# Patient Record
Sex: MALE | Race: WHITE | NOT HISPANIC OR LATINO | Employment: OTHER | URBAN - METROPOLITAN AREA
[De-identification: names, ages, dates, MRNs, and addresses within clinical notes are randomized per-mention and may not be internally consistent; named-entity substitution may affect disease eponyms.]

---

## 2021-09-09 ENCOUNTER — TELEPHONE (OUTPATIENT)
Dept: CT IMAGING | Facility: HOSPITAL | Age: 76
End: 2021-09-09

## 2021-09-09 ENCOUNTER — HOSPITAL ENCOUNTER (OUTPATIENT)
Dept: CT IMAGING | Facility: HOSPITAL | Age: 76
Discharge: HOME | End: 2021-09-09
Attending: OTOLARYNGOLOGY
Payer: MEDICARE

## 2021-09-09 DIAGNOSIS — D37.02 NEOPLASM OF UNCERTAIN BEHAVIOR OF BASE OF TONGUE: ICD-10-CM

## 2021-09-09 PROCEDURE — G1004 CDSM NDSC: HCPCS

## 2021-09-09 PROCEDURE — 70491 CT SOFT TISSUE NECK W/DYE: CPT

## 2021-09-09 RX ADMIN — IOHEXOL 85 ML: 350 INJECTION, SOLUTION INTRAVENOUS at 14:13

## 2021-09-09 NOTE — TELEPHONE ENCOUNTER
Called Dr MONTGOMERY [facility] office to get blood work results  According to his chart he had bloodwork in July thru the [place] but they have no results   Office will try to get results and fax to us before pt's 1:30 pm appt today

## 2021-10-03 DIAGNOSIS — Z11.59 SCREENING FOR VIRAL DISEASE: Primary | ICD-10-CM

## 2021-10-06 ENCOUNTER — TELEPHONE (OUTPATIENT)
Dept: URGENT CARE | Facility: CLINIC | Age: 76
End: 2021-10-06

## 2021-12-21 ENCOUNTER — HOSPITAL ENCOUNTER (INPATIENT)
Facility: HOSPITAL | Age: 76
LOS: 8 days | Discharge: HOME HEALTH | DRG: 871 | End: 2021-12-30
Attending: EMERGENCY MEDICINE | Admitting: INTERNAL MEDICINE
Payer: MEDICARE

## 2021-12-21 DIAGNOSIS — A41.9 SEPSIS (HCC): ICD-10-CM

## 2021-12-21 DIAGNOSIS — I26.99 PULMONARY EMBOLI (HCC): ICD-10-CM

## 2021-12-21 DIAGNOSIS — E83.42 HYPOMAGNESEMIA: ICD-10-CM

## 2021-12-21 DIAGNOSIS — J18.9 PNEUMONIA: ICD-10-CM

## 2021-12-21 DIAGNOSIS — R13.10 DYSPHAGIA, UNSPECIFIED TYPE: ICD-10-CM

## 2021-12-21 DIAGNOSIS — R74.01 TRANSAMINITIS: ICD-10-CM

## 2021-12-21 DIAGNOSIS — E11.9 TYPE 2 DIABETES MELLITUS WITHOUT COMPLICATION, WITHOUT LONG-TERM CURRENT USE OF INSULIN (HCC): ICD-10-CM

## 2021-12-21 DIAGNOSIS — E87.2 LACTIC ACIDOSIS: ICD-10-CM

## 2021-12-21 DIAGNOSIS — C01 PRIMARY MALIGNANT NEOPLASM OF BASE OF TONGUE (HCC): ICD-10-CM

## 2021-12-21 DIAGNOSIS — I10 ESSENTIAL (PRIMARY) HYPERTENSION: ICD-10-CM

## 2021-12-21 DIAGNOSIS — Z43.4 CHOLECYSTOSTOMY CARE (HCC): Primary | ICD-10-CM

## 2021-12-21 DIAGNOSIS — K81.9 CHOLECYSTITIS: ICD-10-CM

## 2021-12-21 PROCEDURE — 99285 EMERGENCY DEPT VISIT HI MDM: CPT

## 2021-12-21 PROCEDURE — 1124F ACP DISCUSS-NO DSCNMKR DOCD: CPT | Performed by: EMERGENCY MEDICINE

## 2021-12-22 ENCOUNTER — APPOINTMENT (EMERGENCY)
Dept: CT IMAGING | Facility: HOSPITAL | Age: 76
DRG: 871 | End: 2021-12-22
Payer: MEDICARE

## 2021-12-22 ENCOUNTER — APPOINTMENT (INPATIENT)
Dept: NON INVASIVE DIAGNOSTICS | Facility: HOSPITAL | Age: 76
DRG: 871 | End: 2021-12-22
Payer: MEDICARE

## 2021-12-22 ENCOUNTER — APPOINTMENT (INPATIENT)
Dept: ULTRASOUND IMAGING | Facility: HOSPITAL | Age: 76
DRG: 871 | End: 2021-12-22
Payer: MEDICARE

## 2021-12-22 ENCOUNTER — APPOINTMENT (EMERGENCY)
Dept: RADIOLOGY | Facility: HOSPITAL | Age: 76
DRG: 871 | End: 2021-12-22
Payer: MEDICARE

## 2021-12-22 PROBLEM — N52.9 ERECTILE DYSFUNCTION: Status: ACTIVE | Noted: 2021-12-22

## 2021-12-22 PROBLEM — R31.9 HEMATURIA: Status: ACTIVE | Noted: 2021-12-22

## 2021-12-22 PROBLEM — R13.10 DYSPHAGIA, UNSPECIFIED: Status: ACTIVE | Noted: 2021-12-22

## 2021-12-22 PROBLEM — K81.9 CHOLECYSTITIS: Status: ACTIVE | Noted: 2021-12-22

## 2021-12-22 PROBLEM — E87.2 LACTIC ACIDOSIS: Status: ACTIVE | Noted: 2021-12-22

## 2021-12-22 PROBLEM — M17.10 OSTEOARTHRITIS OF KNEE: Status: ACTIVE | Noted: 2021-12-22

## 2021-12-22 PROBLEM — F43.10 POST-TRAUMATIC STRESS DISORDER, UNSPECIFIED: Status: ACTIVE | Noted: 2021-12-22

## 2021-12-22 PROBLEM — I10 ESSENTIAL (PRIMARY) HYPERTENSION: Status: ACTIVE | Noted: 2021-12-22

## 2021-12-22 PROBLEM — K63.5 POLYP OF COLON: Status: ACTIVE | Noted: 2021-12-22

## 2021-12-22 PROBLEM — G56.00 CARPAL TUNNEL SYNDROME: Status: ACTIVE | Noted: 2021-12-22

## 2021-12-22 PROBLEM — I26.99 OTHER PULMONARY EMBOLISM WITHOUT ACUTE COR PULMONALE (HCC): Status: ACTIVE | Noted: 2021-12-22

## 2021-12-22 PROBLEM — F41.0 PANIC DISORDER: Status: ACTIVE | Noted: 2021-12-22

## 2021-12-22 PROBLEM — R01.1 HEART MURMUR: Status: ACTIVE | Noted: 2018-05-15

## 2021-12-22 PROBLEM — C01 PRIMARY MALIGNANT NEOPLASM OF BASE OF TONGUE (HCC): Status: ACTIVE | Noted: 2021-10-11

## 2021-12-22 PROBLEM — E78.5 HYPERLIPIDEMIA DUE TO TYPE 2 DIABETES MELLITUS (HCC): Status: ACTIVE | Noted: 2021-12-22

## 2021-12-22 PROBLEM — E11.69 HYPERLIPIDEMIA DUE TO TYPE 2 DIABETES MELLITUS (HCC): Status: ACTIVE | Noted: 2021-12-22

## 2021-12-22 PROBLEM — J18.9 COMMUNITY ACQUIRED PNEUMONIA OF LEFT LOWER LOBE OF LUNG: Status: ACTIVE | Noted: 2021-12-22

## 2021-12-22 PROBLEM — M17.9 OSTEOARTHRITIS OF KNEE: Status: ACTIVE | Noted: 2021-12-22

## 2021-12-22 PROBLEM — L57.0 ACTINIC KERATOSIS: Status: ACTIVE | Noted: 2021-12-22

## 2021-12-22 PROBLEM — F41.9 ANXIETY: Status: ACTIVE | Noted: 2021-12-22

## 2021-12-22 PROBLEM — Z85.51 HISTORY OF MALIGNANT NEOPLASM OF BLADDER: Status: ACTIVE | Noted: 2021-12-22

## 2021-12-22 PROBLEM — E87.20 LACTIC ACIDOSIS: Status: ACTIVE | Noted: 2021-12-22

## 2021-12-22 PROBLEM — F43.12 CHRONIC POST-TRAUMATIC STRESS DISORDER (PTSD) AFTER MILITARY COMBAT: Status: ACTIVE | Noted: 2021-12-22

## 2021-12-22 PROBLEM — I49.9 CARDIAC ARRHYTHMIA: Status: ACTIVE | Noted: 2018-05-15

## 2021-12-22 PROBLEM — E87.1 HYPONATREMIA: Status: ACTIVE | Noted: 2021-12-22

## 2021-12-22 PROBLEM — E11.9 TYPE 2 DIABETES MELLITUS (HCC): Status: ACTIVE | Noted: 2021-12-22

## 2021-12-22 PROBLEM — D48.5 NEOPLASM OF UNCERTAIN BEHAVIOR OF SKIN: Status: ACTIVE | Noted: 2021-12-22

## 2021-12-22 PROBLEM — I51.7 LEFT VENTRICULAR HYPERTROPHY: Status: ACTIVE | Noted: 2018-05-15

## 2021-12-22 PROBLEM — D37.05 NEOPLASM OF UNCERTAIN BEHAVIOR OF OROPHARYNX: Status: ACTIVE | Noted: 2021-09-23

## 2021-12-22 PROBLEM — F17.200 TOBACCO DEPENDENCE: Status: ACTIVE | Noted: 2021-12-22

## 2021-12-22 PROBLEM — A41.9 SEPSIS (HCC): Status: ACTIVE | Noted: 2021-12-22

## 2021-12-22 PROBLEM — C67.9 MALIGNANT NEOPLASM OF URINARY BLADDER (HCC): Status: ACTIVE | Noted: 2021-12-22

## 2021-12-22 PROBLEM — D49.1 NEOPLASM OF LUNG: Status: ACTIVE | Noted: 2021-12-22

## 2021-12-22 PROBLEM — E43 SEVERE PROTEIN-CALORIE MALNUTRITION (HCC): Status: ACTIVE | Noted: 2021-11-24

## 2021-12-22 LAB
2HR DELTA HS TROPONIN: 2 NG/L
4HR DELTA HS TROPONIN: 5 NG/L
ALBUMIN SERPL BCP-MCNC: 3.9 G/DL (ref 3.5–5)
ALP SERPL-CCNC: 77 U/L (ref 34–104)
ALT SERPL W P-5'-P-CCNC: 17 U/L (ref 7–52)
ANION GAP SERPL CALCULATED.3IONS-SCNC: 12 MMOL/L (ref 4–13)
ANISOCYTOSIS BLD QL SMEAR: PRESENT
AORTIC ROOT: 3.3 CM
AORTIC VALVE MEAN VELOCITY: 9.9 M/S
APICAL FOUR CHAMBER EJECTION FRACTION: 72 %
APTT PPP: 31 SECONDS (ref 23–37)
ASCENDING AORTA: 3.3 CM
AST SERPL W P-5'-P-CCNC: 19 U/L (ref 13–39)
ATRIAL RATE: 112 BPM
ATRIAL RATE: 115 BPM
ATRIAL RATE: 122 BPM
AV AREA BY CONTINUOUS VTI: 2.7 CM2
AV AREA PEAK VELOCITY: 2.6 CM2
AV LVOT MEAN GRADIENT: 3 MMHG
AV LVOT PEAK GRADIENT: 5 MMHG
AV MEAN GRADIENT: 4 MMHG
AV PEAK GRADIENT: 7 MMHG
AV VALVE AREA: 2.69 CM2
BASE EX.OXY STD BLDV CALC-SCNC: 52.9 % (ref 60–80)
BASE EXCESS BLDV CALC-SCNC: 0.4 MMOL/L
BASOPHILS # BLD MANUAL: 0 THOUSAND/UL (ref 0–0.1)
BASOPHILS NFR MAR MANUAL: 0 % (ref 0–1)
BILIRUB SERPL-MCNC: 0.94 MG/DL (ref 0.2–1)
BILIRUB UR QL STRIP: NEGATIVE
BNP SERPL-MCNC: 88 PG/ML (ref 1–100)
BUN SERPL-MCNC: 23 MG/DL (ref 5–25)
CALCIUM SERPL-MCNC: 9.7 MG/DL (ref 8.4–10.2)
CARDIAC TROPONIN I PNL SERPL HS: 16 NG/L
CARDIAC TROPONIN I PNL SERPL HS: 18 NG/L
CARDIAC TROPONIN I PNL SERPL HS: 21 NG/L
CHLORIDE SERPL-SCNC: 89 MMOL/L (ref 96–108)
CLARITY UR: CLEAR
CO2 SERPL-SCNC: 25 MMOL/L (ref 21–32)
COLOR UR: YELLOW
CREAT SERPL-MCNC: 0.9 MG/DL (ref 0.6–1.3)
DOP CALC AO VTI: 24.45 CM
DOP CALC LVOT AREA: 3.14 CM2
DOP CALC LVOT DIAMETER: 2 CM
DOP CALC LVOT PEAK VEL VTI: 20.98 CM
DOP CALC LVOT PEAK VEL: 1.13 M/S
DOP CALC LVOT STROKE INDEX: 31.2 ML/M2
DOP CALC LVOT STROKE VOLUME: 65.88 CM3
E WAVE DECELERATION TIME: 220 MS
EOSINOPHIL # BLD MANUAL: 0 THOUSAND/UL (ref 0–0.4)
EOSINOPHIL NFR BLD MANUAL: 0 % (ref 0–6)
ERYTHROCYTE [DISTWIDTH] IN BLOOD BY AUTOMATED COUNT: 19.3 % (ref 11.6–15.1)
EST. AVERAGE GLUCOSE BLD GHB EST-MCNC: 134 MG/DL
FLUAV RNA RESP QL NAA+PROBE: NEGATIVE
FLUBV RNA RESP QL NAA+PROBE: NEGATIVE
FRACTIONAL SHORTENING: 33 % (ref 28–44)
GFR SERPL CREATININE-BSD FRML MDRD: 83 ML/MIN/1.73SQ M
GLUCOSE SERPL-MCNC: 131 MG/DL (ref 65–140)
GLUCOSE SERPL-MCNC: 157 MG/DL (ref 65–140)
GLUCOSE SERPL-MCNC: 182 MG/DL (ref 65–140)
GLUCOSE SERPL-MCNC: 206 MG/DL (ref 65–140)
GLUCOSE SERPL-MCNC: 226 MG/DL (ref 65–140)
GLUCOSE UR STRIP-MCNC: ABNORMAL MG/DL
HBA1C MFR BLD: 6.3 %
HCO3 BLDV-SCNC: 25.4 MMOL/L (ref 24–30)
HCT VFR BLD AUTO: 36 % (ref 36.5–49.3)
HGB BLD-MCNC: 11.8 G/DL (ref 12–17)
HGB UR QL STRIP.AUTO: NEGATIVE
INR PPP: 1.01 (ref 0.84–1.19)
INTERVENTRICULAR SEPTUM IN DIASTOLE (PARASTERNAL SHORT AXIS VIEW): 1.3 CM
KETONES UR STRIP-MCNC: NEGATIVE MG/DL
LACTATE SERPL-SCNC: 1.9 MMOL/L (ref 0.5–2)
LACTATE SERPL-SCNC: 3.4 MMOL/L (ref 0.5–2)
LEFT ATRIUM AREA SYSTOLE SINGLE PLANE A4C: 11.5 CM2
LEFT INTERNAL DIMENSION IN SYSTOLE: 2.9 CM (ref 2.1–4)
LEFT VENTRICULAR INTERNAL DIMENSION IN DIASTOLE: 4.3 CM (ref 4.93–7.34)
LEFT VENTRICULAR POSTERIOR WALL IN END DIASTOLE: 1.1 CM
LEFT VENTRICULAR STROKE VOLUME: 49 ML
LEUKOCYTE ESTERASE UR QL STRIP: NEGATIVE
LYMPHOCYTES # BLD AUTO: 0.06 THOUSAND/UL (ref 0.6–4.47)
LYMPHOCYTES # BLD AUTO: 1 % (ref 14–44)
MAGNESIUM SERPL-MCNC: 1.7 MG/DL (ref 1.9–2.7)
MCH RBC QN AUTO: 29.2 PG (ref 26.8–34.3)
MCHC RBC AUTO-ENTMCNC: 32.8 G/DL (ref 31.4–37.4)
MCV RBC AUTO: 89 FL (ref 82–98)
MONOCYTES # BLD AUTO: 0.52 THOUSAND/UL (ref 0–1.22)
MONOCYTES NFR BLD: 8 % (ref 4–12)
MV E'TISSUE VEL-SEP: 9 CM/S
MV PEAK A VEL: 0.76 M/S
MV PEAK E VEL: 68 CM/S
MV STENOSIS PRESSURE HALF TIME: 0 MS
NEUTROPHILS # BLD MANUAL: 5.9 THOUSAND/UL (ref 1.85–7.62)
NEUTS BAND NFR BLD MANUAL: 8 % (ref 0–8)
NEUTS SEG NFR BLD AUTO: 83 % (ref 43–75)
NITRITE UR QL STRIP: NEGATIVE
O2 CT BLDV-SCNC: 9.6 ML/DL
P AXIS: 52 DEGREES
P AXIS: 55 DEGREES
P AXIS: 61 DEGREES
PCO2 BLDV: 42.5 MM HG (ref 42–50)
PH BLDV: 7.39 [PH] (ref 7.3–7.4)
PH UR STRIP.AUTO: 6.5 [PH]
PLATELET # BLD AUTO: 225 THOUSANDS/UL (ref 149–390)
PLATELET BLD QL SMEAR: ADEQUATE
PMV BLD AUTO: 9.9 FL (ref 8.9–12.7)
PO2 BLDV: 25.3 MM HG (ref 35–45)
POTASSIUM SERPL-SCNC: 4.5 MMOL/L (ref 3.5–5.3)
PR INTERVAL: 178 MS
PR INTERVAL: 180 MS
PR INTERVAL: 184 MS
PROCALCITONIN SERPL-MCNC: 0.41 NG/ML
PROT SERPL-MCNC: 7.4 G/DL (ref 6.4–8.4)
PROT UR STRIP-MCNC: NEGATIVE MG/DL
PROTHROMBIN TIME: 13.2 SECONDS (ref 11.6–14.5)
QRS AXIS: 15 DEGREES
QRS AXIS: 20 DEGREES
QRS AXIS: 42 DEGREES
QRSD INTERVAL: 108 MS
QRSD INTERVAL: 116 MS
QRSD INTERVAL: 118 MS
QT INTERVAL: 306 MS
QT INTERVAL: 334 MS
QT INTERVAL: 338 MS
QTC INTERVAL: 436 MS
QTC INTERVAL: 461 MS
QTC INTERVAL: 462 MS
RBC # BLD AUTO: 4.04 MILLION/UL (ref 3.88–5.62)
RIGHT ATRIUM AREA SYSTOLE A4C: 10.2 CM2
RIGHT VENTRICLE ID DIMENSION: 2.7 CM
RSV RNA RESP QL NAA+PROBE: NEGATIVE
SARS-COV-2 RNA RESP QL NAA+PROBE: NEGATIVE
SL CV LV EF: 70
SL CV PED ECHO LEFT VENTRICLE DIASTOLIC VOLUME (MOD BIPLANE) 2D: 81 ML
SL CV PED ECHO LEFT VENTRICLE SYSTOLIC VOLUME (MOD BIPLANE) 2D: 32 ML
SODIUM SERPL-SCNC: 126 MMOL/L (ref 135–147)
SP GR UR STRIP.AUTO: <=1.005 (ref 1–1.03)
T WAVE AXIS: 48 DEGREES
T WAVE AXIS: 50 DEGREES
T WAVE AXIS: 51 DEGREES
TRICUSPID VALVE S': 1.1 CM/S
UROBILINOGEN UR QL STRIP.AUTO: 0.2 E.U./DL
VENTRICULAR RATE: 112 BPM
VENTRICULAR RATE: 115 BPM
VENTRICULAR RATE: 122 BPM
WBC # BLD AUTO: 6.48 THOUSAND/UL (ref 4.31–10.16)
Z-SCORE OF LEFT VENTRICULAR DIMENSION IN END SYSTOLE: -3.36

## 2021-12-22 PROCEDURE — 83605 ASSAY OF LACTIC ACID: CPT | Performed by: EMERGENCY MEDICINE

## 2021-12-22 PROCEDURE — 84145 PROCALCITONIN (PCT): CPT | Performed by: EMERGENCY MEDICINE

## 2021-12-22 PROCEDURE — G1004 CDSM NDSC: HCPCS

## 2021-12-22 PROCEDURE — 85730 THROMBOPLASTIN TIME PARTIAL: CPT | Performed by: EMERGENCY MEDICINE

## 2021-12-22 PROCEDURE — 85007 BL SMEAR W/DIFF WBC COUNT: CPT | Performed by: EMERGENCY MEDICINE

## 2021-12-22 PROCEDURE — 74177 CT ABD & PELVIS W/CONTRAST: CPT

## 2021-12-22 PROCEDURE — 71045 X-RAY EXAM CHEST 1 VIEW: CPT

## 2021-12-22 PROCEDURE — 84484 ASSAY OF TROPONIN QUANT: CPT | Performed by: EMERGENCY MEDICINE

## 2021-12-22 PROCEDURE — 87040 BLOOD CULTURE FOR BACTERIA: CPT | Performed by: EMERGENCY MEDICINE

## 2021-12-22 PROCEDURE — 96375 TX/PRO/DX INJ NEW DRUG ADDON: CPT

## 2021-12-22 PROCEDURE — 81003 URINALYSIS AUTO W/O SCOPE: CPT | Performed by: EMERGENCY MEDICINE

## 2021-12-22 PROCEDURE — 92610 EVALUATE SWALLOWING FUNCTION: CPT | Performed by: NURSE PRACTITIONER

## 2021-12-22 PROCEDURE — 93010 ELECTROCARDIOGRAM REPORT: CPT | Performed by: INTERNAL MEDICINE

## 2021-12-22 PROCEDURE — 82948 REAGENT STRIP/BLOOD GLUCOSE: CPT

## 2021-12-22 PROCEDURE — 99223 1ST HOSP IP/OBS HIGH 75: CPT | Performed by: PHYSICIAN ASSISTANT

## 2021-12-22 PROCEDURE — 99222 1ST HOSP IP/OBS MODERATE 55: CPT | Performed by: SPECIALIST

## 2021-12-22 PROCEDURE — 93306 TTE W/DOPPLER COMPLETE: CPT

## 2021-12-22 PROCEDURE — 83036 HEMOGLOBIN GLYCOSYLATED A1C: CPT | Performed by: PHYSICIAN ASSISTANT

## 2021-12-22 PROCEDURE — 85027 COMPLETE CBC AUTOMATED: CPT | Performed by: EMERGENCY MEDICINE

## 2021-12-22 PROCEDURE — 96361 HYDRATE IV INFUSION ADD-ON: CPT

## 2021-12-22 PROCEDURE — 76705 ECHO EXAM OF ABDOMEN: CPT

## 2021-12-22 PROCEDURE — 36415 COLL VENOUS BLD VENIPUNCTURE: CPT | Performed by: EMERGENCY MEDICINE

## 2021-12-22 PROCEDURE — 93306 TTE W/DOPPLER COMPLETE: CPT | Performed by: INTERNAL MEDICINE

## 2021-12-22 PROCEDURE — 83735 ASSAY OF MAGNESIUM: CPT | Performed by: EMERGENCY MEDICINE

## 2021-12-22 PROCEDURE — 99285 EMERGENCY DEPT VISIT HI MDM: CPT | Performed by: EMERGENCY MEDICINE

## 2021-12-22 PROCEDURE — 93970 EXTREMITY STUDY: CPT

## 2021-12-22 PROCEDURE — 0241U HB NFCT DS VIR RESP RNA 4 TRGT: CPT | Performed by: EMERGENCY MEDICINE

## 2021-12-22 PROCEDURE — 83880 ASSAY OF NATRIURETIC PEPTIDE: CPT | Performed by: EMERGENCY MEDICINE

## 2021-12-22 PROCEDURE — 93970 EXTREMITY STUDY: CPT | Performed by: SURGERY

## 2021-12-22 PROCEDURE — 96365 THER/PROPH/DIAG IV INF INIT: CPT

## 2021-12-22 PROCEDURE — 80053 COMPREHEN METABOLIC PANEL: CPT | Performed by: EMERGENCY MEDICINE

## 2021-12-22 PROCEDURE — 71275 CT ANGIOGRAPHY CHEST: CPT

## 2021-12-22 PROCEDURE — 82805 BLOOD GASES W/O2 SATURATION: CPT | Performed by: EMERGENCY MEDICINE

## 2021-12-22 PROCEDURE — 85610 PROTHROMBIN TIME: CPT | Performed by: EMERGENCY MEDICINE

## 2021-12-22 PROCEDURE — 93005 ELECTROCARDIOGRAM TRACING: CPT

## 2021-12-22 RX ORDER — FLUTICASONE PROPIONATE 50 MCG
1 SPRAY, SUSPENSION (ML) NASAL DAILY
Status: DISCONTINUED | OUTPATIENT
Start: 2021-12-22 | End: 2021-12-30 | Stop reason: HOSPADM

## 2021-12-22 RX ORDER — MAGNESIUM SULFATE HEPTAHYDRATE 40 MG/ML
2 INJECTION, SOLUTION INTRAVENOUS ONCE
Status: COMPLETED | OUTPATIENT
Start: 2021-12-22 | End: 2021-12-22

## 2021-12-22 RX ORDER — LISINOPRIL 20 MG/1
40 TABLET ORAL DAILY
Status: DISCONTINUED | OUTPATIENT
Start: 2021-12-22 | End: 2021-12-24

## 2021-12-22 RX ORDER — PANTOPRAZOLE SODIUM 20 MG/1
20 TABLET, DELAYED RELEASE ORAL DAILY
Status: DISCONTINUED | OUTPATIENT
Start: 2021-12-22 | End: 2021-12-24

## 2021-12-22 RX ORDER — ATORVASTATIN CALCIUM 40 MG/1
40 TABLET, FILM COATED ORAL
Status: DISCONTINUED | OUTPATIENT
Start: 2021-12-22 | End: 2021-12-24

## 2021-12-22 RX ORDER — ONDANSETRON 2 MG/ML
4 INJECTION INTRAMUSCULAR; INTRAVENOUS EVERY 6 HOURS PRN
Status: DISCONTINUED | OUTPATIENT
Start: 2021-12-22 | End: 2021-12-30 | Stop reason: HOSPADM

## 2021-12-22 RX ORDER — METOPROLOL TARTRATE 5 MG/5ML
5 INJECTION INTRAVENOUS EVERY 8 HOURS
Status: DISCONTINUED | OUTPATIENT
Start: 2021-12-22 | End: 2021-12-24

## 2021-12-22 RX ORDER — ACETAMINOPHEN 650 MG/1
650 SUPPOSITORY RECTAL EVERY 4 HOURS PRN
Status: DISCONTINUED | OUTPATIENT
Start: 2021-12-22 | End: 2021-12-30 | Stop reason: HOSPADM

## 2021-12-22 RX ORDER — KETOROLAC TROMETHAMINE 30 MG/ML
15 INJECTION, SOLUTION INTRAMUSCULAR; INTRAVENOUS EVERY 6 HOURS PRN
Status: DISPENSED | OUTPATIENT
Start: 2021-12-22 | End: 2021-12-24

## 2021-12-22 RX ORDER — HYDROMORPHONE HCL/PF 1 MG/ML
0.5 SYRINGE (ML) INJECTION ONCE
Status: COMPLETED | OUTPATIENT
Start: 2021-12-22 | End: 2021-12-22

## 2021-12-22 RX ORDER — ACETAMINOPHEN 325 MG/1
975 TABLET ORAL ONCE
Status: COMPLETED | OUTPATIENT
Start: 2021-12-22 | End: 2021-12-22

## 2021-12-22 RX ORDER — GLYCOPYRROLATE 0.2 MG/ML
0.1 INJECTION INTRAMUSCULAR; INTRAVENOUS EVERY 8 HOURS PRN
Status: DISCONTINUED | OUTPATIENT
Start: 2021-12-22 | End: 2021-12-30 | Stop reason: HOSPADM

## 2021-12-22 RX ORDER — SODIUM CHLORIDE 9 MG/ML
125 INJECTION, SOLUTION INTRAVENOUS CONTINUOUS
Status: DISCONTINUED | OUTPATIENT
Start: 2021-12-22 | End: 2021-12-23

## 2021-12-22 RX ORDER — ASPIRIN 81 MG/1
81 TABLET ORAL DAILY
Status: DISCONTINUED | OUTPATIENT
Start: 2021-12-22 | End: 2021-12-24

## 2021-12-22 RX ORDER — ACETAMINOPHEN 325 MG/1
650 TABLET ORAL EVERY 6 HOURS PRN
Status: DISCONTINUED | OUTPATIENT
Start: 2021-12-22 | End: 2021-12-22

## 2021-12-22 RX ADMIN — KETOROLAC TROMETHAMINE 15 MG: 30 INJECTION, SOLUTION INTRAMUSCULAR; INTRAVENOUS at 21:40

## 2021-12-22 RX ADMIN — VANCOMYCIN HYDROCHLORIDE 1250 MG: 1 INJECTION, POWDER, LYOPHILIZED, FOR SOLUTION INTRAVENOUS at 03:18

## 2021-12-22 RX ADMIN — MAGNESIUM SULFATE HEPTAHYDRATE 2 G: 40 INJECTION, SOLUTION INTRAVENOUS at 05:42

## 2021-12-22 RX ADMIN — PIPERACILLIN SODIUM AND TAZOBACTAM SODIUM 4.5 G: 4; .5 INJECTION, POWDER, LYOPHILIZED, FOR SOLUTION INTRAVENOUS at 17:28

## 2021-12-22 RX ADMIN — PIPERACILLIN SODIUM AND TAZOBACTAM SODIUM 4.5 G: 4; .5 INJECTION, POWDER, LYOPHILIZED, FOR SOLUTION INTRAVENOUS at 07:37

## 2021-12-22 RX ADMIN — ACETAMINOPHEN 975 MG: 325 TABLET ORAL at 02:27

## 2021-12-22 RX ADMIN — PIPERACILLIN SODIUM AND TAZOBACTAM SODIUM 4.5 G: 4; .5 INJECTION, POWDER, LYOPHILIZED, FOR SOLUTION INTRAVENOUS at 13:01

## 2021-12-22 RX ADMIN — ENOXAPARIN SODIUM 80 MG: 100 INJECTION, SOLUTION INTRAVENOUS; SUBCUTANEOUS at 17:30

## 2021-12-22 RX ADMIN — IOHEXOL 100 ML: 350 INJECTION, SOLUTION INTRAVENOUS at 03:03

## 2021-12-22 RX ADMIN — SODIUM CHLORIDE 1000 ML: 0.9 INJECTION, SOLUTION INTRAVENOUS at 02:42

## 2021-12-22 RX ADMIN — METOPROLOL TARTRATE 25 MG: 25 TABLET, FILM COATED ORAL at 09:54

## 2021-12-22 RX ADMIN — PIPERACILLIN AND TAZOBACTAM 3.38 G: 3; .375 INJECTION, POWDER, FOR SOLUTION INTRAVENOUS at 01:41

## 2021-12-22 RX ADMIN — SODIUM CHLORIDE 125 ML/HR: 0.9 INJECTION, SOLUTION INTRAVENOUS at 23:00

## 2021-12-22 RX ADMIN — SODIUM CHLORIDE 125 ML/HR: 0.9 INJECTION, SOLUTION INTRAVENOUS at 05:39

## 2021-12-22 RX ADMIN — HYDROMORPHONE HYDROCHLORIDE 0.5 MG: 1 INJECTION, SOLUTION INTRAMUSCULAR; INTRAVENOUS; SUBCUTANEOUS at 02:36

## 2021-12-22 RX ADMIN — PIPERACILLIN SODIUM AND TAZOBACTAM SODIUM 4.5 G: 4; .5 INJECTION, POWDER, LYOPHILIZED, FOR SOLUTION INTRAVENOUS at 23:00

## 2021-12-22 RX ADMIN — SODIUM CHLORIDE 1000 ML: 0.9 INJECTION, SOLUTION INTRAVENOUS at 01:22

## 2021-12-22 RX ADMIN — ENOXAPARIN SODIUM 80 MG: 100 INJECTION, SOLUTION INTRAVENOUS; SUBCUTANEOUS at 04:09

## 2021-12-22 RX ADMIN — ACETAMINOPHEN 650 MG: 650 SUPPOSITORY RECTAL at 15:20

## 2021-12-22 RX ADMIN — ASPIRIN 81 MG: 81 TABLET, COATED ORAL at 09:53

## 2021-12-22 RX ADMIN — LISINOPRIL 40 MG: 20 TABLET ORAL at 09:53

## 2021-12-22 RX ADMIN — METOPROLOL TARTRATE 5 MG: 5 INJECTION INTRAVENOUS at 21:39

## 2021-12-22 RX ADMIN — SODIUM CHLORIDE 500 ML: 0.9 INJECTION, SOLUTION INTRAVENOUS at 23:47

## 2021-12-22 RX ADMIN — PANTOPRAZOLE SODIUM 20 MG: 20 TABLET, DELAYED RELEASE ORAL at 09:54

## 2021-12-22 RX ADMIN — SODIUM CHLORIDE 1000 ML: 0.9 INJECTION, SOLUTION INTRAVENOUS at 04:08

## 2021-12-22 NOTE — ASSESSMENT & PLAN NOTE
No results found for: HGBA1C    No results for input(s): POCGLU in the last 72 hours  Blood Sugar Average: Last 72 hrs:    Continue pre-hospital Lipitor 40 mg p o   Daily

## 2021-12-22 NOTE — ED PROCEDURE NOTE
PROCEDURE  CriticalCare Time  Performed by: Adeline Gutierrez MD  Authorized by: Adeline Gutierrez MD     Critical care provider statement:     Critical care time (minutes):  65    Critical care start time:  12/22/2021 1:30 AM    Critical care end time:  12/22/2021 4:25 AM    Critical care was necessary to treat or prevent imminent or life-threatening deterioration of the following conditions:  Dehydration and sepsis    Critical care was time spent personally by me on the following activities:  Examination of patient, evaluation of patient's response to treatment, discussions with consultants, development of treatment plan with patient or surrogate, review of old charts, re-evaluation of patient's condition, ordering and review of radiographic studies, ordering and review of laboratory studies and ordering and performing treatments and interventions         Adeline Gutierrez MD  12/22/21 5720

## 2021-12-22 NOTE — PLAN OF CARE
Problem: Nutrition/Hydration-ADULT  Goal: Nutrient/Hydration intake appropriate for improving, restoring or maintaining nutritional needs  Description: Monitor and assess patient's nutrition/hydration status for malnutrition  Collaborate with interdisciplinary team and initiate plan and interventions as ordered  Monitor patient's weight and dietary intake as ordered or per policy  Utilize nutrition screening tool and intervene as necessary  Determine patient's food preferences and provide high-protein, high-caloric foods as appropriate       INTERVENTIONS:  - Monitor oral intake, urinary output, labs, and treatment plans  - Assess nutrition and hydration status and recommend course of action  - Evaluate amount of meals eaten  - Assist patient with eating if necessary   - Allow adequate time for meals  - Recommend/ encourage appropriate diets, oral nutritional supplements, and vitamin/mineral supplements  - Order, calculate, and assess calorie counts as needed  - Recommend, monitor, and adjust tube feedings and TPN/PPN based on assessed needs  - Assess need for intravenous fluids  - Provide specific nutrition/hydration education as appropriate  - Include patient/family/caregiver in decisions related to nutrition  Outcome: Progressing     Problem: INFECTION - ADULT  Goal: Absence or prevention of progression during hospitalization  Description: INTERVENTIONS:  - Assess and monitor for signs and symptoms of infection  - Monitor lab/diagnostic results  - Monitor all insertion sites, i e  indwelling lines, tubes, and drains  - Monitor endotracheal if appropriate and nasal secretions for changes in amount and color  - [place] appropriate cooling/warming therapies per order  - Administer medications as ordered  - Instruct and encourage patient and family to use good hand hygiene technique  - Identify and instruct in appropriate isolation precautions for identified infection/condition  Outcome: Progressing     Problem: Potential for Falls  Goal: Patient will remain free of falls  Description: INTERVENTIONS:  - Educate patient/family on patient safety including physical limitations  - Instruct patient to call for assistance with activity   - Consult OT/PT to assist with strengthening/mobility   - Keep Call bell within reach  - Keep bed low and locked with side rails adjusted as appropriate  - Keep care items and personal belongings within reach  - Initiate and maintain comfort rounds  - Make Fall Risk Sign visible to staff  - Apply yellow socks and bracelet for high fall risk patients  - Consider moving patient to room near nurses station  Outcome: Progressing     Problem: MOBILITY - ADULT  Goal: Maintain or return to baseline ADL function  Description: INTERVENTIONS:  -  Assess patient's ability to carry out ADLs; assess patient's baseline for ADL function and identify physical deficits which impact ability to perform ADLs (bathing, care of mouth/teeth, toileting, grooming, dressing, etc )  - Assess/evaluate cause of self-care deficits   - Assess range of motion  - Assess patient's mobility; develop plan if impaired  - Assess patient's need for assistive devices and provide as appropriate  - Encourage maximum independence but intervene and supervise when necessary  - Involve family in performance of ADLs  - Assess for home care needs following discharge   - Consider OT consult to assist with ADL evaluation and planning for discharge  - Provide patient education as appropriate  Outcome: Progressing     Problem: DISCHARGE PLANNING  Goal: Discharge to home or other facility with appropriate resources  Description: INTERVENTIONS:  - Identify barriers to discharge w/patient and caregiver  - Arrange for needed discharge resources and transportation as appropriate  - Identify discharge learning needs (meds, wound care, etc )  - Arrange for interpretive services to assist at discharge as needed  - Refer to Case Management Department for coordinating discharge planning if the patient needs post-hospital services based on physician/advanced practitioner order or complex needs related to functional status, cognitive ability, or social support system  Outcome: Progressing

## 2021-12-22 NOTE — ASSESSMENT & PLAN NOTE
Patient currently has feeding tube though he is unsure what formula he is receiving, dose or frequency    Will attempt to clarify and order once more information is available

## 2021-12-22 NOTE — ASSESSMENT & PLAN NOTE
· Will give Lovenox 1 milligram/kilogram subQ q 12 hours  · Placed on O2 protocol  · Obtain echocardiogram and venous duplex extended in a m

## 2021-12-22 NOTE — ASSESSMENT & PLAN NOTE
· Secondary to sepsis  · Resolved status post fluid resuscitation  · Initial lactic acid was 3 4 with repeat of 1 9

## 2021-12-22 NOTE — ED NOTES
Pt was found standing up next to bed  IV dislodged when pt stood up  Skin tear noted to right forearm  PA and RN assisted pt back into bed an instructed him to use call fritz Pena RN  12/22/21 0168

## 2021-12-22 NOTE — H&P
[address]  H&P- Brinda Berrios 1945, 78 y o  male MRN: [id number]  Unit/Bed#: ED 28 Encounter: [id number]  Primary Care Provider: Jennie Velasquez MD   Date and time admitted to hospital: 12/21/2021 11:56 PM    * Sepsis [facility])  Assessment & Plan  · Admit to telemetry  · Patient received sepsis protocol normal saline fluid resuscitation followed by 125 cc/hour  · Will trend lactic acid and procalcitonin  · Blood cultures and urine culture currently pending  · Will give Zosyn 4 5 g IV q 6 hours  · Patient met sepsis criteria in that he was tachycardic with heart rate as high as 120, lactic acidosis of 3 4, febrile a T-max 101 5° unknown source of infection being community-acquired pneumonia as well as cholecystitis    Other pulmonary embolism without acute cor pulmonale ([facility] )  Assessment & Plan  · Will give Lovenox 1 milligram/kilogram subQ q 12 hours  · Placed on O2 protocol  · Obtain echocardiogram and venous duplex extended in a m  Community acquired pneumonia of left lower lobe of lung  Assessment & Plan  · Being treated as per above  · Will obtain urine for strep pneumo and Legionella    Cholecystitis  Assessment & Plan  · Will obtain ultrasound gallbladder in a m  · Will give Zosyn as per above  · Consult general surgery    Lactic acidosis  Assessment & Plan  · Secondary to sepsis  · Resolved status post fluid resuscitation  · Initial lactic acid was 3 4 with repeat of 1 9    Hyponatremia  Assessment & Plan  Will hydrate with normal saline 125 cc/hour continue to monitor with repeat labs in a m  Primary malignant neoplasm of base of tongue [facility])  Assessment & Plan  Patient just completed radiation therapy at [address]    Hyperlipidemia due to type 2 diabetes mellitus ([facility] )  Assessment & Plan  No results found for: HGBA1C    No results for input(s): POCGLU in the last 72 hours  Blood Sugar Average: Last 72 hrs:    Continue pre-hospital Lipitor 40 mg p o   Daily    Type 2 diabetes mellitus ([facility] )  Assessment & Plan  No results found for: HGBA1C    No results for input(s): POCGLU in the last 72 hours  Blood Sugar Average: Last 72 hrs:    Patient is NPO, will obtain Accu-Cheks q 6 hours with Humalog correction dose q 6    Dysphagia, unspecified  Assessment & Plan  Patient currently has feeding tube though he is unsure what formula he is receiving, dose or frequency  Will attempt to clarify and order once more information is available    Essential (primary) hypertension  Assessment & Plan  Substitute lisinopril 40 mg p o  Daily for pre-hospital ramipril and metoprolol 25 mg p o  B i d       VTE Prophylaxis: Enoxaparin (Lovenox)  Code Status:  Level 1  Discussion with family:  None present at bedside at time of exam    Anticipated Length of Stay:  Patient will be admitted on an Inpatient basis with an anticipated length of stay of  > 2 midnights  Justification for Hospital Stay:  Sepsis secondary to community-acquired pneumonia as well as cholecystitis requiring IV antibiotics, IV fluid resuscitation close monitoring  Pulmonary embolism requiring anticoagulation and further evaluation    Total Time for Visit, including Counseling / Coordination of Care: 1 hour  Greater than 50% of this total time spent on direct patient counseling and coordination of care  Chief Complaint:   Generalized weakness x6 days    History of Present Illness:    Zoie Becerril is a 78 y o  male who presents with generalized weakness x6 days  Patient presents ER for further evaluation treatment of his 6 day history of generalized weakness  Patient is undergoing radiation treatment [address] [place] [address] for tongue cancer and just completed his 7 weeks of radiation    Patient is additionally complaining of right upper quadrant abdominal pain and was noted to be febrile upon arrival in the ER with a T-max of 101 2    Patient is a poor historian and unable provide any other information, he denies any cough or shortness of breath, no chest pain or palpitations, he states that he often has nausea vomiting secondary to his cancer treatment  Review of Systems:    Review of Systems   Constitutional: Positive for chills, fatigue and fever  Respiratory: Negative for cough, shortness of breath and wheezing  Cardiovascular: Negative for chest pain and palpitations  Gastrointestinal: Positive for abdominal pain  Negative for diarrhea, nausea and vomiting  Genitourinary: Negative for dysuria, frequency, hematuria and urgency  Neurological: Positive for weakness  Negative for light-headedness and headaches  All other systems reviewed and are negative  Past Medical and Surgical History:     Past Medical History:   Diagnosis Date    Cancer ([facility] )     Diabetes ([facility] )     Gastritis     GERD (gastroesophageal reflux disease)     History of bladder cancer 2010    treated with surgery    Hypercholesterolemia     Hypertension        Past Surgical History:   Procedure Laterality Date    BLADDER TUMOR EXCISION      EGD         Meds/Allergies:    Prior to Admission medications    Medication Sig Start Date End Date Taking?  Authorizing Provider   aspirin (ECOTRIN LOW STRENGTH) 81 mg EC tablet Take 81 mg by mouth    Historical Provider, MD   atorvastatin (LIPITOR) 40 mg tablet Take 40 mg by mouth    Historical Provider, MD   Empagliflozin 25 MG TABS Take 1 tablet by mouth    Historical Provider, MD   fluticasone (FLONASE) 50 mcg/act nasal spray 1 spray into each nostril daily    Historical Provider, MD   insulin glargine (LANTUS) 100 units/mL subcutaneous injection Inject under the skin    Historical Provider, MD   metFORMIN (GLUCOPHAGE) 1000 MG tablet Take 1,000 mg by mouth    Historical Provider, MD   METOPROLOL TARTRATE PO Take by mouth    Historical Provider, MD   pantoprazole (PROTONIX) 20 mg tablet Take 20 mg by mouth daily    Historical Provider, MD   ramipril (ALTACE) 10 MG capsule Take 10 mg by mouth    Historical Provider, MD     all medications and allergies reviewed    Allergies: No Known Allergies    Social History:     Marital Status: /Civil Union   Occupation:  Retired  Patient Pre-hospital Living Situation:  Resides at home with wife and daughter  Patient Pre-hospital Level of Mobility:  Full without assist  Patient Pre-hospital Diet Restrictions:  Tube feeds  Substance Use History:   Social History     Substance and Sexual Activity   Alcohol Use Not Currently     Social History     Tobacco Use   Smoking Status Former Smoker    Quit date: 2021    Years since quittin 3   Smokeless Tobacco Never Used     Social History     Substance and Sexual Activity   Drug Use Never       Family History:  I have reviewed the patient's family history    Physical Exam:     Vitals:   Blood Pressure: 143/70 (21)  Pulse: (!) 120 (21)  Temperature: 100 1 °F (37 8 °C) (21)  Temp Source: Tympanic (21)  Respirations: 20 (21)  Height: 6' 2" (188 cm) (21)  Weight - Scale: 79 4 kg (175 lb) (21)  SpO2: 93 % (21)    Physical Exam  Vitals and nursing note reviewed  Constitutional:       General: He is not in acute distress  Appearance: Normal appearance  HENT:      Head: Normocephalic and atraumatic  Right Ear: Tympanic membrane normal       Left Ear: Tympanic membrane normal       Nose: Nose normal       Mouth/Throat:      Mouth: Mucous membranes are moist       Pharynx: No oropharyngeal exudate or posterior oropharyngeal erythema  Comments: Oral thrush  Eyes:      Extraocular Movements: Extraocular movements intact  Pupils: Pupils are equal, round, and reactive to light  Cardiovascular:      Rate and Rhythm: Regular rhythm  Tachycardia present  Pulses: Normal pulses  Heart sounds: Murmur heard  Pulmonary:      Effort: Pulmonary effort is normal  No respiratory distress        Breath sounds: Normal breath sounds  Abdominal:      General: Abdomen is flat  Bowel sounds are normal       Palpations: Abdomen is soft  Tenderness: There is abdominal tenderness in the right upper quadrant  Comments: Feeding tube present left upper quadrant   Musculoskeletal:         General: Normal range of motion  Cervical back: Normal range of motion and neck supple  Right lower leg: No edema  Left lower leg: No edema  Skin:     General: Skin is warm and dry  Capillary Refill: Capillary refill takes less than 2 seconds  Findings: Ecchymosis and signs of injury present  Comments: Multiple ecchymosis bilateral upper extremities with skin tear of posterior right forearm   Neurological:      General: No focal deficit present  Mental Status: He is alert and oriented to person, place, and time  Additional Data:     Lab Results: I have personally reviewed pertinent reports  Results from last 7 days   Lab Units 12/22/21  0103   WBC Thousand/uL 6 48   HEMOGLOBIN g/dL 11 8*   HEMATOCRIT % 36 0*   PLATELETS Thousands/uL 225   BANDS PCT % 8   LYMPHO PCT % 1*   MONO PCT % 8   EOS PCT % 0     Results from last 7 days   Lab Units 12/22/21  0103   SODIUM mmol/L 126*   POTASSIUM mmol/L 4 5   CHLORIDE mmol/L 89*   CO2 mmol/L 25   BUN mg/dL 23   CREATININE mg/dL 0 90   ANION GAP mmol/L 12   CALCIUM mg/dL 9 7   ALBUMIN g/dL 3 9   TOTAL BILIRUBIN mg/dL 0 94   ALK PHOS U/L 77   ALT U/L 17   AST U/L 19   GLUCOSE RANDOM mg/dL 226*     Results from last 7 days   Lab Units 12/22/21  0103   INR  1 01             Results from last 7 days   Lab Units 12/22/21  0410 12/22/21  0103   LACTIC ACID mmol/L 1 9 3 4*       Imaging: I have personally reviewed pertinent reports  PE Study with CT abdomen & pelvis with contrast   Final Result by Connie Olson MD (70/01 0163)      1  Motion limited examination    Small pulmonary emboli are seen within the segmental pulmonary arteries of bilateral lungs  Measured RV/LV ratio is within normal limits at less than 0 9      2   Patchy opacities in left upper and lower lobe bases likely due to pneumonia and less likely pulmonary infarction  3   Distended gallbladder with pericholecystic stranding  Further evaluation with ultrasound may be obtained to evaluate for noncalcified stones/cholecystitis  4   Mild focal thickening of the hepatic flexure may be reactive to adjacent gallbladder  I personally discussed this study with Steven Baig on 12/22/2021 at 3:43 AM          Workstation performed: [id number]         XR chest portable - 1 view    (Results Pending)   US right upper quadrant    (Results Pending)   VAS lower limb venous duplex study, complete bilateral    (Results Pending)         EKG, Pathology, and Other Studies Reviewed on Admission:   · EKG: N/A    Epic / Care Everywhere Records Reviewed: Yes    ** Please Note: This note has been constructed using a voice recognition system   **

## 2021-12-22 NOTE — SEPSIS NOTE
Sepsis Note   Idania Vasquez 78 y o  male MRN: [id number]  Unit/Bed#: ED 28 Encounter: [id number]       qSOFA     Row Name 12/22/21 0411 12/22/21 0230 12/22/21 0200 12/21/21 7947       Altered mental status GCS < 15 0 -- -- --     Respiratory Rate > / =22 -- 0 0 0     Systolic BP < / =441 -- 0 0 0     Q Sofa Score 0 0 0 0

## 2021-12-22 NOTE — ED PROCEDURE NOTE
PROCEDURE  ECG 12 Lead Documentation Only    Date/Time: 12/22/2021 1:39 AM  Performed by: Tarah Figueroa MD  Authorized by: Tarah Figueroa MD     Indications / Diagnosis:  Weak   ECG reviewed by me, the ED Provider: yes    Patient location:  ED  Interpretation:     Interpretation: normal    Rate:     ECG rate:  122    ECG rate assessment: tachycardic    Rhythm:     Rhythm: sinus tachycardia    Ectopy:     Ectopy: none    QRS:     QRS axis:  Normal    QRS intervals:  Normal  Conduction:     Conduction: abnormal      Abnormal conduction: incomplete RBBB    ST segments:     ST segments:  Non-specific  T waves:     T waves: non-specific           Tarah Figueroa MD  12/22/21 0240

## 2021-12-22 NOTE — ASSESSMENT & PLAN NOTE
No results found for: HGBA1C    No results for input(s): POCGLU in the last 72 hours      Blood Sugar Average: Last 72 hrs:    Patient is NPO, will obtain Accu-Cheks q 6 hours with Humalog correction dose q 6

## 2021-12-22 NOTE — CONSULTS
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 with general malaise, testing so far has identified pulmonary embolism, pneumonia (Covid negative), and questionable cholecystitis on CT  Lethargic, denies abdominal pain, no N/V  Abdomen flat, soft, non-tender, no guarding, PEG in place 3 cm @ skin  T 100 1, , RR 19, /81, SpO2 91% RA  Na 126, Hgb 11 8, WBC 6 48, T bili 0 94, LA 3 4    CT showing GB distention, RUQ inflammation involving hepatic flexure  Assessment:  Acute cholecystitis, w/o choledocholithiasis or cholangitis  Immunocompromise 2/2 chemotherapy for tongue SCCA  Plan:  Continue IVF and IV Zosyn  Obtain RUQ US for second look and r/o cholelithiasis  May need HIDA to evaluate cystic duct patency  Maintain NPO pending further tests  Maintain anticoagulation with Lovenox or Heparin for now  Patient may need percutaneous cholecystostomy  Will discuss with surgery team       History of Present Illness   HPI:  Ilir Alas is a 78 y o  male who presents with   Review of Systems   Constitutional: Positive for activity change, appetite change and fatigue         Historical Information   Past Medical History:   Diagnosis Date    [facility] ([facility] [address]     Diabetes ([facility] [address]     Gastritis     GERD (gastroesophageal reflux disease)     History of bladder cancer 2010    treated with surgery    Hypercholesterolemia     Hypertension      Past Surgical History:   Procedure Laterality Date    BLADDER TUMOR EXCISION      EGD       Social History   Social History Substance and Sexual Activity   Alcohol Use Not Currently     Social History     Substance and Sexual Activity   Drug Use Never     Social History     Tobacco Use   Smoking Status Former Smoker    Quit date: 2021    Years since quittin 3   Smokeless Tobacco Never Used     E-Cigarette/Vaping    E-Cigarette Use Never User      E-Cigarette/Vaping Substances     Family History: History reviewed  No pertinent family history  Meds/Allergies   PTA meds:   Prior to Admission Medications   Prescriptions Last Dose Informant Patient Reported? Taking?    Empagliflozin 25 MG TABS  Self Yes No   Sig: Take 1 tablet by mouth   METOPROLOL TARTRATE PO  Self Yes No   Sig: Take by mouth   aspirin (ECOTRIN LOW STRENGTH) 81 mg EC tablet  Self Yes No   Sig: Take 81 mg by mouth   atorvastatin (LIPITOR) 40 mg tablet  Self Yes No   Sig: Take 40 mg by mouth   fluticasone (FLONASE) 50 mcg/act nasal spray  Self Yes No   Si spray into each nostril daily   insulin glargine (LANTUS) 100 units/mL subcutaneous injection  Self Yes No   Sig: Inject under the skin   metFORMIN (GLUCOPHAGE) 1000 MG tablet  Self Yes No   Sig: Take 1,000 mg by mouth   pantoprazole (PROTONIX) 20 mg tablet  Self Yes No   Sig: Take 20 mg by mouth daily   ramipril (ALTACE) 10 MG capsule  Self Yes No   Sig: Take 10 mg by mouth      Facility-Administered Medications: None     No Known Allergies    Objective   First Vitals:   Blood Pressure: 150/74 (21)  Pulse: (!) 120 (21)  Temperature: (!) 101 2 °F (38 4 °C) (21)  Temp Source: Temporal (21)  Respirations: 16 (21)  Height: 6' 2" (188 cm) (21)  Weight - Scale: 79 4 kg (175 lb) (21)  SpO2: 92 % (21)    Current Vitals:   Blood Pressure: 151/81 (21)  Pulse: (!) 109 (21)  Temperature: 100 1 °F (37 8 °C) (21)  Temp Source: Tympanic (21)  Respirations: 19 (21)  Height: 6' 2" (188 cm) (12/21/21 2356)  Weight - Scale: 79 4 kg (175 lb) (12/21/21 2356)  SpO2: 91 % (12/22/21 0630)      Intake/Output Summary (Last 24 hours) at 12/22/2021 0821  Last data filed at 12/22/2021 6736  Gross per 24 hour   Intake 3400 05 ml   Output --   Net 3400 05 ml       Invasive Devices  Report    Peripheral Intravenous Line            Peripheral IV 12/22/21 Left Antecubital <1 day    Peripheral IV 12/22/21 Right Antecubital <1 day                Physical Exam  Vitals and nursing note reviewed  Constitutional:       General: He is not in acute distress  Appearance: He is well-developed  He is ill-appearing  He is not diaphoretic  Comments: Lethargic  Arousable for about 30 seconds at a time  Falling asleep while answering questions  Difficulty opening eyes  Thin and frail  HENT:      Head: Normocephalic and atraumatic  Eyes:      Conjunctiva/sclera: Conjunctivae normal       Pupils: Pupils are equal, round, and reactive to light  Cardiovascular:      Rate and Rhythm: Regular rhythm  Tachycardia present  Heart sounds: No murmur heard  Pulmonary:      Effort: Pulmonary effort is normal  No respiratory distress  Breath sounds: Normal breath sounds  Abdominal:      General: Abdomen is flat  Bowel sounds are normal  There is no distension  Palpations: Abdomen is soft  Tenderness: There is no abdominal tenderness  There is no guarding  Comments: Soft, non-tender  PEG in place 3 cm at skin  Musculoskeletal:      Cervical back: Normal range of motion  Skin:     General: Skin is warm and dry  Capillary Refill: Capillary refill takes less than 2 seconds  Neurological:      Mental Status: He is oriented to person, place, and time  Psychiatric:         Behavior: Behavior normal          Lab Results:   I have personally reviewed pertinent lab results    , CBC:   Lab Results   Component Value Date    WBC 6 48 12/22/2021    HGB 11 8 (L) 12/22/2021    HCT 36 0 (L) 12/22/2021    MCV 89 12/22/2021     12/22/2021    MCH 29 2 12/22/2021    MCHC 32 8 12/22/2021    RDW 19 3 (H) 12/22/2021    MPV 9 9 12/22/2021   , CMP:   Lab Results   Component Value Date    SODIUM 126 (L) 12/22/2021    K 4 5 12/22/2021    CL 89 (L) 12/22/2021    CO2 25 12/22/2021    BUN 23 12/22/2021    CREATININE 0 90 12/22/2021    CALCIUM 9 7 12/22/2021    AST 19 12/22/2021    ALT 17 12/22/2021    ALKPHOS 77 12/22/2021    EGFR 83 12/22/2021     Imaging: I have personally reviewed pertinent reports  EKG, Pathology, and Other Studies: I have personally reviewed pertinent reports  Code Status: Level 1 - Full Code  Advance Directive and Living Will:      Power of :    POLST:      Counseling / Coordination of Care  Total floor / unit time spent today 15 minutes  Greater than 50% of total time was spent with the patient and / or family counseling and / or coordination of care  A description of the counseling / coordination of care: treatment planning

## 2021-12-22 NOTE — ASSESSMENT & PLAN NOTE
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 with general malaise, testing so far has identified pulmonary embolism, pneumonia (Covid negative), and questionable cholecystitis on CT  Lethargic, denies abdominal pain, no N/V  Abdomen flat, soft, non-tender, no guarding, PEG in place 3 cm @ skin  T 100 1, , RR 19, /81, SpO2 91% RA  Na 126, Hgb 11 8, WBC 6 48, T bili 0 94, LA 3 4    CT showing GB distention, RUQ inflammation involving hepatic flexure  Assessment:  Acute cholecystitis, w/o choledocholithiasis or cholangitis  Immunocompromise 2/2 chemotherapy for tongue SCCA  Plan:  Continue IVF and IV Zosyn  Obtain RUQ US for second look and r/o cholelithiasis  May need HIDA to evaluate cystic duct patency  Maintain NPO pending further tests  Maintain anticoagulation with Lovenox or Heparin for now  Patient may need percutaneous cholecystostomy    Will discuss with surgery team

## 2021-12-22 NOTE — ED PROVIDER NOTES
History  Chief Complaint   Patient presents with    Weakness - Generalized     pt has cancer and received last treatment on Friday; pt is normally tired afterward but is more tired today       73YO M    PMH:  Just finished 7 weeks of radiation for tongue cancer      Pt here for evaluation weakness and RUQ abdominal pain   Pt was unaware that he had fever, temp 101 here on arrival    No new cough    Tmax:   Unknown     Interventions:  None      Pt reports Abdominal discomfort, ruq as well as right lower chest pain     Denies Nausea, No vomiting  No Diarrhea       No urinary complaints:  No dysuria/hematuria/frequency       No sob  No respiratory distress, no stridor, no wheezing      Has recurrent oral thrush      Rash:    None noted      Denies headache   No neck stiffness      No recent travel      Sick contacts: None  No recent hospitalization           History provided by:  Patient  Fever - 9 weeks to 76 years  Max temp prior to arrival:  101  Severity:  Moderate  Onset quality:  Gradual  Worsened by:  Nothing  Ineffective treatments:  None tried  Associated symptoms: chest pain, chills and somnolence    Associated symptoms: no confusion, no cough, no diarrhea, no dysuria, no ear pain, no headaches, no myalgias, no nausea, no rash, no rhinorrhea, no sore throat and no vomiting        Prior to Admission Medications   Prescriptions Last Dose Informant Patient Reported? Taking?    Empagliflozin 25 MG TABS  Self Yes No   Sig: Take 1 tablet by mouth   METOPROLOL TARTRATE PO  Self Yes No   Sig: Take by mouth   aspirin (ECOTRIN LOW STRENGTH) 81 mg EC tablet  Self Yes No   Sig: Take 81 mg by mouth   atorvastatin (LIPITOR) 40 mg tablet  Self Yes No   Sig: Take 40 mg by mouth   fluticasone (FLONASE) 50 mcg/act nasal spray  Self Yes No   Si spray into each nostril daily   insulin glargine (LANTUS) 100 units/mL subcutaneous injection  Self Yes No   Sig: Inject under the skin   metFORMIN (GLUCOPHAGE) 1000 MG tablet  Self Yes No   Sig: Take 1,000 mg by mouth   pantoprazole (PROTONIX) 20 mg tablet  Self Yes No   Sig: Take 20 mg by mouth daily   ramipril (ALTACE) 10 MG capsule  Self Yes No   Sig: Take 10 mg by mouth      Facility-Administered Medications: None       Past Medical History:   Diagnosis Date    Cancer ([facility] )     Diabetes ([facility] )     Gastritis     GERD (gastroesophageal reflux disease)     History of bladder cancer     treated with surgery    Hypercholesterolemia     Hypertension        Past Surgical History:   Procedure Laterality Date    BLADDER TUMOR EXCISION      EGD         History reviewed  No pertinent family history  I have reviewed and agree with the history as documented  E-Cigarette/Vaping    E-Cigarette Use Never User      E-Cigarette/Vaping Substances     Social History     Tobacco Use    Smoking status: Former Smoker     Quit date: 2021     Years since quittin 3    Smokeless tobacco: Never Used   Vaping Use    Vaping Use: Never used   Substance Use Topics    Alcohol use: Not Currently    Drug use: Never       Review of Systems   Constitutional: Positive for chills, fatigue and fever  Negative for diaphoresis  HENT: Negative for ear pain, rhinorrhea, sinus pressure, sinus pain, sore throat, trouble swallowing and voice change  Respiratory: Negative for cough, shortness of breath, wheezing and stridor  Cardiovascular: Positive for chest pain  Negative for palpitations and leg swelling  Gastrointestinal: Positive for abdominal pain  Negative for blood in stool, diarrhea, nausea and vomiting  Genitourinary: Negative for difficulty urinating, dysuria, flank pain, frequency, hematuria, penile pain, penile swelling and urgency  Musculoskeletal: Negative for arthralgias, back pain, gait problem, joint swelling, myalgias, neck pain and neck stiffness  Skin: Negative for rash and wound  Neurological: Negative for dizziness, light-headedness and headaches  Psychiatric/Behavioral: Negative for confusion  All other systems reviewed and are negative  Physical Exam  Physical Exam  Constitutional:       General: He is not in acute distress  Appearance: He is well-developed  He is not ill-appearing, toxic-appearing or diaphoretic  HENT:      Head: Normocephalic and atraumatic  Right Ear: External ear normal       Left Ear: External ear normal       Nose: Nose normal       Mouth/Throat:      Pharynx: No oropharyngeal exudate or posterior oropharyngeal erythema  Comments: Moderate to severe oral thrush   Eyes:      General: No scleral icterus  Right eye: No discharge  Left eye: No discharge  Extraocular Movements: Extraocular movements intact  Conjunctiva/sclera: Conjunctivae normal       Pupils: Pupils are equal, round, and reactive to light  Neck:      Vascular: No JVD  Trachea: No tracheal deviation  Cardiovascular:      Rate and Rhythm: Regular rhythm  Tachycardia present  Pulses: Normal pulses  Heart sounds: Normal heart sounds  No murmur heard  No friction rub  No gallop  Pulmonary:      Effort: Pulmonary effort is normal  No respiratory distress  Breath sounds: Normal breath sounds  No stridor  No wheezing, rhonchi or rales  Chest:      Chest wall: No tenderness  Abdominal:      General: Bowel sounds are normal  There is no distension  Palpations: Abdomen is soft  There is no mass  Tenderness: There is no abdominal tenderness  There is no right CVA tenderness, left CVA tenderness, guarding or rebound  Hernia: No hernia is present  Musculoskeletal:         General: No swelling, tenderness, deformity or signs of injury  Normal range of motion  Cervical back: Normal range of motion and neck supple  No rigidity or tenderness  Right lower leg: No edema  Left lower leg: No edema  Lymphadenopathy:      Cervical: No cervical adenopathy     Skin:     General: Skin is warm  Capillary Refill: Capillary refill takes less than 2 seconds  Coloration: Skin is not jaundiced or pale  Findings: No bruising, erythema, lesion or rash  Neurological:      General: No focal deficit present  Mental Status: He is alert and oriented to person, place, and time  Mental status is at baseline  Cranial Nerves: No cranial nerve deficit  Sensory: No sensory deficit  Motor: No weakness or abnormal muscle tone  Coordination: Coordination normal    Psychiatric:         Behavior: Behavior normal          Thought Content:  Thought content normal          Judgment: Judgment normal       Comments: Weak affect          Vital Signs  ED Triage Vitals [12/21/21 2356]   Temperature Pulse Respirations Blood Pressure SpO2   (!) 101 2 °F (38 4 °C) (!) 120 16 150/74 92 %      Temp Source Heart Rate Source Patient Position - Orthostatic VS BP Location FiO2 (%)   Temporal Monitor Sitting Left arm --      Pain Score       No Pain           Vitals:    12/21/21 2356 12/22/21 0200 12/22/21 0230   BP: 150/74 (!) 175/107 143/70   Pulse: (!) 120 (!) 119 (!) 120   Patient Position - Orthostatic VS: Sitting           Visual Acuity      ED Medications  Medications   magnesium sulfate 2 g/50 mL IVPB (premix) 2 g (has no administration in time range)   aspirin (ECOTRIN LOW STRENGTH) EC tablet 81 mg (has no administration in time range)   atorvastatin (LIPITOR) tablet 40 mg (has no administration in time range)   fluticasone (FLONASE) 50 mcg/act nasal spray 1 spray (has no administration in time range)   pantoprazole (PROTONIX) EC tablet 20 mg (has no administration in time range)   lisinopril (ZESTRIL) tablet 40 mg (has no administration in time range)   sodium chloride 0 9 % infusion (has no administration in time range)   acetaminophen (TYLENOL) tablet 650 mg (has no administration in time range)   ondansetron (ZOFRAN) injection 4 mg (has no administration in time range)   insulin lispro (HumaLOG) 100 units/mL subcutaneous injection 1-6 Units (has no administration in time range)   enoxaparin (LOVENOX) subcutaneous injection 80 mg (has no administration in time range)   piperacillin-tazobactam (ZOSYN) IVPB 4 5 g (has no administration in time range)   sodium chloride 0 9 % bolus 1,000 mL (0 mL Intravenous Stopped 12/22/21 0152)     Followed by   sodium chloride 0 9 % bolus 1,000 mL (0 mL Intravenous Stopped 12/22/21 0312)     Followed by   sodium chloride 0 9 % bolus 1,000 mL (1,000 mL Intravenous New Bag 12/22/21 0408)   acetaminophen (TYLENOL) tablet 975 mg (975 mg Oral Given 12/22/21 0227)   piperacillin-tazobactam (ZOSYN) IVPB 3 375 g (0 g Intravenous Stopped 12/22/21 0211)   vancomycin (VANCOCIN) 1,250 mg in sodium chloride 0 9 % 250 mL IVPB (1,250 mg Intravenous New Bag 12/22/21 0318)   HYDROmorphone (DILAUDID) injection 0 5 mg (0 5 mg Intravenous Given 12/22/21 0236)   iohexol (OMNIPAQUE) 350 MG/ML injection (SINGLE-DOSE) 100 mL (100 mL Intravenous Given 12/22/21 0303)   enoxaparin (LOVENOX) subcutaneous injection 80 mg (80 mg Subcutaneous Given 12/22/21 0409)       Diagnostic Studies  Results Reviewed     Procedure Component Value Units Date/Time    Hemoglobin A1c w/EAG Estimation (Orders if not completed within the last 90 days) [[id number]] Collected: 12/22/21 0103    Lab Status: In process Specimen: Blood from Arm, Right Updated: 12/22/21 0453    Strep Pneumoniae, Urine [[id number]]     Lab Status: No result Specimen: Urine     Legionella antigen, Urine [[id number]]     Lab Status: No result Specimen: Urine     Lactic acid 2 Hours [[id number]]  (Normal) Collected: 12/22/21 0410    Lab Status: Final result Specimen: Blood from Arm, Right Updated: 12/22/21 0432     LACTIC ACID 1 9 mmol/L     Narrative:      Result may be elevated if tourniquet was used during collection      HS Troponin I 2hr [[id number]] Collected: 12/22/21 0310    Lab Status: Final result Specimen: Blood from Arm, Right Updated: 12/22/21 0339     hs TnI 2hr 18 ng/L      Delta 2hr hsTnI 2 ng/L     HS Troponin I 4hr [[id number]]     Lab Status: No result Specimen: Blood     COVID/FLU/RSV - 2 hour TAT [[id number]]  (Normal) Collected: 12/22/21 0121    Lab Status: Final result Specimen: Nares from Nose Updated: 12/22/21 0217     SARS-CoV-2 Negative     INFLUENZA A PCR Negative     INFLUENZA B PCR Negative     RSV PCR Negative    Narrative:      FOR PEDIATRIC PATIENTS - copy/paste COVID Guidelines URL to browser: [email or web address]  [email or web address]     This test has been authorized by FDA under an EUA (Emergency Use Assay) for use by authorized laboratories  Clinical caution and judgement should be used with the interpretation of these results with consideration of the clinical impression and other laboratory testing  Testing reported as "Positive" or "Negative" has been proven to be accurate according to standard laboratory validation requirements  All testing is performed with control materials showing appropriate reactivity at standard intervals  Lactic acid [[id number]]  (Abnormal) Collected: 12/22/21 0103    Lab Status: Final result Specimen: Blood from Arm, Right Updated: 12/22/21 0204     LACTIC ACID 3 4 mmol/L     Narrative:      Result may be elevated if tourniquet was used during collection      Manual Differential(PHLEBS Do Not Order) [[id number]]  (Abnormal) Collected: 12/22/21 0103    Lab Status: Final result Specimen: Blood from Arm, Right Updated: 12/22/21 0200     Segmented % 83 %      Bands % 8 %      Lymphocytes % 1 %      Monocytes % 8 %      Eosinophils, % 0 %      Basophils % 0 %      Absolute Neutrophils 5 90 Thousand/uL      Lymphocytes Absolute 0 06 Thousand/uL      Monocytes Absolute 0 52 Thousand/uL      Eosinophils Absolute 0 00 Thousand/uL      Basophils Absolute 0 00 Thousand/uL      Total Counted --     Anisocytosis Present     Platelet Estimate Adequate    CBC and differential [[id number]]  (Abnormal) Collected: 12/22/21 0103    Lab Status: Final result Specimen: Blood from Arm, Right Updated: 12/22/21 0159     WBC 6 48 Thousand/uL      RBC 4 04 Million/uL      Hemoglobin 11 8 g/dL      Hematocrit 36 0 %      MCV 89 fL      MCH 29 2 pg      MCHC 32 8 g/dL      RDW 19 3 %      MPV 9 9 fL      Platelets 434 Thousands/uL     Narrative: This is an appended report  These results have been appended to a previously verified report      Comprehensive metabolic panel [[id number]]  (Abnormal) Collected: 12/22/21 0103    Lab Status: Final result Specimen: Blood from Arm, Right Updated: 12/22/21 0151     Sodium 126 mmol/L      Potassium 4 5 mmol/L      Chloride 89 mmol/L      CO2 25 mmol/L      ANION GAP 12 mmol/L      BUN 23 mg/dL      Creatinine 0 90 mg/dL      Glucose 226 mg/dL      Calcium 9 7 mg/dL      AST 19 U/L      ALT 17 U/L      Alkaline Phosphatase 77 U/L      Total Protein 7 4 g/dL      Albumin 3 9 g/dL      Total Bilirubin 0 94 mg/dL      eGFR 83 ml/min/1 73sq m     Narrative:      [facility] guidelines for Chronic Kidney Disease (CKD):     Stage 1 with normal or high GFR (GFR > 90 mL/min/1 73 square meters)    Stage 2 Mild CKD (GFR = 60-89 mL/min/1 73 square meters)    Stage 3A Moderate CKD (GFR = 45-59 mL/min/1 73 square meters)    Stage 3B Moderate CKD (GFR = 30-44 mL/min/1 73 square meters)    Stage 4 Severe CKD (GFR = 15-29 mL/min/1 73 square meters)    Stage 5 End Stage CKD (GFR <15 mL/min/1 73 square meters)  Note: GFR calculation is accurate only with a steady state creatinine    Magnesium [[id number]]  (Abnormal) Collected: 12/22/21 0103    Lab Status: Final result Specimen: Blood from Arm, Right Updated: 12/22/21 0151     Magnesium 1 7 mg/dL     HS Troponin 0hr (reflex protocol) [[id number]]  (Normal) Collected: 12/22/21 0103    Lab Status: Final result Specimen: Blood from Arm, Right Updated: 12/22/21 0151     hs TnI 0hr 16 ng/L B-Type Natriuretic Peptide(BNP) CA, GH, EA Campuses Only [[id number]]  (Normal) Collected: 12/22/21 0103    Lab Status: Final result Specimen: Blood from Arm, Right Updated: 12/22/21 0149     BNP 88 pg/mL     Protime-INR [[id number]]  (Normal) Collected: 12/22/21 0103    Lab Status: Final result Specimen: Blood from Arm, Right Updated: 12/22/21 0137     Protime 13 2 seconds      INR 1 01    APTT [[id number]]  (Normal) Collected: 12/22/21 0103    Lab Status: Final result Specimen: Blood from Arm, Right Updated: 12/22/21 0137     PTT 31 seconds     Blood gas, Venous [[id number]]  (Abnormal) Collected: 12/22/21 0103    Lab Status: Final result Specimen: Blood from Arm, Right Updated: 12/22/21 0128     pH, Milo 7 395     pCO2, Milo 42 5 mm Hg      pO2, Milo 25 3 mm Hg      HCO3, Milo 25 4 mmol/L      Base Excess, Milo 0 4 mmol/L      O2 Content, Milo 9 6 ml/dL      O2 HGB, VENOUS 52 9 %     Blood culture #2 [[id number]] Collected: 12/22/21 0119    Lab Status: In process Specimen: Blood from Arm, Left Updated: 12/22/21 0126    Blood culture #1 [[id number]] Collected: 12/22/21 0103    Lab Status: In process Specimen: Blood from Arm, Right Updated: 12/22/21 0126    Procalcitonin with AM Reflex [[id number]] Collected: 12/22/21 0103    Lab Status: In process Specimen: Blood from Arm, Right Updated: 12/22/21 0125    UA w Reflex to Microscopic w Reflex to Culture [[id number]]     Lab Status: No result Specimen: Urine                  PE Study with CT abdomen & pelvis with contrast   Final Result by Connie Olson MD (46/40 [id number])      1  Motion limited examination  Small pulmonary emboli are seen within the segmental pulmonary arteries of bilateral lungs  Measured RV/LV ratio is within normal limits at less than 0 9      2   Patchy opacities in left upper and lower lobe bases likely due to pneumonia and less likely pulmonary infarction  3   Distended gallbladder with pericholecystic stranding   Further evaluation with ultrasound may be obtained to evaluate for noncalcified stones/cholecystitis  4   Mild focal thickening of the hepatic flexure may be reactive to adjacent gallbladder  I personally discussed this study with Emilie Mancillaashwini on 12/22/2021 at 3:43 AM          Workstation performed: [id number]         XR chest portable - 1 view    (Results Pending)   US right upper quadrant    (Results Pending)              Procedures  Procedures         ED Course  ED Course as of 12/22/21 0541   Wed Dec 22, 2021   0125 Blood gas, Venous(!)  Pt seen and evaluated  Here for weakness and fever today  Sepsis work up in progress    0129 CBC and differential(!)   0227 Comprehensive metabolic panel(!)   9011 Manual Differential(PHLEBS Do Not Order)(!)   0227 LACTIC ACID(!!): 3 4   0227 Magnesium(!): 1 7   0227 CBC and differential(!)   0227 BNP: 88   0227 hs TnI 0hr: 16   0227 Blood gas, Venous(!)   0227 COVID/FLU/RSV - 2 hour TAT   0342 Dw Radiology    Small Pulm Emboli seen   No R heart strain   Opacities at base, pna vs infarct  Significant Stranding around gallbladder - recommend US   0347 HS Troponin I 2hr  Tiger texted Aleja Coleman for admission    Daphne Varma texted Dr Thee Ruiz, Gen surg  Reviewed the case    [address] Dw Dr Thee Ruiz, Laverne Boeck the case   He agrees with the tx  No further recommendations  Will see in consult    [address] [facility] admit    6040 Dw wife regarding diagnoses  She understands  She understands plan of admission                HEART Risk Score      Most Recent Value   Heart Score Risk Calculator    History 0 Filed at: 12/22/2021 0541   ECG 1 Filed at: 12/22/2021 0541   Age 2 Filed at: 12/22/2021 0541   Risk Factors 1 Filed at: 12/22/2021 0541   Troponin 0 Filed at: 12/22/2021 0541   HEART Score 4 Filed at: 12/22/2021 0541                        SBIRT 20yo+      Most Recent Value   SBIRT (25 yo +)    In order to provide better care to our patients, we are screening all of our patients for alcohol and drug use   Would it be okay to ask you these screening questions? Yes Filed at: 12/22/2021 0009   Initial Alcohol Screen: US AUDIT-C     1  How often do you have a drink containing alcohol? 0 Filed at: 12/22/2021 0009   2  How many drinks containing alcohol do you have on a typical day you are drinking? 0 Filed at: 12/22/2021 0009   3a  Male UNDER 65: How often do you have five or more drinks on one occasion? 0 Filed at: 12/22/2021 0009   3b  FEMALE Any Age, or MALE 65+: How often do you have 4 or more drinks on one occassion? 0 Filed at: 12/22/2021 0009   Audit-C Score 0 Filed at: 12/22/2021 0009   MARKO: How many times in the past year have you    Used an illegal drug or used a prescription medication for non-medical reasons? Never Filed at: 12/22/2021 0009                    MDM    Disposition  Final diagnoses:   Sepsis ([facility] )   Lactic acidosis   Pulmonary emboli ([facility] )   Pneumonia   Hypomagnesemia     Time reflects when diagnosis was documented in both MDM as applicable and the Disposition within this note     Time User Action Codes Description Comment    12/22/2021  2:41 AM Talita Rios Add [A41 9] Sepsis ([facility] )     12/22/2021  2:41 AM Talita Rios Add [E87 2] Lactic acidosis     12/22/2021  4:01 AM Talita Rios Add [I26 99] Pulmonary emboli ([facility] )     12/22/2021  4:01 AM Talita Rios Add [J18 9] Pneumonia     12/22/2021  4:01 AM Talita Rios Add [E83 42] Hypomagnesemia     12/22/2021  4:39 AM Doyne Glimpse Add [K81 9] Cholecystitis       ED Disposition     ED Disposition Condition Date/Time Comment    Admit Stable Wed Dec 22, 2021  4:01 AM Case was discussed with Mack Olson and the patient's admission status was agreed to be Admission Status: inpatient status to the service of Dr Boris Cortez   Follow-up Information    None         Patient's Medications   Discharge Prescriptions    No medications on file       No discharge procedures on file      PDMP Review     None          ED Provider  Electronically Signed by           Anthnoy Keyes Thera Harada, MD  12/22/21 5992

## 2021-12-22 NOTE — SPEECH THERAPY NOTE
Speech Language/Pathology  Speech/Language Pathology  Assessment    Patient Name: Urban Quinteros  JCJGS'I Date: 12/22/2021     Problem List  Principal Problem:    Sepsis ([facility] )  Active Problems:    Essential (primary) hypertension    Dysphagia, unspecified    Type 2 diabetes mellitus ([facility] )    Hyperlipidemia due to type 2 diabetes mellitus ([facility] )    Primary malignant neoplasm of base of tongue ([facility] )    Community acquired pneumonia of left lower lobe of lung    Other pulmonary embolism without acute cor pulmonale (HCC)    Cholecystitis    Lactic acidosis    Hyponatremia    Past Medical History  Past Medical History:   Diagnosis Date    Cancer ([facility]     Diabetes ([facility]     Gastritis     GERD (gastroesophageal reflux disease)     History of bladder cancer 2010    treated with surgery    Hypercholesterolemia     Hypertension      Past Surgical History  Past Surgical History:   Procedure Laterality Date    BLADDER TUMOR EXCISION      EGD       H&P review "Urban Quinteros is a 78 y o  male who presents with generalized weakness x6 days  Patient presents ER for further evaluation treatment of his 6 day history of generalized weakness  Patient is undergoing radiation treatment [address] [place] [address] for tongue cancer and just completed his 7 weeks of radiation  Patient is additionally complaining of right upper quadrant abdominal pain and was noted to be febrile upon arrival in the ER with a T-max of 101  2     Patient is a poor historian and unable provide any other information, he denies any cough or shortness of breath, no chest pain or palpitations, he states that he often has nausea vomiting secondary to his cancer treatment "      Bedside Swallow Evaluation:    Summary:  Pt refused PO trials today  Received somnolent in bed woke easily to verbal cue  Answered questions fluctuated alertness 1x but woke back up with tactile cue   Pt had FEES procedure at [facility] 11/5/21 with recs for chopped soft solids/thin liquids ( see below)  PEG tube placed right around this time as well ~6 weeks ago  Pt reports he has not eaten any food/liquid by mouth since PEG was placed due to "reduced appetite and gagging on everything " He denies completed a video barium swallow study following FEES  I was not able to find completed study via chart review either  He denies following back up with a Speech-Language Pathologist since FEES procedure  This service will continue follow-up as patient needs continued education regarding swallowing function and does need to be provided with an exercise program to be completing atleast 3x per day  At this time, recommend to continue NPO with alternate means of nutrition/hydration  Will continue follow-up at the bedside to provide PO trials as patient willing to participate, education and exercises  Therapy Prognosis: guarded  Prognosis considerations: history of H&N CA refusing PO  Frequency: 2-3x weekly    Consider consult w/:  Nutrition    H&P/Admit info, pertinent provider notes/procedures/studies/PMH:(copied and placed in this report)      Goal(s):  Pt will tolerate least restrictive diet w/out s/s aspiration or oral/pharyngeal difficulties       Patient's goal: to rest    Reason for consult:  R/o aspiration  Determine safest and least restrictive diet  current pna  poor intake  weight loss ( ~40 lbs)   h/o dysphagia   H/o Head and neck CA      Current diet: NPO    Previous VBS: not known to this service; however, per chart review FEES completed 11/5/21 with recs for Continue oral diet: chopped, soft diet with thin liquids, Cyclical alteration, Control bolus size and rate of presentation, To consider nutritional supplements, Aspiration precautions, Initiate oral and pharyngeal strengthening exercises and Videofluroscopic evaluation of swallowing recommended"         O2 requirement: NC    Voice/Speech: WFL    Social: answering questions with eyes mostly closed    Follows commands: did not participate                          Cognitive Status: oriented x3/3    [facility]  Unable to assess, patient did not participate    Results d/w:  Pt, nursing,

## 2021-12-22 NOTE — ASSESSMENT & PLAN NOTE
· Will obtain ultrasound gallbladder in a m    · Will give Zosyn as per above  · Consult general surgery

## 2021-12-22 NOTE — ASSESSMENT & PLAN NOTE
· Admit to telemetry  · Patient received sepsis protocol normal saline fluid resuscitation followed by 125 cc/hour  · Will trend lactic acid and procalcitonin  · Blood cultures and urine culture currently pending  · Will give Zosyn 4 5 g IV q 6 hours  · Patient met sepsis criteria in that he was tachycardic with heart rate as high as 120, lactic acidosis of 3 4, febrile a T-max 101 5° unknown source of infection being community-acquired pneumonia as well as cholecystitis

## 2021-12-23 LAB
ALBUMIN SERPL BCP-MCNC: 2.7 G/DL (ref 3.5–5)
ALP SERPL-CCNC: 97 U/L (ref 34–104)
ALT SERPL W P-5'-P-CCNC: 42 U/L (ref 7–52)
ANION GAP SERPL CALCULATED.3IONS-SCNC: 8 MMOL/L (ref 4–13)
AST SERPL W P-5'-P-CCNC: 56 U/L (ref 13–39)
BILIRUB SERPL-MCNC: 0.97 MG/DL (ref 0.2–1)
BUN SERPL-MCNC: 19 MG/DL (ref 5–25)
CALCIUM ALBUM COR SERPL-MCNC: 8.9 MG/DL (ref 8.3–10.1)
CALCIUM SERPL-MCNC: 7.9 MG/DL (ref 8.4–10.2)
CHLORIDE SERPL-SCNC: 104 MMOL/L (ref 96–108)
CO2 SERPL-SCNC: 24 MMOL/L (ref 21–32)
CREAT SERPL-MCNC: 0.83 MG/DL (ref 0.6–1.3)
ERYTHROCYTE [DISTWIDTH] IN BLOOD BY AUTOMATED COUNT: 19.9 % (ref 11.6–15.1)
GFR SERPL CREATININE-BSD FRML MDRD: 86 ML/MIN/1.73SQ M
GLUCOSE SERPL-MCNC: 114 MG/DL (ref 65–140)
GLUCOSE SERPL-MCNC: 115 MG/DL (ref 65–140)
GLUCOSE SERPL-MCNC: 120 MG/DL (ref 65–140)
GLUCOSE SERPL-MCNC: 137 MG/DL (ref 65–140)
GLUCOSE SERPL-MCNC: 275 MG/DL (ref 65–140)
HCT VFR BLD AUTO: 27.6 % (ref 36.5–49.3)
HGB BLD-MCNC: 9.1 G/DL (ref 12–17)
L PNEUMO1 AG UR QL IA.RAPID: NEGATIVE
MAGNESIUM SERPL-MCNC: 1.9 MG/DL (ref 1.9–2.7)
MCH RBC QN AUTO: 29 PG (ref 26.8–34.3)
MCHC RBC AUTO-ENTMCNC: 33 G/DL (ref 31.4–37.4)
MCV RBC AUTO: 88 FL (ref 82–98)
NRBC BLD AUTO-RTO: 0 /100 WBCS
PLATELET # BLD AUTO: 191 THOUSANDS/UL (ref 149–390)
PMV BLD AUTO: 10.2 FL (ref 8.9–12.7)
POTASSIUM SERPL-SCNC: 3.6 MMOL/L (ref 3.5–5.3)
PROCALCITONIN SERPL-MCNC: 0.85 NG/ML
PROT SERPL-MCNC: 5.4 G/DL (ref 6.4–8.4)
RBC # BLD AUTO: 3.14 MILLION/UL (ref 3.88–5.62)
S PNEUM AG UR QL: NEGATIVE
SODIUM SERPL-SCNC: 136 MMOL/L (ref 135–147)
WBC # BLD AUTO: 7.92 THOUSAND/UL (ref 4.31–10.16)

## 2021-12-23 PROCEDURE — 80053 COMPREHEN METABOLIC PANEL: CPT

## 2021-12-23 PROCEDURE — 83735 ASSAY OF MAGNESIUM: CPT

## 2021-12-23 PROCEDURE — 36415 COLL VENOUS BLD VENIPUNCTURE: CPT | Performed by: EMERGENCY MEDICINE

## 2021-12-23 PROCEDURE — 97163 PT EVAL HIGH COMPLEX 45 MIN: CPT

## 2021-12-23 PROCEDURE — 84145 PROCALCITONIN (PCT): CPT | Performed by: EMERGENCY MEDICINE

## 2021-12-23 PROCEDURE — 87449 NOS EACH ORGANISM AG IA: CPT

## 2021-12-23 PROCEDURE — 82948 REAGENT STRIP/BLOOD GLUCOSE: CPT

## 2021-12-23 PROCEDURE — 99232 SBSQ HOSP IP/OBS MODERATE 35: CPT

## 2021-12-23 PROCEDURE — 85027 COMPLETE CBC AUTOMATED: CPT | Performed by: PHYSICIAN ASSISTANT

## 2021-12-23 PROCEDURE — 97167 OT EVAL HIGH COMPLEX 60 MIN: CPT

## 2021-12-23 PROCEDURE — 99232 SBSQ HOSP IP/OBS MODERATE 35: CPT | Performed by: SPECIALIST

## 2021-12-23 RX ORDER — SODIUM CHLORIDE 9 MG/ML
75 INJECTION, SOLUTION INTRAVENOUS CONTINUOUS
Status: DISCONTINUED | OUTPATIENT
Start: 2021-12-23 | End: 2021-12-25

## 2021-12-23 RX ORDER — GUAIFENESIN 100 MG/5ML
200 SOLUTION ORAL 4 TIMES DAILY
Status: DISCONTINUED | OUTPATIENT
Start: 2021-12-23 | End: 2021-12-30 | Stop reason: HOSPADM

## 2021-12-23 RX ADMIN — PIPERACILLIN SODIUM AND TAZOBACTAM SODIUM 4.5 G: 4; .5 INJECTION, POWDER, LYOPHILIZED, FOR SOLUTION INTRAVENOUS at 13:46

## 2021-12-23 RX ADMIN — PIPERACILLIN SODIUM AND TAZOBACTAM SODIUM 4.5 G: 4; .5 INJECTION, POWDER, LYOPHILIZED, FOR SOLUTION INTRAVENOUS at 18:05

## 2021-12-23 RX ADMIN — ONDANSETRON 4 MG: 2 INJECTION INTRAMUSCULAR; INTRAVENOUS at 18:04

## 2021-12-23 RX ADMIN — METOPROLOL TARTRATE 5 MG: 5 INJECTION INTRAVENOUS at 13:39

## 2021-12-23 RX ADMIN — ENOXAPARIN SODIUM 80 MG: 100 INJECTION, SOLUTION INTRAVENOUS; SUBCUTANEOUS at 13:46

## 2021-12-23 RX ADMIN — ACETAMINOPHEN 650 MG: 650 SUPPOSITORY RECTAL at 13:51

## 2021-12-23 RX ADMIN — PIPERACILLIN SODIUM AND TAZOBACTAM SODIUM 4.5 G: 4; .5 INJECTION, POWDER, LYOPHILIZED, FOR SOLUTION INTRAVENOUS at 23:37

## 2021-12-23 RX ADMIN — ATORVASTATIN CALCIUM 40 MG: 40 TABLET, FILM COATED ORAL at 18:05

## 2021-12-23 RX ADMIN — SODIUM CHLORIDE 75 ML/HR: 0.9 INJECTION, SOLUTION INTRAVENOUS at 18:10

## 2021-12-23 RX ADMIN — GUAIFENESIN 200 MG: 100 SOLUTION ORAL at 22:51

## 2021-12-23 RX ADMIN — ENOXAPARIN SODIUM 80 MG: 100 INJECTION, SOLUTION INTRAVENOUS; SUBCUTANEOUS at 20:45

## 2021-12-23 RX ADMIN — FLUTICASONE PROPIONATE 1 SPRAY: 50 SPRAY, METERED NASAL at 18:04

## 2021-12-23 RX ADMIN — INSULIN LISPRO 4 UNITS: 100 INJECTION, SOLUTION INTRAVENOUS; SUBCUTANEOUS at 23:45

## 2021-12-23 RX ADMIN — METOPROLOL TARTRATE 5 MG: 5 INJECTION INTRAVENOUS at 20:45

## 2021-12-23 RX ADMIN — METOPROLOL TARTRATE 5 MG: 5 INJECTION INTRAVENOUS at 04:17

## 2021-12-23 RX ADMIN — PIPERACILLIN SODIUM AND TAZOBACTAM SODIUM 4.5 G: 4; .5 INJECTION, POWDER, LYOPHILIZED, FOR SOLUTION INTRAVENOUS at 04:48

## 2021-12-23 NOTE — MALNUTRITION/BMI
This medical record reflects one or more clinical indicators suggestive of malnutrition   Malnutrition Findings:   Adult Malnutrition type: Acute illness (related to inadequate protein energy intake as evidenced by significant wt  loss 13% < 6 months, moderate muscle loss clavicle/temple area, moderate fat loss buccal/orbital pads treated with EN )  Adult Degree of Malnutrition: Other severe protein calorie malnutrition  Malnutrition Characteristics: Fat loss,Muscle loss,Weight loss    BMI Findings: Body mass index is 22 47 kg/m²  See Nutrition note dated 12/23/21 for additional details  Completed nutrition assessment is viewable in the nutrition documentation

## 2021-12-23 NOTE — PROGRESS NOTES
Progress Note - General Surgery   Bettina Li 78 y o  male MRN: [id number]  Unit/Bed#: DIS 02 Encounter: [id number]    Assessment:  78 y o  male with history of immunosuppression secondary to chemotherapy for tongue cancer with acute cholecystitis   Febrile 101 1  One episode of hypotension, 88/56 @ 2300  Tachycardic, 100s  Leukocytosis     Sepsis     Plan:  Conservative management for acute cholecystitis   NPO   IVF @ 125  IV Zosyn   Continue to monitors labs and abdominal exam   Antiemetics and analgesics prn   Incentive spirometry   Encourage ambulation  Lovenox   Medical management per SLIM     Subjective/Objective   Subjective: No acute overnight events  Patient is doing well this morning with no acute complaints  Brad passing flatus or recent bowel movement  Denies abdominal pain, nausea, vomiting  Has not ambulated  Denies chest pain, palpitations, shortness of breath, calf tenderness  Denies fever, chills  Objective:  Blood pressure 132/62, pulse 87, temperature 99 7 °F (37 6 °C), temperature source Tympanic, resp  rate 22, height 6' 2" (1 88 m), weight 79 4 kg (175 lb), SpO2 90 %  ,Body mass index is 22 47 kg/m²      Intake/Output Summary (Last 24 hours) at 12/23/2021 0802  Last data filed at 12/22/2021 1801  Gross per 24 hour   Intake 1245 83 ml   Output 300 ml   Net 945 83 ml       Invasive Devices  Report    Peripheral Intravenous Line            Peripheral IV 12/22/21 Left Antecubital 1 day    Peripheral IV 12/22/21 Right Antecubital 1 day              Physical Exam: /62 (BP Location: Left arm)   Pulse 87   Temp 99 7 °F (37 6 °C) (Tympanic)   Resp 22   Ht 6' 2" (1 88 m)   Wt 79 4 kg (175 lb)   SpO2 90%   BMI 22 47 kg/m²   General appearance: alert and oriented, in no acute distress, lethargic   Lungs: crackles  Heart: regular rate and rhythm, S1, S2 normal, no murmur, click, rub or gallop  Abdomen: soft, non-tender; bowel sounds normal; no masses,  no organomegaly; PEG in place  Extremities: extremities normal, warm and well-perfused; no cyanosis, clubbing, or edema  Skin: Skin color, texture, turgor normal  No rashes or lesions    Lab, Imaging and other studies:  I have personally reviewed pertinent lab results      CBC:   Lab Results   Component Value Date    WBC 7 92 12/23/2021    HGB 9 1 (L) 12/23/2021    HCT 27 6 (L) 12/23/2021    MCV 88 12/23/2021     12/23/2021    MCH 29 0 12/23/2021    MCHC 33 0 12/23/2021    RDW 19 9 (H) 12/23/2021    MPV 10 2 12/23/2021    NRBC 0 12/23/2021   CMP: pending     VTE Pharmacologic Prophylaxis: Enoxaparin (Lovenox)  VTE Mechanical Prophylaxis: sequential compression device    Shaniqua Martinez PA-C

## 2021-12-23 NOTE — ASSESSMENT & PLAN NOTE
· Patient initially presented with generalized weakness  Evidence of possible cholecystitis seen on CT abdomen/pelvis, confirmed with ultrasound gallbladder  · General surgery consult appreciated  · Holding off on more aggressive measures as of now, as patient is not a good surgical candidate  Attempting conservative measures, and if failed, will consider percutaneous cholecystostomy  · Zosyn day 2

## 2021-12-23 NOTE — PHYSICAL THERAPY NOTE
Physical Therapy Evaluation     Patient's Name: Lisa Campos    Admitting Diagnosis  Weakness [R53 1]    Problem List  Patient Active Problem List   Diagnosis    Anxiety    Neoplasm of uncertain behavior of skin    Post-traumatic stress disorder, unspecified    Essential (primary) hypertension    Actinic keratosis    Dysphagia, unspecified    Erectile dysfunction    History of malignant neoplasm of bladder    Cardiac arrhythmia    Carpal tunnel syndrome    Chronic post-traumatic stress disorder (PTSD) after  combat    Type 2 diabetes mellitus ([facility] )    Heart murmur    Hematuria    Hyperlipidemia due to type 2 diabetes mellitus ([facility] )    Left ventricular hypertrophy    Malignant neoplasm of urinary bladder (HCC)    Neoplasm of lung    Neoplasm of uncertain behavior of oropharynx    Osteoarthritis of knee    Panic disorder    Polyp of colon    Primary malignant neoplasm of base of tongue ([facility] )    Severe protein-calorie malnutrition ([facility] )    Tobacco dependence    [facility]    Community acquired pneumonia of left lower lobe of lung    Other pulmonary embolism without acute cor pulmonale (HCC)    Cholecystitis    Lactic acidosis    Hyponatremia       Past Medical History  Past Medical History:   Diagnosis Date    Cancer ([facility] )     Diabetes [facility]     Gastritis     GERD (gastroesophageal reflux disease)     History of bladder cancer 2010    treated with surgery    Hypercholesterolemia     Hypertension        Past Surgical History  Past Surgical History:   Procedure Laterality Date    BLADDER TUMOR EXCISION      EGD          12/23/21 1245   PT Last Visit   PT Visit Date 12/23/21   Note Type   Note type Evaluation   Pain Assessment   Pain Assessment Tool 0-10   Pain Score 7  (at rest 3/10, increased w/activity)   Pain Location/Orientation Orientation: Mid;Location: Chest   Pain Onset/Description Frequency: Constant/Continuous; Descriptor: Aching;Descriptor: Sore;Descriptor: Sharp   Effect of Pain on Daily Activities yes   Patient's Stated Pain Goal No pain   Hospital Pain Intervention(s) Medication (See MAR); Repositioned; Ambulation/increased activity; Emotional support; Other (Comment)  (therapeutic pillow provided to brace w/coughing)   Multiple Pain Sites No   Restrictions/Precautions   Other Precautions Multiple lines;Telemetry; Fall Risk;Pain   Home Living   Type of [address] One level;Performs ADLs on one level; Able to live on main level with bedroom/bathroom;Stairs to enter with rails  (4-5 JUDITH front entrance, 2 from garage)   Bathroom Shower/Tub   (baseline assisted sponge bathing)   Bathroom Toilet Standard  (but also uses commode)   Bathroom Equipment Commode  (situated in bathroom)   [address] Other (Comment)  (no baseline AD usage)   Additional Comments Pt  reports he ambulates with physical assistance of 1-2 dependent on how he feels and who is helping  Prior Function   Level of [place] Needs assistance with IADLs; Needs assistance with ADLs and functional mobility   Lives With Spouse;Daughter   Receives Help From Family   ADL Assistance Needs assistance   IADLs Needs assistance   Falls in the last 6 months 1 to 4  (approximately 4)   Vocational Retired   General   Additional Pertinent History [place] OT present for co-assessment due to medical complexity, initial trialing of WB tasks  Family/Caregiver Present Yes  (spouse arrived upon conclusion)   Cognition   Overall Cognitive Status WFL   Arousal/Participation Responsive   Orientation Level Oriented X4   Memory Within functional limits   Following Commands Follows one step commands with increased time or repetition   Comments Pt  agreeable to PT assessment ,cooperative     Subjective   Subjective "I had the treatment to get better and have more pain than ever"   RLE Assessment   RLE Assessment X  (3+/5 gross musculature)   LLE Assessment   LLE Assessment X  (3+/5 gross musculature)   Vision-Basic Assessment   Current Vision Wears glasses all the time   Bed Mobility   Supine to Sit 3  Moderate assistance   Additional items Assist x 1;HOB elevated; Bedrails; Increased time required;Verbal cues;LE management   Sit to Supine 3  Moderate assistance   Additional items Assist x 2;Bedrails; Increased time required;Verbal cues;LE management   Additional Comments Pt  reported increased dizziness upon achieving static sitting balance  Javier Cordero reported symptomatic resolution within 2 minutes rest  Tactile cues maintained throughout assessment  Transfers   Sit to Stand 3  Moderate assistance   Additional items Assist x 2; Increased time required;Verbal cues; Other  (postural support/hand held assistance)   Stand to Sit 3  Moderate assistance   Additional items Assist x 2; Increased time required;Verbal cues; Other  (postural support/hand held assistance)   Stand pivot Unable to assess  (bedside tasks only due to safety concerns)   Ambulation/Elevation   Gait pattern Improper Weight shift; Antalgic;Narrow LISANDRA; Decreased foot clearance; Short stride; Excessively slow   Gait Assistance 3  Moderate assist   Additional items Assist x 2;Verbal cues; Tactile cues   Assistive Device None; Other (Comment)  (postural support/hand held assistance)   Distance 2 feet  (toward HOB,could not advance due to medical complications i e  lightheadedness)   Stair Management Assistance Not tested  (unsafe for trialing at this time)   Balance   Static Sitting Fair -   Dynamic Sitting Poor +   Static Standing Poor   Dynamic Standing Poor   Ambulatory Poor -   Endurance Deficit   Endurance Deficit Yes   Activity Tolerance   Activity Tolerance Patient limited by fatigue;Patient limited by pain   Medical Staff Made Aware yes, CM was informed of assessment outcome  Nurse Made Aware yes, Natalia Walker RN & Melodie Lee PCA   Assessment   Prognosis Fair   Problem List Decreased strength;Decreased endurance; Impaired balance;Decreased mobility; Decreased coordination;Pain   Assessment Pt is 78 y o  male seen for PT evaluation s/p [facility] on 12/21/2021 w/ Sepsis ([address] )  PT consulted to assess pt's functional mobility and d/c needs  Order placed for PT eval and tx, w/ up and OOB as tolerated order  Comorbidities affecting pt's physical performance at time of assessment include: weakness,sepsis, cholecystitis,community acquired pneumonia L lower lobe, HTN, hyponatremia, lactic acidosis, PE, malignant neoplasm base of tongue, type 2 DM   PTA, pt was requiring assistance of 1-2 for ambulation  Personal factors affecting pt at time of IE include: stairs to enter home, inability to ambulate household distances, inability to navigate community distances, unable to perform dynamic tasks in community, positive fall history and unable to perform physical activity  Please find objective findings from PT assessment regarding body systems outlined above with impairments and limitations including weakness, impaired balance, decreased endurance, impaired coordination, gait deviations, pain, decreased activity tolerance, decreased functional mobility tolerance and fall risk  From PT/mobility standpoint, recommendation at time of d/c would be post acute rehabilitation services pending progress in order to facilitate return to PLOF  Goals   Patient Goals to have less pain   LTG Expiration Date 01/02/22   Long Term Goal #1 1 )Patient will complete bed mobility min A of 1 for decrease need for caregiver assistance, decrease burden of care  2 ) Patient will complete transfers min A of 1 to decrease risk of falls, facilitate upright standing posture  3 ) BLE strength to greater than/equal to 4/5 gross musculature to increase ability to safely transfer, control descent to chair  4 ) Patient will exhibit increase dynamic standing to Fair 1-2 minutes  without LOB min A of 1 to improve activity tolerance   5 ) Patient will exhibit increase dynamic ambulatory balance to Fair 30-50 feet w/AD prn min A of 1 to improve ability to mobilize to toilet, chair and decrease risk for additional medical complication   PT Treatment Day 0   Plan   Treatment/Interventions Functional transfer training;LE strengthening/ROM; Therapeutic exercise; Endurance training;Patient/family training;Equipment eval/education; Bed mobility;Gait training;Spoke to nursing;Spoke to case management;OT   PT Frequency 3-5x/wk   Recommendation   PT Discharge Recommendation Post acute rehabilitation services   Additional Comments Upon conclusion, pt  was resting in bed w/all needs within reach and spouse at bedside  Additional Comments 2 Pt's raw score on the [facility] Basic Mobility inpatient short form is 11, standardized score is 30 25  Patients at this level are likely to benefit from DC to [address]  However, please refer to therapist recommendation for safe DC planning     [facility] Basic Mobility Inpatient   Turning in Bed Without Bedrails 3   Lying on Back to Sitting on Edge of Flat Bed 2   Moving Bed to Chair 2   Standing Up From Chair 2   Walk in Room 1   Climb 3-5 Stairs 1   Basic Mobility Inpatient Raw Score 11   Basic Mobility Standardized Score 30 25   Highest Level Of Mobility   [facility] Goal 4: Move to chair/commode     History/Personal Factors/Comorbidities: weakness,sepsis, cholecystitis,community acquired pneumonia L lower lobe, HTN, hyponatremia, lactic acidosis, PE, malignant neoplasm base of tongue, type 2 DM     # of body structures/limitations: muscle weakness, activity intolerance,decreased endurance, impaired balance, gait deviations,impaired coordination, pain    Clinical presentation: unstable as seen in pain severity, progressive symptoms prior to hospitalization, high fall risk with positive fall history,complicated medical history including chemo radiation,lightheadedness,dizziness    Initial Assessment Time: [id number]    Nicholas Barone, PT

## 2021-12-23 NOTE — PLAN OF CARE
Problem: PHYSICAL THERAPY ADULT  Goal: Performs mobility at highest level of function for planned discharge setting  See evaluation for individualized goals  Description: Treatment/Interventions: Functional transfer training,LE strengthening/ROM,Therapeutic exercise,Endurance training,Patient/family training,Equipment eval/education,Bed mobility,Gait training,Spoke to nursing,Spoke to case management,OT          See flowsheet documentation for full assessment, interventions and recommendations  Note: Prognosis: Fair  Problem List: Decreased strength,Decreased endurance,Impaired balance,Decreased mobility,Decreased coordination,Pain  Assessment: Pt is 78 y o  male seen for PT evaluation s/p admit to [facility] on 12/21/2021 w/ Sepsis ([facility] [address] )  PT consulted to assess pt's functional mobility and d/c needs  Order placed for PT eval and tx, w/ up and OOB as tolerated order  Comorbidities affecting pt's physical performance at time of assessment include: weakness,sepsis, cholecystitis,community acquired pneumonia L lower lobe, HTN, hyponatremia, lactic acidosis, PE, malignant neoplasm base of tongue, type 2 DM   PTA, pt was requiring assistance of 1-2 for ambulation  Personal factors affecting pt at time of IE include: stairs to enter home, inability to ambulate household distances, inability to navigate community distances, unable to perform dynamic tasks in community, positive fall history and unable to perform physical activity  Please find objective findings from PT assessment regarding body systems outlined above with impairments and limitations including weakness, impaired balance, decreased endurance, impaired coordination, gait deviations, pain, decreased activity tolerance, decreased functional mobility tolerance and fall risk  From PT/mobility standpoint, recommendation at time of d/c would be post acute rehabilitation services pending progress in order to facilitate return to OF             PT Discharge Recommendation: (S) Post acute rehabilitation services          See flowsheet documentation for full assessment

## 2021-12-23 NOTE — ASSESSMENT & PLAN NOTE
· As seen on CT and chest x-ray  · COVID negative  · Procalcitonin 0 85  · Strep pneumo and Legionella urine antigens negative  · Zosyn day 2

## 2021-12-23 NOTE — PROGRESS NOTES
[address]  Progress Note - Illa All 1945, 78 y o  male MRN: [id number]  Unit/Bed#: DIS 02 Encounter: [id number]  Primary Care Provider: Giovana Edward MD   Date and time admitted to hospital: 12/21/2021 11:56 PM    Hyponatremia  Assessment & Plan  · Resolved to 136   · Noted to be 125  · Status was IV fluids  · Continue to monitor morning labs      Lactic acidosis  Assessment & Plan  · Resolved to 1 9   · Secondary to sepsis  · Initial lactic acid was 3 4  · Status post IV fluids    Cholecystitis  Assessment & Plan  · Patient initially presented with generalized weakness  Evidence of possible cholecystitis seen on CT abdomen/pelvis, confirmed with ultrasound gallbladder  · General surgery consult appreciated  · Holding off on more aggressive measures as of now, as patient is not a good surgical candidate  Attempting conservative measures, and if failed, will consider percutaneous cholecystostomy  · Zosyn day 2  Other pulmonary embolism without acute cor pulmonale (HCC)  Assessment & Plan  · As seen on CT chest/abdomen/pelvis  · Echo unremarkable  · No evidence of DVT on venous Doppler  · Continue Lovenox 1 milligram/kilogram subQ q 12 hours  · O2 protocol  · Continue to monitor hemodynamic status      Community acquired pneumonia of left lower lobe of lung  Assessment & Plan  · As seen on CT and chest x-ray  · COVID negative  · Procalcitonin 0 85  · Strep pneumo and Legionella urine antigens negative  · Zosyn day 2    Primary malignant neoplasm of base of tongue ([facility] [address] )  Assessment & Plan  · Patient just completed radiation therapy at [address]    Hyperlipidemia due to type 2 diabetes mellitus [facility])  Assessment & Plan  Lab Results   Component Value Date    HGBA1C 6 3 (H) 12/22/2021       Recent Labs     12/22/21  1801 12/22/21  2356 12/23/21  0625 12/23/21  1211   POCGLU 182* 131 114 120       Blood Sugar Average: Last 72 hrs:  (P) 765 [id number]  · Continue home statin    Type 2 diabetes mellitus [facility])  Assessment & Plan  Lab Results   Component Value Date    HGBA1C 6 3 (H) 12/22/2021       Recent Labs     12/22/21  1801 12/22/21  2356 12/23/21  0625 12/23/21  1211   POCGLU 182* 131 114 120       Blood Sugar Average: Last 72 hrs:  (P) 432 [id number]  · Hold home hypoglycemic agents  · Sliding scale insulin  · Accu-Cheks    Dysphagia, unspecified  Assessment & Plan  · Patient has an indwelling PEG tube  Formula used is a [employer] product, which we do not carry here at the hospital   · Nutrition consult appreciated  · Patient and his wife agreeable to using our product here (Steven Harrison) while inpatient    Essential (primary) hypertension  Assessment & Plan  · Blood pressure well controlled  · Continue metoprolol  · Hold lisinopril  · Continue to monitor    * Sepsis [facility])  Assessment & Plan  · Patient is still with active pneumonia cholecystitis, however all other sepsis parameters resolved  · Sepsis criteria met with tachycardia, fever, lactic acidosis, with active pneumonia and cholecystitis  · Patient received sepsis protocol normal saline fluid resuscitation followed by 125 cc/hour  · Lactic acid and procalcitonin trended to normal  · Blood cultures and urine culture currently normal  · Zosyn 4 5 g IV q 6 hours day 2        VTE Pharmacologic Prophylaxis: VTE Score: 9 High Risk (Score >/= 5) - Pharmacological DVT Prophylaxis Ordered: enoxaparin (Lovenox)  Sequential Compression Devices Ordered  Patient Centered Rounds: I performed bedside rounds with nursing staff today  Discussions with Specialists or Other Care Team Provider:  General surgery, nutrition, case management, physical therapy    Education and Discussions with Family / Patient: Updated  (wife) at bedside  Time Spent for Care: 30 minutes  More than 50% of total time spent on counseling and coordination of care as described above      Current Length of Stay: 1 day(s)  Current Patient Status: Inpatient   Certification Statement: The patient will continue to require additional inpatient hospital stay due to IV antibiotics for cholecystitis and pneumonia  Discharge Plan: Anticipate discharge in 48-72 hrs to discharge location to be determined pending rehab evaluations  Code Status: Level 1 - Full Code    Subjective:   Patient was seen examined resting comfortably in bed  No acute events overnight  Reports he feels slightl better today, although he is still experiencing intermittent right upper quadrant pain  Otherwise offers no other complaints  Objective:     Vitals:   Temp (24hrs), Av 3 °F (37 4 °C), Min:98 6 °F (37 °C), Max:99 7 °F (37 6 °C)    Temp:  [98 6 °F (37 °C)-99 7 °F (37 6 °C)] 99 5 °F (37 5 °C)  HR:  [] 90  Resp:  [18-24] 24  BP: ()/(56-67) 141/67  SpO2:  [90 %] 90 %  Body mass index is 22 47 kg/m²  Input and Output Summary (last 24 hours): Intake/Output Summary (Last 24 hours) at 2021 1557  Last data filed at 2021 1300  Gross per 24 hour   Intake 100 ml   Output 900 ml   Net -800 ml       Physical Exam:   Physical Exam  Vitals and nursing note reviewed  Constitutional:       General: He is not in acute distress  Appearance: He is underweight  He is not toxic-appearing  HENT:      Head: Normocephalic and atraumatic  Mouth/Throat:      Mouth: Mucous membranes are moist    Eyes:      Conjunctiva/sclera: Conjunctivae normal    Cardiovascular:      Rate and Rhythm: Normal rate and regular rhythm  Pulses: Normal pulses  Heart sounds: Normal heart sounds  Pulmonary:      Effort: Pulmonary effort is normal  No respiratory distress  Breath sounds: Normal breath sounds  No wheezing, rhonchi or rales  Abdominal:      General: Bowel sounds are normal  There is distension  Palpations: Abdomen is soft  Tenderness: There is no abdominal tenderness (Mild right upper quadrant)  There is no guarding or rebound  Musculoskeletal:      Cervical back: Neck supple  Right lower leg: No edema  Left lower leg: No edema  Skin:     General: Skin is warm and dry  Comments: Peg tube in place   Neurological:      Mental Status: He is alert and oriented to person, place, and time  Cranial Nerves: No cranial nerve deficit  Sensory: No sensory deficit  Motor: No weakness  Coordination: Coordination normal    Psychiatric:         Mood and Affect: Mood normal          Behavior: Behavior normal          Thought Content: Thought content normal           Additional Data:     Labs:  Results from last 7 days   Lab Units 12/23/21  0524 12/22/21  0103 12/22/21  0103   WBC Thousand/uL 7 92   < > 6 48   HEMOGLOBIN g/dL 9 1*   < > 11 8*   HEMATOCRIT % 27 6*   < > 36 0*   PLATELETS Thousands/uL 191   < > 225   BANDS PCT %  --   --  8   LYMPHO PCT %  --   --  1*   MONO PCT %  --   --  8   EOS PCT %  --   --  0    < > = values in this interval not displayed       Results from last 7 days   Lab Units 12/23/21  1133   SODIUM mmol/L 136   POTASSIUM mmol/L 3 6   CHLORIDE mmol/L 104   CO2 mmol/L 24   BUN mg/dL 19   CREATININE mg/dL 0 83   ANION GAP mmol/L 8   CALCIUM mg/dL 7 9*   ALBUMIN g/dL 2 7*   TOTAL BILIRUBIN mg/dL 0 97   ALK PHOS U/L 97   ALT U/L 42   AST U/L 56*   GLUCOSE RANDOM mg/dL 115     Results from last 7 days   Lab Units 12/22/21  0103   INR  1 01     Results from last 7 days   Lab Units 12/23/21  1211 12/23/21  0625 12/22/21  2356 12/22/21  1801 12/22/21  1156 12/22/21  0618   POC GLUCOSE mg/dl 120 114 131 182* 206* 157*     Results from last 7 days   Lab Units 12/22/21  0103   HEMOGLOBIN A1C % 6 3*     Results from last 7 days   Lab Units 12/23/21  0524 12/22/21  0410 12/22/21  0103   LACTIC ACID mmol/L  --  1 9 3 4*   PROCALCITONIN ng/ml 0 85*  --  0 41*       Lines/Drains:  Invasive Devices  Report    Peripheral Intravenous Line            Peripheral IV 12/22/21 Left Antecubital 1 day    Peripheral IV 12/22/21 Right Antecubital 1 day                  Telemetry:  Telemetry Orders (From admission, onward)             24 Hour Telemetry Monitoring  Continuous x 24 Hours (Telem)        References:    Telemetry Guidelines   Question:  Reason for 24 Hour Telemetry  Answer:  Pulmonary Embolism - 24 hours without resp  compromise, dysrhythmias, hemodynamically stable                 Telemetry Reviewed: Normal Sinus Rhythm  Indication for Continued Telemetry Use: PE             Imaging: Reviewed radiology reports from this admission including: Echo, venous Doppler, chest x-ray, CT chest/abdomen/pelvis, right upper quadrant ultrasound    Recent Cultures (last 7 days):   Results from last 7 days   Lab Units 12/23/21  0758 12/22/21  0119 12/22/21  0103   BLOOD CULTURE   --  No Growth at 24 hrs  No Growth at 24 hrs     LEGIONELLA URINARY ANTIGEN  Negative  --   --        Last 24 Hours Medication List:   Current Facility-Administered Medications   Medication Dose Route Frequency Provider Last Rate    acetaminophen  650 mg Rectal Q4H PRN Dione Lee, PA-C      aspirin  81 mg Oral Daily Ignacio Clamp, PA-C      atorvastatin  40 mg Oral Daily With Milad Bravo, PA-C      enoxaparin  1 mg/kg Subcutaneous Q12H [facility] Josephine Clamp, PA-C      fluticasone  1 spray Nasal Daily Ignacio Clamp, PA-C      glycopyrrolate  0 1 mg Intravenous Q8H PRN Dione Lee, PA-C      insulin lispro  1-6 Units Subcutaneous Q6H [facility] Ignacio Clamp, PA-C      ketorolac  15 mg Intravenous Q6H PRN Ignacio Clamp, PA-C      lisinopril  40 mg Oral Daily Josephine Clamp, PA-C      metoprolol  5 mg Intravenous [address], PA-C      morphine injection  2 mg Intravenous Q4H PRN Josephine Clamp, PA-C      ondansetron  4 mg Intravenous Q6H PRN Ignacio Clamp, PA-C      pantoprazole  20 mg Oral Daily Jospehine Clamp, PA-C      piperacillin-tazobactam  4 5 g Intravenous Q6H Ignacio Clamp, PA-C 4 5 g (12/23/21 5008)    sodium chloride  75 mL/hr Intravenous Cecil Stoner PA-C          Today, Patient Was Seen By: Tahmina Condon PA-C    **Please Note: This note may have been constructed using a voice recognition system  **

## 2021-12-23 NOTE — CASE MANAGEMENT
Case Management Assessment & Discharge Planning Note    Patient name Caleb Robles  Location DIS 02/DIS 02 MRN [id number]  : 1945 Date 2021       Current Admission Date: 2021  Current Admission Diagnosis:Sepsis [facility])   Patient Active Problem List    Diagnosis Date Noted    Anxiety 2021    Neoplasm of uncertain behavior of skin 2021    Post-traumatic stress disorder, unspecified 2021    Essential (primary) hypertension 2021    Actinic keratosis 2021    Dysphagia, unspecified 2021    Erectile dysfunction 2021    History of malignant neoplasm of bladder 2021    Carpal tunnel syndrome 2021    Chronic post-traumatic stress disorder (PTSD) after  combat 2021    Type 2 diabetes mellitus ([address] ) 2021    Hematuria 2021    Hyperlipidemia due to type 2 diabetes mellitus ([address] ) 2021    Malignant neoplasm of urinary bladder ([address] ) 2021    Neoplasm of lung 2021    Osteoarthritis of knee 2021    Panic disorder 2021    Polyp of colon 2021    Tobacco dependence 2021    Sepsis ([address] ) 2021    Community acquired pneumonia of left lower lobe of lung 2021    Other pulmonary embolism without acute cor pulmonale ([address] ) 2021    Cholecystitis 2021    Lactic acidosis 2021    Hyponatremia 2021    Severe protein-calorie malnutrition ([address] ) 2021    Primary malignant neoplasm of base of tongue ([address] ) 10/11/2021    Neoplasm of uncertain behavior of oropharynx 2021    Cardiac arrhythmia 05/15/2018    Heart murmur 05/15/2018    Left ventricular hypertrophy 05/15/2018      LOS (days): 1  Geometric Mean LOS (GMLOS) (days): 4 80  Days to GMLOS:3 6     OBJECTIVE:    Risk of Unplanned Readmission Score: 10         Current admission status: Inpatient       Preferred Pharmacy: No Pharmacies Listed  Primary Care Provider: Karishma Schwarz MD    Primary Insurance: MEDICARE  Secondary Insurance: AETNA    ASSESSMENT:  Active Health Care Agents    There are no active Health Care Agents on file  Patient Information  Mental Status: Alert  During Assessment patient was accompanied by: Not accompanied during assessment  Assessment information provided by[de-identified] Spouse  Primary Caregiver: Self  Support Systems: Self  County of Residence: [address] [place] [address] do you live in?: [place]  Home entry access options   Select all that apply : Stairs  Number of steps to enter home : 2  Do the steps have railings?: Yes  Type of Current Residence: [facility]  Living Arrangements: Lives w/ Spouse/significant other,Lives w/ Daughter    Activities of Daily Living Prior to Admission  Functional Status: Independent  Completes ADLs independently?: Yes  Ambulates independently?: Yes  Does patient use assisted devices?: No  Does patient currently own DME?: No  Does the patient have a history of Short-Term Rehab?: No  Does patient have a history of HHC?: No  Does patient currently have [employer]?: No         Patient Information Continued  Income Source: Pension/correction  Does patient have prescription coverage?: Yes  Does patient receive dialysis treatments?: No  Does patient have a history of substance abuse?: No  Does patient have a history of Mental Health Diagnosis?: Yes  Is patient receiving treatment for mental health?: No  Treatment options were provided  (pt has hx of ptsd and [facility] no [facility] nor take medicaiton as he is stable without per wife)         Means of Transportation  Means of Transport to Appts[de-identified] Drives Self  In the past 12 months, has lack of transportation kept you from medical appointments or from getting medications?: No  In the past 12 months, has lack of transportation kept you from meetings, work, or from getting things needed for daily living?: No  Was application for public transport provided?: N/A        DISCHARGE DETAILS:    Discharge planning discussed with[de-identified] pt's wife  Freedom of Choice: Yes     CM contacted family/caregiver?: Yes  Were Treatment Team discharge recommendations reviewed with patient/caregiver?: Yes  Did patient/caregiver verbalize understanding of patient care needs?: Yes  Were patient/caregiver advised of the risks associated with not following Treatment Team discharge recommendations?: Yes    Contacts  Patient Contacts: pt's wife  Relationship to Patient[de-identified] Family  Contact Method: Phone  Reason/Outcome: Continuity of Care,Referral,Discharge Planning (s/w wife to complete assessment   per wife pt is mostly ipta but after months of chemo/radiation he may benefit from dme, has [address] and she would like randi )    [address]         [facility] the [facility]?: Yes  [address] requested[de-identified] [address] Name[de-identified] [address] Provider[de-identified] PCP  Home Health Services Needed[de-identified] Evaluate Functional Status and Safety,Strengthening/Theraputic Exercises to Improve Function  Homebound Criteria Met[de-identified] Requires the Assistance of Another Person for Safe Ambulation or to Leave the Home,Uses an Assist Device (i e  cane, walker, etc)  Supporting Clincal Findings[de-identified] Fatigues Easliy in Short Distances,Limited Endurance         Other Referral/Resources/Interventions Provided:  Interventions: [facility]  Referral Comments: sent referral for Arya Mosqueda  will request pt/ot kieran         Treatment Team Recommendation: Home with [facility]  Discharge Destination Plan[de-identified] Home with Mary Beth at Discharge : Family

## 2021-12-23 NOTE — ASSESSMENT & PLAN NOTE
Lab Results   Component Value Date    HGBA1C 6 3 (H) 12/22/2021       Recent Labs     12/22/21  1801 12/22/21  2356 12/23/21  0625 12/23/21  1211   POCGLU 182* 131 114 120       Blood Sugar Average: Last 72 hrs:  (P) 136 [id number]  · Continue home statin

## 2021-12-23 NOTE — ASSESSMENT & PLAN NOTE
· Patient has an indwelling PEG tube  Formula used is a [employer] product, which we do not carry here at the hospital   · Nutrition consult appreciated    · Patient and his wife agreeable to using our product here (Sky Paulson) while inpatient

## 2021-12-23 NOTE — OCCUPATIONAL THERAPY NOTE
Occupational Therapy Evaluation      Brinda Berrios    12/23/2021    Patient Active Problem List   Diagnosis    Anxiety    Neoplasm of uncertain behavior of skin    Post-traumatic stress disorder, unspecified    Essential (primary) hypertension    Actinic keratosis    Dysphagia, unspecified    Erectile dysfunction    History of malignant neoplasm of bladder    Cardiac arrhythmia    Carpal tunnel syndrome    Chronic post-traumatic stress disorder (PTSD) after  combat    Type 2 diabetes mellitus ([facility]    Heart murmur    Hematuria    Hyperlipidemia due to type 2 diabetes mellitus ([facility] )    Left ventricular hypertrophy    Malignant neoplasm of urinary bladder (HCC)    Neoplasm of lung    Neoplasm of uncertain behavior of oropharynx    Osteoarthritis of knee    Panic disorder    Polyp of colon    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)    Severe protein-calorie malnutrition ([facility] )    Tobacco dependence    [facility]is ([facility]    Community acquired pneumonia of left lower lobe of lung    Other pulmonary embolism without acute cor pulmonale (HCC)    Cholecystitis    Lactic acidosis    Hyponatremia       Past Medical History:   Diagnosis Date    [facility] ([facility]     Diabetes ([facility]     Gastritis     GERD (gastroesophageal reflux disease)     History of bladder cancer 2010    treated with surgery    Hypercholesterolemia     Hypertension        Past Surgical History:   Procedure Laterality Date    BLADDER TUMOR EXCISION      EGD          12/23/21 1246   OT Last Visit   OT Visit Date 12/23/21   Note Type   Note type Evaluation   Restrictions/Precautions   Weight Bearing Precautions Per Order No   Other Precautions Telemetry;Multiple lines;Pain; Fall Risk;Bed Alarm   Pain Assessment   Pain Assessment Tool 0-10   Pain Score 7  (3/10 at rest, increased to 7/10 c mobility )   Pain Location/Orientation Location: Chest   Pain Onset/Description Onset: Ongoing; Onset: Gradual;Descriptor: Aching;Descriptor: Discomfort   Hospital Pain Intervention(s) Repositioned; Ambulation/increased activity; Emotional support   Multiple Pain Sites No   Home Living   Type of Home House   Home Layout Stairs to enter with rails; One level; Able to live on main level with bedroom/bathroom; Performs ADLs on one level  (4-5 JUDITH vs 2 via garage )   [employer] retargeting   (pt reports spongebathing at baseline )   Bathroom Toilet Standard  (but also uses commode )   Bathroom Equipment Commode  (in bathroom )   [address]   (no AD at baseline, hand held assistance for mobility)   Additional Comments Pt reports ambulating with hand hand assistance of 1-2 family members  Does not use AD at baseline    Prior Function   Level of [place] Needs assistance with IADLs; Needs assistance with ADLs and functional mobility   Lives With Spouse;Daughter   Receives Help From Family   ADL Assistance Needs assistance  (assistance with sponge bathing, dressing )   IADLs   (family completes all IADLs )   Falls in the last 6 months 1 to 4  (~ 4 per pt )   Vocational Retired   Comments (-) driving, family provides transportation    Lifestyle   Autonomy Pt reporting needing assistance with ADL and IADLs prior to admission, lives with wife and daughter in 1 story home with 4-5 JUDITH vs 2 JUDITH via garage  Pt ambulates with physical assistance from family members, does not drive       Reciprocal Relationships wife, daughter    Service to Others retired    ADL   Eating Assistance 4  Minimal Assistance   Grooming Assistance 4  [facility] 4  Minimal Assistance   LB [address] 2  Maximal [address] 4  [address] 2  [address]imal [address]  2  [address]imal [address] 4  Minimal Assistance   Bed Mobility   Supine to Sit 3  Moderate assistance   Additional items Assist x 1;HOB elevated; Bedrails; Increased time required;Verbal cues;LE management   Sit to Supine 3  Moderate assistance   Additional items Assist x 2;Bedrails; Increased time required;Verbal cues;LE management   Additional Comments Pt reported dizziness/lightheadedness c positional changes  Symptomatic resolution ~2 min static seated rest at EOB  Transfers   Sit to Stand 3  Moderate assistance   Additional items Assist x 2; Increased time required;Verbal cues; Other  (postural support/ hand held assistance )   Stand to Sit 3  Moderate assistance   Additional items Assist x 2; Increased time required;Verbal cues; Other  (postural support/hand hald assistance )   Additional Comments Reported return of lightheadedness upon standing  Pt returned to resting in bed  Functional Mobility   Functional Mobility 3  Moderate assistance  (x2)   Additional Comments Multiple LOB requiring external correction following sit<>transfers and side steps at EOB  Pt required postural support and VC to maitain static standing position  VC to open eyes and correct posture  Additional items Hand hold assistance   Balance   Static Sitting Fair -   Dynamic Sitting Poor +   Static Standing Poor   Dynamic Standing Poor   Activity Tolerance   Activity Tolerance Patient limited by pain; Patient limited by fatigue   Medical Staff Made Aware CM made aware of assessment outcomes  Co-Eval with PT Maddie Son d/t pt's medical complexity and need for physical assistance of 2 to maintain safety      Nurse Made Aware TIFFANY Martinez, RN Parminder Hauser made aware of outcomes    RUE Assessment   RUE Assessment WFL  (~120 degrees, MMT ~3+/5 based on functional assessment )   LUE Assessment   LUE Assessment WFL  (~120 degrees, MMT ~3+/5 based on functional assessment )   Hand Function   Gross Motor Coordination Functional   Fine Motor Coordination Functional   Sensation   Light Touch No apparent deficits  (per pt )   Vision-Basic Assessment   Current Vision Wears glasses all the time Cognition   Overall Cognitive Status WFL   Orientation Level Oriented X4   Memory Within functional limits   Following Commands Follows one step commands without difficulty   Comments Pt agreeable to OT session, cooperative    Assessment   Limitation Decreased ADL status; Decreased UE ROM; Decreased UE strength;Decreased Safe judgement during ADL;Decreased endurance;Decreased self-care trans;Decreased high-level ADLs   Prognosis Good   Assessment Patient is a 78 y o  male seen for OT evaluation at [place] [address] following admission on 12/21/2021  s/p Sepsis ([address] )  Comorbidities impacting functional performance include: HTN, dysphagia, DM2, primary malignant neoplasm of base of tongue, community acquired pneumonia of L lower lobe, PE without acute cor pulmonale, lactic acidosis, cholecystitis, hyponatremia, anxiety, PTSD, OA of knee, severe protein-calorie malnutrition, and malignant neoplasm of urinary bladder  Patient presents with active orders for OT eval and treat and up and OOB as tolerated   Performed at least 2 patient identifiers during session including name and wristband  Patient reports prior level of function as needs assistance  with ADL/IADLs, ambulatory with hand hold assistance from family members, and lives with daughter and spouse in a 1 story house  with 4-5 vs 2 JUDITH  Patient is retired  and does not  drive  Upon initial evaluation, patient requires min assist  for UB ADLs, max assist for LB ADLs, and mod assist x2 for transfers and functional mobility with HHA  Based on functional eval, patient presents A&Ox4 with intact  attention, safety awareness, and memory  Occupational performance is affected by the following deficits: muscular strength , balance , functional reach , trunk control , activity tolerance  and (+) pain   Patient would benefit from OT services to maximize independence in self-care and transfers   Personal factors impacting performance include: (+) Hx of falls , JUDITH home , decreased ADL independence  and decreased IADL independence  Occupational areas to address include:bathing/showering, toileting, dressing , eating , personal hygiene/grooming , bed mobility , functional mobility, social participation , activity engagement , safety awareness , fall prevention , energy conservation  and home management   Recommending post-acute rehabilitation  upon D/C  Goals   Patient Goals to have less pain    Plan   Treatment Interventions ADL retraining;Functional transfer training;UE strengthening/ROM; Endurance training;Patient/family training;Equipment evaluation/education; Neuromuscular reeducation; Energy conservation; Compensatory technique education; Activityengagement   Goal Expiration Date 01/02/22   OT Treatment Day 0   OT Frequency 3-5x/wk   Recommendation   OT Discharge Recommendation Post acute rehabilitation services   OT - OK to Discharge Yes  (Once medically clear)   Additional Comments  Pt resting in bed at end of session, all needs met and call bell in reach    Additional Comments 2 The patient's raw score on the AM-PAC Daily Activity inpatient short form is 12, standardized score is 30 6, less than 39 4  Patients at this level are likely to benefit from discharge to post-acute rehabilitation services  Please refer to the recommendation of the Occupational Therapist for safe discharge planning     AM-PAC Daily Activity Inpatient   Lower Body Dressing 1   Bathing 2   Toileting 1   Upper Body Dressing 2   Grooming 3   Eating 3   Daily Activity Raw Score 12   Daily Activity Standardized Score (Calc for Raw Score >=11) 30 6   AM-PAC Applied Cognition Inpatient   Following a Speech/Presentation 3   Understanding Ordinary Conversation 4   Taking Medications 3   Remembering Where Things Are Placed or Put Away 3   Remembering List of 4-5 Errands 3   Taking Care of Complicated Tasks 2   Applied Cognition Raw Score 18   Applied Cognition Standardized Score 38 07   Pt will complete UB ADLs Supervision with use of AE as needed for increased ADL independence within 10 days  Pt will complete LB ADLs Min A  with use of AE as needed for increased ADL independence within 10 days  Pt will complete toileting Min A  with use of DME for increased ADL independence within 10 days  Pt will demonstrate proper body mechanics to complete transfers and functional mobility with Min A and use of AD for increased safety and functional mobility within 10 days  Pt will demonstrate standing tolerance of 6 min with Min A and use of AD for increased endurance and activity tolerance within 10 days  Pt will demonstrate OOB sitting tolerance of 2-4 hr/day for increased activity tolerance and engagement within 10 days  Pt will participate in 10 min of BUE therapeutic exercise to increase strength, ROM, and endurance during ADL/IADLs within 10 days  Pt will participate in ongoing cognitive assessments to assist with safe D/C planning and recommendations  Pt will demonstrate proper body mechanics and fall prevention strategies during 100% of tx sessions for increased safety awareness during ADL/IADLs    Pt will verbalize and demonstrate understanding of energy conservation/deep breathing techniques for increased activity tolerance and endurance during meaningful activities       Lisa Gavin, OT

## 2021-12-23 NOTE — ASSESSMENT & PLAN NOTE
· As seen on CT chest/abdomen/pelvis  · Echo unremarkable  · No evidence of DVT on venous Doppler  · Continue Lovenox 1 milligram/kilogram subQ q 12 hours  · O2 protocol  · Continue to monitor hemodynamic status

## 2021-12-23 NOTE — ASSESSMENT & PLAN NOTE
· Patient is still with active pneumonia cholecystitis, however all other sepsis parameters resolved    · Sepsis criteria met with tachycardia, fever, lactic acidosis, with active pneumonia and cholecystitis  · Patient received sepsis protocol normal saline fluid resuscitation followed by 125 cc/hour  · Lactic acid and procalcitonin trended to normal  · Blood cultures and urine culture currently normal  · Zosyn 4 5 g IV q 6 hours day 2

## 2021-12-23 NOTE — PLAN OF CARE
Problem: OCCUPATIONAL THERAPY ADULT  Goal: Performs self-care activities at highest level of function for planned discharge setting  See evaluation for individualized goals  Description: Treatment Interventions: ADL retraining,Functional transfer training,UE strengthening/ROM,Endurance training,Patient/family training,Equipment evaluation/education,Neuromuscular reeducation,Energy conservation,Compensatory technique education,Activityengagement          See flowsheet documentation for full assessment, interventions and recommendations  Note: Limitation: Decreased ADL status,Decreased UE ROM,Decreased UE strength,Decreased Safe judgement during ADL,Decreased endurance,Decreased self-care trans,Decreased high-level ADLs  Prognosis: Good  Assessment: Patient is a 78 y o  male seen for OT evaluation at [place] [address] following admission on 12/21/2021  s/p Sepsis ([address] )  Comorbidities impacting functional performance include: HTN, dysphagia, DM2, primary malignant neoplasm of base of tongue, community acquired pneumonia of L lower lobe, PE without acute cor pulmonale, lactic acidosis, cholecystitis, hyponatremia, anxiety, PTSD, OA of knee, severe protein-calorie malnutrition, and malignant neoplasm of urinary bladder  Patient presents with active orders for OT eval and treat and up and OOB as tolerated   Performed at least 2 patient identifiers during session including name and wristband  Patient reports prior level of function as needs assistance  with ADL/IADLs, ambulatory with hand hold assistance from family members, and lives with daughter and spouse in a 1 story house  with 4-5 vs 2 JUDITH  Patient is retired  and does not  drive  Upon initial evaluation, patient requires min assist  for UB ADLs, max assist for LB ADLs, and mod assist x2 for transfers and functional mobility with HHA  Based on functional eval, patient presents A&Ox4 with intact  attention, safety awareness, and memory   Occupational performance is affected by the following deficits: muscular strength , balance , functional reach , trunk control , activity tolerance  and (+) pain   Patient would benefit from OT services to maximize independence in self-care and transfers  Personal factors impacting performance include: (+) Hx of falls , JUDITH home , decreased ADL independence  and decreased IADL independence  Occupational areas to address include:bathing/showering, toileting, dressing , eating , personal hygiene/grooming , bed mobility , functional mobility, social participation , activity engagement , safety awareness , fall prevention , energy conservation  and home management   Recommending post-acute rehabilitation  upon D/C        OT Discharge Recommendation: Post acute rehabilitation services  OT - OK to Discharge: Yes (Once medically clear)     Jairo Farah OT

## 2021-12-23 NOTE — PLAN OF CARE
Problem: Potential for Falls  Goal: Patient will remain free of falls  Description: INTERVENTIONS:  - Educate patient/family on patient safety including physical limitations  - Instruct patient to call for assistance with activity   - Consult OT/PT to assist with strengthening/mobility   - Keep Call bell within reach  - Keep bed low and locked with side rails adjusted as appropriate  - Keep care items and personal belongings within reach  - Initiate and maintain comfort rounds  - Make Fall Risk Sign visible to staff  Problem: Potential for Falls  Goal: Patient will remain free of falls  Description: INTERVENTIONS:  - Educate patient/family on patient safety including physical limitations  - Instruct patient to call for assistance with activity   - Consult OT/PT to assist with strengthening/mobility   - Keep Call bell within reach  - Keep bed low and locked with side rails adjusted as appropriate  - Keep care items and personal belongings within reach  - Initiate and maintain comfort rounds  - Make Fall Risk Sign visible to staff  Problem: MOBILITY - ADULT  Goal: Maintain or return to baseline ADL function  Description: INTERVENTIONS:  -  Assess patient's ability to carry out ADLs; assess patient's baseline for ADL function and identify physical deficits which impact ability to perform ADLs (bathing, care of mouth/teeth, toileting, grooming, dressing, etc )  - Assess/evaluate cause of self-care deficits   - Assess range of motion  - Assess patient's mobility; develop plan if impaired  - Assess patient's need for assistive devices and provide as appropriate  - Encourage maximum independence but intervene and supervise when necessary  - Involve family in performance of ADLs  - Assess for home care needs following discharge   - Consider OT consult to assist with ADL evaluation and planning for discharge  - Provide patient education as appropriate  Outcome: Not Progressing  Goal: Maintains/Returns to pre admission functional level  Description: INTERVENTIONS:  - Perform BMAT or MOVE assessment daily    - Set and communicate daily mobility goal to care team and patient/family/caregiver  - Collaborate with rehabilitation services on mobility goals if consulted  - Out of bed for toileting  - Record patient progress and toleration of activity level   Outcome: Not Progressing     Problem: SAFETY ADULT  Goal: Maintain or return to baseline ADL function  Description: INTERVENTIONS:  -  Assess patient's ability to carry out ADLs; assess patient's baseline for ADL function and identify physical deficits which impact ability to perform ADLs (bathing, care of mouth/teeth, toileting, grooming, dressing, etc )  - Assess/evaluate cause of self-care deficits   - Assess range of motion  - Assess patient's mobility; develop plan if impaired  - Assess patient's need for assistive devices and provide as appropriate  - Encourage maximum independence but intervene and supervise when necessary  - Involve family in performance of ADLs  - Assess for home care needs following discharge   - Consider OT consult to assist with ADL evaluation and planning for discharge  - Provide patient education as appropriate  Outcome: Not Progressing  Goal: Maintains/Returns to pre admission functional level  Description: INTERVENTIONS:  - Perform BMAT or MOVE assessment daily    - Set and communicate daily mobility goal to care team and patient/family/caregiver  - Collaborate with rehabilitation services on mobility goals if consulted  - Reposition patient every 2 hours    - Dangle patient 3 times a day  - Stand patient 3 times a day  - Ambulate patient 3 times a day  - Out of bed to chair 3 times a day   - Out of bed for meals 3 times a day  - Out of bed for toileting  - Record patient progress and toleration of activity level   Outcome: Not Progressing     Problem: Nutrition/Hydration-ADULT  Goal: Nutrient/Hydration intake appropriate for improving, restoring or maintaining nutritional needs  Description: Monitor and assess patient's nutrition/hydration status for malnutrition  Collaborate with interdisciplinary team and initiate plan and interventions as ordered  Monitor patient's weight and dietary intake as ordered or per policy  Utilize nutrition screening tool and intervene as necessary  Determine patient's food preferences and provide high-protein, high-caloric foods as appropriate       INTERVENTIONS:  - Monitor oral intake, urinary output, labs, and treatment plans  - Assess nutrition and hydration status and recommend course of action  - Evaluate amount of meals eaten  - Assist patient with eating if necessary   - Allow adequate time for meals  - Recommend/ encourage appropriate diets, oral nutritional supplements, and vitamin/mineral supplements  - Order, calculate, and assess calorie counts as needed  - Recommend, monitor, and adjust tube feedings and TPN/PPN based on assessed needs  - Assess need for intravenous fluids  - Provide specific nutrition/hydration education as appropriate  - Include patient/family/caregiver in decisions related to nutrition  Outcome: Not Progressing     - Apply yellow socks and bracelet for high fall risk patients  - Consider moving patient to room near nurses station  Outcome: Progressing     Problem: PAIN - ADULT  Goal: Verbalizes/displays adequate comfort level or baseline comfort level  Description: Interventions:  - Encourage patient to monitor pain and request assistance  - Assess pain using appropriate pain scale  - Administer analgesics based on type and severity of pain and evaluate response  - Implement non-pharmacological measures as appropriate and evaluate response  - Consider cultural and social influences on pain and pain management  - Notify physician/advanced practitioner if interventions unsuccessful or patient reports new pain  Outcome: Progressing     Problem: INFECTION - ADULT  Goal: Absence or prevention of progression during hospitalization  Description: INTERVENTIONS:  - Assess and monitor for signs and symptoms of infection  - Monitor lab/diagnostic results  - Monitor all insertion sites, i e  indwelling lines, tubes, and drains  - Monitor endotracheal if appropriate and nasal secretions for changes in amount and color  - [place] appropriate cooling/warming therapies per order  - Administer medications as ordered  - Instruct and encourage patient and family to use good hand hygiene technique  - Identify and instruct in appropriate isolation precautions for identified infection/condition  Outcome: Progressing     Problem: SAFETY ADULT  Goal: Patient will remain free of falls  Description: INTERVENTIONS:  - Educate patient/family on patient safety including physical limitations  - Instruct patient to call for assistance with activity   - Consult OT/PT to assist with strengthening/mobility   - Keep Call bell within reach  - Keep bed low and locked with side rails adjusted as appropriate  - Keep care items and personal belongings within reach  - Initiate and maintain comfort rounds  - Make Fall Risk Sign visible to staff  - Apply yellow socks and bracelet for high fall risk patients  - Consider moving patient to room near nurses station  Outcome: Progressing     Problem: DISCHARGE PLANNING  Goal: Discharge to home or other facility with appropriate resources  Description: INTERVENTIONS:  - Identify barriers to discharge w/patient and caregiver  - Arrange for needed discharge resources and transportation as appropriate  - Identify discharge learning needs (meds, wound care, etc )  - Arrange for interpretive services to assist at discharge as needed  - Refer to Case Management Department for coordinating discharge planning if the patient needs post-hospital services based on physician/advanced practitioner order or complex needs related to functional status, cognitive ability, or social support system  Outcome: Progressing     Problem: Knowledge Deficit  Goal: Patient/family/caregiver demonstrates understanding of disease process, treatment plan, medications, and discharge instructions  Description: Complete learning assessment and assess knowledge base    Interventions:  - Provide teaching at level of understanding  - Provide teaching via preferred learning methods  Outcome: Progressing     Problem: Prexisting or High Potential for Compromised Skin Integrity  Goal: Skin integrity is maintained or improved  Description: INTERVENTIONS:  - Identify patients at risk for skin breakdown  - Assess and monitor skin integrity  - Assess and monitor nutrition and hydration status  - Monitor labs   - Assess for incontinence   - Turn and reposition patient  - Assist with mobility/ambulation  - Relieve pressure over bony prominences  - Avoid friction and shearing  - Provide appropriate hygiene as needed including keeping skin clean and dry  - Evaluate need for skin moisturizer/barrier cream  - Collaborate with interdisciplinary team   - Patient/family teaching  - Consider wound care consult   Outcome: Progressing     - Apply yellow socks and bracelet for high fall risk patients  - Consider moving patient to room near nurses station  Outcome: Progressing     Problem: PAIN - ADULT  Goal: Verbalizes/displays adequate comfort level or baseline comfort level  Description: Interventions:  - Encourage patient to monitor pain and request assistance  - Assess pain using appropriate pain scale  - Administer analgesics based on type and severity of pain and evaluate response  - Implement non-pharmacological measures as appropriate and evaluate response  - Consider cultural and social influences on pain and pain management  - Notify physician/advanced practitioner if interventions unsuccessful or patient reports new pain  Outcome: Progressing     Problem: INFECTION - ADULT  Goal: Absence or prevention of progression during hospitalization  Description: INTERVENTIONS:  - Assess and monitor for signs and symptoms of infection  - Monitor lab/diagnostic results  - Monitor all insertion sites, i e  indwelling lines, tubes, and drains  - Monitor endotracheal if appropriate and nasal secretions for changes in amount and color  - [place] appropriate cooling/warming therapies per order  - Administer medications as ordered  - Instruct and encourage patient and family to use good hand hygiene technique  - Identify and instruct in appropriate isolation precautions for identified infection/condition  Outcome: Progressing     Problem: SAFETY ADULT  Goal: Patient will remain free of falls  Description: INTERVENTIONS:  - Educate patient/family on patient safety including physical limitations  - Instruct patient to call for assistance with activity   - Consult OT/PT to assist with strengthening/mobility   - Keep Call bell within reach  - Keep bed low and locked with side rails adjusted as appropriate  - Keep care items and personal belongings within reach  - Initiate and maintain comfort rounds  - Make Fall Risk Sign visible to staff  - Offer Toileting every 4 Hours, in advance of need  - Initiate/Maintain bed alarm  - Obtain necessary fall risk management equipment  - Apply yellow socks and bracelet for high fall risk patients  - Consider moving patient to room near nurses station  Outcome: Progressing     Problem: DISCHARGE PLANNING  Goal: Discharge to home or other facility with appropriate resources  Description: INTERVENTIONS:  - Identify barriers to discharge w/patient and caregiver  - Arrange for needed discharge resources and transportation as appropriate  - Identify discharge learning needs (meds, wound care, etc )  - Arrange for interpretive services to assist at discharge as needed  - Refer to Case Management Department for coordinating discharge planning if the patient needs post-hospital services based on physician/advanced practitioner order or complex needs related to functional status, cognitive ability, or social support system  Outcome: Progressing     Problem: Knowledge Deficit  Goal: Patient/family/caregiver demonstrates understanding of disease process, treatment plan, medications, and discharge instructions  Description: Complete learning assessment and assess knowledge base    Interventions:  - Provide teaching at level of understanding  - Provide teaching via preferred learning methods  Outcome: Progressing     Problem: Prexisting or High Potential for Compromised Skin Integrity  Goal: Skin integrity is maintained or improved  Description: INTERVENTIONS:  - Identify patients at risk for skin breakdown  - Assess and monitor skin integrity  - Assess and monitor nutrition and hydration status  - Monitor labs   - Assess for incontinence   - Turn and reposition patient  - Assist with mobility/ambulation  - Relieve pressure over bony prominences  - Avoid friction and shearing  - Provide appropriate hygiene as needed including keeping skin clean and dry  - Evaluate need for skin moisturizer/barrier cream  - Collaborate with interdisciplinary team   - Patient/family teaching  - Consider wound care consult   Outcome: Progressing

## 2021-12-23 NOTE — ASSESSMENT & PLAN NOTE
Lab Results   Component Value Date    HGBA1C 6 3 (H) 12/22/2021       Recent Labs     12/22/21  1801 12/22/21  2356 12/23/21  0625 12/23/21  1211   POCGLU 182* 131 114 120       Blood Sugar Average: Last 72 hrs:  (P) 431 [id number]  · Hold home hypoglycemic agents  · Sliding scale insulin  · Accu-Cheks

## 2021-12-24 PROBLEM — E87.20 LACTIC ACIDOSIS: Status: RESOLVED | Noted: 2021-12-22 | Resolved: 2021-12-24

## 2021-12-24 PROBLEM — E87.1 HYPONATREMIA: Status: RESOLVED | Noted: 2021-12-22 | Resolved: 2021-12-24

## 2021-12-24 PROBLEM — E87.2 LACTIC ACIDOSIS: Status: RESOLVED | Noted: 2021-12-22 | Resolved: 2021-12-24

## 2021-12-24 LAB
ALBUMIN SERPL BCP-MCNC: 2.8 G/DL (ref 3.5–5)
ALP SERPL-CCNC: 191 U/L (ref 34–104)
ALT SERPL W P-5'-P-CCNC: 108 U/L (ref 7–52)
ANION GAP SERPL CALCULATED.3IONS-SCNC: 8 MMOL/L (ref 4–13)
AST SERPL W P-5'-P-CCNC: 176 U/L (ref 13–39)
BILIRUB SERPL-MCNC: 0.98 MG/DL (ref 0.2–1)
BUN SERPL-MCNC: 21 MG/DL (ref 5–25)
CALCIUM ALBUM COR SERPL-MCNC: 9 MG/DL (ref 8.3–10.1)
CALCIUM SERPL-MCNC: 8 MG/DL (ref 8.4–10.2)
CHLORIDE SERPL-SCNC: 107 MMOL/L (ref 96–108)
CO2 SERPL-SCNC: 25 MMOL/L (ref 21–32)
CREAT SERPL-MCNC: 0.77 MG/DL (ref 0.6–1.3)
ERYTHROCYTE [DISTWIDTH] IN BLOOD BY AUTOMATED COUNT: 19.9 % (ref 11.6–15.1)
GFR SERPL CREATININE-BSD FRML MDRD: 89 ML/MIN/1.73SQ M
GLUCOSE SERPL-MCNC: 175 MG/DL (ref 65–140)
GLUCOSE SERPL-MCNC: 186 MG/DL (ref 65–140)
GLUCOSE SERPL-MCNC: 200 MG/DL (ref 65–140)
GLUCOSE SERPL-MCNC: 202 MG/DL (ref 65–140)
GLUCOSE SERPL-MCNC: 230 MG/DL (ref 65–140)
HCT VFR BLD AUTO: 29.3 % (ref 36.5–49.3)
HGB BLD-MCNC: 9.4 G/DL (ref 12–17)
MAGNESIUM SERPL-MCNC: 1.9 MG/DL (ref 1.9–2.7)
MCH RBC QN AUTO: 28.2 PG (ref 26.8–34.3)
MCHC RBC AUTO-ENTMCNC: 32.1 G/DL (ref 31.4–37.4)
MCV RBC AUTO: 88 FL (ref 82–98)
PHOSPHATE SERPL-MCNC: 1.5 MG/DL (ref 2.3–4.1)
PLATELET # BLD AUTO: 218 THOUSANDS/UL (ref 149–390)
PMV BLD AUTO: 10 FL (ref 8.9–12.7)
POTASSIUM SERPL-SCNC: 3.4 MMOL/L (ref 3.5–5.3)
PROT SERPL-MCNC: 5.7 G/DL (ref 6.4–8.4)
RBC # BLD AUTO: 3.33 MILLION/UL (ref 3.88–5.62)
SODIUM SERPL-SCNC: 140 MMOL/L (ref 135–147)
WBC # BLD AUTO: 9.63 THOUSAND/UL (ref 4.31–10.16)

## 2021-12-24 PROCEDURE — 99232 SBSQ HOSP IP/OBS MODERATE 35: CPT | Performed by: SURGERY

## 2021-12-24 PROCEDURE — 99232 SBSQ HOSP IP/OBS MODERATE 35: CPT | Performed by: NURSE PRACTITIONER

## 2021-12-24 PROCEDURE — C9113 INJ PANTOPRAZOLE SODIUM, VIA: HCPCS | Performed by: NURSE PRACTITIONER

## 2021-12-24 PROCEDURE — 87081 CULTURE SCREEN ONLY: CPT | Performed by: NURSE PRACTITIONER

## 2021-12-24 PROCEDURE — 85027 COMPLETE CBC AUTOMATED: CPT

## 2021-12-24 PROCEDURE — 82948 REAGENT STRIP/BLOOD GLUCOSE: CPT

## 2021-12-24 PROCEDURE — 80053 COMPREHEN METABOLIC PANEL: CPT

## 2021-12-24 PROCEDURE — 84100 ASSAY OF PHOSPHORUS: CPT

## 2021-12-24 PROCEDURE — 83735 ASSAY OF MAGNESIUM: CPT

## 2021-12-24 PROCEDURE — 36415 COLL VENOUS BLD VENIPUNCTURE: CPT

## 2021-12-24 RX ORDER — ATORVASTATIN CALCIUM 40 MG/1
40 TABLET, FILM COATED ORAL
Status: DISCONTINUED | OUTPATIENT
Start: 2021-12-24 | End: 2021-12-26

## 2021-12-24 RX ORDER — LISINOPRIL 20 MG/1
40 TABLET ORAL DAILY
Status: DISCONTINUED | OUTPATIENT
Start: 2021-12-25 | End: 2021-12-30 | Stop reason: HOSPADM

## 2021-12-24 RX ORDER — ASPIRIN 81 MG/1
81 TABLET, CHEWABLE ORAL DAILY
Status: DISCONTINUED | OUTPATIENT
Start: 2021-12-25 | End: 2021-12-30 | Stop reason: HOSPADM

## 2021-12-24 RX ORDER — PANTOPRAZOLE SODIUM 40 MG/1
40 INJECTION, POWDER, FOR SOLUTION INTRAVENOUS
Status: DISCONTINUED | OUTPATIENT
Start: 2021-12-24 | End: 2021-12-30 | Stop reason: HOSPADM

## 2021-12-24 RX ADMIN — GUAIFENESIN 200 MG: 100 SOLUTION ORAL at 18:00

## 2021-12-24 RX ADMIN — LISINOPRIL 40 MG: 20 TABLET ORAL at 10:39

## 2021-12-24 RX ADMIN — GUAIFENESIN 200 MG: 100 SOLUTION ORAL at 10:39

## 2021-12-24 RX ADMIN — METOPROLOL TARTRATE 5 MG: 5 INJECTION INTRAVENOUS at 04:19

## 2021-12-24 RX ADMIN — ENOXAPARIN SODIUM 80 MG: 100 INJECTION, SOLUTION INTRAVENOUS; SUBCUTANEOUS at 10:39

## 2021-12-24 RX ADMIN — METOPROLOL TARTRATE 5 MG: 5 INJECTION INTRAVENOUS at 12:44

## 2021-12-24 RX ADMIN — SODIUM CHLORIDE 75 ML/HR: 0.9 INJECTION, SOLUTION INTRAVENOUS at 14:12

## 2021-12-24 RX ADMIN — PIPERACILLIN SODIUM AND TAZOBACTAM SODIUM 4.5 G: 4; .5 INJECTION, POWDER, LYOPHILIZED, FOR SOLUTION INTRAVENOUS at 17:59

## 2021-12-24 RX ADMIN — GUAIFENESIN 200 MG: 100 SOLUTION ORAL at 22:34

## 2021-12-24 RX ADMIN — PIPERACILLIN SODIUM AND TAZOBACTAM SODIUM 4.5 G: 4; .5 INJECTION, POWDER, LYOPHILIZED, FOR SOLUTION INTRAVENOUS at 22:45

## 2021-12-24 RX ADMIN — PIPERACILLIN SODIUM AND TAZOBACTAM SODIUM 4.5 G: 4; .5 INJECTION, POWDER, LYOPHILIZED, FOR SOLUTION INTRAVENOUS at 14:12

## 2021-12-24 RX ADMIN — GUAIFENESIN 200 MG: 100 SOLUTION ORAL at 12:43

## 2021-12-24 RX ADMIN — INSULIN LISPRO 1 UNITS: 100 INJECTION, SOLUTION INTRAVENOUS; SUBCUTANEOUS at 06:00

## 2021-12-24 RX ADMIN — ATORVASTATIN CALCIUM 40 MG: 40 TABLET, FILM COATED ORAL at 18:00

## 2021-12-24 RX ADMIN — ENOXAPARIN SODIUM 80 MG: 100 INJECTION, SOLUTION INTRAVENOUS; SUBCUTANEOUS at 22:34

## 2021-12-24 RX ADMIN — ASPIRIN 81 MG: 81 TABLET, COATED ORAL at 10:39

## 2021-12-24 RX ADMIN — INSULIN LISPRO 3 UNITS: 100 INJECTION, SOLUTION INTRAVENOUS; SUBCUTANEOUS at 18:01

## 2021-12-24 RX ADMIN — INSULIN LISPRO 1 UNITS: 100 INJECTION, SOLUTION INTRAVENOUS; SUBCUTANEOUS at 12:43

## 2021-12-24 RX ADMIN — PANTOPRAZOLE SODIUM 40 MG: 40 INJECTION, POWDER, FOR SOLUTION INTRAVENOUS at 12:43

## 2021-12-24 RX ADMIN — ACETAMINOPHEN 650 MG: 650 SUPPOSITORY RECTAL at 15:50

## 2021-12-24 RX ADMIN — METOPROLOL TARTRATE 25 MG: 25 TABLET, FILM COATED ORAL at 22:34

## 2021-12-24 RX ADMIN — PIPERACILLIN SODIUM AND TAZOBACTAM SODIUM 4.5 G: 4; .5 INJECTION, POWDER, LYOPHILIZED, FOR SOLUTION INTRAVENOUS at 05:54

## 2021-12-24 RX ADMIN — FLUTICASONE PROPIONATE 1 SPRAY: 50 SPRAY, METERED NASAL at 10:35

## 2021-12-24 NOTE — CASE MANAGEMENT
Case Management Progress Note    Patient name Sky All  Location [address] [address] [id number]/-[id number] MRN [id number]  : 1945 Date 2021       LOS (days): 2  Geometric Mean LOS (GMLOS) (days): 4 80  Days to GMLOS:2 3        OBJECTIVE:        Current admission status: Inpatient  Preferred Pharmacy: No Pharmacies Listed  Primary Care Provider: Giovana Edward MD    Primary Insurance: MEDICARE  Secondary Insurance: [employer]    PROGRESS NOTE:  Cm placed a call to Carter Sweet at 4:46 to # [phone] and cm left a message for her to call to discuss d/c plan, pt/ot has recommended  Rehab pt is active with [facility], cm will continue to work on a safe d/c plan

## 2021-12-24 NOTE — PROGRESS NOTES
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    Plan:  - no indication for acute surgical intervention at this time  Due to health status and co-morbidities, will continue with conservative management at this time  If patient clinically worsens (return of fevers, elevated WBC, increased pain in RUQ) may consider consult to IR to assess patient for percutaneous cholecystostomy tube placement  - cont tube feeds  - IV abx   - DVT prophylaxis  - PRN analgesia   - monitor vitals, AM labs   - Medical management per primary team     Subjective/Objective   Subjective: No acute events overnight  Patient states he feels ok today  Denies worsening abdominal pain, N/V  Denies chest pain, palpitations, pain/swelling in extremities  Denies any  complications with tube feeds  Objective: Blood pressure 143/63, pulse 91, temperature 98 °F (36 7 °C), temperature source Tympanic, resp  rate 16, height 6' 2" (1 88 m), weight 79 4 kg (175 lb), SpO2 96 %  ,Body mass index is 22 47 kg/m²        Intake/Output Summary (Last 24 hours) at 12/24/2021 0735  Last data filed at 12/24/2021 0530  Gross per 24 hour   Intake 1360 ml   Output 200 ml   Net 1160 ml       Invasive Devices  Report    Peripheral Intravenous Line            Peripheral IV 12/22/21 Left Antecubital 2 days    Peripheral IV 12/22/21 Right Antecubital 2 days          Drain            Gastrostomy/Enterostomy PEG-jejunostomy LUQ -- days                Physical Exam: /63 (BP Location: Left arm)   Pulse 91   Temp 98 °F (36 7 °C) (Tympanic)   Resp 16   Ht 6' 2" (1 88 m)   Wt 79 4 kg (175 lb)   SpO2 96%   BMI 22 47 kg/m²   General appearance: alert and oriented, in no acute distress, appears stated age and cooperative, seen and examined while laying comfortably in bed  Lungs: Rales noted in R lower lobe of lung  Heart: regular rate and rhythm, S1, S2 normal, no murmur, click, rub or gallop  Abdomen: soft, non-distended, mildly tender to palpation in RUQ, voluntary guarding on palpation, PEG tube in place , hypoactive bowel sounds   Extremities: extremities normal, warm and well-perfused; no cyanosis, clubbing, or edema    Lab, Imaging and other studies:  I have personally reviewed pertinent lab results    , CBC:   Lab Results   Component Value Date    WBC 9 63 12/24/2021    HGB 9 4 (L) 12/24/2021    HCT 29 3 (L) 12/24/2021    MCV 88 12/24/2021     12/24/2021    MCH 28 2 12/24/2021    MCHC 32 1 12/24/2021    RDW 19 9 (H) 12/24/2021    MPV 10 0 12/24/2021   , CMP:   Lab Results   Component Value Date    SODIUM 140 12/24/2021    K 3 4 (L) 12/24/2021     12/24/2021    CO2 25 12/24/2021    BUN 21 12/24/2021    CREATININE 0 77 12/24/2021    CALCIUM 8 0 (L) 12/24/2021     (H) 12/24/2021     (H) 12/24/2021    ALKPHOS 191 (H) 12/24/2021    EGFR 89 12/24/2021     VTE Pharmacologic Prophylaxis: Enoxaparin (Lovenox)  VTE Mechanical Prophylaxis: sequential compression device      Ronda Montero PA-C  12/24/21

## 2021-12-24 NOTE — ASSESSMENT & PLAN NOTE
· Patient has an indwelling PEG tube  Formula used is a [employer] product, which we do not carry here at the hospital   · Nutrition consult appreciated    · Patient and his wife agreeable to using our product here ([employer]) while inpatient

## 2021-12-24 NOTE — ASSESSMENT & PLAN NOTE
· As seen on CT and chest x-ray  · COVID negative   · Procalcitonin 0 85- will trend   · Strep pneumo and Legionella urine antigens negative  · Zosyn day 3  · Check MRSA

## 2021-12-24 NOTE — PLAN OF CARE
Problem: Potential for Falls  Goal: Patient will remain free of falls  Description: INTERVENTIONS:  - Educate patient/family on patient safety including physical limitations  - Instruct patient to call for assistance with activity   - Consult OT/PT to assist with strengthening/mobility   - Keep Call bell within reach  - Keep bed low and locked with side rails adjusted as appropriate  - Keep care items and personal belongings within reach  - Initiate and maintain comfort rounds  - Make Fall Risk Sign visible to staff  - Offer Toileting every 2 Hours, in advance of need  - Initiate/Maintain bed alarm  - Apply yellow socks and bracelet for high fall risk patients  - Consider moving patient to room near nurses station  Outcome: Progressing     Problem: MOBILITY - ADULT  Goal: Maintain or return to baseline ADL function  Description: INTERVENTIONS:  -  Assess patient's ability to carry out ADLs; assess patient's baseline for ADL function and identify physical deficits which impact ability to perform ADLs (bathing, care of mouth/teeth, toileting, grooming, dressing, etc )  - Assess/evaluate cause of self-care deficits   - Assess range of motion  - Assess patient's mobility; develop plan if impaired  - Assess patient's need for assistive devices and provide as appropriate  - Encourage maximum independence but intervene and supervise when necessary  - Involve family in performance of ADLs  - Assess for home care needs following discharge   - Consider OT consult to assist with ADL evaluation and planning for discharge  - Provide patient education as appropriate  Outcome: Progressing  Goal: Maintains/Returns to pre admission functional level  Description: INTERVENTIONS:  - Perform BMAT or MOVE assessment daily    - Set and communicate daily mobility goal to care team and patient/family/caregiver     - Collaborate with rehabilitation services on mobility goals if consulted  - Dangle patient 3 times a day  - Record patient progress and toleration of activity level   Outcome: Progressing     Problem: PAIN - ADULT  Goal: Verbalizes/displays adequate comfort level or baseline comfort level  Description: Interventions:  - Encourage patient to monitor pain and request assistance  - Assess pain using appropriate pain scale  - Administer analgesics based on type and severity of pain and evaluate response  - Implement non-pharmacological measures as appropriate and evaluate response  - Consider cultural and social influences on pain and pain management  - Notify physician/advanced practitioner if interventions unsuccessful or patient reports new pain  Outcome: Progressing     Problem: INFECTION - ADULT  Goal: Absence or prevention of progression during hospitalization  Description: INTERVENTIONS:  - Assess and monitor for signs and symptoms of infection  - Monitor lab/diagnostic results  - Monitor all insertion sites, i e  indwelling lines, tubes, and drains  - Monitor endotracheal if appropriate and nasal secretions for changes in amount and color  - Tunas appropriate cooling/warming therapies per order  - Administer medications as ordered  - Instruct and encourage patient and family to use good hand hygiene technique  - Identify and instruct in appropriate isolation precautions for identified infection/condition  Outcome: Progressing     Problem: SAFETY ADULT  Goal: Patient will remain free of falls  Description: INTERVENTIONS:  - Educate patient/family on patient safety including physical limitations  - Instruct patient to call for assistance with activity   - Consult OT/PT to assist with strengthening/mobility   - Keep Call bell within reach  - Keep bed low and locked with side rails adjusted as appropriate  - Keep care items and personal belongings within reach  - Initiate and maintain comfort rounds  - Make Fall Risk Sign visible to staff  - Offer Toileting every 2 Hours, in advance of need  - Initiate/Maintain bed alarm  - Apply yellow socks and bracelet for high fall risk patients  - Consider moving patient to room near nurses station  Outcome: Progressing  Goal: Maintain or return to baseline ADL function  Description: INTERVENTIONS:  -  Assess patient's ability to carry out ADLs; assess patient's baseline for ADL function and identify physical deficits which impact ability to perform ADLs (bathing, care of mouth/teeth, toileting, grooming, dressing, etc )  - Assess/evaluate cause of self-care deficits   - Assess range of motion  - Assess patient's mobility; develop plan if impaired  - Assess patient's need for assistive devices and provide as appropriate  - Encourage maximum independence but intervene and supervise when necessary  - Involve family in performance of ADLs  - Assess for home care needs following discharge   - Consider OT consult to assist with ADL evaluation and planning for discharge  - Provide patient education as appropriate  Outcome: Progressing  Goal: Maintains/Returns to pre admission functional level  Description: INTERVENTIONS:  - Perform BMAT or MOVE assessment daily    - Set and communicate daily mobility goal to care team and patient/family/caregiver     - Collaborate with rehabilitation services on mobility goals if consulted  - Dangle patient 3 times a day  - Record patient progress and toleration of activity level   Outcome: Progressing     Problem: DISCHARGE PLANNING  Goal: Discharge to home or other facility with appropriate resources  Description: INTERVENTIONS:  - Identify barriers to discharge w/patient and caregiver  - Arrange for needed discharge resources and transportation as appropriate  - Identify discharge learning needs (meds, wound care, etc )  - Arrange for interpretive services to assist at discharge as needed  - Refer to Case Management Department for coordinating discharge planning if the patient needs post-hospital services based on physician/advanced practitioner order or complex needs related to functional status, cognitive ability, or social support system  Outcome: Progressing     Problem: Knowledge Deficit  Goal: Patient/family/caregiver demonstrates understanding of disease process, treatment plan, medications, and discharge instructions  Description: Complete learning assessment and assess knowledge base  Interventions:  - Provide teaching at level of understanding  - Provide teaching via preferred learning methods  Outcome: Progressing     Problem: Nutrition/Hydration-ADULT  Goal: Nutrient/Hydration intake appropriate for improving, restoring or maintaining nutritional needs  Description: Monitor and assess patient's nutrition/hydration status for malnutrition  Collaborate with interdisciplinary team and initiate plan and interventions as ordered  Monitor patient's weight and dietary intake as ordered or per policy  Utilize nutrition screening tool and intervene as necessary  Determine patient's food preferences and provide high-protein, high-caloric foods as appropriate       INTERVENTIONS:  - Monitor oral intake, urinary output, labs, and treatment plans  - Assess nutrition and hydration status and recommend course of action  - Evaluate amount of meals eaten  - Assist patient with eating if necessary   - Allow adequate time for meals  - Recommend/ encourage appropriate diets, oral nutritional supplements, and vitamin/mineral supplements  - Order, calculate, and assess calorie counts as needed  - Recommend, monitor, and adjust tube feedings and TPN/PPN based on assessed needs  - Assess need for intravenous fluids  - Provide specific nutrition/hydration education as appropriate  - Include patient/family/caregiver in decisions related to nutrition  Outcome: Progressing     Problem: Prexisting or High Potential for Compromised Skin Integrity  Goal: Skin integrity is maintained or improved  Description: INTERVENTIONS:  - Identify patients at risk for skin breakdown  - Assess and monitor skin integrity  - Assess and monitor nutrition and hydration status  - Monitor labs   - Assess for incontinence   - Turn and reposition patient  - Assist with mobility/ambulation  - Relieve pressure over bony prominences  - Avoid friction and shearing  - Provide appropriate hygiene as needed including keeping skin clean and dry  - Evaluate need for skin moisturizer/barrier cream  - Collaborate with interdisciplinary team   - Patient/family teaching  - Consider wound care consult   Outcome: Progressing

## 2021-12-24 NOTE — ASSESSMENT & PLAN NOTE
· Patient just completed radiation therapy at [address]  · Supportive care   · Aspiration precautions

## 2021-12-24 NOTE — ASSESSMENT & PLAN NOTE
· Blood pressure well controlled  · Continue with lisinopril and metoprolol tartrate  · Continue to monitor

## 2021-12-24 NOTE — PROGRESS NOTES
[facility] 47  Progress Note - Cheyenne Massed 1945, 78 y o  male MRN: [id number]  Unit/Bed#: DIS 02 Encounter: [id number]  Primary Care Provider: Merrill Saldaña MD   Date and time admitted to hospital: 12/21/2021 11:56 PM    * Sepsis [facility])  Assessment & Plan  · Patient is still with active pneumonia and cholecystitis, however all other sepsis parameters resolved  · Sepsis criteria met with tachycardia, fever, lactic acidosis, with pneumonia possible aspiration and cholecystitis  · Patient received sepsis protocol normal saline fluid resuscitation followed by 125 cc/hour  · Lactic acid and procalcitonin trended to normal  · Blood cultures NGTD   · Zosyn 4 5 g IV q 6 hours day 3    Cholecystitis  Assessment & Plan  · Patient initially presented with generalized weakness  Evidence of possible cholecystitis seen on CT abdomen/pelvis, confirmed with ultrasound gallbladder  · General surgery consult appreciated  · Holding off on more aggressive measures as of now, as patient is not a good surgical candidate  Attempting conservative measures, and if failed, will consider percutaneous cholecystostomy  · Tube feeds resumed and is tolerating   · Zosyn day 3  Other pulmonary embolism without acute cor pulmonale (HCC)  Assessment & Plan  · As seen on CT chest/abdomen/pelvis- 1st episodes of PE  · Echo unremarkable  · No evidence of DVT on venous Doppler  · Continue Lovenox 1 milligram/kilogram subQ q 12 hours  · Will hold off on starting the patient on full anticoagulation at this time as there may be a possibility of IR intervention for percutaneous cholecystostomy tube placement if fails conservative measures  · O2 protocol  · Continue to monitor hemodynamic status      Community acquired pneumonia of left lower lobe of lung  Assessment & Plan  · As seen on CT and chest x-ray  · COVID negative   · Procalcitonin 0 85- will trend   · Strep pneumo and Legionella urine antigens negative  · Zosyn day 3  · Check MRSA     Primary malignant neoplasm of base of tongue ([address] )  Assessment & Plan  · Patient just completed radiation therapy at [address]  · Supportive care   · Aspiration precautions     Type 2 diabetes mellitus without complication, without long-term current use of insulin [facility])  Assessment & Plan  Lab Results   Component Value Date    HGBA1C 6 3 (H) 2021       Recent Labs     21  1211 21  1805 21  2342 21  0558   POCGLU 120 137 275* 186*       Blood Sugar Average: Last 72 hrs:  (P) 877 [id number]  · Hold home hypoglycemic agents  · Sliding scale insulin  · Accu-Cheks     Dysphagia, unspecified  Assessment & Plan  · Patient has an indwelling PEG tube  Formula used is a [employer] product, which we do not carry here at the hospital   · Nutrition consult appreciated  · Patient and his wife agreeable to using our product here (Evalene Messing) while inpatient     Essential (primary) hypertension  Assessment & Plan  · Blood pressure well controlled  · Continue with lisinopril and metoprolol tartrate  · Continue to monitor         VTE Prophylaxis:  Enoxaparin (Lovenox)    Patient Centered Rounds: I have performed bedside rounds with nursing staff today  Discussions with Specialists or Other Care Team Provider: surgery   Education and Discussions with Family / Patient: patient and wife via phone     Current Length of Stay: 2 day(s)    Current Patient Status: Inpatient   Certification Statement: The patient will continue to require additional inpatient hospital stay due to Sepsis    Discharge Plan:  Pending hospital course    Code Status: Level 1 - Full Code    Subjective:   Feeling slightly better however tired overall  Denies any nausea or vomiting  He had a bowel movement earlier today  Complain of abdominal pain on right upper quadrant  This is not much worse than prior      Objective:     Vitals:   Temp (24hrs), Av 8 °F (37 1 °C), Min:98 °F (36 7 °C), Max:99 5 °F (37 5 °C)    Temp:  [98 °F (36 7 °C)-99 5 °F (37 5 °C)] 98 °F (36 7 °C)  HR:  [] 91  Resp:  [16-24] 16  BP: (141-168)/(63-80) 143/63  SpO2:  [87 %-96 %] 96 %  Body mass index is 22 47 kg/m²  Input and Output Summary (last 24 hours): Intake/Output Summary (Last 24 hours) at 12/24/2021 1305  Last data filed at 12/24/2021 0530  Gross per 24 hour   Intake 1360 ml   Output 200 ml   Net 1160 ml       Physical Exam:   Physical Exam  Vitals and nursing note reviewed  Constitutional:       General: He is not in acute distress  Appearance: Normal appearance  He is ill-appearing (chronically )  HENT:      Head: Normocephalic and atraumatic  Right Ear: External ear normal       Left Ear: External ear normal       Nose: Nose normal       Mouth/Throat:      Mouth: Mucous membranes are moist       Pharynx: Oropharynx is clear  Eyes:      General:         Right eye: No discharge  Left eye: No discharge  Extraocular Movements: Extraocular movements intact  Pupils: Pupils are equal, round, and reactive to light  Cardiovascular:      Rate and Rhythm: Normal rate and regular rhythm  Pulses: Normal pulses  Heart sounds: Normal heart sounds  No murmur heard  Pulmonary:      Effort: Pulmonary effort is normal  No respiratory distress  Breath sounds: No wheezing or rales  Comments: Decreased in bases    Abdominal:      General: Bowel sounds are normal  There is no distension  Palpations: Abdomen is soft  There is no mass  Tenderness: There is no abdominal tenderness  Musculoskeletal:         General: No swelling, tenderness or deformity  Normal range of motion  Cervical back: Normal range of motion and neck supple  No rigidity  Skin:     General: Skin is warm and dry  Capillary Refill: Capillary refill takes less than 2 seconds  Coloration: Skin is pale  Findings: No erythema     Neurological:      General: No focal deficit present  Mental Status: He is alert and oriented to person, place, and time  Mental status is at baseline  Psychiatric:         Mood and Affect: Mood normal          Behavior: Behavior normal          Thought Content: Thought content normal          Judgment: Judgment normal          Additional Data:     Labs:    Results from last 7 days   Lab Units 12/24/21  0418 12/23/21  0524 12/22/21  0103   WBC Thousand/uL 9 63   < > 6 48   HEMOGLOBIN g/dL 9 4*   < > 11 8*   HEMATOCRIT % 29 3*   < > 36 0*   PLATELETS Thousands/uL 218   < > 225   LYMPHO PCT %  --   --  1*   MONO PCT %  --   --  8   EOS PCT %  --   --  0    < > = values in this interval not displayed  Results from last 7 days   Lab Units 12/24/21  0418   SODIUM mmol/L 140   POTASSIUM mmol/L 3 4*   CHLORIDE mmol/L 107   CO2 mmol/L 25   BUN mg/dL 21   CREATININE mg/dL 0 77   CALCIUM mg/dL 8 0*   ALK PHOS U/L 191*   ALT U/L 108*   AST U/L 176*     Results from last 7 days   Lab Units 12/22/21  0103   INR  1 01     Results from last 7 days   Lab Units 12/24/21  1046 12/24/21  0558 12/23/21  2342 12/23/21  1805 12/23/21  1211 12/23/21  0625 12/22/21  2356 12/22/21  1801 12/22/21  1156 12/22/21  0618   POC GLUCOSE mg/dl 175* 186* 275* 137 120 114 131 182* 206* 157*     Results from last 7 days   Lab Units 12/22/21  0103   HEMOGLOBIN A1C % 6 3*       * I Have Reviewed All Lab Data Listed Above  * Additional Pertinent Lab Tests Reviewed: [facility] Admission  Reviewed    Imaging:  Imaging Reports Reviewed Today Include: US RUQ     Recent Cultures (last 7 days):     Results from last 7 days   Lab Units 12/23/21  0758 12/22/21  0119 12/22/21  0103   BLOOD CULTURE   --  No Growth at 48 hrs  No Growth at 48 hrs     LEGIONELLA URINARY ANTIGEN  Negative  --   --        Last 24 Hours Medication List:   Current Facility-Administered Medications   Medication Dose Route Frequency Provider Last Rate    acetaminophen  650 mg Rectal Q4H PRN Ifrah Zazueta PA-C      [START ON 12/25/2021] aspirin  81 mg Per PEG Tube Daily MOISES Hu      atorvastatin  40 mg Per PEG Tube Daily With Dinner MOISES Hu      enoxaparin  1 mg/kg Subcutaneous Q12H [address] Chacorta Tricia, TANISHA      fluticasone  1 spray Nasal Daily Chacorta Tricia, TANISHA      glycopyrrolate  0 1 mg Intravenous Q8H PRN Ifrah Zazueta PA-C      guaiFENesin  200 mg Oral 4x Daily Chacorta Tricia, TANISHA      insulin lispro  1-6 Units Subcutaneous Q6H [address] Chacorta Tricia, TANISHA      ketorolac  15 mg Intravenous Q6H PRN Chacorta TriciaTANISHA      [START ON 12/25/2021] lisinopril  40 mg Per PEG Tube Daily MOISES Hu      metoprolol tartrate  25 mg Oral Q12H [address] MOISES Hu      morphine injection  2 mg Intravenous Q4H PRN Chacorta TriciaTANISHA      ondansetron  4 mg Intravenous Q6H PRN Chacorta Tricia, TANISHA      pantoprazole  40 mg Intravenous Q24H [address] MOISES Hu      piperacillin-tazobactam  4 5 g Intravenous Q6H Chacorta Tricia, TANISHA 4 5 g (12/23/21 2337)    sodium chloride  75 mL/hr Intravenous Continuous Brad S DimitrBISI mcgheeC 75 mL/hr (12/23/21 1810)        Today, Patient Was Seen By: MOISES Hu    ** Please Note: Dictation voice to text software may have been used in the creation of this document   **

## 2021-12-24 NOTE — ASSESSMENT & PLAN NOTE
Lab Results   Component Value Date    HGBA1C 6 3 (H) 12/22/2021       Recent Labs     12/23/21  1211 12/23/21  1805 12/23/21  2342 12/24/21  0558   POCGLU 120 137 275* 186*       Blood Sugar Average: Last 72 hrs:  (P) 983 [id number]  · Hold home hypoglycemic agents  · Sliding scale insulin  · Accu-Cheks

## 2021-12-24 NOTE — OCCUPATIONAL THERAPY NOTE
12/24/21 1150   Note Type   Cancel Reasons Other  (Refused)   Attempted OT treatment at 1150 AM  Patient refused despite encouragement  Will continue with OT treatment as able    Ita Beverly

## 2021-12-24 NOTE — PLAN OF CARE
Problem: Potential for Falls  Goal: Patient will remain free of falls  Description: INTERVENTIONS:  - Educate patient/family on patient safety including physical limitations  - Instruct patient to call for assistance with activity   - Consult OT/PT to assist with strengthening/mobility   - Keep Call bell within reach  - Keep bed low and locked with side rails adjusted as appropriate  - Keep care items and personal belongings within reach  - Initiate and maintain comfort rounds  - Make Fall Risk Sign visible to staff  - Apply yellow socks and bracelet for high fall risk patients  - Consider moving patient to room near nurses station  Outcome: Progressing     Problem: MOBILITY - ADULT  Goal: Maintain or return to baseline ADL function  Description: INTERVENTIONS:  -  Assess patient's ability to carry out ADLs; assess patient's baseline for ADL function and identify physical deficits which impact ability to perform ADLs (bathing, care of mouth/teeth, toileting, grooming, dressing, etc )  - Assess/evaluate cause of self-care deficits   - Assess range of motion  - Assess patient's mobility; develop plan if impaired  - Assess patient's need for assistive devices and provide as appropriate  - Encourage maximum independence but intervene and supervise when necessary  - Involve family in performance of ADLs  - Assess for home care needs following discharge   - Consider OT consult to assist with ADL evaluation and planning for discharge  - Provide patient education as appropriate  Outcome: Progressing  Goal: Maintains/Returns to pre admission functional level  Description: INTERVENTIONS:  - Perform BMAT or MOVE assessment daily    - Set and communicate daily mobility goal to care team and patient/family/caregiver     - Collaborate with rehabilitation services on mobility goals if consulted  - Out of bed for toileting  - Record patient progress and toleration of activity level   Outcome: Progressing     Problem: PAIN - ADULT  Goal: Verbalizes/displays adequate comfort level or baseline comfort level  Description: Interventions:  - Encourage patient to monitor pain and request assistance  - Assess pain using appropriate pain scale  - Administer analgesics based on type and severity of pain and evaluate response  - Implement non-pharmacological measures as appropriate and evaluate response  - Consider cultural and social influences on pain and pain management  - Notify physician/advanced practitioner if interventions unsuccessful or patient reports new pain  Outcome: Progressing     Problem: INFECTION - ADULT  Goal: Absence or prevention of progression during hospitalization  Description: INTERVENTIONS:  - Assess and monitor for signs and symptoms of infection  - Monitor lab/diagnostic results  - Monitor all insertion sites, i e  indwelling lines, tubes, and drains  - Monitor endotracheal if appropriate and nasal secretions for changes in amount and color  - Carmine appropriate cooling/warming therapies per order  - Administer medications as ordered  - Instruct and encourage patient and family to use good hand hygiene technique  - Identify and instruct in appropriate isolation precautions for identified infection/condition  Outcome: Progressing     Problem: SAFETY ADULT  Goal: Patient will remain free of falls  Description: INTERVENTIONS:  - Educate patient/family on patient safety including physical limitations  - Instruct patient to call for assistance with activity   - Consult OT/PT to assist with strengthening/mobility   - Keep Call bell within reach  - Keep bed low and locked with side rails adjusted as appropriate  - Keep care items and personal belongings within reach  - Initiate and maintain comfort rounds  - Make Fall Risk Sign visible to staff  - Offer Toileting every 4 Hours, in advance of need  - Initiate/Maintain bed alarm  - Obtain necessary fall risk management equipment  - Apply yellow socks and bracelet for high fall risk patients  - Consider moving patient to room near nurses station  Outcome: Progressing  Goal: Maintain or return to baseline ADL function  Description: INTERVENTIONS:  -  Assess patient's ability to carry out ADLs; assess patient's baseline for ADL function and identify physical deficits which impact ability to perform ADLs (bathing, care of mouth/teeth, toileting, grooming, dressing, etc )  - Assess/evaluate cause of self-care deficits   - Assess range of motion  - Assess patient's mobility; develop plan if impaired  - Assess patient's need for assistive devices and provide as appropriate  - Encourage maximum independence but intervene and supervise when necessary  - Involve family in performance of ADLs  - Assess for home care needs following discharge   - Consider OT consult to assist with ADL evaluation and planning for discharge  - Provide patient education as appropriate  Outcome: Progressing  Goal: Maintains/Returns to pre admission functional level  Description: INTERVENTIONS:  - Perform BMAT or MOVE assessment daily    - Set and communicate daily mobility goal to care team and patient/family/caregiver     - Collaborate with rehabilitation services on mobility goals if consulted  - Dangle patient 3 times a day  - Stand patient 3 times a day  - Ambulate patient 3 times a day  - Out of bed to chair 3 times a day   - Out of bed for meals 3 times a day  - Out of bed for toileting  - Record patient progress and toleration of activity level   Outcome: Progressing     Problem: DISCHARGE PLANNING  Goal: Discharge to home or other facility with appropriate resources  Description: INTERVENTIONS:  - Identify barriers to discharge w/patient and caregiver  - Arrange for needed discharge resources and transportation as appropriate  - Identify discharge learning needs (meds, wound care, etc )  - Arrange for interpretive services to assist at discharge as needed  - Refer to Case Management Department for coordinating discharge planning if the patient needs post-hospital services based on physician/advanced practitioner order or complex needs related to functional status, cognitive ability, or social support system  Outcome: Progressing     Problem: Knowledge Deficit  Goal: Patient/family/caregiver demonstrates understanding of disease process, treatment plan, medications, and discharge instructions  Description: Complete learning assessment and assess knowledge base  Interventions:  - Provide teaching at level of understanding  - Provide teaching via preferred learning methods  Outcome: Progressing     Problem: Nutrition/Hydration-ADULT  Goal: Nutrient/Hydration intake appropriate for improving, restoring or maintaining nutritional needs  Description: Monitor and assess patient's nutrition/hydration status for malnutrition  Collaborate with interdisciplinary team and initiate plan and interventions as ordered  Monitor patient's weight and dietary intake as ordered or per policy  Utilize nutrition screening tool and intervene as necessary  Determine patient's food preferences and provide high-protein, high-caloric foods as appropriate       INTERVENTIONS:  - Monitor oral intake, urinary output, labs, and treatment plans  - Assess nutrition and hydration status and recommend course of action  - Evaluate amount of meals eaten  - Assist patient with eating if necessary   - Allow adequate time for meals  - Recommend/ encourage appropriate diets, oral nutritional supplements, and vitamin/mineral supplements  - Order, calculate, and assess calorie counts as needed  - Recommend, monitor, and adjust tube feedings and TPN/PPN based on assessed needs  - Assess need for intravenous fluids  - Provide specific nutrition/hydration education as appropriate  - Include patient/family/caregiver in decisions related to nutrition  Outcome: Progressing     Problem: Prexisting or High Potential for Compromised Skin Integrity  Goal: Skin integrity is maintained or improved  Description: INTERVENTIONS:  - Identify patients at risk for skin breakdown  - Assess and monitor skin integrity  - Assess and monitor nutrition and hydration status  - Monitor labs   - Assess for incontinence   - Turn and reposition patient  - Assist with mobility/ambulation  - Relieve pressure over bony prominences  - Avoid friction and shearing  - Provide appropriate hygiene as needed including keeping skin clean and dry  - Evaluate need for skin moisturizer/barrier cream  - Collaborate with interdisciplinary team   - Patient/family teaching  - Consider wound care consult   Outcome: Progressing

## 2021-12-24 NOTE — ASSESSMENT & PLAN NOTE
· As seen on CT chest/abdomen/pelvis- 1st episodes of PE  · Echo unremarkable  · No evidence of DVT on venous Doppler  · Continue Lovenox 1 milligram/kilogram subQ q 12 hours  · Will hold off on starting the patient on full anticoagulation at this time as there may be a possibility of IR intervention for percutaneous cholecystostomy tube placement if fails conservative measures  · O2 protocol  · Continue to monitor hemodynamic status

## 2021-12-24 NOTE — ASSESSMENT & PLAN NOTE
· Patient is still with active pneumonia and cholecystitis, however all other sepsis parameters resolved    · Sepsis criteria met with tachycardia, fever, lactic acidosis, with pneumonia possible aspiration and cholecystitis  · Patient received sepsis protocol normal saline fluid resuscitation followed by 125 cc/hour  · Lactic acid and procalcitonin trended to normal  · Blood cultures NGTD   · Zosyn 4 5 g IV q 6 hours day 3

## 2021-12-24 NOTE — ASSESSMENT & PLAN NOTE
· Patient initially presented with generalized weakness  Evidence of possible cholecystitis seen on CT abdomen/pelvis, confirmed with ultrasound gallbladder  · General surgery consult appreciated  · Holding off on more aggressive measures as of now, as patient is not a good surgical candidate  Attempting conservative measures, and if failed, will consider percutaneous cholecystostomy  · Tube feeds resumed and is tolerating   · Zosyn day 3

## 2021-12-25 LAB
ALBUMIN SERPL BCP-MCNC: 2.8 G/DL (ref 3.5–5)
ALP SERPL-CCNC: 332 U/L (ref 34–104)
ALT SERPL W P-5'-P-CCNC: 270 U/L (ref 7–52)
ANION GAP SERPL CALCULATED.3IONS-SCNC: 9 MMOL/L (ref 4–13)
AST SERPL W P-5'-P-CCNC: 370 U/L (ref 13–39)
BASOPHILS # BLD AUTO: 0.03 THOUSANDS/ΜL (ref 0–0.1)
BASOPHILS NFR BLD AUTO: 0 % (ref 0–1)
BILIRUB SERPL-MCNC: 0.91 MG/DL (ref 0.2–1)
BUN SERPL-MCNC: 18 MG/DL (ref 5–25)
CALCIUM ALBUM COR SERPL-MCNC: 9.1 MG/DL (ref 8.3–10.1)
CALCIUM SERPL-MCNC: 8.1 MG/DL (ref 8.4–10.2)
CHLORIDE SERPL-SCNC: 107 MMOL/L (ref 96–108)
CO2 SERPL-SCNC: 26 MMOL/L (ref 21–32)
CREAT SERPL-MCNC: 0.73 MG/DL (ref 0.6–1.3)
EOSINOPHIL # BLD AUTO: 0 THOUSAND/ΜL (ref 0–0.61)
EOSINOPHIL NFR BLD AUTO: 0 % (ref 0–6)
ERYTHROCYTE [DISTWIDTH] IN BLOOD BY AUTOMATED COUNT: 20.2 % (ref 11.6–15.1)
GFR SERPL CREATININE-BSD FRML MDRD: 91 ML/MIN/1.73SQ M
GLUCOSE SERPL-MCNC: 233 MG/DL (ref 65–140)
GLUCOSE SERPL-MCNC: 238 MG/DL (ref 65–140)
GLUCOSE SERPL-MCNC: 255 MG/DL (ref 65–140)
GLUCOSE SERPL-MCNC: 259 MG/DL (ref 65–140)
GLUCOSE SERPL-MCNC: 268 MG/DL (ref 65–140)
HCT VFR BLD AUTO: 28.9 % (ref 36.5–49.3)
HGB BLD-MCNC: 9.3 G/DL (ref 12–17)
IMM GRANULOCYTES # BLD AUTO: 0.2 THOUSAND/UL (ref 0–0.2)
IMM GRANULOCYTES NFR BLD AUTO: 2 % (ref 0–2)
LYMPHOCYTES # BLD AUTO: 0.31 THOUSANDS/ΜL (ref 0.6–4.47)
LYMPHOCYTES NFR BLD AUTO: 3 % (ref 14–44)
MAGNESIUM SERPL-MCNC: 1.9 MG/DL (ref 1.9–2.7)
MCH RBC QN AUTO: 28.8 PG (ref 26.8–34.3)
MCHC RBC AUTO-ENTMCNC: 32.2 G/DL (ref 31.4–37.4)
MCV RBC AUTO: 90 FL (ref 82–98)
MONOCYTES # BLD AUTO: 0.72 THOUSAND/ΜL (ref 0.17–1.22)
MONOCYTES NFR BLD AUTO: 7 % (ref 4–12)
NEUTROPHILS # BLD AUTO: 9.2 THOUSANDS/ΜL (ref 1.85–7.62)
NEUTS SEG NFR BLD AUTO: 88 % (ref 43–75)
NRBC BLD AUTO-RTO: 1 /100 WBCS
PHOSPHATE SERPL-MCNC: 1.3 MG/DL (ref 2.3–4.1)
PLATELET # BLD AUTO: 247 THOUSANDS/UL (ref 149–390)
PMV BLD AUTO: 9.8 FL (ref 8.9–12.7)
POTASSIUM SERPL-SCNC: 3.1 MMOL/L (ref 3.5–5.3)
PROT SERPL-MCNC: 5.9 G/DL (ref 6.4–8.4)
RBC # BLD AUTO: 3.23 MILLION/UL (ref 3.88–5.62)
SODIUM SERPL-SCNC: 142 MMOL/L (ref 135–147)
WBC # BLD AUTO: 10.46 THOUSAND/UL (ref 4.31–10.16)

## 2021-12-25 PROCEDURE — C9113 INJ PANTOPRAZOLE SODIUM, VIA: HCPCS | Performed by: NURSE PRACTITIONER

## 2021-12-25 PROCEDURE — 99232 SBSQ HOSP IP/OBS MODERATE 35: CPT | Performed by: NURSE PRACTITIONER

## 2021-12-25 PROCEDURE — 82948 REAGENT STRIP/BLOOD GLUCOSE: CPT

## 2021-12-25 PROCEDURE — 85025 COMPLETE CBC W/AUTO DIFF WBC: CPT | Performed by: NURSE PRACTITIONER

## 2021-12-25 PROCEDURE — 99232 SBSQ HOSP IP/OBS MODERATE 35: CPT | Performed by: SURGERY

## 2021-12-25 PROCEDURE — 83735 ASSAY OF MAGNESIUM: CPT | Performed by: NURSE PRACTITIONER

## 2021-12-25 PROCEDURE — 84100 ASSAY OF PHOSPHORUS: CPT | Performed by: NURSE PRACTITIONER

## 2021-12-25 PROCEDURE — 80053 COMPREHEN METABOLIC PANEL: CPT | Performed by: NURSE PRACTITIONER

## 2021-12-25 RX ORDER — MAGNESIUM SULFATE HEPTAHYDRATE 40 MG/ML
2 INJECTION, SOLUTION INTRAVENOUS ONCE
Status: COMPLETED | OUTPATIENT
Start: 2021-12-25 | End: 2021-12-25

## 2021-12-25 RX ORDER — POTASSIUM CHLORIDE 20MEQ/15ML
40 LIQUID (ML) ORAL 2 TIMES DAILY
Status: DISCONTINUED | OUTPATIENT
Start: 2021-12-25 | End: 2021-12-27

## 2021-12-25 RX ORDER — AMLODIPINE BESYLATE 5 MG/1
5 TABLET ORAL DAILY
Status: DISCONTINUED | OUTPATIENT
Start: 2021-12-26 | End: 2021-12-28

## 2021-12-25 RX ORDER — AMLODIPINE BESYLATE 5 MG/1
5 TABLET ORAL DAILY
Status: DISCONTINUED | OUTPATIENT
Start: 2021-12-25 | End: 2021-12-25

## 2021-12-25 RX ADMIN — MAGNESIUM SULFATE HEPTAHYDRATE 2 G: 40 INJECTION, SOLUTION INTRAVENOUS at 10:39

## 2021-12-25 RX ADMIN — GUAIFENESIN 200 MG: 100 SOLUTION ORAL at 13:53

## 2021-12-25 RX ADMIN — ENOXAPARIN SODIUM 80 MG: 100 INJECTION, SOLUTION INTRAVENOUS; SUBCUTANEOUS at 09:44

## 2021-12-25 RX ADMIN — INSULIN LISPRO 3 UNITS: 100 INJECTION, SOLUTION INTRAVENOUS; SUBCUTANEOUS at 23:57

## 2021-12-25 RX ADMIN — ENOXAPARIN SODIUM 80 MG: 100 INJECTION, SOLUTION INTRAVENOUS; SUBCUTANEOUS at 21:42

## 2021-12-25 RX ADMIN — PIPERACILLIN SODIUM AND TAZOBACTAM SODIUM 4.5 G: 4; .5 INJECTION, POWDER, LYOPHILIZED, FOR SOLUTION INTRAVENOUS at 05:42

## 2021-12-25 RX ADMIN — GUAIFENESIN 200 MG: 100 SOLUTION ORAL at 09:46

## 2021-12-25 RX ADMIN — INSULIN LISPRO 3 UNITS: 100 INJECTION, SOLUTION INTRAVENOUS; SUBCUTANEOUS at 05:51

## 2021-12-25 RX ADMIN — LISINOPRIL 40 MG: 20 TABLET ORAL at 09:44

## 2021-12-25 RX ADMIN — INSULIN LISPRO 2 UNITS: 100 INJECTION, SOLUTION INTRAVENOUS; SUBCUTANEOUS at 00:01

## 2021-12-25 RX ADMIN — GUAIFENESIN 200 MG: 100 SOLUTION ORAL at 21:43

## 2021-12-25 RX ADMIN — GUAIFENESIN 200 MG: 100 SOLUTION ORAL at 18:08

## 2021-12-25 RX ADMIN — METOPROLOL TARTRATE 25 MG: 25 TABLET, FILM COATED ORAL at 09:46

## 2021-12-25 RX ADMIN — PIPERACILLIN AND TAZOBACTAM 3.38 G: 3; .375 INJECTION, POWDER, FOR SOLUTION INTRAVENOUS at 19:01

## 2021-12-25 RX ADMIN — INSULIN LISPRO 3 UNITS: 100 INJECTION, SOLUTION INTRAVENOUS; SUBCUTANEOUS at 18:21

## 2021-12-25 RX ADMIN — POTASSIUM & SODIUM PHOSPHATES POWDER PACK 280-160-250 MG 2 PACKET: 280-160-250 PACK at 18:08

## 2021-12-25 RX ADMIN — POTASSIUM CHLORIDE 40 MEQ: 20 SOLUTION ORAL at 10:43

## 2021-12-25 RX ADMIN — FLUTICASONE PROPIONATE 1 SPRAY: 50 SPRAY, METERED NASAL at 09:47

## 2021-12-25 RX ADMIN — POTASSIUM PHOSPHATE, MONOBASIC POTASSIUM PHOSPHATE, DIBASIC 21 MMOL: 224; 236 INJECTION, SOLUTION, CONCENTRATE INTRAVENOUS at 13:49

## 2021-12-25 RX ADMIN — PIPERACILLIN SODIUM AND TAZOBACTAM SODIUM 4.5 G: 4; .5 INJECTION, POWDER, LYOPHILIZED, FOR SOLUTION INTRAVENOUS at 12:43

## 2021-12-25 RX ADMIN — ATORVASTATIN CALCIUM 40 MG: 40 TABLET, FILM COATED ORAL at 18:08

## 2021-12-25 RX ADMIN — INSULIN LISPRO 3 UNITS: 100 INJECTION, SOLUTION INTRAVENOUS; SUBCUTANEOUS at 11:35

## 2021-12-25 RX ADMIN — ASPIRIN 81 MG CHEWABLE TABLET 81 MG: 81 TABLET CHEWABLE at 09:44

## 2021-12-25 RX ADMIN — METOPROLOL TARTRATE 25 MG: 25 TABLET, FILM COATED ORAL at 21:43

## 2021-12-25 RX ADMIN — PANTOPRAZOLE SODIUM 40 MG: 40 INJECTION, POWDER, FOR SOLUTION INTRAVENOUS at 09:46

## 2021-12-25 RX ADMIN — MORPHINE SULFATE 2 MG: 2 INJECTION, SOLUTION INTRAMUSCULAR; INTRAVENOUS at 19:12

## 2021-12-25 RX ADMIN — MORPHINE SULFATE 2 MG: 2 INJECTION, SOLUTION INTRAMUSCULAR; INTRAVENOUS at 05:41

## 2021-12-25 RX ADMIN — SODIUM CHLORIDE 75 ML/HR: 0.9 INJECTION, SOLUTION INTRAVENOUS at 05:34

## 2021-12-25 NOTE — PROGRESS NOTES
[facility] 47  Progress Note - Dion Silvestre 1945, 78 y o  male MRN: [id number]  Unit/Bed#: -01 Encounter: [id number]  Primary Care Provider: Nereida Valenzuela MD   Date and time admitted to hospital: 12/21/2021 11:56 PM    * Sepsis [facility])  Assessment & Plan  · Patient is still with active pneumonia and cholecystitis, however all other sepsis parameters resolved  · Sepsis criteria met with tachycardia, fever, lactic acidosis, with pneumonia possible aspiration and cholecystitis  · Patient received sepsis protocol normal saline fluid resuscitation followed by 125 cc/hour  · Lactic acid and procalcitonin trended to normal  · Blood cultures NGTD   · Zosyn 4 5 g will decrease dosing to 3 375 IV q 6 hours day 4    Cholecystitis  Assessment & Plan  · Patient initially presented with generalized weakness  Evidence of possible cholecystitis seen on CT abdomen/pelvis, confirmed with ultrasound gallbladder  · General surgery consult appreciated  · Holding off on more aggressive measures as of now, as patient is not a good surgical candidate  Attempting conservative measures, and if failed, will consider percutaneous cholecystostomy by IR   · Tube feeds resumed and is tolerating    · Zosyn day 4   · Trending CMP     Other pulmonary embolism without acute cor pulmonale (HCC)  Assessment & Plan  · As seen on CT chest/abdomen/pelvis- 1st episodes of PE  · Echo unremarkable  · No evidence of DVT on venous Doppler  · Continue Lovenox 1 milligram/kilogram subQ q 12 hours  · Will hold off on starting the patient on full anticoagulation at this time as there may be a possibility of IR intervention for percutaneous cholecystostomy tube placement if fails conservative measures  · O2 protocol    · Continue to monitor hemodynamic status       Community acquired pneumonia of left lower lobe of lung  Assessment & Plan  · As seen on CT and chest x-ray  · COVID negative   · Procalcitonin 0 85- will trend · Strep pneumo and Legionella urine antigens negative  · Zosyn day 4  · MRSA-pending      Primary malignant neoplasm of base of tongue ([facility] [address] )  Assessment & Plan  · Patient just completed radiation therapy at [address]  · Supportive care   · Aspiration precautions      Hyperlipidemia due to type 2 diabetes mellitus [facility])  Assessment & Plan  Lab Results   Component Value Date    HGBA1C 6 3 (H) 12/22/2021       Recent Labs     12/24/21  1619 12/24/21  2250 12/25/21  0549 12/25/21  1134   POCGLU 230* 202* 259* 255*       Blood Sugar Average: Last 72 hrs:  (P) 857 [id number]  · Continue home statin      Type 2 diabetes mellitus without complication, without long-term current use of insulin [facility])  Assessment & Plan  Lab Results   Component Value Date    HGBA1C 6 3 (H) 12/22/2021       Recent Labs     12/24/21  1619 12/24/21  2250 12/25/21  0549 12/25/21  1134   POCGLU 230* 202* 259* 255*       Blood Sugar Average: Last 72 hrs:  (P) 187 [id number]  · Hold home hypoglycemic agents  · Sliding scale insulin   · Added long acting insulin as tolerates tube feeds   · Accu-Cheks     Dysphagia, unspecified  Assessment & Plan  · Patient has an indwelling PEG tube  Formula used is a [employer] product, which we do not carry here at the hospital   · Nutrition consult appreciated  · Patient and his wife agreeable to using our product here (Shahana Valdes) while inpatient       Essential (primary) hypertension  Assessment & Plan  · Blood pressure well controlled  · Continue with lisinopril and metoprolol tartrate  · Continue to monitor          VTE Prophylaxis:  Enoxaparin (Lovenox)    Patient Centered Rounds: I have performed bedside rounds with nursing staff today      Discussions with Specialists or Other Care Team Provider: surgery   Education and Discussions with Family / Patient: patient and wife     Current Length of Stay: 3 day(s)    Current Patient Status: Inpatient   Certification Statement: The patient will continue to require additional inpatient hospital stay due to sepsis    Discharge Plan: pending clinical course     Code Status: Level 1 - Full Code    Subjective:   Feeling slightly better  Denies any nausea vomiting  Some mild upper quadrant abdominal pain  Objective:     Vitals:   Temp (24hrs), Av 2 °F (37 3 °C), Min:98 3 °F (36 8 °C), Max:100 °F (37 8 °C)    Temp:  [98 3 °F (36 8 °C)-100 °F (37 8 °C)] 98 3 °F (36 8 °C)  HR:  [85-93] 85  Resp:  [18] 18  BP: (163-182)/(75-96) 167/96  SpO2:  [92 %] 92 %  Body mass index is 22 47 kg/m²  Input and Output Summary (last 24 hours): Intake/Output Summary (Last 24 hours) at 2021 1315  Last data filed at 2021 1132  Gross per 24 hour   Intake 2822 5 ml   Output --   Net 2822 5 ml       Physical Exam:   Physical Exam  Vitals and nursing note reviewed  Constitutional:       General: He is not in acute distress  Appearance: Normal appearance  He is ill-appearing (chronically)  HENT:      Head: Normocephalic and atraumatic  Right Ear: External ear normal       Left Ear: External ear normal       Nose: Nose normal       Mouth/Throat:      Mouth: Mucous membranes are moist       Pharynx: Oropharynx is clear  Eyes:      General:         Right eye: No discharge  Left eye: No discharge  Extraocular Movements: Extraocular movements intact  Pupils: Pupils are equal, round, and reactive to light  Cardiovascular:      Rate and Rhythm: Normal rate and regular rhythm  Pulses: Normal pulses  Heart sounds: Normal heart sounds  No murmur heard  Pulmonary:      Effort: Pulmonary effort is normal  No respiratory distress  Breath sounds: Normal breath sounds  No wheezing or rales  Abdominal:      General: Bowel sounds are normal  There is no distension  Palpations: Abdomen is soft  There is no mass  Tenderness: There is abdominal tenderness (mild tenderness RUQ)     Musculoskeletal:         General: No swelling, tenderness or deformity  Normal range of motion  Cervical back: Normal range of motion and neck supple  No rigidity  Skin:     General: Skin is warm and dry  Capillary Refill: Capillary refill takes less than 2 seconds  Coloration: Skin is pale  Findings: No erythema  Neurological:      General: No focal deficit present  Mental Status: He is alert and oriented to person, place, and time  Mental status is at baseline  Psychiatric:         Mood and Affect: Mood normal          Behavior: Behavior normal          Thought Content: Thought content normal          Judgment: Judgment normal          Additional Data:     Labs:    Results from last 7 days   Lab Units 12/25/21  0550   WBC Thousand/uL 10 46*   HEMOGLOBIN g/dL 9 3*   HEMATOCRIT % 28 9*   PLATELETS Thousands/uL 247   NEUTROS PCT % 88*   LYMPHS PCT % 3*   MONOS PCT % 7   EOS PCT % 0     Results from last 7 days   Lab Units 12/25/21  0550   SODIUM mmol/L 142   POTASSIUM mmol/L 3 1*   CHLORIDE mmol/L 107   CO2 mmol/L 26   BUN mg/dL 18   CREATININE mg/dL 0 73   CALCIUM mg/dL 8 1*   ALK PHOS U/L 332*   ALT U/L 270*   AST U/L 370*     Results from last 7 days   Lab Units 12/22/21  0103   INR  1 01     Results from last 7 days   Lab Units 12/25/21  1134 12/25/21  0549 12/24/21  2250 12/24/21  1619 12/24/21  1046 12/24/21  0558 12/23/21  2342 12/23/21  1805 12/23/21  1211 12/23/21  0625 12/22/21  2356 12/22/21  1801   POC GLUCOSE mg/dl 255* 259* 202* 230* 175* 186* 275* 137 120 114 131 182*     Results from last 7 days   Lab Units 12/22/21  0103   HEMOGLOBIN A1C % 6 3*       * I Have Reviewed All Lab Data Listed Above  * Additional Pertinent Lab Tests Reviewed: [facility] Admission  Reviewed    Imaging:  Imaging Reports Reviewed Today Include: n/a     Recent Cultures (last 7 days):     Results from last 7 days   Lab Units 12/23/21  0758 12/22/21  0119 12/22/21  0103   BLOOD CULTURE   --  No Growth at 72 hrs   No Growth at 72 hrs  LEGIONELLA URINARY ANTIGEN  Negative  --   --        Last 24 Hours Medication List:   Current Facility-Administered Medications   Medication Dose Route Frequency Provider Last Rate    acetaminophen  650 mg Rectal Q4H PRN Cirilo Cottrell PA-C      aspirin  81 mg Per PEG Tube Daily MOISES Carrera      atorvastatin  40 mg Per PEG Tube Daily With Dinner MOISES Carrera      enoxaparin  1 mg/kg Subcutaneous Q12H [address] Ivania SelfTANISHA      fluticasone  1 spray Nasal Daily Ivaniaalex Lynne PA-C      glycopyrrolate  0 1 mg Intravenous Q8H PRN Cirilo Cottrell PA-C      guaiFENesin  200 mg Oral 4x Daily Ivaniaalex Lynne PA-C      insulin lispro  1-6 Units Subcutaneous Q6H [address] Ivania SelfTANISAH      lisinopril  40 mg Per PEG Tube Daily MOISES Carrera      metoprolol tartrate  25 mg Oral Q12H [address] MOISES Carrera      morphine injection  2 mg Intravenous Q4H PRN Ivania SelfTANISHA      ondansetron  4 mg Intravenous Q6H PRN Ivaniaalex Lynne PA-C      pantoprazole  40 mg Intravenous Q24H [address] MOISES Carrera      piperacillin-tazobactam  3 375 g Intravenous Q6H MOISES Carrera      potassium chloride  40 mEq Per G Tube BID MOISES Carrera      potassium phosphate  21 mmol Intravenous Once MOISES Carrera      potassium-sodium phosphates  2 packet Per PEG Tube BID With Meals MOISES Carrera          Today, Patient Was Seen By: MOISES Carrera    ** Please Note: Dictation voice to text software may have been used in the creation of this document   **

## 2021-12-25 NOTE — PLAN OF CARE
Problem: Nutrition/Hydration-ADULT  Goal: Nutrient/Hydration intake appropriate for improving, restoring or maintaining nutritional needs  Description: Monitor and assess patient's nutrition/hydration status for malnutrition  Collaborate with interdisciplinary team and initiate plan and interventions as ordered  Monitor patient's weight and dietary intake as ordered or per policy  Utilize nutrition screening tool and intervene as necessary  Determine patient's food preferences and provide high-protein, high-caloric foods as appropriate       INTERVENTIONS:  - Monitor oral intake, urinary output, labs, and treatment plans  - Assess nutrition and hydration status and recommend course of action  - Evaluate amount of meals eaten  - Assist patient with eating if necessary   - Allow adequate time for meals  - Recommend/ encourage appropriate diets, oral nutritional supplements, and vitamin/mineral supplements  - Order, calculate, and assess calorie counts as needed  - Recommend, monitor, and adjust tube feedings and TPN/PPN based on assessed needs  - Assess need for intravenous fluids  - Provide specific nutrition/hydration education as appropriate  - Include patient/family/caregiver in decisions related to nutrition  Outcome: Progressing     Problem: Prexisting or High Potential for Compromised Skin Integrity  Goal: Skin integrity is maintained or improved  Description: INTERVENTIONS:  - Identify patients at risk for skin breakdown  - Assess and monitor skin integrity  - Assess and monitor nutrition and hydration status  - Monitor labs   - Assess for incontinence   - Turn and reposition patient  - Assist with mobility/ambulation  - Relieve pressure over bony prominences  - Avoid friction and shearing  - Provide appropriate hygiene as needed including keeping skin clean and dry  - Evaluate need for skin moisturizer/barrier cream  - Collaborate with interdisciplinary team   - Patient/family teaching  - Consider wound care consult   Outcome: Progressing     Problem: Potential for Falls  Goal: Patient will remain free of falls  Description: INTERVENTIONS:  - Educate patient/family on patient safety including physical limitations  - Instruct patient to call for assistance with activity   - Consult OT/PT to assist with strengthening/mobility   - Keep Call bell within reach  - Keep bed low and locked with side rails adjusted as appropriate  - Keep care items and personal belongings within reach  - Initiate and maintain comfort rounds  - Apply yellow socks and bracelet for high fall risk patients  - Consider moving patient to room near nurses station  Outcome: Progressing     Problem: MOBILITY - ADULT  Goal: Maintain or return to baseline ADL function  Description: INTERVENTIONS:  -  Assess patient's ability to carry out ADLs; assess patient's baseline for ADL function and identify physical deficits which impact ability to perform ADLs (bathing, care of mouth/teeth, toileting, grooming, dressing, etc )  - Assess/evaluate cause of self-care deficits   - Assess range of motion  - Assess patient's mobility; develop plan if impaired  - Assess patient's need for assistive devices and provide as appropriate  - Encourage maximum independence but intervene and supervise when necessary  - Involve family in performance of ADLs  - Assess for home care needs following discharge   - Consider OT consult to assist with ADL evaluation and planning for discharge  - Provide patient education as appropriate  Outcome: Progressing     Problem: INFECTION - ADULT  Goal: Absence or prevention of progression during hospitalization  Description: INTERVENTIONS:  - Assess and monitor for signs and symptoms of infection  - Monitor lab/diagnostic results  - Monitor all insertion sites, i e  indwelling lines, tubes, and drains  - Monitor endotracheal if appropriate and nasal secretions for changes in amount and color  - [place] appropriate cooling/warming therapies per order  - Administer medications as ordered  - Instruct and encourage patient and family to use good hand hygiene technique  - Identify and instruct in appropriate isolation precautions for identified infection/condition  Outcome: Progressing     Problem: DISCHARGE PLANNING  Goal: Discharge to home or other facility with appropriate resources  Description: INTERVENTIONS:  - Identify barriers to discharge w/patient and caregiver  - Arrange for needed discharge resources and transportation as appropriate  - Identify discharge learning needs (meds, wound care, etc )  - Arrange for interpretive services to assist at discharge as needed  - Refer to Case Management Department for coordinating discharge planning if the patient needs post-hospital services based on physician/advanced practitioner order or complex needs related to functional status, cognitive ability, or social support system  Outcome: Progressing

## 2021-12-25 NOTE — PROGRESS NOTES
Progress Note - General Surgery   Andi Mcclure 78 y o  male MRN: [id number]  Unit/Bed#: -01 Encounter: [id number]    Assessment:  Acute cholecystitis, treat conservative non-operative management  Clinically stable, slight bump in WBC  Patient remains afebrile  Currently receiving PEG tube feedings at goal  Right upper quadrant less tender, no palpable masses  T 98 3, afebrile overnight    Left upper and lower pneumonia on CTA of chest abdomen pelvis December 22nd  WBC today increased 10 46 (9 6, 7 9)  Hemoglobin stable, 9 3 (9 4)  Cr normal 0 73  Hypokalemia, K 3 1  History of squamous cell carcinoma of tongue, 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      Plan:  Continue non operative management, IV antibiotics  No plan for general surgical intervention at this time  IV antibiotics, currently Zosyn at 4 5 g IV Q 6 hours, weight based will recommend lower dose to 3 375 g Q 6 hours  His weight is 79 4 kg  BMI 22 47  Will leave up to disposition to Medicine if antibiotic coverage for left upper and lower lobe pneumonia requiring higher dosed Zosyn  Potassium repletion 40 mEq KCL solution 2 times daily for 3 days ordered by Medicine  Continue tube feeds now at goal per recs  Elevate head of bed 30 degree semi-Olson position, patient is risk for aspiration  Continue PPI therapy  Trend WBC, if continues to increase, or the patient develops worsening right upper quadrant abdominal pain, then he may require percutaneous cholecystostomy tube placement by Interventional Radiology  DVT prophylaxis ongoing, enoxaparin venous compression boots  Analgesics and antiemetics as ordered p r n  Medical management per the attending hospitalist providers     Subjective/Objective      Chief Complaint:  Less abdominal pain today  Denies nausea  Subjective:  Abdominal pain in the right upper quadrant is better today  Continues with PEG tube feeds  No nausea or vomiting    Passing gas, no bowel movement  Remains afebrile  Scheduled Meds:  Current Facility-Administered Medications   Medication Dose Route Frequency Provider Last Rate    acetaminophen  650 mg Rectal Q4H PRN Maddie Weeks PA-C      aspirin  81 mg Per PEG Tube Daily MOISES Taylor      atorvastatin  40 mg Per PEG Tube Daily With Dinner MOISES Taylor      enoxaparin  1 mg/kg Subcutaneous Q12H [facility] Mary Price PA-C      fluticasone  1 spray Nasal Daily Mary Price PA-C      glycopyrrolate  0 1 mg Intravenous Q8H PRN Maddie Weeks PA-C      guaiFENesin  200 mg Oral 4x Daily Mary Price PA-C      insulin lispro  1-6 Units Subcutaneous Q6H [facility] Mary Price PA-C      lisinopril  40 mg Per PEG Tube Daily MOISES Taylor      magnesium sulfate  2 g Intravenous Once MOISES Taylor      metoprolol tartrate  25 mg Oral Q12H [facility] MOISES Taylor      morphine injection  2 mg Intravenous Q4H PRN Mary Price PA-C      ondansetron  4 mg Intravenous Q6H PRN [facility] TANISHA Price      pantoprazole  40 mg Intravenous Q24H [facility] MOISES Taylor      piperacillin-tazobactam  4 5 g Intravenous Q6H Mary Price PA-C 4 5 g (12/23/21 2337)    potassium chloride  40 mEq Per G Tube BID MOISES Taylor      potassium phosphate  21 mmol Intravenous Once MOISES Taylor      potassium-sodium phosphates  2 packet Per PEG Tube BID With Meals MOISES Taylor       Continuous Infusions:   PRN Meds:   acetaminophen    glycopyrrolate    morphine injection    ondansetron      Objective:     Blood pressure 167/96, pulse 85, temperature 98 3 °F (36 8 °C), resp  rate 18, height 6' 2" (1 88 m), weight 79 4 kg (175 lb), SpO2 92 %  ,Body mass index is 22 47 kg/m²        Intake/Output Summary (Last 24 hours) at 12/25/2021 1138  Last data filed at 12/25/2021 1132  Gross per 24 hour   Intake 2822 5 ml   Output --   Net 2822 5 ml       Invasive Devices  Report    Peripheral Intravenous Line Peripheral IV 12/22/21 Left Antecubital 3 days    Peripheral IV 12/22/21 Right Antecubital 3 days          Drain            Gastrostomy/Enterostomy PEG-jejunostomy LUQ -- days                Physical Exam:     Awake, cachectic  Thin  No acute distress  Skin slightly pale, no rash or diaphoresis  No jaundice  ENT oral mucosa pink and moist   Heart regular rate and rhythm  No murmur gallop is heard  Lungs clear to auscultation  No rales heard at this time  Abdomen positive bowel sounds, nondistended  Some mild tenderness to palpation right upper quadrant, no definite mass  Peg tube left abdomen with tube feedings currently being instilled  Bowel sounds are present in 4 quadrants  No guarding or rebound  Negative Bailon sign  Extremities no calf tenderness, no peripheral edema  Neurological exam baseline mental status, fully oriented  Speech is clear  No tremor noted  No obvious coordination problems  Moves extremities well, equal  strength  Lab, Imaging and other studies:  I have personally reviewed pertinent lab results    , CBC:   Lab Results   Component Value Date    WBC 10 46 (H) 12/25/2021    HGB 9 3 (L) 12/25/2021    HCT 28 9 (L) 12/25/2021    MCV 90 12/25/2021     12/25/2021    MCH 28 8 12/25/2021    MCHC 32 2 12/25/2021    RDW 20 2 (H) 12/25/2021    MPV 9 8 12/25/2021    NRBC 1 12/25/2021   , CMP:   Lab Results   Component Value Date    SODIUM 142 12/25/2021    K 3 1 (L) 12/25/2021     12/25/2021    CO2 26 12/25/2021    BUN 18 12/25/2021    CREATININE 0 73 12/25/2021    CALCIUM 8 1 (L) 12/25/2021     (H) 12/25/2021     (H) 12/25/2021    ALKPHOS 332 (H) 12/25/2021    EGFR 91 12/25/2021   , Coagulation: No results found for: PT, INR, APTT  VTE Pharmacologic Prophylaxis: Enoxaparin (Lovenox)  VTE Mechanical Prophylaxis: sequential compression device     Timothy Wiggins PA-C

## 2021-12-25 NOTE — ASSESSMENT & PLAN NOTE
· Patient just completed radiation therapy at [address]  · Supportive care   · Aspiration precautions

## 2021-12-25 NOTE — ASSESSMENT & PLAN NOTE
· As seen on CT and chest x-ray  · COVID negative   · Procalcitonin 0 85- will trend   · Strep pneumo and Legionella urine antigens negative  · Zosyn day 4  · MRSA-pending

## 2021-12-25 NOTE — ASSESSMENT & PLAN NOTE
· Blood pressure is slightly elevated  · Continue with lisinopril and metoprolol tartrate; added amlodipine  · Continue to monitor

## 2021-12-25 NOTE — ASSESSMENT & PLAN NOTE
Lab Results   Component Value Date    HGBA1C 6 3 (H) 12/22/2021       Recent Labs     12/24/21  1619 12/24/21  2250 12/25/21  0549 12/25/21  1134   POCGLU 230* 202* 259* 255*       Blood Sugar Average: Last 72 hrs:  (P) 142 [id number]  · Continue home statin

## 2021-12-25 NOTE — ASSESSMENT & PLAN NOTE
· Patient has an indwelling PEG tube  Formula used is a [employer] product, which we do not carry here at the hospital   · Nutrition consult appreciated    · Patient and his wife agreeable to using our product here (Geoff Perez) while inpatient

## 2021-12-25 NOTE — ASSESSMENT & PLAN NOTE
· Patient is still with active pneumonia and cholecystitis, however all other sepsis parameters resolved    · Sepsis criteria met with tachycardia, fever, lactic acidosis, with pneumonia possible aspiration and cholecystitis  · Patient received sepsis protocol normal saline fluid resuscitation followed by 125 cc/hour  · Lactic acid and procalcitonin trended to normal  · Blood cultures NGTD   · Zosyn 4 5 g will decrease dosing to 3 375 IV q 6 hours day 4

## 2021-12-25 NOTE — ASSESSMENT & PLAN NOTE
· Patient initially presented with generalized weakness  Evidence of possible cholecystitis seen on CT abdomen/pelvis, confirmed with ultrasound gallbladder  · General surgery consult appreciated  · Holding off on more aggressive measures as of now, as patient is not a good surgical candidate  Attempting conservative measures, and if failed, will consider percutaneous cholecystostomy by IR     · Tube feeds resumed and is tolerating    · Zosyn day 4   · Trending CMP

## 2021-12-26 LAB
ALBUMIN SERPL BCP-MCNC: 2.7 G/DL (ref 3.5–5)
ALP SERPL-CCNC: 402 U/L (ref 34–104)
ALT SERPL W P-5'-P-CCNC: 414 U/L (ref 7–52)
ANION GAP SERPL CALCULATED.3IONS-SCNC: 6 MMOL/L (ref 4–13)
AST SERPL W P-5'-P-CCNC: 464 U/L (ref 13–39)
BILIRUB SERPL-MCNC: 0.75 MG/DL (ref 0.2–1)
BUN SERPL-MCNC: 17 MG/DL (ref 5–25)
CALCIUM ALBUM COR SERPL-MCNC: 8.6 MG/DL (ref 8.3–10.1)
CALCIUM SERPL-MCNC: 7.6 MG/DL (ref 8.4–10.2)
CHLORIDE SERPL-SCNC: 109 MMOL/L (ref 96–108)
CO2 SERPL-SCNC: 30 MMOL/L (ref 21–32)
CREAT SERPL-MCNC: 0.61 MG/DL (ref 0.6–1.3)
ERYTHROCYTE [DISTWIDTH] IN BLOOD BY AUTOMATED COUNT: 20.6 % (ref 11.6–15.1)
GFR SERPL CREATININE-BSD FRML MDRD: 98 ML/MIN/1.73SQ M
GLUCOSE SERPL-MCNC: 207 MG/DL (ref 65–140)
GLUCOSE SERPL-MCNC: 237 MG/DL (ref 65–140)
GLUCOSE SERPL-MCNC: 238 MG/DL (ref 65–140)
GLUCOSE SERPL-MCNC: 251 MG/DL (ref 65–140)
GLUCOSE SERPL-MCNC: 262 MG/DL (ref 65–140)
GLUCOSE SERPL-MCNC: 274 MG/DL (ref 65–140)
HCT VFR BLD AUTO: 28.2 % (ref 36.5–49.3)
HGB BLD-MCNC: 8.9 G/DL (ref 12–17)
MAGNESIUM SERPL-MCNC: 2.1 MG/DL (ref 1.9–2.7)
MCH RBC QN AUTO: 28.4 PG (ref 26.8–34.3)
MCHC RBC AUTO-ENTMCNC: 31.6 G/DL (ref 31.4–37.4)
MCV RBC AUTO: 90 FL (ref 82–98)
MRSA NOSE QL CULT: NORMAL
PHOSPHATE SERPL-MCNC: 1.8 MG/DL (ref 2.3–4.1)
PLATELET # BLD AUTO: 235 THOUSANDS/UL (ref 149–390)
PMV BLD AUTO: 9.5 FL (ref 8.9–12.7)
POTASSIUM SERPL-SCNC: 3.2 MMOL/L (ref 3.5–5.3)
PROCALCITONIN SERPL-MCNC: 0.56 NG/ML
PROT SERPL-MCNC: 5.6 G/DL (ref 6.4–8.4)
RBC # BLD AUTO: 3.13 MILLION/UL (ref 3.88–5.62)
SODIUM SERPL-SCNC: 145 MMOL/L (ref 135–147)
WBC # BLD AUTO: 8.06 THOUSAND/UL (ref 4.31–10.16)

## 2021-12-26 PROCEDURE — 84100 ASSAY OF PHOSPHORUS: CPT | Performed by: NURSE PRACTITIONER

## 2021-12-26 PROCEDURE — 84145 PROCALCITONIN (PCT): CPT | Performed by: NURSE PRACTITIONER

## 2021-12-26 PROCEDURE — 82948 REAGENT STRIP/BLOOD GLUCOSE: CPT

## 2021-12-26 PROCEDURE — C9113 INJ PANTOPRAZOLE SODIUM, VIA: HCPCS | Performed by: NURSE PRACTITIONER

## 2021-12-26 PROCEDURE — 99232 SBSQ HOSP IP/OBS MODERATE 35: CPT | Performed by: NURSE PRACTITIONER

## 2021-12-26 PROCEDURE — 85027 COMPLETE CBC AUTOMATED: CPT | Performed by: NURSE PRACTITIONER

## 2021-12-26 PROCEDURE — 83735 ASSAY OF MAGNESIUM: CPT | Performed by: NURSE PRACTITIONER

## 2021-12-26 PROCEDURE — NC001 PR NO CHARGE

## 2021-12-26 PROCEDURE — 99232 SBSQ HOSP IP/OBS MODERATE 35: CPT | Performed by: SURGERY

## 2021-12-26 PROCEDURE — NC001 PR NO CHARGE: Performed by: RADIOLOGY

## 2021-12-26 PROCEDURE — 80053 COMPREHEN METABOLIC PANEL: CPT | Performed by: NURSE PRACTITIONER

## 2021-12-26 RX ORDER — POTASSIUM CHLORIDE 14.9 MG/ML
20 INJECTION INTRAVENOUS ONCE
Status: COMPLETED | OUTPATIENT
Start: 2021-12-26 | End: 2021-12-26

## 2021-12-26 RX ORDER — INSULIN GLARGINE 100 [IU]/ML
10 INJECTION, SOLUTION SUBCUTANEOUS
Status: DISCONTINUED | OUTPATIENT
Start: 2021-12-27 | End: 2021-12-30 | Stop reason: HOSPADM

## 2021-12-26 RX ORDER — INSULIN GLARGINE 100 [IU]/ML
5 INJECTION, SOLUTION SUBCUTANEOUS
Status: COMPLETED | OUTPATIENT
Start: 2021-12-26 | End: 2021-12-26

## 2021-12-26 RX ADMIN — PIPERACILLIN AND TAZOBACTAM 3.38 G: 3; .375 INJECTION, POWDER, FOR SOLUTION INTRAVENOUS at 21:50

## 2021-12-26 RX ADMIN — POTASSIUM & SODIUM PHOSPHATES POWDER PACK 280-160-250 MG 2 PACKET: 280-160-250 PACK at 17:06

## 2021-12-26 RX ADMIN — GUAIFENESIN 200 MG: 100 SOLUTION ORAL at 10:17

## 2021-12-26 RX ADMIN — ENOXAPARIN SODIUM 80 MG: 100 INJECTION, SOLUTION INTRAVENOUS; SUBCUTANEOUS at 10:05

## 2021-12-26 RX ADMIN — PIPERACILLIN AND TAZOBACTAM 3.38 G: 3; .375 INJECTION, POWDER, FOR SOLUTION INTRAVENOUS at 05:49

## 2021-12-26 RX ADMIN — INSULIN GLARGINE 5 UNITS: 100 INJECTION, SOLUTION SUBCUTANEOUS at 21:50

## 2021-12-26 RX ADMIN — MORPHINE SULFATE 2 MG: 2 INJECTION, SOLUTION INTRAMUSCULAR; INTRAVENOUS at 00:18

## 2021-12-26 RX ADMIN — AMLODIPINE BESYLATE 5 MG: 5 TABLET ORAL at 10:04

## 2021-12-26 RX ADMIN — POTASSIUM CHLORIDE 40 MEQ: 20 SOLUTION ORAL at 10:06

## 2021-12-26 RX ADMIN — INSULIN LISPRO 2 UNITS: 100 INJECTION, SOLUTION INTRAVENOUS; SUBCUTANEOUS at 12:56

## 2021-12-26 RX ADMIN — GUAIFENESIN 200 MG: 100 SOLUTION ORAL at 21:50

## 2021-12-26 RX ADMIN — ASPIRIN 81 MG CHEWABLE TABLET 81 MG: 81 TABLET CHEWABLE at 10:05

## 2021-12-26 RX ADMIN — POTASSIUM PHOSPHATE, MONOBASIC POTASSIUM PHOSPHATE, DIBASIC 21 MMOL: 224; 236 INJECTION, SOLUTION, CONCENTRATE INTRAVENOUS at 16:48

## 2021-12-26 RX ADMIN — LISINOPRIL 40 MG: 20 TABLET ORAL at 10:05

## 2021-12-26 RX ADMIN — PIPERACILLIN AND TAZOBACTAM 3.38 G: 3; .375 INJECTION, POWDER, FOR SOLUTION INTRAVENOUS at 12:57

## 2021-12-26 RX ADMIN — METOPROLOL TARTRATE 25 MG: 25 TABLET, FILM COATED ORAL at 21:50

## 2021-12-26 RX ADMIN — POTASSIUM & SODIUM PHOSPHATES POWDER PACK 280-160-250 MG 2 PACKET: 280-160-250 PACK at 10:16

## 2021-12-26 RX ADMIN — MORPHINE SULFATE 2 MG: 2 INJECTION, SOLUTION INTRAMUSCULAR; INTRAVENOUS at 05:45

## 2021-12-26 RX ADMIN — GUAIFENESIN 200 MG: 100 SOLUTION ORAL at 12:57

## 2021-12-26 RX ADMIN — GUAIFENESIN 200 MG: 100 SOLUTION ORAL at 17:05

## 2021-12-26 RX ADMIN — INSULIN LISPRO 3 UNITS: 100 INJECTION, SOLUTION INTRAVENOUS; SUBCUTANEOUS at 06:04

## 2021-12-26 RX ADMIN — POTASSIUM CHLORIDE 20 MEQ: 200 INJECTION, SOLUTION INTRAVENOUS at 13:40

## 2021-12-26 RX ADMIN — POTASSIUM CHLORIDE 40 MEQ: 20 SOLUTION ORAL at 17:05

## 2021-12-26 RX ADMIN — MORPHINE SULFATE 2 MG: 2 INJECTION, SOLUTION INTRAMUSCULAR; INTRAVENOUS at 10:16

## 2021-12-26 RX ADMIN — INSULIN LISPRO 4 UNITS: 100 INJECTION, SOLUTION INTRAVENOUS; SUBCUTANEOUS at 18:39

## 2021-12-26 RX ADMIN — MORPHINE SULFATE 2 MG: 2 INJECTION, SOLUTION INTRAMUSCULAR; INTRAVENOUS at 23:50

## 2021-12-26 RX ADMIN — METOPROLOL TARTRATE 25 MG: 25 TABLET, FILM COATED ORAL at 10:05

## 2021-12-26 RX ADMIN — FLUTICASONE PROPIONATE 1 SPRAY: 50 SPRAY, METERED NASAL at 10:06

## 2021-12-26 RX ADMIN — PANTOPRAZOLE SODIUM 40 MG: 40 INJECTION, POWDER, FOR SOLUTION INTRAVENOUS at 10:05

## 2021-12-26 RX ADMIN — PIPERACILLIN AND TAZOBACTAM 3.38 G: 3; .375 INJECTION, POWDER, FOR SOLUTION INTRAVENOUS at 00:04

## 2021-12-26 NOTE — PROGRESS NOTES
Progress Note - General Surgery   Alonza Mis 78 y o  male MRN: [id number]  Unit/Bed#: -01 Encounter: [id number]    Assessment:  Acute cholecystitis  Patient being treated with non operative management including IV antibiotics  Patient continues with right upper quadrant pain, concern over increase transaminitis  Ultrasound right upper quadrant demonstrated gallbladder sludge with mild gallbladder wall thickening and minimal pericholecystic fluid but a negative sonographic Bailon's sign  Findings were equivocal for acute cholecystitis  Discussed with Dr Violeta Gonzalez via TT messaging, he will examine the patient later this morning  The patient will likely need percutaneous cholecystostomy performed by IR tomorrow, Monday  WBC 8 06  Hgb 8 9  K 3 2  , , Alk Phos 402  T bili 0 75  Albumin 2 7     Left upper and lower pneumonia on CTA of chest abdomen pelvis December 22nd    History of squamous cell carcinoma of tongue, 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  Patient currently receiving PEG tube feeds at goal     Plan:  Discussed with Dr Chanel Almendarez interventional radiology for percutaneous cholecystostomy  Will obtain hepatitis profile and protime/INR today, check if labs can be used from earlier draw this morning  Hold tube feeds and nothing by mouth after midnight  Continue IV antibiotics, Zosyn 3 375 g IV Q 6 hours  Potassium repletion as ordered  Repeat a m  labs including CBC, CMP  Medical management per the attending hospitalist  DVT prophylaxis ongoing  Analgesics and antiemetics as ordered    Subjective/Objective   Chief Complaint:  Patient continues with pain in the right upper quadrant, he has been requiring IV morphine for control  Subjective:  68-year-old white male with a history of squamous cell carcinoma of the tongue admitted with acute cholecystitis  Patient remains afebrile  Tube feedings at goal through the PEG tube  He is passing gas, no bowel movement  He continues with discomfort in the right upper quadrant  Liver enzymes continue to rise, normal white count and total bilirubin  Discussed with Dr Kaylah Longo, consideration for percutaneous cholecystostomy  Scheduled Meds:  Current Facility-Administered Medications   Medication Dose Route Frequency Provider Last Rate    acetaminophen  650 mg Rectal Q4H PRN Bertrand Ashford PA-C      amLODIPine  5 mg Per PEG Tube Daily Areta Parcel, CRNP      aspirin  81 mg Per PEG Tube Daily Areta Parcel, CRNP      atorvastatin  40 mg Per PEG Tube Daily With Dinner Areta Parcel, CRNP      enoxaparin  1 mg/kg Subcutaneous Q12H [facility] Zahida Tellez PA-C      fluticasone  1 spray Nasal Daily Zahida Tellez PA-C      glycopyrrolate  0 1 mg Intravenous Q8H PRN Bertrand Ashford PA-C      guaiFENesin  200 mg Oral 4x Daily Zahida Tellez PA-C      insulin lispro  1-6 Units Subcutaneous Q6H [facility] Zahida Tellez PA-C      lisinopril  40 mg Per PEG Tube Daily Areta Parcel, CRNP      metoprolol tartrate  25 mg Oral Q12H [facility] Areta Parcel, CRNP      morphine injection  2 mg Intravenous Q4H PRN Zahida Tellez PA-C      ondansetron  4 mg Intravenous Q6H PRN Zahida Tellez PA-C      pantoprazole  40 mg Intravenous Q24H [facility] Areta Parcel, CRNP      piperacillin-tazobactam  3 375 g Intravenous Q6H Areta Parcel, CRNP      potassium chloride  40 mEq Per G Tube BID Areta Parcel, CRNP      potassium-sodium phosphates  2 packet Per PEG Tube BID With Meals Areta Parcel, CRNP       Continuous Infusions:   PRN Meds:   acetaminophen    glycopyrrolate    morphine injection    ondansetron    Objective:     Blood pressure 163/88, pulse 94, temperature 98 7 °F (37 1 °C), temperature source Oral, resp  rate 20, height 6' 2" (1 88 m), weight 79 4 kg (175 lb), SpO2 (!) 89 %  ,Body mass index is 22 47 kg/m²        Intake/Output Summary (Last 24 hours) at 12/26/2021 5644  Last data filed at 12/26/2021 0758  Gross per 24 hour   Intake 2032 5 ml   Output 300 ml   Net 1732 5 ml       Invasive Devices  Report    Peripheral Intravenous Line            Peripheral IV 12/22/21 Left Antecubital 4 days    Peripheral IV 12/22/21 Right Antecubital 4 days          Drain            Gastrostomy/Enterostomy PEG-jejunostomy LUQ -- days                Physical Exam:     /88 (BP Location: Left arm)   Pulse 94   Temp 98 7 °F (37 1 °C) (Oral)   Resp 20   Ht 6' 2" (1 88 m)   Wt 79 4 kg (175 lb)   SpO2 (!) 89%   BMI 22 47 kg/m²      Awake, cachectic  appears chronically ill  Patient appears uncomfortable, points to his right upper quadrant as source of pain  Skin without jaundice, appears pale  ENT clear, oral mucosa moist   Heart regular rate and rhythm, no murmur heard  Lungs clear to auscultation  Abdomen positive bowel sounds, nondistended  There is mild tenderness to palpation right upper quadrant  Bailon's sign is considered positive  Peg tube left abdomen with tube feedings currently being instilled  No guarding or rebound present  liver edge is not palpable, no definite masses  Extremities muscles thin, no calf tenderness  Neurological exam baseline mental status, fully oriented  answers questions appropriately  No tremor noted  Ambulation could not be observed  Lab, Imaging and other studies:  I have personally reviewed pertinent lab results    , CBC:   Lab Results   Component Value Date    WBC 8 06 12/26/2021    HGB 8 9 (L) 12/26/2021    HCT 28 2 (L) 12/26/2021    MCV 90 12/26/2021     12/26/2021    MCH 28 4 12/26/2021    MCHC 31 6 12/26/2021    RDW 20 6 (H) 12/26/2021    MPV 9 5 12/26/2021   , CMP:   Lab Results   Component Value Date    SODIUM 145 12/26/2021    K 3 2 (L) 12/26/2021     (H) 12/26/2021    CO2 30 12/26/2021    BUN 17 12/26/2021    CREATININE 0 61 12/26/2021    CALCIUM 7 6 (L) 12/26/2021     (H) 12/26/2021     (H) 12/26/2021 ALKPHOS 402 (H) 12/26/2021    EGFR 98 12/26/2021     VTE Pharmacologic Prophylaxis: Enoxaparin (Lovenox)  VTE Mechanical Prophylaxis: sequential compression device     Winston Canchola PA-C

## 2021-12-26 NOTE — ASSESSMENT & PLAN NOTE
Lab Results   Component Value Date    HGBA1C 6 3 (H) 12/22/2021       Recent Labs     12/25/21  1820 12/25/21  2045 12/25/21  2356 12/26/21  0601   POCGLU 268* 233* 251* 262*       Blood Sugar Average: Last 72 hrs:  (P) 211 [id number]  · Hold home hypoglycemic agents  · Sliding scale insulin   · Added long acting insulin as tolerates tube feeds- will give half dose of long acting tonight    · Accu-Cheks

## 2021-12-26 NOTE — ASSESSMENT & PLAN NOTE
· Patient initially presented with generalized weakness  Evidence of possible cholecystitis seen on CT abdomen/pelvis, confirmed with ultrasound gallbladder  · General surgery consult appreciated  · Holding off on more aggressive measures as of now, as patient is not a good surgical candidate  LFTs continue to raise and RUQ pain is intermittent; surgery team recommends percutaneous cholecystostomy by IR tomorrow  · Tube feeds will be held after midnight; will hold Lovenox tonight and AM doses     · Zosyn day 5   · Trending CMP

## 2021-12-26 NOTE — ASSESSMENT & PLAN NOTE
· Patient has an indwelling PEG tube  Formula used is a [employer] product, which we do not carry here at the hospital   · Nutrition consult appreciated    · Patient and his wife agreeable to using our product here (Shruthi Prado) while inpatient

## 2021-12-26 NOTE — ASSESSMENT & PLAN NOTE
· Patient just completed radiation therapy at [address]  · Supportive care   · Aspiration precautions

## 2021-12-26 NOTE — ASSESSMENT & PLAN NOTE
· As seen on CT and chest x-ray  · COVID negative   · Procalcitonin 0 85- will trend   · Strep pneumo and Legionella urine antigens negative  · Zosyn day 5  · MRSA-pending

## 2021-12-26 NOTE — ASSESSMENT & PLAN NOTE
· As seen on CT chest/abdomen/pelvis- 1st episodes of PE  · Echo unremarkable  · No evidence of DVT on venous Doppler  · Continue Lovenox 1 milligram/kilogram subQ q 12 hours  · Will hold off on starting the patient on full anticoagulation at this time as patient will be evaluated by IR for percutaneous cholecystostomy tube placement Monday  · O2 protocol    · Continue to monitor hemodynamic status

## 2021-12-26 NOTE — ASSESSMENT & PLAN NOTE
· Patient is still with active pneumonia and cholecystitis, however all other sepsis parameters resolved    · Sepsis criteria met with tachycardia, fever, lactic acidosis, with pneumonia possible aspiration and cholecystitis  · Patient received sepsis protocol normal saline fluid resuscitation followed by 125 cc/hour  · Lactic acid and procalcitonin trended to normal  · Blood cultures NGTD    · Zosyn 3 375 IV q 6 hours day 5

## 2021-12-26 NOTE — PLAN OF CARE
Problem: Potential for Falls  Goal: Patient will remain free of falls  Description: INTERVENTIONS:  - Educate patient/family on patient safety including physical limitations  - Instruct patient to call for assistance with activity   - Consult OT/PT to assist with strengthening/mobility   - Keep Call bell within reach  - Keep bed low and locked with side rails adjusted as appropriate  - Keep care items and personal belongings within reach  - Initiate and maintain comfort rounds  - Make Fall Risk Sign visible to staff  - Offer Toileting every Hours, in advance of need  - Initiate/Maintain alarm  - Obtain necessary fall risk management equipment:   Apply yellow socks and bracelet for high fall risk   - Consider moving patient to room near nurses station  Outcome: Progressing

## 2021-12-26 NOTE — CONSULTS
Consultation - Interventional Radiology  Laura Campbell 78 y o  male MRN: [id number]  Unit/Bed#: -01 Encounter: [id number]        Consults    ASSESSMENT/PLAN:    78 with sepsis, pneumonia, generalized weakness, right upper quadrant tenderness with intermittent pain    Imaging obtained December 22nd I personally reviewed the imaging distended gallbladder with some amount of pericholecystic inflammation    Recent pulmonary embolism on Lovenox    Also with tongue cancer, on tube feeds    Recommendations  - patient is a candidate for percutaneous cholecystostomy tube placement as he is a higher risk surgical candidate  Imaging is equivocal for cholecystitis but certainly suspicious and he is probably partially treated with IV antibiotics    - hold Lovenox  - stop  Tube feeds at midnight    - add on status for tomorrow, further discussions between interventional radiology and the surgical team   Timing to be determined based on IR availability and these discussions        Medical Problems             Problem List     * (Principal) Sepsis ([facility] )    Anxiety    Neoplasm of uncertain behavior of skin    Post-traumatic stress disorder, unspecified    Essential (primary) hypertension    Actinic keratosis    Dysphagia, unspecified    Erectile dysfunction    History of malignant neoplasm of bladder    Cardiac arrhythmia    Carpal tunnel syndrome    Chronic post-traumatic stress disorder (PTSD) after  combat    Type 2 diabetes mellitus without complication, without long-term current use of insulin ([facility] [address] )    Lab Results   Component Value Date    HGBA1C 6 3 (H) 12/22/2021       Recent Labs     12/25/21  2045 12/25/21  2356 12/26/21  0601 12/26/21  1210   POCGLU 233* 251* 262* 207*       Blood Sugar Average: Last 72 hrs:  (P) 211 6        Heart murmur    Overview Signed 12/22/2021  3:58 AM by Ivania Lynne PA-C     Aug 02, 2018 Entered By: Ross Prasad Comment: per Cardiology note         Hematuria    Hyperlipidemia due to type 2 diabetes mellitus [facility])    Lab Results   Component Value Date    HGBA1C 6 3 (H) 12/22/2021       Recent Labs     12/25/21  2045 12/25/21  2356 12/26/21  0601 12/26/21  1210   POCGLU 233* 251* 262* 207*       Blood Sugar Average: Last 72 hrs:  (P) 211 6        Left ventricular hypertrophy    Overview Signed 12/22/2021  3:58 AM by Owen Luna PA-C     Aug 02, 2018 Entered By: Stephanie Johnston Comment: per cardio note         Malignant neoplasm of urinary bladder ([facility] [address] )    Neoplasm of lung    Neoplasm of uncertain behavior of oropharynx    Osteoarthritis of knee    Panic disorder    Polyp of colon    Primary malignant neoplasm of base of tongue ([facility] )    Severe protein-calorie malnutrition ([facility] )    Malnutrition Findings:   Adult Malnutrition type: Acute illness (related to inadequate protein energy intake as evidenced by significant wt  loss 13% < 6 months, moderate muscle loss clavicle/temple area, moderate fat loss buccal/orbital pads treated with EN )  Adult Degree of Malnutrition: Other severe protein calorie malnutrition    BMI Findings: Body mass index is 22 47 kg/m²           Tobacco dependence    Community acquired pneumonia of left lower lobe of lung    Other pulmonary embolism without acute cor pulmonale (HCC)    Cholecystitis                Reason for Consult / Principal Problem:    HPI: Sky Collado is a 78y o  year old male who presents with Sepsis ([facility] [address] )      Review of Systems      Past Medical History:  Past Medical History:   Diagnosis Date    Cancer ([facility] )     Diabetes ([facility] )     Gastritis     GERD (gastroesophageal reflux disease)     History of bladder cancer 2010    treated with surgery    Hypercholesterolemia     Hypertension     Hyponatremia 12/22/2021    Lactic acidosis 12/22/2021       Past Surgical History:  Past Surgical History:   Procedure Laterality Date    BLADDER TUMOR EXCISION      EGD         Social History:  Social History     Substance and Sexual Activity Alcohol Use Not Currently     Social History     Substance and Sexual Activity   Drug Use Never     Social History     Tobacco Use   Smoking Status Former Smoker    Quit date: 2021    Years since quittin 3   Smokeless Tobacco Never Used       Family History:  History reviewed  No pertinent family history      Allergies:  No Known Allergies    Medications:  Medications Prior to Admission   Medication    aspirin (ECOTRIN LOW STRENGTH) 81 mg EC tablet    atorvastatin (LIPITOR) 40 mg tablet    Empagliflozin 25 MG TABS    fluticasone (FLONASE) 50 mcg/act nasal spray    insulin glargine (LANTUS) 100 units/mL subcutaneous injection    metFORMIN (GLUCOPHAGE) 1000 MG tablet    METOPROLOL TARTRATE PO    pantoprazole (PROTONIX) 20 mg tablet    ramipril (ALTACE) 10 MG capsule     Current Facility-Administered Medications   Medication Dose Route Frequency    acetaminophen (TYLENOL) rectal suppository 650 mg  650 mg Rectal Q4H PRN    amLODIPine (NORVASC) tablet 5 mg  5 mg Per PEG Tube Daily    aspirin chewable tablet 81 mg  81 mg Per PEG Tube Daily    [START ON 2021] enoxaparin (LOVENOX) subcutaneous injection 80 mg  1 mg/kg Subcutaneous Q12H CLAUDIO    fluticasone (FLONASE) 50 mcg/act nasal spray 1 spray  1 spray Nasal Daily    glycopyrrolate (ROBINUL) injection 0 1 mg  0 1 mg Intravenous Q8H PRN    guaiFENesin SOLN 200 mg  200 mg Oral 4x Daily    [START ON 2021] insulin glargine (LANTUS) subcutaneous injection 10 Units 0 1 mL  10 Units Subcutaneous HS    insulin glargine (LANTUS) subcutaneous injection 5 Units 0 05 mL  5 Units Subcutaneous HS    insulin lispro (HumaLOG) 100 units/mL subcutaneous injection 1-6 Units  1-6 Units Subcutaneous Q6H [facility]    lisinopril (ZESTRIL) tablet 40 mg  40 mg Per PEG Tube Daily    metoprolol tartrate (LOPRESSOR) tablet 25 mg  25 mg Oral Q12H CLAUDIO    morphine injection 2 mg  2 mg Intravenous Q4H PRN    ondansetron (ZOFRAN) injection 4 mg  4 mg Intravenous Q6H PRN    pantoprazole (PROTONIX) injection 40 mg  40 mg Intravenous Q24H CLAUDIO    piperacillin-tazobactam (ZOSYN) IVPB 3 375 g  3 375 g Intravenous Q6H    potassium chloride oral solution 40 mEq  40 mEq Per G Tube BID    potassium phosphates 21 mmol in sodium chloride 0 9 % 250 mL infusion  21 mmol Intravenous Once    potassium-sodium phosphates (PHOS-NAK) packet 2 packet  2 packet Per PEG Tube BID With Meals       Vitals:  /88 (BP Location: Left arm)   Pulse 94   Temp 98 7 °F (37 1 °C) (Oral)   Resp 20   Ht 6' 2" (1 88 m)   Wt 79 4 kg (175 lb)   SpO2 (!) 89%   BMI 22 47 kg/m²   Body mass index is 22 47 kg/m²  Weight (last 2 days)     None          I/Os:    Intake/Output Summary (Last 24 hours) at 12/26/2021 1647  Last data filed at 12/26/2021 0758  Gross per 24 hour   Intake 1335 ml   Output 300 ml   Net 1035 ml       PHYSICAL EXAM  None  Electronic consultation only      Lab Results and Cultures:   CBC with diff:   Lab Results   Component Value Date    WBC 8 06 12/26/2021    HGB 8 9 (L) 12/26/2021    HCT 28 2 (L) 12/26/2021    MCV 90 12/26/2021     12/26/2021    MCH 28 4 12/26/2021    MCHC 31 6 12/26/2021    RDW 20 6 (H) 12/26/2021    MPV 9 5 12/26/2021    NRBC 1 12/25/2021      BMP/CMP:  Lab Results   Component Value Date    K 3 2 (L) 12/26/2021     (H) 12/26/2021    CO2 30 12/26/2021    BUN 17 12/26/2021    CREATININE 0 61 12/26/2021    CALCIUM 7 6 (L) 12/26/2021     (H) 12/26/2021     (H) 12/26/2021    ALKPHOS 402 (H) 12/26/2021    EGFR 98 12/26/2021   ,     Coags:   Lab Results   Component Value Date    PTT 31 12/22/2021    INR 1 01 12/22/2021   ,   Results from last 7 days   Lab Units 12/22/21  0103   PTT seconds 31   INR  1 01        Lipid Panel: No results found for: CHOL  No results found for: HDL  No results found for: HDL  No results found for: LDLCALC  No results found for: TRIG    HgbA1c:   Lab Results   Component Value Date    HGBA1C 6 3 (H) 12/22/2021 Blood Culture:   Lab Results   Component Value Date    BLOODCX No Growth After 4 Days  12/22/2021   ,   Urinalysis:   Lab Results   Component Value Date    COLORU Yellow 12/22/2021    CLARITYU Clear 12/22/2021    SPECGRAV <=1 005 (L) 12/22/2021    PHUR 6 5 12/22/2021    LEUKOCYTESUR Negative 12/22/2021    NITRITE Negative 12/22/2021    GLUCOSEU 1+ (A) 12/22/2021    KETONESU Negative 12/22/2021    BILIRUBINUR Negative 12/22/2021    BLOODU Negative 12/22/2021   ,   Urine Culture: No results found for: URINECX,   Wound Culure:  No results found for: WOUNDCULT    Imaging Studies: I have personally reviewed pertinent films in PACS    Counseling / Coordination of Care  Total time spent today  30 minutes  Electronic consultation only  Thank you for allowing me to participate in the care of Keesha Sailors  Please don't hesitate to contact us with any questions

## 2021-12-27 ENCOUNTER — APPOINTMENT (INPATIENT)
Dept: INTERVENTIONAL RADIOLOGY/VASCULAR | Facility: HOSPITAL | Age: 76
DRG: 871 | End: 2021-12-27
Attending: RADIOLOGY
Payer: MEDICARE

## 2021-12-27 LAB
ALBUMIN SERPL BCP-MCNC: 2.8 G/DL (ref 3.5–5)
ALP SERPL-CCNC: 433 U/L (ref 34–104)
ALT SERPL W P-5'-P-CCNC: 370 U/L (ref 7–52)
ANION GAP SERPL CALCULATED.3IONS-SCNC: 10 MMOL/L (ref 4–13)
AST SERPL W P-5'-P-CCNC: 253 U/L (ref 13–39)
BACTERIA BLD CULT: NORMAL
BACTERIA BLD CULT: NORMAL
BASOPHILS # BLD MANUAL: 0 THOUSAND/UL (ref 0–0.1)
BASOPHILS NFR MAR MANUAL: 0 % (ref 0–1)
BILIRUB SERPL-MCNC: 0.81 MG/DL (ref 0.2–1)
BUN SERPL-MCNC: 14 MG/DL (ref 5–25)
CALCIUM ALBUM COR SERPL-MCNC: 8.8 MG/DL (ref 8.3–10.1)
CALCIUM SERPL-MCNC: 7.8 MG/DL (ref 8.4–10.2)
CHLORIDE SERPL-SCNC: 108 MMOL/L (ref 96–108)
CO2 SERPL-SCNC: 28 MMOL/L (ref 21–32)
CREAT SERPL-MCNC: 0.61 MG/DL (ref 0.6–1.3)
EOSINOPHIL # BLD MANUAL: 0 THOUSAND/UL (ref 0–0.4)
EOSINOPHIL NFR BLD MANUAL: 0 % (ref 0–6)
ERYTHROCYTE [DISTWIDTH] IN BLOOD BY AUTOMATED COUNT: 20.4 % (ref 11.6–15.1)
GFR SERPL CREATININE-BSD FRML MDRD: 98 ML/MIN/1.73SQ M
GLUCOSE SERPL-MCNC: 127 MG/DL (ref 65–140)
GLUCOSE SERPL-MCNC: 142 MG/DL (ref 65–140)
GLUCOSE SERPL-MCNC: 152 MG/DL (ref 65–140)
GLUCOSE SERPL-MCNC: 186 MG/DL (ref 65–140)
HCT VFR BLD AUTO: 31.1 % (ref 36.5–49.3)
HGB BLD-MCNC: 9.6 G/DL (ref 12–17)
INR PPP: 1.15 (ref 0.84–1.19)
LYMPHOCYTES # BLD AUTO: 0.64 THOUSAND/UL (ref 0.6–4.47)
LYMPHOCYTES # BLD AUTO: 6 % (ref 14–44)
MCH RBC QN AUTO: 28.3 PG (ref 26.8–34.3)
MCHC RBC AUTO-ENTMCNC: 30.9 G/DL (ref 31.4–37.4)
MCV RBC AUTO: 92 FL (ref 82–98)
MONOCYTES # BLD AUTO: 0.43 THOUSAND/UL (ref 0–1.22)
MONOCYTES NFR BLD: 4 % (ref 4–12)
MYELOCYTES NFR BLD MANUAL: 4 % (ref 0–1)
NEUTROPHILS # BLD MANUAL: 9.19 THOUSAND/UL (ref 1.85–7.62)
NEUTS BAND NFR BLD MANUAL: 2 % (ref 0–8)
NEUTS SEG NFR BLD AUTO: 84 % (ref 43–75)
PLATELET # BLD AUTO: 271 THOUSANDS/UL (ref 149–390)
PLATELET BLD QL SMEAR: ADEQUATE
PMV BLD AUTO: 9.3 FL (ref 8.9–12.7)
POTASSIUM SERPL-SCNC: 3.6 MMOL/L (ref 3.5–5.3)
PROCALCITONIN SERPL-MCNC: 0.48 NG/ML
PROT SERPL-MCNC: 6 G/DL (ref 6.4–8.4)
PROTHROMBIN TIME: 14.6 SECONDS (ref 11.6–14.5)
RBC # BLD AUTO: 3.39 MILLION/UL (ref 3.88–5.62)
RBC MORPH BLD: NORMAL
SODIUM SERPL-SCNC: 146 MMOL/L (ref 135–147)
WBC # BLD AUTO: 10.69 THOUSAND/UL (ref 4.31–10.16)

## 2021-12-27 PROCEDURE — C1729 CATH, DRAINAGE: HCPCS

## 2021-12-27 PROCEDURE — 0F9430Z DRAINAGE OF GALLBLADDER WITH DRAINAGE DEVICE, PERCUTANEOUS APPROACH: ICD-10-PCS | Performed by: INTERNAL MEDICINE

## 2021-12-27 PROCEDURE — C1769 GUIDE WIRE: HCPCS

## 2021-12-27 PROCEDURE — NC001 PR NO CHARGE: Performed by: RADIOLOGY

## 2021-12-27 PROCEDURE — 87205 SMEAR GRAM STAIN: CPT | Performed by: RADIOLOGY

## 2021-12-27 PROCEDURE — 99232 SBSQ HOSP IP/OBS MODERATE 35: CPT | Performed by: SPECIALIST

## 2021-12-27 PROCEDURE — 47490 INCISION OF GALLBLADDER: CPT

## 2021-12-27 PROCEDURE — 82948 REAGENT STRIP/BLOOD GLUCOSE: CPT

## 2021-12-27 PROCEDURE — 99152 MOD SED SAME PHYS/QHP 5/>YRS: CPT | Performed by: RADIOLOGY

## 2021-12-27 PROCEDURE — 99152 MOD SED SAME PHYS/QHP 5/>YRS: CPT

## 2021-12-27 PROCEDURE — 84145 PROCALCITONIN (PCT): CPT | Performed by: NURSE PRACTITIONER

## 2021-12-27 PROCEDURE — 99153 MOD SED SAME PHYS/QHP EA: CPT

## 2021-12-27 PROCEDURE — 47490 INCISION OF GALLBLADDER: CPT | Performed by: RADIOLOGY

## 2021-12-27 PROCEDURE — C9113 INJ PANTOPRAZOLE SODIUM, VIA: HCPCS | Performed by: NURSE PRACTITIONER

## 2021-12-27 PROCEDURE — 85610 PROTHROMBIN TIME: CPT | Performed by: NURSE PRACTITIONER

## 2021-12-27 PROCEDURE — 87070 CULTURE OTHR SPECIMN AEROBIC: CPT | Performed by: RADIOLOGY

## 2021-12-27 PROCEDURE — 99232 SBSQ HOSP IP/OBS MODERATE 35: CPT | Performed by: PHYSICIAN ASSISTANT

## 2021-12-27 PROCEDURE — 92610 EVALUATE SWALLOWING FUNCTION: CPT

## 2021-12-27 PROCEDURE — 80053 COMPREHEN METABOLIC PANEL: CPT

## 2021-12-27 PROCEDURE — 85027 COMPLETE CBC AUTOMATED: CPT

## 2021-12-27 PROCEDURE — 85007 BL SMEAR W/DIFF WBC COUNT: CPT

## 2021-12-27 RX ORDER — METOPROLOL TARTRATE 5 MG/5ML
5 INJECTION INTRAVENOUS EVERY 6 HOURS PRN
Status: DISCONTINUED | OUTPATIENT
Start: 2021-12-27 | End: 2021-12-30 | Stop reason: HOSPADM

## 2021-12-27 RX ORDER — MIDAZOLAM HYDROCHLORIDE 2 MG/2ML
INJECTION, SOLUTION INTRAMUSCULAR; INTRAVENOUS CODE/TRAUMA/SEDATION MEDICATION
Status: COMPLETED | OUTPATIENT
Start: 2021-12-27 | End: 2021-12-27

## 2021-12-27 RX ORDER — LIDOCAINE WITH 8.4% SOD BICARB 0.9%(10ML)
SYRINGE (ML) INJECTION CODE/TRAUMA/SEDATION MEDICATION
Status: COMPLETED | OUTPATIENT
Start: 2021-12-27 | End: 2021-12-27

## 2021-12-27 RX ORDER — FENTANYL CITRATE 50 UG/ML
INJECTION, SOLUTION INTRAMUSCULAR; INTRAVENOUS CODE/TRAUMA/SEDATION MEDICATION
Status: COMPLETED | OUTPATIENT
Start: 2021-12-27 | End: 2021-12-27

## 2021-12-27 RX ORDER — SODIUM CHLORIDE 9 MG/ML
10 INJECTION INTRAVENOUS DAILY
Qty: 300 ML | Refills: 0 | Status: SHIPPED | OUTPATIENT
Start: 2021-12-27 | End: 2021-12-30 | Stop reason: SDUPTHER

## 2021-12-27 RX ADMIN — MORPHINE SULFATE 2 MG: 2 INJECTION, SOLUTION INTRAMUSCULAR; INTRAVENOUS at 19:30

## 2021-12-27 RX ADMIN — POTASSIUM & SODIUM PHOSPHATES POWDER PACK 280-160-250 MG 2 PACKET: 280-160-250 PACK at 16:50

## 2021-12-27 RX ADMIN — POTASSIUM CHLORIDE 40 MEQ: 20 SOLUTION ORAL at 19:00

## 2021-12-27 RX ADMIN — PIPERACILLIN AND TAZOBACTAM 3.38 G: 3; .375 INJECTION, POWDER, FOR SOLUTION INTRAVENOUS at 22:48

## 2021-12-27 RX ADMIN — MORPHINE SULFATE 2 MG: 2 INJECTION, SOLUTION INTRAMUSCULAR; INTRAVENOUS at 12:18

## 2021-12-27 RX ADMIN — Medication 10 ML: at 11:08

## 2021-12-27 RX ADMIN — INSULIN LISPRO 1 UNITS: 100 INJECTION, SOLUTION INTRAVENOUS; SUBCUTANEOUS at 18:58

## 2021-12-27 RX ADMIN — METOPROLOL TARTRATE 5 MG: 5 INJECTION INTRAVENOUS at 22:57

## 2021-12-27 RX ADMIN — GUAIFENESIN 200 MG: 100 SOLUTION ORAL at 19:00

## 2021-12-27 RX ADMIN — PIPERACILLIN AND TAZOBACTAM 3.38 G: 3; .375 INJECTION, POWDER, FOR SOLUTION INTRAVENOUS at 10:13

## 2021-12-27 RX ADMIN — INSULIN LISPRO 3 UNITS: 100 INJECTION, SOLUTION INTRAVENOUS; SUBCUTANEOUS at 00:04

## 2021-12-27 RX ADMIN — INSULIN GLARGINE 10 UNITS: 100 INJECTION, SOLUTION SUBCUTANEOUS at 22:33

## 2021-12-27 RX ADMIN — PIPERACILLIN AND TAZOBACTAM 3.38 G: 3; .375 INJECTION, POWDER, FOR SOLUTION INTRAVENOUS at 03:57

## 2021-12-27 RX ADMIN — GUAIFENESIN 200 MG: 100 SOLUTION ORAL at 22:33

## 2021-12-27 RX ADMIN — ENOXAPARIN SODIUM 80 MG: 100 INJECTION, SOLUTION INTRAVENOUS; SUBCUTANEOUS at 22:33

## 2021-12-27 RX ADMIN — ONDANSETRON 4 MG: 2 INJECTION INTRAMUSCULAR; INTRAVENOUS at 12:18

## 2021-12-27 RX ADMIN — MORPHINE SULFATE 2 MG: 2 INJECTION, SOLUTION INTRAMUSCULAR; INTRAVENOUS at 23:30

## 2021-12-27 RX ADMIN — FENTANYL CITRATE 50 MCG: 50 INJECTION INTRAMUSCULAR; INTRAVENOUS at 11:01

## 2021-12-27 RX ADMIN — PIPERACILLIN AND TAZOBACTAM 3.38 G: 3; .375 INJECTION, POWDER, FOR SOLUTION INTRAVENOUS at 16:50

## 2021-12-27 RX ADMIN — MIDAZOLAM HYDROCHLORIDE 1 MG: 1 INJECTION, SOLUTION INTRAMUSCULAR; INTRAVENOUS at 11:01

## 2021-12-27 RX ADMIN — PANTOPRAZOLE SODIUM 40 MG: 40 INJECTION, POWDER, FOR SOLUTION INTRAVENOUS at 10:13

## 2021-12-27 NOTE — ASSESSMENT & PLAN NOTE
· Patient is still with active pneumonia and cholecystitis, however all other sepsis parameters resolved    · Sepsis criteria met with tachycardia, fever, lactic acidosis, with pneumonia possible aspiration and cholecystitis  · Patient received sepsis protocol normal saline fluid resuscitation followed by 125 cc/hour  · Lactic acid and procalcitonin trended to normal  · Blood cultures NGTD    · Zosyn 3 375 IV q 6 hours day 6

## 2021-12-27 NOTE — PHYSICAL THERAPY NOTE
12/27/21 1054   PT Last Visit   PT Visit Date 12/27/21   Note Type   Note Type Cancelled Session   Cancel Reasons Patient off floor/test   Cancelled PT treatment due to patient off floor/test   Will continue as per POC    Cameron Mora, PTA

## 2021-12-27 NOTE — ASSESSMENT & PLAN NOTE
· Patient has an indwelling PEG tube  Formula used is a [employer] product, which we do not carry here at the hospital   · Nutrition consult appreciated    · Patient and his wife agreeable to using our product here ([employer]) while inpatient

## 2021-12-27 NOTE — PROGRESS NOTES
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 with cholecystitis, pneumonia, PE  Ongoing RUQ pain, nausea  Abdomen with mild TTP in RUQ  T 99 6, HR 84, /85  WBC 10 69, , , Alk Phos 433    Assessment:  Acute cholecystitis, w/o choledocholithiasis or cholangitis  Immunocompromise 2/2 chemotherapy for tongue SCCA  Plan:  Continue Zosyn day#6  IR consult placed yesterday and Dr Giovanni Rivera aware this morning  Planning for percutaneous cholecystostomy  Surgeon to follow-up later today  Subjective/Objective   Chief Complaint: pain    Subjective: ongoing RUQ pain, comes and goes, mild nausea, no vomiting, formed BM today, ongoing cough with sputum    Objective: no overnight events    Blood pressure 163/90, pulse 86, temperature 99 4 °F (37 4 °C), resp  rate 18, height 6' 2" (1 88 m), weight 79 4 kg (175 lb), SpO2 94 %  ,Body mass index is 22 47 kg/m²  No intake or output data in the 24 hours ending 12/27/21 0925    Invasive Devices  Report    Peripheral Intravenous Line            Peripheral IV 12/22/21 Right Antecubital 5 days    Peripheral IV 12/27/21 Left Antecubital <1 day          Drain            Gastrostomy/Enterostomy PEG-jejunostomy LUQ -- days                Physical Exam  Vitals and nursing note reviewed  Constitutional:       General: He is not in acute distress  Appearance: He is well-developed  He is not diaphoretic  HENT:      Head: Normocephalic and atraumatic  Eyes:      Conjunctiva/sclera: Conjunctivae normal       Pupils: Pupils are equal, round, and reactive to light  Cardiovascular:      Rate and Rhythm: Normal rate        Heart sounds: No murmur heard       Pulmonary:      Effort: No respiratory distress  Breath sounds: Rhonchi present  Abdominal:      General: Abdomen is flat  There is no distension  Palpations: Abdomen is soft  Tenderness: There is abdominal tenderness  There is no guarding  Comments: Mild RUQ tenderness without guarding or rigidity  Musculoskeletal:         General: Normal range of motion  Cervical back: Normal range of motion  Skin:     General: Skin is warm and dry  Capillary Refill: Capillary refill takes less than 2 seconds  Neurological:      Mental Status: He is alert and oriented to person, place, and time  Psychiatric:         Behavior: Behavior normal            Lab, Imaging and other studies:  I have personally reviewed pertinent lab results  , CBC:   Lab Results   Component Value Date    WBC 10 69 (H) 12/27/2021    HGB 9 6 (L) 12/27/2021    HCT 31 1 (L) 12/27/2021    MCV 92 12/27/2021     12/27/2021    MCH 28 3 12/27/2021    MCHC 30 9 (L) 12/27/2021    RDW 20 4 (H) 12/27/2021    MPV 9 3 12/27/2021   , CMP:   Lab Results   Component Value Date    SODIUM 146 12/27/2021    K 3 6 12/27/2021     12/27/2021    CO2 28 12/27/2021    BUN 14 12/27/2021    CREATININE 0 61 12/27/2021    CALCIUM 7 8 (L) 12/27/2021     (H) 12/27/2021     (H) 12/27/2021    ALKPHOS 433 (H) 12/27/2021    EGFR 98 12/27/2021     VTE Pharmacologic Prophylaxis: Enoxaparin (Lovenox) on hold    VTE Mechanical Prophylaxis: sequential compression device

## 2021-12-27 NOTE — PROCEDURES
INTERVENTIONAL RADIOLOGY PROCEDURE NOTE    Date: 12/27/2021    Procedure:      Preoperative diagnosis:   1  Sepsis ([facility] [address] )    2  Lactic acidosis    3  Pulmonary emboli ([facility] [address] )    4  Pneumonia    5  Hypomagnesemia    6  Cholecystitis    7  Dysphagia, unspecified type         Postoperative diagnosis: Same  Surgeon: Kathy Sanders MD     Assistant: None  No qualified resident was available  Blood loss:  None    Specimens:  Bile sent for culture    Findings:  Bile was dark and viscous  The gallbladder did appear to be under some pressure and bile came out rapidly through the needle  The bile was not cloudy or grossly infected  Complications: None immediate      Anesthesia: conscious sedation and local

## 2021-12-27 NOTE — OCCUPATIONAL THERAPY NOTE
12/27/21 1056   Note Type   Cancel Reasons Patient off floor/test   Attempted OT treatment at 1056  Patient unavailable due to off/floor/test  Will continue with OT treatment as able    Bee Kirby

## 2021-12-27 NOTE — ASSESSMENT & PLAN NOTE
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

## 2021-12-27 NOTE — ASSESSMENT & PLAN NOTE
Malnutrition Findings:   Adult Malnutrition type: Acute illness (related to inadequate protein energy intake as evidenced by significant wt  loss 13% < 6 months, moderate muscle loss clavicle/temple area, moderate fat loss buccal/orbital pads treated with EN )  Adult Degree of Malnutrition: Other severe protein calorie malnutrition    BMI Findings: Body mass index is 22 47 kg/m²  Patient NPO for procedure, will resume tube feedings when cleared by surgery

## 2021-12-27 NOTE — ASSESSMENT & PLAN NOTE
Lab Results   Component Value Date    HGBA1C 6 3 (H) 12/22/2021       Recent Labs     12/26/21  1627 12/26/21  2348 12/27/21  0554 12/27/21  1544   POCGLU 274* 237* 142* 152*       Blood Sugar Average: Last 72 hrs:  (P) 222 2  · Hold home hypoglycemic agents  · Sliding scale insulin   · Added long acting insulin as tolerates tube feeds   · Accu-Cheks

## 2021-12-27 NOTE — PROGRESS NOTES
[address]  Progress Note - Zoie Becerril 1945, 78 y o  male MRN: [id number]  Unit/Bed#: -[facility] Encounter: [id number]  Primary Care Provider: Rod Arndt MD   Date and time admitted to hospital: 12/21/2021 11:56 PM    Cholecystitis  Assessment & Plan  · Patient initially presented with generalized weakness  Evidence of possible cholecystitis seen on CT abdomen/pelvis, confirmed with ultrasound gallbladder  · General surgery consult appreciated  · Holding off on more aggressive measures as of now, as patient is not a good surgical candidate  Status post percutaneous cholecystomy 12/27  · LFTs trending down today  · Tube feeds held after midnight, will clear with surgery when to restart feeds  · Continue Lovenox  · Zosyn day 6  · Trending CMP     Other pulmonary embolism without acute cor pulmonale (HCC)  Assessment & Plan  · As seen on CT chest/abdomen/pelvis- 1st episodes of PE  · Echo unremarkable  · No evidence of DVT on venous Doppler  · Continue Lovenox 1 milligram/kilogram subQ q 12 hours  · Held off on starting the patient on full anticoagulation given planned percutaneous cholecystostomy tube placement by IR on 12/27- will follow-up with surgery when to restart anticoagulation   · O2 protocol    · Continue to monitor hemodynamic status        Community acquired pneumonia of left lower lobe of lung  Assessment & Plan  · As seen on CT and chest x-ray  · COVID negative   · Procalcitonin 0 85 -> 0 48 will trend   · Strep pneumo and Legionella urine antigens negative  · Zosyn day 6  · MRSA-negative     Severe protein-calorie malnutrition (HCC)  Assessment & Plan  Malnutrition Findings:   Adult Malnutrition type: Acute illness (related to inadequate protein energy intake as evidenced by significant wt  loss 13% < 6 months, moderate muscle loss clavicle/temple area, moderate fat loss buccal/orbital pads treated with EN )  Adult Degree of Malnutrition: Other severe protein calorie malnutrition    BMI Findings: Body mass index is 22 47 kg/m²  Patient NPO for procedure, will resume tube feedings when cleared by surgery  Primary malignant neoplasm of base of tongue ([facility] [address] )  Assessment & Plan  · Patient just completed radiation therapy at [address]  · Supportive care   · Aspiration precautions        Hyperlipidemia due to type 2 diabetes mellitus ([facility] [address] )  Assessment & Plan  · Hold statin for now due to elevated LFTs    Type 2 diabetes mellitus without complication, without long-term current use of insulin [facility])  Assessment & Plan  Lab Results   Component Value Date    HGBA1C 6 3 (H) 12/22/2021       Recent Labs     12/26/21  1627 12/26/21  2348 12/27/21  0554 12/27/21  1544   POCGLU 274* 237* 142* 152*       Blood Sugar Average: Last 72 hrs:  (P) 222 2  · Hold home hypoglycemic agents  · Sliding scale insulin   · Added long acting insulin as tolerates tube feeds   · Accu-Cheks       Dysphagia, unspecified  Assessment & Plan  · Patient has an indwelling PEG tube  Formula used is a [employer] product, which we do not carry here at the hospital   · Nutrition consult appreciated  · Patient and his wife agreeable to using our product here (Eleonora Orellana) while inpatient       Essential (primary) hypertension  Assessment & Plan  · Blood pressure is slightly elevated  · Continue with lisinopril and metoprolol tartrate; added amlodipine  · Continue to monitor       * Sepsis [facility])  Assessment & Plan  · Patient is still with active pneumonia and cholecystitis, however all other sepsis parameters resolved    · Sepsis criteria met with tachycardia, fever, lactic acidosis, with pneumonia possible aspiration and cholecystitis  · Patient received sepsis protocol normal saline fluid resuscitation followed by 125 cc/hour  · Lactic acid and procalcitonin trended to normal  · Blood cultures NGTD    · Zosyn 3 375 IV q 6 hours day 6        VTE Pharmacologic Prophylaxis: VTE Score: 9 High Risk (Score >/= 5) - Pharmacological DVT Prophylaxis Ordered: enoxaparin (Lovenox)  Sequential Compression Devices Ordered  Patient Centered Rounds: I performed bedside rounds with nursing staff today  Discussions with Specialists or Other Care Team Provider: nursing    Education and Discussions with Family / Patient: patient  Time Spent for Care: 20 minutes  More than 50% of total time spent on counseling and coordination of care as described above  Current Length of Stay: 5 day(s)  Current Patient Status: Inpatient   Certification Statement: The patient will continue to require additional inpatient hospital stay due to treatment of cholecystitis with perc jake tube placement  Discharge Plan: Anticipate discharge in 24-48 hrs to home  Code Status: Level 1 - Full Code    Subjective: The patient was seen and examined  The patient states he continues to have RUQ pain  Objective:     Vitals:   Temp (24hrs), Av 5 °F (37 5 °C), Min:99 4 °F (37 4 °C), Max:99 6 °F (37 6 °C)    Temp:  [99 4 °F (37 4 °C)-99 6 °F (37 6 °C)] 99 4 °F (37 4 °C)  HR:  [83-99] 83  Resp:  [18-22] 18  BP: (131-194)/(75-96) 165/85  SpO2:  [92 %-97 %] 96 %  Body mass index is 22 47 kg/m²  Input and Output Summary (last 24 hours):   No intake or output data in the 24 hours ending 21 1637    Physical Exam:   Physical Exam  Vitals and nursing note reviewed  Constitutional:       General: He is awake  Appearance: Normal appearance  Cardiovascular:      Rate and Rhythm: Normal rate and regular rhythm  Pulmonary:      Effort: Pulmonary effort is normal       Breath sounds: Normal breath sounds  No wheezing, rhonchi or rales  Abdominal:      General: Bowel sounds are normal       Palpations: Abdomen is soft  Tenderness: There is abdominal tenderness in the right upper quadrant  There is no guarding or rebound  Skin:     General: Skin is warm and dry  Neurological:      General: No focal deficit present  Mental Status: He is alert and oriented to person, place, and time  Psychiatric:         Attention and Perception: Attention normal          Mood and Affect: Mood normal          Speech: Speech normal          Behavior: Behavior is cooperative  Additional Data:     Labs:  Results from last 7 days   Lab Units 12/27/21  0446 12/26/21  0603 12/25/21  0550   WBC Thousand/uL 10 69*   < > 10 46*   HEMOGLOBIN g/dL 9 6*   < > 9 3*   HEMATOCRIT % 31 1*   < > 28 9*   PLATELETS Thousands/uL 271   < > 247   BANDS PCT % 2  --   --    NEUTROS PCT %  --   --  88*   LYMPHS PCT %  --   --  3*   LYMPHO PCT % 6*  --   --    MONOS PCT %  --   --  7   MONO PCT % 4  --   --    EOS PCT % 0  --  0    < > = values in this interval not displayed       Results from last 7 days   Lab Units 12/27/21  0446   SODIUM mmol/L 146   POTASSIUM mmol/L 3 6   CHLORIDE mmol/L 108   CO2 mmol/L 28   BUN mg/dL 14   CREATININE mg/dL 0 61   ANION GAP mmol/L 10   CALCIUM mg/dL 7 8*   ALBUMIN g/dL 2 8*   TOTAL BILIRUBIN mg/dL 0 81   ALK PHOS U/L 433*   ALT U/L 370*   AST U/L 253*   GLUCOSE RANDOM mg/dL 127     Results from last 7 days   Lab Units 12/27/21  0446   INR  1 15     Results from last 7 days   Lab Units 12/27/21  1544 12/27/21  0554 12/26/21  2348 12/26/21  1627 12/26/21  1210 12/26/21  0601 12/25/21  2356 12/25/21  2045 12/25/21  1820 12/25/21  1134 12/25/21  0549 12/24/21  2250   POC GLUCOSE mg/dl 152* 142* 237* 274* 207* 262* 251* 233* 268* 255* 259* 202*     Results from last 7 days   Lab Units 12/22/21  0103   HEMOGLOBIN A1C % 6 3*     Results from last 7 days   Lab Units 12/27/21  0446 12/26/21  0603 12/23/21  0524 12/22/21  0410 12/22/21  0103   LACTIC ACID mmol/L  --   --   --  1 9 3 4*   PROCALCITONIN ng/ml 0 48* 0 56* 0 85*  --  0 41*       Lines/Drains:  Invasive Devices  Report    Peripheral Intravenous Line            Peripheral IV 12/22/21 Right Antecubital 5 days    Peripheral IV 12/27/21 Left Antecubital <1 day          Drain Gastrostomy/Enterostomy PEG-jejunostomy LUQ -- days    Closed/Suction Drain RUQ Other (Comment)  <1 day                      Imaging: No pertinent imaging reviewed  Recent Cultures (last 7 days):   Results from last 7 days   Lab Units 12/23/21  0758 12/22/21  0119 12/22/21  0103   BLOOD CULTURE   --  No Growth After 5 Days  No Growth After 5 Days  LEGIONELLA URINARY ANTIGEN  Negative  --   --        Last 24 Hours Medication List:   Current Facility-Administered Medications   Medication Dose Route Frequency Provider Last Rate    acetaminophen  650 mg Rectal Q4H PRN Rubin Ennis PA-C      amLODIPine  5 mg Per PEG Tube Daily MOISES Guerra      aspirin  81 mg Per PEG Tube Daily MOISES Guerra      enoxaparin  1 mg/kg Subcutaneous Q12H [address] MOISES Guerra      fluticasone  1 spray Nasal Daily Martine Romero PA-C      glycopyrrolate  0 1 mg Intravenous Q8H PRN Rubin Ennis PA-C      guaiFENesin  200 mg Oral 4x Daily Martine Romero PA-C      insulin glargine  10 Units Subcutaneous HS MOISES Guerra      insulin lispro  1-6 Units Subcutaneous Q6H [address] [facility]      lisinopril  40 mg Per PEG Tube Daily MOISES Guerra      metoprolol tartrate  25 mg Oral Q12H [address] MOISES Guerra      morphine injection  2 mg Intravenous Q4H PRN Martine Romero PA-C      ondansetron  4 mg Intravenous Q6H PRN Martine Romero PA-C      pantoprazole  40 mg Intravenous Q24H [address] MOISES Guerra      piperacillin-tazobactam  3 375 g Intravenous Q6H MOISES Guerra 3 375 g (12/27/21 1013)    potassium chloride  40 mEq Per G Tube BID MOISES Guerra      potassium-sodium phosphates  2 packet Per PEG Tube BID With Meals MOISES Guerra          Today, Patient Was Seen By: Cory Boas, PA-C    **Please Note: This note may have been constructed using a voice recognition system  **

## 2021-12-27 NOTE — SPEECH THERAPY NOTE
Speech Language/Pathology  Speech/Language Pathology Assessment    Patient Name: Lisa Campos  PERMK'O Date: 12/27/2021     Problem List  Principal Problem:    Sepsis ([address] )  Active Problems:    Essential (primary) hypertension    Dysphagia, unspecified    Type 2 diabetes mellitus without complication, without long-term current use of insulin ([address] )    Hyperlipidemia due to type 2 diabetes mellitus ([address] )    Primary malignant neoplasm of base of tongue ([address] )    Community acquired pneumonia of left lower lobe of lung    Other pulmonary embolism without acute cor pulmonale ([address] )    Cholecystitis    Past Medical History  Past Medical History:   Diagnosis Date    Cancer ([address] )     Diabetes ([address] )     Gastritis     GERD (gastroesophageal reflux disease)     History of bladder cancer 2010    treated with surgery    Hypercholesterolemia     Hypertension     Hyponatremia 12/22/2021    Lactic acidosis 12/22/2021     Past Surgical History  Past Surgical History:   Procedure Laterality Date    BLADDER TUMOR EXCISION      EGD       Speech-Language Pathology Bedside Swallow Evaluation      Patient Name: Lisa Campos    YHLWN'W Date: 12/27/2021     Problem List  Principal Problem:    Sepsis ([address] )  Active Problems:    Essential (primary) hypertension    Dysphagia, unspecified    Type 2 diabetes mellitus without complication, without long-term current use of insulin ([address] )    Hyperlipidemia due to type 2 diabetes mellitus ([address] )    Primary malignant neoplasm of base of tongue ([address] )    Community acquired pneumonia of left lower lobe of lung    Other pulmonary embolism without acute cor pulmonale ([address] )    Cholecystitis      Past Medical History  Past Medical History:   Diagnosis Date    Cancer ([address] )     Diabetes ([address] )     Gastritis     GERD (gastroesophageal reflux disease)     History of bladder cancer 2010    treated with surgery    Hypercholesterolemia     Hypertension     Hyponatremia 12/22/2021    Lactic acidosis 12/22/2021       Past Surgical History  Past Surgical History:   Procedure Laterality Date    BLADDER TUMOR EXCISION      EGD         Summary   Limited assessment 2/2 nausea  Pt agreeable to PO trials today however was observed to be brining up copious amounts of secretions  Pt observed with single sip thin via straw at bedside (ginger ale present in room), delayed cough followed by pt bringing up additional secretions  Pt complaining of nausea s/p surgery as well  At this time, recommend keeping pt NPO (allow ice chips with RN supervision), ST to follow  Risk/s for Aspiration: at least moderate    Recommended Diet: NPO   Recommended Form of Meds: non-oral if possible   Other Recommendations: Continue frequent oral care, allow ice chips to promote swallowing and keep mouth moist        Current Medical Status  Pt is a 78 y o  male who presents with generalized weakness x6 days  Patient presents ER for further evaluation treatment of his 6 day history of generalized weakness  Patient is undergoing radiation treatment [facility] for tongue cancer and just completed his 7 weeks of radiation  Patient is additionally complaining of right upper quadrant abdominal pain and was noted to be febrile upon arrival in the ER with a T-max of 101 2  Current Precautions:  Fall  Aspiration    Allergies:  No known food allergies    Past medical history:  Please see H&P for details    Special Studies:  XR chest 12/22/2021: Suspected early infiltrates involving the left lung base and right upper lung zone likely representing multifocal pneumonia, possibly Covid related      Social/Education/Vocational Hx:  Pt lives with family    Swallow Information   Current Risks for Dysphagia & Aspiration: known history of dysphagia 2/2 cancer of the tongue  Current Symptoms/Concerns: wet vocal quality  Current Diet: NPO   Baseline Diet: ?per pt, soft solids and thin liquids      Baseline Assessment Behavior/Cognition: alert  Speech/Language Status: able to participate in conversation and able to follow commands  Patient Positioning: upright in bed, for single PO trial able to sit edge of bed  Pain Status/Interventions/Response to Interventions: No report of or nonverbal indications of pain  Swallow Mechanism Exam  Facial: symmetrical  Labial: WFL  Lingual: reduced ROM, mildly reduced strength  Velum: symmetrical  Mandible: mildly decreased ROM  Dentition: nearly edentulous, single tooth on R lower gums; pt wears dentures however dentures not present at hospital  Vocal quality:weak   Volitional Cough: strong/productive   Respiratory Status: on 2L O2      Consistencies Assessed and Performance   Consistencies Administered: thin liquids  Materials administered included single sip ginger ale    Oral Stage:  Able to form seal around straw, bolus retrieval appeared adequate  Difficult to assess with single trial however transfer appeared fairly timely  Cannot r/o premature spillage at bedside  Pharyngeal Stage:   Swallow Mechanics: Swallowing initiation appeared delayed  Laryngeal rise was palpated and judged to be reduced  Delayed cough following single sip thin followed by pt gagging and bringing up ?mucus/secretions  Pt also complaining of nausea  Esophageal Concerns: none reported      Summary and Recommendations (see above)    Results Reviewed with: patient and RN     Treatment Recommended: yes     Frequency of treatment: 2-3x/week as able      Dysphagia LTG  -Patient will demonstrate safe and effective oral intake (without overt s/s significant oral/pharyngeal dysphagia including s/s penetration or aspiration) for the highest appropriate diet level  Short Term Goals:  -Pt will tolerate trials of oral intake with no significant s/s oral or pharyngeal dysphagia across 1-3 diagnostic session/s      Re: Oral strength, ROM, coordination  -Patient will complete daily oral motor exercises to increase lingual strength, range of motion and coordination with min cues to 90% effectiveness to improve bolus formation, transfer and control with minimal/no cues needed    -Patient will complete exercises to increase laryngeal rise, airway protection and pharyngeal constriction for improved swallow function with minimal/ no cues needed      -Patient will demonstrate independent use of recommended safe swallowing strategies during a clinician assessed meal across 1-3 diagnostic sessions        Speech Therapy Prognosis   Prognosis: fair    Prognosis Considerations: medical status

## 2021-12-27 NOTE — ASSESSMENT & PLAN NOTE
· As seen on CT and chest x-ray  · COVID negative   · Procalcitonin 0 85 -> 0 48 will trend   · Strep pneumo and Legionella urine antigens negative  · Zosyn day 6  · MRSA-negative

## 2021-12-27 NOTE — DISCHARGE INSTRUCTIONS
[address]  Interventional Radiology    [address]: [phone] Jaylin Robertsn: [phone]   Off hours or no answer/[phone] (Ask for IR on call)                 [address]  after your procedure:    Resume your normal diet  Small sips of flat soda will help with nausea  1  The properly functioning catheter should be forward flushed once (1x) daily with 10ml of normal saline using clean technique  You will be given a prescription for flushes  To flush the tube, clean both connections with alcohol swab  Twist off the drainage bag/ bulb  tubing and twist the saline syringe into the drainage tube and flush  Remove the syringe and twist the drainage bag / bulb tubing tubing back on     2  The drainage bag/bulb may be emptied as necessary  Keep a record of the amount of fluid you drain from your tube  This should be done with clean technique as well  3  A fresh dressing should be applied daily over the tube insertion site  4  As the tube is secured to the skin with only a suture,try not to pull on your tube  Tub baths are not permitted  Showers are permitted if the patient's skin entry site is prevented from getting wet  Similarly, washcloth "baths" are acceptable  Contact Interventional Radiology at [phone] Andrew PATIENTS: Contact Interventional Radiology at [phone]) Jorden Bess PATIENTS: Contact Interventional Radiology at [phone]) if:    1  Leakage or large amounts of liquid around the catheter  2  Fever of 101 degrees lasting several hours without other obvious cause (such as sore throat, flu, etc)  3  Persistent nausea or vomiting  4  Diminished drainage, which may be associated with pressure or pain  Or when the     drainage from your tube is less than 10mls for 48 hours  5  Catheter pulled back or falls out  The following pharmacies carry the flush syringes         [facility]  [address]                         [address] [facility]  Phone [phone]            Phone [phone] [address]             [address] [facility]                                [phone]  [address] [facility] T[address]Z                      [address] [facility]  Phone [phone]            Phone [phone]                      Samantha Carvalho                                                                                                          [phone]  [facility]  [facility] [address]    [address]  Phone [phone]

## 2021-12-27 NOTE — ASSESSMENT & PLAN NOTE
· As seen on CT chest/abdomen/pelvis- 1st episodes of PE  · Echo unremarkable  · No evidence of DVT on venous Doppler  · Continue Lovenox 1 milligram/kilogram subQ q 12 hours  · Held off on starting the patient on full anticoagulation given planned percutaneous cholecystostomy tube placement by IR on 12/27- will follow-up with surgery when to restart anticoagulation   · O2 protocol    · Continue to monitor hemodynamic status

## 2021-12-27 NOTE — ASSESSMENT & PLAN NOTE
· Patient initially presented with generalized weakness  Evidence of possible cholecystitis seen on CT abdomen/pelvis, confirmed with ultrasound gallbladder  · General surgery consult appreciated  · Holding off on more aggressive measures as of now, as patient is not a good surgical candidate  Status post percutaneous cholecystomy 12/27  · LFTs trending down today  · Tube feeds held after midnight, will clear with surgery when to restart feeds  · Continue Lovenox  · Zosyn day 6    · Trending CMP

## 2021-12-27 NOTE — ASSESSMENT & PLAN NOTE
· Patient just completed radiation therapy at [address]  · Supportive care   · Aspiration precautions

## 2021-12-28 LAB
ALBUMIN SERPL BCP-MCNC: 2.6 G/DL (ref 3.5–5)
ALP SERPL-CCNC: 448 U/L (ref 34–104)
ALT SERPL W P-5'-P-CCNC: 341 U/L (ref 7–52)
ANION GAP SERPL CALCULATED.3IONS-SCNC: 8 MMOL/L (ref 4–13)
AST SERPL W P-5'-P-CCNC: 249 U/L (ref 13–39)
BILIRUB SERPL-MCNC: 0.86 MG/DL (ref 0.2–1)
BUN SERPL-MCNC: 16 MG/DL (ref 5–25)
CALCIUM ALBUM COR SERPL-MCNC: 8.9 MG/DL (ref 8.3–10.1)
CALCIUM SERPL-MCNC: 7.8 MG/DL (ref 8.4–10.2)
CHLORIDE SERPL-SCNC: 105 MMOL/L (ref 96–108)
CO2 SERPL-SCNC: 28 MMOL/L (ref 21–32)
CREAT SERPL-MCNC: 0.68 MG/DL (ref 0.6–1.3)
ERYTHROCYTE [DISTWIDTH] IN BLOOD BY AUTOMATED COUNT: 20.9 % (ref 11.6–15.1)
GFR SERPL CREATININE-BSD FRML MDRD: 94 ML/MIN/1.73SQ M
GLUCOSE SERPL-MCNC: 111 MG/DL (ref 65–140)
GLUCOSE SERPL-MCNC: 113 MG/DL (ref 65–140)
GLUCOSE SERPL-MCNC: 130 MG/DL (ref 65–140)
GLUCOSE SERPL-MCNC: 135 MG/DL (ref 65–140)
GLUCOSE SERPL-MCNC: 136 MG/DL (ref 65–140)
GLUCOSE SERPL-MCNC: 138 MG/DL (ref 65–140)
HCT VFR BLD AUTO: 31.5 % (ref 36.5–49.3)
HGB BLD-MCNC: 9.8 G/DL (ref 12–17)
MCH RBC QN AUTO: 28.2 PG (ref 26.8–34.3)
MCHC RBC AUTO-ENTMCNC: 31.1 G/DL (ref 31.4–37.4)
MCV RBC AUTO: 91 FL (ref 82–98)
PHOSPHATE SERPL-MCNC: 3.6 MG/DL (ref 2.3–4.1)
PLATELET # BLD AUTO: 251 THOUSANDS/UL (ref 149–390)
PMV BLD AUTO: 9.6 FL (ref 8.9–12.7)
POTASSIUM SERPL-SCNC: 3.9 MMOL/L (ref 3.5–5.3)
PROT SERPL-MCNC: 5.7 G/DL (ref 6.4–8.4)
RBC # BLD AUTO: 3.48 MILLION/UL (ref 3.88–5.62)
SODIUM SERPL-SCNC: 141 MMOL/L (ref 135–147)
WBC # BLD AUTO: 8.16 THOUSAND/UL (ref 4.31–10.16)

## 2021-12-28 PROCEDURE — 82948 REAGENT STRIP/BLOOD GLUCOSE: CPT

## 2021-12-28 PROCEDURE — C9113 INJ PANTOPRAZOLE SODIUM, VIA: HCPCS | Performed by: NURSE PRACTITIONER

## 2021-12-28 PROCEDURE — 84100 ASSAY OF PHOSPHORUS: CPT | Performed by: PHYSICIAN ASSISTANT

## 2021-12-28 PROCEDURE — 85027 COMPLETE CBC AUTOMATED: CPT | Performed by: PHYSICIAN ASSISTANT

## 2021-12-28 PROCEDURE — 80053 COMPREHEN METABOLIC PANEL: CPT | Performed by: PHYSICIAN ASSISTANT

## 2021-12-28 PROCEDURE — 99232 SBSQ HOSP IP/OBS MODERATE 35: CPT | Performed by: SURGERY

## 2021-12-28 RX ORDER — AMLODIPINE BESYLATE 10 MG/1
10 TABLET ORAL DAILY
Status: DISCONTINUED | OUTPATIENT
Start: 2021-12-29 | End: 2021-12-30 | Stop reason: HOSPADM

## 2021-12-28 RX ADMIN — PIPERACILLIN AND TAZOBACTAM 3.38 G: 3; .375 INJECTION, POWDER, FOR SOLUTION INTRAVENOUS at 05:33

## 2021-12-28 RX ADMIN — MORPHINE SULFATE 2 MG: 2 INJECTION, SOLUTION INTRAMUSCULAR; INTRAVENOUS at 17:27

## 2021-12-28 RX ADMIN — ENOXAPARIN SODIUM 80 MG: 100 INJECTION, SOLUTION INTRAVENOUS; SUBCUTANEOUS at 08:20

## 2021-12-28 RX ADMIN — PANTOPRAZOLE SODIUM 40 MG: 40 INJECTION, POWDER, FOR SOLUTION INTRAVENOUS at 08:20

## 2021-12-28 RX ADMIN — PIPERACILLIN AND TAZOBACTAM 3.38 G: 3; .375 INJECTION, POWDER, FOR SOLUTION INTRAVENOUS at 16:36

## 2021-12-28 RX ADMIN — POTASSIUM & SODIUM PHOSPHATES POWDER PACK 280-160-250 MG 2 PACKET: 280-160-250 PACK at 08:23

## 2021-12-28 RX ADMIN — MORPHINE SULFATE 2 MG: 2 INJECTION, SOLUTION INTRAMUSCULAR; INTRAVENOUS at 08:22

## 2021-12-28 RX ADMIN — GUAIFENESIN 200 MG: 100 SOLUTION ORAL at 08:23

## 2021-12-28 RX ADMIN — LISINOPRIL 40 MG: 20 TABLET ORAL at 08:23

## 2021-12-28 RX ADMIN — AMLODIPINE BESYLATE 5 MG: 5 TABLET ORAL at 08:23

## 2021-12-28 RX ADMIN — METOPROLOL TARTRATE 25 MG: 25 TABLET, FILM COATED ORAL at 08:23

## 2021-12-28 RX ADMIN — POTASSIUM & SODIUM PHOSPHATES POWDER PACK 280-160-250 MG 2 PACKET: 280-160-250 PACK at 16:32

## 2021-12-28 RX ADMIN — PIPERACILLIN AND TAZOBACTAM 3.38 G: 3; .375 INJECTION, POWDER, FOR SOLUTION INTRAVENOUS at 10:37

## 2021-12-28 RX ADMIN — GUAIFENESIN 200 MG: 100 SOLUTION ORAL at 11:25

## 2021-12-28 RX ADMIN — ASPIRIN 81 MG CHEWABLE TABLET 81 MG: 81 TABLET CHEWABLE at 08:23

## 2021-12-28 RX ADMIN — FLUTICASONE PROPIONATE 1 SPRAY: 50 SPRAY, METERED NASAL at 08:24

## 2021-12-28 NOTE — ASSESSMENT & PLAN NOTE
· Patient is still with active pneumonia and cholecystitis, however all other sepsis parameters resolved    · Sepsis criteria met with tachycardia, fever, lactic acidosis, with pneumonia possible aspiration and cholecystitis  · Patient received sepsis protocol normal saline fluid resuscitation followed by 125 cc/hour  · Lactic acid and procalcitonin trended to normal  · Blood cultures NGTD    · Zosyn 3 375 IV q 6 hours day 7

## 2021-12-28 NOTE — ASSESSMENT & PLAN NOTE
· Patient just completed radiation therapy at [address]  · Supportive care   · Aspiration precautions

## 2021-12-28 NOTE — PLAN OF CARE
Problem: Potential for Falls  Goal: Patient will remain free of falls  Description: INTERVENTIONS:  - Educate patient/family on patient safety including physical limitations  - Instruct patient to call for assistance with activity   - Consult OT/PT to assist with strengthening/mobility   - Keep Call bell within reach  - Keep bed low and locked with side rails adjusted as appropriate  - Keep care items and personal belongings within reach  - Initiate and maintain comfort rounds  - Make Fall Risk Sign visible to staff  - Offer Toileting every in advance of need  - Initiate/Maintain bed alarm  - Obtain necessary fall risk management equipment:   - Apply yellow socks and bracelet for high fall risk patients  - Consider moving patient to room near nurses station  Outcome: Progressing     Problem: MOBILITY - ADULT  Goal: Maintain or return to baseline ADL function  Description: INTERVENTIONS:  -  Assess patient's ability to carry out ADLs; assess patient's baseline for ADL function and identify physical deficits which impact ability to perform ADLs (bathing, care of mouth/teeth, toileting, grooming, dressing, etc )  - Assess/evaluate cause of self-care deficits   - Assess range of motion  - Assess patient's mobility; develop plan if impaired  - Assess patient's need for assistive devices and provide as appropriate  - Encourage maximum independence but intervene and supervise when necessary  - Involve family in performance of ADLs  - Assess for home care needs following discharge   - Consider OT consult to assist with ADL evaluation and planning for discharge  - Provide patient education as appropriate  Outcome: Progressing  Goal: Maintains/Returns to pre admission functional level  Description: INTERVENTIONS:  - Perform BMAT or MOVE assessment daily    - Set and communicate daily mobility goal to care team and patient/family/caregiver     - Collaborate with rehabilitation services on mobility goals if consulted  - Out of bed for toileting  - Record patient progress and toleration of activity level   Outcome: Progressing     Problem: PAIN - ADULT  Goal: Verbalizes/displays adequate comfort level or baseline comfort level  Description: Interventions:  - Encourage patient to monitor pain and request assistance  - Assess pain using appropriate pain scale  - Administer analgesics based on type and severity of pain and evaluate response  - Implement non-pharmacological measures as appropriate and evaluate response  - Consider cultural and social influences on pain and pain management  - Notify physician/advanced practitioner if interventions unsuccessful or patient reports new pain  Outcome: Progressing     Problem: INFECTION - ADULT  Goal: Absence or prevention of progression during hospitalization  Description: INTERVENTIONS:  - Assess and monitor for signs and symptoms of infection  - Monitor lab/diagnostic results  - Monitor all insertion sites, i e  indwelling lines, tubes, and drains  - Monitor endotracheal if appropriate and nasal secretions for changes in amount and color  - [place] appropriate cooling/warming therapies per order  - Administer medications as ordered  - Instruct and encourage patient and family to use good hand hygiene technique  - Identify and instruct in appropriate isolation precautions for identified infection/condition  Outcome: Progressing     Problem: SAFETY ADULT  Goal: Patient will remain free of falls  Description: INTERVENTIONS:  - Educate patient/family on patient safety including physical limitations  - Instruct patient to call for assistance with activity   - Consult OT/PT to assist with strengthening/mobility   - Keep Call bell within reach  - Keep bed low and locked with side rails adjusted as appropriate  - Keep care items and personal belongings within reach  - Initiate and maintain comfort rounds  - Make Fall Risk Sign visible to staff  -- Apply yellow socks and bracelet for high fall risk patients  - Consider moving patient to room near nurses station  Outcome: Progressing  Goal: Maintain or return to baseline ADL function  Description: INTERVENTIONS:  -  Assess patient's ability to carry out ADLs; assess patient's baseline for ADL function and identify physical deficits which impact ability to perform ADLs (bathing, care of mouth/teeth, toileting, grooming, dressing, etc )  - Assess/evaluate cause of self-care deficits   - Assess range of motion  - Assess patient's mobility; develop plan if impaired  - Assess patient's need for assistive devices and provide as appropriate  - Encourage maximum independence but intervene and supervise when necessary  - Involve family in performance of ADLs  - Assess for home care needs following discharge   - Consider OT consult to assist with ADL evaluation and planning for discharge  - Provide patient education as appropriate  Outcome: Progressing  Goal: Maintains/Returns to pre admission functional level  Description: INTERVENTIONS:  - Perform BMAT or MOVE assessment daily    - Set and communicate daily mobility goal to care team and patient/family/caregiver     - Collaborate with rehabilitation services on mobility goals if consulted  - Out of bed for toileting  - Record patient progress and toleration of activity level   Outcome: Progressing     Problem: DISCHARGE PLANNING  Goal: Discharge to home or other facility with appropriate resources  Description: INTERVENTIONS:  - Identify barriers to discharge w/patient and caregiver  - Arrange for needed discharge resources and transportation as appropriate  - Identify discharge learning needs (meds, wound care, etc )  - Arrange for interpretive services to assist at discharge as needed  - Refer to Case Management Department for coordinating discharge planning if the patient needs post-hospital services based on physician/advanced practitioner order or complex needs related to functional status, cognitive ability, or social support system  Outcome: Progressing     Problem: Knowledge Deficit  Goal: Patient/family/caregiver demonstrates understanding of disease process, treatment plan, medications, and discharge instructions  Description: Complete learning assessment and assess knowledge base  Interventions:  - Provide teaching at level of understanding  - Provide teaching via preferred learning methods  Outcome: Progressing     Problem: Nutrition/Hydration-ADULT  Goal: Nutrient/Hydration intake appropriate for improving, restoring or maintaining nutritional needs  Description: Monitor and assess patient's nutrition/hydration status for malnutrition  Collaborate with interdisciplinary team and initiate plan and interventions as ordered  Monitor patient's weight and dietary intake as ordered or per policy  Utilize nutrition screening tool and intervene as necessary  Determine patient's food preferences and provide high-protein, high-caloric foods as appropriate       INTERVENTIONS:  - Monitor oral intake, urinary output, labs, and treatment plans  - Assess nutrition and hydration status and recommend course of action  - Evaluate amount of meals eaten  - Assist patient with eating if necessary   - Allow adequate time for meals  - Recommend/ encourage appropriate diets, oral nutritional supplements, and vitamin/mineral supplements  - Order, calculate, and assess calorie counts as needed  - Recommend, monitor, and adjust tube feedings and TPN/PPN based on assessed needs  - Assess need for intravenous fluids  - Provide specific nutrition/hydration education as appropriate  - Include patient/family/caregiver in decisions related to nutrition  Outcome: Progressing     Problem: Prexisting or High Potential for Compromised Skin Integrity  Goal: Skin integrity is maintained or improved  Description: INTERVENTIONS:  - Identify patients at risk for skin breakdown  - Assess and monitor skin integrity  - Assess and monitor nutrition and hydration status  - Monitor labs   - Assess for incontinence   - Turn and reposition patient  - Assist with mobility/ambulation  - Relieve pressure over bony prominences  - Avoid friction and shearing  - Provide appropriate hygiene as needed including keeping skin clean and dry  - Evaluate need for skin moisturizer/barrier cream  - Collaborate with interdisciplinary team   - Patient/family teaching  - Consider wound care consult   Outcome: Progressing

## 2021-12-28 NOTE — ASSESSMENT & PLAN NOTE
· As seen on CT chest/abdomen/pelvis- 1st episodes of PE  · Echo unremarkable  · No evidence of DVT on venous Doppler  · The patient was started on Lovenox 1 milligram/kilogram subQ q 12 hours on admission, on 12/28 switched to eliquis 10 mg BID x 7 days   · O2 protocol    · Continue to monitor hemodynamic status

## 2021-12-28 NOTE — ASSESSMENT & PLAN NOTE
· Patient has an indwelling PEG tube  Formula used is a [employer] product, which we do not carry here at the hospital   · Nutrition consult appreciated    · Patient and his wife agreeable to using our product here (Tracey Gaucher) while inpatient

## 2021-12-28 NOTE — ASSESSMENT & PLAN NOTE
Lab Results   Component Value Date    HGBA1C 6 3 (H) 12/22/2021       Recent Labs     12/27/21 2129 12/28/21  0037 12/28/21  0513 12/28/21  1116   POCGLU 186* 138 113 136       Blood Sugar Average: Last 72 hrs:  (P) 986 [id number]  · Hold home hypoglycemic agents  · Sliding scale insulin   · Added long acting insulin as tolerates tube feeds   · Accu-Cheks

## 2021-12-28 NOTE — ASSESSMENT & PLAN NOTE
· Patient initially presented with generalized weakness  Evidence of possible cholecystitis seen on CT abdomen/pelvis, confirmed with ultrasound gallbladder  · General surgery consult appreciated  · Holding off on more aggressive measures as of now, as patient is not a good surgical candidate  Status post percutaneous cholecystomy 12/27  · LFTs trending down  · Tube feeds restarted today 12/28  · Continue Lovenox     · Zosyn day 7   · Trending CMP

## 2021-12-28 NOTE — NURSING NOTE
Emptied biliary tube for 150 mL of dark brown bloody bile with blood clots  PA and Surgical PA aware    Awaiting TF from storeroom to start TF

## 2021-12-28 NOTE — PROGRESS NOTES
[address]  Progress Note - Lisa Campos 1945, 78 y o  male MRN: [id number]  Unit/Bed#: -[facility] Encounter: [id number]  Primary Care Provider: Gray Washington MD   Date and time admitted to hospital: 12/21/2021 11:56 PM    Cholecystitis  Assessment & Plan  · Patient initially presented with generalized weakness  Evidence of possible cholecystitis seen on CT abdomen/pelvis, confirmed with ultrasound gallbladder  · General surgery consult appreciated  · Holding off on more aggressive measures as of now, as patient is not a good surgical candidate  Status post percutaneous cholecystomy 12/27  · LFTs trending down  · Tube feeds restarted today 12/28  · Continue Lovenox  · Zosyn day 7   · Trending CMP     Other pulmonary embolism without acute cor pulmonale (HCC)  Assessment & Plan  · As seen on CT chest/abdomen/pelvis- 1st episodes of PE  · Echo unremarkable  · No evidence of DVT on venous Doppler  · The patient was started on Lovenox 1 milligram/kilogram subQ q 12 hours on admission, on 12/28 switched to eliquis 10 mg BID x 7 days   · O2 protocol    · Continue to monitor hemodynamic status  Community acquired pneumonia of left lower lobe of lung  Assessment & Plan  · As seen on CT and chest x-ray  · COVID negative   · Procalcitonin 0 85 -> 0 48 will trend   · Strep pneumo and Legionella urine antigens negative  · Zosyn day 7  · MRSA-negative     Severe protein-calorie malnutrition (HCC)  Assessment & Plan  Malnutrition Findings:   Adult Malnutrition type: Acute illness (related to inadequate protein energy intake as evidenced by significant wt  loss 13% < 6 months, moderate muscle loss clavicle/temple area, moderate fat loss buccal/orbital pads treated with EN )  Adult Degree of Malnutrition: Other severe protein calorie malnutrition    BMI Findings: Body mass index is 22 47 kg/m²  Tube feeding resumed today 12/28       Primary malignant neoplasm of base of tongue ([facility] [address] )  Assessment & Plan  · Patient just completed radiation therapy at [address]  · Supportive care   · Aspiration precautions        Hyperlipidemia due to type 2 diabetes mellitus ([facility] [address] )  Assessment & Plan  · Hold statin for now due to elevated LFTs    Type 2 diabetes mellitus without complication, without long-term current use of insulin [facility])  Assessment & Plan  Lab Results   Component Value Date    HGBA1C 6 3 (H) 12/22/2021       Recent Labs     12/27/21  2129 12/28/21  0037 12/28/21  0513 12/28/21  1116   POCGLU 186* 138 113 136       Blood Sugar Average: Last 72 hrs:  (P) 934 [id number]  · Hold home hypoglycemic agents  · Sliding scale insulin   · Added long acting insulin as tolerates tube feeds   · Accu-Cheks       Dysphagia, unspecified  Assessment & Plan  · Patient has an indwelling PEG tube  Formula used is a [employer] product, which we do not carry here at the hospital   · Nutrition consult appreciated  · Patient and his wife agreeable to using our product here ([employer] [address]) while inpatient       Essential (primary) hypertension  Assessment & Plan  · Blood pressure is slightly elevated  · Continue with lisinopril and metoprolol tartrate; added amlodipine- will increase to 10 mg daily   · Continue to monitor       * Sepsis [facility])  Assessment & Plan  · Patient is still with active pneumonia and cholecystitis, however all other sepsis parameters resolved  · Sepsis criteria met with tachycardia, fever, lactic acidosis, with pneumonia possible aspiration and cholecystitis  · Patient received sepsis protocol normal saline fluid resuscitation followed by 125 cc/hour  · Lactic acid and procalcitonin trended to normal  · Blood cultures NGTD    · Zosyn 3 375 IV q 6 hours day 7        VTE Pharmacologic Prophylaxis: VTE Score: 9 High Risk (Score >/= 5) - Pharmacological DVT Prophylaxis Ordered: apixaban (Eliquis)  Sequential Compression Devices Ordered      Patient Centered Rounds: I performed bedside rounds with nursing staff today  Discussions with Specialists or Other Care Team Provider: nursing, general surgery    Education and Discussions with Family / Patient: Updated  (wife) at bedside  Time Spent for Care: 20 minutes  More than 50% of total time spent on counseling and coordination of care as described above  Current Length of Stay: 6 day(s)  Current Patient Status: Inpatient   Certification Statement: The patient will continue to require additional inpatient hospital stay due to continued monitoring while restarting tube feeds  Discharge Plan: Anticipate discharge in 24-48 hrs to home  Code Status: Level 1 - Full Code    Subjective: The patient was seen and examined  The patient states he continues to have abdominal pain  He is feeling nauseated with 1 episode of vomiting this morning  Objective:     Vitals:   Temp (24hrs), Av 1 °F (36 7 °C), Min:98 1 °F (36 7 °C), Max:98 1 °F (36 7 °C)    Temp:  [98 1 °F (36 7 °C)] 98 1 °F (36 7 °C)  HR:  [70-83] 77  Resp:  [16-18] 18  BP: (143-174)/() 143/75  SpO2:  [94 %-98 %] 98 %  Body mass index is 22 47 kg/m²  Input and Output Summary (last 24 hours): Intake/Output Summary (Last 24 hours) at 2021 1723  Last data filed at 2021 1120  Gross per 24 hour   Intake 200 ml   Output 750 ml   Net -550 ml       Physical Exam:   Physical Exam  Vitals and nursing note reviewed  Constitutional:       General: He is awake  Appearance: He is ill-appearing (chronically)  Cardiovascular:      Rate and Rhythm: Normal rate and regular rhythm  Pulmonary:      Effort: Pulmonary effort is normal       Breath sounds: Normal breath sounds  Abdominal:      Palpations: Abdomen is soft  Tenderness: There is abdominal tenderness in the right upper quadrant  There is no guarding or rebound  Skin:     General: Skin is warm and dry  Neurological:      General: No focal deficit present        Mental Status: He is alert and oriented to person, place, and time  Psychiatric:         Attention and Perception: Attention normal          Mood and Affect: Mood normal          Speech: Speech normal          Behavior: Behavior is cooperative  Additional Data:     Labs:  Results from last 7 days   Lab Units 12/28/21  0554 12/27/21  0446 12/27/21  0446 12/26/21  0603 12/25/21  0550   WBC Thousand/uL 8 16   < > 10 69*   < > 10 46*   HEMOGLOBIN g/dL 9 8*   < > 9 6*   < > 9 3*   HEMATOCRIT % 31 5*   < > 31 1*   < > 28 9*   PLATELETS Thousands/uL 251   < > 271   < > 247   BANDS PCT %  --   --  2  --   --    NEUTROS PCT %  --   --   --   --  88*   LYMPHS PCT %  --   --   --   --  3*   LYMPHO PCT %  --   --  6*  --   --    MONOS PCT %  --   --   --   --  7   MONO PCT %  --   --  4  --   --    EOS PCT %  --   --  0  --  0    < > = values in this interval not displayed       Results from last 7 days   Lab Units 12/28/21  0554   SODIUM mmol/L 141   POTASSIUM mmol/L 3 9   CHLORIDE mmol/L 105   CO2 mmol/L 28   BUN mg/dL 16   CREATININE mg/dL 0 68   ANION GAP mmol/L 8   CALCIUM mg/dL 7 8*   ALBUMIN g/dL 2 6*   TOTAL BILIRUBIN mg/dL 0 86   ALK PHOS U/L 448*   ALT U/L 341*   AST U/L 249*   GLUCOSE RANDOM mg/dL 111     Results from last 7 days   Lab Units 12/27/21  0446   INR  1 15     Results from last 7 days   Lab Units 12/28/21  1116 12/28/21  0513 12/28/21  0037 12/27/21  2129 12/27/21  1544 12/27/21  0554 12/26/21  2348 12/26/21  1627 12/26/21  1210 12/26/21  0601 12/25/21  2356 12/25/21  2045   POC GLUCOSE mg/dl 136 113 138 186* 152* 142* 237* 274* 207* 262* 251* 233*     Results from last 7 days   Lab Units 12/22/21  0103   HEMOGLOBIN A1C % 6 3*     Results from last 7 days   Lab Units 12/27/21  0446 12/26/21  0603 12/23/21  0524 12/22/21  0410 12/22/21  0103   LACTIC ACID mmol/L  --   --   --  1 9 3 4*   PROCALCITONIN ng/ml 0 48* 0 56* 0 85*  --  0 41*       Lines/Drains:  Invasive Devices  Report    Peripheral Intravenous Line Peripheral IV 12/28/21 Right;Upper;Ventral (anterior) Arm <1 day          Drain            Gastrostomy/Enterostomy PEG-jejunostomy LUQ -- days    Closed/Suction Drain RUQ Other (Comment)  1 day                      Imaging: No pertinent imaging reviewed  Recent Cultures (last 7 days):   Results from last 7 days   Lab Units 12/27/21  1115 12/23/21  0758 12/22/21  0119 12/22/21  0103   BLOOD CULTURE   --   --  No Growth After 5 Days  No Growth After 5 Days     GRAM STAIN RESULT  No Polys or Bacteria seen  --   --   --    LEGIONELLA URINARY ANTIGEN   --  Negative  --   --        Last 24 Hours Medication List:   Current Facility-Administered Medications   Medication Dose Route Frequency Provider Last Rate    acetaminophen  650 mg Rectal Q4H PRN Linsey Mobley PA-C      [START ON 12/29/2021] amLODIPine  10 mg Per PEG Tube Daily Cindy Bruno PA-C      apixaban  10 mg Oral BID Cindy Bruno PA-C      aspirin  81 mg Per PEG Tube Daily MOISES Wooten      fluticasone  1 spray Nasal Daily Anoop Jones PA-C      glycopyrrolate  0 1 mg Intravenous Q8H PRN Linsey Mobley PA-C      guaiFENesin  200 mg Oral 4x Daily Anoop Jones PA-C      insulin glargine  10 Units Subcutaneous HS MOISES Wooten      insulin lispro  1-6 Units Subcutaneous Q6H [address] Anoop Jones PA-C      lisinopril  40 mg Per PEG Tube Daily MOISES Wooten      metoprolol  5 mg Intravenous Q6H PRN Alva Pemberton PA-C      metoprolol tartrate  25 mg Oral Q12H [address] MOISES Wooten      morphine injection  2 mg Intravenous Q4H PRN Anoop Jones PA-C      ondansetron  4 mg Intravenous Q6H PRN Anoop Jones PA-C      pantoprazole  40 mg Intravenous Q24H [address] MOISES Emanuel      piperacillin-tazobactam  3 375 g Intravenous Q6H MOISES Wooten 3 375 g (12/27/21 1013)    potassium-sodium phosphates  2 packet Per PEG Tube BID With Meals MOISES Wooten          Today, Patient Was Seen By: Krystal Macias PA-C    **Please Note: This note may have been constructed using a voice recognition system  **

## 2021-12-28 NOTE — ASSESSMENT & PLAN NOTE
Malnutrition Findings:   Adult Malnutrition type: Acute illness (related to inadequate protein energy intake as evidenced by significant wt  loss 13% < 6 months, moderate muscle loss clavicle/temple area, moderate fat loss buccal/orbital pads treated with EN )  Adult Degree of Malnutrition: Other severe protein calorie malnutrition    BMI Findings: Body mass index is 22 47 kg/m²  Tube feeding resumed today 12/28

## 2021-12-28 NOTE — ASSESSMENT & PLAN NOTE
· As seen on CT and chest x-ray  · COVID negative   · Procalcitonin 0 85 -> 0 48 will trend   · Strep pneumo and Legionella urine antigens negative  · Zosyn day 7  · MRSA-negative

## 2021-12-28 NOTE — PROGRESS NOTES
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  IR perc jake 12/27, f/u cultures  Episodes of hypertension, 194/96, 173/81, 174/107  Afebrile   No leukocytosis, 8 16 today  LFTs trending down  PEG tube   Cholecystostomy tube with thick dark drainage , 55 cc overnight     Plan:  Restart TF with clear liquids   IV Zosyn day #7  Continue to monitor labs and abdominal exam  Antiemetics and analgesics prn   Encourage ambulation  Lovenox  Medical management per SLIM      Subjective/Objective   Chief Complaint: mild abdominal pain    Subjective: No acute overnight events  Continues with mild RUQ abdominal pain/ drain site pain that is well controlled with pain medications  Denies nausea or vomiting but is bringing up sputum with coughing  Asking for something to drink to wet his mouth  Admits to passing flatus and recent bowel movement yesterday  Tolerating ambulation of room  Denies chest pain, palpitations, shortness of breath, calf tenderness  Denies fever, chills  Objective:   Blood pressure 155/84, pulse 74, temperature 98 1 °F (36 7 °C), resp  rate 16, height 6' 2" (1 88 m), weight 79 4 kg (175 lb), SpO2 95 %  ,Body mass index is 22 47 kg/m²        Intake/Output Summary (Last 24 hours) at 12/28/2021 0846  Last data filed at 12/27/2021 2330  Gross per 24 hour   Intake 200 ml   Output 575 ml   Net -375 ml       Invasive Devices  Report    Peripheral Intravenous Line            Peripheral IV 12/22/21 Right Antecubital 6 days    Peripheral IV 12/27/21 Left Antecubital 1 day          Drain            Gastrostomy/Enterostomy PEG-jejunostomy LUQ -- days    Closed/Suction Drain RUQ Other (Comment)  <1 day              Physical Exam: /84   Pulse 74   Temp 98 1 °F (36 7 °C)   Resp 16 Ht 6' 2" (1 88 m)   Wt 79 4 kg (175 lb)   SpO2 95%   BMI 22 47 kg/m²   General appearance: alert and oriented, in no acute distress  Lungs: clear to auscultation bilaterally  Heart: regular rate and rhythm, S1, S2 normal, no murmur, click, rub or gallop  Abdomen: normoactive bowel sounds; soft, nondistended, mild tenderness to palpation in RUQ, no rebound or guarding  Extremities: extremities normal, warm and well-perfused; no cyanosis, clubbing, or edema  Skin: Skin color, texture, turgor normal  No rashes or lesions    Lab, Imaging and other studies:  I have personally reviewed pertinent lab results      CBC:   Lab Results   Component Value Date    WBC 8 16 12/28/2021    HGB 9 8 (L) 12/28/2021    HCT 31 5 (L) 12/28/2021    MCV 91 12/28/2021     12/28/2021    MCH 28 2 12/28/2021    MCHC 31 1 (L) 12/28/2021    RDW 20 9 (H) 12/28/2021    MPV 9 6 12/28/2021     CMP:   Lab Results   Component Value Date    SODIUM 141 12/28/2021    K 3 9 12/28/2021     12/28/2021    CO2 28 12/28/2021    BUN 16 12/28/2021    CREATININE 0 68 12/28/2021    CALCIUM 7 8 (L) 12/28/2021     (H) 12/28/2021     (H) 12/28/2021    ALKPHOS 448 (H) 12/28/2021    EGFR 94 12/28/2021     VTE Pharmacologic Prophylaxis: Enoxaparin (Lovenox)  VTE Mechanical Prophylaxis: sequential compression device    Ingrid Luna PA-C

## 2021-12-29 LAB
ALBUMIN SERPL BCP-MCNC: 2.5 G/DL (ref 3.5–5)
ALP SERPL-CCNC: 427 U/L (ref 34–104)
ALT SERPL W P-5'-P-CCNC: 244 U/L (ref 7–52)
ANION GAP SERPL CALCULATED.3IONS-SCNC: 7 MMOL/L (ref 4–13)
AST SERPL W P-5'-P-CCNC: 122 U/L (ref 13–39)
BILIRUB SERPL-MCNC: 0.62 MG/DL (ref 0.2–1)
BUN SERPL-MCNC: 17 MG/DL (ref 5–25)
CALCIUM ALBUM COR SERPL-MCNC: 8.9 MG/DL (ref 8.3–10.1)
CALCIUM SERPL-MCNC: 7.7 MG/DL (ref 8.4–10.2)
CHLORIDE SERPL-SCNC: 102 MMOL/L (ref 96–108)
CO2 SERPL-SCNC: 27 MMOL/L (ref 21–32)
CREAT SERPL-MCNC: 0.61 MG/DL (ref 0.6–1.3)
ERYTHROCYTE [DISTWIDTH] IN BLOOD BY AUTOMATED COUNT: 20.4 % (ref 11.6–15.1)
GFR SERPL CREATININE-BSD FRML MDRD: 98 ML/MIN/1.73SQ M
GLUCOSE SERPL-MCNC: 141 MG/DL (ref 65–140)
GLUCOSE SERPL-MCNC: 142 MG/DL (ref 65–140)
GLUCOSE SERPL-MCNC: 148 MG/DL (ref 65–140)
GLUCOSE SERPL-MCNC: 148 MG/DL (ref 65–140)
GLUCOSE SERPL-MCNC: 153 MG/DL (ref 65–140)
GLUCOSE SERPL-MCNC: 202 MG/DL (ref 65–140)
HCT VFR BLD AUTO: 28.1 % (ref 36.5–49.3)
HGB BLD-MCNC: 8.8 G/DL (ref 12–17)
MCH RBC QN AUTO: 28.4 PG (ref 26.8–34.3)
MCHC RBC AUTO-ENTMCNC: 31.3 G/DL (ref 31.4–37.4)
MCV RBC AUTO: 91 FL (ref 82–98)
PLATELET # BLD AUTO: 250 THOUSANDS/UL (ref 149–390)
PMV BLD AUTO: 9.3 FL (ref 8.9–12.7)
POTASSIUM SERPL-SCNC: 3.7 MMOL/L (ref 3.5–5.3)
PROT SERPL-MCNC: 5.3 G/DL (ref 6.4–8.4)
RBC # BLD AUTO: 3.1 MILLION/UL (ref 3.88–5.62)
SODIUM SERPL-SCNC: 136 MMOL/L (ref 135–147)
WBC # BLD AUTO: 7.43 THOUSAND/UL (ref 4.31–10.16)

## 2021-12-29 PROCEDURE — 85027 COMPLETE CBC AUTOMATED: CPT | Performed by: PHYSICIAN ASSISTANT

## 2021-12-29 PROCEDURE — 99232 SBSQ HOSP IP/OBS MODERATE 35: CPT | Performed by: SPECIALIST

## 2021-12-29 PROCEDURE — 80053 COMPREHEN METABOLIC PANEL: CPT | Performed by: PHYSICIAN ASSISTANT

## 2021-12-29 PROCEDURE — 97110 THERAPEUTIC EXERCISES: CPT

## 2021-12-29 PROCEDURE — 99232 SBSQ HOSP IP/OBS MODERATE 35: CPT | Performed by: PHYSICIAN ASSISTANT

## 2021-12-29 PROCEDURE — 97116 GAIT TRAINING THERAPY: CPT

## 2021-12-29 PROCEDURE — 97535 SELF CARE MNGMENT TRAINING: CPT

## 2021-12-29 PROCEDURE — C9113 INJ PANTOPRAZOLE SODIUM, VIA: HCPCS | Performed by: NURSE PRACTITIONER

## 2021-12-29 PROCEDURE — 97530 THERAPEUTIC ACTIVITIES: CPT

## 2021-12-29 PROCEDURE — 82948 REAGENT STRIP/BLOOD GLUCOSE: CPT

## 2021-12-29 RX ADMIN — PANTOPRAZOLE SODIUM 40 MG: 40 INJECTION, POWDER, FOR SOLUTION INTRAVENOUS at 10:21

## 2021-12-29 RX ADMIN — PIPERACILLIN AND TAZOBACTAM 3.38 G: 3; .375 INJECTION, POWDER, FOR SOLUTION INTRAVENOUS at 10:20

## 2021-12-29 RX ADMIN — PIPERACILLIN AND TAZOBACTAM 3.38 G: 3; .375 INJECTION, POWDER, FOR SOLUTION INTRAVENOUS at 00:39

## 2021-12-29 RX ADMIN — METOPROLOL TARTRATE 25 MG: 25 TABLET, FILM COATED ORAL at 10:21

## 2021-12-29 RX ADMIN — APIXABAN 10 MG: 5 TABLET, FILM COATED ORAL at 00:40

## 2021-12-29 RX ADMIN — GUAIFENESIN 200 MG: 100 SOLUTION ORAL at 22:24

## 2021-12-29 RX ADMIN — GUAIFENESIN 200 MG: 100 SOLUTION ORAL at 10:21

## 2021-12-29 RX ADMIN — APIXABAN 10 MG: 5 TABLET, FILM COATED ORAL at 17:00

## 2021-12-29 RX ADMIN — METOPROLOL TARTRATE 25 MG: 25 TABLET, FILM COATED ORAL at 22:24

## 2021-12-29 RX ADMIN — POTASSIUM & SODIUM PHOSPHATES POWDER PACK 280-160-250 MG 2 PACKET: 280-160-250 PACK at 16:57

## 2021-12-29 RX ADMIN — INSULIN GLARGINE 10 UNITS: 100 INJECTION, SOLUTION SUBCUTANEOUS at 00:39

## 2021-12-29 RX ADMIN — MORPHINE SULFATE 2 MG: 2 INJECTION, SOLUTION INTRAMUSCULAR; INTRAVENOUS at 10:20

## 2021-12-29 RX ADMIN — AMLODIPINE BESYLATE 10 MG: 10 TABLET ORAL at 10:21

## 2021-12-29 RX ADMIN — LISINOPRIL 40 MG: 20 TABLET ORAL at 10:21

## 2021-12-29 RX ADMIN — GUAIFENESIN 200 MG: 100 SOLUTION ORAL at 17:01

## 2021-12-29 RX ADMIN — INSULIN GLARGINE 10 UNITS: 100 INJECTION, SOLUTION SUBCUTANEOUS at 22:24

## 2021-12-29 RX ADMIN — PIPERACILLIN AND TAZOBACTAM 3.38 G: 3; .375 INJECTION, POWDER, FOR SOLUTION INTRAVENOUS at 06:19

## 2021-12-29 RX ADMIN — POTASSIUM & SODIUM PHOSPHATES POWDER PACK 280-160-250 MG 2 PACKET: 280-160-250 PACK at 10:20

## 2021-12-29 RX ADMIN — APIXABAN 10 MG: 5 TABLET, FILM COATED ORAL at 10:21

## 2021-12-29 RX ADMIN — ASPIRIN 81 MG CHEWABLE TABLET 81 MG: 81 TABLET CHEWABLE at 10:21

## 2021-12-29 RX ADMIN — INSULIN LISPRO 2 UNITS: 100 INJECTION, SOLUTION INTRAVENOUS; SUBCUTANEOUS at 17:00

## 2021-12-29 RX ADMIN — GUAIFENESIN 200 MG: 100 SOLUTION ORAL at 00:40

## 2021-12-29 RX ADMIN — METOPROLOL TARTRATE 25 MG: 25 TABLET, FILM COATED ORAL at 00:40

## 2021-12-29 NOTE — CASE MANAGEMENT
Case Management Discharge Planning Note    Patient name Devorah Severs  Location [address] 220/-37 MRN [id number]  : 1945 Date 2021       Current Admission Date: 2021  Current Admission Diagnosis:Sepsis [facility])   Patient Active Problem List    Diagnosis Date Noted    Anxiety 2021    Neoplasm of uncertain behavior of skin 2021    Post-traumatic stress disorder, unspecified 2021    Essential (primary) hypertension 2021    Actinic keratosis 2021    Dysphagia, unspecified 2021    Erectile dysfunction 2021    History of malignant neoplasm of bladder 2021    Carpal tunnel syndrome 2021    Chronic post-traumatic stress disorder (PTSD) after  combat 2021    Type 2 diabetes mellitus without complication, without long-term current use of insulin ([address] ) 2021    Hematuria 2021    Hyperlipidemia due to type 2 diabetes mellitus ([address] ) 2021    Malignant neoplasm of urinary bladder ([facility] [address] ) 2021    Neoplasm of lung 2021    Osteoarthritis of knee 2021    Panic disorder 2021    Polyp of colon 2021    Tobacco dependence 2021    Sepsis ([address] ) 2021    Community acquired pneumonia of left lower lobe of lung 2021    Other pulmonary embolism without acute cor pulmonale ([address] ) 2021    Cholecystitis 2021    Severe protein-calorie malnutrition ([address] ) 2021    Primary malignant neoplasm of base of tongue ([facility] [address] ) 10/11/2021    Neoplasm of uncertain behavior of oropharynx 2021    Cardiac arrhythmia 05/15/2018    Heart murmur 05/15/2018    Left ventricular hypertrophy 05/15/2018      LOS (days): 7  Geometric Mean LOS (GMLOS) (days): 4 80  Days to GMLOS:-2 7     OBJECTIVE:  Risk of Unplanned Readmission Score: 20         Current admission status: Inpatient   Preferred Pharmacy:   [address], [address] [place]  [address]  Phone: [phone] Fax: [phone]    Primary Care Provider: Merrill Saldaña MD    Primary Insurance: MEDICARE  Secondary Insurance: [facility]    DISCHARGE DETAILS:    Discharge planning discussed with[de-identified] patient , wife and daughter  Freedom of Choice: Yes  Comments - Freedom of Choice: rehab recommended, family would like him home pt is active with [facility] contacted family/caregiver?: Yes             Contacts  Patient Contacts: Essence  Relationship to Patient[de-identified] Family (wife)  Contact Method:  In Person  Reason/Outcome: Discharge [address]         Is the patient interested in [facility] at discharge?: Yes  [address] requested[de-identified] [address] Name[de-identified] [employer] Provider[de-identified] PCP  Home Health Services Needed[de-identified] Wound/Ostomy Care,Strengthening/Theraputic Exercises to Improve Function,Other (comment) (review medications, new percutaneous drain that  needs to be flushed  resume previous orders)  Homebound Criteria Met[de-identified] Uses an Assist Device (i e  cane, walker, etc),Requires the Assistance of Another Person for Safe Ambulation or to Leave the Home  Supporting Clincal Findings[de-identified] Limited Endurance    DME Referral Provided  Referral made for DME?: Yes  DME referral completed for the following items[de-identified] Michael Lucas  DME Supplier Name[de-identified] [employer]    Other Referral/Resources/Interventions Provided:  Interventions: [facility]  Referral Comments: pt is active with helen    Would you like to participate in our [address] service program?  : No - Declined    Treatment Team Recommendation: Home with [address] (home with  spouse and [facility])     Transport at Discharge : Automobile,Family                             IMM Given (Date):: 12/29/21  IMM Given to[de-identified] Family (wife)     Additional Comments: pt will need rx for flushes and eliquis coupon and walker from the consignment closet,, family marsha need teaching on flushing the percutaneous tube

## 2021-12-29 NOTE — ASSESSMENT & PLAN NOTE
· Blood pressure is slightly elevated but improving  · Continue with lisinopril and metoprolol tartrate; amlodipine- increased to 10 mg daily on 12/28  · Continue to monitor

## 2021-12-29 NOTE — PLAN OF CARE
Problem: PHYSICAL THERAPY ADULT  Goal: Performs mobility at highest level of function for planned discharge setting  See evaluation for individualized goals  Description: Treatment/Interventions: Functional transfer training,LE strengthening/ROM,Therapeutic exercise,Endurance training,Bed mobility,Gait training          See flowsheet documentation for full assessment, interventions and recommendations  Outcome: Progressing  Note: Prognosis: Fair  Problem List: Decreased strength,Decreased endurance,Impaired balance,Decreased mobility,Decreased coordination,Pain  Assessment: Pt seen for PT treatment session this date with interventions consisting of gait training w/ emphasis on improving pt's ability to ambulate level surfaces x 20 ftx2 with min A provided by therapist with RW, Therapeutic exercise consisting of: AROM 20 reps B LE in supine position and therapeutic activity consisting of training: bed mobility, supine<>sit transfers, sit<>stand transfers and toilet transfer  Pt agreeable to PT treatment session upon arrival, pt found supine in bed w/ HOB elevated, responsive  In comparison to previous session, pt with improvements in distance ambulated and amount of assistance required  Post session: pt returned BTB, bed alarm engaged, all needs in reach and RN notified of session findings/recommendations  Continue to recommend post acute rehabilitation services at time of d/c in order to maximize pt's functional independence and safety w/ mobility  Pt continues to be functioning below baseline level, and remains limited 2* factors listed above and including impaired balance, decreased functional activity tolerance and high risk for falls  PT will continue to see pt during current hospitalization in order to address the deficits listed above and provide interventions consistent w/ POC in effort to achieve STGs             PT Discharge Recommendation: Post acute rehabilitation services          See flowsheet documentation for full assessment

## 2021-12-29 NOTE — PHYSICAL THERAPY NOTE
12/29/21 1000   PT Last Visit   PT Visit Date 12/29/21   Note Type   Note Type Treatment   Pain Assessment   Pain Assessment Tool 0-10   Pain Score 7   Pain Location/Orientation Orientation: Right;Location: Chest   Pain Onset/Description Frequency: Constant/Continuous; Onset: Ongoing   Effect of Pain on Daily Activities yes   Patient's Stated Pain Goal No pain   Hospital Pain Intervention(s) Ambulation/increased activity;Repositioned   Multiple Pain Sites No   Restrictions/Precautions   Weight Bearing Precautions Per Order No   Other Precautions Multiple lines;Telemetry; Fall Risk;Pain   General   Chart Reviewed Yes   Response to Previous Treatment Patient with no complaints from previous session  Family/Caregiver Present No   Cognition   Overall Cognitive Status WFL   Arousal/Participation Alert; Cooperative   Attention Within functional limits   Orientation Level Oriented X4   Memory Within functional limits   Following Commands Follows one step commands with increased time or repetition   Comments pt agreeable to PT session   Bed Mobility   Supine to Sit 4  Minimal assistance   Additional items Assist x 1;HOB elevated; Bedrails; Increased time required;Verbal cues;LE management   Sit to Supine 4  Minimal assistance   Additional items Assist x 1;Bedrails; Increased time required;Verbal cues;LE management   Additional Comments pt reports dizziness upon sitting that resolved within 30 seconds   Transfers   Sit to Stand 4  Minimal assistance   Additional items Assist x 1; Armrests; Increased time required;Verbal cues   Stand to Sit 4  Minimal assistance   Additional items Assist x 1; Armrests; Increased time required;Verbal cues   Toilet transfer 4  Minimal assistance   Additional items Assist x 1; Armrests; Increased time required;Verbal cues;Standard toilet   Additional Comments pt requires verbal cues for proper hand placement and safety   Ambulation/Elevation   Gait pattern Improper Weight shift; Antalgic;Narrow LISANDRA;Decreased foot clearance; Short stride; Excessively slow   Gait Assistance 4  Minimal assist   Additional items Assist x 1;Verbal cues; Tactile cues   Assistive Device Rolling walker   Distance 20 ftx2   Stair Management Assistance Not tested   Ambulation/Elevation Additional Comments verbal cues to keep feet seperated during ambulation   Balance   Static Sitting Fair   Dynamic Sitting Fair -   Static Standing Poor +   Dynamic Standing Poor +   Ambulatory Poor   Endurance Deficit   Endurance Deficit Yes   Activity Tolerance   Activity Tolerance Patient limited by fatigue   Medical Staff Made Aware yes, CM   Nurse Made Aware yes   Exercises   Quad Sets Supine;15 reps;AROM; Bilateral   Heelslides Supine;15 reps;AROM; Bilateral   Hip Abduction Supine;15 reps;AROM; Bilateral   Hip Adduction Supine;15 reps;AROM; Bilateral   Ankle Pumps Supine;20 reps;AROM; Bilateral   Assessment   Prognosis Fair   Problem List Decreased strength;Decreased endurance; Impaired balance;Decreased mobility; Decreased coordination;Pain   Assessment Pt seen for PT treatment session this date with interventions consisting of gait training w/ emphasis on improving pt's ability to ambulate level surfaces x 20 ftx2 with min A provided by therapist with RW, Therapeutic exercise consisting of: AROM 20 reps B LE in supine position and therapeutic activity consisting of training: bed mobility, supine<>sit transfers, sit<>stand transfers and toilet transfer  Pt agreeable to PT treatment session upon arrival, pt found supine in bed w/ HOB elevated, responsive  In comparison to previous session, pt with improvements in distance ambulated and amount of assistance required  Post session: pt returned BTB, bed alarm engaged, all needs in reach and RN notified of session findings/recommendations  Continue to recommend post acute rehabilitation services at time of d/c in order to maximize pt's functional independence and safety w/ mobility   Pt continues to be functioning below baseline level, and remains limited 2* factors listed above and including impaired balance, decreased functional activity tolerance and high risk for falls  PT will continue to see pt during current hospitalization in order to address the deficits listed above and provide interventions consistent w/ POC in effort to achieve STGs  Plan   Treatment/Interventions Functional transfer training;LE strengthening/ROM; Therapeutic exercise; Endurance training;Bed mobility;Gait training   Progress Slow progress, decreased activity tolerance   PT Frequency 3-5x/wk   Recommendation   PT Discharge Recommendation Post acute rehabilitation services   Additional Comments upon conclusion, pt resting in bed with all needs in reach   Additional Comments 2 Pt's raw score on the [facility] Basic Mobility inpatient short form is 15, standardized score is 36 97  Patients at this level are likely to benefit from DC to [facility], however, please refer to therapist recommendation for safe DC planning  [facility] Basic Mobility Inpatient   Turning in Bed Without Bedrails 3   Lying on Back to Sitting on Edge of Flat Bed 2   Moving Bed to Chair 3   Standing Up From Chair 3   Walk in Room 3   Climb 3-5 Stairs 1   Basic Mobility Inpatient Raw Score 15   Basic Mobility Standardized Score 36 97   Highest Level Of Mobility   -[facility] Goal 4: Move to chair/commode   Education   Education Provided Assistive device; Mobility training   Patient Demonstrates acceptance/verbal understanding   End of Consult   Patient Position at End of Consult Supine;Bed/Chair alarm activated; All needs within reach

## 2021-12-29 NOTE — ASSESSMENT & PLAN NOTE
· As seen on CT and chest x-ray  · COVID negative   · Procalcitonin 0 85 -> 0 48 will trend   · Strep pneumo and Legionella urine antigens negative  · Completed 7 days of Zosyn   · MRSA-negative

## 2021-12-29 NOTE — OCCUPATIONAL THERAPY NOTE
12/29/21 1005   OT Last Visit   OT Visit Date 12/29/21   Note Type   Note Type Treatment  (completed [id number])   Restrictions/Precautions   Weight Bearing Precautions Per Order No   Other Precautions Multiple lines;Telemetry;O2;Fall Risk;Pain   General   Response to Previous Treatment Patient with no complaints from previous session   Lifestyle   Intrinsic Gratification reports doing nothing much in the winter    Pain Assessment   Pain Assessment Tool 0-10   Pain Score 7   Pain Location/Orientation Orientation: Right;Location: Chest   Hospital Pain Intervention(s) Medication (See MAR); Repositioned; Ambulation/increased activity  (distraction: self-care )   ADL   Where Assessed Other (Comment)  (seated in bed (back supported) )   Eating Comments had not yet attempted to eat his breakfast - Encouraged to try to eat after session completion    Grooming Comments declined combing hair at this time - comb provided and within reach for later use    UB Bathing Assistance 5  Supervision/Setup   UB Bathing Deficit Setup;Supervision/safety; Increased time to complete   LB Bathing Assistance 2  Maximal Assistance   LB Bathing Comments patient too fatigued  to continue with LB bathing    UB Dressing Assistance 4  Minimal Assistance   UB Dressing Comments *hospital gown change   LB Dressing Comments cont  to assess / introduce Itzel Naidu as needed   Toileting Assistance  5  Supervision/Setup   Toileting Deficit Setup;Verbal cueing;Supervison/safety; Increased time to complete   Toileting Comments patient asked to use bathroom    Bed Mobility   Supine to Sit 4  Minimal assistance   Additional items Assist x 1;HOB elevated; Bedrails; Increased time required;Verbal cues   Sit to Supine 4  Minimal assistance   Additional items Assist x 1;Bedrails; Increased time required;Verbal cues   Additional Comments lines adjusted, disconnected by nurse for safe OOB movement   Transfers   Sit to Stand 4  Minimal assistance   Additional items Assist x 1;Armrests; Increased time required;Verbal cues   Stand to Sit 4  Minimal assistance   Additional items Assist x 1; Armrests; Increased time required;Verbal cues   Toilet transfer 4  Minimal assistance   Additional items Assist x 1; Armrests; Increased time required;Verbal cues;Standard toilet   Coordination   Gross Motor WFL   Dexterity appears WFL   Cognition   Overall Cognitive Status WFL   Arousal/Participation Alert; Cooperative   Attention Within functional limits   Orientation Level Oriented X4   Following Commands Follows one step commands with increased time or repetition   Comments patient eager to go home but also is considering STR: "I have to talk to my wife"    Activity Tolerance   Activity Tolerance Patient limited by fatigue;Patient limited by pain  (patient moaning at times during first half of session )   Assessment   Assessment Patient participated in Skilled OT session this date with interventions consisting of ADL re training with the use of correct body mechnaics, Energy Conservation techniques, safety awareness and fall prevention techniques and  therapeutic activities to: increase activity tolerance   Patient agreeable to OT treatment session, upon arrival patient was found seated in bed (back supported)  Patient reports ill-effects (particularly coughing with phlegm) since his chemo treatments  Patient requiring verbal cues for pacing thru activity steps, frequent rest periods and ocassional safety reminders and occasional cues for correct techniques  Patient continues to be functioning below baseline level, occupational performance remains limited secondary to factors listed above and increased risk for falls and injury  From OT standpoint, recommendation at time of d/c would be post-acute rehabilitation services     Patient to benefit from continued Occupational Therapy treatment while in the hospital to address deficits as defined above and maximize level of functional independence with ADLs and functional mobility  Plan   Treatment Interventions ADL retraining;Functional transfer training;Patient/family training;Energy conservation; Activityengagement   Goal Expiration Date 01/02/22   OT Treatment Day 1   Recommendation   Additional Comments 2 The patient's raw score on the AM-PAC Daily Activity inpatient short form is 14, standardized score is 33 39, less than 39 4  Patients at this level are likely to benefit from discharge to post-acute rehabilitation services  Please refer to the recommendation of the Occupational Therapist for safe discharge planning     AM-PAC Daily Activity Inpatient   Lower Body Dressing 1   Bathing 2   Toileting 3   Upper Body Dressing 2   Grooming 3   Eating 3   Daily Activity Raw Score 14   Daily Activity Standardized Score (Calc for Raw Score >=11) 33 39   AM-PAC Applied Cognition Inpatient   Following a Speech/Presentation 3   Understanding Ordinary Conversation 4   Taking Medications 3   Remembering Where Things Are Placed or Put Away 3   Remembering List of 4-5 Errands 3   Taking Care of Complicated Tasks 2   Applied Cognition Raw Score 18   Applied Cognition Standardized Score 38 07   EUN Siddiqui/DREA

## 2021-12-29 NOTE — ASSESSMENT & PLAN NOTE
Lab Results   Component Value Date    HGBA1C 6 3 (H) 12/22/2021       Recent Labs     12/28/21  1757 12/29/21  0038 12/29/21  0555 12/29/21  1046   POCGLU 130 142* 153* 148*       Blood Sugar Average: Last 72 hrs:  (P) 038 [id number]  · Hold home regimen of metformin and empagliflozin  · Sliding scale insulin   · Added long acting insulin as tolerates tube feeds   · Accu-Cheks

## 2021-12-29 NOTE — ASSESSMENT & PLAN NOTE
Malnutrition Findings:   Adult Malnutrition type: Acute illness (related to inadequate protein energy intake as evidenced by significant wt  loss 13% < 6 months, moderate muscle loss clavicle/temple area, moderate fat loss buccal/orbital pads treated with EN )  Adult Degree of Malnutrition: Other severe protein calorie malnutrition    BMI Findings: Body mass index is 22 47 kg/m²  Tube feeding resumed at half dose on 12/28, will increase to goal rate of 80 mL today 12/29

## 2021-12-29 NOTE — PLAN OF CARE
Problem: OCCUPATIONAL THERAPY ADULT  Goal: Performs self-care activities at highest level of function for planned discharge setting  See evaluation for individualized goals  Description: Treatment Interventions: ADL retraining,Functional transfer training,UE strengthening/ROM,Endurance training,Patient/family training,Equipment evaluation/education,Neuromuscular reeducation,Energy conservation,Compensatory technique education,Activityengagement          See flowsheet documentation for full assessment, interventions and recommendations  Outcome: Progressing  Note: Limitation: Decreased ADL status,Decreased UE ROM,Decreased UE strength,Decreased Safe judgement during ADL,Decreased endurance,Decreased self-care trans,Decreased high-level ADLs  Prognosis: Good  Assessment: Patient participated in Skilled OT session this date with interventions consisting of ADL re training with the use of correct body mechnaics, Energy Conservation techniques, safety awareness and fall prevention techniques and  therapeutic activities to: increase activity tolerance   Patient agreeable to OT treatment session, upon arrival patient was found seated in bed (back supported)  Patient reports ill-effects (particularly coughing with phlegm) since his chemo treatments  Patient requiring verbal cues for pacing thru activity steps, frequent rest periods and ocassional safety reminders and occasional cues for correct techniques  Patient continues to be functioning below baseline level, occupational performance remains limited secondary to factors listed above and increased risk for falls and injury  From OT standpoint, recommendation at time of d/c would be post-acute rehabilitation services  Patient to benefit from continued Occupational Therapy treatment while in the hospital to address deficits as defined above and maximize level of functional independence with ADLs and functional mobility        OT Discharge Recommendation: Post acute rehabilitation services  OT - OK to Discharge: Yes (Once medically clear)     Uriel Show, HAQ/L

## 2021-12-29 NOTE — ASSESSMENT & PLAN NOTE
· All sepsis parameters resolved    · Sepsis criteria met with tachycardia, fever, lactic acidosis, with pneumonia possible aspiration and cholecystitis  · Patient received sepsis protocol normal saline fluid resuscitation followed by 125 cc/hour  · Lactic acid and procalcitonin trended to normal  · Blood cultures NGTD    · Patient completed course of Zosyn on 12/29

## 2021-12-29 NOTE — ASSESSMENT & PLAN NOTE
· As seen on CT chest/abdomen/pelvis- 1st episodes of PE  · Echo unremarkable  · No evidence of DVT on venous Doppler  · The patient was started on Lovenox 1 milligram/kilogram subQ q 12 hours on admission, on 12/28 switched to eliquis 10 mg BID x 7 days, then 5 mg po BID thereafter   · O2 protocol    · Continue to monitor hemodynamic status

## 2021-12-29 NOTE — PROGRESS NOTES
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  AVSS, afebrile   No leukocytosis, 7 43 from 8 18  LFTs trending down  Cultures still pending  PEG  Cholecystostomy tube with 150 cc, bloody/bilious drainge    Plan:  TF @ 40 ml/hr with clear liquids, advance to goal as tolerated   Discontinue IV Zosyn  F/u cultures   Continue to monitor labs and abdominal exam   Continue to monitor drain output  Antiemetics and analgesics prn   Encourage ambulation  Eliquis   Medical management per SLIM     Subjective/Objective   Chief Complaint: no acute complaints    Subjective: No acute overnight events  Patient is doing well today  Mild abdominal pain in RUQ/drain site controlled with pain medications  Still coughing up sputum throughout the day  Tolerating tube feeds and clear liquid diet without nausea and vomiting  Admits to passing flatus and recent soft bowel movement this morning  Denies chest pain, palpitations, shortness of breath, calf tenderness  Denies fever, chills  Objective:   Blood pressure 163/88, pulse 72, temperature 98 2 °F (36 8 °C), resp  rate 18, height 6' 2" (1 88 m), weight 79 4 kg (175 lb), SpO2 95 %  ,Body mass index is 22 47 kg/m²      Intake/Output Summary (Last 24 hours) at 12/29/2021 1006  Last data filed at 12/29/2021 0900  Gross per 24 hour   Intake 0 ml   Output 1440 ml   Net -1440 ml       Invasive Devices  Report    Peripheral Intravenous Line            Peripheral IV 12/28/21 Right;Upper;Ventral (anterior) Arm 1 day          Drain            Gastrostomy/Enterostomy PEG-jejunostomy LUQ -- days    Closed/Suction Drain RUQ Other (Comment)  1 day              Physical Exam: /88   Pulse 72   Temp 98 2 °F (36 8 °C)   Resp 18   Ht 6' 2" (1 88 m)   Wt 79 4 kg (175 lb)   SpO2 95%   BMI 22 47 kg/m²   General appearance: alert and oriented, in no acute distress  Lungs: wheezes  Heart: regular rate and rhythm, S1, S2 normal, no murmur, click, rub or gallop  Abdomen: choleststomy drain in place, bloody/bilious drainage; normoactive bowel sounds, soft, nondistende, nontender, no rebound or guarding   Extremities: extremities normal, warm and well-perfused; no cyanosis, clubbing, or edema  Skin: Skin color, texture, turgor normal  No rashes or lesions    Lab, Imaging and other studies:  I have personally reviewed pertinent lab results      CBC:   Lab Results   Component Value Date    WBC 7 43 12/29/2021    HGB 8 8 (L) 12/29/2021    HCT 28 1 (L) 12/29/2021    MCV 91 12/29/2021     12/29/2021    MCH 28 4 12/29/2021    MCHC 31 3 (L) 12/29/2021    RDW 20 4 (H) 12/29/2021    MPV 9 3 12/29/2021     CMP:   Lab Results   Component Value Date    SODIUM 136 12/29/2021    K 3 7 12/29/2021     12/29/2021    CO2 27 12/29/2021    BUN 17 12/29/2021    CREATININE 0 61 12/29/2021    CALCIUM 7 7 (L) 12/29/2021     (H) 12/29/2021     (H) 12/29/2021    ALKPHOS 427 (H) 12/29/2021    EGFR 98 12/29/2021     VTE Pharmacologic Prophylaxis: apixaban (Eliquis)  VTE Mechanical Prophylaxis: sequential compression device    Juan Pedroza PA-C

## 2021-12-29 NOTE — ASSESSMENT & PLAN NOTE
· Patient initially presented with generalized weakness  Evidence of possible cholecystitis seen on CT abdomen/pelvis, confirmed with ultrasound gallbladder  · General surgery consult appreciated  · Holding off on more aggressive measures as of now, as patient is not a good surgical candidate  Status post percutaneous cholecystomy 12/27  · LFTs trending down  · Tube feeds restarted 12/28, increase to goal rate of 80 today 12/29  ·  Lovenox discontinued on 12/28 and switched to eliquis 10 mg po bid x 7 days, then 5 mg po bid thereafter  · Completed 7 days of Zosyn on 12/29    · Trending CMP

## 2021-12-29 NOTE — ASSESSMENT & PLAN NOTE
· Patient has an indwelling PEG tube  Formula used is a [employer] product, which we do not carry here at the hospital   · Nutrition consult appreciated    · Patient and his wife agreeable to using our product here ([employer]) while inpatient

## 2021-12-29 NOTE — ASSESSMENT & PLAN NOTE
· Patient just completed radiation therapy at [address]  · Supportive care   · Aspiration precautions

## 2021-12-29 NOTE — PROGRESS NOTES
[facility]  Progress Note - Pratibha Ponce 1945, 78 y o  male MRN: [id number]  Unit/Bed#: -01 Encounter: [id number]  Primary Care Provider: Debbie Galloway MD   Date and time admitted to hospital: 12/21/2021 11:56 PM    Cholecystitis  Assessment & Plan  · Patient initially presented with generalized weakness  Evidence of possible cholecystitis seen on CT abdomen/pelvis, confirmed with ultrasound gallbladder  · General surgery consult appreciated  · Holding off on more aggressive measures as of now, as patient is not a good surgical candidate  Status post percutaneous cholecystomy 12/27  · LFTs trending down  · Tube feeds restarted 12/28, increase to goal rate of 80 today 12/29  ·  Lovenox discontinued on 12/28 and switched to eliquis 10 mg po bid x 7 days, then 5 mg po bid thereafter  · Completed 7 days of Zosyn on 12/29  · Trending CMP     Other pulmonary embolism without acute cor pulmonale (HCC)  Assessment & Plan  · As seen on CT chest/abdomen/pelvis- 1st episodes of PE  · Echo unremarkable  · No evidence of DVT on venous Doppler  · The patient was started on Lovenox 1 milligram/kilogram subQ q 12 hours on admission, on 12/28 switched to eliquis 10 mg BID x 7 days, then 5 mg po BID thereafter   · O2 protocol    · Continue to monitor hemodynamic status        Community acquired pneumonia of left lower lobe of lung  Assessment & Plan  · As seen on CT and chest x-ray  · COVID negative   · Procalcitonin 0 85 -> 0 48 will trend   · Strep pneumo and Legionella urine antigens negative  · Completed 7 days of Zosyn   · MRSA-negative     Severe protein-calorie malnutrition (HCC)  Assessment & Plan  Malnutrition Findings:   Adult Malnutrition type: Acute illness (related to inadequate protein energy intake as evidenced by significant wt  loss 13% < 6 months, moderate muscle loss clavicle/temple area, moderate fat loss buccal/orbital pads treated with EN )  Adult Degree of Malnutrition: Other severe protein calorie malnutrition    BMI Findings: Body mass index is 22 47 kg/m²  Tube feeding resumed at half dose on 12/28, will increase to goal rate of 80 mL today 12/29  [facility] )  Assessment & Plan  · Patient just completed radiation therapy at [address]  · Supportive care   · Aspiration precautions        Hyperlipidemia due to type 2 diabetes mellitus ([facility] )  Assessment & Plan  · Hold statin for now due to elevated LFTs    Type 2 diabetes mellitus without complication, without long-term current use of insulin [facility])  Assessment & Plan  Lab Results   Component Value Date    HGBA1C 6 3 (H) 12/22/2021       Recent Labs     12/28/21  1757 12/29/21  0038 12/29/21  0555 12/29/21  1046   POCGLU 130 142* 153* 148*       Blood Sugar Average: Last 72 hrs:  (P) 233 [id number]  · Hold home regimen of metformin and empagliflozin  · Sliding scale insulin   · Added long acting insulin as tolerates tube feeds   · Accu-Cheks       Dysphagia, unspecified  Assessment & Plan  · Patient has an indwelling PEG tube  Formula used is a [employer] product, which we do not carry here at the hospital   · Nutrition consult appreciated  · Patient and his wife agreeable to using our product here ([employer]) while inpatient       Essential (primary) hypertension  Assessment & Plan  · Blood pressure is slightly elevated but improving  · Continue with lisinopril and metoprolol tartrate; amlodipine- increased to 10 mg daily on 12/28  · Continue to monitor       * Sepsis [facility])  Assessment & Plan  · All sepsis parameters resolved    · Sepsis criteria met with tachycardia, fever, lactic acidosis, with pneumonia possible aspiration and cholecystitis  · Patient received sepsis protocol normal saline fluid resuscitation followed by 125 cc/hour  · Lactic acid and procalcitonin trended to normal  · Blood cultures NGTD    · Patient completed course of Zosyn on         VTE Pharmacologic Prophylaxis: VTE Score: 9 High Risk (Score >/= 5) - Pharmacological DVT Prophylaxis Ordered: apixaban (Eliquis)  Sequential Compression Devices Ordered  Patient Centered Rounds: I performed bedside rounds with nursing staff today  Discussions with Specialists or Other Care Team Provider: nursing, general surgery    Education and Discussions with Family / Patient: will update wife at bedside later today per patient request      Time Spent for Care: 20 minutes  More than 50% of total time spent on counseling and coordination of care as described above  Current Length of Stay: 7 day(s)  Current Patient Status: Inpatient   Certification Statement: The patient will continue to require additional inpatient hospital stay due to continued monitoring of labs, serial abd exams, awaiting blood culture results  Discharge Plan: Anticipate discharge in 24-48 hrs to rehab facility  Code Status: Level 1 - Full Code    Subjective: The patient was seen and examined  The patient states he continues to have abdominal pain and mild nausea  He denies any episodes of vomiting today  Objective:     Vitals:   Temp (24hrs), Av 2 °F (36 8 °C), Min:98 1 °F (36 7 °C), Max:98 4 °F (36 9 °C)    Temp:  [98 1 °F (36 7 °C)-98 4 °F (36 9 °C)] 98 2 °F (36 8 °C)  HR:  [70-79] 79  Resp:  [17-18] 17  BP: (129-163)/(72-88) 129/72  SpO2:  [93 %-98 %] 93 %  Body mass index is 22 47 kg/m²  Input and Output Summary (last 24 hours): Intake/Output Summary (Last 24 hours) at 2021 1438  Last data filed at 2021 1300  Gross per 24 hour   Intake 0 ml   Output 1090 ml   Net -1090 ml       Physical Exam:   Physical Exam  Vitals and nursing note reviewed  Constitutional:       General: He is awake  Appearance: He is underweight  Cardiovascular:      Rate and Rhythm: Normal rate and regular rhythm     Pulmonary:      Effort: Pulmonary effort is normal       Breath sounds: Normal breath sounds  Abdominal:      General: Bowel sounds are normal       Palpations: Abdomen is soft  Tenderness: There is abdominal tenderness in the right upper quadrant  There is no guarding or rebound  Skin:     General: Skin is warm and dry  Neurological:      General: No focal deficit present  Mental Status: He is alert and oriented to person, place, and time  Psychiatric:         Attention and Perception: Attention normal          Mood and Affect: Mood normal          Speech: Speech normal          Behavior: Behavior is cooperative  Additional Data:     Labs:  Results from last 7 days   Lab Units 12/29/21  0624 12/28/21  0554 12/27/21  0446 12/26/21  0603 12/25/21  0550   WBC Thousand/uL 7 43   < > 10 69*   < > 10 46*   HEMOGLOBIN g/dL 8 8*   < > 9 6*   < > 9 3*   HEMATOCRIT % 28 1*   < > 31 1*   < > 28 9*   PLATELETS Thousands/uL 250   < > 271   < > 247   BANDS PCT %  --   --  2  --   --    NEUTROS PCT %  --   --   --   --  88*   LYMPHS PCT %  --   --   --   --  3*   LYMPHO PCT %  --   --  6*  --   --    MONOS PCT %  --   --   --   --  7   MONO PCT %  --   --  4  --   --    EOS PCT %  --   --  0  --  0    < > = values in this interval not displayed       Results from last 7 days   Lab Units 12/29/21  0624   SODIUM mmol/L 136   POTASSIUM mmol/L 3 7   CHLORIDE mmol/L 102   CO2 mmol/L 27   BUN mg/dL 17   CREATININE mg/dL 0 61   ANION GAP mmol/L 7   CALCIUM mg/dL 7 7*   ALBUMIN g/dL 2 5*   TOTAL BILIRUBIN mg/dL 0 62   ALK PHOS U/L 427*   ALT U/L 244*   AST U/L 122*   GLUCOSE RANDOM mg/dL 148*     Results from last 7 days   Lab Units 12/27/21  0446   INR  1 15     Results from last 7 days   Lab Units 12/29/21  1046 12/29/21  0555 12/29/21  0038 12/28/21  1757 12/28/21  1724 12/28/21  1116 12/28/21  0513 12/28/21  0037 12/27/21  2129 12/27/21  1544 12/27/21  0554 12/26/21  2348   POC GLUCOSE mg/dl 148* 153* 142* 130 135 136 113 138 186* 152* 142* 237*         Results from last 7 days   Lab Units 12/27/21  0446 12/26/21  0603 12/23/21  0524   PROCALCITONIN ng/ml 0 48* 0 56* 0 85*       Lines/Drains:  Invasive Devices  Report    Peripheral Intravenous Line            Peripheral IV 12/28/21 Right;Upper;Ventral (anterior) Arm 1 day          Drain            Gastrostomy/Enterostomy PEG-jejunostomy LUQ -- days    Closed/Suction Drain RUQ Other (Comment)  2 days                      Imaging: No pertinent imaging reviewed      Recent Cultures (last 7 days):   Results from last 7 days   Lab Units 12/27/21  1115 12/23/21  0758   GRAM STAIN RESULT  No Polys or Bacteria seen  --    LEGIONELLA URINARY ANTIGEN   --  Negative       Last 24 Hours Medication List:   Current Facility-Administered Medications   Medication Dose Route Frequency Provider Last Rate    acetaminophen  650 mg Rectal Q4H PRN Gage Wood PA-C      amLODIPine  10 mg Per PEG Tube Daily Yi Celestin PA-C      apixaban  10 mg Oral BID Yi Celestin PA-C      aspirin  81 mg Per PEG Tube Daily MOISES Mccracken      fluticasone  1 spray Nasal Daily Espinoza Hoover PA-C      glycopyrrolate  0 1 mg Intravenous Q8H PRN Gage Wood PA-C      guaiFENesin  200 mg Oral 4x Daily Espinoza Hoover PA-C      insulin glargine  10 Units Subcutaneous HS MOISES Mccracken      insulin lispro  1-6 Units Subcutaneous Q6H [address] Espinoza TANISHA Hoover      lisinopril  40 mg Per PEG Tube Daily MOISES Mccracken      metoprolol  5 mg Intravenous Q6H PRN Michelmelvin Lemon PA-C      metoprolol tartrate  25 mg Oral Q12H [address] MOISES Mccracken      morphine injection  2 mg Intravenous Q4H PRN Espinoza TANISHA Hoover      ondansetron  4 mg Intravenous Q6H PRN Espinozajud Hoover PA-C      pantoprazole  40 mg Intravenous Q24H [address] MOISES Mccracken      potassium-sodium phosphates  2 packet Per PEG Tube BID With Meals MOISES Mccracken          Today, Patient Was Seen By: Yi Celestin PA-C    **Please Note: This note may have been constructed using a voice recognition system  **

## 2021-12-29 NOTE — PLAN OF CARE
Problem: Potential for Falls  Goal: Patient will remain free of falls  Description: INTERVENTIONS:  - Educate patient/family on patient safety including physical limitations  - Instruct patient to call for assistance with activity   - Consult OT/PT to assist with strengthening/mobility   - Keep Call bell within reach  - Keep bed low and locked with side rails adjusted as appropriate  - Keep care items and personal belongings within reach  - Initiate and maintain comfort rounds  - Make Fall Risk Sign visible to staff  - Offer Toileting every 2 Hours, in advance of need  - Initiate/Maintain bed alarm  - Obtain necessary fall risk management equipment: bed alarm  - Apply yellow socks and bracelet for high fall risk patients  - Consider moving patient to room near nurses station  Outcome: Progressing     Problem: MOBILITY - ADULT  Goal: Maintain or return to baseline ADL function  Description: INTERVENTIONS:  -  Assess patient's ability to carry out ADLs; assess patient's baseline for ADL function and identify physical deficits which impact ability to perform ADLs (bathing, care of mouth/teeth, toileting, grooming, dressing, etc )  - Assess/evaluate cause of self-care deficits   - Assess range of motion  - Assess patient's mobility; develop plan if impaired  - Assess patient's need for assistive devices and provide as appropriate  - Encourage maximum independence but intervene and supervise when necessary  - Involve family in performance of ADLs  - Assess for home care needs following discharge   - Consider OT consult to assist with ADL evaluation and planning for discharge  - Provide patient education as appropriate  Outcome: Progressing  Goal: Maintains/Returns to pre admission functional level  Description: INTERVENTIONS:  - Perform BMAT or MOVE assessment daily    - Set and communicate daily mobility goal to care team and patient/family/caregiver     - Collaborate with rehabilitation services on mobility goals if consulted  - Perform Range of Motion 2 times a day  - Reposition patient every 2 hours    - Dangle patient 2 times a day  - Stand patient 2 times a day  - Ambulate patient 2 times a day  - Out of bed to chair 2 times a day   - Out of bed for meals 2 times a day  - Out of bed for toileting  - Record patient progress and toleration of activity level   Outcome: Progressing     Problem: PAIN - ADULT  Goal: Verbalizes/displays adequate comfort level or baseline comfort level  Description: Interventions:  - Encourage patient to monitor pain and request assistance  - Assess pain using appropriate pain scale  - Administer analgesics based on type and severity of pain and evaluate response  - Implement non-pharmacological measures as appropriate and evaluate response  - Consider cultural and social influences on pain and pain management  - Notify physician/advanced practitioner if interventions unsuccessful or patient reports new pain  Outcome: Progressing     Problem: INFECTION - ADULT  Goal: Absence or prevention of progression during hospitalization  Description: INTERVENTIONS:  - Assess and monitor for signs and symptoms of infection  - Monitor lab/diagnostic results  - Monitor all insertion sites, i e  indwelling lines, tubes, and drains  - Monitor endotracheal if appropriate and nasal secretions for changes in amount and color  - Dennis appropriate cooling/warming therapies per order  - Administer medications as ordered  - Instruct and encourage patient and family to use good hand hygiene technique  - Identify and instruct in appropriate isolation precautions for identified infection/condition  Outcome: Progressing     Problem: SAFETY ADULT  Goal: Patient will remain free of falls  Description: INTERVENTIONS:  - Educate patient/family on patient safety including physical limitations  - Instruct patient to call for assistance with activity   - Consult OT/PT to assist with strengthening/mobility   - Keep Call bell within reach  - Keep bed low and locked with side rails adjusted as appropriate  - Keep care items and personal belongings within reach  - Initiate and maintain comfort rounds  - Make Fall Risk Sign visible to staff  - Offer Toileting every 2 Hours, in advance of need  - Initiate/Maintain bed alarm  - Obtain necessary fall risk management equipment: bed alarm  - Apply yellow socks and bracelet for high fall risk patients  - Consider moving patient to room near nurses station  Outcome: Progressing  Goal: Maintain or return to baseline ADL function  Description: INTERVENTIONS:  -  Assess patient's ability to carry out ADLs; assess patient's baseline for ADL function and identify physical deficits which impact ability to perform ADLs (bathing, care of mouth/teeth, toileting, grooming, dressing, etc )  - Assess/evaluate cause of self-care deficits   - Assess range of motion  - Assess patient's mobility; develop plan if impaired  - Assess patient's need for assistive devices and provide as appropriate  - Encourage maximum independence but intervene and supervise when necessary  - Involve family in performance of ADLs  - Assess for home care needs following discharge   - Consider OT consult to assist with ADL evaluation and planning for discharge  - Provide patient education as appropriate  Outcome: Progressing  Goal: Maintains/Returns to pre admission functional level  Description: INTERVENTIONS:  - Perform BMAT or MOVE assessment daily    - Set and communicate daily mobility goal to care team and patient/family/caregiver  - Collaborate with rehabilitation services on mobility goals if consulted  - Perform Range of Motion 2 times a day  - Reposition patient every 2 hours    - Dangle patient 2 times a day  - Stand patient 2 times a day  - Ambulate patient 2 times a day  - Out of bed to chair 2 times a day   - Out of bed for meals 2 times a day  - Out of bed for toileting  - Record patient progress and toleration of activity level   Outcome: Progressing     Problem: DISCHARGE PLANNING  Goal: Discharge to home or other facility with appropriate resources  Description: INTERVENTIONS:  - Identify barriers to discharge w/patient and caregiver  - Arrange for needed discharge resources and transportation as appropriate  - Identify discharge learning needs (meds, wound care, etc )  - Arrange for interpretive services to assist at discharge as needed  - Refer to Case Management Department for coordinating discharge planning if the patient needs post-hospital services based on physician/advanced practitioner order or complex needs related to functional status, cognitive ability, or social support system  Outcome: Progressing     Problem: Knowledge Deficit  Goal: Patient/family/caregiver demonstrates understanding of disease process, treatment plan, medications, and discharge instructions  Description: Complete learning assessment and assess knowledge base  Interventions:  - Provide teaching at level of understanding  - Provide teaching via preferred learning methods  Outcome: Progressing     Problem: Nutrition/Hydration-ADULT  Goal: Nutrient/Hydration intake appropriate for improving, restoring or maintaining nutritional needs  Description: Monitor and assess patient's nutrition/hydration status for malnutrition  Collaborate with interdisciplinary team and initiate plan and interventions as ordered  Monitor patient's weight and dietary intake as ordered or per policy  Utilize nutrition screening tool and intervene as necessary  Determine patient's food preferences and provide high-protein, high-caloric foods as appropriate       INTERVENTIONS:  - Monitor oral intake, urinary output, labs, and treatment plans  - Assess nutrition and hydration status and recommend course of action  - Evaluate amount of meals eaten  - Assist patient with eating if necessary   - Allow adequate time for meals  - Recommend/ encourage appropriate diets, oral nutritional supplements, and vitamin/mineral supplements  - Order, calculate, and assess calorie counts as needed  - Recommend, monitor, and adjust tube feedings and TPN/PPN based on assessed needs  - Assess need for intravenous fluids  - Provide specific nutrition/hydration education as appropriate  - Include patient/family/caregiver in decisions related to nutrition  Outcome: Progressing     Problem: Prexisting or High Potential for Compromised Skin Integrity  Goal: Skin integrity is maintained or improved  Description: INTERVENTIONS:  - Identify patients at risk for skin breakdown  - Assess and monitor skin integrity  - Assess and monitor nutrition and hydration status  - Monitor labs   - Assess for incontinence   - Turn and reposition patient  - Assist with mobility/ambulation  - Relieve pressure over bony prominences  - Avoid friction and shearing  - Provide appropriate hygiene as needed including keeping skin clean and dry  - Evaluate need for skin moisturizer/barrier cream  - Collaborate with interdisciplinary team   - Patient/family teaching  - Consider wound care consult   Outcome: Progressing

## 2021-12-30 VITALS
SYSTOLIC BLOOD PRESSURE: 127 MMHG | WEIGHT: 175 LBS | DIASTOLIC BLOOD PRESSURE: 65 MMHG | HEART RATE: 57 BPM | RESPIRATION RATE: 14 BRPM | OXYGEN SATURATION: 96 % | TEMPERATURE: 98.3 F | BODY MASS INDEX: 22.46 KG/M2 | HEIGHT: 74 IN

## 2021-12-30 LAB
ALBUMIN SERPL BCP-MCNC: 2.5 G/DL (ref 3.5–5)
ALP SERPL-CCNC: 396 U/L (ref 34–104)
ALT SERPL W P-5'-P-CCNC: 189 U/L (ref 7–52)
ANION GAP SERPL CALCULATED.3IONS-SCNC: 7 MMOL/L (ref 4–13)
AST SERPL W P-5'-P-CCNC: 90 U/L (ref 13–39)
BACTERIA SPEC BFLD CULT: NO GROWTH
BASOPHILS # BLD AUTO: 0.02 THOUSANDS/ΜL (ref 0–0.1)
BASOPHILS NFR BLD AUTO: 0 % (ref 0–1)
BILIRUB SERPL-MCNC: 0.47 MG/DL (ref 0.2–1)
BUN SERPL-MCNC: 18 MG/DL (ref 5–25)
CALCIUM ALBUM COR SERPL-MCNC: 9.2 MG/DL (ref 8.3–10.1)
CALCIUM SERPL-MCNC: 8 MG/DL (ref 8.4–10.2)
CHLORIDE SERPL-SCNC: 100 MMOL/L (ref 96–108)
CO2 SERPL-SCNC: 27 MMOL/L (ref 21–32)
CREAT SERPL-MCNC: 0.65 MG/DL (ref 0.6–1.3)
EOSINOPHIL # BLD AUTO: 0.03 THOUSAND/ΜL (ref 0–0.61)
EOSINOPHIL NFR BLD AUTO: 0 % (ref 0–6)
ERYTHROCYTE [DISTWIDTH] IN BLOOD BY AUTOMATED COUNT: 20.4 % (ref 11.6–15.1)
GFR SERPL CREATININE-BSD FRML MDRD: 95 ML/MIN/1.73SQ M
GLUCOSE SERPL-MCNC: 140 MG/DL (ref 65–140)
GLUCOSE SERPL-MCNC: 144 MG/DL (ref 65–140)
GLUCOSE SERPL-MCNC: 169 MG/DL (ref 65–140)
GRAM STN SPEC: NORMAL
HCT VFR BLD AUTO: 28.8 % (ref 36.5–49.3)
HGB BLD-MCNC: 9 G/DL (ref 12–17)
IMM GRANULOCYTES # BLD AUTO: 0.14 THOUSAND/UL (ref 0–0.2)
IMM GRANULOCYTES NFR BLD AUTO: 2 % (ref 0–2)
LYMPHOCYTES # BLD AUTO: 0.32 THOUSANDS/ΜL (ref 0.6–4.47)
LYMPHOCYTES NFR BLD AUTO: 5 % (ref 14–44)
MCH RBC QN AUTO: 28.4 PG (ref 26.8–34.3)
MCHC RBC AUTO-ENTMCNC: 31.3 G/DL (ref 31.4–37.4)
MCV RBC AUTO: 91 FL (ref 82–98)
MONOCYTES # BLD AUTO: 0.37 THOUSAND/ΜL (ref 0.17–1.22)
MONOCYTES NFR BLD AUTO: 6 % (ref 4–12)
NEUTROPHILS # BLD AUTO: 5.84 THOUSANDS/ΜL (ref 1.85–7.62)
NEUTS SEG NFR BLD AUTO: 87 % (ref 43–75)
NRBC BLD AUTO-RTO: 1 /100 WBCS
PLATELET # BLD AUTO: 257 THOUSANDS/UL (ref 149–390)
PMV BLD AUTO: 9.9 FL (ref 8.9–12.7)
POTASSIUM SERPL-SCNC: 3.8 MMOL/L (ref 3.5–5.3)
PROT SERPL-MCNC: 5.4 G/DL (ref 6.4–8.4)
RBC # BLD AUTO: 3.17 MILLION/UL (ref 3.88–5.62)
SODIUM SERPL-SCNC: 134 MMOL/L (ref 135–147)
WBC # BLD AUTO: 6.72 THOUSAND/UL (ref 4.31–10.16)

## 2021-12-30 PROCEDURE — 80053 COMPREHEN METABOLIC PANEL: CPT | Performed by: PHYSICIAN ASSISTANT

## 2021-12-30 PROCEDURE — C9113 INJ PANTOPRAZOLE SODIUM, VIA: HCPCS | Performed by: NURSE PRACTITIONER

## 2021-12-30 PROCEDURE — 85025 COMPLETE CBC W/AUTO DIFF WBC: CPT | Performed by: PHYSICIAN ASSISTANT

## 2021-12-30 PROCEDURE — 94761 N-INVAS EAR/PLS OXIMETRY MLT: CPT

## 2021-12-30 PROCEDURE — 99238 HOSP IP/OBS DSCHRG MGMT 30/<: CPT | Performed by: PHYSICIAN ASSISTANT

## 2021-12-30 PROCEDURE — 97530 THERAPEUTIC ACTIVITIES: CPT

## 2021-12-30 PROCEDURE — 82948 REAGENT STRIP/BLOOD GLUCOSE: CPT

## 2021-12-30 PROCEDURE — 99232 SBSQ HOSP IP/OBS MODERATE 35: CPT

## 2021-12-30 RX ORDER — AMLODIPINE BESYLATE 10 MG/1
10 TABLET ORAL DAILY
Qty: 30 TABLET | Refills: 0 | Status: SHIPPED | OUTPATIENT
Start: 2021-12-31

## 2021-12-30 RX ORDER — SODIUM CHLORIDE 9 MG/ML
10 INJECTION INTRAVENOUS DAILY
Qty: 300 ML | Refills: 0 | Status: SHIPPED | OUTPATIENT
Start: 2021-12-30 | End: 2022-03-30

## 2021-12-30 RX ADMIN — LISINOPRIL 40 MG: 20 TABLET ORAL at 08:45

## 2021-12-30 RX ADMIN — INSULIN LISPRO 1 UNITS: 100 INJECTION, SOLUTION INTRAVENOUS; SUBCUTANEOUS at 08:48

## 2021-12-30 RX ADMIN — FLUTICASONE PROPIONATE 1 SPRAY: 50 SPRAY, METERED NASAL at 08:47

## 2021-12-30 RX ADMIN — ASPIRIN 81 MG CHEWABLE TABLET 81 MG: 81 TABLET CHEWABLE at 08:45

## 2021-12-30 RX ADMIN — APIXABAN 10 MG: 5 TABLET, FILM COATED ORAL at 08:45

## 2021-12-30 RX ADMIN — METOPROLOL TARTRATE 25 MG: 25 TABLET, FILM COATED ORAL at 08:45

## 2021-12-30 RX ADMIN — GUAIFENESIN 200 MG: 100 SOLUTION ORAL at 13:09

## 2021-12-30 RX ADMIN — POTASSIUM & SODIUM PHOSPHATES POWDER PACK 280-160-250 MG 2 PACKET: 280-160-250 PACK at 08:45

## 2021-12-30 RX ADMIN — PANTOPRAZOLE SODIUM 40 MG: 40 INJECTION, POWDER, FOR SOLUTION INTRAVENOUS at 08:46

## 2021-12-30 RX ADMIN — GUAIFENESIN 200 MG: 100 SOLUTION ORAL at 08:46

## 2021-12-30 RX ADMIN — AMLODIPINE BESYLATE 10 MG: 10 TABLET ORAL at 08:45

## 2021-12-30 NOTE — PLAN OF CARE
Problem: Potential for Falls  Goal: Patient will remain free of falls  Description: INTERVENTIONS:  - Educate patient/family on patient safety including physical limitations  - Instruct patient to call for assistance with activity   - Consult OT/PT to assist with strengthening/mobility   - Keep Call bell within reach  - Keep bed low and locked with side rails adjusted as appropriate  - Keep care items and personal belongings within reach  - Initiate and maintain comfort rounds  - Make Fall Risk Sign visible to staff  - Offer Toileting every 2 Hours, in advance of need  - Initiate/Maintain bed alarm  - Obtain necessary fall risk management equipment: yellow socks  - Apply yellow socks and bracelet for high fall risk patients  - Consider moving patient to room near nurses station  Outcome: Progressing     Problem: MOBILITY - ADULT  Goal: Maintain or return to baseline ADL function  Description: INTERVENTIONS:  -  Assess patient's ability to carry out ADLs; assess patient's baseline for ADL function and identify physical deficits which impact ability to perform ADLs (bathing, care of mouth/teeth, toileting, grooming, dressing, etc )  - Assess/evaluate cause of self-care deficits   - Assess range of motion  - Assess patient's mobility; develop plan if impaired  - Assess patient's need for assistive devices and provide as appropriate  - Encourage maximum independence but intervene and supervise when necessary  - Involve family in performance of ADLs  - Assess for home care needs following discharge   - Consider OT consult to assist with ADL evaluation and planning for discharge  - Provide patient education as appropriate  Outcome: Progressing  Goal: Maintains/Returns to pre admission functional level  Description: INTERVENTIONS:  - Perform BMAT or MOVE assessment daily    - Set and communicate daily mobility goal to care team and patient/family/caregiver     - Collaborate with rehabilitation services on mobility goals if consulted  - Perform Range of Motion 3 times a day  - Reposition patient every 2 hours    - Dangle patient 2 times a day  - Stand patient 2 times a day  - Ambulate patient 2 times a day  - Out of bed to chair 2 times a day   - Out of bed for meals 2 times a day  - Out of bed for toileting  - Record patient progress and toleration of activity level   Outcome: Progressing     Problem: PAIN - ADULT  Goal: Verbalizes/displays adequate comfort level or baseline comfort level  Description: Interventions:  - Encourage patient to monitor pain and request assistance  - Assess pain using appropriate pain scale  - Administer analgesics based on type and severity of pain and evaluate response  - Implement non-pharmacological measures as appropriate and evaluate response  - Consider cultural and social influences on pain and pain management  - Notify physician/advanced practitioner if interventions unsuccessful or patient reports new pain  Outcome: Progressing     Problem: INFECTION - ADULT  Goal: Absence or prevention of progression during hospitalization  Description: INTERVENTIONS:  - Assess and monitor for signs and symptoms of infection  - Monitor lab/diagnostic results  - Monitor all insertion sites, i e  indwelling lines, tubes, and drains  - Monitor endotracheal if appropriate and nasal secretions for changes in amount and color  - [place] appropriate cooling/warming therapies per order  - Administer medications as ordered  - Instruct and encourage patient and family to use good hand hygiene technique  - Identify and instruct in appropriate isolation precautions for identified infection/condition  Outcome: Progressing    Goal: Maintain or return to baseline ADL function  Description: INTERVENTIONS:  -  Assess patient's ability to carry out ADLs; assess patient's baseline for ADL function and identify physical deficits which impact ability to perform ADLs (bathing, care of mouth/teeth, toileting, grooming, dressing, etc )  - Assess/evaluate cause of self-care deficits   - Assess range of motion  - Assess patient's mobility; develop plan if impaired  - Assess patient's need for assistive devices and provide as appropriate  - Encourage maximum independence but intervene and supervise when necessary  - Involve family in performance of ADLs  - Assess for home care needs following discharge   - Consider OT consult to assist with ADL evaluation and planning for discharge  - Provide patient education as appropriate  Outcome: Progressing     Problem: DISCHARGE PLANNING  Goal: Discharge to home or other facility with appropriate resources  Description: INTERVENTIONS:  - Identify barriers to discharge w/patient and caregiver  - Arrange for needed discharge resources and transportation as appropriate  - Identify discharge learning needs (meds, wound care, etc )  - Arrange for interpretive services to assist at discharge as needed  - Refer to Case Management Department for coordinating discharge planning if the patient needs post-hospital services based on physician/advanced practitioner order or complex needs related to functional status, cognitive ability, or social support system  Outcome: Progressing     Problem: Knowledge Deficit  Goal: Patient/family/caregiver demonstrates understanding of disease process, treatment plan, medications, and discharge instructions  Description: Complete learning assessment and assess knowledge base  Interventions:  - Provide teaching at level of understanding  - Provide teaching via preferred learning methods  Outcome: Progressing     Problem: Nutrition/Hydration-ADULT  Goal: Nutrient/Hydration intake appropriate for improving, restoring or maintaining nutritional needs  Description: Monitor and assess patient's nutrition/hydration status for malnutrition  Collaborate with interdisciplinary team and initiate plan and interventions as ordered    Monitor patient's weight and dietary intake as ordered or per policy  Utilize nutrition screening tool and intervene as necessary  Determine patient's food preferences and provide high-protein, high-caloric foods as appropriate       INTERVENTIONS:  - Monitor oral intake, urinary output, labs, and treatment plans  - Assess nutrition and hydration status and recommend course of action  - Evaluate amount of meals eaten  - Assist patient with eating if necessary   - Allow adequate time for meals  - Recommend/ encourage appropriate diets, oral nutritional supplements, and vitamin/mineral supplements  - Order, calculate, and assess calorie counts as needed  - Recommend, monitor, and adjust tube feedings and TPN/PPN based on assessed needs  - Assess need for intravenous fluids  - Provide specific nutrition/hydration education as appropriate  - Include patient/family/caregiver in decisions related to nutrition  Outcome: Progressing     Problem: Prexisting or High Potential for Compromised Skin Integrity  Goal: Skin integrity is maintained or improved  Description: INTERVENTIONS:  - Identify patients at risk for skin breakdown  - Assess and monitor skin integrity  - Assess and monitor nutrition and hydration status  - Monitor labs   - Assess for incontinence   - Turn and reposition patient  - Assist with mobility/ambulation  - Relieve pressure over bony prominences  - Avoid friction and shearing  - Provide appropriate hygiene as needed including keeping skin clean and dry  - Evaluate need for skin moisturizer/barrier cream  - Collaborate with interdisciplinary team   - Patient/family teaching  - Consider wound care consult   Outcome: Progressing

## 2021-12-30 NOTE — DISCHARGE SUMMARY
Maggie U  68   Discharge- Frantz Santosr 1945, 78 y o  male MRN: [id number]  Unit/Bed#: -[facility] Encounter: [id number]  Primary Care Provider: Tami Milton MD   Date and time admitted to hospital: 12/21/2021 11:56 PM    Cholecystitis  Assessment & Plan  · Patient initially presented with generalized weakness  Evidence of possible cholecystitis seen on CT abdomen/pelvis, confirmed with ultrasound gallbladder  · General surgery consult appreciated  · Holding off on more aggressive measures as of now, as patient is not a good surgical candidate  Status post percutaneous cholecystomy 12/27  · LFTs trending down  · Tube feeds restarted 12/28, increase to goal rate of 80 cc 12/29  ·  Lovenox discontinued on 12/28 and switched to eliquis 10 mg po bid x 7 days, then 5 mg po bid thereafter  · Completed 7 days of Zosyn on 12/29  · Repeat  CMP in 1 week- follow-up with PCP regarding results  · Outpatient follow-up with general surgery  · Outpatient follow-up with IR regarding removal of cholecystomy tube    Other pulmonary embolism without acute cor pulmonale (HCC)  Assessment & Plan  · As seen on CT chest/abdomen/pelvis- 1st episodes of PE  · Echo unremarkable  · No evidence of DVT on venous Doppler  · The patient was started on Lovenox 1 milligram/kilogram subQ q 12 hours on admission, on 12/28 switched to eliquis 10 mg BID x 7 days, then 5 mg po BID thereafter   · Home desaturation study preformed- patient does NOT qualify for oxygen       Community acquired pneumonia of left lower lobe of lung  Assessment & Plan  · As seen on CT and chest x-ray  · COVID negative   · Procalcitonin 0 85 -> 0 48  · Strep pneumo and Legionella urine antigens negative  · Completed 7 days of Zosyn   · MRSA-negative     Severe protein-calorie malnutrition (HCC)  Assessment & Plan  Malnutrition Findings:   Adult Malnutrition type: Acute illness (related to inadequate protein energy intake as evidenced by significant wt  loss 13% < 6 months, moderate muscle loss clavicle/temple area, moderate fat loss buccal/orbital pads treated with EN )  Adult Degree of Malnutrition: Other severe protein calorie malnutrition    BMI Findings: Body mass index is 22 47 kg/m²  Tube feeding resumed at half dose on 12/28, will increase to goal rate of 80 mL 12/29  Primary malignant neoplasm of base of tongue ([facility] )  Assessment & Plan  · Patient just completed radiation therapy at [address]  · Supportive care   · Aspiration precautions        Hyperlipidemia due to type 2 diabetes mellitus ([facility] )  Assessment & Plan  · Hold statin for now due to elevated LFTs  · Repeat CMP in 1 week  · Follow-up with PCP for results of labs and when to restart Lipitor    Type 2 diabetes mellitus without complication, without long-term current use of insulin [facility])  Assessment & Plan  Lab Results   Component Value Date    HGBA1C 6 3 (H) 12/22/2021       Recent Labs     12/29/21  1604 12/29/21  2210 12/30/21  0715 12/30/21  1102   POCGLU 202* 141* 169* 140       Blood Sugar Average: Last 72 hrs:  (P) 966 [id number]  · Resume metformin and empagliflozin    Dysphagia, unspecified  Assessment & Plan  · Patient has an indwelling PEG tube  Formula used is a [employer] product, which we do not carry here at the hospital   · Nutrition consult appreciated  · Patient and his wife agreeable to using our product here (Cathie Gaucher) while inpatient     · Resume previous product for feeds on discharge    Essential (primary) hypertension  Assessment & Plan  · Blood pressure is slightly elevated but improving  · Continue with lisinopril and metoprolol tartrate; added amlodipine 10 mg daily - this will be continued on discharge  · Outpatient follow-up with PCP    * Sepsis ([facility] )  Assessment & Plan  · All sepsis parameters resolved    · Sepsis criteria met with tachycardia, fever, lactic acidosis, with pneumonia possible aspiration and cholecystitis  · Patient received sepsis protocol normal saline fluid resuscitation followed by 125 cc/hour  · Lactic acid and procalcitonin trended to normal  · Blood cultures NGTD    · Patient completed course of Zosyn on 12/29      Medical Problems             Resolved Problems  Date Reviewed: 12/30/2021          Resolved    Lactic acidosis 12/24/2021     Resolved by  MOISES Trujillo    Hyponatremia 12/24/2021     Resolved by  Shoaib Sparks, [address]              Discharging Physician / Practitioner: Lisa Warren PA-C  PCP: Manual Canavan, MD  Admission Date:   Admission Orders (From admission, onward)     Ordered        12/22/21 0402  Inpatient Admission  Once                      Discharge Date: 12/30/21    Consultations During Hospital Stay:  · General surgery  · Interventional Radiology    Procedures Performed:   · Percutaneous cholecystostomy tube placement by Dr Lavelle Gowers on 12/27/2021    Significant Findings / Test Results:   XR chest portable - 1 view    Result Date: 12/22/2021  Impression: Suspected early infiltrates involving the left lung base and right upper lung zone likely representing multifocal pneumonia, possibly Covid related Workstation performed: [id number]     PE Study with CT abdomen & pelvis with contrast    Result Date: 12/22/2021  Impression: 1  Motion limited examination  Small pulmonary emboli are seen within the segmental pulmonary arteries of bilateral lungs  Measured RV/LV ratio is within normal limits at less than 0 9   2   Patchy opacities in left upper and lower lobe bases likely due to pneumonia and less likely pulmonary infarction  3   Distended gallbladder with pericholecystic stranding  Further evaluation with ultrasound may be obtained to evaluate for noncalcified stones/cholecystitis  4   Mild focal thickening of the hepatic flexure may be reactive to adjacent gallbladder   I personally discussed this study with Pricila Rodriguez on 12/22/2021 at 3:43 AM  Workstation performed: [id number]     US right upper quadrant    Result Date: 12/22/2021  · Impression: 1  Gallbladder sludge with mild gallbladder wall thickening and minimal pericholecystic fluid but a negative sonographic Bailon's sign  Findings are equivocal for acute cholecystitis  2   Mild fatty infiltration of the liver  3   Nonobstructive right-sided nephrolithiasis  The study was marked in EPIC for significant notification  Workstation performed: [id number]   ·     Incidental Findings:   · none     Test Results Pending at Discharge (will require follow up):   · none     Outpatient Tests Requested:  · CMP- follow-up with PCP regarding results    Complications:  none    Reason for Admission: Sepsis, pulmonary embolism, acute cholecystitis      Hospital Course:   Marianne Worrell is a 78 y o  male patient who originally presented to the hospital on 12/21/2021 due to generalized weakness x 6 days  Patient presents ER for further evaluation treatment of his 6 day history of generalized weakness  Patient is undergoing radiation treatment [facility] for tongue cancer and just completed his 7 weeks of radiation  Patient is additionally complaining of right upper quadrant abdominal pain and was noted to be febrile upon arrival in the ER with a T-max of 101  2     The patient was meeting criteria for sepsis  He was noted to have acute cholecystitis and pulmonary embolism  He received 7 days of IV zosyn  He was started on Lovenox therapeutic dose  General surgery consulted and felt the patient was not a good candidate for surgery and opted for non surgical management  The patient underwent an insertion of a percutaneous cholecystomy tube by IR on 12/27/2021  Cultures were negative  The patient's Lovenox was switched to eliquis  The patient's tube feeds were restarted on 12/28 and increased to goal rate on 12/29 which the patient tolerated   PT evaluated the patient and recommended post acute rehab however the patient an family opted to take the patient home with home health care  Please see above list of diagnoses and related plan for additional information  Condition at Discharge: fair    Discharge Day Visit / Exam:   Subjective:    Vitals: Blood Pressure: 148/88 (12/30/21 0716)  Pulse: 70 (12/30/21 0716)  Temperature: 98 3 °F (36 8 °C) (12/30/21 0716)  Temp Source: Oral (12/28/21 2231)  Respirations: 16 (12/30/21 0716)  Height: 6' 2" (188 cm) (12/22/21 1318)  Weight - Scale: 79 4 kg (175 lb) (12/22/21 1318)  SpO2: 95 % (12/30/21 0716)  Exam:   Physical Exam  Vitals and nursing note reviewed  Constitutional:       Appearance: Normal appearance  HENT:      Head: Normocephalic and atraumatic  Cardiovascular:      Rate and Rhythm: Normal rate and regular rhythm  Pulmonary:      Effort: No respiratory distress  Breath sounds: Normal breath sounds  No wheezing or rales  Abdominal:      General: There is no distension  Palpations: Abdomen is soft  Tenderness: There is no abdominal tenderness  Skin:     General: Skin is warm and dry  Neurological:      General: No focal deficit present  Mental Status: He is alert and oriented to person, place, and time  Discussion with Family: Updated  (wife) via phone  Discharge instructions/Information to patient and family:   See after visit summary for information provided to patient and family  Provisions for Follow-Up Care:  See after visit summary for information related to follow-up care and any pertinent home health orders  Disposition:   Home with VNA Services (Reminder: Complete face to face encounter)    Planned Readmission: no     Discharge Statement:  I spent 25 minutes discharging the patient  This time was spent on the day of discharge  I had direct contact with the patient on the day of discharge   Greater than 50% of the total time was spent examining patient, answering all patient questions, arranging and discussing plan of care with patient as well as directly providing post-discharge instructions  Additional time then spent on discharge activities  Discharge Medications:  See after visit summary for reconciled discharge medications provided to patient and/or family        **Please Note: This note may have been constructed using a voice recognition system**

## 2021-12-30 NOTE — PLAN OF CARE
Problem: PHYSICAL THERAPY ADULT  Goal: Performs mobility at highest level of function for planned discharge setting  See evaluation for individualized goals  Description: Treatment/Interventions: Functional transfer training,LE strengthening/ROM,Therapeutic exercise,Endurance training,Bed mobility,Gait training          See flowsheet documentation for full assessment, interventions and recommendations  Outcome: Not Progressing  Note: Prognosis: Fair  Problem List: Decreased strength,Decreased endurance,Impaired balance,Decreased mobility,Impaired judgement,Decreased safety awareness,Decreased coordination,Pain  Assessment: Pt seen for PT treatment session this date with interventions consisting of therapeutic activity consisting of training: bed mobility, supine<>sit transfers, sit<>stand transfers, static sitting tolerance at EOB for 8 minutes w/ no UE support and vc and tactile cues for static sitting posture faciliation  Pt agreeable to PT treatment session upon arrival, pt found supine in bed w/ HOB elevated, in no apparent distress and responsive  In comparison to previous session, pt with no improvements as evidenced by pt declined further mobility  Post session: pt returned BTB, bed alarm engaged, all needs in reach and RN notified of session findings/recommendations  Continue to recommend post acute rehabilitation services at time of d/c in order to maximize pt's functional independence and safety w/ mobility  Pt continues to be functioning below baseline level, and remains limited 2* factors listed above and including decreased functional mobility and decreased functional activity tolerance  PT will continue to see pt during current hospitalization in order to address the deficits listed above and provide interventions consistent w/ POC in effort to achieve STGs  PT Discharge Recommendation: Post acute rehabilitation services          See flowsheet documentation for full assessment

## 2021-12-30 NOTE — CASE MANAGEMENT
Case Management Discharge Planning Note    Patient name Sharlene [facility]  Location [address] 220/-96 MRN [id number]  : 1945 Date 2021       Current Admission Date: 2021  Current Admission Diagnosis:Sepsis [facility])   Patient Active Problem List    Diagnosis Date Noted    Anxiety 2021    Neoplasm of uncertain behavior of skin 2021    Post-traumatic stress disorder, unspecified 2021    Essential (primary) hypertension 2021    Actinic keratosis 2021    Dysphagia, unspecified 2021    Erectile dysfunction 2021    History of malignant neoplasm of bladder 2021    Carpal tunnel syndrome 2021    Chronic post-traumatic stress disorder (PTSD) after  combat 2021    Type 2 diabetes mellitus without complication, without long-term current use of insulin ([facility] ) 2021    Hematuria 2021    Hyperlipidemia due to type 2 diabetes mellitus ([address] ) 2021    Malignant neoplasm of urinary bladder ([facility] ) 2021    Neoplasm of lung 2021    Osteoarthritis of knee 2021    Panic disorder 2021    Polyp of colon 2021    Tobacco dependence 2021    Sepsis ([address] ) 2021    Community acquired pneumonia of left lower lobe of lung 2021    Other pulmonary embolism without acute cor pulmonale ([address] ) 2021    Cholecystitis 2021    Severe protein-calorie malnutrition ([address] ) 2021    Primary malignant neoplasm of base of tongue ([facility] ) 10/11/2021    Neoplasm of uncertain behavior of oropharynx 2021    Cardiac arrhythmia 05/15/2018    Heart murmur 05/15/2018    Left ventricular hypertrophy 05/15/2018      LOS (days): 8  Geometric Mean LOS (GMLOS) (days): 4 80  Days to GMLOS:-3 7     OBJECTIVE:  Risk of Unplanned Readmission Score: 20         Current admission status: Inpatient   Preferred Pharmacy:   [address], [address] [place]  [address]  Phone: [phone] Fax: [phone]    Primary Care Provider: Rod Arndt MD    Primary Insurance: MEDICARE  Secondary Insurance: Eileen Mcclure    DISCHARGE DETAILS:    Discharge planning discussed with[de-identified] patient , wife and daughter  Freedom of Choice: Yes  Comments - Freedom of Choice: pt was recommended  rehab, family has declined, pt is active with [facility]  CM contacted family/caregiver?: Yes             Contacts  Patient Contacts: Jenel Galeazzi  Relationship to Patient[de-identified] Family (wife)  Contact Method:  In Person  Reason/Outcome: Discharge [address]         Is the patient interested in [address] at discharge?: Yes         Other Referral/Resources/Interventions Provided:  Interventions: [address]  Referral Comments: pt is active with [facility], [facility] was made aware of the d/c and avs was faxed [phone]    Would you like to participate in our [facility] service program?  : No - Declined    Treatment Team Recommendation: Home with [facility] Ione Razz (family and [facility])  Discharge Destination Plan[de-identified] Home with [facility] Ione Razz (wife and [facility])  Transport at Discharge : Yarely by: Family member           Family notified[de-identified] family was in the room

## 2021-12-30 NOTE — ASSESSMENT & PLAN NOTE
Malnutrition Findings:   Adult Malnutrition type: Acute illness (related to inadequate protein energy intake as evidenced by significant wt  loss 13% < 6 months, moderate muscle loss clavicle/temple area, moderate fat loss buccal/orbital pads treated with EN )  Adult Degree of Malnutrition: Other severe protein calorie malnutrition    BMI Findings: Body mass index is 22 47 kg/m²  Tube feeding resumed at half dose on 12/28, will increase to goal rate of 80 mL 12/29

## 2021-12-30 NOTE — ASSESSMENT & PLAN NOTE
· As seen on CT chest/abdomen/pelvis- 1st episodes of PE  · Echo unremarkable  · No evidence of DVT on venous Doppler  · The patient was started on Lovenox 1 milligram/kilogram subQ q 12 hours on admission, on 12/28 switched to eliquis 10 mg BID x 7 days, then 5 mg po BID thereafter   · Home desaturation study preformed- patient does NOT qualify for oxygen

## 2021-12-30 NOTE — OCCUPATIONAL THERAPY NOTE
12/30/21 1200   OT Last Visit   OT Visit Date 12/30/21   Note Type   Note Type Treatment   Restrictions/Precautions   Weight Bearing Precautions Per Order No   Other Precautions Multiple lines;Telemetry;O2;Fall Risk;Pain   Pain Assessment   Pain Assessment Tool 0-10   Pain Score 7   Pain Location/Orientation Location: Abdomen   Pain Onset/Description Frequency: Constant/Continuous; Onset: Ongoing   Patient's Stated Pain Goal No pain   Hospital Pain Intervention(s) Medication (See MAR); Repositioned; Emotional support   Bed Mobility   Supine to Sit 4  Minimal assistance   Additional items Assist x 1;HOB elevated; Bedrails; Increased time required;Verbal cues   Sit to Supine 4  Minimal assistance   Additional items Assist x 1;Bedrails; Increased time required;Verbal cues   Additional Comments Sat EOB x 5 mins unsupprted with no LOB  Transfers   Sit to Stand Unable to assess  (patient declined)   Functional Mobility   Functional Mobility   (Declined)   Cognition   Overall Cognitive Status [facility]   Arousal/Participation Alert; Responsive   Attention Within functional limits   Orientation Level Oriented X4   Memory Within functional limits   Following Commands Follows one step commands with increased time or repetition   Comments Pt agreeable to OT treatment  Activity Tolerance   Activity Tolerance Patient limited by fatigue;Patient limited by pain   Assessment   Assessment Patient participated in Skilled OT session this date with interventions consisting of functional transfer training and Energy Conservation techniques   Patient agreeable to OT treatment session, upon arrival patient was found supine in bed  Patient transfers supine <> sit EOB with Min a x 1  Patient then sat EOB x 5+ minutes with no LOB noted  Patient reports significant fatigue/not feeling well  Encouraged additional activities however, patient declined further activity at this time   Session ended with patient returned to bed, SCDs reapplied and all needs met  In comparison to previous session, patient with no noted improvements  Patient requiring frequent re direction, verbal cues for safety, verbal cues for correct technique, verbal cues for pacing thru activity steps and frequent rest periods  Patient performance  demonstrated good carryover of learned techniques and strategies to facilitate safety during functional tasks  Patient continues to be functioning below baseline level, occupational performance remains limited secondary to factors listed above and increased risk for falls and injury  From OT standpoint, recommendation at time of d/c would be Short Term Rehab  Patient to benefit from continued Occupational Therapy treatment while in the hospital to address deficits as defined above and maximize level of functional independence with ADLs and functional mobility in order to return to [facility]      Plan   Treatment Interventions Functional transfer training;Energy conservation   Goal Expiration Date 12/02/21   OT Treatment Day 2   OT Frequency 3-5x/wk   Recommendation   OT Discharge Recommendation Post acute rehabilitation services   OT - OK to Discharge Yes  (when medically cleared)   AM-PAC Daily Activity Inpatient   Lower Body Dressing 1   Bathing 2   Toileting 3   Upper Body Dressing 2   Grooming 3   Eating 3   Daily Activity Raw Score 14   Daily Activity Standardized Score (Calc for Raw Score >=11) 33 39   AM-PAC Applied Cognition Inpatient   Following a Speech/Presentation 3   Understanding Ordinary Conversation 4   Taking Medications 3   Remembering Where Things Are Placed or Put Away 3   Remembering List of 4-5 Errands 3   Taking Care of Complicated Tasks 2   Applied Cognition Raw Score 18   Applied Cognition Standardized Score 38 07   EUN Alves

## 2021-12-30 NOTE — ASSESSMENT & PLAN NOTE
· Patient has an indwelling PEG tube  Formula used is a [employer] product, which we do not carry here at the hospital   · Nutrition consult appreciated    · Patient and his wife agreeable to using our product here (Jevity) while inpatient     · Resume previous product for feeds on discharge

## 2021-12-30 NOTE — PLAN OF CARE
Problem: OCCUPATIONAL THERAPY ADULT  Goal: Performs self-care activities at highest level of function for planned discharge setting  See evaluation for individualized goals  Description: Treatment Interventions: ADL retraining,Functional transfer training,UE strengthening/ROM,Endurance training,Patient/family training,Equipment evaluation/education,Neuromuscular reeducation,Energy conservation,Compensatory technique education,Activityengagement          See flowsheet documentation for full assessment, interventions and recommendations  Outcome: Progressing  Note: Limitation: Decreased ADL status,Decreased UE ROM,Decreased UE strength,Decreased Safe judgement during ADL,Decreased endurance,Decreased self-care trans,Decreased high-level ADLs  Prognosis: Good  Assessment: Patient participated in Skilled OT session this date with interventions consisting of functional transfer training and Energy Conservation techniques   Patient agreeable to OT treatment session, upon arrival patient was found supine in bed  Patient transfers supine <> sit EOB with Min a x 1  Patient then sat EOB x 5+ minutes with no LOB noted  Patient reports significant fatigue/not feeling well  Encouraged additional activities however, patient declined further activity at this time  Session ended with patient returned to bed, SCDs reapplied and all needs met  In comparison to previous session, patient with no noted improvements  Patient requiring frequent re direction, verbal cues for safety, verbal cues for correct technique, verbal cues for pacing thru activity steps and frequent rest periods  Patient performance  demonstrated good carryover of learned techniques and strategies to facilitate safety during functional tasks  Patient continues to be functioning below baseline level, occupational performance remains limited secondary to factors listed above and increased risk for falls and injury     From OT standpoint, recommendation at time of d/c would be Short Term Rehab  Patient to benefit from continued Occupational Therapy treatment while in the hospital to address deficits as defined above and maximize level of functional independence with ADLs and functional mobility in order to return to [facility]        OT Discharge Recommendation: Post acute rehabilitation services  OT - OK to Discharge: Yes (when medically cleared)

## 2021-12-30 NOTE — ASSESSMENT & PLAN NOTE
· Blood pressure is slightly elevated but improving  · Continue with lisinopril and metoprolol tartrate; added amlodipine 10 mg daily - this will be continued on discharge    · Outpatient follow-up with PCP

## 2021-12-30 NOTE — ASSESSMENT & PLAN NOTE
· As seen on CT and chest x-ray  · COVID negative   · Procalcitonin 0 85 -> 0 48  · Strep pneumo and Legionella urine antigens negative  · Completed 7 days of Zosyn   · MRSA-negative

## 2021-12-30 NOTE — NURSING NOTE
IV removed with catheter tip intact  AVS reviewed with patient and spouse  Patient wheeled to car by myself

## 2021-12-30 NOTE — ASSESSMENT & PLAN NOTE
· Patient just completed radiation therapy at [address]  · Supportive care   · Aspiration precautions

## 2021-12-30 NOTE — ASSESSMENT & PLAN NOTE
· Patient initially presented with generalized weakness  Evidence of possible cholecystitis seen on CT abdomen/pelvis, confirmed with ultrasound gallbladder  · General surgery consult appreciated  · Holding off on more aggressive measures as of now, as patient is not a good surgical candidate  Status post percutaneous cholecystomy 12/27  · LFTs trending down  · Tube feeds restarted 12/28, increase to goal rate of 80 cc 12/29  ·  Lovenox discontinued on 12/28 and switched to eliquis 10 mg po bid x 7 days, then 5 mg po bid thereafter  · Completed 7 days of Zosyn on 12/29    · Repeat  CMP in 1 week- follow-up with PCP regarding results  · Outpatient follow-up with general surgery  · Outpatient follow-up with IR regarding removal of cholecystomy tube

## 2021-12-30 NOTE — PLAN OF CARE
Problem: Potential for Falls  Goal: Patient will remain free of falls  Description: INTERVENTIONS:  - Educate patient/family on patient safety including physical limitations  - Instruct patient to call for assistance with activity   - Consult OT/PT to assist with strengthening/mobility   - Keep Call bell within reach  - Keep bed low and locked with side rails adjusted as appropriate  - Keep care items and personal belongings within reach  - Initiate and maintain comfort rounds  - Make Fall Risk Sign visible to staff  - Offer Toileting every 2 Hours, in advance of need  - Initiate/Maintain bed alarm  - Obtain necessary fall risk management equipment: bed alarm  - Apply yellow socks and bracelet for high fall risk patients  - Consider moving patient to room near nurses station  Outcome: Progressing     Problem: MOBILITY - ADULT  Goal: Maintain or return to baseline ADL function  Description: INTERVENTIONS:  -  Assess patient's ability to carry out ADLs; assess patient's baseline for ADL function and identify physical deficits which impact ability to perform ADLs (bathing, care of mouth/teeth, toileting, grooming, dressing, etc )  - Assess/evaluate cause of self-care deficits   - Assess range of motion  - Assess patient's mobility; develop plan if impaired  - Assess patient's need for assistive devices and provide as appropriate  - Encourage maximum independence but intervene and supervise when necessary  - Involve family in performance of ADLs  - Assess for home care needs following discharge   - Consider OT consult to assist with ADL evaluation and planning for discharge  - Provide patient education as appropriate  Outcome: Progressing  Goal: Maintains/Returns to pre admission functional level  Description: INTERVENTIONS:  - Perform BMAT or MOVE assessment daily    - Set and communicate daily mobility goal to care team and patient/family/caregiver     - Collaborate with rehabilitation services on mobility goals if consulted  - Perform Range of Motion 2 times a day  - Reposition patient every 2 hours    - Dangle patient 2 times a day  - Stand patient 2 times a day  - Ambulate patient 2 times a day  - Out of bed to chair 2 times a day   - Out of bed for meals 2 times a day  - Out of bed for toileting  - Record patient progress and toleration of activity level   Outcome: Progressing     Problem: PAIN - ADULT  Goal: Verbalizes/displays adequate comfort level or baseline comfort level  Description: Interventions:  - Encourage patient to monitor pain and request assistance  - Assess pain using appropriate pain scale  - Administer analgesics based on type and severity of pain and evaluate response  - Implement non-pharmacological measures as appropriate and evaluate response  - Consider cultural and social influences on pain and pain management  - Notify physician/advanced practitioner if interventions unsuccessful or patient reports new pain  Outcome: Progressing     Problem: INFECTION - ADULT  Goal: Absence or prevention of progression during hospitalization  Description: INTERVENTIONS:  - Assess and monitor for signs and symptoms of infection  - Monitor lab/diagnostic results  - Monitor all insertion sites, i e  indwelling lines, tubes, and drains  - Monitor endotracheal if appropriate and nasal secretions for changes in amount and color  - [place] appropriate cooling/warming therapies per order  - Administer medications as ordered  - Instruct and encourage patient and family to use good hand hygiene technique  - Identify and instruct in appropriate isolation precautions for identified infection/condition  Outcome: Progressing     Problem: SAFETY ADULT  Goal: Patient will remain free of falls  Description: INTERVENTIONS:  - Educate patient/family on patient safety including physical limitations  - Instruct patient to call for assistance with activity   - Consult OT/PT to assist with strengthening/mobility   - Keep Call bell within reach  - Keep bed low and locked with side rails adjusted as appropriate  - Keep care items and personal belongings within reach  - Initiate and maintain comfort rounds  - Make Fall Risk Sign visible to staff  - Offer Toileting every 2 Hours, in advance of need  - Initiate/Maintain bed alarm  - Obtain necessary fall risk management equipment: bed alarm  - Apply yellow socks and bracelet for high fall risk patients  - Consider moving patient to room near nurses station  Outcome: Progressing  Goal: Maintain or return to baseline ADL function  Description: INTERVENTIONS:  -  Assess patient's ability to carry out ADLs; assess patient's baseline for ADL function and identify physical deficits which impact ability to perform ADLs (bathing, care of mouth/teeth, toileting, grooming, dressing, etc )  - Assess/evaluate cause of self-care deficits   - Assess range of motion  - Assess patient's mobility; develop plan if impaired  - Assess patient's need for assistive devices and provide as appropriate  - Encourage maximum independence but intervene and supervise when necessary  - Involve family in performance of ADLs  - Assess for home care needs following discharge   - Consider OT consult to assist with ADL evaluation and planning for discharge  - Provide patient education as appropriate  Outcome: Progressing  Goal: Maintains/Returns to pre admission functional level  Description: INTERVENTIONS:  - Perform BMAT or MOVE assessment daily    - Set and communicate daily mobility goal to care team and patient/family/caregiver  - Collaborate with rehabilitation services on mobility goals if consulted  - Perform Range of Motion 2 times a day  - Reposition patient every 2 hours    - Dangle patient 2 times a day  - Stand patient 2 times a day  - Ambulate patient 2 times a day  - Out of bed to chair 2 times a day   - Out of bed for meals 2 times a day  - Out of bed for toileting  - Record patient progress and toleration of activity level   Outcome: Progressing     Problem: DISCHARGE PLANNING  Goal: Discharge to home or other facility with appropriate resources  Description: INTERVENTIONS:  - Identify barriers to discharge w/patient and caregiver  - Arrange for needed discharge resources and transportation as appropriate  - Identify discharge learning needs (meds, wound care, etc )  - Arrange for interpretive services to assist at discharge as needed  - Refer to Case Management Department for coordinating discharge planning if the patient needs post-hospital services based on physician/advanced practitioner order or complex needs related to functional status, cognitive ability, or social support system  Outcome: Progressing     Problem: Knowledge Deficit  Goal: Patient/family/caregiver demonstrates understanding of disease process, treatment plan, medications, and discharge instructions  Description: Complete learning assessment and assess knowledge base  Interventions:  - Provide teaching at level of understanding  - Provide teaching via preferred learning methods  Outcome: Progressing     Problem: Nutrition/Hydration-ADULT  Goal: Nutrient/Hydration intake appropriate for improving, restoring or maintaining nutritional needs  Description: Monitor and assess patient's nutrition/hydration status for malnutrition  Collaborate with interdisciplinary team and initiate plan and interventions as ordered  Monitor patient's weight and dietary intake as ordered or per policy  Utilize nutrition screening tool and intervene as necessary  Determine patient's food preferences and provide high-protein, high-caloric foods as appropriate       INTERVENTIONS:  - Monitor oral intake, urinary output, labs, and treatment plans  - Assess nutrition and hydration status and recommend course of action  - Evaluate amount of meals eaten  - Assist patient with eating if necessary   - Allow adequate time for meals  - Recommend/ encourage appropriate diets, oral nutritional supplements, and vitamin/mineral supplements  - Order, calculate, and assess calorie counts as needed  - Recommend, monitor, and adjust tube feedings and TPN/PPN based on assessed needs  - Assess need for intravenous fluids  - Provide specific nutrition/hydration education as appropriate  - Include patient/family/caregiver in decisions related to nutrition  Outcome: Progressing     Problem: Prexisting or High Potential for Compromised Skin Integrity  Goal: Skin integrity is maintained or improved  Description: INTERVENTIONS:  - Identify patients at risk for skin breakdown  - Assess and monitor skin integrity  - Assess and monitor nutrition and hydration status  - Monitor labs   - Assess for incontinence   - Turn and reposition patient  - Assist with mobility/ambulation  - Relieve pressure over bony prominences  - Avoid friction and shearing  - Provide appropriate hygiene as needed including keeping skin clean and dry  - Evaluate need for skin moisturizer/barrier cream  - Collaborate with interdisciplinary team   - Patient/family teaching  - Consider wound care consult   Outcome: Progressing

## 2021-12-30 NOTE — PROGRESS NOTES
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  AVSS, afebrile   No leukocytosis, 6 72 from 7 43   LFTs contiue to trend down  Cultures pending but NGTD  PEG   Cholesystostomy tube with 120 cc blood, bilious drainage    Plan:  Stable for discharge from surgical standpoint, outpatient f/u  TF @ 80 ml/hr with clear liquids as tolerated   Continue to monitor drain output  Antiemetics and analgesic prn  Incentive spirometry  Encourage ambulation of hallways  [place]  Medical management per SLIM   F/u cultures, NGTD    Subjective/Objective   Chief Complaint: No acute complaints    Subjective: No acute overnight events  Patient is doing well this morning  Mild pain at drain site but otherwise abdominal pain is improved  Continues to cough up sputum  Otherwise tolerating tube feeds and clear liquids without nausea or vomiting  Denies chest pain, palpitations, shortness of breath, calf tenderness  Denies fever, chills  Objective:   Blood pressure 148/88, pulse 70, temperature 98 3 °F (36 8 °C), resp  rate 16, height 6' 2" (1 88 m), weight 79 4 kg (175 lb), SpO2 95 %  ,Body mass index is 22 47 kg/m²        Intake/Output Summary (Last 24 hours) at 12/30/2021 0902  Last data filed at 12/30/2021 0640  Gross per 24 hour   Intake 1285 ml   Output 830 ml   Net 455 ml       Invasive Devices  Report    Peripheral Intravenous Line            Peripheral IV 12/28/21 Right;Upper;Ventral (anterior) Arm 1 day          Drain            Gastrostomy/Enterostomy PEG-jejunostomy LUQ -- days    Closed/Suction Drain RUQ Other (Comment)  2 days              Physical Exam: /88   Pulse 70   Temp 98 3 °F (36 8 °C)   Resp 16   Ht 6' 2" (1 88 m)   Wt 79 4 kg (175 lb)   SpO2 95%   BMI 22 47 kg/m²   General appearance: alert and oriented, in no acute distress  Lungs: wheezes  Heart: regular rate and rhythm, S1, S2 normal, no murmur, click, rub or gallop  Abdomen: drain site is clean, dry, and intact with bloody, bilious drainage; normoactive bowel sounds, soft, nondistended, nontender  Extremities: extremities normal, warm and well-perfused; no cyanosis, clubbing, or edema  Skin: Skin color, texture, turgor normal  No rashes or lesions    Lab, Imaging and other studies:  I have personally reviewed pertinent lab results      CBC:   Lab Results   Component Value Date    WBC 6 72 12/30/2021    HGB 9 0 (L) 12/30/2021    HCT 28 8 (L) 12/30/2021    MCV 91 12/30/2021     12/30/2021    MCH 28 4 12/30/2021    MCHC 31 3 (L) 12/30/2021    RDW 20 4 (H) 12/30/2021    MPV 9 9 12/30/2021    NRBC 1 12/30/2021     CMP:   Lab Results   Component Value Date    SODIUM 134 (L) 12/30/2021    K 3 8 12/30/2021     12/30/2021    CO2 27 12/30/2021    BUN 18 12/30/2021    CREATININE 0 65 12/30/2021    CALCIUM 8 0 (L) 12/30/2021    AST 90 (H) 12/30/2021     (H) 12/30/2021    ALKPHOS 396 (H) 12/30/2021    EGFR 95 12/30/2021     VTE Pharmacologic Prophylaxis: apixaban (Eliquis)  VTE Mechanical Prophylaxis: sequential compression device    Reina Flannery PA-C

## 2021-12-30 NOTE — ASSESSMENT & PLAN NOTE
· Hold statin for now due to elevated LFTs  · Repeat CMP in 1 week  · Follow-up with PCP for results of labs and when to restart Lipitor

## 2021-12-30 NOTE — PHYSICAL THERAPY NOTE
12/30/21 1023   PT Last Visit   PT Visit Date 12/30/21   Note Type   Note Type Treatment   Pain Assessment   Pain Assessment Tool 0-10   Pain Score 7   Pain Location/Orientation Location: Abdomen   Pain Onset/Description Frequency: Constant/Continuous; Onset: Ongoing   Effect of Pain on Daily Activities yes   Patient's Stated Pain Goal No pain   Hospital Pain Intervention(s) Ambulation/increased activity;Repositioned; Emotional support;Medication (See MAR)   Restrictions/Precautions   Weight Bearing Precautions Per Order No   Other Precautions Chair Alarm; Bed Alarm;Multiple lines;O2;Fall Risk;Pain   General   Chart Reviewed Yes   Response to Previous Treatment Patient with no complaints from previous session  Family/Caregiver Present No   Cognition   Overall Cognitive Status WFL   Arousal/Participation Alert; Responsive   Attention Within functional limits   Orientation Level Oriented X4   Memory Within functional limits   Following Commands Follows one step commands with increased time or repetition   Comments pt initially agreeable to PT session, then terminated session early   Bed Mobility   Supine to Sit 4  Minimal assistance   Additional items Assist x 1;HOB elevated; Bedrails; Increased time required;Verbal cues   Sit to Supine 4  Minimal assistance   Additional items Assist x 1;Bedrails; Increased time required;Verbal cues;LE management   Additional Comments pt sat at EOB~8 minutes unsupported with no LOB   Transfers   Sit to Stand Unable to assess  (pt declined further mobility)   Additional Comments pt c/o of lightheadedness upon sitting, but did resolve within ~1 minute   Ambulation/Elevation   Gait pattern   (pt declined)   Balance   Static Sitting Fair +   Dynamic Sitting Fair   Endurance Deficit   Endurance Deficit Yes   Activity Tolerance   Activity Tolerance Patient limited by fatigue;Patient limited by pain   Nurse Made Aware yes   Assessment   Prognosis Fair   Problem List Decreased strength;Decreased endurance; Impaired balance;Decreased mobility; Impaired judgement;Decreased safety awareness;Decreased coordination;Pain   Assessment Pt seen for PT treatment session this date with interventions consisting of therapeutic activity consisting of training: bed mobility, supine<>sit transfers, sit<>stand transfers, static sitting tolerance at EOB for 8 minutes w/ no UE support and vc and tactile cues for static sitting posture faciliation  Pt agreeable to PT treatment session upon arrival, pt found supine in bed w/ HOB elevated, in no apparent distress and responsive  In comparison to previous session, pt with no improvements as evidenced by pt declined further mobility  Post session: pt returned BTB, bed alarm engaged, all needs in reach and RN notified of session findings/recommendations  Continue to recommend post acute rehabilitation services at time of d/c in order to maximize pt's functional independence and safety w/ mobility  Pt continues to be functioning below baseline level, and remains limited 2* factors listed above and including decreased functional mobility and decreased functional activity tolerance  PT will continue to see pt during current hospitalization in order to address the deficits listed above and provide interventions consistent w/ POC in effort to achieve STGs  Plan   Treatment/Interventions Functional transfer training;LE strengthening/ROM; Therapeutic exercise; Endurance training;Bed mobility;Gait training   Progress Slow progress, decreased activity tolerance   PT Frequency 3-5x/wk   Recommendation   PT Discharge Recommendation Post acute rehabilitation services   Additional Comments upon conclusion, pt resting in bed with all needs in reach   Additional Comments 2 Pt's raw score on the AM-PAC Basic Mobility inpatient short form is 15, standardized score is 36 97   Patients at this level are likely to benefit from DC to [facility], however, please refer to therapist recommendation for safe DC planning  AM-PAC Basic Mobility Inpatient   Turning in Bed Without Bedrails 3   Lying on Back to Sitting on Edge of Flat Bed 2   Moving Bed to Chair 3   Standing Up From Chair 3   Walk in Room 3   Climb 3-5 Stairs 1   Basic Mobility Inpatient Raw Score 15   Basic Mobility Standardized Score 36 97   Highest Level Of Mobility   [facility] Goal 4: Move to chair/commode   Education   Education Provided Mobility training   Patient Demonstrates acceptance/verbal understanding;Reinforcement needed   End of Consult   Patient Position at End of Consult Supine;Bed/Chair alarm activated; All needs within reach

## 2021-12-30 NOTE — ASSESSMENT & PLAN NOTE
Lab Results   Component Value Date    HGBA1C 6 3 (H) 12/22/2021       Recent Labs     12/29/21  1604 12/29/21  2210 12/30/21  0715 12/30/21  1102   POCGLU 202* 141* 169* 140       Blood Sugar Average: Last 72 hrs:  (P) 017 [id number]  · Resume metformin and empagliflozin

## 2022-01-04 ENCOUNTER — TELEPHONE (OUTPATIENT)
Dept: INTERVENTIONAL RADIOLOGY/VASCULAR | Facility: HOSPITAL | Age: 77
End: 2022-01-04

## 2022-01-04 ENCOUNTER — TELEPHONE (OUTPATIENT)
Dept: SURGERY | Facility: CLINIC | Age: 77
End: 2022-01-04

## 2022-01-04 NOTE — TELEPHONE ENCOUNTER
Message sent to Dr Britt Anderson  Patient called, he has a Cholecystostomy and states that the bag detached from the tube  Do you want to see him in the office or should he go to the ER?  Phone is [phone] (his wife Roque Luque)

## 2022-01-04 NOTE — TELEPHONE ENCOUNTER
Per Dr Lito White, suggestion was to contact IR  I called and spoke with Ananth Said, gave her the contact information for [facility] and she will contact the patient  Spoke with patient's wife to let her know that IR will be contacting [facility]

## 2022-01-05 ENCOUNTER — HOSPITAL ENCOUNTER (EMERGENCY)
Facility: HOSPITAL | Age: 77
Discharge: HOME | End: 2022-01-05
Attending: EMERGENCY MEDICINE | Admitting: EMERGENCY MEDICINE
Payer: MEDICARE

## 2022-01-05 ENCOUNTER — HOSPITAL ENCOUNTER (OUTPATIENT)
Dept: RADIOLOGY | Facility: HOSPITAL | Age: 77
Discharge: HOME | End: 2022-01-05
Attending: RADIOLOGY
Payer: MEDICARE

## 2022-01-05 VITALS
SYSTOLIC BLOOD PRESSURE: 109 MMHG | BODY MASS INDEX: 21.83 KG/M2 | HEART RATE: 94 BPM | RESPIRATION RATE: 16 BRPM | DIASTOLIC BLOOD PRESSURE: 61 MMHG | WEIGHT: 170 LBS | OXYGEN SATURATION: 98 %

## 2022-01-05 DIAGNOSIS — K81.9 CHOLECYSTITIS: Primary | ICD-10-CM

## 2022-01-05 DIAGNOSIS — D37.05 NEOPLASM OF UNCERTAIN BEHAVIOR OF OROPHARYNX: Primary | ICD-10-CM

## 2022-01-05 DIAGNOSIS — D37.05 NEOPLASM OF UNCERTAIN BEHAVIOR OF OROPHARYNX: ICD-10-CM

## 2022-01-05 DIAGNOSIS — T85.698A JP DRAIN, BROKEN, INITIAL ENCOUNTER: Primary | ICD-10-CM

## 2022-01-05 PROCEDURE — 99284 EMERGENCY DEPT VISIT MOD MDM: CPT | Performed by: EMERGENCY MEDICINE

## 2022-01-05 PROCEDURE — C1769 GUIDE WIRE: HCPCS

## 2022-01-05 PROCEDURE — 99283 EMERGENCY DEPT VISIT LOW MDM: CPT

## 2022-01-05 PROCEDURE — 47536 EXCHANGE BILIARY DRG CATH: CPT

## 2022-01-05 PROCEDURE — 47536 EXCHANGE BILIARY DRG CATH: CPT | Performed by: INTERNAL MEDICINE

## 2022-01-05 PROCEDURE — C1729 CATH, DRAINAGE: HCPCS

## 2022-01-05 RX ADMIN — IOHEXOL 8 ML: 350 INJECTION, SOLUTION INTRAVENOUS at 14:20

## 2022-01-05 NOTE — ED PROVIDER NOTES
History  Chief Complaint   Patient presents with    Medical Problem     patient partially ripped out gallbladder drainage tube    Fall     no head strike     72-year-old male presents emergency room at the request of General surgery  Patient had a history of cholecystitis and had a IR placed percutaneous drain inserted, and had been doing well  The drain however came out yesterday, 2022 with the patient was using the restroom and the drain bag got caught  The patient is having bloody drainage from the drain that appears to be still stitched in place  Family contacted the surgeon who noted that they should contact the IR department for further evaluation  Family notes they called IR yesterday and no one at called him back so they came into the ER today  Patient has some pain in his right upper quadrant at times but otherwise has been doing well  He has been weak and fell a few days ago (by losing his footing) without any substantial injury but notes that they have people coming into the home for home services on Friday  Prior to Admission Medications   Prescriptions Last Dose Informant Patient Reported? Taking? Empagliflozin 25 MG TABS  Self Yes No   Sig: Take 1 tablet by mouth   METOPROLOL TARTRATE PO  Self Yes No   Sig: Take by mouth   amLODIPine (NORVASC) 10 mg tablet   No No   Sig: Take 1 tablet (10 mg total) by mouth daily   apixaban (Eliquis) 5 mg   No No   Sig: Take 2 tablets (10 mg total) by mouth 2 (two) times a day for 5 days, THEN 1 tablet (5 mg total) 2 (two) times a day for 25 days     aspirin (ECOTRIN LOW STRENGTH) 81 mg EC tablet  Self Yes No   Sig: Take 81 mg by mouth   fluticasone (FLONASE) 50 mcg/act nasal spray  Self Yes No   Si spray into each nostril daily   insulin glargine (LANTUS) 100 units/mL subcutaneous injection  Self Yes No   Sig: Inject under the skin   metFORMIN (GLUCOPHAGE) 1000 MG tablet  Self Yes No   Sig: Take 1,000 mg by mouth   pantoprazole (PROTONIX) 20 mg tablet  Self Yes No   Sig: Take 20 mg by mouth daily   ramipril (ALTACE) 10 MG capsule  Self Yes No   Sig: Take 10 mg by mouth   sodium chloride, PF, 0 9 %   No No   Sig: 10 mL by Intracatheter route daily Intracatheter flushing daily      Facility-Administered Medications: None       Past Medical History:   Diagnosis Date    Cancer ([facility] )     Diabetes ([facility] )     Gastritis     GERD (gastroesophageal reflux disease)     History of bladder cancer     treated with surgery    Hypercholesterolemia     Hypertension     Hyponatremia 2021    Lactic acidosis 2021       Past Surgical History:   Procedure Laterality Date    BLADDER TUMOR EXCISION      EGD      IR CHOLECYSTOSTOMY TUBE CHECK/CHANGE/REPOSITION/REINSERTION/UPSIZE  2022    IR CHOLECYSTOSTOMY TUBE PLACEMENT  2021       History reviewed  No pertinent family history  I have reviewed and agree with the history as documented  E-Cigarette/Vaping    E-Cigarette Use Never User      E-Cigarette/Vaping Substances     Social History     Tobacco Use    Smoking status: Former Smoker     Quit date: 2021     Years since quittin 3    Smokeless tobacco: Never Used   Vaping Use    Vaping Use: Never used   Substance Use Topics    Alcohol use: Not Currently    Drug use: Never       Review of Systems   Constitutional: Negative for chills and fever  HENT: Negative for ear pain and sore throat  Eyes: Negative for pain and visual disturbance  Respiratory: Negative for cough and shortness of breath  Cardiovascular: Negative for chest pain and palpitations  Gastrointestinal: Positive for abdominal pain  Negative for vomiting  Genitourinary: Negative for dysuria and hematuria  Musculoskeletal: Negative for arthralgias and back pain  Skin: Negative for color change and rash  Neurological: Positive for weakness  Negative for seizures and syncope     All other systems reviewed and are negative  Physical Exam  Physical Exam  Constitutional:       General: He is not in acute distress  Appearance: Normal appearance  He is normal weight  He is not ill-appearing  HENT:      Head: Normocephalic and atraumatic  Right Ear: External ear normal       Left Ear: External ear normal       Nose: Nose normal       Mouth/Throat:      Mouth: Mucous membranes are moist    Eyes:      Conjunctiva/sclera: Conjunctivae normal    Cardiovascular:      Rate and Rhythm: Normal rate and regular rhythm  Pulses: Normal pulses  Heart sounds: Normal heart sounds  Pulmonary:      Effort: Pulmonary effort is normal       Breath sounds: Normal breath sounds  Abdominal:      General: Abdomen is flat  There is no distension  Palpations: Abdomen is soft  There is no mass  Tenderness: There is abdominal tenderness  Comments: Faint right upper quadrant tenderness to palpation  No guarding rigidity rebound is noted  Musculoskeletal:         General: No swelling, tenderness or deformity  Normal range of motion  Cervical back: Normal range of motion  Skin:     General: Skin is warm and dry  Capillary Refill: Capillary refill takes 2 to 3 seconds  Coloration: Skin is pale  Neurological:      General: No focal deficit present  Mental Status: He is alert and oriented to person, place, and time  Mental status is at baseline     Psychiatric:         Mood and Affect: Mood normal          Vital Signs  ED Triage Vitals [01/05/22 0740]   Temp Pulse Respirations Blood Pressure SpO2   -- 94 16 109/61 98 %      Temp src Heart Rate Source Patient Position - Orthostatic VS BP Location FiO2 (%)   -- Monitor Sitting Left arm --      Pain Score       3           Vitals:    01/05/22 0740   BP: 109/61   Pulse: 94   Patient Position - Orthostatic VS: Sitting         Visual Acuity      ED Medications  Medications - No data to display    Diagnostic Studies  Results Reviewed     None No orders to display              Procedures  Procedures         ED Course  ED Course as of 01/05/22 2124 Wed Jan 05, 2022   0568 Case discussed with Dr Rahel Matt, will try to get him an appointment for an outpatient cholecystostomy tube change today  MDM    Disposition  Final diagnoses:   DAVIDA drain, broken, initial encounter     Time reflects when diagnosis was documented in both MDM as applicable and the Disposition within this note     Time User Action Codes Description Comment    1/5/2022 10:39 AM Debby Dillon Add [V37 036C] DAVIDA drain, broken, initial encounter       ED Disposition     ED Disposition Condition Date/Time Comment    Discharge Stable Wed Jan 5, 2022 10:42 AM Emilee Sleet discharge to home/self care              Follow-up Information     Follow up With Specialties Details Why Contact Info    Rebeka Osman MD Internal Medicine On 1/11/2022  [address] [facility] [address]   [phone]            Discharge Medication List as of 1/5/2022 10:49 AM      CONTINUE these medications which have NOT CHANGED    Details   amLODIPine (NORVASC) 10 mg tablet Take 1 tablet (10 mg total) by mouth daily, Starting Fri 12/31/2021, Normal      apixaban (Eliquis) 5 mg Multiple Dosages:Starting Wed 12/29/2021, Until Sun 1/2/2022 at 2359, THEN Starting Mon 1/3/2022, Until Thu 1/27/2022 at 2359Take 2 tablets (10 mg total) by mouth 2 (two) times a day for 5 days, THEN 1 tablet (5 mg total) 2 (two) times a day for 22 da ys , Normal      aspirin (ECOTRIN LOW STRENGTH) 81 mg EC tablet Take 81 mg by mouth, Historical Med      Empagliflozin 25 MG TABS Take 1 tablet by mouth, Historical Med      fluticasone (FLONASE) 50 mcg/act nasal spray 1 spray into each nostril daily, Historical Med      insulin glargine (LANTUS) 100 units/mL subcutaneous injection Inject under the skin, Historical Med      metFORMIN (GLUCOPHAGE) 1000 MG tablet Take 1,000 mg by mouth, Historical Med      METOPROLOL TARTRATE PO Take by mouth, Historical Med      pantoprazole (PROTONIX) 20 mg tablet Take 20 mg by mouth daily, Historical Med      ramipril (ALTACE) 10 MG capsule Take 10 mg by mouth, Historical Med      sodium chloride, PF, 0 9 % 10 mL by Intracatheter route daily Intracatheter flushing daily, Starting Thu 12/30/2021, Until Wed 3/30/2022, Print             No discharge procedures on file      PDMP Review     None          ED Provider  Electronically Signed by           Baljeet Richey DO  01/05/22 3823

## 2022-01-05 NOTE — SEDATION DOCUMENTATION
Patient stated the "Hub" of his tube came off, end of tube is in a zip lock back with minimal drainage noted  Patient complains of pain all over

## 2022-01-05 NOTE — SEDATION DOCUMENTATION
Patient was seen and evaluated by Dr Fausto Iglesias, Tube was exchanged, Patient tolerated procedure well  Patient was assisted off of exam table, assisted to wheelchair and escorted to waiting room to family

## 2022-01-05 NOTE — DISCHARGE INSTRUCTIONS
Go to [facility] for a 2:00 p m  appointment  Park in the parking garage  Go inside on the 1st floor and approach the people at the desk and let them know you of an appointment with Interventional Radiology at 2:00 p m  They will guide you from there

## 2022-01-05 NOTE — BRIEF OP NOTE (RAD/CATH)
INTERVENTIONAL RADIOLOGY PROCEDURE NOTE    Date: 1/5/2022    Procedure: IR CHOLECYSTOSTOMY TUBE CHECK/CHANGE/REPOSITION/REINSERTION/UPSIZE    Preoperative diagnosis:   1  Neoplasm of uncertain behavior of oropharynx         Postoperative diagnosis: Same  Surgeon: Nadine Bentley MD     Assistant: None  No qualified resident was available  Blood loss: None    Specimens: None     Findings:     Hub of catheter completely transected  Existing tube remained in appropriate position within the gallbladder lumen  Over the wire exchange for new 10F per jake tube  Keep to bag drainage  Flush daily  Tube check in 3 months  Complications: None immediate      Anesthesia: local

## 2022-01-10 ENCOUNTER — APPOINTMENT (EMERGENCY)
Dept: CT IMAGING | Facility: HOSPITAL | Age: 77
End: 2022-01-10
Payer: MEDICARE

## 2022-01-10 ENCOUNTER — APPOINTMENT (EMERGENCY)
Dept: RADIOLOGY | Facility: HOSPITAL | Age: 77
End: 2022-01-10
Payer: MEDICARE

## 2022-01-10 ENCOUNTER — HOSPITAL ENCOUNTER (EMERGENCY)
Facility: HOSPITAL | Age: 77
End: 2022-01-11
Attending: EMERGENCY MEDICINE | Admitting: EMERGENCY MEDICINE
Payer: MEDICARE

## 2022-01-10 DIAGNOSIS — E87.1 HYPONATREMIA: ICD-10-CM

## 2022-01-10 DIAGNOSIS — E83.42 HYPOMAGNESEMIA: ICD-10-CM

## 2022-01-10 DIAGNOSIS — R53.1 GENERALIZED WEAKNESS: Primary | ICD-10-CM

## 2022-01-10 DIAGNOSIS — S22.49XA MULTIPLE RIB FRACTURES: ICD-10-CM

## 2022-01-10 DIAGNOSIS — D72.825 BANDEMIA: ICD-10-CM

## 2022-01-10 DIAGNOSIS — U07.1 COVID-19 VIRUS INFECTION: ICD-10-CM

## 2022-01-10 DIAGNOSIS — E86.0 DEHYDRATION: ICD-10-CM

## 2022-01-10 LAB
ALBUMIN SERPL BCP-MCNC: 3.6 G/DL (ref 3.5–5)
ALP SERPL-CCNC: 122 U/L (ref 34–104)
ALT SERPL W P-5'-P-CCNC: 18 U/L (ref 7–52)
ANION GAP SERPL CALCULATED.3IONS-SCNC: 9 MMOL/L (ref 4–13)
ANISOCYTOSIS BLD QL SMEAR: PRESENT
APTT PPP: 25 SECONDS (ref 23–37)
AST SERPL W P-5'-P-CCNC: 16 U/L (ref 13–39)
BASE EX.OXY STD BLDV CALC-SCNC: 63.7 % (ref 60–80)
BASE EXCESS BLDV CALC-SCNC: 3.9 MMOL/L
BASOPHILS # BLD MANUAL: 0.08 THOUSAND/UL (ref 0–0.1)
BASOPHILS NFR MAR MANUAL: 1 % (ref 0–1)
BILIRUB SERPL-MCNC: 0.61 MG/DL (ref 0.2–1)
BNP SERPL-MCNC: 44 PG/ML (ref 1–100)
BUN SERPL-MCNC: 23 MG/DL (ref 5–25)
CALCIUM SERPL-MCNC: 8.8 MG/DL (ref 8.4–10.2)
CHLORIDE SERPL-SCNC: 92 MMOL/L (ref 96–108)
CO2 SERPL-SCNC: 28 MMOL/L (ref 21–32)
CREAT SERPL-MCNC: 0.78 MG/DL (ref 0.6–1.3)
EOSINOPHIL # BLD MANUAL: 0 THOUSAND/UL (ref 0–0.4)
EOSINOPHIL NFR BLD MANUAL: 0 % (ref 0–6)
ERYTHROCYTE [DISTWIDTH] IN BLOOD BY AUTOMATED COUNT: 20.7 % (ref 11.6–15.1)
FLUAV RNA RESP QL NAA+PROBE: NEGATIVE
FLUBV RNA RESP QL NAA+PROBE: NEGATIVE
GFR SERPL CREATININE-BSD FRML MDRD: 88 ML/MIN/1.73SQ M
GLUCOSE SERPL-MCNC: 126 MG/DL (ref 65–140)
HCO3 BLDV-SCNC: 27.9 MMOL/L (ref 24–30)
HCT VFR BLD AUTO: 31.2 % (ref 36.5–49.3)
HGB BLD-MCNC: 9.7 G/DL (ref 12–17)
INR PPP: 1.15 (ref 0.84–1.19)
LACTATE SERPL-SCNC: 2.8 MMOL/L (ref 0.5–2)
LIPASE SERPL-CCNC: 44 U/L (ref 11–82)
LYMPHOCYTES # BLD AUTO: 0.17 THOUSAND/UL (ref 0.6–4.47)
LYMPHOCYTES # BLD AUTO: 2 % (ref 14–44)
MAGNESIUM SERPL-MCNC: 1.5 MG/DL (ref 1.9–2.7)
MCH RBC QN AUTO: 29.6 PG (ref 26.8–34.3)
MCHC RBC AUTO-ENTMCNC: 31.1 G/DL (ref 31.4–37.4)
MCV RBC AUTO: 95 FL (ref 82–98)
MONOCYTES # BLD AUTO: 0.42 THOUSAND/UL (ref 0–1.22)
MONOCYTES NFR BLD: 5 % (ref 4–12)
NEUTROPHILS # BLD MANUAL: 7.77 THOUSAND/UL (ref 1.85–7.62)
NEUTS BAND NFR BLD MANUAL: 9 % (ref 0–8)
NEUTS SEG NFR BLD AUTO: 83 % (ref 43–75)
O2 CT BLDV-SCNC: 9.8 ML/DL
PCO2 BLDV: 40.1 MM HG (ref 42–50)
PH BLDV: 7.46 [PH] (ref 7.3–7.4)
PLATELET # BLD AUTO: 248 THOUSANDS/UL (ref 149–390)
PLATELET BLD QL SMEAR: ADEQUATE
PMV BLD AUTO: 9.7 FL (ref 8.9–12.7)
PO2 BLDV: 30.3 MM HG (ref 35–45)
POLYCHROMASIA BLD QL SMEAR: PRESENT
POTASSIUM SERPL-SCNC: 4.7 MMOL/L (ref 3.5–5.3)
PROT SERPL-MCNC: 6.7 G/DL (ref 6.4–8.4)
PROTHROMBIN TIME: 14.6 SECONDS (ref 11.6–14.5)
RBC # BLD AUTO: 3.28 MILLION/UL (ref 3.88–5.62)
RBC MORPH BLD: PRESENT
RSV RNA RESP QL NAA+PROBE: NEGATIVE
SARS-COV-2 RNA RESP QL NAA+PROBE: POSITIVE
SODIUM SERPL-SCNC: 129 MMOL/L (ref 135–147)
WBC # BLD AUTO: 8.45 THOUSAND/UL (ref 4.31–10.16)

## 2022-01-10 PROCEDURE — 85730 THROMBOPLASTIN TIME PARTIAL: CPT | Performed by: PHYSICIAN ASSISTANT

## 2022-01-10 PROCEDURE — 93005 ELECTROCARDIOGRAM TRACING: CPT

## 2022-01-10 PROCEDURE — 96361 HYDRATE IV INFUSION ADD-ON: CPT

## 2022-01-10 PROCEDURE — G1004 CDSM NDSC: HCPCS

## 2022-01-10 PROCEDURE — 70450 CT HEAD/BRAIN W/O DYE: CPT

## 2022-01-10 PROCEDURE — 99285 EMERGENCY DEPT VISIT HI MDM: CPT | Performed by: PHYSICIAN ASSISTANT

## 2022-01-10 PROCEDURE — 85007 BL SMEAR W/DIFF WBC COUNT: CPT | Performed by: PHYSICIAN ASSISTANT

## 2022-01-10 PROCEDURE — 36415 COLL VENOUS BLD VENIPUNCTURE: CPT | Performed by: PHYSICIAN ASSISTANT

## 2022-01-10 PROCEDURE — 71045 X-RAY EXAM CHEST 1 VIEW: CPT

## 2022-01-10 PROCEDURE — 99285 EMERGENCY DEPT VISIT HI MDM: CPT

## 2022-01-10 PROCEDURE — 83690 ASSAY OF LIPASE: CPT | Performed by: PHYSICIAN ASSISTANT

## 2022-01-10 PROCEDURE — 74177 CT ABD & PELVIS W/CONTRAST: CPT

## 2022-01-10 PROCEDURE — 83735 ASSAY OF MAGNESIUM: CPT | Performed by: PHYSICIAN ASSISTANT

## 2022-01-10 PROCEDURE — 80053 COMPREHEN METABOLIC PANEL: CPT | Performed by: PHYSICIAN ASSISTANT

## 2022-01-10 PROCEDURE — 82805 BLOOD GASES W/O2 SATURATION: CPT | Performed by: EMERGENCY MEDICINE

## 2022-01-10 PROCEDURE — 83880 ASSAY OF NATRIURETIC PEPTIDE: CPT | Performed by: EMERGENCY MEDICINE

## 2022-01-10 PROCEDURE — 85610 PROTHROMBIN TIME: CPT | Performed by: PHYSICIAN ASSISTANT

## 2022-01-10 PROCEDURE — 85027 COMPLETE CBC AUTOMATED: CPT | Performed by: PHYSICIAN ASSISTANT

## 2022-01-10 PROCEDURE — 71260 CT THORAX DX C+: CPT

## 2022-01-10 PROCEDURE — 83605 ASSAY OF LACTIC ACID: CPT | Performed by: PHYSICIAN ASSISTANT

## 2022-01-10 PROCEDURE — 87040 BLOOD CULTURE FOR BACTERIA: CPT | Performed by: PHYSICIAN ASSISTANT

## 2022-01-10 PROCEDURE — 84145 PROCALCITONIN (PCT): CPT | Performed by: PHYSICIAN ASSISTANT

## 2022-01-10 RX ORDER — SODIUM CHLORIDE 9 MG/ML
125 INJECTION, SOLUTION INTRAVENOUS CONTINUOUS
Status: DISCONTINUED | OUTPATIENT
Start: 2022-01-10 | End: 2022-01-11 | Stop reason: HOSPADM

## 2022-01-10 RX ADMIN — SODIUM CHLORIDE 1000 ML: 0.9 INJECTION, SOLUTION INTRAVENOUS at 22:06

## 2022-01-10 RX ADMIN — IOHEXOL 100 ML: 350 INJECTION, SOLUTION INTRAVENOUS at 22:55

## 2022-01-11 ENCOUNTER — HOSPITAL ENCOUNTER (INPATIENT)
Facility: HOSPITAL | Age: 77
LOS: 3 days | Discharge: HOME HEALTH | DRG: 177 | End: 2022-01-14
Attending: STUDENT IN AN ORGANIZED HEALTH CARE EDUCATION/TRAINING PROGRAM | Admitting: STUDENT IN AN ORGANIZED HEALTH CARE EDUCATION/TRAINING PROGRAM
Payer: MEDICARE

## 2022-01-11 VITALS
TEMPERATURE: 97.8 F | WEIGHT: 163.36 LBS | SYSTOLIC BLOOD PRESSURE: 120 MMHG | HEART RATE: 102 BPM | OXYGEN SATURATION: 96 % | BODY MASS INDEX: 20.97 KG/M2 | RESPIRATION RATE: 16 BRPM | DIASTOLIC BLOOD PRESSURE: 63 MMHG

## 2022-01-11 DIAGNOSIS — R13.10 DYSPHAGIA, UNSPECIFIED TYPE: ICD-10-CM

## 2022-01-11 DIAGNOSIS — E87.1 HYPONATREMIA: ICD-10-CM

## 2022-01-11 DIAGNOSIS — U07.1 COVID: ICD-10-CM

## 2022-01-11 DIAGNOSIS — E04.1 THYROID NODULE: ICD-10-CM

## 2022-01-11 DIAGNOSIS — S22.49XA MULTIPLE FRACTURES OF RIB INVOLVING FOUR OR MORE RIBS: Primary | ICD-10-CM

## 2022-01-11 DIAGNOSIS — E78.5 HYPERLIPIDEMIA DUE TO TYPE 2 DIABETES MELLITUS (HCC): ICD-10-CM

## 2022-01-11 DIAGNOSIS — S22.41XA CLOSED FRACTURE OF MULTIPLE RIBS OF RIGHT SIDE, INITIAL ENCOUNTER: ICD-10-CM

## 2022-01-11 DIAGNOSIS — E11.69 HYPERLIPIDEMIA DUE TO TYPE 2 DIABETES MELLITUS (HCC): ICD-10-CM

## 2022-01-11 PROBLEM — J96.01 ACUTE RESPIRATORY FAILURE WITH HYPOXIA (HCC): Status: ACTIVE | Noted: 2022-01-11

## 2022-01-11 LAB
AMMONIA PLAS-SCNC: 15 UMOL/L (ref 18–72)
ANION GAP SERPL CALCULATED.3IONS-SCNC: 4 MMOL/L (ref 4–13)
APAP SERPL-MCNC: <10 UG/ML (ref 10–20)
APTT PPP: 36 SECONDS (ref 23–37)
ATRIAL RATE: 112 BPM
BACTERIA UR QL AUTO: ABNORMAL /HPF
BASOPHILS # BLD AUTO: 0.02 THOUSANDS/ΜL (ref 0–0.1)
BASOPHILS NFR BLD AUTO: 0 % (ref 0–1)
BILIRUB UR QL STRIP: NEGATIVE
BUN SERPL-MCNC: 13 MG/DL (ref 5–25)
CALCIUM SERPL-MCNC: 8.8 MG/DL (ref 8.3–10.1)
CHLORIDE SERPL-SCNC: 102 MMOL/L (ref 100–108)
CLARITY UR: CLEAR
CO2 SERPL-SCNC: 28 MMOL/L (ref 21–32)
COLOR UR: YELLOW
CREAT SERPL-MCNC: 0.71 MG/DL (ref 0.6–1.3)
EOSINOPHIL # BLD AUTO: 0 THOUSAND/ΜL (ref 0–0.61)
EOSINOPHIL NFR BLD AUTO: 0 % (ref 0–6)
ERYTHROCYTE [DISTWIDTH] IN BLOOD BY AUTOMATED COUNT: 20.6 % (ref 11.6–15.1)
ETHANOL SERPL-MCNC: <10 MG/DL
GFR SERPL CREATININE-BSD FRML MDRD: 92 ML/MIN/1.73SQ M
GLUCOSE SERPL-MCNC: 101 MG/DL (ref 65–140)
GLUCOSE SERPL-MCNC: 103 MG/DL (ref 65–140)
GLUCOSE SERPL-MCNC: 114 MG/DL (ref 65–140)
GLUCOSE SERPL-MCNC: 158 MG/DL (ref 65–140)
GLUCOSE UR STRIP-MCNC: NEGATIVE MG/DL
HCT VFR BLD AUTO: 31.2 % (ref 36.5–49.3)
HGB BLD-MCNC: 9.6 G/DL (ref 12–17)
HGB UR QL STRIP.AUTO: NEGATIVE
IMM GRANULOCYTES # BLD AUTO: 0.02 THOUSAND/UL (ref 0–0.2)
IMM GRANULOCYTES NFR BLD AUTO: 0 % (ref 0–2)
INR PPP: 1.12 (ref 0.84–1.19)
KETONES UR STRIP-MCNC: NEGATIVE MG/DL
LACTATE SERPL-SCNC: 1.3 MMOL/L (ref 0.5–2)
LACTATE SERPL-SCNC: 3 MMOL/L (ref 0.5–2)
LEUKOCYTE ESTERASE UR QL STRIP: NEGATIVE
LYMPHOCYTES # BLD AUTO: 0.31 THOUSANDS/ΜL (ref 0.6–4.47)
LYMPHOCYTES NFR BLD AUTO: 4 % (ref 14–44)
MCH RBC QN AUTO: 29 PG (ref 26.8–34.3)
MCHC RBC AUTO-ENTMCNC: 30.8 G/DL (ref 31.4–37.4)
MCV RBC AUTO: 94 FL (ref 82–98)
MONOCYTES # BLD AUTO: 0.48 THOUSAND/ΜL (ref 0.17–1.22)
MONOCYTES NFR BLD AUTO: 7 % (ref 4–12)
NEUTROPHILS # BLD AUTO: 6.39 THOUSANDS/ΜL (ref 1.85–7.62)
NEUTS SEG NFR BLD AUTO: 89 % (ref 43–75)
NITRITE UR QL STRIP: NEGATIVE
NON-SQ EPI CELLS URNS QL MICRO: ABNORMAL /HPF
NRBC BLD AUTO-RTO: 0 /100 WBCS
P AXIS: 69 DEGREES
PH UR STRIP.AUTO: 8 [PH]
PLATELET # BLD AUTO: 208 THOUSANDS/UL (ref 149–390)
PMV BLD AUTO: 10 FL (ref 8.9–12.7)
POTASSIUM SERPL-SCNC: 4.7 MMOL/L (ref 3.5–5.3)
PR INTERVAL: 184 MS
PROCALCITONIN SERPL-MCNC: 0.3 NG/ML
PROT UR STRIP-MCNC: NEGATIVE MG/DL
PROTHROMBIN TIME: 14 SECONDS (ref 11.6–14.5)
QRS AXIS: 71 DEGREES
QRSD INTERVAL: 114 MS
QT INTERVAL: 324 MS
QTC INTERVAL: 442 MS
RBC # BLD AUTO: 3.31 MILLION/UL (ref 3.88–5.62)
RBC #/AREA URNS AUTO: ABNORMAL /HPF
SALICYLATES SERPL-MCNC: <5 MG/DL (ref 3–20)
SODIUM SERPL-SCNC: 134 MMOL/L (ref 136–145)
SP GR UR STRIP.AUTO: 1.01 (ref 1–1.03)
T WAVE AXIS: 65 DEGREES
T4 FREE SERPL-MCNC: 1.19 NG/DL (ref 0.76–1.46)
TSH SERPL DL<=0.05 MIU/L-ACNC: 0.27 UIU/ML (ref 0.36–3.74)
UROBILINOGEN UR QL STRIP.AUTO: 0.2 E.U./DL
VENTRICULAR RATE: 112 BPM
WBC # BLD AUTO: 7.22 THOUSAND/UL (ref 4.31–10.16)
WBC #/AREA URNS AUTO: ABNORMAL /HPF

## 2022-01-11 PROCEDURE — 96361 HYDRATE IV INFUSION ADD-ON: CPT

## 2022-01-11 PROCEDURE — 99222 1ST HOSP IP/OBS MODERATE 55: CPT | Performed by: STUDENT IN AN ORGANIZED HEALTH CARE EDUCATION/TRAINING PROGRAM

## 2022-01-11 PROCEDURE — 85025 COMPLETE CBC W/AUTO DIFF WBC: CPT

## 2022-01-11 PROCEDURE — 82948 REAGENT STRIP/BLOOD GLUCOSE: CPT

## 2022-01-11 PROCEDURE — 99285 EMERGENCY DEPT VISIT HI MDM: CPT

## 2022-01-11 PROCEDURE — 99222 1ST HOSP IP/OBS MODERATE 55: CPT | Performed by: INTERNAL MEDICINE

## 2022-01-11 PROCEDURE — 96366 THER/PROPH/DIAG IV INF ADDON: CPT

## 2022-01-11 PROCEDURE — 80143 DRUG ASSAY ACETAMINOPHEN: CPT | Performed by: EMERGENCY MEDICINE

## 2022-01-11 PROCEDURE — 94760 N-INVAS EAR/PLS OXIMETRY 1: CPT

## 2022-01-11 PROCEDURE — 83605 ASSAY OF LACTIC ACID: CPT | Performed by: PHYSICIAN ASSISTANT

## 2022-01-11 PROCEDURE — 82140 ASSAY OF AMMONIA: CPT | Performed by: EMERGENCY MEDICINE

## 2022-01-11 PROCEDURE — NC001 PR NO CHARGE: Performed by: EMERGENCY MEDICINE

## 2022-01-11 PROCEDURE — 85730 THROMBOPLASTIN TIME PARTIAL: CPT

## 2022-01-11 PROCEDURE — 93010 ELECTROCARDIOGRAM REPORT: CPT | Performed by: INTERNAL MEDICINE

## 2022-01-11 PROCEDURE — 84439 ASSAY OF FREE THYROXINE: CPT | Performed by: INTERNAL MEDICINE

## 2022-01-11 PROCEDURE — 80048 BASIC METABOLIC PNL TOTAL CA: CPT

## 2022-01-11 PROCEDURE — 80179 DRUG ASSAY SALICYLATE: CPT | Performed by: EMERGENCY MEDICINE

## 2022-01-11 PROCEDURE — 83605 ASSAY OF LACTIC ACID: CPT | Performed by: STUDENT IN AN ORGANIZED HEALTH CARE EDUCATION/TRAINING PROGRAM

## 2022-01-11 PROCEDURE — 82077 ASSAY SPEC XCP UR&BREATH IA: CPT | Performed by: EMERGENCY MEDICINE

## 2022-01-11 PROCEDURE — 85610 PROTHROMBIN TIME: CPT

## 2022-01-11 PROCEDURE — 96365 THER/PROPH/DIAG IV INF INIT: CPT

## 2022-01-11 PROCEDURE — 84443 ASSAY THYROID STIM HORMONE: CPT | Performed by: INTERNAL MEDICINE

## 2022-01-11 PROCEDURE — 36415 COLL VENOUS BLD VENIPUNCTURE: CPT | Performed by: EMERGENCY MEDICINE

## 2022-01-11 PROCEDURE — 81001 URINALYSIS AUTO W/SCOPE: CPT | Performed by: PHYSICIAN ASSISTANT

## 2022-01-11 PROCEDURE — 99223 1ST HOSP IP/OBS HIGH 75: CPT | Performed by: INTERNAL MEDICINE

## 2022-01-11 RX ORDER — MAGNESIUM SULFATE HEPTAHYDRATE 40 MG/ML
2 INJECTION, SOLUTION INTRAVENOUS ONCE
Status: COMPLETED | OUTPATIENT
Start: 2022-01-11 | End: 2022-01-11

## 2022-01-11 RX ORDER — LANOLIN ALCOHOL/MO/W.PET/CERES
9 CREAM (GRAM) TOPICAL
Status: DISCONTINUED | OUTPATIENT
Start: 2022-01-11 | End: 2022-01-11

## 2022-01-11 RX ORDER — AMLODIPINE BESYLATE 10 MG/1
10 TABLET ORAL DAILY
Status: DISCONTINUED | OUTPATIENT
Start: 2022-01-11 | End: 2022-01-11

## 2022-01-11 RX ORDER — AMOXICILLIN 250 MG
1 CAPSULE ORAL 2 TIMES DAILY
Status: DISCONTINUED | OUTPATIENT
Start: 2022-01-11 | End: 2022-01-11

## 2022-01-11 RX ORDER — OXYCODONE HYDROCHLORIDE 5 MG/1
2.5 TABLET ORAL EVERY 4 HOURS PRN
Status: DISCONTINUED | OUTPATIENT
Start: 2022-01-11 | End: 2022-01-11

## 2022-01-11 RX ORDER — HYDROMORPHONE HCL IN WATER/PF 6 MG/30 ML
0.2 PATIENT CONTROLLED ANALGESIA SYRINGE INTRAVENOUS EVERY 4 HOURS PRN
Status: DISCONTINUED | OUTPATIENT
Start: 2022-01-11 | End: 2022-01-14 | Stop reason: HOSPADM

## 2022-01-11 RX ORDER — FLUOXETINE HYDROCHLORIDE 20 MG/1
80 CAPSULE ORAL DAILY
Status: DISCONTINUED | OUTPATIENT
Start: 2022-01-11 | End: 2022-01-11

## 2022-01-11 RX ORDER — ATORVASTATIN CALCIUM 20 MG/1
20 TABLET, FILM COATED ORAL
Status: DISCONTINUED | OUTPATIENT
Start: 2022-01-11 | End: 2022-01-14 | Stop reason: HOSPADM

## 2022-01-11 RX ORDER — OXYCODONE HYDROCHLORIDE 5 MG/1
5 TABLET ORAL EVERY 4 HOURS PRN
Status: DISCONTINUED | OUTPATIENT
Start: 2022-01-11 | End: 2022-01-14 | Stop reason: HOSPADM

## 2022-01-11 RX ORDER — PRAZOSIN HYDROCHLORIDE 2 MG/1
8 CAPSULE ORAL
Status: DISCONTINUED | OUTPATIENT
Start: 2022-01-11 | End: 2022-01-14 | Stop reason: HOSPADM

## 2022-01-11 RX ORDER — DEXAMETHASONE SODIUM PHOSPHATE 4 MG/ML
6 INJECTION, SOLUTION INTRA-ARTICULAR; INTRALESIONAL; INTRAMUSCULAR; INTRAVENOUS; SOFT TISSUE EVERY 24 HOURS
Status: DISCONTINUED | OUTPATIENT
Start: 2022-01-11 | End: 2022-01-14 | Stop reason: HOSPADM

## 2022-01-11 RX ORDER — LISINOPRIL 20 MG/1
40 TABLET ORAL DAILY
Status: DISCONTINUED | OUTPATIENT
Start: 2022-01-11 | End: 2022-01-11

## 2022-01-11 RX ORDER — GLYCOPYRROLATE 1 MG/1
1 TABLET ORAL 3 TIMES DAILY PRN
Status: DISCONTINUED | OUTPATIENT
Start: 2022-01-11 | End: 2022-01-14 | Stop reason: HOSPADM

## 2022-01-11 RX ORDER — SODIUM CHLORIDE 9 MG/ML
10 INJECTION INTRAVENOUS DAILY
Status: DISCONTINUED | OUTPATIENT
Start: 2022-01-11 | End: 2022-01-14 | Stop reason: HOSPADM

## 2022-01-11 RX ORDER — MELATONIN
1000 DAILY
Status: DISCONTINUED | OUTPATIENT
Start: 2022-01-12 | End: 2022-01-14 | Stop reason: HOSPADM

## 2022-01-11 RX ORDER — LANOLIN ALCOHOL/MO/W.PET/CERES
9 CREAM (GRAM) TOPICAL
Status: DISCONTINUED | OUTPATIENT
Start: 2022-01-11 | End: 2022-01-14 | Stop reason: HOSPADM

## 2022-01-11 RX ORDER — AMOXICILLIN 250 MG
1 CAPSULE ORAL 2 TIMES DAILY
Status: DISCONTINUED | OUTPATIENT
Start: 2022-01-11 | End: 2022-01-14 | Stop reason: HOSPADM

## 2022-01-11 RX ORDER — FLUOXETINE HYDROCHLORIDE 20 MG/1
80 CAPSULE ORAL DAILY
Status: DISCONTINUED | OUTPATIENT
Start: 2022-01-12 | End: 2022-01-14 | Stop reason: HOSPADM

## 2022-01-11 RX ORDER — GLYCOPYRROLATE 1 MG/1
1 TABLET ORAL 3 TIMES DAILY PRN
Status: DISCONTINUED | OUTPATIENT
Start: 2022-01-11 | End: 2022-01-11

## 2022-01-11 RX ORDER — LISINOPRIL 20 MG/1
40 TABLET ORAL DAILY
Status: DISCONTINUED | OUTPATIENT
Start: 2022-01-12 | End: 2022-01-12

## 2022-01-11 RX ORDER — OXYCODONE HYDROCHLORIDE 5 MG/1
2.5 TABLET ORAL EVERY 4 HOURS PRN
Status: DISCONTINUED | OUTPATIENT
Start: 2022-01-11 | End: 2022-01-14 | Stop reason: HOSPADM

## 2022-01-11 RX ORDER — ONDANSETRON 2 MG/ML
4 INJECTION INTRAMUSCULAR; INTRAVENOUS EVERY 4 HOURS PRN
Status: DISCONTINUED | OUTPATIENT
Start: 2022-01-11 | End: 2022-01-14 | Stop reason: HOSPADM

## 2022-01-11 RX ORDER — MELATONIN
1000 DAILY
Status: DISCONTINUED | OUTPATIENT
Start: 2022-01-11 | End: 2022-01-11

## 2022-01-11 RX ORDER — BUPROPION HYDROCHLORIDE 150 MG/1
150 TABLET ORAL DAILY
Status: DISCONTINUED | OUTPATIENT
Start: 2022-01-11 | End: 2022-01-14 | Stop reason: HOSPADM

## 2022-01-11 RX ORDER — RISPERIDONE 0.5 MG/1
0.5 TABLET, ORALLY DISINTEGRATING ORAL 3 TIMES DAILY
Status: DISCONTINUED | OUTPATIENT
Start: 2022-01-11 | End: 2022-01-14 | Stop reason: HOSPADM

## 2022-01-11 RX ORDER — AMLODIPINE BESYLATE 10 MG/1
10 TABLET ORAL DAILY
Status: DISCONTINUED | OUTPATIENT
Start: 2022-01-12 | End: 2022-01-12

## 2022-01-11 RX ORDER — DOCUSATE SODIUM 100 MG/1
100 CAPSULE, LIQUID FILLED ORAL 2 TIMES DAILY
Status: DISCONTINUED | OUTPATIENT
Start: 2022-01-11 | End: 2022-01-11

## 2022-01-11 RX ORDER — PANTOPRAZOLE SODIUM 40 MG/1
40 INJECTION, POWDER, FOR SOLUTION INTRAVENOUS
Status: DISCONTINUED | OUTPATIENT
Start: 2022-01-12 | End: 2022-01-14 | Stop reason: HOSPADM

## 2022-01-11 RX ORDER — ACETAMINOPHEN 325 MG/1
975 TABLET ORAL EVERY 8 HOURS SCHEDULED
Status: DISCONTINUED | OUTPATIENT
Start: 2022-01-11 | End: 2022-01-12

## 2022-01-11 RX ORDER — FLUTICASONE PROPIONATE 50 MCG
1 SPRAY, SUSPENSION (ML) NASAL DAILY
Status: DISCONTINUED | OUTPATIENT
Start: 2022-01-11 | End: 2022-01-14 | Stop reason: HOSPADM

## 2022-01-11 RX ORDER — PRAZOSIN HYDROCHLORIDE 2 MG/1
8 CAPSULE ORAL DAILY
Status: DISCONTINUED | OUTPATIENT
Start: 2022-01-11 | End: 2022-01-11

## 2022-01-11 RX ORDER — ATORVASTATIN CALCIUM 20 MG/1
40 TABLET, FILM COATED ORAL
Status: DISCONTINUED | OUTPATIENT
Start: 2022-01-11 | End: 2022-01-11

## 2022-01-11 RX ORDER — ACETAMINOPHEN 325 MG/1
975 TABLET ORAL EVERY 8 HOURS SCHEDULED
Status: DISCONTINUED | OUTPATIENT
Start: 2022-01-11 | End: 2022-01-11

## 2022-01-11 RX ORDER — PANTOPRAZOLE SODIUM 20 MG/1
20 TABLET, DELAYED RELEASE ORAL
Status: DISCONTINUED | OUTPATIENT
Start: 2022-01-11 | End: 2022-01-11

## 2022-01-11 RX ORDER — HEPARIN SODIUM 5000 [USP'U]/ML
5000 INJECTION, SOLUTION INTRAVENOUS; SUBCUTANEOUS EVERY 8 HOURS SCHEDULED
Status: DISCONTINUED | OUTPATIENT
Start: 2022-01-11 | End: 2022-01-11

## 2022-01-11 RX ORDER — OXYCODONE HYDROCHLORIDE 5 MG/1
5 TABLET ORAL EVERY 4 HOURS PRN
Status: DISCONTINUED | OUTPATIENT
Start: 2022-01-11 | End: 2022-01-11

## 2022-01-11 RX ORDER — ATORVASTATIN CALCIUM 20 MG/1
20 TABLET, FILM COATED ORAL
Status: DISCONTINUED | OUTPATIENT
Start: 2022-01-11 | End: 2022-01-11

## 2022-01-11 RX ORDER — ASPIRIN 81 MG/1
81 TABLET ORAL DAILY
Status: DISCONTINUED | OUTPATIENT
Start: 2022-01-11 | End: 2022-01-14 | Stop reason: HOSPADM

## 2022-01-11 RX ORDER — SODIUM CHLORIDE, SODIUM GLUCONATE, SODIUM ACETATE, POTASSIUM CHLORIDE, MAGNESIUM CHLORIDE, SODIUM PHOSPHATE, DIBASIC, AND POTASSIUM PHOSPHATE .53; .5; .37; .037; .03; .012; .00082 G/100ML; G/100ML; G/100ML; G/100ML; G/100ML; G/100ML; G/100ML
50 INJECTION, SOLUTION INTRAVENOUS CONTINUOUS
Status: DISCONTINUED | OUTPATIENT
Start: 2022-01-11 | End: 2022-01-13

## 2022-01-11 RX ADMIN — CYANOCOBALAMIN TAB 500 MCG 1000 MCG: 500 TAB at 13:20

## 2022-01-11 RX ADMIN — SENNOSIDES AND DOCUSATE SODIUM 1 TABLET: 50; 8.6 TABLET ORAL at 13:57

## 2022-01-11 RX ADMIN — PRAZOSIN HYDROCHLORIDE 8 MG: 2 CAPSULE ORAL at 21:07

## 2022-01-11 RX ADMIN — AMLODIPINE BESYLATE 10 MG: 10 TABLET ORAL at 13:25

## 2022-01-11 RX ADMIN — Medication 12.5 MG: at 21:05

## 2022-01-11 RX ADMIN — RISPERIDONE 0.5 MG: 0.5 TABLET, ORALLY DISINTEGRATING ORAL at 13:20

## 2022-01-11 RX ADMIN — SODIUM CHLORIDE, SODIUM GLUCONATE, SODIUM ACETATE, POTASSIUM CHLORIDE, MAGNESIUM CHLORIDE, SODIUM PHOSPHATE, DIBASIC, AND POTASSIUM PHOSPHATE 50 ML/HR: .53; .5; .37; .037; .03; .012; .00082 INJECTION, SOLUTION INTRAVENOUS at 10:07

## 2022-01-11 RX ADMIN — ENOXAPARIN SODIUM 30 MG: 30 INJECTION SUBCUTANEOUS at 13:19

## 2022-01-11 RX ADMIN — PANTOPRAZOLE SODIUM 20 MG: 20 TABLET, DELAYED RELEASE ORAL at 13:20

## 2022-01-11 RX ADMIN — LISINOPRIL 40 MG: 20 TABLET ORAL at 13:25

## 2022-01-11 RX ADMIN — SODIUM CHLORIDE 125 ML/HR: 0.9 INJECTION, SOLUTION INTRAVENOUS at 01:46

## 2022-01-11 RX ADMIN — ENOXAPARIN SODIUM 30 MG: 30 INJECTION SUBCUTANEOUS at 21:05

## 2022-01-11 RX ADMIN — SENNOSIDES AND DOCUSATE SODIUM 1 TABLET: 50; 8.6 TABLET ORAL at 21:05

## 2022-01-11 RX ADMIN — DOCUSATE SODIUM 100 MG: 100 CAPSULE ORAL at 13:19

## 2022-01-11 RX ADMIN — MAGNESIUM SULFATE HEPTAHYDRATE 2 G: 40 INJECTION, SOLUTION INTRAVENOUS at 01:46

## 2022-01-11 RX ADMIN — Medication 1000 UNITS: at 13:19

## 2022-01-11 RX ADMIN — ATORVASTATIN CALCIUM 20 MG: 20 TABLET, FILM COATED ORAL at 21:05

## 2022-01-11 RX ADMIN — Medication 9 MG: at 21:05

## 2022-01-11 RX ADMIN — DEXAMETHASONE SODIUM PHOSPHATE 6 MG: 4 INJECTION, SOLUTION INTRA-ARTICULAR; INTRALESIONAL; INTRAMUSCULAR; INTRAVENOUS; SOFT TISSUE at 13:26

## 2022-01-11 RX ADMIN — ASPIRIN 81 MG: 81 TABLET, COATED ORAL at 13:57

## 2022-01-11 RX ADMIN — ACETAMINOPHEN 975 MG: 325 TABLET, FILM COATED ORAL at 13:19

## 2022-01-11 RX ADMIN — ACETAMINOPHEN 975 MG: 325 TABLET, FILM COATED ORAL at 21:05

## 2022-01-11 RX ADMIN — REMDESIVIR 200 MG: 100 INJECTION, POWDER, LYOPHILIZED, FOR SOLUTION INTRAVENOUS at 13:19

## 2022-01-11 NOTE — ASSESSMENT & PLAN NOTE
Lab Results   Component Value Date    HGBA1C 6 3 (H) 12/22/2021       Recent Labs     01/11/22  0638   POCGLU 103       Blood Sugar Average: Last 72 hrs:  (P) 103   Hyperglycemia well controlled  Home Empagliflozin resumed by Primary Team  Continue SSIP for possible steroid-induced hyperglycemia which may occur with Decadron treatment for COVID

## 2022-01-11 NOTE — RESPIRATORY THERAPY NOTE
RT Protocol Note  Marjan Beaulieu 78 y o  male MRN: [id number]  Unit/Bed#: ED 23 Encounter: [id number]    Assessment    Active Problems:    Tobacco dependence    Multiple fractures of rib involving four or more ribs-right      Home Pulmonary Medications:  reviewed       Past Medical History:   Diagnosis Date    Cancer ([facility] )     Diabetes ([facility] )     Gastritis     GERD (gastroesophageal reflux disease)     History of bladder cancer     treated with surgery    Hypercholesterolemia     Hypertension     Hyponatremia 2021    Lactic acidosis 2021     Social History     Socioeconomic History    Marital status: /Civil Union     Spouse name: None    Number of children: None    Years of education: None    Highest education level: None   Occupational History    None   Tobacco Use    Smoking status: Former Smoker     Quit date: 2021     Years since quittin 3    Smokeless tobacco: Never Used   Vaping Use    Vaping Use: Never used   Substance and Sexual Activity    Alcohol use: Not Currently    Drug use: Never    Sexual activity: None   Other Topics Concern    None   Social History Narrative    None     Social Determinants of Health     Financial Resource Strain: Not on file   Food Insecurity: Not on file   Transportation Needs: No Transportation Needs    Lack of Transportation (Medical): No    Lack of Transportation (Non-Medical):  No   Physical Activity: Not on file   Stress: Not on file   Social Connections: Not on file   Intimate Partner Violence: Not on file   Housing Stability: Not on file       Subjective         Objective    Physical Exam:   Assessment Type: Assess only  General Appearance: Alert,Awake  Respiratory Pattern: Normal  Chest Assessment: Chest expansion symmetrical  Bilateral Breath Sounds: Diminished,Clear  R Breath Sounds: Diminished,Clear  L Breath Sounds: Diminished,Clear  Cough: Strong,Congested,Productive    Vitals:  Blood pressure 115/57, pulse 98, temperature 98 8 °F (37 1 °C), temperature source Oral, resp  rate 16, SpO2 97 %  Imaging and other studies: I have personally reviewed pertinent reports  Plan       Airway Clearance Plan: Incentive Spirometer     Resp Comments: (P) Pt assessed per resp and airway clearance protocol  Pt admitted under trauma secondary to a fall  Rib fracture protocol initiated  He was also found to be COVID positive  No pulmonary history or at home oxygen/resp medication, per patient  CT showed small right pleural effusion, rib fractures and opacity suspicious of pneumonia  Plan to educate with return demonstration of IS therapy  Resp treatments or further intervention is indicated at this time  Resp protocol d/c  Will continue to monitor and assess IS therapy under ACP

## 2022-01-11 NOTE — CONSULTS
[address] [facility] 1945, 78 y o  male MRN: [id number]  Unit/Bed#: ED 23 Encounter: [id number]  Primary Care Provider: Halina Tran MD   Date and time admitted to hospital: 1/11/2022  4:56 AM    Consults    Multiple fractures of rib involving four or more ribs-right  Assessment & Plan  Present on admission  Admitted to Primary Team (Trauma Service)  Home Eliquis (for hx of PE) held due to fall and fractures  Continue scheduled bowel regimen given pain regimen  Pending PT/OT evaluation    Hyponatremia  Assessment & Plan  Present on admission  Mild and present on prior blood work  Pending TSH with reflex T4  Continue to monitor BMP intermittently    Cholecystitis  Assessment & Plan  S/p drain placement on 01/05/2022  Continue management per Surgery Team    Tobacco dependence  Assessment & Plan  Patient decline nicotine replacement and denies nicotine use    Hyperlipidemia due to type 2 diabetes mellitus [facility])  Assessment & Plan  Lab Results   Component Value Date    HGBA1C 6 3 (H) 12/22/2021       Recent Labs     01/11/22  0638   POCGLU 103       Blood Sugar Average: Last 72 hrs:  (P) 103   Chronic and stable  Continue home Lipitor    Type 2 diabetes mellitus without complication, without long-term current use of insulin (HCC)  Assessment & Plan  Lab Results   Component Value Date    HGBA1C 6 3 (H) 12/22/2021       Recent Labs     01/11/22  0638   POCGLU 103       Blood Sugar Average: Last 72 hrs:  (P) 103   Hyperglycemia well controlled  Home Empagliflozin resumed by Primary Team  Continue SSIP for possible steroid-induced hyperglycemia which may occur with Decadron treatment for COVID    Essential hypertension  Assessment & Plan  Well controlled  Continue home Norvasc, Lisinopril, and Lopressor  Continue to monitor    * COVID  Assessment & Plan  Present on admission   COVID POSITIVE on 01/10/2022 with acute respiratory failure on 3 L NC  Patient reported vaccinated and with booster shot  Possible cause for patchy opacity identified on CT chest  Continue mild pathway with 5-day course Remdesivir and 10-day course Decadron  Continue to wean with goal SpO2 >88%        VTE Prophylaxis:   Moderate Risk (Score 3-4) - Pharmacological DVT Prophylaxis Ordered: enoxaparin (Lovenox)  Recommendations for Discharge:  · Pending PT/OT evaluations    Counseling / Coordination of Care Time: 30 minutes Greater than 50% of total time spent on patient counseling and coordination of care  Collaboration of Care: Were Recommendations Directly Discussed with Primary Treatment Team? No    History of Present Illness:  Norris Dow is a 78 y o  male who is originally admitted to the Trauma service due to fall with multiple rib fractures  We are consulted for medical management  Review of Systems:  Review of Systems   Constitutional: Positive for appetite change and fatigue  HENT: Negative for facial swelling  Respiratory: Positive for cough and shortness of breath  Cardiovascular: Positive for chest pain  Negative for leg swelling  Gastrointestinal: Positive for abdominal pain  Negative for nausea  Genitourinary: Negative for difficulty urinating and dysuria  Musculoskeletal: Negative for joint swelling and myalgias  Psychiatric/Behavioral: Positive for sleep disturbance  The patient is not nervous/anxious          Past Medical and Surgical History:   Past Medical History:   Diagnosis Date    Cancer [facility] [address]     [facility]     Gastritis     GERD (gastroesophageal reflux disease)     History of bladder cancer 2010    treated with surgery    Hypercholesterolemia     Hypertension     Hyponatremia 12/22/2021    Lactic acidosis 12/22/2021       Past Surgical History:   Procedure Laterality Date    BLADDER TUMOR EXCISION      EGD      IR CHOLECYSTOSTOMY TUBE CHECK/CHANGE/REPOSITION/REINSERTION/UPSIZE  1/5/2022    IR CHOLECYSTOSTOMY TUBE PLACEMENT 2021       Meds/Allergies:  all medications and allergies reviewed    Allergies: No Known Allergies    Social History:  Marital Status: /Civil Union  Substance Use History:   Social History     Substance and Sexual Activity   Alcohol Use Never     Social History     Tobacco Use   Smoking Status Former Smoker    Types: Cigarettes    Quit date: 2021    Years since quittin 3   Smokeless Tobacco Never Used     Social History     Substance and Sexual Activity   Drug Use Never       Family History:  Family History   Problem Relation Age of Onset    Cancer Mother     Melanoma Neg Hx        Physical Exam:   Vitals:   Blood Pressure: 136/72 (22 1000)  Pulse: 94 (22 1000)  Temperature: 98 8 °F (37 1 °C) (22 0506)  Temp Source: Oral (22 0506)  Respirations: 18 (22 1000)  SpO2: 98 % (22 1000)    Physical Exam  Vitals and nursing note reviewed  Constitutional:       General: He is not in acute distress  Appearance: He is ill-appearing  He is not toxic-appearing  HENT:      Head: Normocephalic and atraumatic  Right Ear: External ear normal       Left Ear: External ear normal       Nose: Nose normal       Comments: NC in place at 3 L     Mouth/Throat:      Mouth: Mucous membranes are dry  Eyes:      Extraocular Movements: Extraocular movements intact  Cardiovascular:      Rate and Rhythm: Normal rate and regular rhythm  Heart sounds: No friction rub  No gallop  Pulmonary:      Effort: Pulmonary effort is normal       Breath sounds: No wheezing or rhonchi  Abdominal:      General: Bowel sounds are normal  There is no distension  Palpations: Abdomen is soft  Tenderness: There is abdominal tenderness  Comments: Drain present   Musculoskeletal:      Right lower leg: No edema  Left lower leg: No edema  Skin:     Coloration: Skin is not jaundiced  Findings: No bruising     Neurological:      General: No focal deficit present  Mental Status: He is alert  Motor: No weakness  Comments: Sleepy but easily awoken by verbal stimuli   Psychiatric:      Comments: Depressed mood with flat affect          Additional Data:   Lab Results:    Results from last 7 days   Lab Units 01/11/22  1007 01/10/22  2203 01/10/22  2203   WBC Thousand/uL 7 22   < > 8 45   HEMOGLOBIN g/dL 9 6*   < > 9 7*   HEMATOCRIT % 31 2*   < > 31 2*   PLATELETS Thousands/uL 208   < > 248   BANDS PCT %  --   --  9*   NEUTROS PCT % 89*  --   --    LYMPHS PCT % 4*  --   --    LYMPHO PCT %  --   --  2*   MONOS PCT % 7  --   --    MONO PCT %  --   --  5   EOS PCT % 0  --  0    < > = values in this interval not displayed  Results from last 7 days   Lab Units 01/11/22  1007 01/10/22  2204 01/10/22  2204   SODIUM mmol/L 134*   < > 129*   POTASSIUM mmol/L 4 7   < > 4 7   CHLORIDE mmol/L 102   < > 92*   CO2 mmol/L 28   < > 28   BUN mg/dL 13   < > 23   CREATININE mg/dL 0 71   < > 0 78   ANION GAP mmol/L 4   < > 9   CALCIUM mg/dL 8 8   < > 8 8   ALBUMIN g/dL  --   --  3 6   TOTAL BILIRUBIN mg/dL  --   --  0 61   ALK PHOS U/L  --   --  122*   ALT U/L  --   --  18   AST U/L  --   --  16   GLUCOSE RANDOM mg/dL 101   < > 126    < > = values in this interval not displayed  Results from last 7 days   Lab Units 01/11/22  1007   INR  1 12         Lab Results   Component Value Date/Time    HGBA1C 6 3 (H) 12/22/2021 01:03 AM     Results from last 7 days   Lab Units 01/11/22  0638   POC GLUCOSE mg/dl 103     Results from last 7 days   Lab Units 01/11/22 1007 01/11/22  0136 01/10/22  2204 01/10/22  2203   LACTIC ACID mmol/L 1 3 3 0*  --  2 8*   PROCALCITONIN ng/ml  --   --  0 30*  --        Imaging: Reviewed radiology reports from this admission including: chest CT scan  XR chest pa & lateral (24 hours after admission)    (Results Pending)       EKG, Pathology, and Other Studies Reviewed on Admission:   · EKG: Unable to access on Epic      ** Please Note: This note may have been constructed using a voice recognition system   **

## 2022-01-11 NOTE — ASSESSMENT & PLAN NOTE
Present on admission   COVID POSITIVE on 01/10/2022 with acute respiratory failure on 3 L NC  Patient reported vaccinated and with booster shot  Possible cause for patchy opacity identified on CT chest  Continue mild pathway with 5-day course Remdesivir and 10-day course Decadron  Continue to wean with goal SpO2 >88%

## 2022-01-11 NOTE — CONSULTS
[address] [facility]  Consult Note - Urban Quinteros 1945, 78 y o  male MRN: [id number]  Unit/Bed#: ED 23 Encounter: [id number]  Primary Care Provider: Deepak Keen MD   Date and time admitted to hospital: 1/11/2022  4:56 AM    Multiple fractures of rib involving four or more ribs-right  Assessment & Plan  · Currently denies pain at rest   States pain increases slightly with movement  · No respiratory distress or oxygen requirement  · Able to pull 1 L consistently on incentive spirometry  · Fractures of right 7th through 12th ribs  · Patient on Eliquis at home for recent diagnosis of pulmonary embolism  · Continue Tylenol 975 mg p o  q 8 hours scheduled  · Continue oxycodone 2 5 mg p o  q 4 hours p r n  moderate pain  · Continue oxycodone 5 mg p o  q 4 hours p r n  severe pain  · Continue Dilaudid 0 2 mg IV q 4 hours p r n  breakthrough pain  · Continue bowel regimen to avoid opioid induced constipation while on opioid pain medication  · Would avoid muscle relaxers in this age group  · No indication for Neurontin  · Ice to painful area for 20 minutes every hour as needed  · Patient is not a candidate for neuraxial blockade secondary to recent use Eliquis  · No indication for peripheral nerve block at this time  · Could consider palliative care consultation in this patient currently being treated for cancer with multiple previous cancer diagnoses and multiple comorbidities  Tobacco dependence  Assessment & Plan  Smokers have been shown to require higher doses of opioid pain medication and are more likely to develop chronic pain  Encourage smoking cessation  ·         Urban Quinteros is a 78 y o  male  currently being treated for oral/tongue cancer seen by trauma in the emergency room this morning after several recent falls resulting in right chest wall pain  Imaging reveals right 7 through 12th rib fractures with small hemothorax    Also noted to be COVID positive  APS will continue to follow  Please contact Acute Pain Service - [facility] via [employer] from [phone] with additional questions or concerns  See Riley or Feliberto for additional contacts and after hours information  History of Present Illness    Admit Date:  1/11/2022  Hospital Day:  0 days  Primary Service:  Trauma  Attending Provider:  Deland Soulier, DO  Reason for Consult / Principal Problem:  Multiple right rib fractures  HPI: Marjan Beaulieu is a 78 y o  male who presents with right 7 through 12th rib fractures following several falls recently  Patient 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  Also recently admitted to this facility for acute cholecystitis and acute pulmonary embolism  Underwent cholecystectomy and currently on Eliquis for pulmonary embolus  Patient also has history of multiple other cancers and currently has a PEG tube for nutritional support  Currently, patient states he has no pain in his chest at rest but does have some increase in pain with movement, deep inspiration or cough  He denies any shortness of breath, nausea, vomiting  Right chest wall is tender to palpation  Current pain location(s):  No pain at rest  Pain Scale:   No pain at rest  Quality:  No pain at rest  Current Analgesic regimen:    Tylenol 975 mg p o  q 8 hours scheduled  Oxycodone 2 5 mg p o  q 4 hours p r n  moderate pain  Oxycodone 5 mg p o  q 4 hours p r n  severe pain  Dilaudid 0 2 mg IV q 4 hours p r n  breakthrough pain  Pain History:  None  Pain Management Provider:  None    I have reviewed the patient's controlled substance dispensing history in the Prescription Drug Monitoring Program in compliance with the [facility] regulations before prescribing any controlled substances       Past 1 year review of PDMP:  Fill Date ID   Written Drug Qty Days Prescriber Rx # Pharmacy Refill   Daily Dose* Pymt Type      12/07/2021  1   12/06/2021  Pregabalin 20 Mg/Ml Solution    450 00  56 [place]   [id number]   [place] (5516)   0   [place] Ins   [place]   2021  1   2021  Oxycodone Hcl (Ir) 5 MG Tablet    90 00  15 [place]   [id number]   [place] (5516)   0  45 00 MME  Comm Ins   [place]   2021  1   2021  Pregabalin 75 MG Capsule    60 00  32 [facility] [place]   [id number]   [place] (5516)   0   Private Pay   [place]   2021  1   2021  Oxycodone Hcl (Ir) 5 MG Tablet    60 00  10 Br X[place]ilt   [id number]   [place] (5516)   0  45 00 MME  Private Pay   [place]         Consults    Review of Systems   Respiratory: Negative for cough and shortness of breath  Cardiovascular: Positive for chest pain  All other systems reviewed and are negative        Historical Information   Past Medical History:   Diagnosis Date    Cancer [facility] [address] [facility]     Diabetes ([facility] [address] )     Gastritis     GERD (gastroesophageal reflux disease)     History of bladder cancer     treated with surgery    Hypercholesterolemia     Hypertension     Hyponatremia 2021    Lactic acidosis 2021     Past Surgical History:   Procedure Laterality Date    BLADDER TUMOR EXCISION      EGD      IR CHOLECYSTOSTOMY TUBE CHECK/CHANGE/REPOSITION/REINSERTION/UPSIZE  2022    IR CHOLECYSTOSTOMY TUBE PLACEMENT  2021     Social History   Social History     Substance and Sexual Activity   Alcohol Use Never     Social History     Substance and Sexual Activity   Drug Use Never     Social History     Tobacco Use   Smoking Status Former Smoker    Types: Cigarettes    Quit date: 2021    Years since quittin 3   Smokeless Tobacco Never Used     Family History:   Family History   Problem Relation Age of Onset    Cancer Mother     Melanoma Neg Hx        Meds/Allergies   all current active meds have been reviewed, current meds:   Current Facility-Administered Medications   Medication Dose Route Frequency    acetaminophen (TYLENOL) tablet 975 mg  975 mg Oral Q8H [address]    amLODIPine (NORVASC) tablet 10 mg  10 mg Oral Daily    aspirin (ECOTRIN LOW STRENGTH) EC tablet 81 mg  81 mg Oral Daily    atorvastatin (LIPITOR) tablet 20 mg  20 mg Oral Daily With Dinner    buPROPion (WELLBUTRIN XL) 24 hr tablet 150 mg  150 mg Oral Daily    cholecalciferol (VITAMIN D3) tablet 1,000 Units  1,000 Units Oral Daily    cyanocobalamin (VITAMIN B-12) tablet 1,000 mcg  1,000 mcg Oral Daily    dexamethasone (DECADRON) injection 6 mg  6 mg Intravenous Q24H    docusate sodium (COLACE) capsule 100 mg  100 mg Oral BID    Empagliflozin TABS 25 mg  1 tablet Oral Daily    enoxaparin (LOVENOX) subcutaneous injection 30 mg  30 mg Subcutaneous Q12H [address]    FLUoxetine (PROzac) capsule 80 mg  80 mg Oral Daily    fluticasone (FLONASE) 50 mcg/act nasal spray 1 spray  1 spray Nasal Daily    glycopyrrolate (ROBINUL) tablet 1 mg  1 mg Oral TID PRN    HYDROmorphone HCl (DILAUDID) injection 0 2 mg  0 2 mg Intravenous Q4H PRN    insulin lispro (HumaLOG) 100 units/mL subcutaneous injection 1-6 Units  1-6 Units Subcutaneous 4 times day    insulin lispro (HumaLOG) 100 units/mL subcutaneous injection 1-6 Units  1-6 Units Subcutaneous HS    [START ON 1/12/2022] insulin lispro (HumaLOG) 100 units/mL subcutaneous injection 1-6 Units  1-6 Units Subcutaneous 0200    lisinopril (ZESTRIL) tablet 40 mg  40 mg Oral Daily    melatonin tablet 9 mg  9 mg Oral HS    metoprolol tartrate (LOPRESSOR) partial tablet 12 5 mg  12 5 mg Oral Q12H [address]    multi-electrolyte (PLASMALYTE-A/ISOLYTE-S PH 7 4) IV solution  50 mL/hr Intravenous Continuous    naloxone (NARCAN) 0 04 mg/mL syringe 0 04 mg  0 04 mg Intravenous Q1MIN PRN    ondansetron (ZOFRAN) injection 4 mg  4 mg Intravenous Q4H PRN    oxyCODONE (ROXICODONE) IR tablet 2 5 mg  2 5 mg Oral Q4H PRN    oxyCODONE (ROXICODONE) IR tablet 5 mg  5 mg Oral Q4H PRN    pantoprazole (PROTONIX) EC tablet 20 mg  20 mg Oral Early Morning    prazosin (MINIPRESS) capsule 8 mg  8 mg Oral HS    remdesivir (Veklury) 200 mg in sodium chloride 0 9 % 290 mL IVPB  200 mg Intravenous Q24H    Followed by   George Specking ON 2022] remdesivir (Veklury) 100 mg in sodium chloride 0 9 % 270 mL IVPB  100 mg Intravenous Q24H    risperiDONE (RisperDAL M-TAB) disintegrating tablet 0 5 mg  0 5 mg Oral TID    senna-docusate sodium (SENOKOT S) 8 6-50 mg per tablet 1 tablet  1 tablet Oral BID    sodium chloride (PF) 0 9 % injection 10 mL  10 mL Intracatheter Daily    and PTA meds:   Prior to Admission Medications   Prescriptions Last Dose Informant Patient Reported? Taking? Empagliflozin 25 MG TABS  Self Yes No   Sig: Take 1 tablet by mouth   METOPROLOL TARTRATE PO  Self Yes No   Sig: Take by mouth   amLODIPine (NORVASC) 10 mg tablet   No No   Sig: Take 1 tablet (10 mg total) by mouth daily   apixaban (Eliquis) 5 mg   No No   Sig: Take 2 tablets (10 mg total) by mouth 2 (two) times a day for 5 days, THEN 1 tablet (5 mg total) 2 (two) times a day for 25 days     aspirin (ECOTRIN LOW STRENGTH) 81 mg EC tablet  Self Yes No   Sig: Take 81 mg by mouth   fluticasone (FLONASE) 50 mcg/act nasal spray  Self Yes No   Si spray into each nostril daily   insulin glargine (LANTUS) 100 units/mL subcutaneous injection  Self Yes No   Sig: Inject under the skin   metFORMIN (GLUCOPHAGE) 1000 MG tablet  Self Yes No   Sig: Take 1,000 mg by mouth   pantoprazole (PROTONIX) 20 mg tablet  Self Yes No   Sig: Take 20 mg by mouth daily   ramipril (ALTACE) 10 MG capsule  Self Yes No   Sig: Take 10 mg by mouth   sodium chloride, PF, 0 9 %   No No   Sig: 10 mL by Intracatheter route daily Intracatheter flushing daily      Facility-Administered Medications: None       No Known Allergies    Objective   Temp:  [97 8 °F (36 6 °C)-98 8 °F (37 1 °C)] 98 8 °F (37 1 °C)  HR:  [] 94  Resp:  [16-18] 18  BP: (115-136)/(57-76) 136/72    Intake/Output Summary (Last 24 hours) at 2022 1208  Last data filed at 2022 1006  Gross per 24 hour   Intake --   Output 750 ml   Net -750 ml       Physical Exam  Vitals and nursing note reviewed  Constitutional:       General: He is sleeping  He is not in acute distress  Appearance: He is ill-appearing  He is not toxic-appearing or diaphoretic  Comments: Appears comfortable  Sleeping upon my arrival    Eyes:      Conjunctiva/sclera: Conjunctivae normal       Pupils: Pupils are equal, round, and reactive to light  Cardiovascular:      Rate and Rhythm: Normal rate and regular rhythm  Pulmonary:      Effort: Pulmonary effort is normal       Breath sounds: Normal breath sounds  Chest:      Chest wall: Tenderness (Right lower chest wall) present  No deformity or crepitus  Skin:     General: Skin is warm and dry  Capillary Refill: Capillary refill takes less than 2 seconds  Neurological:      General: No focal deficit present  Mental Status: He is oriented to person, place, and time and easily aroused  GCS: GCS eye subscore is 4  GCS verbal subscore is 5  GCS motor subscore is 6  Psychiatric:         Attention and Perception: Attention normal          Speech: Speech normal          Behavior: Behavior normal  Behavior is cooperative  Lab Results:   I have personally reviewed pertinent labs  , CBC:   Lab Results   Component Value Date    WBC 7 22 01/11/2022    HGB 9 6 (L) 01/11/2022    HCT 31 2 (L) 01/11/2022    MCV 94 01/11/2022     01/11/2022    MCH 29 0 01/11/2022    MCHC 30 8 (L) 01/11/2022    RDW 20 6 (H) 01/11/2022    MPV 10 0 01/11/2022    NRBC 0 01/11/2022   , CMP:   Lab Results   Component Value Date    SODIUM 134 (L) 01/11/2022    K 4 7 01/11/2022     01/11/2022    CO2 28 01/11/2022    BUN 13 01/11/2022    CREATININE 0 71 01/11/2022    CALCIUM 8 8 01/11/2022    AST 16 01/10/2022    ALT 18 01/10/2022    ALKPHOS 122 (H) 01/10/2022    EGFR 92 01/11/2022   , BMP:  Lab Results   Component Value Date    SODIUM 134 (L) 01/11/2022    K 4 7 01/11/2022     01/11/2022    CO2 28 01/11/2022    BUN 13 01/11/2022 CREATININE 0 71 01/11/2022    GLUC 101 01/11/2022    CALCIUM 8 8 01/11/2022    AGAP 4 01/11/2022    EGFR 92 01/11/2022   , PT/PTT:  Lab Results   Component Value Date    PTT 36 01/11/2022    Estimated Creatinine Clearance: 95 7 mL/min (by C-G formula based on SCr of 0 71 mg/dL)  Imaging Studies: I have personally reviewed pertinent reports  EKG, Pathology, and Other Studies: I have personally reviewed pertinent reports  Counseling / Coordination of Care  Greater than 50% of total time was spent with the patient and / or family counseling and / or coordination of care  A description of the counseling / coordination of care:  Patient interview, physical examination, review of PDMP, review of medical record, review of imaging and laboratory data, development of pain management plan, discussion of pain management plan with patient and primary service  Please note that the APS provides consultative services regarding pain management only  With the exception of ketamine and epidural infusions and except when indicated, final decisions regarding starting or changing doses of analgesic medications are at the discretion of the consulting service  Off hours consultation and/or medication management is generally not available      Flores Avila PA-C  Acute Pain Service

## 2022-01-11 NOTE — EMTALA/ACUTE CARE TRANSFER
[address] [facility] [address]  Dept: [phone]      EMTALA TRANSFER CONSENT    NAME Kimberley Peres                                         1945                              MRN [id number]    I have been informed of my rights regarding examination, treatment, and transfer   by Dr Ivania Johnson MD    Benefits: Specialized equipment and/or services available at the receiving facility (Include comment)________________________    Risks: Potential for delay in receiving treatment,Potential deterioration of medical condition,Loss of IV,Increased discomfort during transfer,Possible worsening of condition or death during transfer      Consent for Transfer:  I acknowledge that my medical condition has been evaluated and explained to me by the emergency department physician or other qualified medical person and/or my attending physician, who has recommended that I be transferred to the service of  Accepting Physician: Dr Jamari Gutierrez at [address] [facility] [address], [address] : [facility]  The above potential benefits of such transfer, the potential risks associated with such transfer, and the probable risks of not being transferred have been explained to me, and I fully understand them  The doctor has explained that, in my case, the benefits of transfer outweigh the risks  I agree to be transferred  I authorize the performance of emergency medical procedures and treatments upon me in both transit and upon arrival at the receiving facility  Additionally, I authorize the release of any and all medical records to the receiving facility and request they be transported with me, if possible  I understand that the safest mode of transportation during a medical emergency is an ambulance and that the Hospital advocates the use of this mode of transport   Risks of traveling to the receiving facility by car, including absence of medical control, life sustaining equipment, such as oxygen, and medical personnel has been explained to me and I fully understand them  (ZULMA CORRECT BOX BELOW)  [  ]  I consent to the stated transfer and to be transported by ambulance/helicopter  [  ]  I consent to the stated transfer, but refuse transportation by ambulance and accept full responsibility for my transportation by car  I understand the risks of non-ambulance transfers and I exonerate the Hospital and its staff from any deterioration in my condition that results from this refusal     X___________________________________________    DATE  22  TIME________  Signature of patient or legally responsible individual signing on patient behalf           RELATIONSHIP TO PATIENT_________________________          Provider Certification    NAME Pratibha Ponce                                        [facility] 1945                              MRN [id number]    A medical screening exam was performed on the above named patient  Based on the examination:    Condition Necessitating Transfer The primary encounter diagnosis was Generalized weakness  Diagnoses of COVID-19 virus infection, Hypomagnesemia, Bandemia, Dehydration, Hyponatremia, and Multiple rib fractures were also pertinent to this visit      Patient Condition: The patient has been stabilized such that within reasonable medical probability, no material deterioration of the patient condition or the condition of the unborn child(adrián) is likely to result from the transfer    Reason for Transfer: Level of Care needed not available at this facility    Transfer Requirements: [facility]   · Space available and qualified personnel available for treatment as acknowledged by    · Agreed to accept transfer and to provide appropriate medical treatment as acknowledged by       Dr Alison Verde  · Appropriate medical records of the examination and treatment of the patient are provided at the time of transfer   [address] [facility] COMPLETED _______  · Transfer will be performed by qualified personnel from    and appropriate transfer equipment as required, including the use of necessary and appropriate life support measures  Provider Certification: I have examined the patient and explained the following risks and benefits of being transferred/refusing transfer to the patient/family:  General risk, such as traffic hazards, adverse weather conditions, rough terrain or turbulence, possible failure of equipment (including vehicle or aircraft), or consequences of actions of persons outside the control of the transport personnel,Unanticipated needs of medical equipment and personnel during transport,Risk of worsening condition,The possibility of a transport vehicle being unavailable      Based on these reasonable risks and benefits to the patient and/or the unborn child(adrián), and based upon the information available at the time of the patients examination, I certify that the medical benefits reasonably to be expected from the provision of appropriate medical treatments at another medical facility outweigh the increasing risks, if any, to the individuals medical condition, and in the case of labor to the unborn child, from effecting the transfer      X____________________________________________ DATE 01/11/22        TIME_______      ORIGINAL - SEND TO MEDICAL RECORDS   COPY - SEND WITH PATIENT DURING TRANSFER

## 2022-01-11 NOTE — ASSESSMENT & PLAN NOTE
Present on admission  Mild and present on prior blood work  Pending TSH with reflex T4  Continue to monitor BMP intermittently

## 2022-01-11 NOTE — CONSULTS
Consultation - Geriatric Medicine   Ivet Ram 78 y o  male MRN: [id number]  Unit/Bed#: ED 23 Encounter: [id number]      Assessment/Plan     Acute metabolic encephalopathy  -evidence by confusion, inattentiveness and fluctuating mental status difficult to obtain wakeful state, and word-finding difficulties in patient who is otherwise usually fully oriented  -multifactorial including fall with traumatic injuries, acute pain, acute COVID-19 infection, hypoxia requiring supplemental O2, hyponatremia, lactic acidosis and multiple recent hospitalizations/setting changes in patient with markedly reduced physiologic and metabolic reserves at baseline  -CTH on admission reports no acute intracranial abnormality  -continue supplemental O2 to maintain appropriate O2 saturations  -correct underlying metabolic and electrolyte derangements  -ensure that acute pain is well controlled  -nutrition via TF - monitor for residuals and aspiration precautions  -resume appropriate home medications - primary team notified of med list confirmation as outlined below  -encourage use of sensory assist devices such as glasses and hearing aids at appropriate times  -maintain calm and quiet environment to reduce risk overstimulation  -reorient and redirect frequently    Ambulatory dysfunction with fall  -reportedly mechanical x2 within last two weeks - patient cannot be more specific to date   -(-) head strike (-) loss of consciousness  -injuries as outlined below  -Requires use of walker for ambulation at baseline which is new recently  --no prior hx recurrent falls  -high risk future falls due to age, hx fall, deconditioning/debility and unfamiliar environment   -encourage good body mechanics and assist with all transfers  -keep personal items and call bell close to prevent reaching  -maintain environment free of fall hazards  -encourage appropriate footwear and adequate lighting at all times when out of bed  -recommend home fall risk assessment and personal fall alert system if returning home  -PT and OT pending    Multiple right-sided rib fractures (7-12)  -s/p fall as outlined above  -CT chest abdomen pelvis on admission reports fractures of right-sided rib 7-12 as well as small right pleural effusion no underlying pneumothorax  -currently requiring supplemental O2 3LPM which is not used at baseline  -continue pulmonary toilet and ISS   -continue acute pain control  -follow-up CXR pending     Acute pain due to trauma  -consider pain control per Geriatric pain protocol with modifications to ensure adequate mireya  Tylenol 975mg Q8H scheduled  Roxicodone 2 5mg Q4H PRN moderate pain  Roxicodone 5mg Q4H PRN severe pain  Dilaudid 0 2mg Q4H PRN  -consider adjuncts such as lidocaine patch topically  -recommend continuation of home Pregabalin  -encourage addition of non-pharmacologic pain treatment including ice and frequent repositioning  -recommend  bowel regimen to prevent and treat constipation due to increased risk with acute pain and opiate pain medications    COVID-19   -symptomatic with dyspnea, malaise and mental status changes   -positive by PCR 1/10/22  -s/p vaccination with Vinnie Coil April 2021 and May 2021  -recommend COVID pathway  -isolation precautions per protocol, appropriate PPE was used throughout duration of encounter    Hyponatremia  -sodium appears to be acutely low at 129 - normal or hypernatremia on most available prior labs  -likely multifactorial including poor nutritional intake, pain and infection  -ensure adequate nutrition intake - recommend nutrition consult for TF recs given presence of PEG tube as primary source of nutrition  -correct electrolyte derangements    Lactic acidosis  -lactic acid elevated at 3 0, recommend fluid resuscitation and recheck    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 - continue close follow-up    Pulmonary emboli without acute cor pulmonale  -diagnosed 12/22/21 - initiated on Eliquis   -remains high risk recurrence due to underlying malignancy and history of thromboembolism, consider resumption of systemic anticoagulation as soon as medically appropriate     Presence of PEG tube  -recommend nutrition consult for assistance with optimization tube feeds     PTSD  -management per [facility] - current regimen includes bupropion, fluoxetine, risperidone  -continue psychosocial supports and outpatient follow-up with [facility] providers  -may benefit from referral to support group/counseling as part of comprehensive multimodal treatment approach    Impaired Vision  -recommend use of corrective lenses at all appropriate times  -encourage adequate lighting and encourage use of assistance with ambulation  -keep personal belongings close to person to avoid reaching  -encourage appropriate footwear at all times  -consider large font for printed materials provided to patient    Impaired Hearing  -Encourage use of hearing aids at all appropriate times  -encourage providers and caregivers to speak slowly and clearly directly to patient  -minimize background noise to encourage patient engagement  -consider use of hearing amplifier to reduce risk of straining to hear if hearing aids are not present or are not sufficient   -encourage use of teach back method to ensure clear communication    Impaired mastication  -Requires use of dentures - currently not in use due to squamous cell cancer of base of tongue undergoing tx as o/p - will need clearance from Heme/Onc prior to use   -ensure meal consistency appropriate for abilities  -continue aspiration precautions magdi given patient maintained on TF via PEG    Cognitive screening  -no prior cognitive testing on record for review  -requiring assistance with ADLs and iADLs more heavily recently due to underlying malignancy and its downstream effects  -CTH obtained on admission reviewed, reveals mild chronic microangiopathic changes   -consider outpatient cognitive testing following recovery from acute illness/injury to establish baseline and identify/mobilize additional community-based resources as  indicated and appropriate    Deconditioning/debility/frailty  -clinical frailty scale stage 6, moderately frail  -multifactorial including active malignancy, recent Pulmonary Embolism and multiple chronic medical conditions now with acute traumatic injury superimposed  -albumin 3 6, nutrition primarily via PEG - recommend nutrition consult for optimization  -encourage optimization chronic medical conditions and address acute metabolic derangements as arise  -continue to treat underlying mood/depression/anxiety/PTSD symptoms as present as may impact patient's response to therapies as well as overall sense of well-being and quality of life  -optimize chronic medical conditions and address acute metabolic derangements as arise    Delirium precautions  -Patient is high risk of delirium due to age, fall, traumatic injuries, acute pain, acute COVID-19 infection and hospitalization  -Initiate delirium precautions  -maintain normal sleep/wake cycle  -minimize overnight interruptions, group overnight vitals/labs/nursing checks as possible  -dim lights, close blinds and turn off tv to minimize stimulation and encourage sleep environment in evenings  -ensure that pain is well controlled   -monitor for fecal and urinary retention which may precipitate delirium  -provide frequent reorientation and redirection as indicated and appropriate  -minimize use of medications which may precipitate or worsen delirium such as tramadol, benzodiazepine, anticholinergics, and benadryl  -encourage hydration and nutrition   -redirect unwanted behaviors as first line, avoid physical restraints  -QTc 442 on EKG obtained on admission     Home medication review    Personally confirmed with [facility] :  Lansoprazole 300mg ODT daily thru peg tube  Metformin 500mg/ml - take 10 mL BID  Oxycodone 5mg Q4H PRN (PDMP checked - last filled 12/7/21)  Zofran ODT 8mg Q8H PRN   Pregabalin 20 mg/mL take 4mL Q12H    Personally confirmed with [facility] [phone]:  Amlodipine 10 mg daily  Eliquis 5 mg BID  Glycopyrrolate 1 mg Q8H PRN    [facility]ally confirmed with [facility]:    Atorvastatin 20 mg daily  Bupropion 150 mg daily  Empagliflozin 25 mg daily  Fluoxetine 80 mg daily  Insulin glargine 20U (twenty) HS  Melatonin 10 mg HS  Metoprolol tartrate 12 5 mg BID  Prazosin 8mg HS  Ramipril 20 mg daily  Risperidone 0 5 mg TID    Care coordination: rounded with [facility] (RN in ED) and Dr Kaleb Dooley (Trauma resident)    History of Present Illness   Physician Requesting Consult: Melba Monet DO  Reason for Consult / Principal Problem: Fall   Hx and PE limited by: N/A  Additional history obtained from: Chart review and patient evaluation, outside records including [facility] and [address] [facility] as well as personally speaking with multiple outpatient pharmacies including [address] [place] [address] pharmacy to confirm home medications     HPI: Alfa Peterson is a 78y o  year old male with DM-II, PTSD, hypertension, history of Pulmonary Embolism, LVH, hyperlipidemia, history of malignant neoplasm of bladder, squamous cell cancer of base of tongue s/p surgery and radiation therapy, recent placement of cholecystostomy tube and presence of PEG tube who is admitted to the trauma service with ambulatory with ambulatory dysfunction fall found to have multiple right-sided rib fractures, he is being seen consultation by Geriatrics for high risk of developing delirium during hospitalization  He is seen and examined at bedside in the emergency department where he is lying resting, he reports general malaise since returning home from hospitalization where he underwent cholecystostomy tube placement with IR a couple of weeks ago    Since returning home he reports generalized weakness and worsening ambulation for which he is currently required use of walker which he has not required in the past   He reports 2 mechanical falls over the past 2 weeks both of which he struck the right side of his body resulting in moderate pain which he felt was tolerable with his current home pain medication regimen however over the past couple of days the pain continue to worsen and he developed shortness of breath and worsening malaise prompting presentation to the emergency department yesterday  He has extensive past medical history including history of bladder cancer in 2010 s/p surgery as well recent diagnosis of squamous cell cancer of the bases of tongue for which he is actively undergoing treatment at [facility]  He reports that over the past few weeks dysfunction/assistance of a decline to the point he is now requiring use of walker for ambulation even with this at times feeling too weak to ambulate, he requires use of glasses hearing aids and dentures  He resides at home with his wife and is currently requiring assistance with ADLs and IADLs, he reports recent for mood and memory  Inpatient consult to Gerontology  Consult performed by: Janusz Barrera DO  Consult ordered by: Gene Celis MD        Review of Systems   Constitutional: Positive for fatigue  Negative for chills and fever  HENT: Positive for dental problem (Dentures), hearing loss ( wears hearing aids) and trouble swallowing ( due to tongue cancer)  Eyes: Positive for visual disturbance ( wears glasses)  Respiratory: Positive for cough and shortness of breath  Cardiovascular: Positive for chest pain ( right chest wall pain)  Negative for palpitations  Gastrointestinal: Negative for abdominal pain, nausea and vomiting  Endocrine: Negative  Genitourinary: Negative  Negative for difficulty urinating and dysuria     Musculoskeletal: Positive for arthralgias and gait problem ( currently utilizing walker at baseline)  Skin: Negative  Allergic/Immunologic: Negative  Neurological: Positive for weakness  Negative for dizziness, light-headedness and numbness  Hematological: Negative  Psychiatric/Behavioral: Positive for decreased concentration and dysphoric mood  All other systems reviewed and are negative  Historical Information   Past Medical History:   Diagnosis Date    Cancer (RUST [facility] )     Diabetes (RUST [facility] )     Gastritis     GERD (gastroesophageal reflux disease)     History of bladder cancer     treated with surgery    Hypercholesterolemia     Hypertension     Hyponatremia 2021    Lactic acidosis 2021     Past Surgical History:   Procedure Laterality Date    BLADDER TUMOR EXCISION      EGD      IR CHOLECYSTOSTOMY TUBE CHECK/CHANGE/REPOSITION/REINSERTION/UPSIZE  2022    IR CHOLECYSTOSTOMY TUBE PLACEMENT  2021     Social History   Social History     Substance and Sexual Activity   Alcohol Use Not Currently     Social History     Substance and Sexual Activity   Drug Use Never     Social History     Tobacco Use   Smoking Status Former Smoker    Quit date: 2021    Years since quittin 3   Smokeless Tobacco Never Used     Family History:   Family History   Problem Relation Age of Onset    Cancer Mother     Melanoma Neg Hx      Meds/Allergies   all current active meds have been reviewed    No Known Allergies    Objective     Intake/Output Summary (Last 24 hours) at 2022 0906  Last data filed at 2022 0501  Gross per 24 hour   Intake --   Output 325 ml   Net -325 ml     Invasive Devices  Report    Peripheral Intravenous Line            Peripheral IV 21 Right;Upper;Ventral (anterior) Arm 13 days          Drain            Gastrostomy/Enterostomy PEG-jejunostomy LUQ -- days    Closed/Suction Drain RUQ Other (Comment)  5 days              Physical Exam  Vitals and nursing note reviewed     Constitutional: General: He is not in acute distress  Appearance: He is ill-appearing  Comments: Very thin, frail, elderly male appears older than stated age   HENT:      Head: Normocephalic  Comments: Periorbital regions sunken and hollowed out appearing     Nose: Nose normal       Comments: O2 via NC     Mouth/Throat:      Mouth: Mucous membranes are dry  Eyes:      General:         Right eye: No discharge  Left eye: No discharge  Comments: Conjunctiva slightly injected bilaterally  Wearing glasses   Cardiovascular:      Rate and Rhythm: Tachycardia present  Pulses: Normal pulses  Pulmonary:      Effort: Respiratory distress present  Comments: Course breath sounds, mildly increased work of breathing  Abdominal:      General: There is distension ( mild)  Palpations: Abdomen is soft  Musculoskeletal:      Cervical back: Neck supple  Right lower leg: No edema  Left lower leg: No edema  Comments: Diffuse severe subcutaneous fat and muscle wasting   Skin:     General: Skin is warm and dry  Comments:  Thin and friable  Ecchymosis bilateral upper extremities with large scab right forearm   Neurological:      Comments: Sleeping but awakens to voice, answers questions appropriately initially however begins to have notable delay in response and eventually unable to remain awake for encounter and falls asleep during conversation   Psychiatric:      Comments: Mood and affect flat withdrawn       Lab Results:   I have personally reviewed pertinent lab results including the following:  -sodium 129, potassium 4 7, chloride 90, CO2 18, BUN 23, creatinine 0 78  -magnesium 1 5  -lactic acid 2 8, 3 0  -hemoglobin 9 6, hematocrit 31 2, WBC 7 22, platelets 523  -[id number] positive by PCR 1/10/22    I have personally reviewed the following imaging study reports in PACS:    CT head without contrast 1/10/22:  No acute intracranial abnormality  CT chest abdomen pelvis with contrast reviewed, multiple rib fractures reported on right, please see radiology report for full report    Therapies:   PT:  Pending  OT:  Pending    VTE Prophylaxis: Sequential compression device Castro Chyle)     Code Status: Level 1 - Full Code  Advance Directive and Living Will:      Power of :    POLST:      Family and Social Support:  Wife    Goals of Care: sleep

## 2022-01-11 NOTE — H&P
H&P Exam - Trauma   Idania Vasquez 78 y o  male MRN: [id number]  Unit/Bed#: ED 23 Encounter: [id number]    Assessment/Plan   Trauma Alert: Other Transfer  Model of Arrival: Transfer from [address] [place] [address] Team: Attending Claudene Members, Resident Stephie Covington  Consultants: None    Trauma Active Problems:   - Multiple falls recently  - COVID positive  - Right 7-12 rib fx  - Small R pleural effusion    Trauma Plan:   - Admit to Trauma  - Maia consult  - PT/OT eval  - Rib fracture protocol  - ICS  - Pain control  - DVT ppx  - APS consult    Chief Complaint: pain in ribs    History of Present Illness   HPI:  Idania Vasquez is a 78 y o  male with past medical history of hyponatremia, bladder cancer, hypertension, diabetes type 2, PTSD, GERD, hyperlipidemia, cholecystitis who presents with anterior rib pain after a fall  Patient recently underwent a cholecystostomy tube placement with IR  Patient states that he fell at home in his living room about 2 weeks ago, when he lost balance and fell squarely on his right side  Patient states that he also fell on the same side more recently, although unable to provide a clear date  Patient denied head strike or loss of consciousness during her fall  Patient states that on both occasions, he had right-sided tenderness to palpation over the chest   Patient denies any other associated injuries, or pain elsewhere in the body  Patient takes aspirin and Eliquis daily, last taken yesterday  Patient was seen at [facility], where CT of the chest/abdomen/pelvis demonstrated right-sided 7th to 12th rib fractures, small right pleural effusion, and pulmonary opacity suspicious for pneumonia  He was transferred here for higher level of care  Mechanism:Fall    Review of Systems    12-point, complete review of systems was reviewed and negative except as stated above         Historical Information     Past Medical History:   Diagnosis Date    Cancer [facility] [address] [facility]     Diabetes ([facility] [address] )     Gastritis     GERD (gastroesophageal reflux disease)     History of bladder cancer     treated with surgery    Hypercholesterolemia     Hypertension     Hyponatremia 2021    Lactic acidosis 2021     Past Surgical History:   Procedure Laterality Date    BLADDER TUMOR EXCISION      EGD      IR CHOLECYSTOSTOMY TUBE CHECK/CHANGE/REPOSITION/REINSERTION/UPSIZE  2022    IR CHOLECYSTOSTOMY TUBE PLACEMENT  2021     Social History   Social History     Substance and Sexual Activity   Alcohol Use Not Currently     Social History     Substance and Sexual Activity   Drug Use Never     Social History     Tobacco Use   Smoking Status Former Smoker    Quit date: 2021    Years since quittin 3   Smokeless Tobacco Never Used     E-Cigarette/Vaping    E-Cigarette Use Never User      E-Cigarette/Vaping Substances     Immunization History   Administered Date(s) Administered    COVID-19 MODERNA VACC 0 5 ML IM 2021, 2021, 10/26/2021     Last Tetanus: unknown      Meds/Allergies   Current Outpatient Medications   Medication Instructions    amLODIPine (NORVASC) 10 mg, Oral, Daily    apixaban (Eliquis) 5 mg Take 2 tablets (10 mg total) by mouth 2 (two) times a day for 5 days, THEN 1 tablet (5 mg total) 2 (two) times a day for 25 days      aspirin (ECOTRIN LOW STRENGTH) 81 mg, Oral    Empagliflozin 25 MG TABS 1 tablet, Oral    fluticasone (FLONASE) 50 mcg/act nasal spray 1 spray, Nasal, Daily    insulin glargine (LANTUS) 100 units/mL subcutaneous injection Subcutaneous    metFORMIN (GLUCOPHAGE) 1,000 mg, Oral    METOPROLOL TARTRATE PO Oral    pantoprazole (PROTONIX) 20 mg, Oral, Daily    ramipril (ALTACE) 10 mg, Oral    sodium chloride, PF, 0 9 % 10 mL, Intracatheter, Daily, Intracatheter flushing daily       No Known Allergies      PHYSICAL EXAM    Objective   Vitals:   First set: Temperature: 98 8 °F (37 1 °C) (22 050)  Pulse: (!) 112 (22 050)  Respirations: 16 (01/11/22 0502)  Blood Pressure: 131/73 (01/11/22 0502)    Primary Survey:   (A) Airway: intact  (B) Breathing: decreased breath sounds bilaterally  (C) Circulation: Pulses:   normal  (D) Disabliity:  GCS Total:  15  (E) Expose:  Completed    Secondary Survey: (Click on Physical Exam tab above)  Physical Exam  Constitutional:       General: He is awake  Appearance: He is ill-appearing  HENT:      Head: Normocephalic and atraumatic  Cardiovascular:      Rate and Rhythm: Regular rhythm  Tachycardia present  Pulses: Normal pulses  Pulmonary:      Effort: Pulmonary effort is normal       Breath sounds: Decreased breath sounds present  Comments: Patient on 3L NC  Chest:       Abdominal:      General: Abdomen is flat  Palpations: Abdomen is soft  Tenderness: There is no abdominal tenderness  Musculoskeletal:      Comments: Ecchymoses over dorsal forearms bilaterally  No tenderness in other extremities, FROM   Skin:     General: Skin is warm and dry  Capillary Refill: Capillary refill takes less than 2 seconds  Neurological:      General: No focal deficit present  Mental Status: He is alert and oriented to person, place, and time  GCS: GCS eye subscore is 4  GCS verbal subscore is 5  GCS motor subscore is 6  Psychiatric:         Behavior: Behavior is cooperative           Invasive Devices  Report    Peripheral Intravenous Line            Peripheral IV 12/28/21 Right;Upper;Ventral (anterior) Arm 13 days          Drain            Gastrostomy/Enterostomy PEG-jejunostomy LUQ -- days    Closed/Suction Drain RUQ Other (Comment)  5 days                Lab Results:   Results Reviewed     None          Imaging/EKG Studies:   No orders to display       Code Status: Prior

## 2022-01-11 NOTE — PROGRESS NOTES
[facility]  Progress Note - Kimberley Fire 1945, 78 y o  male MRN: [id number]  Unit/Bed#: ED 23 Encounter: [id number]  Primary Care Provider: Catie Duran MD   Date and time admitted to hospital: 1/11/2022  4:56 AM    COVID  Assessment & Plan  - COVID precautions  -SLIM consult for medical management    Multiple fractures of rib involving four or more ribs-right  Assessment & Plan  Right 7 -12 rib fractures, with small right pleural effusion  - rib fracture protocol  - multimodal pain control  - incentive spirometer  - APS for pain management    Hyponatremia  Assessment & Plan  - trend BMP  - normal saline    Cholecystitis  Assessment & Plan  - continue cholecystostomy tube in drain management          Disposition:  Med surg      SUBJECTIVE:  Chief Complaint:  Fall    Subjective:  Patient reports SOB which has been persistent for days to weeks accompanied by some mild chest tightness  He reports some right-sided chest pain at the site of rib fractures but has no other complaints        OBJECTIVE:     Meds/Allergies     Current Facility-Administered Medications:     acetaminophen (TYLENOL) tablet 975 mg, 975 mg, Oral, Q8H [facility], Francois Bates MD    amLODIPine (NORVASC) tablet 10 mg, 10 mg, Oral, Daily, Francois Bates MD    aspirin (ECOTRIN LOW STRENGTH) EC tablet 81 mg, 81 mg, Oral, Daily, Francois Bates MD    atorvastatin (LIPITOR) tablet 40 mg, 40 mg, Oral, Daily With Dinner, Francois Bates MD    cholecalciferol (VITAMIN D3) tablet 1,000 Units, 1,000 Units, Oral, Daily, Francois Bates MD    cyanocobalamin (VITAMIN B-12) tablet 1,000 mcg, 1,000 mcg, Oral, Daily, Francois Bates MD    docusate sodium (COLACE) capsule 100 mg, 100 mg, Oral, BID, Francois Bates MD    Empagliflozin TABS 25 mg, 1 tablet, Oral, Daily, Francois Bates MD    fluticasone (FLONASE) 50 mcg/act nasal spray 1 spray, 1 spray, Nasal, Daily, Francois Bates MD    glycopyrrolate (ROBINUL) tablet 1 mg, 1 mg, Oral, TID PRN, Francois Bates MD    heparin (porcine) subcutaneous injection 5,000 Units, 5,000 Units, Subcutaneous, Q8H [address], Francois Bates MD    HYDROmorphone HCl (DILAUDID) injection 0 2 mg, 0 2 mg, Intravenous, Q4H PRN, Francois Bates MD    insulin lispro (HumaLOG) 100 units/mL subcutaneous injection 1-6 Units, 1-6 Units, Subcutaneous, 4 times day **AND** Fingerstick Glucose (POCT), , , 4 times day, Francois Bates MD  [facility]  insulin lispro (HumaLOG) 100 units/mL subcutaneous injection 1-6 Units, 1-6 Units, Subcutaneous, HS, Francois Bates MD  [facility]  Aiden Presume ON 1/12/2022] insulin lispro (HumaLOG) 100 units/mL subcutaneous injection 1-6 Units, 1-6 Units, Subcutaneous, 0200, Francois Bates MD    lisinopril (ZESTRIL) tablet 40 mg, 40 mg, Oral, Daily, Francois Bates MD  [facility]  [START ON 1/13/2022] metFORMIN (GLUCOPHAGE) tablet 1,000 mg, 1,000 mg, Oral, BID With Meals, Francois Bates MD    metoprolol tartrate (LOPRESSOR) tablet 25 mg, 25 mg, Oral, Q12H [address], Francois Bates MD    multi-electrolyte (PLASMALYTE-A/ISOLYTE-S PH 7 4) IV solution, 50 mL/hr, Intravenous, Continuous, Ursula Sesay, , Last Rate: 50 mL/hr at 01/11/22 1007, 50 mL/hr at 01/11/22 1007    naloxone (NARCAN) 0 04 mg/mL syringe 0 04 mg, 0 04 mg, Intravenous, Q1MIN PRN, Francois Bates MD    ondansetron [facility]) injection 4 mg, 4 mg, Intravenous, Q4H PRN, Francois Bates MD    oxyCODONE (ROXICODONE) IR tablet 2 5 mg, 2 5 mg, Oral, Q4H PRN, Francois Bates MD    oxyCODONE (ROXICODONE) IR tablet 5 mg, 5 mg, Oral, Q4H PRN, Francois Bates MD    pantoprazole (PROTONIX) EC tablet 20 mg, 20 mg, Oral, Early Morning, Francois Bates MD    senna-docusate sodium (SENOKOT S) 8 6-50 mg per tablet 1 tablet, 1 tablet, Oral, BID, Francois Bates MD    sodium chloride (PF) 0 9 % injection 10 mL, 10 mL, Intracatheter, Daily, Francois Bates MD    Current Outpatient Medications:     amLODIPine (NORVASC) 10 mg tablet, Take 1 tablet (10 mg total) by mouth daily, Disp: 30 tablet, Rfl: 0   apixaban (Eliquis) 5 mg, Take 2 tablets (10 mg total) by mouth 2 (two) times a day for 5 days, THEN 1 tablet (5 mg total) 2 (two) times a day for 25 days  , Disp: 70 tablet, Rfl: 0    aspirin (ECOTRIN LOW STRENGTH) 81 mg EC tablet, Take 81 mg by mouth, Disp: , Rfl:     Empagliflozin 25 MG TABS, Take 1 tablet by mouth, Disp: , Rfl:     fluticasone (FLONASE) 50 mcg/act nasal spray, 1 spray into each nostril daily, Disp: , Rfl:     insulin glargine (LANTUS) 100 units/mL subcutaneous injection, Inject under the skin, Disp: , Rfl:     metFORMIN (GLUCOPHAGE) 1000 MG tablet, Take 1,000 mg by mouth, Disp: , Rfl:     METOPROLOL TARTRATE PO, Take by mouth, Disp: , Rfl:     pantoprazole (PROTONIX) 20 mg tablet, Take 20 mg by mouth daily, Disp: , Rfl:     ramipril (ALTACE) 10 MG capsule, Take 10 mg by mouth, Disp: , Rfl:     sodium chloride, PF, 0 9 %, 10 mL by Intracatheter route daily Intracatheter flushing daily, Disp: 300 mL, Rfl: 0     Vitals:   Vitals:    01/11/22 0841   BP:    Pulse:    Resp:    Temp:    SpO2: 96%       Intake/Output:  I/O       01/09 0701  01/10 0700 01/10 0701 01/11 0700 01/11 0701 01/12 0700    Urine  325 425    Total Output  325 425    Net  -325 -425                  Nutrition/GI Proph/Bowel Reg:  Ordered    Physical Exam:   Gen: NAD, Comfortable  Neuro: A&O, No focal deficits  Head: Normal Cephalic, Atraumatic  Eye: EOMI, PERRLA, No scleral icterus  Neck: Supple, No JVD, Midline trachea  CV: RRR, Cap refill <2 sec  Pulm: Normal work of breathing, no respiratory distress  Right-sided rib tenderness  Chest pain is not reproducible  Abd: Soft, Non-Distended, Non-Tender  RUQ cholecystostomy tube present with bilious drainage   LUQ PEG tube  Ext: No edema, Non-tender  Skin: warm, dry, intact      PIC Score  PIC Pain Score: 3 (1/11/2022  6:06 AM)  PIC Incentive Spirometry Score: 4 (1/11/2022  5:12 AM)  PIC Cough Description: 3 (1/11/2022  5:12 AM)  PIC Total Score: 10 (1/11/2022  5:12 AM) If the Total PIC Score </=5, did you consult APS and evaluate patient for further intervention?: yes      Pain:    Incentive Spirometry  Cough  3 = Controlled  4 = Above goal volume 3 = Strong  2 = Moderate  3 = Goal to alert volume 2 = Weak  1 = Severe  2 = Below alert volume 1 = Absent     1 = Unable to perform IS           Invasive Devices  Report    Peripheral Intravenous Line            Peripheral IV 12/28/21 Right;Upper;Ventral (anterior) Arm 14 days          Drain            Gastrostomy/Enterostomy PEG-jejunostomy LUQ -- days    Closed/Suction Drain RUQ Other (Comment)  5 days                 Lab Results:   Results: I have personally reviewed pertinent reports   , BMP/CMP:   Lab Results   Component Value Date    SODIUM 129 (L) 01/10/2022    K 4 7 01/10/2022    CL 92 (L) 01/10/2022    CO2 28 01/10/2022    BUN 23 01/10/2022    CREATININE 0 78 01/10/2022    CALCIUM 8 8 01/10/2022    AST 16 01/10/2022    ALT 18 01/10/2022    ALKPHOS 122 (H) 01/10/2022    EGFR 88 01/10/2022   , CBC:   Lab Results   Component Value Date    WBC 8 45 01/10/2022    HGB 9 7 (L) 01/10/2022    HCT 31 2 (L) 01/10/2022    MCV 95 01/10/2022     01/10/2022    MCH 29 6 01/10/2022    MCHC 31 1 (L) 01/10/2022    RDW 20 7 (H) 01/10/2022    MPV 9 7 01/10/2022   , Coagulation:   Lab Results   Component Value Date    INR 1 15 01/10/2022    and Lactate: No results found for: LACTATE  Imaging/EKG Studies: Results: I have personally reviewed pertinent reports         Other Studies: None  VTE Prophylaxis: Heparin       Ash Quintero MD  1/11/2022  10:31 AM

## 2022-01-11 NOTE — ASSESSMENT & PLAN NOTE
Present on admission  Admitted to Primary Team (Trauma Service)  Home Eliquis (for hx of PE) held due to fall and fractures  Ordered Orthostatics  Continue scheduled bowel regimen given pain regimen  Pending PT/OT evaluation

## 2022-01-11 NOTE — ASSESSMENT & PLAN NOTE
Lab Results   Component Value Date    HGBA1C 6 3 (H) 12/22/2021       Recent Labs     01/11/22  0638   POCGLU 103       Blood Sugar Average: Last 72 hrs:  (P) 103   Chronic and stable  Continue home Lipitor

## 2022-01-11 NOTE — ASSESSMENT & PLAN NOTE
· Currently denies pain at rest   States pain increases slightly with movement  · No respiratory distress or oxygen requirement  · Able to pull 1 L consistently on incentive spirometry  · Fractures of right 7th through 12th ribs  · Patient on Eliquis at home for recent diagnosis of pulmonary embolism  · Continue Tylenol 975 mg p o  q 8 hours scheduled  · Continue oxycodone 2 5 mg p o  q 4 hours p r n  moderate pain  · Continue oxycodone 5 mg p o  q 4 hours p r n  severe pain  · Continue Dilaudid 0 2 mg IV q 4 hours p r n  breakthrough pain  · Continue bowel regimen to avoid opioid induced constipation while on opioid pain medication  · Would avoid muscle relaxers in this age group  · No indication for Neurontin  · Ice to painful area for 20 minutes every hour as needed  · Patient is not a candidate for neuraxial blockade secondary to recent use Eliquis  · No indication for peripheral nerve block at this time  · Could consider palliative care consultation in this patient currently being treated for cancer with multiple previous cancer diagnoses and multiple comorbidities

## 2022-01-11 NOTE — ASSESSMENT & PLAN NOTE
Smokers have been shown to require higher doses of opioid pain medication and are more likely to develop chronic pain  Encourage smoking cessation    ·

## 2022-01-11 NOTE — ED CARE HANDOFF
Emergency Department Sign Out Note        Sign out and transfer of care from ORLIN Earl  See Separate Emergency Department note  The patient, Emilee Clark, was evaluated by the previous provider for Sepsis  Workup Completed:  none    ED Course / Workup Pending (followup):                                      ED Course as of 01/11/22 2124   Mon Zi 10, 2022   2119 Received in sign out:     Pt here for aMS/encepholopathy  Sepsis work up pending   2145 Pt seen and evaluated  He is ill appearing  Tachycardic  Sepsis work up in progress   Tue Jan 11, 2022   0010 LACTIC ACID(!!): 2 8   0010 CBC and differential(!)   0010 Manual Differential(PHLEBS Do Not Order)(!)   0010 COVID/FLU/RSV - 2 hour TAT(!)   0010 Blood gas, venous(!)   0010 POCT INR: 1 15   0010 Lipase: 44   0010 Magnesium(!): 1 5   0010 Comprehensive metabolic panel(!)   7156 CT BRAIN - WITHOUT CONTRAST     INDICATION:   Altered mental status, delirium, encephalopathy, possible sepsis  Patient has confirmed COVID-19 (tested positive January 10, 2022)      COMPARISON:  None available      TECHNIQUE:  CT examination of the brain was performed  In addition to axial images, sagittal and coronal 2D reformatted images were created and submitted for interpretation      Radiation dose length product (DLP) for this visit:  080 mGy-cm   This examination, like all CT scans performed in the [facility], was performed utilizing techniques to minimize radiation dose exposure, including the use of iterative   reconstruction and automated exposure control        IMAGE QUALITY:  Diagnostic      FINDINGS:     PARENCHYMA:  No intracranial mass, positive mass effect or midline shift  No CT signs of acute infarction  No acute parenchymal hemorrhage    There is generalized atrophy, most pronounced involving the convexities      VENTRICLES AND EXTRA-AXIAL SPACES:  Ventricles and extra-axial CSF spaces are prominent commensurate with the degree of volume loss   No hydrocephalus  No acute extra-axial hemorrhage      VISUALIZED ORBITS AND PARANASAL SINUSES:  Unremarkable      CALVARIUM AND EXTRACRANIAL SOFT TISSUES:  Unremarkable      IMPRESSION:     No acute intracranial abnormality     01[address] Dw Dr Julian Snell, Radiologist     Pt has many rib fractures, all appears acute  7, 8 and 9 and 10 and 11 and 12  Small pleural effusion  No ptx  Patchy opacities c/w covid  Anterior/anterior lateral wall thick, pt has h/o bladder ca  Subcut nodule under right scapule, examine to r/o cancer   0203 Dw wife  She understands the work results and plan for transfer based on the multitude of rib fx     Adt21 placed    0232 Dw Trauma, Dr Rahda Fraga   Reviewed the case  She will accept in transfer      Procedures  MDM        Disposition  Final diagnoses:   Generalized weakness   COVID-19 virus infection   Hypomagnesemia   Bandemia   Dehydration   Hyponatremia   Multiple rib fractures     Time reflects when diagnosis was documented in both MDM as applicable and the Disposition within this note     Time User Action Codes Description Comment    1/11/2022 12:14 AM Obdulio Connor Add [R53 1] Weakness     1/11/2022 12:14 AM Obdulio Connor Remove [R53 1] Weakness     1/11/2022 12:14 AM Obdulio Connor Add [R53 1] Generalized weakness     1/11/2022 12:14 AM Obdulio Connor Add [U07 1] COVID-19 virus infection     1/11/2022 12:15 AM Obdulio Connor Add [E83 42] Hypomagnesemia     1/11/2022 12:15 AM Tremaine Magic [D72 825] Bandemia     1/11/2022 12:15 AM Tremaine Magic [E86 0] Dehydration     1/11/2022 12:16 AM Obdulio Connor Add [E87 1] Hyponatremia     1/11/2022  2:04 AM Obdulio Connor Add [S22 49XA] Multiple rib fractures       ED Disposition     ED Disposition Condition Date/Time Comment    Transfer to Another Facility-In Network  Tue Jan 11, 2022  2:36 AM Alfa Peterson should be transferred [facility] to [facility]        MD Documentation      Most Recent Value   Patient Condition The patient has been stabilized such that within reasonable medical probability, no material deterioration of the patient condition or the condition of the unborn child(adrián) is likely to result from the transfer   Reason for Transfer Level of Care needed not available at this facility   Benefits of Transfer Specialized equipment and/or services available at the receiving facility (Include comment)________________________   Risks of Transfer Potential for delay in receiving treatment, Potential deterioration of medical condition, Loss of IV, Increased discomfort during transfer, Possible worsening of condition or death during transfer   Accepting Physician Dr Guido Schilling Name, [facility] Goncal[facility]   Sending VALERIE Daley   Provider Certification General risk, such as traffic hazards, adverse weather conditions, rough terrain or turbulence, possible failure of equipment (including vehicle or aircraft), or consequences of actions of persons outside the control of the transport personnel, Unanticipated needs of medical equipment and personnel during transport, Risk of worsening condition, The possibility of a transport vehicle being unavailable      RN Documentation      Most Recent Value   Accepting Facility Name, Zia Goncalves   Bed Assignment trauma   Report Given to trauma RN      Follow-up Information    None       Discharge Medication List as of 1/11/2022  4:26 AM      CONTINUE these medications which have NOT CHANGED    Details   amLODIPine (NORVASC) 10 mg tablet Take 1 tablet (10 mg total) by mouth daily, Starting Fri 12/31/2021, Normal      apixaban (Eliquis) 5 mg Multiple Dosages:Starting Wed 12/29/2021, Until Sun 1/2/2022 at 2359, THEN Starting Mon 1/3/2022, Until Thu 1/27/2022 at 2359Take 2 tablets (10 mg total) by mouth 2 (two) times a day for 5 days, THEN 1 tablet (5 mg total) 2 (two) times a day for 22 da ys , Normal      aspirin (ECOTRIN LOW STRENGTH) 81 mg EC tablet Take 81 mg by mouth, Historical Med      Empagliflozin 25 MG TABS Take 1 tablet by mouth, Historical Med      fluticasone (FLONASE) 50 mcg/act nasal spray 1 spray into each nostril daily, Historical Med      insulin glargine (LANTUS) 100 units/mL subcutaneous injection Inject under the skin, Historical Med      metFORMIN (GLUCOPHAGE) 1000 MG tablet Take 1,000 mg by mouth, Historical Med      METOPROLOL TARTRATE PO Take by mouth, Historical Med      pantoprazole (PROTONIX) 20 mg tablet Take 20 mg by mouth daily, Historical Med      ramipril (ALTACE) 10 MG capsule Take 10 mg by mouth, Historical Med      sodium chloride, PF, 0 9 % 10 mL by Intracatheter route daily Intracatheter flushing daily, Starting Thu 12/30/2021, Until Wed 3/30/2022, Print           No discharge procedures on file         ED Provider  Electronically Signed by     Kandace Vance MD  01/11/22 7592

## 2022-01-11 NOTE — ASSESSMENT & PLAN NOTE
Right 7 -12 rib fractures, with small right pleural effusion  - rib fracture protocol  - multimodal pain control  - incentive spirometer  - APS for pain management

## 2022-01-12 ENCOUNTER — APPOINTMENT (INPATIENT)
Dept: RADIOLOGY | Facility: HOSPITAL | Age: 77
DRG: 177 | End: 2022-01-12
Payer: MEDICARE

## 2022-01-12 LAB
ANION GAP SERPL CALCULATED.3IONS-SCNC: 8 MMOL/L (ref 4–13)
APTT PPP: 41 SECONDS (ref 23–37)
BASOPHILS # BLD AUTO: 0 THOUSANDS/ΜL (ref 0–0.1)
BASOPHILS NFR BLD AUTO: 0 % (ref 0–1)
BUN SERPL-MCNC: 24 MG/DL (ref 5–25)
CALCIUM SERPL-MCNC: 8.6 MG/DL (ref 8.3–10.1)
CHLORIDE SERPL-SCNC: 105 MMOL/L (ref 100–108)
CO2 SERPL-SCNC: 25 MMOL/L (ref 21–32)
CREAT SERPL-MCNC: 0.87 MG/DL (ref 0.6–1.3)
EOSINOPHIL # BLD AUTO: 0 THOUSAND/ΜL (ref 0–0.61)
EOSINOPHIL NFR BLD AUTO: 0 % (ref 0–6)
ERYTHROCYTE [DISTWIDTH] IN BLOOD BY AUTOMATED COUNT: 20.4 % (ref 11.6–15.1)
GFR SERPL CREATININE-BSD FRML MDRD: 85 ML/MIN/1.73SQ M
GLUCOSE SERPL-MCNC: 151 MG/DL (ref 65–140)
GLUCOSE SERPL-MCNC: 170 MG/DL (ref 65–140)
GLUCOSE SERPL-MCNC: 226 MG/DL (ref 65–140)
GLUCOSE SERPL-MCNC: 266 MG/DL (ref 65–140)
GLUCOSE SERPL-MCNC: 276 MG/DL (ref 65–140)
HCT VFR BLD AUTO: 27.5 % (ref 36.5–49.3)
HGB BLD-MCNC: 8.3 G/DL (ref 12–17)
IMM GRANULOCYTES # BLD AUTO: 0.02 THOUSAND/UL (ref 0–0.2)
IMM GRANULOCYTES NFR BLD AUTO: 0 % (ref 0–2)
INR PPP: 1.26 (ref 0.84–1.19)
LYMPHOCYTES # BLD AUTO: 0.33 THOUSANDS/ΜL (ref 0.6–4.47)
LYMPHOCYTES NFR BLD AUTO: 4 % (ref 14–44)
MCH RBC QN AUTO: 28.8 PG (ref 26.8–34.3)
MCHC RBC AUTO-ENTMCNC: 30.2 G/DL (ref 31.4–37.4)
MCV RBC AUTO: 96 FL (ref 82–98)
MONOCYTES # BLD AUTO: 0.61 THOUSAND/ΜL (ref 0.17–1.22)
MONOCYTES NFR BLD AUTO: 8 % (ref 4–12)
NEUTROPHILS # BLD AUTO: 6.54 THOUSANDS/ΜL (ref 1.85–7.62)
NEUTS SEG NFR BLD AUTO: 88 % (ref 43–75)
NRBC BLD AUTO-RTO: 0 /100 WBCS
PLATELET # BLD AUTO: 179 THOUSANDS/UL (ref 149–390)
PMV BLD AUTO: 9.6 FL (ref 8.9–12.7)
POTASSIUM SERPL-SCNC: 4.2 MMOL/L (ref 3.5–5.3)
PROTHROMBIN TIME: 15.2 SECONDS (ref 11.6–14.5)
RBC # BLD AUTO: 2.88 MILLION/UL (ref 3.88–5.62)
SODIUM SERPL-SCNC: 138 MMOL/L (ref 136–145)
WBC # BLD AUTO: 7.5 THOUSAND/UL (ref 4.31–10.16)

## 2022-01-12 PROCEDURE — XW033E5 INTRODUCTION OF REMDESIVIR ANTI-INFECTIVE INTO PERIPHERAL VEIN, PERCUTANEOUS APPROACH, NEW TECHNOLOGY GROUP 5: ICD-10-PCS | Performed by: EMERGENCY MEDICINE

## 2022-01-12 PROCEDURE — 85610 PROTHROMBIN TIME: CPT

## 2022-01-12 PROCEDURE — 99232 SBSQ HOSP IP/OBS MODERATE 35: CPT | Performed by: EMERGENCY MEDICINE

## 2022-01-12 PROCEDURE — 97167 OT EVAL HIGH COMPLEX 60 MIN: CPT

## 2022-01-12 PROCEDURE — 85730 THROMBOPLASTIN TIME PARTIAL: CPT

## 2022-01-12 PROCEDURE — 97163 PT EVAL HIGH COMPLEX 45 MIN: CPT

## 2022-01-12 PROCEDURE — 85025 COMPLETE CBC W/AUTO DIFF WBC: CPT

## 2022-01-12 PROCEDURE — 71045 X-RAY EXAM CHEST 1 VIEW: CPT

## 2022-01-12 PROCEDURE — C9113 INJ PANTOPRAZOLE SODIUM, VIA: HCPCS | Performed by: STUDENT IN AN ORGANIZED HEALTH CARE EDUCATION/TRAINING PROGRAM

## 2022-01-12 PROCEDURE — 80048 BASIC METABOLIC PNL TOTAL CA: CPT

## 2022-01-12 PROCEDURE — 82948 REAGENT STRIP/BLOOD GLUCOSE: CPT

## 2022-01-12 RX ORDER — ACETAMINOPHEN 160 MG/5ML
975 SUSPENSION, ORAL (FINAL DOSE FORM) ORAL EVERY 8 HOURS
Status: DISCONTINUED | OUTPATIENT
Start: 2022-01-12 | End: 2022-01-14 | Stop reason: HOSPADM

## 2022-01-12 RX ORDER — SODIUM CHLORIDE, SODIUM GLUCONATE, SODIUM ACETATE, POTASSIUM CHLORIDE, MAGNESIUM CHLORIDE, SODIUM PHOSPHATE, DIBASIC, AND POTASSIUM PHOSPHATE .53; .5; .37; .037; .03; .012; .00082 G/100ML; G/100ML; G/100ML; G/100ML; G/100ML; G/100ML; G/100ML
1000 INJECTION, SOLUTION INTRAVENOUS ONCE
Status: COMPLETED | OUTPATIENT
Start: 2022-01-12 | End: 2022-01-12

## 2022-01-12 RX ADMIN — INSULIN LISPRO 2 UNITS: 100 INJECTION, SOLUTION INTRAVENOUS; SUBCUTANEOUS at 13:29

## 2022-01-12 RX ADMIN — Medication 12.5 MG: at 21:56

## 2022-01-12 RX ADMIN — CYANOCOBALAMIN TAB 500 MCG 1000 MCG: 500 TAB at 08:47

## 2022-01-12 RX ADMIN — Medication 1000 UNITS: at 08:47

## 2022-01-12 RX ADMIN — SENNOSIDES AND DOCUSATE SODIUM 1 TABLET: 50; 8.6 TABLET ORAL at 17:52

## 2022-01-12 RX ADMIN — OXYCODONE HYDROCHLORIDE 5 MG: 5 TABLET ORAL at 08:47

## 2022-01-12 RX ADMIN — ENOXAPARIN SODIUM 30 MG: 30 INJECTION SUBCUTANEOUS at 21:57

## 2022-01-12 RX ADMIN — PRAZOSIN HYDROCHLORIDE 8 MG: 2 CAPSULE ORAL at 21:58

## 2022-01-12 RX ADMIN — RISPERIDONE 0.5 MG: 0.5 TABLET, ORALLY DISINTEGRATING ORAL at 21:58

## 2022-01-12 RX ADMIN — BUPROPION HYDROCHLORIDE 150 MG: 150 TABLET, FILM COATED, EXTENDED RELEASE ORAL at 08:47

## 2022-01-12 RX ADMIN — OXYCODONE HYDROCHLORIDE 5 MG: 5 TABLET ORAL at 13:26

## 2022-01-12 RX ADMIN — ACETAMINOPHEN 975 MG: 325 TABLET, FILM COATED ORAL at 05:17

## 2022-01-12 RX ADMIN — PANTOPRAZOLE SODIUM 40 MG: 40 INJECTION, POWDER, FOR SOLUTION INTRAVENOUS at 05:17

## 2022-01-12 RX ADMIN — INSULIN LISPRO 1 UNITS: 100 INJECTION, SOLUTION INTRAVENOUS; SUBCUTANEOUS at 17:59

## 2022-01-12 RX ADMIN — ATORVASTATIN CALCIUM 20 MG: 20 TABLET, FILM COATED ORAL at 17:52

## 2022-01-12 RX ADMIN — FLUOXETINE 80 MG: 20 CAPSULE ORAL at 08:47

## 2022-01-12 RX ADMIN — ASPIRIN 81 MG: 81 TABLET, COATED ORAL at 08:47

## 2022-01-12 RX ADMIN — RISPERIDONE 0.5 MG: 0.5 TABLET, ORALLY DISINTEGRATING ORAL at 09:48

## 2022-01-12 RX ADMIN — DEXAMETHASONE SODIUM PHOSPHATE 6 MG: 4 INJECTION, SOLUTION INTRA-ARTICULAR; INTRALESIONAL; INTRAMUSCULAR; INTRAVENOUS; SOFT TISSUE at 13:26

## 2022-01-12 RX ADMIN — ENOXAPARIN SODIUM 30 MG: 30 INJECTION SUBCUTANEOUS at 08:48

## 2022-01-12 RX ADMIN — SODIUM CHLORIDE, SODIUM GLUCONATE, SODIUM ACETATE, POTASSIUM CHLORIDE, MAGNESIUM CHLORIDE, SODIUM PHOSPHATE, DIBASIC, AND POTASSIUM PHOSPHATE 1000 ML: .53; .5; .37; .037; .03; .012; .00082 INJECTION, SOLUTION INTRAVENOUS at 04:00

## 2022-01-12 RX ADMIN — OXYCODONE HYDROCHLORIDE 2.5 MG: 5 TABLET ORAL at 18:25

## 2022-01-12 RX ADMIN — RISPERIDONE 0.5 MG: 0.5 TABLET, ORALLY DISINTEGRATING ORAL at 01:56

## 2022-01-12 RX ADMIN — RISPERIDONE 0.5 MG: 0.5 TABLET, ORALLY DISINTEGRATING ORAL at 17:50

## 2022-01-12 RX ADMIN — ACETAMINOPHEN 975 MG: 650 SUSPENSION ORAL at 22:40

## 2022-01-12 RX ADMIN — ACETAMINOPHEN 975 MG: 650 SUSPENSION ORAL at 17:52

## 2022-01-12 RX ADMIN — REMDESIVIR 100 MG: 100 INJECTION, POWDER, LYOPHILIZED, FOR SOLUTION INTRAVENOUS at 13:37

## 2022-01-12 RX ADMIN — INSULIN LISPRO 4 UNITS: 100 INJECTION, SOLUTION INTRAVENOUS; SUBCUTANEOUS at 01:59

## 2022-01-12 RX ADMIN — Medication 9 MG: at 21:57

## 2022-01-12 RX ADMIN — SENNOSIDES AND DOCUSATE SODIUM 1 TABLET: 50; 8.6 TABLET ORAL at 08:47

## 2022-01-12 RX ADMIN — GLYCOPYRROLATE 1 MG: 1 TABLET ORAL at 22:12

## 2022-01-12 NOTE — ASSESSMENT & PLAN NOTE
Lab Results   Component Value Date    HGBA1C 6 3 (H) 12/22/2021       Recent Labs     01/11/22  1416 01/11/22 1957 01/12/22  0013 01/12/22  0158   POCGLU 114 158* 266* 276*       Blood Sugar Average: Last 72 hrs:  (P) 183 4

## 2022-01-12 NOTE — ED PROCEDURE NOTE
PROCEDURE  ECG 12 Lead Documentation Only    Date/Time: 1/10/2022 9:30 PM  Performed by: Irena Jeffers MD  Authorized by: Irena Jeffers MD     Indications / Diagnosis:  Sepsis   ECG reviewed by me, the ED Provider: yes    Patient location:  ED  Interpretation:     Interpretation: abnormal    Rate:     ECG rate:  112    ECG rate assessment: tachycardic    Rhythm:     Rhythm: sinus tachycardia    Ectopy:     Ectopy: none    QRS:     QRS axis:  Normal  Conduction:     Conduction: abnormal      Abnormal conduction: complete RBBB    ST segments:     ST segments:  Non-specific  T waves:     T waves: non-specific           Irena Jeffers MD  01/12/22 6656

## 2022-01-12 NOTE — NUTRITION
01/12/22 1415   Recommendations/Interventions   Interventions EN continue as ordered   Nutrition Recommendations Continue EN as ordered;Lab consider order (Specify)  (Suggest add 150 ml free water flush to current EN order, total volume with flushes will provide 2255 ml tv   Monitor electrolytes, adjust IVF accordingly )

## 2022-01-12 NOTE — ASSESSMENT & PLAN NOTE
Hypotensive 1/11  - hold amlodipine and lisinopril  - holding parameters on home metoprolol 12 5 to hold for SBP < 110

## 2022-01-12 NOTE — ASSESSMENT & PLAN NOTE
Right 7 -12 rib fractures, with small right pleural effusion  - rib fracture protocol  - multimodal pain control  - incentive spirometer  - APS for pain management  Continue p o   Pain regimen at this time

## 2022-01-12 NOTE — PHYSICAL THERAPY NOTE
PHYSICAL THERAPY EVALUATION  NAME:  Bettina Li  DATE: 01/12/22    AGE:   78 y o  Mrn:   [id number]  ADMIT DX:  Hyponatremia [E87 1]  Rib fracture [S22 39XA]  Closed fracture of multiple ribs of right side, initial encounter [S22 41XA]  Hyperlipidemia due to type 2 diabetes mellitus ([facility] ) [E11 69, E78 5]  Dysphagia, unspecified type [R13 10]  COVID [U07 1]    Past Medical History:   Diagnosis Date    Cancer ([facility] )     Diabetes ([place] [address] )     Gastritis     GERD (gastroesophageal reflux disease)     History of bladder cancer 2010    treated with surgery    Hypercholesterolemia     Hypertension     Hyponatremia 12/22/2021    Lactic acidosis 12/22/2021       Past Surgical History:   Procedure Laterality Date    BLADDER TUMOR EXCISION      EGD      IR CHOLECYSTOSTOMY TUBE CHECK/CHANGE/REPOSITION/REINSERTION/UPSIZE  1/5/2022    IR CHOLECYSTOSTOMY TUBE PLACEMENT  12/27/2021       Length Of Stay: 1    PHYSICAL THERAPY EVALUATION:        01/12/22 0915   Note Type   Note type Evaluation   Pain Assessment   Pain Assessment Tool 0-10   Pain Score 4   Pain Location/Orientation Orientation: Right;Location: Rib Cage   Pain Onset/Description Onset: Ongoing;Frequency: Constant/Continuous; Descriptor: Aching   Effect of Pain on Daily Activities Increased pain with activity   Patient's Stated Pain Goal No pain   Hospital Pain Intervention(s) Ambulation/increased activity;Repositioned   Restrictions/Precautions   Weight Bearing Precautions Per Order No   Other Precautions Chair Alarm; Bed Alarm;Multiple lines;Contact/isolation; Airborne/isolation;O2;Fall Risk;Pain  (COVID 19 +; cholecystostomy tube)   Home Living   Type of [address] [place] [address] One level;Stairs to enter with rails  (2-4 JUDITH )   [address] [place] [address],[address]   Additional Comments Patient reports living with supportive spouse was able to assist as needed   Prior Function   Level of [place] Independent with ADLs and functional mobility   Lives With Spouse Receives Help From Family   ADL Assistance Independent   IADLs Needs assistance   Falls in the last 6 months 1 to 4  (2 )   Vocational Retired   Comments Patient reports use of a rolling walker for ambulation prior to admission   General   Family/Caregiver Present No   Cognition   Overall Cognitive Status WFL   Attention Attends with cues to redirect   Orientation Level Oriented X4   Memory Within functional limits   Following Commands Follows multistep commands without difficulty   RUE Assessment   RUE Assessment WFL   LUE Assessment   LUE Assessment WFL   RLE Assessment   RLE Assessment WFL   Strength RLE   RLE Overall Strength 4-/5   LLE Assessment   LLE Assessment WFL   Strength LLE   LLE Overall Strength 4-/5   Bed Mobility   Supine to Sit 5  Supervision   Additional items Increased time required;Verbal cues   Transfers   Sit to Stand 4  Minimal assistance   Additional items Assist x 1; Increased time required;Verbal cues   Stand to Sit 4  Minimal assistance   Additional items Assist x 1; Increased time required   Additional Comments VC and TC needed for hand placement during transfers    Ambulation/Elevation   Gait pattern Excessively slow; Short stride; Foward flexed; Inconsistent norma   Gait Assistance 4  Minimal assist   Additional items Assist x 1   Assistive Device Rolling walker   Distance 10ft x 2   (limited 2* isolation restrictions)   Balance   Static Sitting Fair +   Static Standing Fair -   Ambulatory Poor +   Endurance Deficit   Endurance Deficit Yes   Endurance Deficit Description fatigue    Activity Tolerance   Activity Tolerance Patient limited by fatigue   Medical Staff Made Aware Chise, OT; OT present for co evaluation due to pts unstable presentaion and decreased activity tolerance which all impact pts overall physical performance    Nurse Made Aware Patient appropriate to be seen and mobilized per nursing   Assessment   Prognosis Good   Problem List Decreased strength;Decreased endurance; Impaired balance;Decreased mobility;Pain   Assessment Pt is 78 y o  male seen for PT evaluation s/p admit [facility] on 1/11/2022  Two pt identifiers were used to confirm  Pt presented s/p fall  Pt originally presented at [facility] and was transferred to [facility] for further medical management/ evaluation  Pt was admitted with a primary dx of:  Multiple fractures of ribs on right side, and other active problems include hyponatremia, cholecystitis, HLD due to type 2 DM, dysphagic, essential HTN, COVID-19 positive   PT now consulted for assessment of mobility and d/c needs  Pt with Up in chair orders  Pts current co morbidities affecting treatment include:  Cancer, DM, gastritis, GERD, hypercholesterolemia, HTN, hyponatremia  Pts current clinical presentation is Unstable/ Unpredictable (high complexity) due to Ongoing medical management for primary dx, Decreased activity tolerance compared to baseline, Fall risk, Increased assistance needed from caregiver at current time, Continuous pulse oximetry monitoring     Upon evaluation, pt currently is requiring Supervision for bed mobility; Min Ax1 for transfers and Min Ax1 for ambulation w/ RW  Pt presents at PT eval functioning below baseline and currently w/ overall mobility deficits 2* to: BLE weakness, impaired balance, decreased endurance, gait deviations, pain, decreased activity tolerance compared to baseline, fall risk  Pt currently at a fall risk 2* to impairments listed above  Based on the aforementioned PT evaluation, pt will continue to benefit from skilled Acute PT interventions to address stated impairments; to maximize functional mobility; for ongoing pt/ family training; and DME needs  At conclusion of PT session pt returned back in chair with phone and call bell within reach  Pt denies any further questions at this time  PT is currently recommending Home PT and Home with increased family support   PT will continue to follow during hospital stay  Barriers to Discharge None   Barriers to Discharge Comments Patient denies any mobility or safety concerns about returning home at time of discharge   Goals   Patient Goals " to go home"   STG Expiration Date 01/22/22   Short Term Goal #1 In 10 days pt will complete: 1) Bed mobility skills with mod I to increase safety and independence as well as decrease caregiver burden  2) Functional transfers with mod I to promote increased independence, safety, and QOL  3) Ambulate 150' using least restrictive AD with mod I without LOB and stable vitals so that pt can negotiate previous living environment safely and promote independence with functional mobility and return to PLOF  4) Stair training up/ down 3 step/s using rail/s with S so that pt can enter/negotiate previous living environment safely and decrease fall risk  5) Improve balance grades by 1/2 grade to increase safety with all mobility and decrease fall risk  6) Improve BLE strength by 1/2 grade to help increase overall functional mobility and decrease fall risk  Plan   Treatment/Interventions Functional transfer training;LE strengthening/ROM; Therapeutic exercise; Endurance training;Elevations; Patient/family training;Equipment eval/education; Bed mobility;Gait training;Spoke to nursing;OT   PT Frequency Other (Comment)  (3-6x a week )   Recommendation   PT Discharge Recommendation Home with home health rehabilitation   Equipment Recommended [facility] Recommended Wheeled walker   Additional Comments Patient denies any mobility or safety concerns about returning home at time of discharge   Bk 8 in Bed Without Bedrails 4   Lying on Back to Sitting on Edge of Flat Bed 4   Moving Bed to Chair 3   Standing Up From Chair 3   Walk in Room 3   Climb 3-5 Stairs 3   Basic Mobility Inpatient Raw Score 20   Basic Mobility Standardized Score 43 99   Highest Level Of Mobility   -[facility] Goal 6: Walk 10 steps or more   Modified [place] Scale   Modified [place] Scale 4   Barthel Index   Feeding 10   Bathing 0   Grooming Score 5   Dressing Score 5   Bladder Score 10   Bowels Score 10   Toilet Use Score 5   Transfers (Bed/Chair) Score 10   Mobility (Level Surface) Score 0   Stairs Score 0   Barthel Index Score 55   Portions of the documentation may have been created using voice recognition software  Occasional wrong word or sound alike substitutions may have occurred due to the inherent limitations of the voice recognition software  Read the chart carefully and recognize, using context, where substitutions have occurred      Arya Mendoza, PT, DPT

## 2022-01-12 NOTE — OCCUPATIONAL THERAPY NOTE
Occupational Therapy Evaluation     Patient Name: Marianne Worrell  XPJPN'E Date: 1/12/2022  Problem List  Principal Problem:    COVID  Active Problems:    Essential hypertension    Dysphagia, unspecified    Type 2 diabetes mellitus without complication, without long-term current use of insulin ([facility] )    Hyperlipidemia due to type 2 diabetes mellitus ([facility] )    Tobacco dependence    Cholecystitis    Hyponatremia    Multiple fractures of rib involving four or more ribs-right    Acute respiratory failure with hypoxia [facility])    Past Medical History  Past Medical History:   Diagnosis Date    Cancer [facility] )     Diabetes ([place] [address] )     Gastritis     GERD (gastroesophageal reflux disease)     History of bladder cancer 2010    treated with surgery    Hypercholesterolemia     Hypertension     Hyponatremia 12/22/2021    Lactic acidosis 12/22/2021     Past Surgical History  Past Surgical History:   Procedure Laterality Date    BLADDER TUMOR EXCISION      EGD      IR CHOLECYSTOSTOMY TUBE CHECK/CHANGE/REPOSITION/REINSERTION/UPSIZE  1/5/2022    IR CHOLECYSTOSTOMY TUBE PLACEMENT  12/27/2021 01/12/22 0916   OT Last Visit   OT Visit Date 01/12/21   Note Type   Note type Evaluation   Restrictions/Precautions   Weight Bearing Precautions Per Order No   Other Precautions Chair Alarm;Multiple lines; Fall Risk;Pain;O2;Contact/isolation; Airborne/isolation   Pain Assessment   Pain Assessment Tool 0-10   Pain Score 4   Pain Location/Orientation Orientation: Right;Location: Rib Cage   Patient's Stated Pain Goal No pain   Hospital Pain Intervention(s) Repositioned; Ambulation/increased activity; Emotional support   Home Living   Type of [address] [place] [address] One level;Stairs to enter with rails  (2-4 JUDITH)   Bathroom Shower/Tub Walk-in shower   Bathroom Toilet Standard   Bathroom Equipment Shower chair   [address] Walker   Additional Comments + USE OF RW   Prior Function   Level of [place] Independent with ADLs and functional mobility   Lives With Spouse   Receives Help From Family   ADL Assistance Independent   IADLs Needs assistance   Falls in the last 6 months 1 to 4  (2)   Vocational Retired   Lifestyle   Autonomy PATIENT REPORTS BEING INDEPENDENT WITH ADLS AND HAVE ASSIST WITH IADLS  OCCASIONAL   Reciprocal Relationships LIVES WITH SUPPORTIVE SPOUSE WHO IS HOME AND ABLE TO ASSIST AS NEEDED   Service to Others RETIRED   Intrinsic Gratification ENJOYS SPENDING TIME WITH 8 GRANDCHILDREN   Psychosocial   Psychosocial (WDL) WDL   Subjective   Subjective "I DO NOT FEEL LIKE I AM SICK:   ADL   Eating Assistance 7  Independent   Grooming Assistance 7  Independent   UB Bathing Assistance 5  Supervision/Setup   LB Bathing Assistance 4  Minimal Assistance   UB Dressing Assistance 5  Supervision/Setup   LB Dressing Assistance 4  Minimal Assistance   Toileting Assistance  4  Minimal Assistance   Functional Assistance 4  Minimal Assistance   Bed Mobility   Supine to Sit 5  Supervision   Additional items Increased time required   Sit to Supine Unable to assess   Additional items   (PATIENT LEFT OUT OF BED WITH ALL NEEDS AND REACH UP+ ALARM )   Transfers   Sit to Stand 4  Minimal assistance   Additional items Assist x 1; Increased time required   Stand to Sit 4  Minimal assistance   Additional items Assist x 1; Increased time required   Functional Mobility   Functional Mobility 4  Minimal assistance   Additional items Rolling walker   Balance   Static Sitting Fair +   Static Standing Fair -   Ambulatory Poor +   Activity Tolerance   Activity Tolerance Patient tolerated treatment well   Medical Staff Made Aware PT SEEN FOR CO-SESSION WITH SKILLED PHYSICAL THERAPIST 2' CLINICALLY UNSTABLE PRESENTATION, TRAUMATIC INJURIES, NEW PRECAUTIONS/LIMITATIONS, LIMITED ACTIVITY TOLERANCE AND PRESENT IMPAIRMENTS WHICH ARE A REGRESSION FROM THE PT'S BASELINE AND IMPACTING OVERALL OCCUPATIONAL PERFORMANCE  Nurse Made Aware APPROPRIATE TO SEE PER RN  RN UPDATED    RUE Assessment   RUE Assessment WFL   LUE Assessment   LUE Assessment WFL   Hand Function   Gross Motor Coordination Functional   Fine Motor Coordination Functional   Sensation   Light Touch No apparent deficits   Cognition   Overall Cognitive Status WFL   Arousal/Participation Alert; Cooperative   Attention Attends with cues to redirect   Orientation Level Oriented X4   Memory Within functional limits   Following Commands Follows multistep commands without difficulty   Comments PATIENT IS PLEASANT AND COOPERATIVE  MOTIVATED TO RETURN HOME   Assessment   Limitation Decreased ADL status; Decreased endurance;Decreased self-care trans;Decreased high-level ADLs   Prognosis Good   Assessment 77 YO Male SEEN FOR INITIAL OCCUPATIONAL THERAPY EVALUATION FOLLOWING TXF FROM [facility] S/P FALL RESULTING IN R SIDED RIB FXS  PROBLEMS LIST INCLUDES R SMALL PLEURAL EFFUSION, COVID-19+ ON 1L O2, BLADDER CA/ORAL CA WITH PEG PLACEMENT, HTN, DM, PTSD, HLD, CHOLECYSTITIS AND RECENT CHOLECYSTOSTOMY TUBE PLACEMENT  PT IS FROM HOME WITH SPOUSE WHERE HE REPORTS BEING INDEPENDENT WITH ADLS AND HAS ASSIST WITH IADLS PTA  PT CURRENTLY REQUIRES OVERALL MIN A WITH ADLS, TRANSFERS AND FUNCTIONAL MOBILITY WITH USE OF RW  O2 SATS REMAINED IN MID 90'S ON RA VS 1L SUPPLEMENTAL O2 T/O SESSION  PT IS LIMITED 2' PAIN, FATIGUE, IMPAIRED BALANCE, OVERALL WEAKNESS/DECONDITIONING  and OVERALL LIMITED ACTIVITY TOLERANCE  PT EDUCATED ON DEEP BREATHING TECHNIQUES T/O ACTIVITY, SLOWING OF PACE, ENERGY CONSERVATION TECHNIQUES FOR CARRY OVER UPON D/C, INCREASED FAMILY SUPPORT and CONTINUE PARTICIPATION IN SELF-CARE/MOBILITY WITH STAFF Sanchez W Joselo Avina   The patient's raw score on the AM-PAC Daily Activity inpatient short form is 20, standardized score is 42 03, greater than 39 4  Patients at this level are likely to benefit from discharge to home   Please refer to the recommendation of the Occupational Therapist for safe discharge planning  FROM AN OCCUPATIONAL THERAPY PERSPECTIVE, PT CAN RETURN HOME WITH INCREASED FAMILY SUPPORT + HOME OT SERVICES UPON D/C  DME RECS INCLUDE BSC + USE OF SC AND AGREE WITH USE OF RW  WILL CONT TO FOLLOW TO ADDRESS THE BELOW DESCRIBED GOALS  Goals   Patient Goals TO RETURN HOME   LTG Time Frame 10-14   Long Term Goal #1 SEE BELOW   Plan   Treatment Interventions ADL retraining;Functional transfer training; Endurance training;Patient/family training;Equipment evaluation/education; Compensatory technique education; Energy conservation; Activityengagement   Goal Expiration Date 01/26/22   OT Frequency 3-5x/wk   Recommendation   OT Discharge Recommendation Home with home health rehabilitation   Equipment Recommended Bedside commode   Commode Type Standard   OT - OK to Discharge Yes   AM-PAC Daily Activity Inpatient   Lower Body Dressing 3   Bathing 3   Toileting 3   Upper Body Dressing 3   Grooming 4   Eating 4   Daily Activity Raw Score 20   Daily Activity Standardized Score (Calc for Raw Score >=11) 42 03   AM-PAC Applied Cognition Inpatient   Following a Speech/Presentation 4   Understanding Ordinary Conversation 4   Taking Medications 3   Remembering Where Things Are Placed or Put Away 4   Remembering List of 4-5 Errands 4   Taking Care of Complicated Tasks 3   Applied Cognition Raw Score 22   Applied Cognition Standardized Score 47 83   Modified Joie Scale   Modified [place] Scale 4       OCCUPATIONAL THERAPY GOALS TO BE MET WITHIN 14 DAYS:    -Pt will increase bed mobility to MOD I to participate in functional activities with G tolerance and balance  -Pt will improve functional mobility and transfers to MOD I on/off all surfaces w/ G balance/safety including toileting   -Pt will participate in lt grooming task with MOD I after set-up standing at sink ~3-5 minutes with G safety and balance     -Pt will increase independence in all ADLS to MOD I with G balance sitting upright in chair   -Pt will improve activity tolerance to G for 30 min txment sessions w/ G carry over of learned energy conservation techniques   -Pt will improve independence in lt homemaking activities to MOD I without requiring cues for safety   -Pt will demonstrate G carryover of learned safety techniques and proper body mechanics in functional and leisure activities with use of DME      Documentation completed by Jose Fountain, [facility], OTR/L  [facility] Certified ID# [id number]

## 2022-01-12 NOTE — CASE MANAGEMENT
Case Management Assessment & Discharge Planning Note    Patient name Olena An  Location [address] [address] 817/PPHP 219-00 MRN [id number]  : 1945 Date 2022       Current Admission Date: 2022  Current Admission Diagnosis:COVID   Patient Active Problem List    Diagnosis Date Noted    Multiple fractures of rib involving four or more ribs-right 2022    COVID 2022    Acute respiratory failure with hypoxia ([address] ) 2022    Anxiety 2021    Neoplasm of uncertain behavior of skin 2021    Post-traumatic stress disorder, unspecified 2021    Essential hypertension 2021    Actinic keratosis 2021    Dysphagia, unspecified 2021    Erectile dysfunction 2021    History of malignant neoplasm of bladder 2021    Carpal tunnel syndrome 2021    Chronic post-traumatic stress disorder (PTSD) after  combat 2021    Type 2 diabetes mellitus without complication, without long-term current use of insulin ([address] ) 2021    Hematuria 2021    Hyperlipidemia due to type 2 diabetes mellitus ([address] ) 2021    Malignant neoplasm of urinary bladder ([address] ) 2021    Neoplasm of lung 2021    Osteoarthritis of knee 2021    Panic disorder 2021    Polyp of colon 2021    Tobacco dependence 2021    Sepsis ([address] ) 2021    Community acquired pneumonia of left lower lobe of lung 2021    Other pulmonary embolism without acute cor pulmonale ([address] ) 2021    Cholecystitis 2021    Hyponatremia 2021    Severe protein-calorie malnutrition ([address] ) 2021    Primary malignant neoplasm of base of tongue ([address] ) 10/11/2021    Neoplasm of uncertain behavior of oropharynx 2021    Cardiac arrhythmia 05/15/2018    Heart murmur 05/15/2018    Left ventricular hypertrophy 05/15/2018      LOS (days): 1  Geometric Mean LOS (GMLOS) (days): 5 40  Days to GMLOS:4 OBJECTIVE:  PATIENT READMITTED TO HOSPITAL  Risk of Unplanned Readmission Score: 29         Current admission status: Inpatient       Preferred Pharmacy:   [address], [address] [facility] [address] [address]  Phone: [phone] Fax: [phone]    Primary Care Provider: Aminata Cleary MD    Primary Insurance: MEDICARE  Secondary Insurance: [address]    ASSESSMENT:  Active Health Care Agents    There are no active Health Care Agents on file  Readmission Root Cause  30 Day Readmission: No,Yes  Who directed you to return to the hospital?: Family  Did you understand whom to contact if you had questions or problems?: Yes  Did you get your prescriptions before you left the hospital?: Yes  Were you able to get your prescriptions filled when you left the hospital?: Yes  Did you take your medications as prescribed?: Yes  Were you able to get to your follow-up appointments?: Yes  During previous admission, was a post-acute recommendation made?: Yes  What post-acute resources were offered?: STR,Offered, but declined  Patient was readmitted due to: fall, rib fx, COVID  Action Plan: d/c home with VNA    Patient Information  Admitted from[de-identified] Home  Mental Status: Alert  During Assessment patient was accompanied by: Not accompanied during assessment  Assessment information provided by[de-identified] Patient  Primary Caregiver: Self  Support Systems: Self,Spouse/significant other  [place] of Residence: [address] do you live in?: [address] access options   Select all that apply : Stairs  Number of steps to enter home : 4  Do the steps have railings?: Yes  Type of Current Residence: [place]  In the last 12 months, was there a time when you were not able to pay the mortgage or rent on time?: No  In the last 12 months, was there a time when you did not have a steady place to sleep or slept in a shelter (including now)?: No  Homeless/housing insecurity resource given?: N/A  Living Arrangements: Lives w/ Spouse/significant other  Is patient a ?: No    Activities of Daily Living Prior to Admission  Functional Status: Independent  Completes ADLs independently?: Yes  Ambulates independently?: Yes  Does patient use assisted devices?: No  Does patient currently own DME?: Yes  What DME does the patient currently own?: Anna Marie Howard  Does patient have a history of Outpatient Therapy (PT/OT)?: No  Does the patient have a history of Short-Term Rehab?: No  Does patient have a history of HHC?: No  Does patient currently have Sumi ?: Yes    Current Home Health Care  Type of Current Home Care Services: Home PT,Home OT,Nurse visit  Current Home Health Agency[de-identified] [address] [place] [address] Provider[de-identified] PCP    Patient Information Continued  Income Source: Pension/FPC  Does patient have prescription coverage?: Yes  Within the past 12 months, you worried that your food would run out before you got the money to buy more : Never true  Within the past 12 months, the food you bought just didnt last and you didnt have money to get more : Never true  Food insecurity resource given?: N/A  Does patient receive dialysis treatments?: No  Does patient have a history of substance abuse?: No  Does patient have a history of Mental Health Diagnosis?: No         Means of Transportation  Means of Transport to [facility][de-identified] Drives Self  In the past 12 months, has lack of transportation kept you from medical appointments or from getting medications?: No  In the past 12 months, has lack of transportation kept you from meetings, work, or from getting things needed for daily living?: No  Was application for public transport provided?: N/A        DISCHARGE DETAILS:       Freedom of Choice: Yes     CM contacted family/caregiver?: Yes  Were Treatment Team discharge recommendations reviewed with patient/caregiver?: Yes  Did patient/caregiver verbalize understanding of patient care needs?: Yes  Were patient/caregiver advised of the risks associated with not following Treatment Team discharge recommendations?: Yes    Contacts  Patient Contacts: Flynn Pena  Relationship to Patient[de-identified] Family  Contact Method: In [facility]  Phone Number: [phone]  Reason/Outcome: Discharge Planning,Continuity of [address]         Is the patient interested in [facility] [place] at discharge?: Yes  [address] requested[de-identified] Physical [facility] Name[de-identified] [address] Provider[de-identified] PCP  Home Health Services Needed[de-identified] Evaluate Functional Status and Safety,Gait/ADL Training,Strengthening/Theraputic Exercises to Improve Function  Homebound Criteria Met[de-identified] Uses an Assist Device (i e  cane, walker, etc),Requires the Assistance of Another Person for Safe Ambulation or to Leave the Home  Supporting Clincal Findings[de-identified] Limited Endurance,Fatigues Easliy in Short Distances    DME Referral Provided  Referral made for DME?: Yes  DME referral completed for the following items[de-identified] Bedside Commode  DME Supplier Name[de-identified] [facility]              Treatment Team Recommendation: Home with [employer]  Discharge Destination Plan[de-identified] Home with [employer]        Pt was seen by therapy and recommended for home with VNA  Pt is a 30 Day Readmission and previously discharged home with [facility]  CM placed referral with them for a resumption of services  Pt has a walker at home but will need a C  CM placed referral in [place] for the DME  Pt not medically stable for d/c at this moment but CM will continue to follow for possible d/c needs       CM reviewed d/c planning process including the following: identifying help at home, patient preference for d/c planning needs, Discharge Lounge, [facility] Meds to Bed program, availability of treatment team to discuss questions or concerns patient and/or family may have regarding understanding medications and recognizing signs and symptoms once discharged  CM also encouraged patient to follow up with all recommended appointments after discharge  Patient advised of importance for patient and family to participate in managing patients medical well being

## 2022-01-12 NOTE — ED PROCEDURE NOTE
PROCEDURE  CriticalCare Time  Performed by: Braxton Snell MD  Authorized by: Braxton Snell MD     Critical care provider statement:     Critical care time (minutes):  35    Critical care start time:  1/10/2022 9:25 PM    Critical care end time:  1/11/2022 3:00 AM    Critical care was necessary to treat or prevent imminent or life-threatening deterioration of the following conditions:  Trauma, sepsis, dehydration and metabolic crisis    Critical care was time spent personally by me on the following activities:  Examination of patient, evaluation of patient's response to treatment, discussions with primary provider, discussions with consultants, development of treatment plan with patient or surrogate, obtaining history from patient or surrogate, review of old charts, re-evaluation of patient's condition, ordering and review of radiographic studies, ordering and review of laboratory studies and ordering and performing treatments and interventions         Braxton Snell MD  01/11/22 [id number]

## 2022-01-12 NOTE — PLAN OF CARE
Problem: OCCUPATIONAL THERAPY ADULT  Goal: Performs self-care activities at highest level of function for planned discharge setting  See evaluation for individualized goals  Description: Treatment Interventions: ADL retraining,Functional transfer training,Endurance training,Patient/family training,Equipment evaluation/education,Compensatory technique education,Energy conservation,Activityengagement  Equipment Recommended: Bedside commode       See flowsheet documentation for full assessment, interventions and recommendations  Note: Limitation: Decreased ADL status,Decreased endurance,Decreased self-care trans,Decreased high-level ADLs  Prognosis: Good  Assessment: 77 YO Male SEEN FOR INITIAL OCCUPATIONAL THERAPY EVALUATION FOLLOWING TXF FROM [facility] S/P FALL RESULTING IN R SIDED RIB FXS  PROBLEMS LIST INCLUDES R SMALL PLEURAL EFFUSION, COVID-19+ ON 1L O2, BLADDER CA/ORAL CA WITH PEG PLACEMENT, HTN, DM, PTSD, HLD, CHOLECYSTITIS AND RECENT CHOLECYSTOSTOMY TUBE PLACEMENT  PT IS FROM HOME WITH SPOUSE WHERE HE REPORTS BEING INDEPENDENT WITH ADLS AND HAS ASSIST WITH IADLS PTA  PT CURRENTLY REQUIRES OVERALL MIN A WITH ADLS, TRANSFERS AND FUNCTIONAL MOBILITY WITH USE OF RW  O2 SATS REMAINED IN MID 90'S ON RA VS 1L SUPPLEMENTAL O2 T/O SESSION  PT IS LIMITED 2' PAIN, FATIGUE, IMPAIRED BALANCE, OVERALL WEAKNESS/DECONDITIONING  and OVERALL LIMITED ACTIVITY TOLERANCE  PT EDUCATED ON DEEP BREATHING TECHNIQUES T/O ACTIVITY, SLOWING OF PACE, ENERGY CONSERVATION TECHNIQUES FOR CARRY OVER UPON D/C, INCREASED FAMILY SUPPORT and CONTINUE PARTICIPATION IN SELF-CARE/MOBILITY WITH STAFF Sanchez Avina   The patient's raw score on the AM-PAC Daily Activity inpatient short form is 20, standardized score is 42 03, greater than 39 4  Patients at this level are likely to benefit from discharge to home  Please refer to the recommendation of the Occupational Therapist for safe discharge planning   FROM AN OCCUPATIONAL THERAPY PERSPECTIVE, PT CAN RETURN HOME WITH INCREASED FAMILY SUPPORT + HOME OT SERVICES UPON D/C  DME RECS INCLUDE [facility]SC + USE OF SC AND AGREE WITH USE OF RW  WILL CONT TO FOLLOW TO ADDRESS THE BELOW DESCRIBED GOALS  OT Discharge Recommendation: Home with home health rehabilitation  OT - OK to Discharge:  Yes

## 2022-01-12 NOTE — PLAN OF CARE
Problem: Potential for Falls  Goal: Patient will remain free of falls  Description: INTERVENTIONS:  - Educate patient/family on patient safety including physical limitations  - Instruct patient to call for assistance with activity   - Consult OT/PT to assist with strengthening/mobility   - Keep Call bell within reach  - Keep bed low and locked with side rails adjusted as appropriate  - Keep care items and personal belongings within reach  - Initiate and maintain comfort rounds  - Make Fall Risk Sign visible to staff  - Offer Toileting every 2 Hours, in advance of need  - Initiate/Maintain alarm  - Obtain necessary fall risk management equipment:   Apply yellow socks and bracelet for high fall risk patients  - Consider moving patient to room near nurses station  Outcome: Progressing     Problem: MOBILITY - ADULT  Goal: Maintain or return to baseline ADL function  Description: INTERVENTIONS:  -  Assess patient's ability to carry out ADLs; assess patient's baseline for ADL function and identify physical deficits which impact ability to perform ADLs (bathing, care of mouth/teeth, toileting, grooming, dressing, etc )  - Assess/evaluate cause of self-care deficits   - Assess range of motion  - Assess patient's mobility; develop plan if impaired  - Assess patient's need for assistive devices and provide as appropriate  - Encourage maximum independence but intervene and supervise when necessary  - Involve family in performance of ADLs  - Assess for home care needs following discharge   - Consider OT consult to assist with ADL evaluation and planning for discharge  - Provide patient education as appropriate  Outcome: Progressing  Goal: Maintains/Returns to pre admission functional level  Description: INTERVENTIONS:  - Perform BMAT or MOVE assessment daily    - Set and communicate daily mobility goal to care team and patient/family/caregiver     - Collaborate with rehabilitation services on mobility goals if consulted  - Perform Range of Motion 3 times a day  - Reposition patient every 2 hours    - Dangle patient 3 times a day  - Stand patient 3 times a day  - Ambulate patient 3 times a day  - Out of bed to chair 3 times a day   - Out of bed for meals 3 times a day  - Out of bed for toileting  - Record patient progress and toleration of activity level   Outcome: Progressing     Problem: PAIN - ADULT  Goal: Verbalizes/displays adequate comfort level or baseline comfort level  Description: Interventions:  - Encourage patient to monitor pain and request assistance  - Assess pain using appropriate pain scale  - Administer analgesics based on type and severity of pain and evaluate response  - Implement non-pharmacological measures as appropriate and evaluate response  - Consider cultural and social influences on pain and pain management  - Notify physician/advanced practitioner if interventions unsuccessful or patient reports new pain  Outcome: Progressing     Problem: INFECTION - ADULT  Goal: Absence or prevention of progression during hospitalization  Description: INTERVENTIONS:  - Assess and monitor for signs and symptoms of infection  - Monitor lab/diagnostic results  - Monitor all insertion sites, i e  indwelling lines, tubes, and drains  - Monitor endotracheal if appropriate and nasal secretions for changes in amount and color  - [place] appropriate cooling/warming therapies per order  - Administer medications as ordered  - Instruct and encourage patient and family to use good hand hygiene technique  - Identify and instruct in appropriate isolation precautions for identified infection/condition  Outcome: Progressing  Goal: Absence of fever/infection during neutropenic period  Description: INTERVENTIONS:  - Monitor WBC    Outcome: Progressing     Problem: SAFETY ADULT  Goal: Patient will remain free of falls  Description: INTERVENTIONS:  - Educate patient/family on patient safety including physical limitations  - Instruct patient to call for assistance with activity   - Consult OT/PT to assist with strengthening/mobility   - Keep Call bell within reach  - Keep bed low and locked with side rails adjusted as appropriate  - Keep care items and personal belongings within reach  - Initiate and maintain comfort rounds  - Make Fall Risk Sign visible to staff  - Offer Toileting every 2 Hours, in advance of need  - Initiate/Maintain alarm  - Obtain necessary fall risk management equipment:   - Apply yellow socks and bracelet for high fall risk patients  - Consider moving patient to room near nurses station  Outcome: Progressing  Goal: Maintain or return to baseline ADL function  Description: INTERVENTIONS:  -  Assess patient's ability to carry out ADLs; assess patient's baseline for ADL function and identify physical deficits which impact ability to perform ADLs (bathing, care of mouth/teeth, toileting, grooming, dressing, etc )  - Assess/evaluate cause of self-care deficits   - Assess range of motion  - Assess patient's mobility; develop plan if impaired  - Assess patient's need for assistive devices and provide as appropriate  - Encourage maximum independence but intervene and supervise when necessary  - Involve family in performance of ADLs  - Assess for home care needs following discharge   - Consider OT consult to assist with ADL evaluation and planning for discharge  - Provide patient education as appropriate  Outcome: Progressing  Goal: Maintains/Returns to pre admission functional level  Description: INTERVENTIONS:  - Perform BMAT or MOVE assessment daily    - Set and communicate daily mobility goal to care team and patient/family/caregiver  - Collaborate with rehabilitation services on mobility goals if consulted  - Perform Range of Motion 3 times a day    - Reposition patient every 3   - Dangle patient 3 times a day  - Stand patient 3 times a day  - Ambulate patient 3 times a day  - Out of bed to chair 3 times a day   - Out of bed for meals 3 times a day  - Out of bed for toileting  - Record patient progress and toleration of activity level   Outcome: Progressing     Problem: DISCHARGE PLANNING  Goal: Discharge to home or other facility with appropriate resources  Description: INTERVENTIONS:  - Identify barriers to discharge w/patient and caregiver  - Arrange for needed discharge resources and transportation as appropriate  - Identify discharge learning needs (meds, wound care, etc )  - Arrange for interpretive services to assist at discharge as needed  - Refer to Case Management Department for coordinating discharge planning if the patient needs post-hospital services based on physician/advanced practitioner order or complex needs related to functional status, cognitive ability, or social support system  Outcome: Progressing     Problem: Knowledge Deficit  Goal: Patient/family/caregiver demonstrates understanding of disease process, treatment plan, medications, and discharge instructions  Description: Complete learning assessment and assess knowledge base    Interventions:  - Provide teaching at level of understanding  - Provide teaching via preferred learning methods  Outcome: Progressing

## 2022-01-12 NOTE — PLAN OF CARE
Problem: PHYSICAL THERAPY ADULT  Goal: Performs mobility at highest level of function for planned discharge setting  See evaluation for individualized goals  Description: Treatment/Interventions: Functional transfer training,LE strengthening/ROM,Therapeutic exercise,Endurance training,Elevations,Patient/family training,Equipment eval/education,Bed mobility,Gait training,Spoke to nursing,OT  Equipment Recommended: Sweta Correa       See flowsheet documentation for full assessment, interventions and recommendations  Note: Prognosis: Good  Problem List: Decreased strength,Decreased endurance,Impaired balance,Decreased mobility,Pain  Assessment: Pt is 78 y o  male seen for PT evaluation s/p admit to [facility] on 1/11/2022  Two pt identifiers were used to confirm  Pt presented s/p fall  Pt originally presented at [facility] and was transferred to [facility] for further medical management/ evaluation  Pt was admitted with a primary dx of:  Multiple fractures of ribs on right side, and other active problems include hyponatremia, cholecystitis, HLD due to type 2 DM, dysphagic, essential HTN, COVID-19 positive   PT now consulted for assessment of mobility and d/c needs  Pt with Up in chair orders  Pts current co morbidities affecting treatment include:  Cancer, DM, gastritis, GERD, hypercholesterolemia, HTN, hyponatremia  Pts current clinical presentation is Unstable/ Unpredictable (high complexity) due to Ongoing medical management for primary dx, Decreased activity tolerance compared to baseline, Fall risk, Increased assistance needed from caregiver at current time, Continuous pulse oximetry monitoring     Upon evaluation, pt currently is requiring Supervision for bed mobility; Min Ax1 for transfers and Min Ax1 for ambulation w/ RW   Pt presents at PT eval functioning below baseline and currently w/ overall mobility deficits 2* to: BLE weakness, impaired balance, decreased endurance, gait deviations, pain, decreased activity tolerance compared to baseline, fall risk  Pt currently at a fall risk 2* to impairments listed above  Based on the aforementioned PT evaluation, pt will continue to benefit from skilled Acute PT interventions to address stated impairments; to maximize functional mobility; for ongoing pt/ family training; and DME needs  At conclusion of PT session pt returned back in chair with phone and call bell within reach  Pt denies any further questions at this time  PT is currently recommending Home PT and Home with increased family support  PT will continue to follow during hospital stay  Barriers to Discharge: None  Barriers to Discharge Comments: Patient denies any mobility or safety concerns about returning home at time of discharge     PT Discharge Recommendation: Home with home health rehabilitation          See flowsheet documentation for full assessment

## 2022-01-12 NOTE — PROGRESS NOTES
[address] [facility]  Progress Note - Bettina Li 1945, 78 y o  male MRN: [id number]  Unit/Bed#: [facility] 817-01 Encounter: [id number]  Primary Care Provider: Kate Townsend MD   Date and time admitted to hospital: 1/11/2022  4:56 AM    Multiple fractures of rib involving four or more ribs-right  Assessment & Plan  Right 7 -12 rib fractures, with small right pleural effusion  - rib fracture protocol  - multimodal pain control  - incentive spirometer  - APS for pain management  Continue p o  Pain regimen at this time    Hyponatremia  Assessment & Plan  - hyponatremia resolved  - Sodium 138      Cholecystitis  Assessment & Plan  - continue cholecystostomy tube in drain management    Hyperlipidemia due to type 2 diabetes mellitus [facility])  Assessment & Plan  Lab Results   Component Value Date    HGBA1C 6 3 (H) 12/22/2021       Recent Labs     01/11/22  1416 01/11/22 1957 01/12/22  0013 01/12/22  0158   POCGLU 114 158* 266* 276*       Blood Sugar Average: Last 72 hrs:  (P) 183  4    Type 2 diabetes mellitus without complication, without long-term current use of insulin [facility])  Assessment & Plan  Lab Results   Component Value Date    HGBA1C 6 3 (H) 12/22/2021       Recent Labs     01/11/22  1416 01/11/22 1957 01/12/22  0013 01/12/22  0158   POCGLU 114 158* 266* 276*       Blood Sugar Average: Last 72 hrs:  (P) 183  4    Dysphagia, unspecified  Assessment & Plan  - PEG bolus tube Feeds  - Nutrition consult    Essential hypertension  Assessment & Plan  Hypotensive 1/11  - hold amlodipine and lisinopril  - holding parameters on home metoprolol 12 5 to hold for SBP < 110    * COVID  Assessment & Plan  - COVID precautions  -SLIM consult for medical management          Disposition:  Med surg      SUBJECTIVE:  Chief Complaint:  Fall    Subjective:  Patient feels well this morning  States that his rib pain is controlled  Denies any abdominal pain  Denies any dyspnea or SOB        OBJECTIVE: Meds/Allergies     Current Facility-Administered Medications:     acetaminophen (TYLENOL) tablet 975 mg, 975 mg, Per PEG Tube, Q8H [address], Jovan Coyle MD, 975 mg at 01/12/22 0517    aspirin (ECOTRIN LOW STRENGTH) EC tablet 81 mg, 81 mg, Oral, Daily, Tabby Rosen MD, 81 mg at 01/12/22 0847    atorvastatin (LIPITOR) tablet 20 mg, 20 mg, Per PEG Tube, Daily With Dinner, Jovan Coyle MD, 20 mg at 01/11/22 2105    buPROPion (WELLBUTRIN XL) 24 hr tablet 150 mg, 150 mg, Oral, Daily, Jovan Coyle MD, 150 mg at 01/12/22 0847    cholecalciferol (VITAMIN D3) tablet 1,000 Units, 1,000 Units, Per PEG Tube, Daily, Jovan Coyle MD, 1,000 Units at 01/12/22 0847    cyanocobalamin (VITAMIN B-12) tablet 1,000 mcg, 1,000 mcg, Per PEG Tube, Daily, Jovan Coyle MD, 1,000 mcg at 01/12/22 0847    dexamethasone (DECADRON) injection 6 mg, 6 mg, Intravenous, Q24H, Jovan Coyle MD, 6 mg at 01/11/22 1326    Empagliflozin TABS 25 mg, 1 tablet, Per PEG Tube, Daily, Jovan Coyle MD    enoxaparin (LOVENOX) subcutaneous injection 30 mg, 30 mg, Subcutaneous, Q12H [address], Jovan Coyle MD, 30 mg at 01/12/22 0848    FLUoxetine (PROzac) capsule 80 mg, 80 mg, Per PEG Tube, Daily, Jovan Coyle MD, 80 mg at 01/12/22 0847    fluticasone (FLONASE) 50 mcg/act nasal spray 1 spray, 1 spray, Nasal, Daily, Tabby Rosen MD    glycopyrrolate (ROBINUL) tablet 1 mg, 1 mg, Per PEG Tube, TID PRN, Jovan Coyle MD    HYDROmorphone HCl (DILAUDID) injection 0 2 mg, 0 2 mg, Intravenous, Q4H PRN, Tabby Rosen MD    insulin lispro (HumaLOG) 100 units/mL subcutaneous injection 1-6 Units, 1-6 Units, Subcutaneous, Q6H, 4 Units at 01/12/22 0159 **AND** Fingerstick Glucose (POCT), , , Q6H, Tiffanie Officer, MD    melatonin tablet 9 mg, 9 mg, Per PEG Tube, HS, Jovan Coyle MD, 9 mg at 01/11/22 2105    metoprolol tartrate (LOPRESSOR) partial tablet 12 5 mg, 12 5 mg, Per PEG Tube, Q12H [address], Jovan Coyle MD, 12 5 mg at 01/11/22 2105   multi-electrolyte (PLASMALYTE-A/ISOLYTE-S PH 7 4) IV solution, 50 mL/hr, Intravenous, Continuous, Red Arrieta DO, Last Rate: 50 mL/hr at 01/11/22 1007, 50 mL/hr at 01/11/22 1007    naloxone (NARCAN) 0 04 mg/mL syringe 0 04 mg, 0 04 mg, Intravenous, Q1MIN PRN, James Sykes MD    ondansetron [facility]) injection 4 mg, 4 mg, Intravenous, Q4H PRN, James Sykes MD    oxyCODONE (ROXICODONE) IR tablet 2 5 mg, 2 5 mg, Per PEG Tube, Q4H PRN, Stephon Del Toro MD    oxyCODONE (ROXICODONE) IR tablet 5 mg, 5 mg, Per PEG Tube, Q4H PRN, Stephon Del Toro MD, 5 mg at 01/12/22 0847    pantoprazole (PROTONIX) injection 40 mg, 40 mg, Intravenous, Q24H [address], Stephon Del Toro MD, 40 mg at 01/12/22 0517    prazosin (MINIPRESS) capsule 8 mg, 8 mg, Oral, HS, Stephon Del Toro MD, 8 mg at 01/11/22 2107    [COMPLETED] remdesivir (Veklury) 200 mg in sodium chloride 0 9 % 290 mL IVPB, 200 mg, Intravenous, Q24H, 200 mg at 01/11/22 1319 **FOLLOWED BY** remdesivir (Veklury) 100 mg in sodium chloride 0 9 % 270 mL IVPB, 100 mg, Intravenous, Q24H, Stephon Del Toro MD    risperiDONE (RisperDAL M-TAB) disintegrating tablet 0 5 mg, 0 5 mg, Oral, TID, Stephon Del Toro MD, 0 5 mg at 01/12/22 0948    senna-docusate sodium (SENOKOT S) 8 6-50 mg per tablet 1 tablet, 1 tablet, Per PEG Tube, BID, Stephon Del Toro MD, 1 tablet at 01/12/22 0847    sodium chloride (PF) 0 9 % injection 10 mL, 10 mL, Intracatheter, Daily, James Sykes MD     Vitals:   Vitals:    01/12/22 0848   BP: 108/54   Pulse:    Resp:    Temp:    SpO2:        Intake/Output:  I/O       01/10 0701  01/11 0700 01/11 0701 01/12 0700 01/12 0701 01/13 0700    NG/GT  120     Feedings  1000     Total Intake  1120     Urine 325 850     Drains  100     Total Output 325 950     Net -325 +170            Unmeasured Urine Occurrence  1 x            Nutrition/GI Proph/Bowel Reg:  Ordered    Physical Exam:   Gen: NAD, Comfortable  Neuro: A&O, No focal deficits  Head: Normal Cephalic, Atraumatic  Eye: EOMI, PERRLA, No scleral icterus  Neck: Supple, No JVD, Midline trachea  CV: RRR, Cap refill <2 sec  Pulm: Normal work of breathing, no respiratory distress  Mild to moderate right lateral wall chest pain   Abd: Soft, Non-Distended, Non-Tender RUQ cholecystostomy tube in place with mild bilious drainage    LUQ peg tube in place  Ext: No edema, Non-tender  Skin: warm, dry, intact      PIC Score  PIC Pain Score: 3 (1/12/2022  9:16 AM)  PIC Incentive Spirometry Score: 2 (1/12/2022  2:00 AM)  PIC Cough Description: 3 (1/12/2022  2:00 AM)  PIC Total Score: 8 (1/12/2022  2:00 AM)       If the Total PIC Score </=5, did you consult APS and evaluate patient for further intervention?: yes      Pain:    Incentive Spirometry  Cough  3 = Controlled  4 = Above goal volume 3 = Strong  2 = Moderate  3 = Goal to alert volume 2 = Weak  1 = Severe  2 = Below alert volume 1 = Absent     1 = Unable to perform IS           Invasive Devices  Report    Peripheral Intravenous Line            Peripheral IV 01/11/22 Left Wrist 1 day    Peripheral IV 01/11/22 Right Wrist 1 day          Drain            Gastrostomy/Enterostomy PEG-jejunostomy LUQ -- days    Closed/Suction Drain RUQ Other (Comment)  6 days                 Lab Results:   Results: I have personally reviewed pertinent reports   , BMP/CMP:   Lab Results   Component Value Date    SODIUM 138 01/12/2022    K 4 2 01/12/2022     01/12/2022    CO2 25 01/12/2022    BUN 24 01/12/2022    CREATININE 0 87 01/12/2022    CALCIUM 8 6 01/12/2022    EGFR 85 01/12/2022   , CBC:   Lab Results   Component Value Date    WBC 7 50 01/12/2022    HGB 8 3 (L) 01/12/2022    HCT 27 5 (L) 01/12/2022    MCV 96 01/12/2022     01/12/2022    MCH 28 8 01/12/2022    MCHC 30 2 (L) 01/12/2022    RDW 20 4 (H) 01/12/2022    MPV 9 6 01/12/2022    NRBC 0 01/12/2022    and Coagulation:   Lab Results   Component Value Date    INR 1 26 (H) 01/12/2022     Imaging/EKG Studies: Results: I have personally reviewed pertinent reports      Other Studies:  None  VTE Prophylaxis: Sequential compression device Frandy Hemphill MD  1/12/2022

## 2022-01-12 NOTE — ED PROVIDER NOTES
History  Chief Complaint   Patient presents with    Weakness - Generalized     Quiet yesterday and lethargy started this afternoon after visiting nurse was in to see patient  Patient known to this facility  79-year-old male presents to the emergency department accompanied by his wife seeking evaluation for apparent lethargy and fatigue  Patient was recently seen at this facility a few days ago for a fractured cholecystostomy tube which required replacement  Patient had reportedly fallen causing the tube to fracture  It is reported that the patient has been doing well since then however over the last 24 hours has becoming increasingly quiet and demonstrating decreased energy and activity  Visiting nurse saw the patient today at home and noted that his behavior was unusual prompting him to come to the emergency department  Patient is sleeping in exam bed  He is rousable to stimuli  Patient states he feels okay then immediately falls back asleep  Wife reports this is a dramatic departure from his baseline  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 Taking? Empagliflozin 25 MG TABS  Self Yes No   Sig: Take 1 tablet by mouth   METOPROLOL TARTRATE PO  Self Yes No   Sig: Take by mouth   amLODIPine (NORVASC) 10 mg tablet   No No   Sig: Take 1 tablet (10 mg total) by mouth daily   apixaban (Eliquis) 5 mg   No No   Sig: Take 2 tablets (10 mg total) by mouth 2 (two) times a day for 5 days, THEN 1 tablet (5 mg total) 2 (two) times a day for 25 days     aspirin (ECOTRIN LOW STRENGTH) 81 mg EC tablet  Self Yes No   Sig: Take 81 mg by mouth   fluticasone (FLONASE) 50 mcg/act nasal spray  Self Yes No   Si spray into each nostril daily insulin glargine (LANTUS) 100 units/mL subcutaneous injection  Self Yes No   Sig: Inject under the skin   metFORMIN (GLUCOPHAGE) 1000 MG tablet  Self Yes No   Sig: Take 1,000 mg by mouth   pantoprazole (PROTONIX) 20 mg tablet  Self Yes No   Sig: Take 20 mg by mouth daily   ramipril (ALTACE) 10 MG capsule  Self Yes No   Sig: Take 10 mg by mouth   sodium chloride, PF, 0 9 %   No No   Sig: 10 mL by Intracatheter route daily Intracatheter flushing daily      Facility-Administered Medications: None       Past Medical History:   Diagnosis Date    Cancer ([facility] )     Diabetes ([facility] )     Gastritis     GERD (gastroesophageal reflux disease)     History of bladder cancer     treated with surgery    Hypercholesterolemia     Hypertension     Hyponatremia 2021    Lactic acidosis 2021       Past Surgical History:   Procedure Laterality Date    BLADDER TUMOR EXCISION      EGD      IR CHOLECYSTOSTOMY TUBE CHECK/CHANGE/REPOSITION/REINSERTION/UPSIZE  2022    IR CHOLECYSTOSTOMY TUBE PLACEMENT  2021       Family History   Problem Relation Age of Onset    Cancer Mother     Melanoma Neg Hx      I have reviewed and agree with the history as documented  E-Cigarette/Vaping    E-Cigarette Use Never User      E-Cigarette/Vaping Substances     Social History     Tobacco Use    Smoking status: Former Smoker     Types: Cigarettes     Quit date: 2021     Years since quittin 3    Smokeless tobacco: Never Used   Vaping Use    Vaping Use: Never used   Substance Use Topics    Alcohol use: Never    Drug use: Never       Review of Systems   Unable to perform ROS: Mental status change   Constitutional: Positive for fatigue  Physical Exam  Physical Exam  Vitals and nursing note reviewed  Constitutional:       General: He is not in acute distress  Appearance: He is well-developed  He is ill-appearing  HENT:      Head: Normocephalic and atraumatic        Right Ear: External ear normal  Left Ear: External ear normal       Nose: Nose normal       Mouth/Throat:      Lips: Pink  Mouth: Mucous membranes are dry  Pharynx: Oropharynx is clear  Eyes:      Pupils: Pupils are equal, round, and reactive to light  Cardiovascular:      Rate and Rhythm: Regular rhythm  Tachycardia present  Heart sounds: Normal heart sounds  No murmur heard  No friction rub  No gallop  Pulmonary:      Effort: Pulmonary effort is normal  No respiratory distress  Breath sounds: Normal breath sounds  No stridor  No wheezing or rales  Abdominal:      General: Bowel sounds are normal  There is no distension  Palpations: Abdomen is soft  Tenderness: There is no abdominal tenderness  There is no guarding  Comments: Cholecystostomy tube  Peg tube  Musculoskeletal:         General: No tenderness  Normal range of motion  Cervical back: Normal range of motion and neck supple  Skin:     General: Skin is warm  Capillary Refill: Capillary refill takes less than 2 seconds  Neurological:      General: No focal deficit present  Mental Status: He is alert and oriented to person, place, and time  Psychiatric:         Behavior: Behavior is cooperative           Vital Signs  ED Triage Vitals   Temperature Pulse Respirations Blood Pressure SpO2   01/10/22 1942 01/10/22 1944 01/10/22 1944 01/10/22 1944 01/10/22 1944   97 8 °F (36 6 °C) (!) 112 16 127/66 95 %      Temp Source Heart Rate Source Patient Position - Orthostatic VS BP Location FiO2 (%)   01/10/22 1942 01/10/22 1944 01/10/22 1944 01/10/22 1944 --   Temporal Monitor Lying Right arm       Pain Score       01/10/22 1944       No Pain           Vitals:    01/11/22 0330 01/11/22 0345 01/11/22 0400 01/11/22 0415   BP: 118/62  120/63 120/63   Pulse: 102 105 102    Patient Position - Orthostatic VS:             Visual Acuity      ED Medications  Medications   sodium chloride 0 9 % bolus 2,000 mL (0 mL Intravenous Stopped 1/11/22 0417)   iohexol (OMNIPAQUE) 350 MG/ML injection (SINGLE-DOSE) 100 mL (100 mL Intravenous Given 1/10/22 7845)   magnesium sulfate 2 g/50 mL IVPB (premix) 2 g (0 g Intravenous Stopped 1/11/22 0418)       Diagnostic Studies  Results Reviewed     Procedure Component Value Units Date/Time    Procalcitonin with AM Reflex [[id number]]  (Abnormal) Collected: 01/10/22 2204    Lab Status: Final result Specimen: Blood from Arm, Right Updated: 01/11/22 0917     Procalcitonin 0 30 ng/ml     Blood culture #1 [[id number]] Collected: 01/10/22 2204    Lab Status: Preliminary result Specimen: Blood from Arm, Left Updated: 01/11/22 0905     Blood Culture Received in Microbiology Lab  Culture in Progress  Blood culture #2 [[id number]] Collected: 01/10/22 2204    Lab Status: Preliminary result Specimen: Blood from Arm, Right Updated: 01/11/22 0905     Blood Culture Received in Microbiology Lab  Culture in Progress  Urinalysis with microscopic [[id number]]  (Abnormal) Collected: 01/11/22 0403    Lab Status: Final result Specimen: Urine, Clean Catch Updated: 01/11/22 0423     Clarity, UA Clear     Color, UA Yellow     Specific Gravity, UA 1 010     pH, UA 8 0     Glucose, UA Negative mg/dl      Ketones, UA Negative mg/dl      Blood, UA Negative     Protein, UA Negative mg/dl      Nitrite, UA Negative     Bilirubin, UA Negative     Urobilinogen, UA 0 2 E U /dl      Leukocytes, UA Negative     WBC, UA 0-1 /hpf      RBC, UA 0-1 /hpf      Bacteria, UA None Seen /hpf      Epithelial Cells Occasional /hpf     Lactic acid 2 Hours [[id number]]  (Abnormal) Collected: 01/11/22 0136    Lab Status: Final result Specimen: Blood from Arm, Right Updated: 01/11/22 0210     LACTIC ACID 3 0 mmol/L     Narrative:      Result may be elevated if tourniquet was used during collection      Ethanol [[id number]]  (Normal) Collected: 01/11/22 0136    Lab Status: Final result Specimen: Blood Updated: 01/11/22 0159     Ethanol Lvl <10 mg/dL     Acetaminophen level-If concentration is detectable, please discuss with medical  on call  [[id number]]  (Abnormal) Collected: 01/11/22 0136    Lab Status: Final result Specimen: Blood from Arm, Right Updated: 01/11/22 0159     Acetaminophen Level <41 ug/mL     Salicylate level [[id number]]  (Normal) Collected: 01/11/22 0136    Lab Status: Final result Specimen: Blood from Arm, Right Updated: 42/43/98 9314     Salicylate Lvl <5 mg/dL     Ammonia [[id number]]  (Abnormal) Collected: 01/11/22 0136    Lab Status: Final result Specimen: Blood from Arm, Right Updated: 01/11/22 0159     Ammonia 15 umol/L     COVID/FLU/RSV - 2 hour TAT [[id number]]  (Abnormal) Collected: 01/10/22 2203    Lab Status: Final result Specimen: Nares from Nasopharyngeal Swab Updated: 01/10/22 2330     SARS-CoV-2 Positive     INFLUENZA A PCR Negative     INFLUENZA B PCR Negative     RSV PCR Negative    Narrative:      FOR PEDIATRIC PATIENTS - copy/paste COVID Guidelines URL to browser: [email or web address] [email or web address]/  ashx    SARS-CoV-2 assay is a Nucleic Acid Amplification assay intended for the  qualitative detection of nucleic acid from SARS-CoV-2 in nasopharyngeal  swabs  Results are for the presumptive identification of SARS-CoV-2 RNA  Positive results are indicative of infection with SARS-CoV-2, the virus  causing COVID-19, but do not rule out bacterial infection or co-infection  with other viruses  Laboratories within the [place] and its  territories are required to report all positive results to the appropriate  public health authorities  Negative results do not preclude SARS-CoV-2  infection and should not be used as the sole basis for treatment or other  patient management decisions  Negative results must be combined with  clinical observations, patient history, and epidemiological information  This test has not been FDA cleared or approved      This test has been authorized by FDA under an Emergency Use Authorization  (EUA)  This test is only authorized for the duration of time the  declaration that circumstances exist justifying the authorization of the  emergency use of an in vitro diagnostic tests for detection of SARS-CoV-2  virus and/or diagnosis of COVID-19 infection under section 564(b)(1) of  the Act, 21 U  S C  223WUH-0(W)(2), unless the authorization is terminated  or revoked sooner  The test has been validated but independent review by FDA  and CLIA is pending  Test performed using [employer] GeneXpert: This RT-PCR assay targets N2,  a region unique to SARS-CoV-2  A conserved region in the E-gene was chosen  for pan-Sarbecovirus detection which includes SARS-CoV-2  B-Type Natriuretic Peptide(BNP) CA, ,  Campuses Only [[id number]]  (Normal) Collected: 01/10/22 2203    Lab Status: Final result Specimen: Blood Updated: 01/10/22 2253     BNP 44 pg/mL     CBC and differential [[id number]]  (Abnormal) Collected: 01/10/22 2203    Lab Status: Final result Specimen: Blood from Arm, Right Updated: 01/10/22 2253     WBC 8 45 Thousand/uL      RBC 3 28 Million/uL      Hemoglobin 9 7 g/dL      Hematocrit 31 2 %      MCV 95 fL      MCH 29 6 pg      MCHC 31 1 g/dL      RDW 20 7 %      MPV 9 7 fL      Platelets 401 Thousands/uL     Narrative: This is an appended report  These results have been appended to a previously verified report      Manual Differential(PHLEBS Do Not Order) [[id number]]  (Abnormal) Collected: 01/10/22 2203    Lab Status: Final result Specimen: Blood from Arm, Right Updated: 01/10/22 2253     Segmented % 83 %      Bands % 9 %      Lymphocytes % 2 %      Monocytes % 5 %      Eosinophils, % 0 %      Basophils % 1 %      Absolute Neutrophils 7 77 Thousand/uL      Lymphocytes Absolute 0 17 Thousand/uL      Monocytes Absolute 0 42 Thousand/uL      Eosinophils Absolute 0 00 Thousand/uL      Basophils Absolute 0 08 Thousand/uL      Total Counted --     RBC Morphology Present Anisocytosis Present     Polychromasia Present     Platelet Estimate Adequate    Lactic acid [[id number]]  (Abnormal) Collected: 01/10/22 2203    Lab Status: Final result Specimen: Blood from Arm, Right Updated: 01/10/22 2241     LACTIC ACID 2 8 mmol/L     Narrative:      Result may be elevated if tourniquet was used during collection  Result may be elevated if tourniquet was used during collection      Comprehensive metabolic panel [[id number]]  (Abnormal) Collected: 01/10/22 2204    Lab Status: Final result Specimen: Blood from Arm, Right Updated: 01/10/22 2237     Sodium 129 mmol/L      Potassium 4 7 mmol/L      Chloride 92 mmol/L      CO2 28 mmol/L      ANION GAP 9 mmol/L      BUN 23 mg/dL      Creatinine 0 78 mg/dL      Glucose 126 mg/dL      Calcium 8 8 mg/dL      AST 16 U/L      ALT 18 U/L      Alkaline Phosphatase 122 U/L      Total Protein 6 7 g/dL      Albumin 3 6 g/dL      Total Bilirubin 0 61 mg/dL      eGFR 88 ml/min/1 73sq m     Narrative:      [place] guidelines for Chronic Kidney Disease (CKD):     Stage 1 with normal or high GFR (GFR > 90 mL/min/1 73 square meters)    Stage 2 Mild CKD (GFR = 60-89 mL/min/1 73 square meters)    Stage 3A Moderate CKD (GFR = 45-59 mL/min/1 73 square meters)    Stage 3B Moderate CKD (GFR = 30-44 mL/min/1 73 square meters)    Stage 4 Severe CKD (GFR = 15-29 mL/min/1 73 square meters)    Stage 5 End Stage CKD (GFR <15 mL/min/1 73 square meters)  Note: GFR calculation is accurate only with a steady state creatinine    Lipase [[id number]]  (Normal) Collected: 01/10/22 2204    Lab Status: Final result Specimen: Blood from Arm, Right Updated: 01/10/22 2237     Lipase 44 u/L     Magnesium [[id number]]  (Abnormal) Collected: 01/10/22 2204    Lab Status: Final result Specimen: Blood from Arm, Right Updated: 01/10/22 2237     Magnesium 1 5 mg/dL     Protime-INR [[id number]]  (Abnormal) Collected: 01/10/22 2204    Lab Status: Final result Specimen: Blood from Arm, Right Updated: 01/10/22 2232     Protime 14 6 seconds      INR 1 15    APTT [[id number]]  (Normal) Collected: 01/10/22 2204    Lab Status: Final result Specimen: Blood from Arm, Right Updated: 01/10/22 2232     PTT 25 seconds     Blood gas, venous [[id number]]  (Abnormal) Collected: 01/10/22 2204    Lab Status: Final result Specimen: Blood from Arm, Right Updated: 01/10/22 2216     pH, Milo 7 461     pCO2, Milo 40 1 mm Hg      pO2, Milo 30 3 mm Hg      HCO3, Milo 27 9 mmol/L      Base Excess, Milo 3 9 mmol/L      O2 Content, Milo 9 8 ml/dL      O2 HGB, VENOUS 63 7 %                  CT chest abdomen pelvis w contrast   Final Result by Robert Crews MD (01/11 0149)      Multiple nondisplaced right-sided rib fractures, as described above  Please see discussion  There is a small right pleural effusion  There is no evidence of pneumothorax  Pulmonary opacities, as described above, suspicious for pneumonia  Short-term follow-up after a course of treatment is recommended in order to ensure complete resolution  Bilateral thyroid nodules are present, measuring up to approximately 1 7 cm on the right  Nonemergent outpatient thyroid ultrasound is recommended for further evaluation  The anterior and anterolateral aspects of the urinary bladder wall appear thickened  Clinical correlation, laboratory correlation and follow-up is recommended  Urology consultation and follow-up is recommended  There is a 1 7 x 1 cm subcutaneous nodule in the posterolateral right chest wall, near the inferior aspect of the right scapula  Clinical correlation and follow-up is recommended  A percutaneous cholecystostomy tube is present  There is some gas within the gallbladder  Other nonemergent findings, as described above  Please see discussion  This examination demonstrates findings for which clinical and imaging follow-up is recommended and was logged as such in [facility]               I personally discussed this study with [place] Safia on 1/11/2022 at 1:44 AM                Workstation performed: [id number]         CT head without contrast   Final Result by Carlos Alberto Ortiz MD (01/11 5150)      No acute intracranial abnormality  Workstation performed: [id number]         XR chest portable   Final Result by Cara Mcclendon MD (01/11 0820)      Persistent left lower lobe infiltrate  Workstation performed: [id number]                    Procedures  Procedures         ED Course                               SBIRT 22yo+      Most Recent Value   SBIRT (22 yo +)    In order to provide better care to our patients, we are screening all of our patients for alcohol and drug use  Would it be okay to ask you these screening questions? Yes Filed at: 01/10/2022 1946   Initial Alcohol Screen: US AUDIT-C     1  How often do you have a drink containing alcohol? 0 Filed at: 01/10/2022 1946   2  How many drinks containing alcohol do you have on a typical day you are drinking? 0 Filed at: 01/10/2022 1946   3a  Male UNDER 65: How often do you have five or more drinks on one occasion? 0 Filed at: 01/10/2022 1946   3b  FEMALE Any Age, or MALE 65+: How often do you have 4 or more drinks on one occassion? 0 Filed at: 01/10/2022 1946   Audit-C Score 0 Filed at: 01/10/2022 1946   MARKO: How many times in the past year have you    Used an illegal drug or used a prescription medication for non-medical reasons? Never Filed at: 01/10/2022 1946                    MDM  Number of Diagnoses or Management Options  Bandemia  COVID-19 virus infection  Dehydration  Generalized weakness  Hypomagnesemia  Hyponatremia  Multiple rib fractures  Diagnosis management comments: Patient presents for fatigue and lethargy  Tachycardia noted  Will perform sepsis workup and imaging studies  Case signed out to attending physician Dr Jose Mendez pending labs and imaging studies    Formal disposition per   Edgar June          Amount and/or Complexity of Data Reviewed  Clinical lab tests: ordered and reviewed  Tests in the radiology section of CPT®: ordered and reviewed    Risk of Complications, Morbidity, and/or Mortality  Presenting problems: moderate  Diagnostic procedures: low  Management options: low        Disposition  Final diagnoses:   Generalized weakness   COVID-19 virus infection   Hypomagnesemia   Bandemia   Dehydration   Hyponatremia   Multiple rib fractures     Time reflects when diagnosis was documented in both MDM as applicable and the Disposition within this note     Time User Action Codes Description Comment    1/11/2022 12:14 AM Talita Rios Add [R53 1] Weakness     1/11/2022 12:14 AM Talita Rios Remove [R53 1] Weakness     1/11/2022 12:14 AM Talita Rios Add [R53 1] Generalized weakness     1/11/2022 12:14 AM Talita Rios Add [U07 1] COVID-19 virus infection     1/11/2022 12:15 AM Talita Rios Add [E83 42] Hypomagnesemia     1/11/2022 12:15 AM Talita Rios Add [D72 825] Bandemia     1/11/2022 12:15 AM Talita Rios Add [E86 0] Dehydration     1/11/2022 12:16 AM Talita Rios Add [E87 1] Hyponatremia     1/11/2022  2:04 AM Talita Rios Add [S22 49XA] Multiple rib fractures       ED Disposition     ED Disposition Condition Date/Time Comment    Transfer to Another Facility-In Network  Tue Jan 11, 2022  2:36 AM Marygrace Boast should be transferred out to [facility]        MD Documentation      Most Recent Value   Patient Condition The patient has been stabilized such that within reasonable medical probability, no material deterioration of the patient condition or the condition of the unborn child(adrián) is likely to result from the transfer   Reason for Transfer Level of Care needed not available at this facility   Benefits of Transfer Specialized equipment and/or services available at the receiving facility (Include comment)________________________   Risks of Transfer Potential for delay in receiving treatment, Potential deterioration of medical condition, Loss of IV, Increased discomfort during transfer, Possible worsening of condition or death during transfer   Accepting Physician Dr Ghislaine Wick NameMartin   Sending VALERIE Gurrola   Provider Certification General risk, such as traffic hazards, adverse weather conditions, rough terrain or turbulence, possible failure of equipment (including vehicle or aircraft), or consequences of actions of persons outside the control of the transport personnel, Unanticipated needs of medical equipment and personnel during transport, Risk of worsening condition, The possibility of a transport vehicle being unavailable      RN Documentation      Most Recent Value   Accepting Facility Name, [facility]   Bed Assignment trauma   Report Given to trauma RN      Follow-up Information    None         Discharge Medication List as of 1/11/2022  4:26 AM      CONTINUE these medications which have NOT CHANGED    Details   amLODIPine (NORVASC) 10 mg tablet Take 1 tablet (10 mg total) by mouth daily, Starting Fri 12/31/2021, Normal      apixaban (Eliquis) 5 mg Multiple Dosages:Starting Wed 12/29/2021, Until Sun 1/2/2022 at 2359, THEN Starting Mon 1/3/2022, Until Thu 1/27/2022 at 2359Take 2 tablets (10 mg total) by mouth 2 (two) times a day for 5 days, THEN 1 tablet (5 mg total) 2 (two) times a day for 22 da ys , Normal      aspirin (ECOTRIN LOW STRENGTH) 81 mg EC tablet Take 81 mg by mouth, Historical Med      Empagliflozin 25 MG TABS Take 1 tablet by mouth, Historical Med      fluticasone (FLONASE) 50 mcg/act nasal spray 1 spray into each nostril daily, Historical Med      insulin glargine (LANTUS) 100 units/mL subcutaneous injection Inject under the skin, Historical Med      metFORMIN (GLUCOPHAGE) 1000 MG tablet Take 1,000 mg by mouth, Historical Med      METOPROLOL TARTRATE PO Take by mouth, Historical Med pantoprazole (PROTONIX) 20 mg tablet Take 20 mg by mouth daily, Historical Med      ramipril (ALTACE) 10 MG capsule Take 10 mg by mouth, Historical Med      sodium chloride, PF, 0 9 % 10 mL by Intracatheter route daily Intracatheter flushing daily, Starting Thu 12/30/2021, Until Wed 3/30/2022, Print             No discharge procedures on file      PDMP Review       Value Time User    PDMP Reviewed  Yes 1/11/2022 12:15 PM Dafne Wong PA-C          ED Provider  Electronically Signed by           Carmenza Shea PA-C  01/12/22 5950

## 2022-01-13 PROBLEM — E87.1 HYPONATREMIA: Status: RESOLVED | Noted: 2021-12-22 | Resolved: 2022-01-13

## 2022-01-13 LAB
ANION GAP SERPL CALCULATED.3IONS-SCNC: 4 MMOL/L (ref 4–13)
BUN SERPL-MCNC: 18 MG/DL (ref 5–25)
CALCIUM SERPL-MCNC: 8.7 MG/DL (ref 8.3–10.1)
CHLORIDE SERPL-SCNC: 105 MMOL/L (ref 100–108)
CO2 SERPL-SCNC: 28 MMOL/L (ref 21–32)
CREAT SERPL-MCNC: 0.68 MG/DL (ref 0.6–1.3)
GFR SERPL CREATININE-BSD FRML MDRD: 94 ML/MIN/1.73SQ M
GLUCOSE SERPL-MCNC: 143 MG/DL (ref 65–140)
GLUCOSE SERPL-MCNC: 149 MG/DL (ref 65–140)
GLUCOSE SERPL-MCNC: 169 MG/DL (ref 65–140)
GLUCOSE SERPL-MCNC: 254 MG/DL (ref 65–140)
GLUCOSE SERPL-MCNC: 268 MG/DL (ref 65–140)
GLUCOSE SERPL-MCNC: 278 MG/DL (ref 65–140)
MAGNESIUM SERPL-MCNC: 2 MG/DL (ref 1.6–2.6)
PHOSPHATE SERPL-MCNC: 2.5 MG/DL (ref 2.3–4.1)
POTASSIUM SERPL-SCNC: 4.5 MMOL/L (ref 3.5–5.3)
SODIUM SERPL-SCNC: 137 MMOL/L (ref 136–145)

## 2022-01-13 PROCEDURE — C9113 INJ PANTOPRAZOLE SODIUM, VIA: HCPCS | Performed by: STUDENT IN AN ORGANIZED HEALTH CARE EDUCATION/TRAINING PROGRAM

## 2022-01-13 PROCEDURE — 83735 ASSAY OF MAGNESIUM: CPT | Performed by: STUDENT IN AN ORGANIZED HEALTH CARE EDUCATION/TRAINING PROGRAM

## 2022-01-13 PROCEDURE — 99232 SBSQ HOSP IP/OBS MODERATE 35: CPT | Performed by: EMERGENCY MEDICINE

## 2022-01-13 PROCEDURE — 82948 REAGENT STRIP/BLOOD GLUCOSE: CPT

## 2022-01-13 PROCEDURE — 84100 ASSAY OF PHOSPHORUS: CPT | Performed by: STUDENT IN AN ORGANIZED HEALTH CARE EDUCATION/TRAINING PROGRAM

## 2022-01-13 PROCEDURE — 80048 BASIC METABOLIC PNL TOTAL CA: CPT | Performed by: STUDENT IN AN ORGANIZED HEALTH CARE EDUCATION/TRAINING PROGRAM

## 2022-01-13 RX ORDER — GUAIFENESIN 600 MG
600 TABLET, EXTENDED RELEASE 12 HR ORAL EVERY 12 HOURS SCHEDULED
Status: DISCONTINUED | OUTPATIENT
Start: 2022-01-13 | End: 2022-01-14 | Stop reason: HOSPADM

## 2022-01-13 RX ADMIN — ASPIRIN 81 MG: 81 TABLET, COATED ORAL at 11:11

## 2022-01-13 RX ADMIN — DEXAMETHASONE SODIUM PHOSPHATE 6 MG: 4 INJECTION, SOLUTION INTRA-ARTICULAR; INTRALESIONAL; INTRAMUSCULAR; INTRAVENOUS; SOFT TISSUE at 11:11

## 2022-01-13 RX ADMIN — FLUTICASONE PROPIONATE 1 SPRAY: 50 SPRAY, METERED NASAL at 11:21

## 2022-01-13 RX ADMIN — PRAZOSIN HYDROCHLORIDE 8 MG: 2 CAPSULE ORAL at 22:26

## 2022-01-13 RX ADMIN — REMDESIVIR 100 MG: 100 INJECTION, POWDER, LYOPHILIZED, FOR SOLUTION INTRAVENOUS at 13:08

## 2022-01-13 RX ADMIN — INSULIN LISPRO 3 UNITS: 100 INJECTION, SOLUTION INTRAVENOUS; SUBCUTANEOUS at 17:49

## 2022-01-13 RX ADMIN — ACETAMINOPHEN 975 MG: 650 SUSPENSION ORAL at 11:11

## 2022-01-13 RX ADMIN — RISPERIDONE 0.5 MG: 0.5 TABLET, ORALLY DISINTEGRATING ORAL at 11:21

## 2022-01-13 RX ADMIN — ENOXAPARIN SODIUM 30 MG: 30 INJECTION SUBCUTANEOUS at 11:11

## 2022-01-13 RX ADMIN — BUPROPION HYDROCHLORIDE 150 MG: 150 TABLET, FILM COATED, EXTENDED RELEASE ORAL at 11:11

## 2022-01-13 RX ADMIN — GUAIFENESIN 600 MG: 600 TABLET, EXTENDED RELEASE ORAL at 11:26

## 2022-01-13 RX ADMIN — Medication 12.5 MG: at 11:11

## 2022-01-13 RX ADMIN — FLUOXETINE 80 MG: 20 CAPSULE ORAL at 11:10

## 2022-01-13 RX ADMIN — Medication 12.5 MG: at 22:27

## 2022-01-13 RX ADMIN — RISPERIDONE 0.5 MG: 0.5 TABLET, ORALLY DISINTEGRATING ORAL at 15:27

## 2022-01-13 RX ADMIN — INSULIN LISPRO 4 UNITS: 100 INJECTION, SOLUTION INTRAVENOUS; SUBCUTANEOUS at 00:33

## 2022-01-13 RX ADMIN — SENNOSIDES AND DOCUSATE SODIUM 1 TABLET: 50; 8.6 TABLET ORAL at 11:11

## 2022-01-13 RX ADMIN — SENNOSIDES AND DOCUSATE SODIUM 1 TABLET: 50; 8.6 TABLET ORAL at 17:49

## 2022-01-13 RX ADMIN — Medication 1000 UNITS: at 11:11

## 2022-01-13 RX ADMIN — INSULIN LISPRO 1 UNITS: 100 INJECTION, SOLUTION INTRAVENOUS; SUBCUTANEOUS at 11:23

## 2022-01-13 RX ADMIN — SODIUM CHLORIDE 10 ML: 9 INJECTION, SOLUTION INTRAMUSCULAR; INTRAVENOUS; SUBCUTANEOUS at 11:22

## 2022-01-13 RX ADMIN — Medication 9 MG: at 22:27

## 2022-01-13 RX ADMIN — ATORVASTATIN CALCIUM 20 MG: 20 TABLET, FILM COATED ORAL at 15:25

## 2022-01-13 RX ADMIN — ENOXAPARIN SODIUM 30 MG: 30 INJECTION SUBCUTANEOUS at 22:27

## 2022-01-13 RX ADMIN — PANTOPRAZOLE SODIUM 40 MG: 40 INJECTION, POWDER, FOR SOLUTION INTRAVENOUS at 05:30

## 2022-01-13 RX ADMIN — CYANOCOBALAMIN TAB 500 MCG 1000 MCG: 500 TAB at 11:10

## 2022-01-13 RX ADMIN — ACETAMINOPHEN 975 MG: 650 SUSPENSION ORAL at 22:32

## 2022-01-13 NOTE — PLAN OF CARE
Problem: Potential for Falls  Goal: Patient will remain free of falls  Description: INTERVENTIONS:  - Educate patient/family on patient safety including physical limitations  - Instruct patient to call for assistance with activity   - Consult OT/PT to assist with strengthening/mobility   - Keep Call bell within reach  - Keep bed low and locked with side rails adjusted as appropriate  - Keep care items and personal belongings within reach  - Initiate and maintain comfort rounds  - Make Fall Risk Sign visible to staff  - Offer Toileting every  Hours, in advance of need  - Initiate/Maintain alarm  - Obtain necessary fall risk management equipment:   - Apply yellow socks and bracelet for high fall risk patients  - Consider moving patient to room near nurses station  Outcome: Progressing     Problem: MOBILITY - ADULT  Goal: Maintain or return to baseline ADL function  Description: INTERVENTIONS:  -  Assess patient's ability to carry out ADLs; assess patient's baseline for ADL function and identify physical deficits which impact ability to perform ADLs (bathing, care of mouth/teeth, toileting, grooming, dressing, etc )  - Assess/evaluate cause of self-care deficits   - Assess range of motion  - Assess patient's mobility; develop plan if impaired  - Assess patient's need for assistive devices and provide as appropriate  - Encourage maximum independence but intervene and supervise when necessary  - Involve family in performance of ADLs  - Assess for home care needs following discharge   - Consider OT consult to assist with ADL evaluation and planning for discharge  - Provide patient education as appropriate  Outcome: Progressing  Goal: Maintains/Returns to pre admission functional level  Description: INTERVENTIONS:  - Perform BMAT or MOVE assessment daily    - Set and communicate daily mobility goal to care team and patient/family/caregiver     - Collaborate with rehabilitation services on mobility goals if consulted  - Perform Range of Motion  times a day  - Reposition patient every  hours    - Dangle patient  times a day  - Stand patient  times a day  - Ambulate patient  times a day  - Out of bed to chair  times a day   - Out of bed for meals  times a day  - Out of bed for toileting  - Record patient progress and toleration of activity level   Outcome: Progressing     Problem: PAIN - ADULT  Goal: Verbalizes/displays adequate comfort level or baseline comfort level  Description: Interventions:  - Encourage patient to monitor pain and request assistance  - Assess pain using appropriate pain scale  - Administer analgesics based on type and severity of pain and evaluate response  - Implement non-pharmacological measures as appropriate and evaluate response  - Consider cultural and social influences on pain and pain management  - Notify physician/advanced practitioner if interventions unsuccessful or patient reports new pain  Outcome: Progressing     Problem: INFECTION - ADULT  Goal: Absence or prevention of progression during hospitalization  Description: INTERVENTIONS:  - Assess and monitor for signs and symptoms of infection  - Monitor lab/diagnostic results  - Monitor all insertion sites, i e  indwelling lines, tubes, and drains  - Monitor endotracheal if appropriate and nasal secretions for changes in amount and color  - [place] appropriate cooling/warming therapies per order  - Administer medications as ordered  - Instruct and encourage patient and family to use good hand hygiene technique  - Identify and instruct in appropriate isolation precautions for identified infection/condition  Outcome: Progressing  Goal: Absence of fever/infection during neutropenic period  Description: INTERVENTIONS:  - Monitor WBC    Outcome: Progressing     Problem: SAFETY ADULT  Goal: Patient will remain free of falls  Description: INTERVENTIONS:  - Educate patient/family on patient safety including physical limitations  - Instruct patient to call for assistance with activity   - Consult OT/PT to assist with strengthening/mobility   - Keep Call bell within reach  - Keep bed low and locked with side rails adjusted as appropriate  - Keep care items and personal belongings within reach  - Initiate and maintain comfort rounds  - Make Fall Risk Sign visible to staff  - Offer Toileting every  Hours, in advance of need  - Initiate/Maintain alarm  - Obtain necessary fall risk management equipment:   - Apply yellow socks and bracelet for high fall risk patients  - Consider moving patient to room near nurses station  Outcome: Progressing  Goal: Maintain or return to baseline ADL function  Description: INTERVENTIONS:  -  Assess patient's ability to carry out ADLs; assess patient's baseline for ADL function and identify physical deficits which impact ability to perform ADLs (bathing, care of mouth/teeth, toileting, grooming, dressing, etc )  - Assess/evaluate cause of self-care deficits   - Assess range of motion  - Assess patient's mobility; develop plan if impaired  - Assess patient's need for assistive devices and provide as appropriate  - Encourage maximum independence but intervene and supervise when necessary  - Involve family in performance of ADLs  - Assess for home care needs following discharge   - Consider OT consult to assist with ADL evaluation and planning for discharge  - Provide patient education as appropriate  Outcome: Progressing  Goal: Maintains/Returns to pre admission functional level  Description: INTERVENTIONS:  - Perform BMAT or MOVE assessment daily    - Set and communicate daily mobility goal to care team and patient/family/caregiver  - Collaborate with rehabilitation services on mobility goals if consulted  - Perform Range of Motion  times a day  - Reposition patient every  hours    - Dangle patient  times a day  - Stand patient  times a day  - Ambulate patient  times a day  - Out of bed to chair  times a day   - Out of bed for meals  times a day  - Out of bed for toileting  - Record patient progress and toleration of activity level   Outcome: Progressing     Problem: DISCHARGE PLANNING  Goal: Discharge to home or other facility with appropriate resources  Description: INTERVENTIONS:  - Identify barriers to discharge w/patient and caregiver  - Arrange for needed discharge resources and transportation as appropriate  - Identify discharge learning needs (meds, wound care, etc )  - Arrange for interpretive services to assist at discharge as needed  - Refer to Case Management Department for coordinating discharge planning if the patient needs post-hospital services based on physician/advanced practitioner order or complex needs related to functional status, cognitive ability, or social support system  Outcome: Progressing     Problem: Knowledge Deficit  Goal: Patient/family/caregiver demonstrates understanding of disease process, treatment plan, medications, and discharge instructions  Description: Complete learning assessment and assess knowledge base    Interventions:  - Provide teaching at level of understanding  - Provide teaching via preferred learning methods  Outcome: Progressing     Problem: Prexisting or High Potential for Compromised Skin Integrity  Goal: Skin integrity is maintained or improved  Description: INTERVENTIONS:  - Identify patients at risk for skin breakdown  - Assess and monitor skin integrity  - Assess and monitor nutrition and hydration status  - Monitor labs   - Assess for incontinence   - Turn and reposition patient  - Assist with mobility/ambulation  - Relieve pressure over bony prominences  - Avoid friction and shearing  - Provide appropriate hygiene as needed including keeping skin clean and dry  - Evaluate need for skin moisturizer/barrier cream  - Collaborate with interdisciplinary team   - Patient/family teaching  - Consider wound care consult   Outcome: Progressing     Problem: Nutrition/Hydration-ADULT  Goal: Nutrient/Hydration intake appropriate for improving, restoring or maintaining nutritional needs  Description: Monitor and assess patient's nutrition/hydration status for malnutrition  Collaborate with interdisciplinary team and initiate plan and interventions as ordered  Monitor patient's weight and dietary intake as ordered or per policy  Utilize nutrition screening tool and intervene as necessary  Determine patient's food preferences and provide high-protein, high-caloric foods as appropriate       INTERVENTIONS:  - Monitor oral intake, urinary output, labs, and treatment plans  - Assess nutrition and hydration status and recommend course of action  - Evaluate amount of meals eaten  - Assist patient with eating if necessary   - Allow adequate time for meals  - Recommend/ encourage appropriate diets, oral nutritional supplements, and vitamin/mineral supplements  - Order, calculate, and assess calorie counts as needed  - Recommend, monitor, and adjust tube feedings and TPN/PPN based on assessed needs  - Assess need for intravenous fluids  - Provide specific nutrition/hydration education as appropriate  - Include patient/family/caregiver in decisions related to nutrition  Outcome: Progressing

## 2022-01-13 NOTE — DISCHARGE SUMMARY
[facility]  Discharge- Cheyenne Massed 1945, 78 y o  male MRN: [id number]  Unit/Bed#: [facility] 817-01 Encounter: [id number]  Primary Care Provider: Merrill Saldaña MD   Date and time admitted to hospital: 1/11/2022  4:56 AM    Multiple fractures of rib involving four or more ribs-right  Assessment & Plan  Right 7 -12 rib fractures, with small right pleural effusion  - rib fracture protocol  - multimodal pain control  - incentive spirometer  - APS for pain management  Continue p o  Pain regimen at this time  - will likely discharge home today with home health    Cholecystitis  Assessment & Plan  - continue cholecystostomy tube in drain management    55 cc in 24 hours bilious drainage  - follow-up as outpatient with IR for tube check    Hyperlipidemia due to type 2 diabetes mellitus [facility])  Assessment & Plan  Lab Results   Component Value Date    HGBA1C 6 3 (H) 12/22/2021       Recent Labs     01/12/22  1757 01/13/22  0007 01/13/22  0538 01/13/22  1118   POCGLU 170* 278* 149* 169*       Blood Sugar Average: Last 72 hrs:  (P) 190 9     - home statin    Type 2 diabetes mellitus without complication, without long-term current use of insulin [facility])  Assessment & Plan  Lab Results   Component Value Date    HGBA1C 6 3 (H) 12/22/2021       Recent Labs     01/12/22  1757 01/13/22  0007 01/13/22  0538 01/13/22  1118   POCGLU 170* 278* 149* 169*       Blood Sugar Average: Last 72 hrs:  (P) 190 9     - home diabetes regimen    Dysphagia, unspecified  Assessment & Plan  - PEG bolus tube Feeds  - Nutrition following for tube feed recommendations  - will continue Jevity 1 2 with q 4 150 mL free water flushes    Essential hypertension  Assessment & Plan  Hypotensive 1/11  - hold amlodipine and lisinopril  - holding parameters on home metoprolol 12 5 to hold for SBP < 110  - Will need to follow-up with PCP regarding the adjustment of antihypertensive medications after discharge    * COVID  Assessment & Plan  - COVID precautions                Medical Problems             Resolved Problems  Date Reviewed: 1/11/2022          Resolved    Hyponatremia 1/13/2022     Resolved by  Ольга Toledo MD                Admission Date:   Admission Orders (From admission, onward)     Ordered        01/11/22 0649  Inpatient Admission  Once                        Admitting Diagnosis: Hyponatremia [E87 1]  Rib fracture [S22 39XA]  Closed fracture of multiple ribs of right side, initial encounter [S22 41XA]  Hyperlipidemia due to type 2 diabetes mellitus ([facility] [address] ) [E11 69, E78 5]  Dysphagia, unspecified type [R13 10]  COVID [U07 1]    HPI:  Marjan Beaulieu is a 78 y o  male with past medical history of hyponatremia, bladder cancer, hypertension, diabetes type 2, PTSD, GERD, hyperlipidemia, cholecystitis who presents with anterior rib pain after a fall  Patient recently underwent a cholecystostomy tube placement with IR  Patient states that he fell at home in his living room about 2 weeks ago, when he lost balance and fell squarely on his right side  Patient states that he also fell on the same side more recently, although unable to provide a clear date  Patient denied head strike or loss of consciousness during her fall  Patient states that on both occasions, he had right-sided tenderness to palpation over the chest   Patient denies any other associated injuries, or pain elsewhere in the body  Patient takes aspirin and Eliquis daily, last taken yesterday  Patient was seen at [facility], where CT of the chest/abdomen/pelvis demonstrated right-sided 7th to 12th rib fractures, small right pleural effusion, and pulmonary opacity suspicious for pneumonia  He was transferred here for higher level of care  Procedures Performed: No orders of the defined types were placed in this encounter  Summary of Hospital Course: While admitted patient was monitored for pain control    He is provided with a multimodal oral pain regimen  He was evaluated by APS and deemed fit to proceed without any additional additive such as rib blocks or epidural placement  He he continued to have a persistent productive cough which he states had been present since his throat radiation for his previous cancer treatment  He denied any shortness of breath or dyspnea  While here he was continued on tube feeds and slowly his weakness resolved  At the time of discharge she was stable with no other trauma needs  He is instructed to follow up as outpatient for management of his blood pressure as he had had several bouts of hypotension while admitted as an inpatient  He was also instructed to follow up for management of his percutaneous cholecystostomy tube  Significant Findings, Care, Treatment and Services Provided:   XR chest portable    Result Date: 1/12/2022  Impression: Improving left lower lobe pneumonia  Workstation performed: [id number]       Complications:  None    Condition at Discharge: fair         Discharge instructions/Information to patient and family:   See after visit summary for information provided to patient and family  Provisions for Follow-Up Care:  See after visit summary for information related to follow-up care and any pertinent home health orders  PCP: Sheila Moseley MD    Disposition: Home with On [facility] of [facility]    Planned Readmission: No    Discharge Statement   I spent 0 minutes discharging the patient  This time was spent on the day of discharge  I had direct contact with the patient on the day of discharge  Additional documentation is required if more than 30 minutes were spent on discharge  Discharge Medications:  See after visit summary for reconciled discharge medications provided to patient and family

## 2022-01-13 NOTE — DISCHARGE INSTRUCTIONS
Fall Prevention for Older Adults   WHAT YOU NEED TO KNOW:   As you age, your muscles weaken and your risk for falls increases  Your risk also increases if you take medicines that make you sleepy or dizzy  You may also be at risk if you have vision or joint problems, have low blood pressure, or are not active  DISCHARGE INSTRUCTIONS:   Call 911 or have someone else call if:   · You have fallen and are unconscious  · You have fallen and cannot move part of your body  Contact your healthcare provider if:   · You have fallen and have pain or a headache  · You have questions or concerns about your condition or care  Fall prevention tips:   · Stay active  Exercise can help strengthen your muscles and improve your balance  Your healthcare provider may recommend water aerobics, walking, or [place]  He or she may also recommend physical therapy to improve your coordination  Never start an exercise program without asking your healthcare provider first             · Wear shoes that fit well and have soles that   Wear shoes both inside and outside  Use slippers with good   Avoid shoes with high heels  · Use assistive devices as directed  Your healthcare provider may suggest that you use a cane or walker to help you keep your balance  You may need to have grab bars put in your bathroom near the toilet or in the shower  · Stand or sit up slowly  This may help you keep your balance and prevent falls  · Wear a personal alarm  This is a device that allows you to call 911 if you need help  Ask for more information on personal alarms  · Manage your medical conditions  Keep all appointments with your healthcare providers  Visit your eye doctor as directed  Home safety tips:       · Add items to prevent falls in the bathroom  Put nonslip strips on your bath or shower floor to prevent you from slipping  Use a bath mat if you do not have carpet in the bathroom   This will prevent you from falling when you step out of the bath or shower  Use a shower seat so you do not need to stand while you shower  Sit on the toilet or a chair in your bathroom to dry yourself and put on clothing  This will prevent you from losing your balance from drying or dressing yourself while you are standing  · Keep paths clear  Remove books, shoes, and other objects from walkways and stairs  Place cords for telephones and lamps out of the way so that you do not need to walk over them  Tape them down if you cannot move them  Remove small rugs  If you cannot remove a rug, secure it with double-sided tape  This will prevent you from tripping  · Install bright lights in your home  Use night lights to help light paths to the bathroom or kitchen  Always turn on the light before you start walking  · Keep items you use often on shelves within reach  Do not use a step stool to help you reach an item  · Paint or place reflective tape on the edges of your stairs  This will help you see the stairs better  Follow up with your doctor as directed:  Write down your questions so you remember to ask them during your visits  © Copyright [employer] 2021 Information is for End User's use only and may not be sold, redistributed or otherwise used for commercial purposes  All illustrations and images included in CareNotes® are the copyrighted property of [employer]  or [employer]  The above information is an  only  It is not intended as medical advice for individual conditions or treatments  Talk to your doctor, nurse or pharmacist before following any medical regimen to see if it is safe and effective for you

## 2022-01-13 NOTE — ASSESSMENT & PLAN NOTE
Hypotensive 1/11  - hold amlodipine and lisinopril  - holding parameters on home metoprolol 12 5 to hold for SBP < 110  - Will need to follow-up with PCP regarding the adjustment of antihypertensive medications after discharge

## 2022-01-13 NOTE — ASSESSMENT & PLAN NOTE
Lab Results   Component Value Date    HGBA1C 6 3 (H) 12/22/2021       Recent Labs     01/12/22  1757 01/13/22  0007 01/13/22  0538 01/13/22  1118   POCGLU 170* 278* 149* 169*       Blood Sugar Average: Last 72 hrs:  (P) 190 9     - home diabetes regimen

## 2022-01-13 NOTE — PROGRESS NOTES
[address] [facility]  Progress Note - Brinda Berrios 1945, 78 y o  male MRN: [id number]  Unit/Bed#: [facility] 817-01 Encounter: [id number]  Primary Care Provider: Jennie Velasquez MD   Date and time admitted to hospital: 1/11/2022  4:56 AM    Multiple fractures of rib involving four or more ribs-right  Assessment & Plan  Right 7 -12 rib fractures, with small right pleural effusion  - rib fracture protocol  - multimodal pain control  - incentive spirometer  - APS for pain management  Continue p o  Pain regimen at this time  - will likely discharge home today versus tomorrow with home health    Cholecystitis  Assessment & Plan  - continue cholecystostomy tube in drain management  55 cc in 24 hours bilious drainage    Type 2 diabetes mellitus without complication, without long-term current use of insulin [facility])  Assessment & Plan  Lab Results   Component Value Date    HGBA1C 6 3 (H) 12/22/2021       Recent Labs     01/12/22  1328 01/12/22  1757 01/13/22  0007 01/13/22  0538   POCGLU 226* 170* 278* 149*       Blood Sugar Average: Last 72 hrs:  (P) 429 [id number]    Dysphagia, unspecified  Assessment & Plan  - PEG bolus tube Feeds  - Nutrition consult    Essential hypertension  Assessment & Plan  Hypotensive 1/11  - hold amlodipine and lisinopril  - holding parameters on home metoprolol 12 5 to hold for SBP < 110  - the need to follow-up with PCP regarding the adjustment of antihypertensive medications    * COVID  Assessment & Plan  - COVID precautions  -SLIM consult for medical management    Hyponatremia-resolved as of 1/13/2022  Assessment & Plan  - hyponatremia resolved  - Sodium 138            Disposition:  Med surg versus home      SUBJECTIVE:  Chief Complaint:  Fall    Subjective:  Patient feels better this morning  He has minimal pain at the site of his rib fractures  He has a persistent cough which is unchanged since his stroke radiation    Overall he denies no shortness of breath        OBJECTIVE:     Meds/Allergies     Current Facility-Administered Medications:     acetaminophen (TYLENOL) oral suspension 975 mg, 975 mg, Per PEG Tube, Q8H, Aden Dumont MD, 975 mg at 01/12/22 2240    aspirin (ECOTRIN LOW STRENGTH) EC tablet 81 mg, 81 mg, Oral, Daily, Donovan Duarte MD, 81 mg at 01/12/22 0847    atorvastatin (LIPITOR) tablet 20 mg, 20 mg, Per PEG Tube, Daily With Dinner, Aden Dumont MD, 20 mg at 01/12/22 1752    buPROPion (WELLBUTRIN XL) 24 hr tablet 150 mg, 150 mg, Oral, Daily, Aden Dumont MD, 150 mg at 01/12/22 0847    cholecalciferol (VITAMIN D3) tablet 1,000 Units, 1,000 Units, Per PEG Tube, Daily, Aden Dumont MD, 1,000 Units at 01/12/22 0847    cyanocobalamin (VITAMIN B-12) tablet 1,000 mcg, 1,000 mcg, Per PEG Tube, Daily, Aden Dumont MD, 1,000 mcg at 01/12/22 0847    dexamethasone (DECADRON) injection 6 mg, 6 mg, Intravenous, Q24H, Aden Dumont MD, 6 mg at 01/12/22 1326    Empagliflozin TABS 25 mg, 1 tablet, Per PEG Tube, Daily, Aden Dumont MD    enoxaparin (LOVENOX) subcutaneous injection 30 mg, 30 mg, Subcutaneous, Q12H [address], Aden Dumont MD, 30 mg at 01/12/22 2157    FLUoxetine (PROzac) capsule 80 mg, 80 mg, Per PEG Tube, Daily, Aden Dumont MD, 80 mg at 01/12/22 0847    fluticasone (FLONASE) 50 mcg/act nasal spray 1 spray, 1 spray, Nasal, Daily, Donovan Duarte MD    glycopyrrolate (ROBINUL) tablet 1 mg, 1 mg, Per PEG Tube, TID PRN, Aden Dumont MD, 1 mg at 01/12/22 2212    guaiFENesin (MUCINEX) 12 hr tablet 600 mg, 600 mg, Oral, Q12H [address], Aden Dumont MD    HYDROmorphone HCl (DILAUDID) injection 0 2 mg, 0 2 mg, Intravenous, Q4H PRN, Donovan Duarte MD    insulin lispro (HumaLOG) 100 units/mL subcutaneous injection 1-6 Units, 1-6 Units, Subcutaneous, Q6H, 4 Units at 01/13/22 0033 **AND** Fingerstick Glucose (POCT), , , Q6H, Michael Cook MD    melatonin tablet 9 mg, 9 mg, Per PEG Tube, HS, Aden Dumont MD, 9 mg at 01/12/22 2157    metoprolol tartrate (LOPRESSOR) partial tablet 12 5 mg, 12 5 mg, Per PEG Tube, Q12H [address], Arvin Rodriguez MD, 12 5 mg at 01/12/22 2156    naloxone (NARCAN) 0 04 mg/mL syringe 0 04 mg, 0 04 mg, Intravenous, Q1MIN PRN, Latosha Wong MD    [facility]ron [facility]) injection 4 mg, 4 mg, Intravenous, Q4H PRN, Latosha Wong MD    oxyCODONE (ROXICODONE) IR tablet 2 5 mg, 2 5 mg, Per PEG Tube, Q4H PRN, Arvin Rodriguez MD, 2 5 mg at 01/12/22 1825    oxyCODONE (ROXICODONE) IR tablet 5 mg, 5 mg, Per PEG Tube, Q4H PRN, Arvin Rodriguez MD, 5 mg at 01/12/22 1326    pantoprazole (PROTONIX) injection 40 mg, 40 mg, Intravenous, Q24H [address], Arvin Rodriguez MD, 40 mg at 01/13/22 0530    prazosin (MINIPRESS) capsule 8 mg, 8 mg, Oral, HS, Arvin Rodriguez MD, 8 mg at 01/12/22 2158    [COMPLETED] remdesivir (Veklury) 200 mg in sodium chloride 0 9 % 290 mL IVPB, 200 mg, Intravenous, Q24H, 200 mg at 01/11/22 1319 **FOLLOWED BY** remdesivir (Veklury) 100 mg in sodium chloride 0 9 % 270 mL IVPB, 100 mg, Intravenous, Q24H, Arvin Rodriguez MD, Stopped at 01/12/22 1400    risperiDONE (RisperDAL M-TAB) disintegrating tablet 0 5 mg, 0 5 mg, Oral, TID, Arvin Rodriguez MD, 0 5 mg at 01/12/22 2158    senna-docusate sodium (SENOKOT S) 8 6-50 mg per tablet 1 tablet, 1 tablet, Per PEG Tube, BID, Arvin Rodriguez MD, 1 tablet at 01/12/22 1752    sodium chloride (PF) 0 9 % injection 10 mL, 10 mL, Intracatheter, Daily, Latosha Wong MD     Vitals:   Vitals:    01/13/22 0753   BP: 160/73   Pulse: 99   Resp: 22   Temp: 98 7 °F (37 1 °C)   SpO2: 98%       Intake/Output:  I/O       01/11 0701 01/12 0700 01/12 0701 01/13 0700 01/13 0701 01/14 0700    P  O    0    I V   400     NG/ 60     Feedings 1000 570     Total Intake 1120 1030 0    Urine 850 725 725    Drains 100 5 50    Total Output 950 730 775    Net +170 +300 -775           Unmeasured Urine Occurrence 1 x             Nutrition/GI Proph/Bowel Reg:  Ordered    Physical Exam:   Gen: NAD, Comfortable  Neuro: A&O, No focal deficits  Head: Normal Cephalic, Atraumatic  Eye: EOMI, PERRLA, No scleral icterus  Neck: Supple, No JVD, Midline trachea  CV: RRR, Cap refill <2 sec  Pulm: Normal work of breathing, no respiratory distress  Minimal right-sided rib pain  Abd: Soft, Non-Distended  mild RUQ tenderness at the site of biliary drain  Cholecystostomy tube 55 cc of bilious output  Peg tube in place with tube feeds going at 70 cc per hour  Ext: No edema, Non-tender  Skin: warm, dry, intact      PIC Score  PIC Pain Score: 3 (1/12/2022 10:40 PM)  PIC Incentive Spirometry Score: 3 (1/12/2022  6:25 PM)  PIC Cough Description: 3 (1/12/2022  6:25 PM)  [facility] Total Score: 8 (1/12/2022  6:25 PM)       If the Total PIC Score </=5, did you consult APS and evaluate patient for further intervention?: yes      Pain:    Incentive Spirometry  Cough  3 = Controlled  4 = Above goal volume 3 = Strong  2 = Moderate  3 = Goal to alert volume 2 = Weak  1 = Severe  2 = Below alert volume 1 = Absent     1 = Unable to perform IS           Invasive Devices  Report    Peripheral Intravenous Line            Peripheral IV 01/11/22 Left Wrist 2 days    Peripheral IV 01/11/22 Right Wrist 2 days          Drain            Gastrostomy/Enterostomy PEG-jejunostomy LUQ -- days    Closed/Suction Drain RUQ Other (Comment)  7 days                 Lab Results: Results: I have personally reviewed pertinent reports  Imaging/EKG Studies: Results: I have personally reviewed pertinent reports      Other Studies:  None  VTE Prophylaxis: Heparin         Corinna López MD  1/13/2022  11:10 AM

## 2022-01-13 NOTE — ASSESSMENT & PLAN NOTE
Lab Results   Component Value Date    HGBA1C 6 3 (H) 12/22/2021       Recent Labs     01/12/22  1328 01/12/22  1757 01/13/22  0007 01/13/22  0538   POCGLU 226* 170* 278* 149*       Blood Sugar Average: Last 72 hrs:  (P) 483 [id number]

## 2022-01-13 NOTE — ASSESSMENT & PLAN NOTE
Right 7 -12 rib fractures, with small right pleural effusion  - rib fracture protocol  - multimodal pain control  - incentive spirometer  - APS for pain management  Continue p o   Pain regimen at this time  - will likely discharge home today versus tomorrow with home health

## 2022-01-13 NOTE — CASE MANAGEMENT
Case Management Discharge Planning Note    Patient name Zoie Becerril  [facility] [address] [facility] [address] [facility] [id number]  : 1945 Date 2022       Current Admission Date: 2022  Current Admission Diagnosis:COVID   Patient Active Problem List    Diagnosis Date Noted    Multiple fractures of rib involving four or more ribs-right 2022    COVID 2022    Acute respiratory failure with hypoxia ([address] ) 2022    Anxiety 2021    Neoplasm of uncertain behavior of skin 2021    Post-traumatic stress disorder, unspecified 2021    Essential hypertension 2021    Actinic keratosis 2021    Dysphagia, unspecified 2021    Erectile dysfunction 2021    History of malignant neoplasm of bladder 2021    Carpal tunnel syndrome 2021    Chronic post-traumatic stress disorder (PTSD) after  combat 2021    Type 2 diabetes mellitus without complication, without long-term current use of insulin ([address] ) 2021    Hematuria 2021    Hyperlipidemia due to type 2 diabetes mellitus ([address] ) 2021    Malignant neoplasm of urinary bladder ([address] ) 2021    Neoplasm of lung 2021    Osteoarthritis of knee 2021    Panic disorder 2021    Polyp of colon 2021    Tobacco dependence 2021    Sepsis ([address] ) 2021    Community acquired pneumonia of left lower lobe of lung 2021    Other pulmonary embolism without acute cor pulmonale ([address] ) 2021    Cholecystitis 2021    Severe protein-calorie malnutrition ([address] ) 2021    Primary malignant neoplasm of base of tongue ([address] ) 10/11/2021    Neoplasm of uncertain behavior of oropharynx 2021    Cardiac arrhythmia 05/15/2018    Heart murmur 05/15/2018    Left ventricular hypertrophy 05/15/2018      LOS (days): 2  Geometric Mean LOS (GMLOS) (days): 5 40  Days to GMLOS:3 2     OBJECTIVE:  Risk of Unplanned Readmission Score: 32 Current admission status: Inpatient   Preferred Pharmacy:   [address], [address] [facility] [address] [address]  Phone: [phone] Fax: [phone]    Primary Care Provider: Sheila Moseley MD    Primary Insurance: MEDICARE  Secondary Insurance: Kathe Cockayne DETAILS:                      Pt not medically stable for d/c today  Plan to d/c home tomorrow  Pt will resume services with [facility]   Pt's family to transport

## 2022-01-13 NOTE — ASSESSMENT & PLAN NOTE
Lab Results   Component Value Date    HGBA1C 6 3 (H) 12/22/2021       Recent Labs     01/12/22  1757 01/13/22  0007 01/13/22  0538 01/13/22  1118   POCGLU 170* 278* 149* 169*       Blood Sugar Average: Last 72 hrs:  (P) 190 9     - home statin

## 2022-01-13 NOTE — ASSESSMENT & PLAN NOTE
- continue cholecystostomy tube in drain management    55 cc in 24 hours bilious drainage  - follow-up as outpatient with IR for tube check

## 2022-01-13 NOTE — ASSESSMENT & PLAN NOTE
Hypotensive 1/11  - hold amlodipine and lisinopril  - holding parameters on home metoprolol 12 5 to hold for SBP < 110  - the need to follow-up with PCP regarding the adjustment of antihypertensive medications

## 2022-01-13 NOTE — ASSESSMENT & PLAN NOTE
- PEG bolus tube Feeds  - Nutrition following for tube feed recommendations  - will continue Jevity 1 2 with q 4 150 mL free water flushes

## 2022-01-13 NOTE — PLAN OF CARE
Problem: PAIN - ADULT  Goal: Verbalizes/displays adequate comfort level or baseline comfort level  Description: Interventions:  - Encourage patient to monitor pain and request assistance  - Assess pain using appropriate pain scale  - Administer analgesics based on type and severity of pain and evaluate response  - Implement non-pharmacological measures as appropriate and evaluate response  - Consider cultural and social influences on pain and pain management  - Notify physician/advanced practitioner if interventions unsuccessful or patient reports new pain  Outcome: Progressing     Problem: DISCHARGE PLANNING  Goal: Discharge to home or other facility with appropriate resources  Description: INTERVENTIONS:  - Identify barriers to discharge w/patient and caregiver  - Arrange for needed discharge resources and transportation as appropriate  - Identify discharge learning needs (meds, wound care, etc )  - Arrange for interpretive services to assist at discharge as needed  - Refer to Case Management Department for coordinating discharge planning if the patient needs post-hospital services based on physician/advanced practitioner order or complex needs related to functional status, cognitive ability, or social support system  Outcome: Progressing     Problem: Knowledge Deficit  Goal: Patient/family/caregiver demonstrates understanding of disease process, treatment plan, medications, and discharge instructions  Description: Complete learning assessment and assess knowledge base  Interventions:  - Provide teaching at level of understanding  - Provide teaching via preferred learning methods  Outcome: Progressing     Problem: Nutrition/Hydration-ADULT  Goal: Nutrient/Hydration intake appropriate for improving, restoring or maintaining nutritional needs  Description: Monitor and assess patient's nutrition/hydration status for malnutrition   Collaborate with interdisciplinary team and initiate plan and interventions as ordered  Monitor patient's weight and dietary intake as ordered or per policy  Utilize nutrition screening tool and intervene as necessary  Determine patient's food preferences and provide high-protein, high-caloric foods as appropriate       INTERVENTIONS:  - Monitor oral intake, urinary output, labs, and treatment plans  - Assess nutrition and hydration status and recommend course of action  - Evaluate amount of meals eaten  - Assist patient with eating if necessary   - Allow adequate time for meals  - Recommend/ encourage appropriate diets, oral nutritional supplements, and vitamin/mineral supplements  - Order, calculate, and assess calorie counts as needed  - Recommend, monitor, and adjust tube feedings and TPN/PPN based on assessed needs  - Assess need for intravenous fluids  - Provide specific nutrition/hydration education as appropriate  - Include patient/family/caregiver in decisions related to nutrition  Outcome: Progressing

## 2022-01-13 NOTE — INCIDENTAL FINDINGS
The following findings require follow up:  Radiographic finding   Finding: Bilateral thyroid nodules are present, measuring up to approximately 1 7 cm on the right     Follow up required: US   Follow up should be done within endocrinology following discharge     Please notify the following clinician to assist with the follow up:   Endocrinology referral made

## 2022-01-14 VITALS
HEIGHT: 74 IN | RESPIRATION RATE: 18 BRPM | TEMPERATURE: 98.1 F | OXYGEN SATURATION: 90 % | SYSTOLIC BLOOD PRESSURE: 114 MMHG | DIASTOLIC BLOOD PRESSURE: 65 MMHG | BODY MASS INDEX: 20.97 KG/M2 | HEART RATE: 84 BPM

## 2022-01-14 LAB
ANION GAP SERPL CALCULATED.3IONS-SCNC: 5 MMOL/L (ref 4–13)
BUN SERPL-MCNC: 17 MG/DL (ref 5–25)
CALCIUM SERPL-MCNC: 8.2 MG/DL (ref 8.3–10.1)
CHLORIDE SERPL-SCNC: 103 MMOL/L (ref 100–108)
CO2 SERPL-SCNC: 29 MMOL/L (ref 21–32)
CREAT SERPL-MCNC: 0.55 MG/DL (ref 0.6–1.3)
GFR SERPL CREATININE-BSD FRML MDRD: 102 ML/MIN/1.73SQ M
GLUCOSE SERPL-MCNC: 125 MG/DL (ref 65–140)
GLUCOSE SERPL-MCNC: 135 MG/DL (ref 65–140)
GLUCOSE SERPL-MCNC: 150 MG/DL (ref 65–140)
MAGNESIUM SERPL-MCNC: 1.8 MG/DL (ref 1.6–2.6)
PHOSPHATE SERPL-MCNC: 3.4 MG/DL (ref 2.3–4.1)
POTASSIUM SERPL-SCNC: 4.6 MMOL/L (ref 3.5–5.3)
SODIUM SERPL-SCNC: 137 MMOL/L (ref 136–145)

## 2022-01-14 PROCEDURE — 82948 REAGENT STRIP/BLOOD GLUCOSE: CPT

## 2022-01-14 PROCEDURE — 84100 ASSAY OF PHOSPHORUS: CPT | Performed by: STUDENT IN AN ORGANIZED HEALTH CARE EDUCATION/TRAINING PROGRAM

## 2022-01-14 PROCEDURE — 80048 BASIC METABOLIC PNL TOTAL CA: CPT | Performed by: STUDENT IN AN ORGANIZED HEALTH CARE EDUCATION/TRAINING PROGRAM

## 2022-01-14 PROCEDURE — 99238 HOSP IP/OBS DSCHRG MGMT 30/<: CPT | Performed by: EMERGENCY MEDICINE

## 2022-01-14 PROCEDURE — NC001 PR NO CHARGE: Performed by: EMERGENCY MEDICINE

## 2022-01-14 PROCEDURE — C9113 INJ PANTOPRAZOLE SODIUM, VIA: HCPCS | Performed by: STUDENT IN AN ORGANIZED HEALTH CARE EDUCATION/TRAINING PROGRAM

## 2022-01-14 PROCEDURE — 83735 ASSAY OF MAGNESIUM: CPT | Performed by: STUDENT IN AN ORGANIZED HEALTH CARE EDUCATION/TRAINING PROGRAM

## 2022-01-14 RX ORDER — ACETAMINOPHEN 160 MG/5ML
975 SUSPENSION, ORAL (FINAL DOSE FORM) ORAL EVERY 8 HOURS
Qty: 1276.8 ML | Refills: 0 | Status: SHIPPED | OUTPATIENT
Start: 2022-01-14 | End: 2022-01-28

## 2022-01-14 RX ORDER — ACETAMINOPHEN 160 MG/5ML
975 SUSPENSION, ORAL (FINAL DOSE FORM) ORAL EVERY 8 HOURS
Qty: 1276.8 ML | Refills: 0 | Status: CANCELLED | OUTPATIENT
Start: 2022-01-14 | End: 2022-01-28

## 2022-01-14 RX ORDER — MAGNESIUM SULFATE HEPTAHYDRATE 40 MG/ML
2 INJECTION, SOLUTION INTRAVENOUS ONCE
Status: COMPLETED | OUTPATIENT
Start: 2022-01-14 | End: 2022-01-14

## 2022-01-14 RX ADMIN — CYANOCOBALAMIN TAB 500 MCG 1000 MCG: 500 TAB at 10:38

## 2022-01-14 RX ADMIN — Medication 12.5 MG: at 10:38

## 2022-01-14 RX ADMIN — ASPIRIN 81 MG: 81 TABLET, COATED ORAL at 10:38

## 2022-01-14 RX ADMIN — FLUOXETINE 80 MG: 20 CAPSULE ORAL at 10:38

## 2022-01-14 RX ADMIN — REMDESIVIR 100 MG: 100 INJECTION, POWDER, LYOPHILIZED, FOR SOLUTION INTRAVENOUS at 11:28

## 2022-01-14 RX ADMIN — Medication 1000 UNITS: at 10:38

## 2022-01-14 RX ADMIN — GUAIFENESIN 600 MG: 600 TABLET, EXTENDED RELEASE ORAL at 10:38

## 2022-01-14 RX ADMIN — BUPROPION HYDROCHLORIDE 150 MG: 150 TABLET, FILM COATED, EXTENDED RELEASE ORAL at 10:38

## 2022-01-14 RX ADMIN — INSULIN LISPRO 3 UNITS: 100 INJECTION, SOLUTION INTRAVENOUS; SUBCUTANEOUS at 00:38

## 2022-01-14 RX ADMIN — ACETAMINOPHEN 975 MG: 650 SUSPENSION ORAL at 10:36

## 2022-01-14 RX ADMIN — FLUTICASONE PROPIONATE 1 SPRAY: 50 SPRAY, METERED NASAL at 10:37

## 2022-01-14 RX ADMIN — PANTOPRAZOLE SODIUM 40 MG: 40 INJECTION, POWDER, FOR SOLUTION INTRAVENOUS at 06:13

## 2022-01-14 RX ADMIN — SENNOSIDES AND DOCUSATE SODIUM 1 TABLET: 50; 8.6 TABLET ORAL at 10:38

## 2022-01-14 RX ADMIN — INSULIN LISPRO 1 UNITS: 100 INJECTION, SOLUTION INTRAVENOUS; SUBCUTANEOUS at 06:19

## 2022-01-14 RX ADMIN — ENOXAPARIN SODIUM 30 MG: 30 INJECTION SUBCUTANEOUS at 10:37

## 2022-01-14 RX ADMIN — SODIUM CHLORIDE 10 ML: 9 INJECTION, SOLUTION INTRAMUSCULAR; INTRAVENOUS; SUBCUTANEOUS at 11:29

## 2022-01-14 RX ADMIN — DEXAMETHASONE SODIUM PHOSPHATE 6 MG: 4 INJECTION, SOLUTION INTRA-ARTICULAR; INTRALESIONAL; INTRAMUSCULAR; INTRAVENOUS; SOFT TISSUE at 10:39

## 2022-01-14 RX ADMIN — MAGNESIUM SULFATE HEPTAHYDRATE 2 G: 40 INJECTION, SOLUTION INTRAVENOUS at 11:27

## 2022-01-14 NOTE — PROGRESS NOTES
[facility] [facility]  Progress Note - Yeny Castañeda 1945, 78 y o  male MRN: [id number]  Unit/Bed#: [facility] 817-01 Encounter: [id number]  Primary Care Provider: Halina Tran MD   Date and time admitted to hospital: 1/11/2022  4:56 AM    Multiple fractures of rib involving four or more ribs-right  Assessment & Plan  Right 7 -12 rib fractures, with small right pleural effusion  - rib fracture protocol  - multimodal pain control  - incentive spirometer  - APS for pain management  Continue p o  Pain regimen at this time  - will likely discharge home today 1/14 with home health    Cholecystitis  Assessment & Plan  - continue cholecystostomy tube in drain management    55 cc in 24 hours bilious drainage  - follow-up as outpatient with IR for tube check    Hyperlipidemia due to type 2 diabetes mellitus [facility])  Assessment & Plan  Lab Results   Component Value Date    HGBA1C 6 3 (H) 12/22/2021       Recent Labs     01/12/22  1757 01/13/22  0007 01/13/22  0538 01/13/22  1118   POCGLU 170* 278* 149* 169*       Blood Sugar Average: Last 72 hrs:  (P) 190 9     - home statin    Type 2 diabetes mellitus without complication, without long-term current use of insulin [facility])  Assessment & Plan  Lab Results   Component Value Date    HGBA1C 6 3 (H) 12/22/2021       Recent Labs     01/12/22  1757 01/13/22  0007 01/13/22  0538 01/13/22  1118   POCGLU 170* 278* 149* 169*       Blood Sugar Average: Last 72 hrs:  (P) 190 9     - home diabetes regimen    Dysphagia, unspecified  Assessment & Plan  - PEG bolus tube Feeds  - Nutrition following for tube feed recommendations  - will continue Jevity 1 2 with q 4 150 mL free water flushes    Essential hypertension  Assessment & Plan  Hypotensive 1/11  - hold amlodipine and lisinopril  - holding parameters on home metoprolol 12 5 to hold for SBP < 110  - Will need to follow-up with PCP regarding the adjustment of antihypertensive medications after discharge    * COVID  Assessment & Plan  - COVID precautions            Disposition: continue on trauma service, med surg, anticipate d/c today with home health rehab set up per , wife will transport pt home      SUBJECTIVE:  Chief Complaint: Right sided rib pain    Subjective: Reports continued right sided rib pain, overall improving  Pulling 1000 on IS, remains stable on room air  Otherwise tolerating tube feeds w/o n/v, ambulating with PT who recommend home health rehab, continues to have bowel function        OBJECTIVE:     Meds/Allergies     Current Facility-Administered Medications:     acetaminophen (TYLENOL) oral suspension 975 mg, 975 mg, Per PEG Tube, Q8H, Last Bunch MD, 975 mg at 01/13/22 2232    aspirin (ECOTRIN LOW STRENGTH) EC tablet 81 mg, 81 mg, Oral, Daily, Miriam Hutton MD, 81 mg at 01/13/22 1111    atorvastatin (LIPITOR) tablet 20 mg, 20 mg, Per PEG Tube, Daily With Dinner, Last Bunch MD, 20 mg at 01/13/22 1525    buPROPion (WELLBUTRIN XL) 24 hr tablet 150 mg, 150 mg, Oral, Daily, Last Bunch MD, 150 mg at 01/13/22 1111    cholecalciferol (VITAMIN D3) tablet 1,000 Units, 1,000 Units, Per PEG Tube, Daily, Last Bunch MD, 1,000 Units at 01/13/22 1111    cyanocobalamin (VITAMIN B-12) tablet 1,000 mcg, 1,000 mcg, Per PEG Tube, Daily, Last Bunch MD, 1,000 mcg at 01/13/22 1110    dexamethasone (DECADRON) injection 6 mg, 6 mg, Intravenous, Q24H, Last Bunch MD, 6 mg at 01/13/22 1111    Empagliflozin TABS 25 mg, 1 tablet, Per PEG Tube, Daily, Last Bunch MD    enoxaparin (LOVENOX) subcutaneous injection 30 mg, 30 mg, Subcutaneous, Q12H [address], Last Bunch MD, 30 mg at 01/13/22 2227    FLUoxetine (PROzac) capsule 80 mg, 80 mg, Per PEG Tube, Daily, Last Bunch MD, 80 mg at 01/13/22 1110    fluticasone (FLONASE) 50 mcg/act nasal spray 1 spray, 1 spray, Nasal, Daily, Miriam Hutton MD, 1 spray at 01/13/22 1121    glycopyrrolate (ROBINUL) tablet 1 mg, 1 mg, Per PEG Tube, TID PRN, Ash Quintero MD, 1 mg at 01/12/22 2212    guaiFENesin (MUCINEX) 12 hr tablet 600 mg, 600 mg, Oral, Q12H [facility], Ash Quintero MD, 600 mg at 01/13/22 1126    HYDROmorphone HCl (DILAUDID) injection 0 2 mg, 0 2 mg, Intravenous, Q4H PRN, Jasmin Taylor MD    insulin lispro (HumaLOG) 100 units/mL subcutaneous injection 1-6 Units, 1-6 Units, Subcutaneous, Q6H, 1 Units at 01/14/22 0619 **AND** Fingerstick Glucose (POCT), , , Q6H, Jimenez Valerio MD    magnesium sulfate 2 g/50 mL IVPB (premix) 2 g, 2 g, Intravenous, Once, Jose D Conner MD    melatonin tablet 9 mg, 9 mg, Per PEG Tube, HS, Ash Quintero MD, 9 mg at 01/13/22 2227    metoprolol tartrate (LOPRESSOR) partial tablet 12 5 mg, 12 5 mg, Per PEG Tube, Q12H [facility], Ash Quintero MD, 12 5 mg at 01/13/22 2227    naloxone (NARCAN) 0 04 mg/mL syringe 0 04 mg, 0 04 mg, Intravenous, Q1MIN PRN, Jasmin Taylor MD    [facility]ansetron [facility]) injection 4 mg, 4 mg, Intravenous, Q4H PRN, Jasmin Taylor MD    oxyCODONE (ROXICODONE) IR tablet 2 5 mg, 2 5 mg, Per PEG Tube, Q4H PRN, Ash Quintero MD, 2 5 mg at 01/12/22 1825    oxyCODONE (ROXICODONE) IR tablet 5 mg, 5 mg, Per PEG Tube, Q4H PRN, Ash Quintero MD, 5 mg at 01/12/22 1326    pantoprazole (PROTONIX) injection 40 mg, 40 mg, Intravenous, Q24H [facility], Ash Quintero MD, 40 mg at 01/14/22 6756    prazosin (MINIPRESS) capsule 8 mg, 8 mg, Oral, HS, Ash Quintero MD, 8 mg at 01/13/22 2226    [COMPLETED] remdesivir (Veklury) 200 mg in sodium chloride 0 9 % 290 mL IVPB, 200 mg, Intravenous, Q24H, 200 mg at 01/11/22 1319 **FOLLOWED BY** remdesivir (Veklury) 100 mg in sodium chloride 0 9 % 270 mL IVPB, 100 mg, Intravenous, Q24H, Ash Quintero MD, Last Rate: 0 mL/hr at 01/12/22 1400, 100 mg at 01/13/22 1308    risperiDONE (RisperDAL M-TAB) disintegrating tablet 0 5 mg, 0 5 mg, Oral, TID, Ash Quintero MD, 0 5 mg at 01/13/22 1527    senna-docusate sodium (SENOKOT S) 8 6-50 mg per tablet 1 tablet, 1 tablet, Per PEG Tube, BID, Aj Reeves MD, 1 tablet at 01/13/22 1749    sodium chloride (PF) 0 9 % injection 10 mL, 10 mL, Intracatheter, Daily, Presley Bae MD, 10 mL at 01/13/22 1122     Vitals:   Vitals:    01/13/22 2230   BP: 141/71   Pulse:    Resp: 20   Temp: 98 2 °F (36 8 °C)   SpO2:        Intake/Output:  I/O       01/12 0701 01/13 0700 01/13 0701 01/14 0700 01/14 0701  01/15 0700    P  O   0     I V  400      NG/GT 60      Feedings 570      Total Intake 1030 0     Urine 725 1725     Drains 5 50     Total Output 730 1775     Net +300 -1775                   Nutrition/GI Proph/Bowel Reg: TFs/protonix/senna    Physical Exam:   General: NAD  Skin: Warm, dry, anicteric  HEENT: Normocephalic, atraumatic  CV: RRR, no m/r/g  Pulm: CTA b/l, no inc WOB, RA  Abd: Soft, ND/NT  MSK: R sided rib pain  Neuro: AOx3, GCS 15    PIC Score  PIC Pain Score: 3 (1/14/2022  4:16 AM)  PIC Incentive Spirometry Score: 2 (1/14/2022  4:16 AM)  PIC Cough Description: 3 (1/14/2022  4:16 AM)  PIC Total Score: 8 (1/14/2022  4:16 AM)       If the Total PIC Score </=5, did you consult APS and evaluate patient for further intervention?: yes      Pain:    Incentive Spirometry  Cough  3 = Controlled  4 = Above goal volume 3 = Strong  2 = Moderate  3 = Goal to alert volume 2 = Weak  1 = Severe  2 = Below alert volume 1 = Absent     1 = Unable to perform IS           Invasive Devices  Report    Peripheral Intravenous Line            Peripheral IV 01/11/22 Left Wrist 3 days    Peripheral IV 01/11/22 Right Wrist 3 days          Drain            Gastrostomy/Enterostomy PEG-jejunostomy LUQ -- days    Closed/Suction Drain RUQ Other (Comment)  8 days                 Lab Results:   Results: I have personally reviewed pertinent reports   , BMP/CMP:   Lab Results   Component Value Date    SODIUM 137 01/14/2022    K 4 6 01/14/2022     01/14/2022    CO2 29 01/14/2022    BUN 17 01/14/2022    CREATININE 0 55 (L) 01/14/2022    CALCIUM 8 2 (L) 01/14/2022 EGFR 102 01/14/2022    and CBC: No results found for: WBC, HGB, HCT, MCV, PLT, ADJUSTEDWBC, MCH, MCHC, RDW, MPV, NRBC  Imaging/EKG Studies: Results: I have personally reviewed pertinent reports      Other Studies:    VTE Prophylaxis: Sequential compression device (Venodyne)  and Enoxaparin (Lovenox)

## 2022-01-14 NOTE — PLAN OF CARE
Problem: PAIN - ADULT  Goal: Verbalizes/displays adequate comfort level or baseline comfort level  Description: Interventions:  - Encourage patient to monitor pain and request assistance  - Assess pain using appropriate pain scale  - Administer analgesics based on type and severity of pain and evaluate response  - Implement non-pharmacological measures as appropriate and evaluate response  - Consider cultural and social influences on pain and pain management  - Notify physician/advanced practitioner if interventions unsuccessful or patient reports new pain  Outcome: Progressing     Problem: INFECTION - ADULT  Goal: Absence or prevention of progression during hospitalization  Description: INTERVENTIONS:  - Assess and monitor for signs and symptoms of infection  - Monitor lab/diagnostic results  - Monitor all insertion sites, i e  indwelling lines, tubes, and drains  - Monitor endotracheal if appropriate and nasal secretions for changes in amount and color  - [place] appropriate cooling/warming therapies per order  - Administer medications as ordered  - Instruct and encourage patient and family to use good hand hygiene technique  - Identify and instruct in appropriate isolation precautions for identified infection/condition  Outcome: Progressing  Goal: Absence of fever/infection during neutropenic period  Description: INTERVENTIONS:  - Monitor WBC    Outcome: Progressing     Problem: DISCHARGE PLANNING  Goal: Discharge to home or other facility with appropriate resources  Description: INTERVENTIONS:  - Identify barriers to discharge w/patient and caregiver  - Arrange for needed discharge resources and transportation as appropriate  - Identify discharge learning needs (meds, wound care, etc )  - Arrange for interpretive services to assist at discharge as needed  - Refer to Case Management Department for coordinating discharge planning if the patient needs post-hospital services based on physician/advanced practitioner order or complex needs related to functional status, cognitive ability, or social support system  Outcome: Progressing     Problem: Knowledge Deficit  Goal: Patient/family/caregiver demonstrates understanding of disease process, treatment plan, medications, and discharge instructions  Description: Complete learning assessment and assess knowledge base  Interventions:  - Provide teaching at level of understanding  - Provide teaching via preferred learning methods  Outcome: Progressing     Problem: Nutrition/Hydration-ADULT  Goal: Nutrient/Hydration intake appropriate for improving, restoring or maintaining nutritional needs  Description: Monitor and assess patient's nutrition/hydration status for malnutrition  Collaborate with interdisciplinary team and initiate plan and interventions as ordered  Monitor patient's weight and dietary intake as ordered or per policy  Utilize nutrition screening tool and intervene as necessary  Determine patient's food preferences and provide high-protein, high-caloric foods as appropriate       INTERVENTIONS:  - Monitor oral intake, urinary output, labs, and treatment plans  - Assess nutrition and hydration status and recommend course of action  - Evaluate amount of meals eaten  - Assist patient with eating if necessary   - Allow adequate time for meals  - Recommend/ encourage appropriate diets, oral nutritional supplements, and vitamin/mineral supplements  - Order, calculate, and assess calorie counts as needed  - Recommend, monitor, and adjust tube feedings and TPN/PPN based on assessed needs  - Assess need for intravenous fluids  - Provide specific nutrition/hydration education as appropriate  - Include patient/family/caregiver in decisions related to nutrition  Outcome: Progressing

## 2022-01-16 LAB
BACTERIA BLD CULT: NORMAL
BACTERIA BLD CULT: NORMAL

## 2022-01-20 LAB
DME PARACHUTE DELIVERY DATE ACTUAL: NORMAL
DME PARACHUTE DELIVERY DATE REQUESTED: NORMAL
DME PARACHUTE ITEM DESCRIPTION: NORMAL
DME PARACHUTE ORDER STATUS: NORMAL
DME PARACHUTE SUPPLIER NAME: NORMAL
DME PARACHUTE SUPPLIER PHONE: NORMAL

## 2022-02-07 ENCOUNTER — CONSULT (OUTPATIENT)
Dept: ENDOCRINOLOGY | Facility: CLINIC | Age: 77
End: 2022-02-07
Payer: MEDICARE

## 2022-02-07 VITALS
HEART RATE: 52 BPM | DIASTOLIC BLOOD PRESSURE: 58 MMHG | HEIGHT: 74 IN | BODY MASS INDEX: 21.05 KG/M2 | SYSTOLIC BLOOD PRESSURE: 100 MMHG | WEIGHT: 164 LBS

## 2022-02-07 DIAGNOSIS — E05.90 SUBCLINICAL HYPERTHYROIDISM: ICD-10-CM

## 2022-02-07 DIAGNOSIS — E11.9 TYPE 2 DIABETES MELLITUS WITHOUT COMPLICATION, WITHOUT LONG-TERM CURRENT USE OF INSULIN (HCC): ICD-10-CM

## 2022-02-07 DIAGNOSIS — Z92.3 HISTORY OF HEAD AND NECK RADIATION: ICD-10-CM

## 2022-02-07 DIAGNOSIS — E04.1 THYROID NODULE: Primary | ICD-10-CM

## 2022-02-07 DIAGNOSIS — Z77.098 AGENT ORANGE EXPOSURE: ICD-10-CM

## 2022-02-07 PROCEDURE — 99204 OFFICE O/P NEW MOD 45 MIN: CPT | Performed by: STUDENT IN AN ORGANIZED HEALTH CARE EDUCATION/TRAINING PROGRAM

## 2022-02-07 RX ORDER — METFORMIN HYDROCHLORIDE 500 MG/5ML
500 SOLUTION ORAL 2 TIMES DAILY
COMMUNITY

## 2022-02-07 NOTE — ASSESSMENT & PLAN NOTE
I discussed the natural history, prevalence and recommended evaluation of thyroid nodules as outlined by the VINEET and AACE professional societies  The risk of malignancy in most thyroid nodules is low and the course of thyroid cancer in general is indolent, slow growing and responds well to therapy  Recommendations for biopsy are therefore based on the patient's risk factors for thyroid cancer, the size and ultrasonographic appearance of the nodules and the life expectancy and comorbidities of the patient  Since malignancy can develop in pre-existing nodules, ongoing ultrasound surveillance is recommended  Will plan to obtain dedicated thyroid ultrasound

## 2022-02-07 NOTE — PROGRESS NOTES
Kimberley Peres 78 y o  male MRN: [id number]    Encounter: [id number]      Assessment/Plan     Problem List Items Addressed This Visit        Endocrine    Type 2 diabetes mellitus without complication, without long-term current use of insulin ([address] )     Diabetes seems to be reasonably controlled on metformin monotherapy  Patient presently on bolus tube feeds  Advised checking blood sugars at scattering times  Provided pre-meal and post-meal glycemic targets  Gave patient a BG log to send me info if any concerns develop         Relevant Medications    Metformin HCl (RIOMET) 500 MG/5ML oral solution    Thyroid nodule - Primary     I discussed the natural history, prevalence and recommended evaluation of thyroid nodules as outlined by the VINEET and AACE professional societies  The risk of malignancy in most thyroid nodules is low and the course of thyroid cancer in general is indolent, slow growing and responds well to therapy  Recommendations for biopsy are therefore based on the patient's risk factors for thyroid cancer, the size and ultrasonographic appearance of the nodules and the life expectancy and comorbidities of the patient  Since malignancy can develop in pre-existing nodules, ongoing ultrasound surveillance is recommended  Will plan to obtain dedicated thyroid ultrasound  Relevant Orders    T4, free Lab Collect    TSH, 3rd generation Lab Collect    US thyroid    T3    Subclinical hyperthyroidism     More likely sick-euthyroidal illness  Will repeat thyroid labs prior to next visit         Relevant Orders    T4, free Lab Collect    TSH, 3rd generation Lab Collect    T3       Other    History of head and neck radiation     Can contribute to lifetime risk of thyroid nodules and hypothyroidism  However, would expect to occur months to years after treatment and not so shortly after treatment completion           Agent orange exposure     Risk factor for thyroid cancer             CC: Thyroid nodules    History of Present Illness     HPI:    Yonis Cox presents today for evaluation of incidental thyroid nodules found on CT scan  He is joined today by his wife who is assisting with elements of the history  Yonis Cox denies any personal history of thyroid disorder  He denies any family history of thyroid disorder or thyroid cancer  He reports having Agent Orange exposure  He has also received 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 which he completed in December after 3 months of therapy  He denies childhood exposure to head and neck radiation  He has also completed chemotherapy  He does report a history of lung nodules as well  He denies any hyper or hypothyroid symptoms, except for tremors  He denies any complaints such as enlarging neck or hoarseness of voice  He does receive bolus tube feeding  He denies taking biotin  Yonis Cox reports a 15-year history of diabetes, which is complicated by neuropathy  He follows with a podiatrist (Dr Yodit Mcbride) and sees an eye doctor through the [place]  He is presently on metformin monotherapy  He reports BID fingersticks  BG throughout the day never really above 120  He has 4 tube feeds sessions per day and uses a diabetes friendly solution  He also reports a history of bladder cancer  Review of Systems   Constitutional: Negative for fatigue and unexpected weight change  HENT: Positive for trouble swallowing  Negative for voice change  Eyes: Negative for visual disturbance  Respiratory: Negative for shortness of breath  Cardiovascular: Negative for chest pain, palpitations and leg swelling  Gastrointestinal: Negative for nausea and vomiting  Endocrine: Negative for cold intolerance and heat intolerance  Neurological: Positive for tremors  Psychiatric/Behavioral: Negative for agitation and behavioral problems         Historical Information   Past Medical History:   Diagnosis Date    [facility]     [facility]     Gastritis     GERD (gastroesophageal reflux disease)     History of bladder cancer     treated with surgery    Hypercholesterolemia     Hypertension     Hyponatremia 2021    Lactic acidosis 2021     Past Surgical History:   Procedure Laterality Date    BLADDER TUMOR EXCISION      EGD      IR CHOLECYSTOSTOMY TUBE CHECK/CHANGE/REPOSITION/REINSERTION/UPSIZE  2022    IR CHOLECYSTOSTOMY TUBE PLACEMENT  2021     Social History   Social History     Substance and Sexual Activity   Alcohol Use Never     Social History     Substance and Sexual Activity   Drug Use Never     Social History     Tobacco Use   Smoking Status Former Smoker    Types: Cigarettes    Quit date: 2021    Years since quittin 4   Smokeless Tobacco Never Used     Family History:   Family History   Problem Relation Age of Onset    Cancer Mother     Melanoma Neg Hx        Meds/Allergies   Current Outpatient Medications   Medication Sig Dispense Refill    apixaban (Eliquis) 5 mg Take 2 tablets (10 mg total) by mouth 2 (two) times a day for 5 days, THEN 1 tablet (5 mg total) 2 (two) times a day for 25 days   70 tablet 0    fluticasone (FLONASE) 50 mcg/act nasal spray 1 spray into each nostril daily      Metformin HCl (RIOMET) 500 MG/5ML oral solution Take 500 mg by mouth 2 (two) times a day      sodium chloride, PF, 0 9 % 10 mL by Intracatheter route daily Intracatheter flushing daily 300 mL 0    amLODIPine (NORVASC) 10 mg tablet Take 1 tablet (10 mg total) by mouth daily (Patient not taking: Reported on 2022 ) 30 tablet 0    aspirin (ECOTRIN LOW STRENGTH) 81 mg EC tablet Take 81 mg by mouth      Empagliflozin 25 MG TABS Take 1 tablet by mouth (Patient not taking: Reported on 2022 )      insulin glargine (LANTUS) 100 units/mL subcutaneous injection Inject under the skin (Patient not taking: Reported on 2022 )      metFORMIN (GLUCOPHAGE) 1000 MG tablet Take 1,000 mg by mouth (Patient not taking: Reported on 2/7/2022 )      METOPROLOL TARTRATE PO Take by mouth (Patient not taking: Reported on 2/7/2022 )      pantoprazole (PROTONIX) 20 mg tablet Take 20 mg by mouth daily (Patient not taking: Reported on 2/7/2022 )      ramipril (ALTACE) 10 MG capsule Take 10 mg by mouth (Patient not taking: Reported on 2/7/2022 )       No current facility-administered medications for this visit  No Known Allergies    Objective   Vitals: Blood pressure 100/58, pulse (!) 52, height 6' 2" (1 88 m), weight 74 4 kg (164 lb)  Physical Exam  Vitals reviewed  Constitutional:       General: He is not in acute distress  HENT:      Head: Normocephalic and atraumatic  Eyes:      General: No scleral icterus  Conjunctiva/sclera: Conjunctivae normal    Neck:      Thyroid: No thyromegaly or thyroid tenderness  Cardiovascular:      Rate and Rhythm: Normal rate and regular rhythm  Pulmonary:      Effort: Pulmonary effort is normal    Lymphadenopathy:      Cervical: No cervical adenopathy  Skin:     General: Skin is warm and dry  Neurological:      General: No focal deficit present  Mental Status: He is alert  Comments: Faint tremor to outstretched hands   Psychiatric:         Mood and Affect: Mood normal          Behavior: Behavior normal          The history was obtained from the review of the chart, patient and family  Lab Results:   Lab Results   Component Value Date/Time    TSH 3RD GENERATON 0 269 (L) 01/11/2022 10:07 AM    Free T4 1 19 01/11/2022 10:07 AM       Imaging Studies:      1/10/2022  CHEST WALL AND LOWER NECK: Bilateral thyroid nodules are present, measuring up to approximately 1 7 cm on the right    Incidental discovery of one or more thyroid nodule(s) measuring more than 1 5 cm is noted in this patient who is above 28years old;   according to guidelines published in the February 2015 white paper on incidental thyroid nodules in the Journal of the [employer] of Radiology Manuel Pisano), further characterization with thyroid ultrasound is recommended  I have personally reviewed pertinent reports  Portions of the record may have been created with voice recognition software  Occasional wrong word or "sound a like" substitutions may have occurred due to the inherent limitations of voice recognition software  Read the chart carefully and recognize, using context, where substitutions have occurred

## 2022-02-07 NOTE — PATIENT INSTRUCTIONS
Thyroid Labs in 3-4    Obtain thyroid US    Check sugars daily    Best times to monitor are on waking, before meals or bedtime  Ok to alternate different times of day    Goal blood sugar    <180 even after eating    Aim for 45g carbohydrates with meals  15-20g with snacks    Goal exercise is 30 minutes daily, most days of the week    Please have labs done before next visit    Bring your meter or logbook with you each visit    Return appointment 3 months

## 2022-02-07 NOTE — ASSESSMENT & PLAN NOTE
Can contribute to lifetime risk of thyroid nodules and hypothyroidism  However, would expect to occur months to years after treatment and not so shortly after treatment completion

## 2022-02-07 NOTE — ASSESSMENT & PLAN NOTE
Diabetes seems to be reasonably controlled on metformin monotherapy  Patient presently on bolus tube feeds  Advised checking blood sugars at scattering times  Provided pre-meal and post-meal glycemic targets   Gave patient a BG log to send me info if any concerns develop

## 2022-03-04 ENCOUNTER — HOSPITAL ENCOUNTER (OUTPATIENT)
Dept: INTERVENTIONAL RADIOLOGY/VASCULAR | Facility: HOSPITAL | Age: 77
Discharge: HOME | End: 2022-03-04
Attending: RADIOLOGY
Payer: MEDICARE

## 2022-03-04 DIAGNOSIS — K81.9 CHOLECYSTITIS: Primary | ICD-10-CM

## 2022-03-04 DIAGNOSIS — K81.9 CHOLECYSTITIS: ICD-10-CM

## 2022-03-04 PROCEDURE — C1769 GUIDE WIRE: HCPCS

## 2022-03-04 PROCEDURE — C1729 CATH, DRAINAGE: HCPCS

## 2022-03-04 PROCEDURE — 47531 INJECTION FOR CHOLANGIOGRAM: CPT

## 2022-03-04 PROCEDURE — 47536 EXCHANGE BILIARY DRG CATH: CPT | Performed by: RADIOLOGY

## 2022-03-04 RX ADMIN — IOHEXOL 15 ML: 350 INJECTION, SOLUTION INTRAVENOUS at 13:52

## 2022-03-04 NOTE — DISCHARGE INSTRUCTIONS
[address]  Interventional Radiology  Dr Chanel Signs: [phone] [address]: [phone]   Off hours or no answer/[phone] (Ask for IR on call)

## 2022-03-04 NOTE — BRIEF OP NOTE (RAD/CATH)
INTERVENTIONAL RADIOLOGY PROCEDURE NOTE    Date: 3/4/2022    Procedure: IR CHOLECYSTOSTOMY TUBE CHECK/CHANGE/REPOSITION/REINSERTION/UPSIZE    Preoperative diagnosis:   1  Cholecystitis         Postoperative diagnosis: Same  Surgeon: Portillo Vogt MD     Assistant: None  No qualified resident was available  Blood loss: 0    Specimens: 0     Findings:     Cystic duct patent    Tube exchanged and then capped  Will hold flushes    Instructions to family    Return in two weeks possible removal    Complications: None immediate      Anesthesia: local

## 2022-03-24 ENCOUNTER — HOSPITAL ENCOUNTER (OUTPATIENT)
Dept: INTERVENTIONAL RADIOLOGY/VASCULAR | Facility: HOSPITAL | Age: 77
Discharge: HOME | End: 2022-03-24
Attending: RADIOLOGY

## 2022-03-24 DIAGNOSIS — K81.9 CHOLECYSTITIS: ICD-10-CM

## 2022-03-24 PROCEDURE — NC001 PR NO CHARGE: Performed by: RADIOLOGY

## 2022-03-24 NOTE — PROCEDURES
Interventional Radiology Procedure Note    PATIENT NAME: Pratibha Ponce  : 1945  MRN: [id number]     Pre-op Diagnosis:   1  Cholecystitis      2  Status post capping trial   Asymptomatic  No pain, no fever, no leak    Post-op Diagnosis:   1  Cholecystitis      2  Same    Procedure:  Tube pull      Surgeon:   Kush Cardenas MD  Assistants:     No qualified resident was available, Resident is only observing    Estimated Blood Loss: Minimal     Findings:  No complications during capping trial   Tube removed in its entirety    Specimens: None     Complications:  None     Anesthesia: none    Kush Cardenas MD     Date: 3/24/2022  Time: 2:16 PM

## 2022-03-24 NOTE — DISCHARGE INSTRUCTIONS
[address]  Interventional Radiology  [phone]         Acute Wound Care   AMBULATORY CARE:   An acute wound  is an injury that causes a break in the skin  An acute wound can happen suddenly, last a short time, and may heal on its own  Common signs and symptoms of an acute wound:   · A cut, tear, or gash in your skin    · Bleeding, swelling, pain, or trouble moving the affected area    · Dirt or foreign objects inside the wound     · Milky, yellow, green, or brown pus in the wound     · Red, tender, or warm area around the pus    · Fever  Seek care immediately if:   · You have pus or a foul odor coming from the wound  · You have sudden trouble breathing or chest pain  · Blood soaks through your bandage  Contact your healthcare provider if:   · You have muscle, joint, or body aches, sweating, or a fever  · You have more swelling, redness, or bleeding in your wound  · Your skin is itchy, swollen, or you have a rash  · You have questions or concerns about your condition or care  Treatment for an acute wound  may include any of the following:  · Cleansing  is done with soap and water to wash away germs and decrease the risk of infection  Sterile water further cleans the wound  The cleaning is done under high pressure with a catheter tip and large syringe  A solution that kills germs may also be used  · Debridement  is done to clean and remove objects, dirt, or dead tissues from the open wound  Healthcare providers may also drain the wound to clean out pus  · Closure of the wound  is done with stitches, staples, skin adhesive, or other treatments  This may be done if the wound is wide or deep  Stitches may be needed if the wound is in an area that moves a lot, such as the hands, feet, and joints  Stitches may help to keep the wound from getting infected  They may also decrease the amount of scarring you have  Some wounds may heal better without stitches    Wound care:   · If your wound was closed with thin strips of medical tape, keep them clean and dry  The strips of medical tape will fall off on their own  Do not pull them off  · Keep the bandage clean and dry  Do not remove the bandage over your wound unless your healthcare provider says it is okay  · Wash your hands before and after you take care of your wound to prevent infection  · Clean the wound as directed  If you cannot reach the wound, have someone help you  · If you have packing, make sure all the gauze used to pack the wound is taken out and replaced as directed  Keep track of how many gauze dressings are placed inside the wound  Follow up with your healthcare provider as directed:  Write down your questions so you remember to ask them during your visits  © 2016 [address] is for End User's use only and may not be sold, redistributed or otherwise used for commercial purposes  All illustrations and images included in CareNotes® are the copyrighted property of A D [employer] M [employer]  or Venkata Cruz  The above information is an  only  It is not intended as medical advice for individual conditions or treatments  Talk to your doctor, nurse or pharmacist before following any medical regimen to see if it is safe and effective for you

## 2022-03-24 NOTE — ASSESSMENT & PLAN NOTE
Lab Results   Component Value Date    HGBA1C 6 3 (H) 12/22/2021       Recent Labs     12/24/21  1619 12/24/21  2250 12/25/21  0549 12/25/21  1134   POCGLU 230* 202* 259* 255*       Blood Sugar Average: Last 72 hrs:  (P) 187 [id number]  · Hold home hypoglycemic agents  · Sliding scale insulin   · Added long acting insulin as tolerates tube feeds   · Accu-Cheks Methotrexate Pregnancy And Lactation Text: This medication is Pregnancy Category X and is known to cause fetal harm. This medication is excreted in breast milk.

## 2022-05-26 ENCOUNTER — APPOINTMENT (OUTPATIENT)
Dept: LAB | Facility: HOSPITAL | Age: 77
End: 2022-05-26
Payer: MEDICARE

## 2022-05-26 ENCOUNTER — HOSPITAL ENCOUNTER (OUTPATIENT)
Dept: ULTRASOUND IMAGING | Facility: HOSPITAL | Age: 77
Discharge: HOME | End: 2022-05-26
Attending: STUDENT IN AN ORGANIZED HEALTH CARE EDUCATION/TRAINING PROGRAM
Payer: MEDICARE

## 2022-05-26 DIAGNOSIS — E04.1 THYROID NODULE: ICD-10-CM

## 2022-05-26 DIAGNOSIS — E05.90 SUBCLINICAL HYPERTHYROIDISM: ICD-10-CM

## 2022-05-26 DIAGNOSIS — R74.01 TRANSAMINITIS: ICD-10-CM

## 2022-05-26 LAB
ALBUMIN SERPL BCP-MCNC: 4.4 G/DL (ref 3.5–5)
ALP SERPL-CCNC: 67 U/L (ref 34–104)
ALT SERPL W P-5'-P-CCNC: 18 U/L (ref 7–52)
ANION GAP SERPL CALCULATED.3IONS-SCNC: 9 MMOL/L (ref 4–13)
AST SERPL W P-5'-P-CCNC: 15 U/L (ref 13–39)
BILIRUB SERPL-MCNC: 0.47 MG/DL (ref 0.2–1)
BUN SERPL-MCNC: 17 MG/DL (ref 5–25)
CALCIUM SERPL-MCNC: 10 MG/DL (ref 8.4–10.2)
CHLORIDE SERPL-SCNC: 101 MMOL/L (ref 96–108)
CO2 SERPL-SCNC: 28 MMOL/L (ref 21–32)
CREAT SERPL-MCNC: 1.19 MG/DL (ref 0.6–1.3)
GFR SERPL CREATININE-BSD FRML MDRD: 59 ML/MIN/1.73SQ M
GLUCOSE P FAST SERPL-MCNC: 118 MG/DL (ref 65–99)
POTASSIUM SERPL-SCNC: 4.5 MMOL/L (ref 3.5–5.3)
PROT SERPL-MCNC: 7.3 G/DL (ref 6.4–8.4)
SODIUM SERPL-SCNC: 138 MMOL/L (ref 135–147)
T3 SERPL-MCNC: 1 NG/ML (ref 0.6–1.8)
T4 FREE SERPL-MCNC: 1.09 NG/DL (ref 0.76–1.46)
TSH SERPL DL<=0.05 MIU/L-ACNC: 2.1 UIU/ML (ref 0.45–4.5)

## 2022-05-26 PROCEDURE — 84439 ASSAY OF FREE THYROXINE: CPT

## 2022-05-26 PROCEDURE — 36415 COLL VENOUS BLD VENIPUNCTURE: CPT

## 2022-05-26 PROCEDURE — 76536 US EXAM OF HEAD AND NECK: CPT

## 2022-05-26 PROCEDURE — 84480 ASSAY TRIIODOTHYRONINE (T3): CPT

## 2022-05-26 PROCEDURE — 84443 ASSAY THYROID STIM HORMONE: CPT

## 2022-05-26 PROCEDURE — 80053 COMPREHEN METABOLIC PANEL: CPT

## 2022-06-09 ENCOUNTER — OFFICE VISIT (OUTPATIENT)
Dept: ENDOCRINOLOGY | Facility: CLINIC | Age: 77
End: 2022-06-09
Payer: MEDICARE

## 2022-06-09 VITALS
SYSTOLIC BLOOD PRESSURE: 138 MMHG | DIASTOLIC BLOOD PRESSURE: 62 MMHG | BODY MASS INDEX: 22.97 KG/M2 | WEIGHT: 179 LBS | HEIGHT: 74 IN

## 2022-06-09 DIAGNOSIS — E11.9 TYPE 2 DIABETES MELLITUS WITHOUT COMPLICATION, WITHOUT LONG-TERM CURRENT USE OF INSULIN (HCC): ICD-10-CM

## 2022-06-09 DIAGNOSIS — Z92.3 HISTORY OF HEAD AND NECK RADIATION: ICD-10-CM

## 2022-06-09 DIAGNOSIS — Z77.098 AGENT ORANGE EXPOSURE: ICD-10-CM

## 2022-06-09 DIAGNOSIS — E04.1 THYROID NODULE: Primary | ICD-10-CM

## 2022-06-09 DIAGNOSIS — E05.90 SUBCLINICAL HYPERTHYROIDISM: ICD-10-CM

## 2022-06-09 PROCEDURE — 99214 OFFICE O/P EST MOD 30 MIN: CPT | Performed by: STUDENT IN AN ORGANIZED HEALTH CARE EDUCATION/TRAINING PROGRAM

## 2022-06-09 NOTE — ASSESSMENT & PLAN NOTE
No thyroid nodules meeting TR recommendations for biopsy   Will plan to repeat thyroid US in 12 months

## 2022-06-09 NOTE — PROGRESS NOTES
Lashawn Hutchison 78 y o  male MRN: [id number]    Encounter: [id number]      Assessment/Plan     Problem List Items Addressed This Visit        Endocrine    Type 2 diabetes mellitus without complication, without long-term current use of insulin ([address] )     Diabetes appears to be in sound control  Continue metformin therapy  Reviewed pharmacology of basal insulin and explained how PRN use is not adequate  Carlos Moeller was advised that he may contact me with concerns regarding his blood sugars and I provided him with a BG log in order to share values  Thyroid nodule - Primary     No thyroid nodules meeting TR recommendations for biopsy  Will plan to repeat thyroid US in 12 months           Relevant Orders    US thyroid    T4, free Lab Collect    TSH, 3rd generation Lab Collect    Subclinical hyperthyroidism     Resolved  Likely sick euthyroid               Other    History of head and neck radiation    Agent orange exposure        CC: Thyroid nodules    History of Present Illness     HPI:    Carlos Moeller returns today for follow up of thyroid nodules  His wife is with him today  Carlos Moeller is overall doing well  He has been gaining weight, which is good  He denies any hyper- or hypothyroid symptoms  He denies any neck complaints  He does have history of Agent Orange exposure and radiation exposure to head & neck  Regarding diabetes, Carlos Moeller checks fingersticks BID and they have been overall stable  He remains on metformin therapy, but occasionally takes lantus as needed for high blood sugars  He denies any hyperglycemic symptoms  Review of Systems   Constitutional: Negative for fatigue and unexpected weight change  HENT: Negative for voice change  Cardiovascular: Negative for palpitations  Gastrointestinal: Negative for nausea and vomiting  Endocrine: Negative for polydipsia and polyuria  Neurological: Negative for tremors         Historical Information   Past Medical History:   Diagnosis Date    Cancer ([facility] )     Diabetes ([facility] )     Gastritis     GERD (gastroesophageal reflux disease)     History of bladder cancer     treated with surgery    Hypercholesterolemia     Hypertension     Hyponatremia 2021    Lactic acidosis 2021     Past Surgical History:   Procedure Laterality Date    BLADDER TUMOR EXCISION      EGD      IR CHOLECYSTOSTOMY TUBE CHECK/CHANGE/REPOSITION/REINSERTION/UPSIZE  2022    IR CHOLECYSTOSTOMY TUBE CHECK/CHANGE/REPOSITION/REINSERTION/UPSIZE  3/4/2022    IR CHOLECYSTOSTOMY TUBE CHECK/CHANGE/REPOSITION/REINSERTION/UPSIZE  3/24/2022    IR CHOLECYSTOSTOMY TUBE PLACEMENT  2021     Social History   Social History     Substance and Sexual Activity   Alcohol Use Never     Social History     Substance and Sexual Activity   Drug Use Never     Social History     Tobacco Use   Smoking Status Former Smoker    Types: Cigarettes    Quit date: 2021    Years since quittin 7   Smokeless Tobacco Never Used     Family History:   Family History   Problem Relation Age of Onset    Cancer Mother     Melanoma Neg Hx        Meds/Allergies   Current Outpatient Medications   Medication Sig Dispense Refill    fluticasone (FLONASE) 50 mcg/act nasal spray 1 spray into each nostril daily      insulin glargine (LANTUS) 100 units/mL subcutaneous injection Inject 20 Units under the skin      Metformin HCl (RIOMET) 500 MG/5ML oral solution Take 500 mg by mouth 2 (two) times a day      ramipril (ALTACE) 10 MG capsule Take 10 mg by mouth      amLODIPine (NORVASC) 10 mg tablet Take 1 tablet (10 mg total) by mouth daily (Patient not taking: No sig reported) 30 tablet 0    apixaban (Eliquis) 5 mg Take 2 tablets (10 mg total) by mouth 2 (two) times a day for 5 days, THEN 1 tablet (5 mg total) 2 (two) times a day for 25 days   70 tablet 0    aspirin (ECOTRIN LOW STRENGTH) 81 mg EC tablet Take 81 mg by mouth      Empagliflozin 25 MG TABS Take 1 tablet by mouth (Patient not taking: No sig reported)      metFORMIN (GLUCOPHAGE) 1000 MG tablet Take 1,000 mg by mouth (Patient not taking: Reported on 6/9/2022)      METOPROLOL TARTRATE PO Take by mouth (Patient not taking: No sig reported)      pantoprazole (PROTONIX) 20 mg tablet Take 20 mg by mouth daily (Patient not taking: No sig reported)      sodium chloride, PF, 0 9 % 10 mL by Intracatheter route daily Intracatheter flushing daily 300 mL 0     No current facility-administered medications for this visit  No Known Allergies    Objective   Vitals: Blood pressure 138/62, height 6' 2" (1 88 m), weight 81 2 kg (179 lb)  Physical Exam  Vitals reviewed  Constitutional:       General: He is not in acute distress  HENT:      Head: Normocephalic and atraumatic  Eyes:      General: No scleral icterus  Conjunctiva/sclera: Conjunctivae normal    Neck:      Thyroid: No thyroid mass or thyromegaly  Cardiovascular:      Rate and Rhythm: Normal rate and regular rhythm  Pulmonary:      Effort: Pulmonary effort is normal    Musculoskeletal:      Cervical back: Neck supple  Skin:     General: Skin is warm and dry  Neurological:      General: No focal deficit present  Mental Status: He is alert  Psychiatric:         Mood and Affect: Mood normal          Behavior: Behavior normal          The history was obtained from the review of the chart, patient  Lab Results:   Lab Results   Component Value Date/Time    TSH 3RD GENERATON 2 104 05/26/2022 12:06 PM    TSH 3RD GENERATON 0 269 (L) 01/11/2022 10:07 AM    Free T4 1 09 05/26/2022 12:06 PM    Free T4 1 19 01/11/2022 10:07 AM     Lab Results   Component Value Date    HGBA1C 6 3 (H) 12/22/2021         Imaging Studies:   Results for orders placed during the hospital encounter of 05/26/22    US thyroid    Impression  No nodule meets current ACR criteria for requiring biopsy or followup ultrasounds          Reference: ACR Thyroid Imaging, Reporting and Data System (TI-RADS): White Paper of the [facility]  J AM Luly Radiol 1855;09:420-765  (additional recommendations based on American Thyroid Association 2015 guidelines )      Workstation performed: [id number]      I have personally reviewed pertinent reports  Portions of the record may have been created with voice recognition software  Occasional wrong word or "sound a like" substitutions may have occurred due to the inherent limitations of voice recognition software  Read the chart carefully and recognize, using context, where substitutions have occurred

## 2022-06-09 NOTE — ASSESSMENT & PLAN NOTE
Diabetes appears to be in sound control  Continue metformin therapy  Reviewed pharmacology of basal insulin and explained how PRN use is not adequate  Checo Sesay was advised that he may contact me with concerns regarding his blood sugars and I provided him with a BG log in order to share values

## 2022-06-09 NOTE — PATIENT INSTRUCTIONS
Thyroid ultrasound and labs in 1-year    Check sugars twice daily    Best times to monitor are on waking, before meals or bedtime  Ok to alternate different times of day    Goal Blood Sugars:   Premeal , even better <110  2hr after a meal <180, even better <140  A1C <7%, even better <6 5%      Aim for 45g carbohydrates with meals  15-20g with snacks    Goal exercise is 30 minutes daily, most days of the week    Please have labs done before next visit    Bring your meter or logbook with you each visit    Return appointment 3 months

## 2022-10-12 PROBLEM — J18.9 COMMUNITY ACQUIRED PNEUMONIA OF LEFT LOWER LOBE OF LUNG: Status: RESOLVED | Noted: 2021-12-22 | Resolved: 2022-10-12

## 2022-10-12 PROBLEM — A41.9 SEPSIS (HCC): Status: RESOLVED | Noted: 2021-12-22 | Resolved: 2022-10-12

## 2022-11-25 ENCOUNTER — APPOINTMENT (EMERGENCY)
Dept: RADIOLOGY | Facility: HOSPITAL | Age: 77
End: 2022-11-25

## 2022-11-25 ENCOUNTER — HOSPITAL ENCOUNTER (OUTPATIENT)
Facility: HOSPITAL | Age: 77
Setting detail: OBSERVATION
Discharge: HOME | End: 2022-11-26
Attending: EMERGENCY MEDICINE | Admitting: INTERNAL MEDICINE

## 2022-11-25 DIAGNOSIS — E83.42 HYPOMAGNESEMIA: ICD-10-CM

## 2022-11-25 DIAGNOSIS — E78.5 HYPERLIPIDEMIA, UNSPECIFIED HYPERLIPIDEMIA TYPE: ICD-10-CM

## 2022-11-25 DIAGNOSIS — R55 SYNCOPE: Primary | ICD-10-CM

## 2022-11-25 LAB
2HR DELTA HS TROPONIN: 2 NG/L
4HR DELTA HS TROPONIN: 2 NG/L
ALBUMIN SERPL BCP-MCNC: 3.6 G/DL (ref 3.5–5)
ALP SERPL-CCNC: 83 U/L (ref 34–104)
ALT SERPL W P-5'-P-CCNC: 17 U/L (ref 7–52)
ANION GAP SERPL CALCULATED.3IONS-SCNC: 11 MMOL/L (ref 4–13)
AST SERPL W P-5'-P-CCNC: 20 U/L (ref 13–39)
ATRIAL RATE: 84 BPM
BACTERIA UR QL AUTO: ABNORMAL /HPF
BASOPHILS # BLD AUTO: 0.03 THOUSANDS/ÂΜL (ref 0–0.1)
BASOPHILS NFR BLD AUTO: 0 % (ref 0–1)
BILIRUB SERPL-MCNC: 0.51 MG/DL (ref 0.2–1)
BILIRUB UR QL STRIP: NEGATIVE
BUN SERPL-MCNC: 20 MG/DL (ref 5–25)
CALCIUM SERPL-MCNC: 9.4 MG/DL (ref 8.4–10.2)
CARDIAC TROPONIN I PNL SERPL HS: 4 NG/L
CARDIAC TROPONIN I PNL SERPL HS: 6 NG/L
CARDIAC TROPONIN I PNL SERPL HS: 6 NG/L
CHLORIDE SERPL-SCNC: 102 MMOL/L (ref 96–108)
CLARITY UR: CLEAR
CO2 SERPL-SCNC: 26 MMOL/L (ref 21–32)
COLOR UR: YELLOW
CREAT SERPL-MCNC: 1.14 MG/DL (ref 0.6–1.3)
EOSINOPHIL # BLD AUTO: 0.05 THOUSAND/ÂΜL (ref 0–0.61)
EOSINOPHIL NFR BLD AUTO: 1 % (ref 0–6)
ERYTHROCYTE [DISTWIDTH] IN BLOOD BY AUTOMATED COUNT: 15.8 % (ref 11.6–15.1)
FINE GRAN CASTS URNS QL MICRO: ABNORMAL /LPF
FLUAV RNA RESP QL NAA+PROBE: NEGATIVE
FLUBV RNA RESP QL NAA+PROBE: NEGATIVE
GFR SERPL CREATININE-BSD FRML MDRD: 62 ML/MIN/1.73SQ M
GLUCOSE SERPL-MCNC: 109 MG/DL (ref 65–140)
GLUCOSE SERPL-MCNC: 123 MG/DL (ref 65–140)
GLUCOSE SERPL-MCNC: 150 MG/DL (ref 65–140)
GLUCOSE UR STRIP-MCNC: NEGATIVE MG/DL
HCT VFR BLD AUTO: 37.1 % (ref 36.5–49.3)
HGB BLD-MCNC: 11.2 G/DL (ref 12–17)
HGB UR QL STRIP.AUTO: ABNORMAL
HYALINE CASTS #/AREA URNS LPF: ABNORMAL /LPF
IMM GRANULOCYTES # BLD AUTO: 0.04 THOUSAND/UL (ref 0–0.2)
IMM GRANULOCYTES NFR BLD AUTO: 0 % (ref 0–2)
KETONES UR STRIP-MCNC: ABNORMAL MG/DL
LEUKOCYTE ESTERASE UR QL STRIP: NEGATIVE
LYMPHOCYTES # BLD AUTO: 0.47 THOUSANDS/ÂΜL (ref 0.6–4.47)
LYMPHOCYTES NFR BLD AUTO: 5 % (ref 14–44)
MAGNESIUM SERPL-MCNC: 1.3 MG/DL (ref 1.9–2.7)
MCH RBC QN AUTO: 26.4 PG (ref 26.8–34.3)
MCHC RBC AUTO-ENTMCNC: 30.2 G/DL (ref 31.4–37.4)
MCV RBC AUTO: 87 FL (ref 82–98)
MONOCYTES # BLD AUTO: 0.68 THOUSAND/ÂΜL (ref 0.17–1.22)
MONOCYTES NFR BLD AUTO: 7 % (ref 4–12)
MUCOUS THREADS UR QL AUTO: ABNORMAL
NEUTROPHILS # BLD AUTO: 7.99 THOUSANDS/ÂΜL (ref 1.85–7.62)
NEUTS SEG NFR BLD AUTO: 87 % (ref 43–75)
NITRITE UR QL STRIP: NEGATIVE
NON-SQ EPI CELLS URNS QL MICRO: ABNORMAL /HPF
NRBC BLD AUTO-RTO: 0 /100 WBCS
P AXIS: 63 DEGREES
PH UR STRIP.AUTO: 6 [PH]
PLATELET # BLD AUTO: 268 THOUSANDS/UL (ref 149–390)
PMV BLD AUTO: 9.1 FL (ref 8.9–12.7)
POTASSIUM SERPL-SCNC: 4.1 MMOL/L (ref 3.5–5.3)
PR INTERVAL: 170 MS
PROT SERPL-MCNC: 7 G/DL (ref 6.4–8.4)
PROT UR STRIP-MCNC: NEGATIVE MG/DL
QRS AXIS: 67 DEGREES
QRSD INTERVAL: 122 MS
QT INTERVAL: 392 MS
QTC INTERVAL: 463 MS
RBC # BLD AUTO: 4.25 MILLION/UL (ref 3.88–5.62)
RBC #/AREA URNS AUTO: ABNORMAL /HPF
RSV RNA RESP QL NAA+PROBE: NEGATIVE
SARS-COV-2 RNA RESP QL NAA+PROBE: NEGATIVE
SODIUM SERPL-SCNC: 139 MMOL/L (ref 135–147)
SP GR UR STRIP.AUTO: 1.02
T WAVE AXIS: 39 DEGREES
UROBILINOGEN UR QL STRIP.AUTO: 0.2 E.U./DL
VENTRICULAR RATE: 84 BPM
WBC # BLD AUTO: 9.26 THOUSAND/UL (ref 4.31–10.16)
WBC #/AREA URNS AUTO: ABNORMAL /HPF

## 2022-11-25 RX ORDER — INSULIN LISPRO 100 [IU]/ML
1-6 INJECTION, SOLUTION INTRAVENOUS; SUBCUTANEOUS
Status: DISCONTINUED | OUTPATIENT
Start: 2022-11-25 | End: 2022-11-26 | Stop reason: HOSPADM

## 2022-11-25 RX ORDER — FLUTICASONE PROPIONATE 50 MCG
1 SPRAY, SUSPENSION (ML) NASAL DAILY
Status: DISCONTINUED | OUTPATIENT
Start: 2022-11-26 | End: 2022-11-26 | Stop reason: HOSPADM

## 2022-11-25 RX ORDER — ENOXAPARIN SODIUM 100 MG/ML
40 INJECTION SUBCUTANEOUS DAILY
Status: DISCONTINUED | OUTPATIENT
Start: 2022-11-26 | End: 2022-11-26 | Stop reason: HOSPADM

## 2022-11-25 RX ORDER — ACETAMINOPHEN 325 MG/1
650 TABLET ORAL EVERY 4 HOURS PRN
Status: DISCONTINUED | OUTPATIENT
Start: 2022-11-25 | End: 2022-11-26 | Stop reason: HOSPADM

## 2022-11-25 RX ORDER — ONDANSETRON 2 MG/ML
4 INJECTION INTRAMUSCULAR; INTRAVENOUS EVERY 6 HOURS PRN
Status: DISCONTINUED | OUTPATIENT
Start: 2022-11-25 | End: 2022-11-26 | Stop reason: HOSPADM

## 2022-11-25 RX ORDER — ATORVASTATIN CALCIUM 20 MG/1
20 TABLET, FILM COATED ORAL
Status: DISCONTINUED | OUTPATIENT
Start: 2022-11-25 | End: 2022-11-26 | Stop reason: HOSPADM

## 2022-11-25 RX ORDER — INSULIN LISPRO 100 [IU]/ML
1-5 INJECTION, SOLUTION INTRAVENOUS; SUBCUTANEOUS
Status: DISCONTINUED | OUTPATIENT
Start: 2022-11-25 | End: 2022-11-26 | Stop reason: HOSPADM

## 2022-11-25 RX ORDER — MAGNESIUM SULFATE HEPTAHYDRATE 40 MG/ML
4 INJECTION, SOLUTION INTRAVENOUS ONCE
Status: COMPLETED | OUTPATIENT
Start: 2022-11-25 | End: 2022-11-25

## 2022-11-25 RX ORDER — SODIUM CHLORIDE 9 MG/ML
75 INJECTION, SOLUTION INTRAVENOUS CONTINUOUS
Status: DISCONTINUED | OUTPATIENT
Start: 2022-11-25 | End: 2022-11-26

## 2022-11-25 RX ADMIN — SODIUM CHLORIDE 75 ML/HR: 0.9 INJECTION, SOLUTION INTRAVENOUS at 18:04

## 2022-11-25 RX ADMIN — MAGNESIUM SULFATE HEPTAHYDRATE 4 G: 40 INJECTION, SOLUTION INTRAVENOUS at 15:09

## 2022-11-25 RX ADMIN — SODIUM CHLORIDE 1000 ML: 0.9 INJECTION, SOLUTION INTRAVENOUS at 15:09

## 2022-11-25 RX ADMIN — ATORVASTATIN CALCIUM 20 MG: 10 TABLET, FILM COATED ORAL at 17:45

## 2022-11-25 NOTE — ASSESSMENT & PLAN NOTE
Lab Results   Component Value Date    HGBA1C 6 3 (H) 12/22/2021       Recent Labs     11/25/22  1732   POCGLU 109       Blood Sugar Average: Last 72 hrs:  (P) 109     · Hold diabetic medications  · Insulin sliding scale with Accu-Cheks  · Diabetic diet  · Monitor blood glucose trends

## 2022-11-25 NOTE — H&P
Shayleeanel 47  H&P- [facility] 1945, 78 y o  male MRN: [id number]  Unit/Bed#: ED 15 Encounter: [id number]  Primary Care Provider: Carmen Damon MD   Date and time admitted to hospital: 11/25/2022  2:02 PM    * Syncope and collapse  Assessment & Plan  · The patient was in the ED with his wife who is here with for a significant injury when he was noted by his daughter to pass out  Patient's daughter is at the bedside currently and initially thought he was falling asleep however she states he slumped over and described a mild myoclonic jerk  The patient woke spontaneously was but was confused  Patient currently alert and oriented x3 states he has not had anything to eat or drink all day given he was in the ED with his wife after her accident  Patient's daughter reports patient drank soda all day yesterday when he normally drinks water  · Likely due to dehydration as patient had not eaten or drinking all day and noted to have a mildly elevated creatinine  · Patient received 2 L IV fluids in the emergency department and states he is now feeling better  · Chest x-ray pending  · Telemetry monitoring  · Orthostatic vitals  · Continue IV fluids  · Labs in a m  Hypomagnesemia  Assessment & Plan  · Patient noted to have a magnesium 1 3  · Patient received 4 g of IV magnesium in the emergency department  · Repeat Mag level in a m      Essential hypertension  Assessment & Plan  · Blood pressure currently elevated  · Patient reports being taken off all blood pressure medications  · Monitor blood pressure trends    Hyperlipemia  Assessment & Plan  · Continue home regimen of Lipitor    Type 2 diabetes mellitus without complication, without long-term current use of insulin [facility])  Assessment & Plan  Lab Results   Component Value Date    HGBA1C 6 3 (H) 12/22/2021       Recent Labs     11/25/22  1732   POCGLU 109       Blood Sugar Average: Last 72 hrs:  (P) 109     · Hold diabetic medications  · Insulin sliding scale with Accu-Cheks  · Diabetic diet  · Monitor blood glucose trends    VTE Pharmacologic Prophylaxis: VTE Score: 3 Moderate Risk (Score 3-4) - Pharmacological DVT Prophylaxis Ordered: enoxaparin (Lovenox)  Code Status: Level 1 - Full Code   Discussion with family: Updated  (daughter) at bedside  Anticipated Length of Stay: Patient will be admitted on an observation basis with an anticipated length of stay of less than 2 midnights secondary to Monitoring and workup of syncopal episode  Total Time for Visit, including Counseling / Coordination of Care: 60 minutes Greater than 50% of this total time spent on direct patient counseling and coordination of care  Chief Complaint:  Syncopal episode    History of Present Illness:  Lauren Bazzi is a 78 y o  male with a PMH of tongue cancer, diabetes, hyperlipidemia, hypertension, pulmonary embolism who was in the emergency department with his wife who had an accident  While at the bedside with his wife the patient's daughter noted the patient slumped forward and she described and mild myoclonic jerk  Patient spontaneously woke but was confused  The patient states that he has not eaten all day given his wife was cooking lunch when her injury occurred which prompted them to come to the emergency department  The patient states that he did eat well the day before  However the patient's daughter states he had several drinks diet soda when he normally drinks water  The patient's daughter states that she has noticed a great improvement of him since receiving the IV fluids and feels he is back to his baseline  The patient himself reports feeling well since having IV fluids  Patient is noted to have a magnesium level of 1 3  He denies any diarrhea  He denies any headaches, chest pain, abdominal pain, nausea/vomiting, or diarrhea  He denies any fevers or chills       Review of Systems:  Review of Systems Neurological: Positive for syncope  All other systems reviewed and are negative  Past Medical and Surgical History:   Past Medical History:   Diagnosis Date   • [facility] )    • Diabetes ([facility] )    • Gastritis    • GERD (gastroesophageal reflux disease)    • History of bladder cancer 2010    treated with surgery   • Hypercholesterolemia    • Hypertension    • Hyponatremia 12/22/2021   • Lactic acidosis 12/22/2021       Past Surgical History:   Procedure Laterality Date   • BLADDER TUMOR EXCISION     • EGD     • IR CHOLECYSTOSTOMY TUBE CHECK/CHANGE/REPOSITION/REINSERTION/UPSIZE  1/5/2022   • IR CHOLECYSTOSTOMY TUBE CHECK/CHANGE/REPOSITION/REINSERTION/UPSIZE  3/4/2022   • IR CHOLECYSTOSTOMY TUBE CHECK/CHANGE/REPOSITION/REINSERTION/UPSIZE  3/24/2022   • IR CHOLECYSTOSTOMY TUBE PLACEMENT  12/27/2021       Meds/Allergies:  Prior to Admission medications    Medication Sig Start Date End Date Taking? Authorizing Provider   apixaban (Eliquis) 5 mg Take 2 tablets (10 mg total) by mouth 2 (two) times a day for 5 days, THEN 1 tablet (5 mg total) 2 (two) times a day for 25 days   12/29/21 2/7/22  Delmy Perdomo PA-C   Empagliflozin 25 MG TABS Take 1 tablet by mouth  Patient not taking: No sig reported    Historical Provider, MD   fluticasone (FLONASE) 50 mcg/act nasal spray 1 spray into each nostril daily    Historical Provider, MD   insulin glargine (LANTUS) 100 units/mL subcutaneous injection Inject 20 Units under the skin    Historical Provider, MD   Metformin HCl (RIOMET) 500 MG/5ML oral solution Take 500 mg by mouth 2 (two) times a day    Historical Provider, MD   sodium chloride, PF, 0 9 % 10 mL by Intracatheter route daily Intracatheter flushing daily 12/30/21 3/30/22  Delmy Perdomo PA-C   amLODIPine (NORVASC) 10 mg tablet Take 1 tablet (10 mg total) by mouth daily  Patient not taking: No sig reported 12/31/21 11/25/22  Delmy Perdomo PA-C   aspirin (ECOTRIN LOW STRENGTH) 81 mg EC tablet Take 81 mg by mouth 22  Historical Provider, MD   metFORMIN (GLUCOPHAGE) 1000 MG tablet Take 1,000 mg by mouth  Patient not taking: Reported on 22  Historical Provider, MD   METOPROLOL TARTRATE PO Take by mouth  Patient not taking: No sig reported  22  Historical Provider, MD   pantoprazole (PROTONIX) 20 mg tablet Take 20 mg by mouth daily  Patient not taking: No sig reported  22  Historical Provider, MD   ramipril (ALTACE) 10 MG capsule Take 10 mg by mouth  22  Historical Provider, MD     I have reviewed home medications with patient personally  Allergies: No Known Allergies    Social History:  Marital Status: /Civil Union   Occupation: unknown  Patient Pre-hospital Living Situation: Home  Patient Pre-hospital Level of Mobility: walks  Patient Pre-hospital Diet Restrictions: none  Substance Use History:   Social History     Substance and Sexual Activity   Alcohol Use Never     Social History     Tobacco Use   Smoking Status Former   • Types: Cigarettes   • Quit date: 2021   • Years since quittin 2   Smokeless Tobacco Never     Social History     Substance and Sexual Activity   Drug Use Never       Family History:  Family History   Problem Relation Age of Onset   • Cancer Mother    • Melanoma Neg Hx        Physical Exam:     Vitals:   Blood Pressure: (!) 175/92 (22 1726)  Pulse: 81 (22 1726)  Temperature: (!) 97 2 °F (36 2 °C) (22 1418)  Temp Source: Temporal (22 141)  Respirations: 20 (22 1726)  Height: 6' 2" (188 cm) (22 1418)  Weight - Scale: 75 8 kg (167 lb) (22 1418)  SpO2: 94 % (22 1530)    Physical Exam  Vitals and nursing note reviewed  Constitutional:       General: He is awake  Appearance: Normal appearance  HENT:      Head: Normocephalic and atraumatic  Cardiovascular:      Rate and Rhythm: Normal rate and regular rhythm  Heart sounds: Normal heart sounds     Pulmonary:      Effort: Pulmonary effort is normal       Breath sounds: Normal breath sounds  Abdominal:      Palpations: Abdomen is soft  Tenderness: There is no abdominal tenderness  Skin:     General: Skin is warm and dry  Neurological:      General: No focal deficit present  Mental Status: He is alert and oriented to person, place, and time  Psychiatric:         Attention and Perception: Attention normal          Mood and Affect: Mood normal          Speech: Speech normal          Behavior: Behavior is cooperative  Additional Data:     Lab Results:  Results from last 7 days   Lab Units 11/25/22  1428   WBC Thousand/uL 9 26   HEMOGLOBIN g/dL 11 2*   HEMATOCRIT % 37 1   PLATELETS Thousands/uL 268   NEUTROS PCT % 87*   LYMPHS PCT % 5*   MONOS PCT % 7   EOS PCT % 1     Results from last 7 days   Lab Units 11/25/22  1428   SODIUM mmol/L 139   POTASSIUM mmol/L 4 1   CHLORIDE mmol/L 102   CO2 mmol/L 26   BUN mg/dL 20   CREATININE mg/dL 1 14   ANION GAP mmol/L 11   CALCIUM mg/dL 9 4   ALBUMIN g/dL 3 6   TOTAL BILIRUBIN mg/dL 0 51   ALK PHOS U/L 83   ALT U/L 17   AST U/L 20   GLUCOSE RANDOM mg/dL 150*         Results from last 7 days   Lab Units 11/25/22  1732   POC GLUCOSE mg/dl 109               Lines/Drains:  Invasive Devices     Peripheral Intravenous Line  Duration           Peripheral IV 11/25/22 Proximal;Right;Ventral (anterior) Forearm <1 day          Drain  Duration           Gastrostomy/Enterostomy PEG-jejunostomy LUQ -- days                    Imaging: No pertinent imaging reviewed  XR chest 1 view portable    (Results Pending)       EKG and Other Studies Reviewed on Admission:   · EKG: NSR  HR 84     ** Please Note: This note has been constructed using a voice recognition system   **

## 2022-11-25 NOTE — ASSESSMENT & PLAN NOTE
· Blood pressure currently elevated  · Patient reports being taken off all blood pressure medications  · Monitor blood pressure trends

## 2022-11-25 NOTE — ASSESSMENT & PLAN NOTE
· The patient was in the ED with his wife who is here with for a significant injury when he was noted by his daughter to pass out  Patient's daughter is at the bedside currently and initially thought he was falling asleep however she states he slumped over and described a mild myoclonic jerk  The patient woke spontaneously was but was confused  Patient currently alert and oriented x3 states he has not had anything to eat or drink all day given he was in the ED with his wife after her accident  Patient's daughter reports patient drank soda all day yesterday when he normally drinks water  · Likely due to dehydration as patient had not eaten or drinking all day and noted to have a mildly elevated creatinine  · Patient received 2 L IV fluids in the emergency department and states he is now feeling better  · Chest x-ray pending  · Telemetry monitoring  · Orthostatic vitals  · Continue IV fluids  · Labs in a m

## 2022-11-25 NOTE — ED PROVIDER NOTES
History  Chief Complaint   Patient presents with   • Syncope     C/o near syncope while sitting on the chair and mumbling  Pt is diaphoretic  Pt denies dizziness      72-year-old male presents with syncopal episode while in the emergency department  Patient was visiting his wife who was being seen for significant injury which she required transfer to [place], several minutes after hearing this patient had a witnessed syncopal episode  His daughter was in the room and described him slumping forward, then having several myoclonic jerks, T woke spontaneously  Did not have any confusion afterwards  Patient has a history of tongue cancer status post radiation treatment, patient states he has been declining since being treated for cancer  He also recently had a right-sided pleural effusion which was drained at [place]  He denies chest pain or shortness of breath  He does report decreased appetite, decreased hydration over last several days  Syncope  Associated symptoms: no chest pain, no fever, no nausea, no vomiting and no weakness        Prior to Admission Medications   Prescriptions Last Dose Informant Patient Reported? Taking? Empagliflozin 25 MG TABS Not Taking  Yes No   Sig: Take 1 tablet by mouth   Patient not taking: Reported on 2022   Metformin HCl (RIOMET) 500 MG/5ML oral solution 2022  Yes Yes   Sig: Take 500 mg by mouth 2 (two) times a day   apixaban (Eliquis) 5 mg   No No   Sig: Take 2 tablets (10 mg total) by mouth 2 (two) times a day for 5 days, THEN 1 tablet (5 mg total) 2 (two) times a day for 25 days     fluticasone (FLONASE) 50 mcg/act nasal spray Past Week  Yes Yes   Si spray into each nostril daily   insulin glargine (LANTUS) 100 units/mL subcutaneous injection Not Taking  Yes No   Sig: Inject 20 Units under the skin   Patient not taking: Reported on 2022   sodium chloride, PF, 0 9 %   No No   Sig: 10 mL by Intracatheter route daily Intracatheter flushing daily Facility-Administered Medications: None       Past Medical History:   Diagnosis Date   • Cancer ([facility] [address] )    • [facility] )    • Gastritis    • GERD (gastroesophageal reflux disease)    • History of bladder cancer 2010    treated with surgery   • Hypercholesterolemia    • Hypertension    • Hyponatremia 2021   • Lactic acidosis 2021       Past Surgical History:   Procedure Laterality Date   • BLADDER TUMOR EXCISION     • EGD     • IR CHOLECYSTOSTOMY TUBE CHECK/CHANGE/REPOSITION/REINSERTION/UPSIZE  2022   • IR CHOLECYSTOSTOMY TUBE CHECK/CHANGE/REPOSITION/REINSERTION/UPSIZE  3/4/2022   • IR CHOLECYSTOSTOMY TUBE CHECK/CHANGE/REPOSITION/REINSERTION/UPSIZE  3/24/2022   • IR CHOLECYSTOSTOMY TUBE PLACEMENT  2021       Family History   Problem Relation Age of Onset   • Cancer Mother    • Melanoma Neg Hx      I have reviewed and agree with the history as documented  E-Cigarette/Vaping   • E-Cigarette Use Never User      E-Cigarette/Vaping Substances   • Nicotine No    • THC No    • CBD No    • Flavoring No    • Other No    • Unknown No      Social History     Tobacco Use   • Smoking status: Former     Types: Cigarettes     Quit date: 2021     Years since quittin 2     Passive exposure: Past   • Smokeless tobacco: Never   Vaping Use   • Vaping Use: Never used   Substance Use Topics   • Alcohol use: Never   • Drug use: Never       Review of Systems   Constitutional: Positive for fatigue  Negative for chills and fever  HENT: Negative for congestion, nosebleeds, rhinorrhea and sore throat  Eyes: Negative for pain and visual disturbance  Respiratory: Negative for cough and wheezing  Cardiovascular: Positive for syncope  Negative for chest pain and leg swelling  Gastrointestinal: Negative for abdominal distention, abdominal pain, diarrhea, nausea and vomiting  Genitourinary: Negative for dysuria and frequency  Musculoskeletal: Negative for back pain and joint swelling     Skin: Negative for rash and wound  Neurological: Positive for syncope  Negative for weakness and numbness  Psychiatric/Behavioral: Negative for decreased concentration and suicidal ideas  Physical Exam  Physical Exam  Vitals and nursing note reviewed  Constitutional:       Appearance: He is well-developed and well-nourished  Comments: Chronically ill appearing   HENT:      Head: Normocephalic and atraumatic  Mouth/Throat:      Mouth: Oropharynx is clear and moist    Eyes:      Extraocular Movements: EOM normal       Conjunctiva/sclera: Conjunctivae normal       Pupils: Pupils are equal, round, and reactive to light  Neck:      Trachea: No tracheal deviation  Cardiovascular:      Rate and Rhythm: Normal rate and regular rhythm  Heart sounds: Normal heart sounds  No murmur heard  Pulmonary:      Effort: Pulmonary effort is normal  No respiratory distress  Breath sounds: No wheezing or rales  Comments: Decreased right lower lung sounds  Abdominal:      General: Bowel sounds are normal  There is no distension  Palpations: Abdomen is soft  Tenderness: There is no abdominal tenderness  Musculoskeletal:         General: No deformity or edema  Cervical back: Normal range of motion and neck supple  Skin:     General: Skin is warm and dry  Capillary Refill: Capillary refill takes less than 2 seconds  Neurological:      Mental Status: He is alert and oriented to person, place, and time  Sensory: No sensory deficit     Psychiatric:         Mood and Affect: Mood and affect normal          Judgment: Judgment normal          Vital Signs  ED Triage Vitals [11/25/22 1418]   Temperature Pulse Respirations Blood Pressure SpO2   (!) 97 2 °F (36 2 °C) 83 18 126/73 95 %      Temp Source Heart Rate Source Patient Position - Orthostatic VS BP Location FiO2 (%)   Temporal Monitor Lying Left arm --      Pain Score       No Pain           Vitals:    11/25/22 1830 11/25/22 1914 11/25/22 2252 11/26/22 0301   BP: 145/65 120/80 134/73 126/67   Pulse: 81 81 63 63   Patient Position - Orthostatic VS: Lying Sitting  Lying         Visual Acuity  Visual Acuity    Flowsheet Row Most Recent Value   L Pupil Size (mm) 3   R Pupil Size (mm) 3   L Pupil Shape Round   R Pupil Shape Round          ED Medications  Medications   fluticasone (FLONASE) 50 mcg/act nasal spray 1 spray (has no administration in time range)   atorvastatin (LIPITOR) tablet 20 mg (20 mg Oral Given 11/25/22 1745)   acetaminophen (TYLENOL) tablet 650 mg (has no administration in time range)   ondansetron (ZOFRAN) injection 4 mg (has no administration in time range)   enoxaparin (LOVENOX) subcutaneous injection 40 mg (has no administration in time range)   insulin lispro (HumaLOG) 100 units/mL subcutaneous injection 1-6 Units (1 Units Subcutaneous Not Given 11/25/22 1733)   insulin lispro (HumaLOG) 100 units/mL subcutaneous injection 1-5 Units (1 Units Subcutaneous Not Given 11/25/22 2146)   sodium chloride 0 9 % infusion (75 mL/hr Intravenous New Bag 11/26/22 0640)   sodium chloride 0 9 % bolus 1,000 mL (0 mL Intravenous Stopped 11/25/22 1700)   magnesium sulfate 4 g/100 mL IVPB (premix) 4 g (0 g Intravenous Stopped 11/25/22 1859)   sodium chloride 0 9 % bolus 1,000 mL (0 mL Intravenous Stopped 11/25/22 1700)       Diagnostic Studies  Results Reviewed     Procedure Component Value Units Date/Time    Comprehensive metabolic panel [[id number]]  (Abnormal) Collected: 11/26/22 0441    Lab Status: Final result Specimen: Blood from Arm, Left Updated: 11/26/22 0522     Sodium 138 mmol/L      Potassium 4 0 mmol/L      Chloride 106 mmol/L      CO2 23 mmol/L      ANION GAP 9 mmol/L      BUN 18 mg/dL      Creatinine 0 87 mg/dL      Glucose 76 mg/dL      Glucose, Fasting 76 mg/dL      Calcium 8 3 mg/dL      Corrected Calcium 9 2 mg/dL      AST 17 U/L      ALT 14 U/L      Alkaline Phosphatase 62 U/L      Total Protein 5 7 g/dL      Albumin 2 9 g/dL      Total Bilirubin 0 31 mg/dL      eGFR 84 ml/min/1 73sq m     Narrative:      [place] guidelines for Chronic Kidney Disease (CKD):   •  Stage 1 with normal or high GFR (GFR > 90 mL/min/1 73 square meters)  •  Stage 2 Mild CKD (GFR = 60-89 mL/min/1 73 square meters)  •  Stage 3A Moderate CKD (GFR = 45-59 mL/min/1 73 square meters)  •  Stage 3B Moderate CKD (GFR = 30-44 mL/min/1 73 square meters)  •  Stage 4 Severe CKD (GFR = 15-29 mL/min/1 73 square meters)  •  Stage 5 End Stage CKD (GFR <15 mL/min/1 73 square meters)  Note: GFR calculation is accurate only with a steady state creatinine    Magnesium [[id number]]  (Normal) Collected: 11/26/22 0441    Lab Status: Final result Specimen: Blood from Arm, Left Updated: 11/26/22 0522     Magnesium 2 0 mg/dL     CBC [[id number]]  (Abnormal) Collected: 11/26/22 0441    Lab Status: Final result Specimen: Blood from Arm, Left Updated: 11/26/22 0503     WBC 5 91 Thousand/uL      RBC 3 59 Million/uL      Hemoglobin 9 5 g/dL      Hematocrit 30 7 %      MCV 86 fL      MCH 26 5 pg      MCHC 30 9 g/dL      RDW 15 8 %      Platelets 032 Thousands/uL      MPV 9 2 fL     HS Troponin I 4hr [[id number]]  (Normal) Collected: 11/25/22 1853    Lab Status: Final result Specimen: Blood from Arm, Right Updated: 11/25/22 1922     hs TnI 4hr 6 ng/L      Delta 4hr hsTnI 2 ng/L     HS Troponin I 2hr [[id number]]  (Normal) Collected: 11/25/22 1719    Lab Status: Final result Specimen: Blood from Arm, Right Updated: 11/25/22 1752     hs TnI 2hr 6 ng/L      Delta 2hr hsTnI 2 ng/L     Urine Microscopic [[id number]]  (Abnormal) Collected: 11/25/22 1715    Lab Status: Final result Specimen: Urine, Clean Catch Updated: 11/25/22 1738     RBC, UA 0-1 /hpf      WBC, UA 0-1 /hpf      Epithelial Cells Occasional /hpf      Bacteria, UA None Seen /hpf      Hyaline Casts, UA 0-1 /lpf      Fine granular casts 0-1 /lpf      MUCUS THREADS Occasional    Fingerstick Glucose (POCT) [[id number]]  (Normal) Collected: 11/25/22 1732    Lab Status: Final result Updated: 11/25/22 1734     POC Glucose 109 mg/dl     UA w Reflex to Microscopic w Reflex to Culture [[id number]]  (Abnormal) Collected: 11/25/22 1715    Lab Status: Final result Specimen: Urine, Clean Catch Updated: 11/25/22 1720     Color, UA Yellow     Clarity, UA Clear     Specific [place], UA 1 020     pH, UA 6 0     Leukocytes, UA Negative     Nitrite, UA Negative     Protein, UA Negative mg/dl      Glucose, UA Negative mg/dl      Ketones, UA Trace mg/dl      Urobilinogen, UA 0 2 E U /dl      Bilirubin, UA Negative     Occult Blood, UA Trace-Intact    HS Troponin 0hr (reflex protocol) [[id number]]  (Normal) Collected: 11/25/22 1428    Lab Status: Final result Specimen: Blood from Arm, Right Updated: 11/25/22 1527     hs TnI 0hr 4 ng/L     FLU/RSV/COVID - if FLU/RSV clinically relevant [[id number]]  (Normal) Collected: 11/25/22 1428    Lab Status: Final result Specimen: Nares from Nose Updated: 11/25/22 1518     SARS-CoV-2 Negative     INFLUENZA A PCR Negative     INFLUENZA B PCR Negative     RSV PCR Negative    Narrative:      FOR PEDIATRIC PATIENTS - copy/paste COVID Guidelines URL to browser: [email or web address]    SARS-CoV-2 assay is a Nucleic Acid Amplification assay intended for the  qualitative detection of nucleic acid from SARS-CoV-2 in nasopharyngeal  swabs  Results are for the presumptive identification of SARS-CoV-2 RNA  Positive results are indicative of infection with SARS-CoV-2, the virus  causing COVID-19, but do not rule out bacterial infection or co-infection  with other viruses  Laboratories within the [place] and its  territories are required to report all positive results to the appropriate  public health authorities   Negative results do not preclude SARS-CoV-2  infection and should not be used as the sole basis for treatment or other  patient management decisions  Negative results must be combined with  clinical observations, patient history, and epidemiological information  This test has not been FDA cleared or approved  This test has been authorized by FDA under an Emergency Use Authorization  (EUA)  This test is only authorized for the duration of time the  declaration that circumstances exist justifying the authorization of the  emergency use of an in vitro diagnostic tests for detection of SARS-CoV-2  virus and/or diagnosis of COVID-19 infection under section 564(b)(1) of  the Act, 21 U  S C  177BGV-5(G)(0), unless the authorization is terminated  or revoked sooner  The test has been validated but independent review by FDA  and CLIA is pending  Test performed using LMN-1 GeneXpert: This RT-PCR assay targets N2,  a region unique to SARS-CoV-2  A conserved region in the E-gene was chosen  for pan-Sarbecovirus detection which includes SARS-CoV-2  According to CMS-2020-01-R, this platform meets the definition of high-throughput technology      Comprehensive metabolic panel [[id number]]  (Abnormal) Collected: 11/25/22 1428    Lab Status: Final result Specimen: Blood from Arm, Right Updated: 11/25/22 1455     Sodium 139 mmol/L      Potassium 4 1 mmol/L      Chloride 102 mmol/L      CO2 26 mmol/L      ANION GAP 11 mmol/L      BUN 20 mg/dL      Creatinine 1 14 mg/dL      Glucose 150 mg/dL      Calcium 9 4 mg/dL      AST 20 U/L      ALT 17 U/L      Alkaline Phosphatase 83 U/L      Total Protein 7 0 g/dL      Albumin 3 6 g/dL      Total Bilirubin 0 51 mg/dL      eGFR 62 ml/min/1 73sq m     Narrative:      [place] guidelines for Chronic Kidney Disease (CKD):   •  Stage 1 with normal or high GFR (GFR > 90 mL/min/1 73 square meters)  •  Stage 2 Mild CKD (GFR = 60-89 mL/min/1 73 square meters)  •  Stage 3A Moderate CKD (GFR = 45-59 mL/min/1 73 square meters)  •  Stage 3B Moderate CKD (GFR = 30-44 mL/min/1 73 square meters)  •  Stage 4 Severe CKD (GFR = 15-29 mL/min/1 73 square meters)  •  Stage 5 End Stage CKD (GFR <15 mL/min/1 73 square meters)  Note: GFR calculation is accurate only with a steady state creatinine    Magnesium [[id number]]  (Abnormal) Collected: 11/25/22 1428    Lab Status: Final result Specimen: Blood from Arm, Right Updated: 11/25/22 1455     Magnesium 1 3 mg/dL     CBC and differential [[id number]]  (Abnormal) Collected: 11/25/22 1428    Lab Status: Final result Specimen: Blood from Arm, Right Updated: 11/25/22 1433     WBC 9 26 Thousand/uL      RBC 4 25 Million/uL      Hemoglobin 11 2 g/dL      Hematocrit 37 1 %      MCV 87 fL      MCH 26 4 pg      MCHC 30 2 g/dL      RDW 15 8 %      MPV 9 1 fL      Platelets 010 Thousands/uL      nRBC 0 /100 WBCs      Neutrophils Relative 87 %      Immat GRANS % 0 %      Lymphocytes Relative 5 %      Monocytes Relative 7 %      Eosinophils Relative 1 %      Basophils Relative 0 %      Neutrophils Absolute 7 99 Thousands/µL      Immature Grans Absolute 0 04 Thousand/uL      Lymphocytes Absolute 0 47 Thousands/µL      Monocytes Absolute 0 68 Thousand/µL      Eosinophils Absolute 0 05 Thousand/µL      Basophils Absolute 0 03 Thousands/µL                  XR chest 1 view portable   Final Result by Stephie Montoya MD (11/25 2031)      Moderate to large right pleural effusion with compressive atelectasis versus infiltrate in the right lung  Workstation performed: [id number]                    Procedures  Procedures         ED Course                               SBIRT 20yo+    Flowsheet Row Most Recent Value   SBIRT (23 yo +)    In order to provide better care to our patients, we are screening all of our patients for alcohol and drug use  Would it be okay to ask you these screening questions? Yes Filed at: 11/25/2022 1727   Initial Alcohol Screen: US AUDIT-C     1  How often do you have a drink containing alcohol? 0 Filed at: 11/25/2022 1727   2   How many drinks containing alcohol do you have on a typical day you are drinking? 0 Filed at: 11/25/2022 1727   3a  Male UNDER 65: How often do you have five or more drinks on one occasion? 0 Filed at: 11/25/2022 1727   3b  FEMALE Any Age, or MALE 65+: How often do you have 4 or more drinks on one occassion? 0 Filed at: 11/25/2022 1727   Audit-C Score 0 Filed at: 11/25/2022 1727   MARKO: How many times in the past year have you    Used an illegal drug or used a prescription medication for non-medical reasons? Never Filed at: 11/25/2022 1727                    MDM  Number of Diagnoses or Management Options  Hypomagnesemia: new and requires workup  Syncope: new and requires workup  Diagnosis management comments: Patient with the syncopal episode, EKG reassuring, no symptoms to suggest ACS  No prolonged QT/QRS  Magnesium is 1 3, will require repletion, cardiac monitoring for arrhythmia    He has residual right-sided pleural effusion, no intervention required at this time  I do not believe this is contributing to his symptoms  History not consistent with PE  No hemodynamically instability  No SOB/CP      While his symptoms are likely related to emotional stress, in the setting of hypomagnesemia, frail constitution, and lack of monitoring at home, will admit patient overnight for cardiac monitoring and electrolyte repletion       Amount and/or Complexity of Data Reviewed  Review and summarize past medical records: yes  Independent visualization of images, tracings, or specimens: yes    Risk of Complications, Morbidity, and/or Mortality  Presenting problems: moderate  Diagnostic procedures: minimal  Management options: moderate        Disposition  Final diagnoses:   Syncope   Hypomagnesemia     Time reflects when diagnosis was documented in both MDM as applicable and the Disposition within this note     Time User Action Codes Description Comment    11/25/2022  3:40 PM Simi Greer Add [R55] Syncope     11/25/2022  3:40 PM Dc Singh [place] [[id number]] Hypomagnesemia       ED Disposition     ED Disposition   Admit    Condition   Stable    Date/Time   Fri Nov 25, 2022  3:40 PM    Comment   Case was discussed with Maribel Cam and the patient's admission status was agreed to be Admission Status: observation status to the service of Dr Magaly Little   Follow-up Information    None         Current Discharge Medication List      CONTINUE these medications which have NOT CHANGED    Details   fluticasone (FLONASE) 50 mcg/act nasal spray 1 spray into each nostril daily      Metformin HCl (RIOMET) 500 MG/5ML oral solution Take 500 mg by mouth 2 (two) times a day      apixaban (Eliquis) 5 mg Take 2 tablets (10 mg total) by mouth 2 (two) times a day for 5 days, THEN 1 tablet (5 mg total) 2 (two) times a day for 25 days  Qty: 70 tablet, Refills: 0    Associated Diagnoses: Pulmonary emboli (HCC)      Empagliflozin 25 MG TABS Take 1 tablet by mouth      insulin glargine (LANTUS) 100 units/mL subcutaneous injection Inject 20 Units under the skin      sodium chloride, PF, 0 9 % 10 mL by Intracatheter route daily Intracatheter flushing daily  Qty: 300 mL, Refills: 0    Associated Diagnoses: [facility]             No discharge procedures on file      PDMP Review       Value Time User    PDMP Reviewed  Yes 1/11/2022 12:15 PM Eren Vázquez PA-C          ED Provider  Electronically Signed by           Dennis Quintana DO  11/26/22 8983

## 2022-11-25 NOTE — ASSESSMENT & PLAN NOTE
· Patient noted to have a magnesium 1 3  · Patient received 4 g of IV magnesium in the emergency department  · Repeat Mag level in a m

## 2022-11-25 NOTE — ED NOTES
Pt fingerstick glucose taken prior to admission to ED  Result was 130  Pt entered as [id number] and filled out paper with result and sent it down to lab        Aura Tobar  11/25/22 7963

## 2022-11-26 VITALS
SYSTOLIC BLOOD PRESSURE: 138 MMHG | OXYGEN SATURATION: 94 % | TEMPERATURE: 98.5 F | HEART RATE: 79 BPM | HEIGHT: 74 IN | WEIGHT: 164.24 LBS | RESPIRATION RATE: 21 BRPM | BODY MASS INDEX: 21.08 KG/M2 | DIASTOLIC BLOOD PRESSURE: 71 MMHG

## 2022-11-26 PROBLEM — J90 PLEURAL EFFUSION: Status: ACTIVE | Noted: 2022-11-26

## 2022-11-26 LAB
ALBUMIN SERPL BCP-MCNC: 2.9 G/DL (ref 3.5–5)
ALP SERPL-CCNC: 62 U/L (ref 34–104)
ALT SERPL W P-5'-P-CCNC: 14 U/L (ref 7–52)
ANION GAP SERPL CALCULATED.3IONS-SCNC: 9 MMOL/L (ref 4–13)
AST SERPL W P-5'-P-CCNC: 17 U/L (ref 13–39)
BILIRUB SERPL-MCNC: 0.31 MG/DL (ref 0.2–1)
BUN SERPL-MCNC: 18 MG/DL (ref 5–25)
CALCIUM ALBUM COR SERPL-MCNC: 9.2 MG/DL (ref 8.3–10.1)
CALCIUM SERPL-MCNC: 8.3 MG/DL (ref 8.4–10.2)
CHLORIDE SERPL-SCNC: 106 MMOL/L (ref 96–108)
CO2 SERPL-SCNC: 23 MMOL/L (ref 21–32)
CREAT SERPL-MCNC: 0.87 MG/DL (ref 0.6–1.3)
ERYTHROCYTE [DISTWIDTH] IN BLOOD BY AUTOMATED COUNT: 15.8 % (ref 11.6–15.1)
GFR SERPL CREATININE-BSD FRML MDRD: 84 ML/MIN/1.73SQ M
GLUCOSE P FAST SERPL-MCNC: 76 MG/DL (ref 65–99)
GLUCOSE SERPL-MCNC: 72 MG/DL (ref 65–140)
GLUCOSE SERPL-MCNC: 76 MG/DL (ref 65–140)
HCT VFR BLD AUTO: 30.7 % (ref 36.5–49.3)
HGB BLD-MCNC: 9.5 G/DL (ref 12–17)
MAGNESIUM SERPL-MCNC: 2 MG/DL (ref 1.9–2.7)
MCH RBC QN AUTO: 26.5 PG (ref 26.8–34.3)
MCHC RBC AUTO-ENTMCNC: 30.9 G/DL (ref 31.4–37.4)
MCV RBC AUTO: 86 FL (ref 82–98)
PLATELET # BLD AUTO: 244 THOUSANDS/UL (ref 149–390)
PMV BLD AUTO: 9.2 FL (ref 8.9–12.7)
POTASSIUM SERPL-SCNC: 4 MMOL/L (ref 3.5–5.3)
PROT SERPL-MCNC: 5.7 G/DL (ref 6.4–8.4)
RBC # BLD AUTO: 3.59 MILLION/UL (ref 3.88–5.62)
SODIUM SERPL-SCNC: 138 MMOL/L (ref 135–147)
WBC # BLD AUTO: 5.91 THOUSAND/UL (ref 4.31–10.16)

## 2022-11-26 RX ORDER — ATORVASTATIN CALCIUM 20 MG/1
20 TABLET, FILM COATED ORAL
Qty: 30 TABLET | Refills: 0 | Status: SHIPPED | OUTPATIENT
Start: 2022-11-26 | End: 2022-12-26

## 2022-11-26 RX ADMIN — FLUTICASONE PROPIONATE 1 SPRAY: 50 SPRAY, METERED NASAL at 09:08

## 2022-11-26 RX ADMIN — SODIUM CHLORIDE 75 ML/HR: 0.9 INJECTION, SOLUTION INTRAVENOUS at 06:40

## 2022-11-26 NOTE — ASSESSMENT & PLAN NOTE
· Blood pressure decently controlled at this time  · Recently taken off his antihypertensive medications  · Continue out-patient monitoring

## 2022-11-26 NOTE — PLAN OF CARE
Problem: PAIN - ADULT  Goal: Verbalizes/displays adequate comfort level or baseline comfort level  Description: Interventions:  - Encourage patient to monitor pain and request assistance  - Assess pain using appropriate pain scale  - Administer analgesics based on type and severity of pain and evaluate response  - Implement non-pharmacological measures as appropriate and evaluate response  - Consider cultural and social influences on pain and pain management  - Notify physician/advanced practitioner if interventions unsuccessful or patient reports new pain  Outcome: Progressing     Problem: INFECTION - ADULT  Goal: Absence or prevention of progression during hospitalization  Description: INTERVENTIONS:  - Assess and monitor for signs and symptoms of infection  - Monitor lab/diagnostic results  - Monitor all insertion sites, i e  indwelling lines, tubes, and drains  - Monitor endotracheal if appropriate and nasal secretions for changes in amount and color  - [place] appropriate cooling/warming therapies per order  - Administer medications as ordered  - Instruct and encourage patient and family to use good hand hygiene technique  - Identify and instruct in appropriate isolation precautions for identified infection/condition  Outcome: Progressing  Goal: Absence of fever/infection during neutropenic period  Description: INTERVENTIONS:  - Monitor WBC    Outcome: Progressing

## 2022-11-26 NOTE — DISCHARGE SUMMARY
[address]  Discharge- Lelo Prow 1945, 78 y o  male MRN: [id number]  Unit/Bed#: -01 Encounter: [id number]  Primary Care Provider: Figueroa Ford MD   Date and time admitted to hospital: 11/25/2022  2:02 PM    * Syncope and collapse  Assessment & Plan  · Patient noted to 'pass out' in the ED as observed by his daughter  · Likely due to dehydration as the patient had not eaten nor drank much prior to arrival  · Symptoms improved with IV hydration  · Ambulated around the unit without difficulty this AM    Pleural effusion  Assessment & Plan  · CXR (11/25): Moderate to large right pleural effusion with compressive atelectasis versus infiltrate in the right lung     · Has prior history of malignant effusions and thoracentesis has been preformed out-patient  · Patient is not symptomatic and oxygen saturations are in the 90s even with ambulation  · Discussed that IR would tank be available until Monday and as the patient is otherwise stable, he could follow-up out patient    Type 2 diabetes mellitus without complication, without long-term current use of insulin [facility])  Assessment & Plan  Lab Results   Component Value Date    HGBA1C 6 3 (H) 12/22/2021       Recent Labs     11/25/22  1732 11/25/22  2103 11/26/22  0726   POCGLU 109 123 72       Blood Sugar Average: Last 72 hrs:  (P) 907 [id number]     · Resume home medications  · Carb-controlled diet    Essential hypertension  Assessment & Plan  · Blood pressure decently controlled at this time  · Recently taken off his antihypertensive medications  · Continue out-patient monitoring    Hyperlipemia  Assessment & Plan  · Continue home Lipitor 20mg daily      Medical Problems     Resolved Problems  Date Reviewed: 11/25/2022   None       Discharging Physician / Practitioner: Callie Bright DO  PCP: Figueroa Ford MD  Admission Date:   Admission Orders (From admission, onward)     Ordered        11/25/22 1540  Place in Observation  Once                      Discharge Date: 11/26/22    Consultations During Hospital Stay:  · None    Procedures Performed:   · None    Significant Findings / Test Results:   · CXR (11/25): Moderate to large right pleural effusion with compressive atelectasis versus infiltrate in the right lung  Incidental Findings:   · CXR as above   · I reviewed the above mentioned incidental findings with the patient and/or family and they expressed understanding  Test Results Pending at Discharge (will require follow up): · None     Outpatient Tests Requested:  · To be determined upon outpatient follow-up with PCP and Oncology     Complications:  None     Reason for Admission: Syncope    Hospital Course:   Alfredo Otto is a 78 y o  male patient who originally presented to the hospital on 11/25/2022 due to syncope  Please see H&P as documented by Colton Palumbo for complete details regarding history of presenting illness  In brief, the patient has a past medical history of tongue cancer, pulmonary embolism, and type 2 diabetes who originally presented to the emergency department with his wife who was in an accident  Unfortunately, while at his wife's bedside, the patient's daughter noted the patient to be slumped forward and described a myoclonic jerk  The patient awoke spontaneously but was somewhat confused  He did note that he had not been eating all day and had very little to drink  Given this, the patient was admitted under observation status for syncope  He was found to have a relatively low magnesium and appeared hemoconcentrated on laboratory studies  He was given magnesium supplementation and IV fluid hydration with significant improvement in his symptoms  The morning following admission he noted resolution of his symptoms and was able to ambulate the entire unit without difficulty    The patient's chest x-ray findings were discussed in him and given his lack of symptoms and stable oxygen saturations he agreed to follow-up for further thoracentesis outpatient  He was ultimately discharged home in stable condition all of his questions were answered prior to departure  This is a brief discharge summary please see full medical record for more details  Please see above list of diagnoses and related plan for additional information  Condition at Discharge: stable    Discharge Day Visit / Exam:   Subjective:  Patient sitting up in bedside chair without any acute complaints  No significant overnight events reported by nursing  The patient is anxious for discharge home  Vitals: Blood Pressure: 138/71 (11/26/22 0853)  Pulse: 79 (11/26/22 0851)  Temperature: 98 5 °F (36 9 °C) (11/26/22 0726)  Temp Source: Oral (11/26/22 0301)  Respirations: 21 (11/26/22 0726)  Height: 6' 2" (188 cm) (11/25/22 1418)  Weight - Scale: 74 5 kg (164 lb 3 9 oz) (11/25/22 1900)  SpO2: 94 % (11/26/22 0851)     Exam:   Physical Exam  Vitals and nursing note reviewed  Constitutional:       Appearance: Normal appearance  He is normal weight  HENT:      Head: Normocephalic and atraumatic  Mouth/Throat:      Mouth: Mucous membranes are moist       Pharynx: Oropharynx is clear  Eyes:      Extraocular Movements: Extraocular movements intact  Conjunctiva/sclera: Conjunctivae normal    Cardiovascular:      Rate and Rhythm: Normal rate and regular rhythm  Pulses: Normal pulses  Heart sounds: Normal heart sounds  Pulmonary:      Effort: Pulmonary effort is normal  No respiratory distress  Breath sounds: Normal breath sounds  No wheezing  Abdominal:      General: Bowel sounds are normal  There is no distension  Palpations: Abdomen is soft  Tenderness: There is no abdominal tenderness  Musculoskeletal:         General: Normal range of motion  Cervical back: Normal range of motion and neck supple  Skin:     General: Skin is warm and dry     Neurological:      General: No focal deficit present  Mental Status: He is alert and oriented to person, place, and time  Mental status is at baseline  Psychiatric:         Mood and Affect: Mood normal          Behavior: Behavior normal          Judgment: Judgment normal         Discussion with Family: Patient declined call to   Patient updated on plan of care  Discharge instructions/Information to patient and family:   See after visit summary for information provided to patient and family  Provisions for Follow-Up Care:  See after visit summary for information related to follow-up care and any pertinent home health orders  Disposition:   Home    Planned Readmission:  None     Discharge Statement:  I spent > 35 minutes discharging the patient  This time was spent on the day of discharge  I had direct contact with the patient on the day of discharge  Greater than 50% of the total time was spent examining patient, answering all patient questions, arranging and discussing plan of care with patient as well as directly providing post-discharge instructions  Additional time then spent on discharge activities  Discharge Medications:  See after visit summary for reconciled discharge medications provided to patient and/or family        **Please Note: This note may have been constructed using a voice recognition system**

## 2022-11-26 NOTE — OCCUPATIONAL THERAPY NOTE
Occupational Therapy Evaluation      Tania Renee    11/26/2022    Principal Problem:    Syncope and collapse  Active Problems:    Essential hypertension    Type 2 diabetes mellitus without complication, without long-term current use of insulin ([place] [address] )    Hypomagnesemia    Hyperlipemia      Past Medical History:   Diagnosis Date    Cancer ([facility] )     Diabetes ([place] [address] )     Gastritis     GERD (gastroesophageal reflux disease)     History of bladder cancer 2010    treated with surgery    Hypercholesterolemia     Hypertension     Hyponatremia 12/22/2021    Lactic acidosis 12/22/2021       Past Surgical History:   Procedure Laterality Date    BLADDER TUMOR EXCISION      EGD      IR CHOLECYSTOSTOMY TUBE CHECK/CHANGE/REPOSITION/REINSERTION/UPSIZE  1/5/2022    IR CHOLECYSTOSTOMY TUBE CHECK/CHANGE/REPOSITION/REINSERTION/UPSIZE  3/4/2022    IR CHOLECYSTOSTOMY TUBE CHECK/CHANGE/REPOSITION/REINSERTION/UPSIZE  3/24/2022    IR CHOLECYSTOSTOMY TUBE PLACEMENT  12/27/2021 11/26/22 0902   OT Last Visit   OT Visit Date 11/26/22   Note Type   Note type Evaluation   Pain Assessment   Pain Assessment Tool 0-10   Pain Score No Pain   Restrictions/Precautions   Weight Bearing Precautions Per Order No   Braces or Orthoses   (diabetic shoes)   Other Precautions Fall Risk;Multiple lines;Telemetry   Home Living   Type of [address] [place] [address] One level;Stairs to enter with rails  (3 JUDITH)   Bathroom Shower/Tub Tub/shower unit   Bathroom Toilet Standard   Bathroom Equipment Grab bars in shower; Shower chair   Home Equipment Cane;Walker  (uses SPC occ at baseline)   Prior Function   Level of [place] Independent with functional mobility; Independent with ADLs; Independent with IADLS   Lives With Spouse   IADLs Independent with meal prep; Independent with driving   Falls in the last 6 months 1 to 4   Vocational Retired   Comments enjoys being outside   [address] [facility] PROFICIO Dressing Assistance 7  Independent   LB Dressing Assistance 7  Independent   Bed Mobility   Supine to Sit 6  Modified independent   Additional items HOB elevated   Additional Comments Pt reamined OOB in recliner upon conclusion   Transfers   Sit to Stand 7  Independent   Stand to Sit 7  Independent   Additional Comments Orthostatic BPs:  Date/Time Temp Pulse Resp BP MAP (mmHg) SpO2 O2 Device Patient Position - Orthostatic VS   11/26/22 0853 -- -- -- 138/71 -- -- -- Standing - Orthostatic VS   11/26/22 08:51:12 -- 79 -- 143/71 95 94 % -- Sitting - Orthostatic VS   11/26/22 08:49:21 -- 76 -- 151/75 100 95 % -- Lying - Orthostatic VS      Functional Mobility   Functional Mobility 7  Independent   Additional items   (no AD)   Balance   Static Sitting Normal   Dynamic Sitting Normal   Static Standing Good   Dynamic Standing Good   Ambulatory Good   Activity Tolerance   Activity Tolerance Patient tolerated treatment well   RUE Assessment   RUE Assessment WFL   LUE Assessment   LUE Assessment WFL   Vision-Basic Assessment   Current Vision Wears glasses all the time   Cognition   Overall Cognitive Status WFL   Arousal/Participation Alert; Cooperative   Attention Within functional limits   Orientation Level Oriented X4   Memory Within functional limits   Following Commands Follows all commands and directions without difficulty   Assessment   Assessment Pt is a 78 y o  male seen for OT evaluation s/p admit to [address] [place] [address] on 11/25/2022 w/ Syncope and collapse  Comorbidities affecting pt's functional performance at time of assessment include: HTN, DM II, HLD  Personal factors affecting pt at time of IE include:steps to enter environment  Prior to admission, pt was I with ADLs and is currently at baseline function  From OT standpoint, recommendation at time of d/c would be no rehab needs     Goals   Patient Goals To go home today   Plan   Goal Expiration Date 11/26/22   OT Treatment Day 0   OT Frequency Eval only   Recommendation OT Discharge Recommendation No rehabilitation needs   Additional Comments  Pt seen as a co-eval with PT due to the patient's co-morbidities, clinically unstable presentation, and present impairments which are a regression from the patient's baseline  Additional Comments 2 The patient's raw score on the AM-PAC Daily Activity inpatient short form is 24, standardized score is 57 54, greater than 39 4  Patients at this level are likely to benefit from discharge to home  Please refer to the recommendation of the Occupational Therapist for safe discharge planning     AM-PAC Daily Activity Inpatient   Lower Body Dressing 4   Bathing 4   Toileting 4   Upper Body Dressing 4   Grooming 4   Eating 4   Daily Activity Raw Score 24   Daily Activity Standardized Score (Calc for Raw Score >=11) 57 54   AM-PAC Applied Cognition Inpatient   Following a Speech/Presentation 4   Understanding Ordinary Conversation 4   Taking Medications 4   Remembering Where Things Are Placed or Put Away 4   Remembering List of 4-5 Errands 4   Taking Care of Complicated Tasks 4   Applied Cognition Raw Score 24   Applied Cognition Standardized Score 62 21     Rachel Brown MS, OTR/L

## 2022-11-26 NOTE — ASSESSMENT & PLAN NOTE
Lab Results   Component Value Date    HGBA1C 6 3 (H) 12/22/2021       Recent Labs     11/25/22  1732 11/25/22  2103 11/26/22  0726   POCGLU 109 123 72       Blood Sugar Average: Last 72 hrs:  (P) 816 [id number]     · Resume home medications  · Carb-controlled diet

## 2022-11-26 NOTE — ASSESSMENT & PLAN NOTE
· CXR (11/25): Moderate to large right pleural effusion with compressive atelectasis versus infiltrate in the right lung     · Has prior history of malignant effusions and thoracentesis has been preformed out-patient  · Patient is not symptomatic and oxygen saturations are in the 90s even with ambulation  · Discussed that IR would tank be available until Monday and as the patient is otherwise stable, he could follow-up out patient

## 2022-11-26 NOTE — ASSESSMENT & PLAN NOTE
· Patient noted to 'pass out' in the ED as observed by his daughter  · Likely due to dehydration as the patient had not eaten nor drank much prior to arrival  · Symptoms improved with IV hydration  · Ambulated around the unit without difficulty this AM

## 2022-11-26 NOTE — NURSING NOTE
Pt was set up for bathing pt refused to sit in chair when asked if he wanted to sit in recliner RN aware

## 2022-11-26 NOTE — PHYSICAL THERAPY NOTE
PHYSICAL THERAPY EVALUATION  NAME:  Marcellus Berry  DATE: 11/26/22    AGE:   78 y o    Mrn:   [id number]  ADMIT DX:  Hypomagnesemia [E83 42]  Syncope [R55]  Problem List:   Patient Active Problem List   Diagnosis   • Anxiety   • Neoplasm of uncertain behavior of skin   • Post-traumatic stress disorder, unspecified   • Essential hypertension   • Actinic keratosis   • Dysphagia, unspecified   • Erectile dysfunction   • History of malignant neoplasm of bladder   • Cardiac arrhythmia   • Carpal tunnel syndrome   • Chronic post-traumatic stress disorder (PTSD) after  combat   • Type 2 diabetes mellitus without complication, without long-term current use of insulin ([place] [address] )   • Heart murmur   • Hematuria   • Hyperlipidemia due to type 2 diabetes mellitus ([place] [address] )   • Left ventricular hypertrophy   • Malignant neoplasm of urinary bladder (HCC)   • Neoplasm of lung   • Neoplasm of uncertain behavior of oropharynx   • Osteoarthritis of knee   • Panic disorder   • Polyp of colon   • Primary malignant neoplasm of base of tongue ([place] [address] )   • Severe protein-calorie malnutrition ([place] [address] )   • Tobacco dependence   • Other pulmonary embolism without acute cor pulmonale (HCC)   • Cholecystitis   • Multiple fractures of rib involving four or more ribs-right   • COVID   • Acute respiratory failure with hypoxia (HCC)   • Thyroid nodule   • History of head and neck radiation   • Agent orange exposure   • Subclinical hyperthyroidism   • Syncope and collapse   • Hypomagnesemia   • Hyperlipemia       Past Medical History  Past Medical History:   Diagnosis Date   • Cancer ([facility] )    • Diabetes ([place] [address] )    • Gastritis    • GERD (gastroesophageal reflux disease)    • History of bladder cancer 2010    treated with surgery   • Hypercholesterolemia    • Hypertension    • Hyponatremia 12/22/2021   • Lactic acidosis 12/22/2021       Past Surgical History  Past Surgical History:   Procedure Laterality Date   • BLADDER TUMOR EXCISION     • EGD     • IR CHOLECYSTOSTOMY TUBE CHECK/CHANGE/REPOSITION/REINSERTION/UPSIZE  1/5/2022   • IR CHOLECYSTOSTOMY TUBE CHECK/CHANGE/REPOSITION/REINSERTION/UPSIZE  3/4/2022   • IR CHOLECYSTOSTOMY TUBE CHECK/CHANGE/REPOSITION/REINSERTION/UPSIZE  3/24/2022   • IR CHOLECYSTOSTOMY TUBE PLACEMENT  12/27/2021       Length Of Stay: 0  Performed at least 2 patient identifiers during session: Name and Birthday         11/26/22 0842   PT Last Visit   PT Visit Date 11/26/22   Note Type   Note type Evaluation   Pain Assessment   Pain Assessment Tool 0-10   Pain Score No Pain   Restrictions/Precautions   Weight Bearing Precautions Per Order No   Braces or Orthoses   (diabetic shoes)   Other Precautions Fall Risk;Multiple lines;Telemetry   Home Living   Type of [address] [place] [address] One level;Stairs to enter with rails  (3 JUDITH)   Bathroom Shower/Tub Tub/shower unit   Bathroom Toilet Standard   Bathroom Equipment Grab bars in shower; Shower chair   Home Equipment Cane;Walker  (uses SPC occ at baseline)   Prior Function   Level of [place] Independent with functional mobility; Independent with ADLs; Independent with IADLS   Lives With Spouse   IADLs Independent with meal prep; Independent with driving   Falls in the last 6 months 1 to 4   Vocational Retired   Comments enjoys being outside   General   Family/Caregiver Present No   Cognition   Overall Cognitive Status WFL   Arousal/Participation Alert   Attention Within functional limits   Orientation Level Oriented X4   Memory Within functional limits   Following Commands Follows all commands and directions without difficulty   RLE Assessment   RLE Assessment WFL   LLE Assessment   LLE Assessment WFL   Vision-Basic Assessment   Current Vision Wears glasses all the time   Coordination   Movements are Fluid and Coordinated 1   Sensation X   Light Touch   RLE Light Touch Impaired   LLE Light Touch Impaired   Bed Mobility   Supine to Sit 6  Modified independent   Additional items HOB elevated Transfers   Sit to Stand 7  Independent   Stand to Sit 7  Independent   Additional Comments BPs: supine: 151/75, sit 143/71 standing 138/71; pt denied dizziness   Ambulation/Elevation   Gait pattern WNL   Gait Assistance 6  Modified independent   Assistive Device SPC  (began with [facility] however ended up carrying it toward end of session)   Distance 250 ft   Ambulation/Elevation Additional Comments no LOB or deficits noted   Balance   Static Sitting Normal   Dynamic Sitting Normal   Static Standing Good   Dynamic Standing Good   Ambulatory Good   Endurance Deficit   Endurance Deficit No   Activity Tolerance   Activity Tolerance Patient tolerated treatment well   Medical Staff Made Aware LEONOR Randle   Assessment   Prognosis Excellent   Assessment Pt is 78 y o  male seen for PT evaluation s/p admit to [address] on 11/25/2022 w/ Syncope and collapse  PT consulted to assess pt's functional mobility and d/c needs  Order placed for PT eval and tx, w/ up and OOB as tolerated order  Pt agreeable to PT  session upon arrival, pt found supine in bed  The following objective measures performed on IE also reveal limitations: AM-PAC 6 Clicks 99/36  Patient was independent w/ all functional mobility w/ cane  Pt [facility] at [facility] with transfers, ambulation, and bed mobility  From PT/mobility standpoint, recommendation at time of d/c would be anticipate no needs  Upon conclusion pt seated in recliner  D/c PT services at this time  Complexity: Comorbidities affecting pt's physical performance at time of assessment include: DM  Personal factors affecting pt at time of IE include: ambulating w/ assistive device, stairs to enter home and positive fall history  Please find objective findings from PT assessment regarding body systems outlined above with impairments and limitations including fall risk  Pt's clinical presentation is currently stable  seen in pt's presentation of lack of deficts   The patient's AM-PAC Basic Mobility Inpatient Short Form Raw Score is 24  A Raw score of  greater than 16 suggests the patient may benefit from discharge to home  Please also refer to the recommendation of the Physical Therapist for safe discharge planning  Pt seen as a co-eval with OT due to the patient's co-morbidities and recent fall     Barriers to Discharge None   Goals   Patient Goals to go home today   Recommendation   PT Discharge Recommendation No rehabilitation needs   Equipment Recommended   (none)   AM-PAC Basic Mobility Inpatient   Turning in Bed Without Bedrails 4   Lying on Back to Sitting on Edge of Flat Bed 4   Moving Bed to Chair 4   Standing Up From Chair 4   Walk in Room 4   Climb 3-5 Stairs 4   Basic Mobility Inpatient Raw Score 24   Basic Mobility Standardized Score 57 68   Highest Level Of Mobility   JH-HLM Goal 8: Walk 250 feet or more   JH-HLM Achieved 8: Walk 250 feet ot more     Time In: 0841  Time Out: 0902  Total Evaluation Minutes: [id number] [facility] [address] [facility],

## 2023-03-21 ENCOUNTER — APPOINTMENT (EMERGENCY)
Dept: CT IMAGING | Facility: HOSPITAL | Age: 78
End: 2023-03-21

## 2023-03-21 ENCOUNTER — APPOINTMENT (EMERGENCY)
Dept: RADIOLOGY | Facility: HOSPITAL | Age: 78
End: 2023-03-21

## 2023-03-21 ENCOUNTER — HOSPITAL ENCOUNTER (INPATIENT)
Facility: HOSPITAL | Age: 78
LOS: 7 days | Discharge: NURSING, REHAB OR LONG-TERM CARE | End: 2023-03-28
Attending: STUDENT IN AN ORGANIZED HEALTH CARE EDUCATION/TRAINING PROGRAM | Admitting: HOSPITALIST

## 2023-03-21 ENCOUNTER — APPOINTMENT (INPATIENT)
Dept: GASTROENTEROLOGY | Facility: HOSPITAL | Age: 78
End: 2023-03-21

## 2023-03-21 DIAGNOSIS — J94.8 HYDROPNEUMOTHORAX: ICD-10-CM

## 2023-03-21 DIAGNOSIS — A41.9 SEPSIS (HCC): ICD-10-CM

## 2023-03-21 DIAGNOSIS — J18.9 PNEUMONIA: ICD-10-CM

## 2023-03-21 DIAGNOSIS — R09.02 HYPOXIA: ICD-10-CM

## 2023-03-21 DIAGNOSIS — J18.9 PNA (PNEUMONIA): ICD-10-CM

## 2023-03-21 DIAGNOSIS — D49.1 NEOPLASM OF LUNG: ICD-10-CM

## 2023-03-21 DIAGNOSIS — L89.300 PRESSURE INJURY OF BUTTOCK, UNSTAGEABLE, UNSPECIFIED LATERALITY (HCC): Primary | ICD-10-CM

## 2023-03-21 DIAGNOSIS — E11.9 TYPE 2 DIABETES MELLITUS WITHOUT COMPLICATION, WITHOUT LONG-TERM CURRENT USE OF INSULIN (HCC): ICD-10-CM

## 2023-03-21 DIAGNOSIS — Z78.9 DIFFICULT INTRAVENOUS ACCESS: ICD-10-CM

## 2023-03-21 DIAGNOSIS — J96.01 ACUTE RESPIRATORY FAILURE WITH HYPOXIA (HCC): ICD-10-CM

## 2023-03-21 DIAGNOSIS — I26.94 MULTIPLE SUBSEGMENTAL PULMONARY EMBOLI WITHOUT ACUTE COR PULMONALE (HCC): ICD-10-CM

## 2023-03-21 PROBLEM — J90 PLEURAL EFFUSION: Status: RESOLVED | Noted: 2022-11-26 | Resolved: 2023-03-21

## 2023-03-21 PROBLEM — R06.03 ACUTE RESPIRATORY DISTRESS: Status: RESOLVED | Noted: 2023-03-21 | Resolved: 2023-03-21

## 2023-03-21 PROBLEM — L89.309 PRESSURE INJURY OF SKIN OF BUTTOCK: Status: ACTIVE | Noted: 2023-03-21

## 2023-03-21 PROBLEM — R06.03 ACUTE RESPIRATORY DISTRESS: Status: ACTIVE | Noted: 2023-03-21

## 2023-03-21 LAB
2HR DELTA HS TROPONIN: 0 NG/L
ALBUMIN SERPL BCP-MCNC: 3.4 G/DL (ref 3.5–5)
ALP SERPL-CCNC: 108 U/L (ref 34–104)
ALT SERPL W P-5'-P-CCNC: 17 U/L (ref 7–52)
ANION GAP SERPL CALCULATED.3IONS-SCNC: 10 MMOL/L (ref 4–13)
APTT PPP: 27 SECONDS (ref 23–37)
APTT PPP: 82 SECONDS (ref 23–37)
AST SERPL W P-5'-P-CCNC: 17 U/L (ref 13–39)
BACTERIA UR QL AUTO: ABNORMAL /HPF
BASOPHILS # BLD AUTO: 0.02 THOUSANDS/ÂΜL (ref 0–0.1)
BASOPHILS NFR BLD AUTO: 0 % (ref 0–1)
BILIRUB SERPL-MCNC: 0.54 MG/DL (ref 0.2–1)
BILIRUB UR QL STRIP: NEGATIVE
BUN SERPL-MCNC: 19 MG/DL (ref 5–25)
CALCIUM ALBUM COR SERPL-MCNC: 10.3 MG/DL (ref 8.3–10.1)
CALCIUM SERPL-MCNC: 9.8 MG/DL (ref 8.4–10.2)
CARDIAC TROPONIN I PNL SERPL HS: 11 NG/L
CARDIAC TROPONIN I PNL SERPL HS: 11 NG/L
CHLORIDE SERPL-SCNC: 91 MMOL/L (ref 96–108)
CLARITY UR: ABNORMAL
CO2 SERPL-SCNC: 32 MMOL/L (ref 21–32)
COLOR UR: YELLOW
CREAT SERPL-MCNC: 0.77 MG/DL (ref 0.6–1.3)
EOSINOPHIL # BLD AUTO: 0 THOUSAND/ÂΜL (ref 0–0.61)
EOSINOPHIL NFR BLD AUTO: 0 % (ref 0–6)
ERYTHROCYTE [DISTWIDTH] IN BLOOD BY AUTOMATED COUNT: 24.5 % (ref 11.6–15.1)
FLUAV RNA RESP QL NAA+PROBE: NEGATIVE
FLUBV RNA RESP QL NAA+PROBE: NEGATIVE
GFR SERPL CREATININE-BSD FRML MDRD: 88 ML/MIN/1.73SQ M
GLUCOSE SERPL-MCNC: 143 MG/DL (ref 65–140)
GLUCOSE SERPL-MCNC: 173 MG/DL (ref 65–140)
GLUCOSE UR STRIP-MCNC: NEGATIVE MG/DL
HCT VFR BLD AUTO: 37.2 % (ref 36.5–49.3)
HGB BLD-MCNC: 11 G/DL (ref 12–17)
HGB UR QL STRIP.AUTO: ABNORMAL
IMM GRANULOCYTES # BLD AUTO: 0.07 THOUSAND/UL (ref 0–0.2)
IMM GRANULOCYTES NFR BLD AUTO: 1 % (ref 0–2)
INR PPP: 1.09 (ref 0.84–1.19)
KETONES UR STRIP-MCNC: NEGATIVE MG/DL
L PNEUMO1 AG UR QL IA.RAPID: NEGATIVE
LACTATE SERPL-SCNC: 2 MMOL/L (ref 0.5–2)
LACTATE SERPL-SCNC: 2.1 MMOL/L (ref 0.5–2)
LEUKOCYTE ESTERASE UR QL STRIP: ABNORMAL
LYMPHOCYTES # BLD AUTO: 0.4 THOUSANDS/ÂΜL (ref 0.6–4.47)
LYMPHOCYTES NFR BLD AUTO: 4 % (ref 14–44)
MAGNESIUM SERPL-MCNC: 1.8 MG/DL (ref 1.9–2.7)
MCH RBC QN AUTO: 26.7 PG (ref 26.8–34.3)
MCHC RBC AUTO-ENTMCNC: 29.6 G/DL (ref 31.4–37.4)
MCV RBC AUTO: 90 FL (ref 82–98)
MONOCYTES # BLD AUTO: 0.57 THOUSAND/ÂΜL (ref 0.17–1.22)
MONOCYTES NFR BLD AUTO: 5 % (ref 4–12)
NEUTROPHILS # BLD AUTO: 10.03 THOUSANDS/ÂΜL (ref 1.85–7.62)
NEUTS SEG NFR BLD AUTO: 90 % (ref 43–75)
NITRITE UR QL STRIP: POSITIVE
NON-SQ EPI CELLS URNS QL MICRO: ABNORMAL /HPF
NRBC BLD AUTO-RTO: 0 /100 WBCS
PH UR STRIP.AUTO: 7.5 [PH]
PLATELET # BLD AUTO: 299 THOUSANDS/UL (ref 149–390)
PMV BLD AUTO: 9 FL (ref 8.9–12.7)
POTASSIUM SERPL-SCNC: 4.2 MMOL/L (ref 3.5–5.3)
PROCALCITONIN SERPL-MCNC: 0.59 NG/ML
PROT SERPL-MCNC: 6.7 G/DL (ref 6.4–8.4)
PROT UR STRIP-MCNC: NEGATIVE MG/DL
PROTHROMBIN TIME: 14.1 SECONDS (ref 11.6–14.5)
RBC # BLD AUTO: 4.12 MILLION/UL (ref 3.88–5.62)
RBC #/AREA URNS AUTO: ABNORMAL /HPF
RSV RNA RESP QL NAA+PROBE: NEGATIVE
S PNEUM AG UR QL: NEGATIVE
SARS-COV-2 RNA RESP QL NAA+PROBE: NEGATIVE
SODIUM SERPL-SCNC: 133 MMOL/L (ref 135–147)
SP GR UR STRIP.AUTO: 1.01
UROBILINOGEN UR QL STRIP.AUTO: 0.2 E.U./DL
WBC # BLD AUTO: 11.09 THOUSAND/UL (ref 4.31–10.16)
WBC #/AREA URNS AUTO: ABNORMAL /HPF

## 2023-03-21 RX ORDER — PREDNISONE 1 MG/1
TABLET ORAL DAILY
COMMUNITY

## 2023-03-21 RX ORDER — HEPARIN SODIUM 1000 [USP'U]/ML
5600 INJECTION, SOLUTION INTRAVENOUS; SUBCUTANEOUS ONCE
Status: COMPLETED | OUTPATIENT
Start: 2023-03-21 | End: 2023-03-21

## 2023-03-21 RX ORDER — CEFEPIME HYDROCHLORIDE 2 G/50ML
2000 INJECTION, SOLUTION INTRAVENOUS EVERY 8 HOURS
Status: DISCONTINUED | OUTPATIENT
Start: 2023-03-21 | End: 2023-03-23

## 2023-03-21 RX ORDER — VANCOMYCIN HYDROCHLORIDE 1 G/200ML
15 INJECTION, SOLUTION INTRAVENOUS ONCE
Status: COMPLETED | OUTPATIENT
Start: 2023-03-21 | End: 2023-03-21

## 2023-03-21 RX ORDER — ONDANSETRON 4 MG/1
4 TABLET, FILM COATED ORAL EVERY 8 HOURS PRN
COMMUNITY

## 2023-03-21 RX ORDER — FAMOTIDINE 40 MG/1
40 TABLET, FILM COATED ORAL 2 TIMES DAILY
COMMUNITY

## 2023-03-21 RX ORDER — FLUCONAZOLE 40 MG/ML
40 POWDER, FOR SUSPENSION ORAL DAILY
Status: DISCONTINUED | OUTPATIENT
Start: 2023-03-22 | End: 2023-03-21

## 2023-03-21 RX ORDER — ATORVASTATIN CALCIUM 20 MG/1
20 TABLET, FILM COATED ORAL
Status: DISCONTINUED | OUTPATIENT
Start: 2023-03-21 | End: 2023-03-28 | Stop reason: HOSPADM

## 2023-03-21 RX ORDER — MAGNESIUM SULFATE HEPTAHYDRATE 40 MG/ML
2 INJECTION, SOLUTION INTRAVENOUS ONCE
Status: COMPLETED | OUTPATIENT
Start: 2023-03-21 | End: 2023-03-22

## 2023-03-21 RX ORDER — VANCOMYCIN HYDROCHLORIDE 500 MG/100ML
500 INJECTION, SOLUTION INTRAVENOUS ONCE
Status: COMPLETED | OUTPATIENT
Start: 2023-03-21 | End: 2023-03-22

## 2023-03-21 RX ORDER — PREDNISONE 1 MG/1
5 TABLET ORAL DAILY
Status: DISCONTINUED | OUTPATIENT
Start: 2023-03-22 | End: 2023-03-28 | Stop reason: HOSPADM

## 2023-03-21 RX ORDER — FLUCONAZOLE 40 MG/ML
POWDER, FOR SUSPENSION ORAL DAILY
COMMUNITY

## 2023-03-21 RX ORDER — HEPARIN SODIUM 1000 [USP'U]/ML
2800 INJECTION, SOLUTION INTRAVENOUS; SUBCUTANEOUS EVERY 6 HOURS PRN
Status: DISCONTINUED | OUTPATIENT
Start: 2023-03-21 | End: 2023-03-27

## 2023-03-21 RX ORDER — CHLORHEXIDINE GLUCONATE 0.12 MG/ML
15 RINSE ORAL EVERY 12 HOURS SCHEDULED
Status: DISCONTINUED | OUTPATIENT
Start: 2023-03-21 | End: 2023-03-28 | Stop reason: HOSPADM

## 2023-03-21 RX ORDER — CEFEPIME HYDROCHLORIDE 2 G/50ML
2000 INJECTION, SOLUTION INTRAVENOUS ONCE
Status: COMPLETED | OUTPATIENT
Start: 2023-03-21 | End: 2023-03-21

## 2023-03-21 RX ORDER — FAMOTIDINE 40 MG/5ML
20 POWDER, FOR SUSPENSION ORAL 2 TIMES DAILY
Status: DISCONTINUED | OUTPATIENT
Start: 2023-03-21 | End: 2023-03-28 | Stop reason: HOSPADM

## 2023-03-21 RX ORDER — INSULIN LISPRO 100 [IU]/ML
1-5 INJECTION, SOLUTION INTRAVENOUS; SUBCUTANEOUS EVERY 6 HOURS SCHEDULED
Status: DISCONTINUED | OUTPATIENT
Start: 2023-03-21 | End: 2023-03-22

## 2023-03-21 RX ORDER — FAMOTIDINE 20 MG/1
40 TABLET, FILM COATED ORAL 2 TIMES DAILY
Status: DISCONTINUED | OUTPATIENT
Start: 2023-03-21 | End: 2023-03-21

## 2023-03-21 RX ORDER — HEPARIN SODIUM 1000 [USP'U]/ML
5600 INJECTION, SOLUTION INTRAVENOUS; SUBCUTANEOUS EVERY 6 HOURS PRN
Status: DISCONTINUED | OUTPATIENT
Start: 2023-03-21 | End: 2023-03-27

## 2023-03-21 RX ORDER — FLUCONAZOLE 40 MG/ML
100 POWDER, FOR SUSPENSION ORAL DAILY
Status: DISCONTINUED | OUTPATIENT
Start: 2023-03-22 | End: 2023-03-23

## 2023-03-21 RX ORDER — HEPARIN SODIUM 10000 [USP'U]/100ML
3-30 INJECTION, SOLUTION INTRAVENOUS
Status: DISCONTINUED | OUTPATIENT
Start: 2023-03-21 | End: 2023-03-27

## 2023-03-21 RX ADMIN — CEFEPIME HYDROCHLORIDE 2000 MG: 2 INJECTION, SOLUTION INTRAVENOUS at 13:56

## 2023-03-21 RX ADMIN — FAMOTIDINE 20 MG: 40 POWDER, FOR SUSPENSION ORAL at 21:42

## 2023-03-21 RX ADMIN — HEPARIN SODIUM 5600 UNITS: 1000 INJECTION INTRAVENOUS; SUBCUTANEOUS at 14:57

## 2023-03-21 RX ADMIN — SODIUM CHLORIDE 1000 ML: 0.9 INJECTION, SOLUTION INTRAVENOUS at 13:57

## 2023-03-21 RX ADMIN — MAGNESIUM SULFATE HEPTAHYDRATE 2 G: 40 INJECTION, SOLUTION INTRAVENOUS at 16:51

## 2023-03-21 RX ADMIN — IOHEXOL 85 ML: 350 INJECTION, SOLUTION INTRAVENOUS at 13:45

## 2023-03-21 RX ADMIN — VANCOMYCIN HYDROCHLORIDE 1000 MG: 1 INJECTION, SOLUTION INTRAVENOUS at 14:31

## 2023-03-21 RX ADMIN — SODIUM CHLORIDE 1000 ML: 0.9 INJECTION, SOLUTION INTRAVENOUS at 13:09

## 2023-03-21 RX ADMIN — ATORVASTATIN CALCIUM 20 MG: 20 TABLET, FILM COATED ORAL at 16:51

## 2023-03-21 RX ADMIN — VANCOMYCIN HYDROCHLORIDE 500 MG: 500 INJECTION, SOLUTION INTRAVENOUS at 18:26

## 2023-03-21 RX ADMIN — CEFEPIME HYDROCHLORIDE 2000 MG: 2 INJECTION, SOLUTION INTRAVENOUS at 21:41

## 2023-03-21 RX ADMIN — HEPARIN SODIUM 18 UNITS/KG/HR: 10000 INJECTION, SOLUTION INTRAVENOUS at 15:00

## 2023-03-21 RX ADMIN — CHLORHEXIDINE GLUCONATE 0.12% ORAL RINSE 15 ML: 1.2 LIQUID ORAL at 21:41

## 2023-03-21 NOTE — PROGRESS NOTES
Connie Griffin is a 78 y o  male who is currently ordered Vancomycin IV with management by the Pharmacy Consult service  Relevant clinical data and objective / subjective history reviewed  Vancomycin Assessment:  Indication and Goal AUC/Trough: Pneumonia (goal -600, trough >10), -600, trough >10  Clinical Status: stable  Micro:   pending  Renal Function:  SCr: 0 77 mg/dL  CrCl: 71 2 mL/min  Renal replacement: Not on dialysis  Days of Therapy: 1  Current Dose: 1g once  Vancomycin Plan:  New Dosing: Will give 500 mg once complete 1500 mg total dose followed by 1500 mg q24  Estimated AUC: 476 mcg*hr/mL  Estimated Trough: 12 4 mcg/mL  Next Level: 3/22 6am  Renal Function Monitoring: Daily BMP and UOP  Pharmacy will continue to follow closely for s/sx of nephrotoxicity, infusion reactions and appropriateness of therapy  BMP and CBC will be ordered per protocol  We will continue to follow the patient’s culture results and clinical progress daily      Janette Cogan, Pharmacist
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
## 2023-03-21 NOTE — ED NOTES
Patient returned from [address] [place] [address] [address] [facility], [address] [place] [address]  03/21/23 [id number]

## 2023-03-21 NOTE — ASSESSMENT & PLAN NOTE
Malnutrition Findings:                                 BMI Findings: Body mass index is 17 18 kg/m²     · Has PEG tube secondary to dysphagia through which he gets Jevity tube feeds  · Nutrition consult

## 2023-03-21 NOTE — ASSESSMENT & PLAN NOTE
· POA  · Inpatient wound care consult  · Allyvn prn  · Frequent repositioning and off loading pressure points

## 2023-03-21 NOTE — ED PROVIDER NOTES
History  Chief Complaint   Patient presents with   • Shortness of Breath     Lung ca patient has low SPO2 at home reported in the 70's     HPI this is a 77-year-old male who presents to the emergency department accompanied by his wife for concerns of acute on chronic shortness of breath and low pulse oximetry  Patient has a history of lung cancer and reports that he is chronically short of breath  However patient states he became more acutely short of breath over the past 48 hours states he awoke this morning extremely short of breath and dyspneic on exertion and noted that his pulse oximetry was 78% on room air  His wife became concerned and brought him here to the emergency department denies any fevers denies any chills denies any nausea vomiting or chest pain  Denies any sick contacts  Prior to Admission Medications   Prescriptions Last Dose Informant Patient Reported? Taking?    Metformin HCl (RIOMET) 500 MG/5ML oral solution   Yes No   Sig: Take 500 mg by mouth 2 (two) times a day   atorvastatin (LIPITOR) 20 mg tablet   No No   Sig: Take 1 tablet (20 mg total) by mouth daily with dinner   famotidine (PEPCID) 40 MG tablet   Yes Yes   Sig: Take 40 mg by mouth 2 (two) times a day   fluconazole (DIFLUCAN) 40 mg/mL suspension   Yes Yes   Sig: Take by mouth daily   fluticasone (FLONASE) 50 mcg/act nasal spray   Yes No   Si spray into each nostril daily   ondansetron (ZOFRAN) 4 mg tablet   Yes Yes   Sig: Take 4 mg by mouth every 8 (eight) hours as needed for nausea or vomiting   predniSONE 5 mg tablet   Yes Yes   Sig: Take by mouth daily   sodium chloride, PF, 0 9 %   No No   Sig: 10 mL by Intracatheter route daily Intracatheter flushing daily      Facility-Administered Medications: None       Past Medical History:   Diagnosis Date   • Cancer ([facility] [address] )    • Diabetes ([facility] )    • Gastritis    • GERD (gastroesophageal reflux disease)    • History of bladder cancer     treated with surgery   • Hypercholesterolemia    • Hypertension    • Hyponatremia 2021   • Lactic acidosis 2021       Past Surgical History:   Procedure Laterality Date   • BLADDER TUMOR EXCISION     • EGD     • IR CHOLECYSTOSTOMY TUBE CHECK/CHANGE/REPOSITION/REINSERTION/UPSIZE  2022   • IR CHOLECYSTOSTOMY TUBE CHECK/CHANGE/REPOSITION/REINSERTION/UPSIZE  3/4/2022   • IR CHOLECYSTOSTOMY TUBE CHECK/CHANGE/REPOSITION/REINSERTION/UPSIZE  3/24/2022   • IR CHOLECYSTOSTOMY TUBE PLACEMENT  2021       Family History   Problem Relation Age of Onset   • Cancer Mother    • Melanoma Neg Hx      I have reviewed and agree with the history as documented  E-Cigarette/Vaping   • E-Cigarette Use Never User      E-Cigarette/Vaping Substances   • Nicotine No    • THC No    • CBD No    • Flavoring No    • Other No    • Unknown No      Social History     Tobacco Use   • Smoking status: Former     Types: Cigarettes     Quit date: 2021     Years since quittin 5     Passive exposure: Past   • Smokeless tobacco: Never   Vaping Use   • Vaping Use: Never used   Substance Use Topics   • Alcohol use: Never   • Drug use: Never       Review of Systems   Constitutional: Negative for activity change, appetite change, chills, fatigue and fever  HENT: Negative for congestion, dental problem, drooling, ear discharge, ear pain, facial swelling, postnasal drip, rhinorrhea and sinus pain  Eyes: Negative for photophobia, pain, discharge and itching  Respiratory: Positive for shortness of breath  Negative for apnea, cough and chest tightness  Cardiovascular: Negative for chest pain and leg swelling  Gastrointestinal: Negative for abdominal distention, abdominal pain, anal bleeding, constipation, diarrhea and nausea  Endocrine: Negative for cold intolerance, heat intolerance and polydipsia  Genitourinary: Negative for difficulty urinating  Musculoskeletal: Negative for arthralgias, gait problem, joint swelling and myalgias     Skin: Negative for color change and pallor  Allergic/Immunologic: Negative for immunocompromised state  Neurological: Negative for dizziness, seizures, facial asymmetry, weakness, light-headedness, numbness and headaches  Psychiatric/Behavioral: Negative for agitation, behavioral problems, confusion, decreased concentration and dysphoric mood  All other systems reviewed and are negative  Physical Exam  Physical Exam  Vitals and nursing note reviewed  Constitutional:       General: He is in acute distress  Appearance: He is well-developed  He is ill-appearing  Comments: Acute distress secondary to hypoxia   HENT:      Head: Normocephalic  Eyes:      Pupils: Pupils are equal, round, and reactive to light  Cardiovascular:      Rate and Rhythm: Regular rhythm  Tachycardia present  Pulmonary:      Effort: Tachypnea and respiratory distress present  Breath sounds: Examination of the right-upper field reveals decreased breath sounds  Examination of the left-upper field reveals decreased breath sounds  Examination of the right-middle field reveals decreased breath sounds  Examination of the left-middle field reveals decreased breath sounds  Examination of the right-lower field reveals decreased breath sounds  Examination of the left-lower field reveals decreased breath sounds  Decreased breath sounds present  Chest:      Chest wall: Deformity present  No mass  Abdominal:      General: Bowel sounds are normal       Palpations: Abdomen is soft  Musculoskeletal:         General: Normal range of motion  Cervical back: Normal range of motion and neck supple  Skin:     General: Skin is warm  Capillary Refill: Capillary refill takes less than 2 seconds     Psychiatric:         Mood and Affect: Mood normal          Vital Signs  ED Triage Vitals [03/21/23 1242]   Temperature Pulse Respirations Blood Pressure SpO2   (!) 97 3 °F (36 3 °C) (!) 110 20 92/69 (!) 78 %      Temp Source Heart Rate Source Patient Position - Orthostatic VS BP Location FiO2 (%)   Tympanic Monitor Sitting Left arm --      Pain Score       2           Vitals:    03/21/23 1415 03/21/23 1430 03/21/23 1445 03/21/23 1500   BP: 139/74 145/78 108/65 134/82   Pulse: (!) 108 (!) 115 102 102   Patient Position - Orthostatic VS: Sitting Sitting Sitting Lying         Visual Acuity      ED Medications  Medications   vancomycin (VANCOCIN) IVPB (premix in dextrose) 1,000 mg 200 mL (1,000 mg Intravenous New Bag 3/21/23 1431)   heparin (porcine) 25,000 units in 0 45% NaCl 250 mL infusion (premix) (18 Units/kg/hr × 70 kg (Order-Specific) Intravenous New Bag 3/21/23 1500)   heparin (porcine) injection 5,600 Units (has no administration in time range)   heparin (porcine) injection 2,800 Units (has no administration in time range)   sodium chloride 0 9 % bolus 1,000 mL (0 mL Intravenous Stopped 3/21/23 1339)     Followed by   sodium chloride 0 9 % bolus 1,000 mL (0 mL Intravenous Stopped 3/21/23 1339)     Followed by   sodium chloride 0 9 % bolus 1,000 mL (0 mL Intravenous Stopped 3/21/23 1357)   iohexol (OMNIPAQUE) 350 MG/ML injection (SINGLE-DOSE) 85 mL (85 mL Intravenous Given 3/21/23 1345)   cefepime (MAXIPIME) IVPB (premix in dextrose) 2,000 mg 50 mL (0 mg Intravenous Stopped 3/21/23 1426)   heparin (porcine) injection 5,600 Units (5,600 Units Intravenous Given 3/21/23 1457)       Diagnostic Studies  Results Reviewed     Procedure Component Value Units Date/Time    HS Troponin I 4hr [[id number]]     Lab Status: No result Specimen: Blood     Lactic acid 2 Hours [[id number]] Collected: 03/21/23 1452    Lab Status: In process Specimen: Blood from Arm, Right Updated: 03/21/23 1455    HS Troponin I 2hr [[id number]] Collected: 03/21/23 1452    Lab Status:  In process Specimen: Blood from Arm, Right Updated: 03/21/23 1455    CBC and differential [[id number]]  (Abnormal) Collected: 03/21/23 1303    Lab Status: Final result Specimen: Blood from Arm, Right Updated: 03/21/23 1418     WBC 11 09 Thousand/uL      RBC 4 12 Million/uL      Hemoglobin 11 0 g/dL      Hematocrit 37 2 %      MCV 90 fL      MCH 26 7 pg      MCHC 29 6 g/dL      RDW 24 5 %      MPV 9 0 fL      Platelets 579 Thousands/uL      nRBC 0 /100 WBCs      Neutrophils Relative 90 %      Immat GRANS % 1 %      Lymphocytes Relative 4 %      Monocytes Relative 5 %      Eosinophils Relative 0 %      Basophils Relative 0 %      Neutrophils Absolute 10 03 Thousands/µL      Immature Grans Absolute 0 07 Thousand/uL      Lymphocytes Absolute 0 40 Thousands/µL      Monocytes Absolute 0 57 Thousand/µL      Eosinophils Absolute 0 00 Thousand/µL      Basophils Absolute 0 02 Thousands/µL     Narrative: This is an appended report  These results have been appended to a previously verified report  FLU/RSV/COVID - if FLU/RSV clinically relevant [[id number]]  (Normal) Collected: 03/21/23 1303    Lab Status: Final result Specimen: Nares from Nose Updated: 03/21/23 1352     SARS-CoV-2 Negative     INFLUENZA A PCR Negative     INFLUENZA B PCR Negative     RSV PCR Negative    Narrative:      FOR PEDIATRIC PATIENTS - copy/paste COVID Guidelines URL to browser: [email or web address]    SARS-CoV-2 assay is a Nucleic Acid Amplification assay intended for the  qualitative detection of nucleic acid from SARS-CoV-2 in nasopharyngeal  swabs  Results are for the presumptive identification of SARS-CoV-2 RNA  Positive results are indicative of infection with SARS-CoV-2, the virus  causing COVID-19, but do not rule out bacterial infection or co-infection  with other viruses  Laboratories within the [place] and its  territories are required to report all positive results to the appropriate  public health authorities  Negative results do not preclude SARS-CoV-2  infection and should not be used as the sole basis for treatment or other  patient management decisions  Negative results must be combined with  clinical observations, patient history, and epidemiological information  This test has not been FDA cleared or approved  This test has been authorized by FDA under an Emergency Use Authorization  (EUA)  This test is only authorized for the duration of time the  declaration that circumstances exist justifying the authorization of the  emergency use of an in vitro diagnostic tests for detection of SARS-CoV-2  virus and/or diagnosis of COVID-19 infection under section 564(b)(1) of  the Act, 21 U  S C  640TDT-6(I)(5), unless the authorization is terminated  or revoked sooner  The test has been validated but independent review by FDA  and CLIA is pending  Test performed using [employer] GeneXpert: This RT-PCR assay targets N2,  a region unique to SARS-CoV-2  A conserved region in the E-gene was chosen  for pan-Sarbecovirus detection which includes SARS-CoV-2  According to CMS-2020-01-R, this platform meets the definition of high-throughput technology  Lactic acid [[id number]]  (Abnormal) Collected: 03/21/23 1303    Lab Status: Final result Specimen: Blood from Arm, Right Updated: 03/21/23 1348     LACTIC ACID 2 1 mmol/L     Narrative:      Result may be elevated if tourniquet was used during collection      Procalcitonin [[id number]]  (Abnormal) Collected: 03/21/23 1303    Lab Status: Final result Specimen: Blood from Arm, Right Updated: 03/21/23 1339     Procalcitonin 0 59 ng/ml     HS Troponin 0hr (reflex protocol) [[id number]]  (Normal) Collected: 03/21/23 1303    Lab Status: Final result Specimen: Blood from Arm, Right Updated: 03/21/23 1336     hs TnI 0hr 11 ng/L     Comprehensive metabolic panel [[id number]]  (Abnormal) Collected: 03/21/23 1303    Lab Status: Final result Specimen: Blood from Arm, Right Updated: 03/21/23 1332     Sodium 133 mmol/L      Potassium 4 2 mmol/L      Chloride 91 mmol/L      CO2 32 mmol/L      ANION GAP 10 mmol/L      BUN 19 mg/dL Creatinine 0 77 mg/dL      Glucose 173 mg/dL      Calcium 9 8 mg/dL      Corrected Calcium 10 3 mg/dL      AST 17 U/L      ALT 17 U/L      Alkaline Phosphatase 108 U/L      Total Protein 6 7 g/dL      Albumin 3 4 g/dL      Total Bilirubin 0 54 mg/dL      eGFR 88 ml/min/1 73sq m     Narrative:      [facility], [place], operated by [facility] guidelines for Chronic Kidney Disease (CKD):   •  Stage 1 with normal or high GFR (GFR > 90 mL/min/1 73 square meters)  •  Stage 2 Mild CKD (GFR = 60-89 mL/min/1 73 square meters)  •  Stage 3A Moderate CKD (GFR = 45-59 mL/min/1 73 square meters)  •  Stage 3B Moderate CKD (GFR = 30-44 mL/min/1 73 square meters)  •  Stage 4 Severe CKD (GFR = 15-29 mL/min/1 73 square meters)  •  Stage 5 End Stage CKD (GFR <15 mL/min/1 73 square meters)  Note: GFR calculation is accurate only with a steady state creatinine    Magnesium [[id number]]  (Abnormal) Collected: 03/21/23 1303    Lab Status: Final result Specimen: Blood from Arm, Right Updated: 03/21/23 1332     Magnesium 1 8 mg/dL     Protime-INR [[id number]]  (Normal) Collected: 03/21/23 1303    Lab Status: Final result Specimen: Blood from Arm, Right Updated: 03/21/23 1326     Protime 14 1 seconds      INR 1 09    APTT [[id number]]  (Normal) Collected: 03/21/23 1303    Lab Status: Final result Specimen: Blood from Arm, Right Updated: 03/21/23 1326     PTT 27 seconds     Blood culture #1 [[id number]] Collected: 03/21/23 1303    Lab Status: In process Specimen: Blood from Arm, Right Updated: 03/21/23 1307    Blood culture #2 [[id number]] Collected: 03/21/23 1303    Lab Status: In process Specimen: Blood from Arm, Left Updated: 03/21/23 1307                 CTA ED chest PE Study   Final Result by Ruddy Nowak MD (03/21 1429)      1  Filling defects involving the anterior-basilar and lateral-basilar segments of the left lower lobe, as and inferior lingular segment of the left upper lobe, compatible with acute pulmonary emboli   The calculated ratio of right ventricular to left    ventricular diameter (RV/LV ratio) is 1 06      This is greater than 0 9, which is abnormal and indicates right heart strain  An abnormal RV/LV ratio has been shown to be associated with an increased risk of 30 day mortality in the setting of acute pulmonary embolism  2   Multifocal aspiration bronchopneumonia with bibasilar predominance  Multiple areas of mucous plugging in the lower lung zones  There are are approximately 5 cavitary lesions mostly involving the lingula and the left lower lobe measuring up to 1 cm,    which may represent septic emboli or small foci of cavitary pneumonia  3   Small loculated right basilar hydropneumothorax with pleural thickening, suspicious for empyema and    bronchopleural fistula  I personally discussed this study with Juan Vasquez on 3/21/2023 at 2:25 PM                Workstation performed: [id number]         XR chest portable   Final Result by Domo Villeda MD (03/21 6850)      Bibasilar nodularity, better shown on CT, increased from 2022, likely aspiration  Small likely ex vacuo right hydropneumothorax on CT not visible                 Workstation performed: [id number]                    Procedures  ECG 12 Lead Documentation Only    Date/Time: 3/21/2023 12:52 PM  Performed by: Maki Peters MD  Authorized by: Maki Peters MD     ECG reviewed by me, the ED Provider: yes    Patient location:  ED  Previous ECG:     Previous ECG:  Unavailable  Interpretation:     Interpretation: abnormal    Rate:     ECG rate:  110    ECG rate assessment: tachycardic    Rhythm:     Rhythm: sinus tachycardia    Ectopy:     Ectopy: none    QRS:     QRS axis:  Normal  Conduction:     Conduction: normal    Other findings:     Other findings comment:  Right bundle    CriticalCare Time    Date/Time: 3/21/2023 2:54 PM  Performed by: Maki Peters MD  Authorized by: Maki Peters MD     Critical care provider statement:     Critical care time (minutes):  90    Critical care time was exclusive of:  Separately billable procedures and treating other patients and teaching time    Critical care was necessary to treat or prevent imminent or life-threatening deterioration of the following conditions:  Circulatory failure, sepsis and respiratory failure    Critical care was time spent personally by me on the following activities:  Blood draw for specimens, obtaining history from patient or surrogate, development of treatment plan with patient or surrogate, discussions with consultants, evaluation of patient's response to treatment, ordering and performing treatments and interventions, ordering and review of laboratory studies, ordering and review of radiographic studies, review of old charts and examination of patient    I assumed direction of critical care for this patient from another provider in my specialty: no               ED Course  ED Course as of 03/21/23 1511   Tue Mar 21, 2023   1316 WBC(!): 11 09   1334 Magnesium(!): 1 8   1334 Sodium(!): 133   1334 Creatinine: 0 77   1336 hs TnI 0hr: 69   1043 Patient seen and evaluated resting comfortably no acute distress pulse ox only 92% on nasal cannula  1340 Procalcitonin(!): 0 59   1413 Formal x-ray reading noted   bibasilar nodularity, better shown on CT, increased from 2022, likely aspiration      Small likely ex vacuo right hydropneumothorax on CT not visible  1423 Spoke with rads:   multiple segmental Pes  1 05 LV/RV ratio and multilobar PNA  Hydropnuemo thorax on the right  1995 [facility] text to critical care to discuss case   [phone] Case discussed with critical care recommending admission to the floor and possible bronchoscopy tomorrow  Also recommending initiation of heparin  Jiela Frey text sent to hospitalist to discuss   5 Family updated at bedside and in agreement for plan for admission and initiation of heparin drip   1504 Discussed case with ICU nurse practitioner at bedside HEART Risk Score    Flowsheet Row Most Recent Value   Heart Score Risk Calculator    History 0 Filed at: 03/21/2023 1337   ECG 0 Filed at: 03/21/2023 1337   Age 2 Filed at: 03/21/2023 1337   Risk Factors 2 Filed at: 03/21/2023 1337   Troponin 0 Filed at: 03/21/2023 1337   HEART Score 4 Filed at: 03/21/2023 1337                        SBIRT 22yo+    Flowsheet Row Most Recent Value   SBIRT (23 yo +)    In order to provide better care to our patients, we are screening all of our patients for alcohol and drug use  Would it be okay to ask you these screening questions? Yes Filed at: 03/21/2023 1310   Initial Alcohol Screen: US AUDIT-C     1  How often do you have a drink containing alcohol? 0 Filed at: 03/21/2023 1310   2  How many drinks containing alcohol do you have on a typical day you are drinking? 0 Filed at: 03/21/2023 1310   3a  Male UNDER 65: How often do you have five or more drinks on one occasion? 0 Filed at: 03/21/2023 1310   3b  FEMALE Any Age, or MALE 65+: How often do you have 4 or more drinks on one occassion? 0 Filed at: 03/21/2023 1310   Audit-C Score 0 Filed at: 03/21/2023 1310   MARKO: How many times in the past year have you    Used an illegal drug or used a prescription medication for non-medical reasons? Never Filed at: 03/21/2023 1310                    Medical Decision Making  72-year-old male with past medical history of lung cancer presents with acute on chronic shortness of breath, hypoxia and generalized malaise  Differential broad but includes pneumonia, worsening malignancy, viral infection, PE  We will proceed with septic work-up as well as ACS work-up given risk factors  With new hypoxemia anticipate admission    Amount and/or Complexity of Data Reviewed  Labs: ordered  Decision-making details documented in ED Course  Radiology: ordered            Disposition  Final diagnoses:   Multiple subsegmental pulmonary emboli without acute cor pulmonale (HCC)   Hypoxia   Acute respiratory failure with hypoxia ([address] )   Pneumonia   Sepsis ([address] )   Hydropneumothorax     Time reflects when diagnosis was documented in both MDM as applicable and the Disposition within this note     Time User Action Codes Description Comment    3/21/2023  2:53 PM Foster Dust Add [I26 94] Multiple subsegmental pulmonary emboli without acute cor pulmonale ([address] )     3/21/2023  2:53 PM Foster Dust Add [R09 02] Hypoxia     3/21/2023  2:53 PM Yesenia Cordero Add [J96 01] Acute respiratory failure with hypoxia ([address] )     3/21/2023  2:53 PM Foster Dust Add [J18 9] Pneumonia     3/21/2023  2:53 PM Foster Dust Add [A41 9] Sepsis ([address] )     3/21/2023  2:53 PM Foster Dust Modify [I26 94] Multiple subsegmental pulmonary emboli without acute cor pulmonale ([facility] )     3/21/2023  2:53 PM Foster Dust Modify [J96 01] Acute respiratory failure with hypoxia ([address] )     3/21/2023  2:53 PM Foster Dust Add [J94 8] Hydropneumothorax       ED Disposition     ED Disposition   Admit    Condition   Stable    Date/Time   Tue Mar 21, 2023  3:00 PM    Comment   Case was discussed            Follow-up Information    None         Patient's Medications   Discharge Prescriptions    No medications on file       No discharge procedures on file      PDMP Review       Value Time User    PDMP Reviewed  Yes 1/11/2022 12:15 PM Allison June PA-C          ED Provider  Electronically Signed by           Eula Pierre MD  03/21/23 5076

## 2023-03-21 NOTE — ASSESSMENT & PLAN NOTE
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

## 2023-03-21 NOTE — ASSESSMENT & PLAN NOTE
· Most likely secondary to aspiration versus community-acquired  · CT chest-multifocal aspiration pneumonia with multiple areas of mucous plugging    Also small loculated right basilar hydropneumothorax with pleural thickening suspicious for empyema and bronchopleural fistula  · Sputum culture pending  · Strep and Legionella pending  · Continue cefepime and Vanco

## 2023-03-21 NOTE — ASSESSMENT & PLAN NOTE
· As evidenced by tachycardia hypoxia, leukocytosis and lactic acidosis  · Fluid resuscitated with 3 L of normal saline in the ED  · Suspect multifactorial pneumonia most likely aspiration  · Blood cultures x2 pending  · Pro-Alvarez 0 59, trend  · LA 2 1, cleared  · Urine strep and Legionella MRSA pending  · Sputum cx pending  · UA pending  · Started on cefepime and Vanco in ED we will continue  · Trend WBC and fever curve

## 2023-03-21 NOTE — ASSESSMENT & PLAN NOTE
· Multifactorial, pneumonia, PE, mucous plugging  · CT PE study with multifocal factorial pneumonia, PEs, mucous plugging, and a right basilar hydropneumothorax  · Wean oxygen for an SPO2 greater than 88%  · Incentive spirometry flutter valve  · Pulmonary consulted for bronchoscopy tomorrow

## 2023-03-21 NOTE — ASSESSMENT & PLAN NOTE
Lab Results   Component Value Date    HGBA1C 5 8 (H) 12/30/2022       No results for input(s): POCGLU in the last 72 hours      Blood Sugar Average: Last 72 hrs:  · Holding home metformin  · Continue sliding scale and Accu-Cheks every 6 hours while NPO

## 2023-03-21 NOTE — ASSESSMENT & PLAN NOTE
· Hx of hydropneumothorax  · Recently admitted to [facility] on December 29, 2021 to and had a pigtail chest tube placed by CT surgery    Remained in for 2 days and was discontinued secondary to decreased output  · Exudative and suspicious for malignancy-unable to find recent cytology  · CT PE study with small loculated right basilar hydropneumothorax with pleural thickening suspicious for empyema and bronchopleural fistula  · Pulmonary consulted for possible chest tube and drainage

## 2023-03-21 NOTE — ASSESSMENT & PLAN NOTE
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

## 2023-03-21 NOTE — ASSESSMENT & PLAN NOTE
· Complained of sudden onset of dyspnea yesterday-found to have a low SPO2 in the 80s  · Used home O2 with improvement to high 80s  · CT PE-Filling defects involving the anterior-basilar and lateral-basilar segments of the left lower lobe, as and inferior lingular segment of the left upper lobe, compatible with acute pulmonary emboli  · Echo from 12/22/2022 ejection fraction is 70%   Systolic function is normal  NRWM  · Repeat Echo pending  · Continue heparin gtt via PE protocol

## 2023-03-21 NOTE — H&P
Zackary 47  H&P- Elesa Grooms 1945, 78 y o  male MRN: [id number]  Unit/Bed#: ICU 06-01 Encounter: [id number]  Primary Care Provider: Pino Marcum MD   Date and time admitted to hospital: 3/21/2023 12:44 PM    * Acute respiratory failure with hypoxia [facility])  Assessment & Plan  · Multifactorial, pneumonia, PE, mucous plugging  · CT PE study with multifocal factorial pneumonia, PEs, mucous plugging, and a right basilar hydropneumothorax  · Wean oxygen for an SPO2 greater than 88%  · Incentive spirometry flutter valve  · Pulmonary consulted for bronchoscopy tomorrow    PNA (pneumonia)  Assessment & Plan  · Most likely secondary to aspiration versus community-acquired  · CT chest-multifocal aspiration pneumonia with multiple areas of mucous plugging  Also small loculated right basilar hydropneumothorax with pleural thickening suspicious for empyema and bronchopleural fistula  · Sputum culture pending  · Strep and Legionella pending  · Continue cefepime and Vanco    Hydropneumothorax  Assessment & Plan  · Hx of hydropneumothorax  · Recently admitted to [facility] on December 29, 2021 to and had a pigtail chest tube placed by CT surgery    Remained in for 2 days and was discontinued secondary to decreased output  · Exudative and suspicious for malignancy-unable to find recent cytology  · CT PE study with small loculated right basilar hydropneumothorax with pleural thickening suspicious for empyema and bronchopleural fistula  · Pulmonary consulted for possible chest tube and drainage    Pressure injury of skin of buttock  Assessment & Plan  · POA  · Inpatient wound care consult  · Allyvn prn  · Frequent repositioning and off loading pressure points    Hyperlipemia  Assessment & Plan  · Continue home statin    Other pulmonary embolism without acute cor pulmonale (HCC)  Assessment & Plan  · Complained of sudden onset of dyspnea yesterday-found to have a low SPO2 in the 80s  · Used home O2 with improvement to high 80s  · CT PE-Filling defects involving the anterior-basilar and lateral-basilar segments of the left lower lobe, as and inferior lingular segment of the left upper lobe, compatible with acute pulmonary emboli  · Echo from 12/22/2022 ejection fraction is 70%  Systolic function is normal  NRWM  · Repeat Echo pending  · Continue heparin gtt via PE protocol      Sepsis ([facility] )  Assessment & Plan  · As evidenced by tachycardia hypoxia, leukocytosis and lactic acidosis  · Fluid resuscitated with 3 L of normal saline in the ED  · Suspect multifactorial pneumonia most likely aspiration  · Blood cultures x2 pending  · Pro-Alvarez 0 59, trend  · LA 2 1, cleared  · Urine strep and Legionella MRSA pending  · Sputum cx pending  · UA pending  · Started on cefepime and Vanco in ED we will continue  · Trend WBC and fever curve    Severe protein-calorie malnutrition ([facility] )  Assessment & Plan  Malnutrition Findings:                                 BMI Findings: Body mass index is 17 18 kg/m²  · Has PEG tube secondary to dysphagia through which he gets Jevity tube feeds  · Nutrition consult    Neoplasm of lung  Assessment & Plan  · 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 mediastinal and cardiophrenic lymph nodes  · Follows with [facility] oncology  · Completed almost 2 complete rounds of chemotherapy-next round of chemotherapy to start at the beginning of April  · We will continue home prednisone    Type 2 diabetes mellitus without complication, without long-term current use of insulin ([facility] )  Assessment & Plan  Lab Results   Component Value Date    HGBA1C 5 8 (H) 12/30/2022       No results for input(s): POCGLU in the last 72 hours      Blood Sugar Average: Last 72 hrs:  · Holding home metformin  · Continue sliding scale and Accu-Cheks every 6 hours while NPO    Dysphagia, unspecified  Assessment & Plan  · Secondary "chemotherapy and 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  · Has PEG tube in place  · Jevity tube feeds at home-currently n p o  for bronchoscopy for tomorrow    VTE Prophylaxis: Heparin Drip  / sequential compression device   Code Status: Level 3 DNR\DNI  POLST: There is no POLST form on file for this patient (pre-hospital)  Discussion with family: Wife    Anticipated Length of Stay:  Patient will be admitted on an Inpatient basis with an anticipated length of stay of  > 2 midnights  Justification for Hospital Stay: Acute respiratory failure, multifactorial pneumonia, PE, hydropneumothorax    Total Time for Visit, including Counseling / Coordination of Care: 45 minutes  Greater than 50% of this total time spent on direct patient counseling and coordination of care  Chief Complaint:   \" Dyspnea\"    History of Present Illness:    Alisa Ramsay is a 78 y o  male with a significant past medical history for bladder cancer status post surgery in 2006, A-fib, diabetes type 2, HLD, former smoker, history of SCC base of tongue status post chemo\radiation, recent PET scan with right lung CA metastasis status post multiple thoracentesis and recent chest tube on 12/29/2022 at [address] who presents with dyspnea  Patient reports he had a sudden onset of increased dyspnea that started last night and was found to have with a pulse ox in the low 80s  He reports moist productive cough of white mucus  In the ED CT PE revealed multifactorial pneumonia, PE, mucous plugging and a right basilar hydropneumothorax  He was started on a heparin drip per PE protocol  He also met sepsis criteria and was started on cefepime and Vanco   Pulmonary was consulted  Patient for a bronchoscopy tomorrow  He admitted to the ICU to stepdown level 2 for further monitoring and management  Review of Systems:    Review of Systems   Constitutional: Positive for appetite change and fatigue  Negative for fever     HENT: " Negative for sore throat  Respiratory: Positive for cough and shortness of breath  Negative for chest tightness and wheezing  Cardiovascular: Negative for chest pain, palpitations and leg swelling  Gastrointestinal: Negative for abdominal distention, abdominal pain, constipation, diarrhea, nausea and vomiting  Genitourinary: Negative for difficulty urinating  Musculoskeletal: Positive for gait problem  Negative for arthralgias  Skin: Positive for wound  Neurological: Negative for dizziness, syncope, speech difficulty, weakness, light-headedness, numbness and headaches  Psychiatric/Behavioral: The patient is not nervous/anxious  All other systems reviewed and are negative  Past Medical and Surgical History:     Past Medical History:   Diagnosis Date   • Cancer ([facility] )    • Diabetes ([facility] )    • Gastritis    • GERD (gastroesophageal reflux disease)    • History of bladder cancer 2010    treated with surgery   • Hypercholesterolemia    • Hypertension    • Hyponatremia 12/22/2021   • Lactic acidosis 12/22/2021       Past Surgical History:   Procedure Laterality Date   • BLADDER TUMOR EXCISION     • EGD     • IR CHOLECYSTOSTOMY TUBE CHECK/CHANGE/REPOSITION/REINSERTION/UPSIZE  1/5/2022   • IR CHOLECYSTOSTOMY TUBE CHECK/CHANGE/REPOSITION/REINSERTION/UPSIZE  3/4/2022   • IR CHOLECYSTOSTOMY TUBE CHECK/CHANGE/REPOSITION/REINSERTION/UPSIZE  3/24/2022   • IR CHOLECYSTOSTOMY TUBE PLACEMENT  12/27/2021       Meds/Allergies:    Prior to Admission medications    Medication Sig Start Date End Date Taking?  Authorizing Provider   famotidine (PEPCID) 40 MG tablet Take 40 mg by mouth 2 (two) times a day   Yes Historical Provider, MD   fluconazole (DIFLUCAN) 40 mg/mL suspension Take by mouth daily   Yes Historical Provider, MD   ondansetron (ZOFRAN) 4 mg tablet Take 4 mg by mouth every 8 (eight) hours as needed for nausea or vomiting   Yes Historical Provider, MD   predniSONE 5 mg tablet Take by mouth daily   Yes "Historical Provider, MD   atorvastatin (LIPITOR) 20 mg tablet Take 1 tablet (20 mg total) by mouth daily with dinner 22  [facility], DO   fluticasone [facility]) 50 mcg/act nasal spray 1 spray into each nostril daily    Historical Provider, MD   Metformin HCl (RIOMET) 500 MG/5ML oral solution Take 500 mg by mouth 2 (two) times a day    Historical Provider, MD   sodium chloride, PF, 0 9 % 10 mL by Intracatheter route daily Intracatheter flushing daily 12/30/21 3/30/22  Jessika Angel PA-C     I have reviewed home medications with patient family member  Allergies: No Known Allergies    Social History:     Marital Status: /Civil Union   Occupation: Retired  Patient Pre-hospital Living Situation: Lives with spouse  Patient Pre-hospital Level of Mobility: Ambulatory with a walker  Patient Pre-hospital Diet Restrictions: Tube feedings  Substance Use History:   Social History     Substance and Sexual Activity   Alcohol Use Never     Social History     Tobacco Use   Smoking Status Former   • Types: Cigarettes   • Quit date: 2021   • Years since quittin 5   • Passive exposure: Past   Smokeless Tobacco Never     Social History     Substance and Sexual Activity   Drug Use Never       Family History:    non-contributory    Physical Exam:     Vitals:   Blood Pressure: 123/76 (23 1545)  Pulse: 72 (23 1545)  Temperature: (!) 97 °F (36 1 °C) (23 1608)  Temp Source: Oral (23 1608)  Respirations: 16 (23 1545)  Height: 6' 2\" (188 cm) (23 1608)  Weight - Scale: 60 7 kg (133 lb 13 1 oz) (23 1608)  SpO2: 100 % (23 1545)    Physical Exam  Constitutional:       Appearance: He is underweight  He is ill-appearing  HENT:      Head: Normocephalic and atraumatic  Mouth/Throat:      Mouth: Mucous membranes are dry  Eyes:      Extraocular Movements: Extraocular movements intact        Conjunctiva/sclera: Conjunctivae normal       Pupils: Pupils are " equal, round, and reactive to light  Cardiovascular:      Rate and Rhythm: Normal rate and regular rhythm  Pulses: Normal pulses  Pulmonary:      Effort: No respiratory distress  Abdominal:      General: Abdomen is flat  Bowel sounds are normal  There is no distension  Palpations: Abdomen is soft  Tenderness: There is no abdominal tenderness  Comments: PEG tube C/D/I   Musculoskeletal:         General: No swelling  Normal range of motion  Cervical back: Normal range of motion  Right lower leg: No edema  Left lower leg: No edema  Skin:     General: Skin is warm and dry  Capillary Refill: Capillary refill takes less than 2 seconds  Findings: Bruising present  Neurological:      Mental Status: He is oriented to person, place, and time  GCS: GCS eye subscore is 4  GCS verbal subscore is 5  GCS motor subscore is 6  Cranial Nerves: Cranial nerves 2-12 are intact  Psychiatric:         Mood and Affect: Mood normal          Behavior: Behavior normal          Thought Content: Thought content normal          Judgment: Judgment normal            Additional Data:     Lab Results: I have personally reviewed pertinent reports        Results from last 7 days   Lab Units 03/21/23  1303   WBC Thousand/uL 11 09*   HEMOGLOBIN g/dL 11 0*   HEMATOCRIT % 37 2   PLATELETS Thousands/uL 299   NEUTROS PCT % 90*   LYMPHS PCT % 4*   MONOS PCT % 5   EOS PCT % 0     Results from last 7 days   Lab Units 03/21/23  1303   SODIUM mmol/L 133*   POTASSIUM mmol/L 4 2   CHLORIDE mmol/L 91*   CO2 mmol/L 32   BUN mg/dL 19   CREATININE mg/dL 0 77   ANION GAP mmol/L 10   CALCIUM mg/dL 9 8   ALBUMIN g/dL 3 4*   TOTAL BILIRUBIN mg/dL 0 54   ALK PHOS U/L 108*   ALT U/L 17   AST U/L 17   GLUCOSE RANDOM mg/dL 173*     Results from last 7 days   Lab Units 03/21/23  1303   INR  1 09             Results from last 7 days   Lab Units 03/21/23  1452 03/21/23  1303   LACTIC ACID mmol/L 2 0 2 1* PROCALCITONIN ng/ml  --  0 59*       Imaging: I have personally reviewed pertinent reports  and I have personally reviewed pertinent films in PACS    CTA ED chest PE Study   Final Result by Iris Yoo MD (03/21 2690)      1  Filling defects involving the anterior-basilar and lateral-basilar segments of the left lower lobe, as and inferior lingular segment of the left upper lobe, compatible with acute pulmonary emboli  The calculated ratio of right ventricular to left    ventricular diameter (RV/LV ratio) is 1 06      This is greater than 0 9, which is abnormal and indicates right heart strain  An abnormal RV/LV ratio has been shown to be associated with an increased risk of 30 day mortality in the setting of acute pulmonary embolism  2   Multifocal aspiration bronchopneumonia with bibasilar predominance  Multiple areas of mucous plugging in the lower lung zones  There are are approximately 5 cavitary lesions mostly involving the lingula and the left lower lobe measuring up to 1 cm,    which may represent septic emboli or small foci of cavitary pneumonia  3   Small loculated right basilar hydropneumothorax with pleural thickening, suspicious for empyema and    bronchopleural fistula  I personally discussed this study with Neptali Canela on 3/21/2023 at 2:25 PM                Workstation performed: [id number]         XR chest portable   Final Result by Adrián Jewell MD (03/21 5311)      Bibasilar nodularity, better shown on CT, increased from 2022, likely aspiration  Small likely ex vacuo right hydropneumothorax on CT not visible  Workstation performed: [id number]             EKG, Pathology, and Other Studies Reviewed on Admission:   · EKG: Sinus rhythm    Allscripts / Epic Records Reviewed: Yes     ** Please Note: This note has been constructed using a voice recognition system   **

## 2023-03-22 ENCOUNTER — APPOINTMENT (INPATIENT)
Dept: GASTROENTEROLOGY | Facility: HOSPITAL | Age: 78
End: 2023-03-22
Attending: INTERNAL MEDICINE

## 2023-03-22 ENCOUNTER — ANESTHESIA EVENT (INPATIENT)
Dept: GASTROENTEROLOGY | Facility: HOSPITAL | Age: 78
End: 2023-03-22

## 2023-03-22 ENCOUNTER — APPOINTMENT (INPATIENT)
Dept: NON INVASIVE DIAGNOSTICS | Facility: HOSPITAL | Age: 78
End: 2023-03-22

## 2023-03-22 ENCOUNTER — ANESTHESIA (INPATIENT)
Dept: GASTROENTEROLOGY | Facility: HOSPITAL | Age: 78
End: 2023-03-22

## 2023-03-22 LAB
ALBUMIN SERPL BCP-MCNC: 2.7 G/DL (ref 3.5–5)
ALP SERPL-CCNC: 84 U/L (ref 34–104)
ALT SERPL W P-5'-P-CCNC: 14 U/L (ref 7–52)
ANION GAP SERPL CALCULATED.3IONS-SCNC: 8 MMOL/L (ref 4–13)
ANISOCYTOSIS BLD QL SMEAR: PRESENT
APICAL FOUR CHAMBER EJECTION FRACTION: 59 %
APTT PPP: 73 SECONDS (ref 23–37)
APTT PPP: 79 SECONDS (ref 23–37)
AST SERPL W P-5'-P-CCNC: 18 U/L (ref 13–39)
ATRIAL RATE: 110 BPM
AV LVOT MEAN GRADIENT: 2 MMHG
AV LVOT PEAK GRADIENT: 3 MMHG
BASOPHILS # BLD MANUAL: 0 THOUSAND/UL (ref 0–0.1)
BASOPHILS NFR MAR MANUAL: 0 % (ref 0–1)
BILIRUB SERPL-MCNC: 0.44 MG/DL (ref 0.2–1)
BUN SERPL-MCNC: 14 MG/DL (ref 5–25)
CALCIUM ALBUM COR SERPL-MCNC: 9.6 MG/DL (ref 8.3–10.1)
CALCIUM SERPL-MCNC: 8.6 MG/DL (ref 8.4–10.2)
CHLORIDE SERPL-SCNC: 96 MMOL/L (ref 96–108)
CO2 SERPL-SCNC: 29 MMOL/L (ref 21–32)
CREAT SERPL-MCNC: 0.6 MG/DL (ref 0.6–1.3)
DOP CALC LVOT PEAK VEL VTI: 19.79 CM
DOP CALC LVOT PEAK VEL: 0.89 M/S
E WAVE DECELERATION TIME: 202 MS
EOSINOPHIL # BLD MANUAL: 0 THOUSAND/UL (ref 0–0.4)
EOSINOPHIL NFR BLD MANUAL: 0 % (ref 0–6)
ERYTHROCYTE [DISTWIDTH] IN BLOOD BY AUTOMATED COUNT: 24.7 % (ref 11.6–15.1)
FRACTIONAL SHORTENING: 39 % (ref 28–44)
GFR SERPL CREATININE-BSD FRML MDRD: 98 ML/MIN/1.73SQ M
GLUCOSE SERPL-MCNC: 106 MG/DL (ref 65–140)
GLUCOSE SERPL-MCNC: 108 MG/DL (ref 65–140)
GLUCOSE SERPL-MCNC: 109 MG/DL (ref 65–140)
GLUCOSE SERPL-MCNC: 89 MG/DL (ref 65–140)
GLUCOSE SERPL-MCNC: 91 MG/DL (ref 65–140)
GLUCOSE SERPL-MCNC: 98 MG/DL (ref 65–140)
HCT VFR BLD AUTO: 30.8 % (ref 36.5–49.3)
HGB BLD-MCNC: 9.2 G/DL (ref 12–17)
INR PPP: 1.15 (ref 0.84–1.19)
INTERVENTRICULAR SEPTUM IN DIASTOLE (PARASTERNAL SHORT AXIS VIEW): 0.7 CM
INTERVENTRICULAR SEPTUM: 0.7 CM (ref 0.6–1.1)
LAAS-AP4: 9.9 CM2
LEFT ATRIUM SIZE: 2.8 CM
LEFT INTERNAL DIMENSION IN SYSTOLE: 2.5 CM (ref 2.1–4)
LEFT VENTRICULAR INTERNAL DIMENSION IN DIASTOLE: 4.1 CM (ref 3.5–6)
LEFT VENTRICULAR POSTERIOR WALL IN END DIASTOLE: 0.6 CM
LEFT VENTRICULAR STROKE VOLUME: 50 ML
LVSV (TEICH): 50 ML
LYMPHOCYTES # BLD AUTO: 0.23 THOUSAND/UL (ref 0.6–4.47)
LYMPHOCYTES # BLD AUTO: 3 % (ref 14–44)
LYMPHOCYTES NFR BLD AUTO: 1 %
MACROCYTES BLD QL AUTO: PRESENT
MACROPHAGES NFR FLD: 10 %
MACROPHAGES NFR FLD: 20 %
MACROPHAGES NFR FLD: 28 %
MACROPHAGES NFR FLD: 78 %
MAGNESIUM SERPL-MCNC: 1.9 MG/DL (ref 1.9–2.7)
MCH RBC QN AUTO: 27.2 PG (ref 26.8–34.3)
MCHC RBC AUTO-ENTMCNC: 29.9 G/DL (ref 31.4–37.4)
MCV RBC AUTO: 91 FL (ref 82–98)
MICROCYTES BLD QL AUTO: PRESENT
MONOCYTES # BLD AUTO: 0.3 THOUSAND/UL (ref 0–1.22)
MONOCYTES NFR BLD: 4 % (ref 4–12)
MRSA NOSE QL CULT: NORMAL
MV E'TISSUE VEL-SEP: 7 CM/S
MV PEAK A VEL: 0.59 M/S
MV PEAK E VEL: 62 CM/S
MV STENOSIS PRESSURE HALF TIME: 59 MS
MV VALVE AREA P 1/2 METHOD: 3.73 CM2
NEUTROPHILS # BLD MANUAL: 7.05 THOUSAND/UL (ref 1.85–7.62)
NEUTS BAND NFR BLD MANUAL: 4 % (ref 0–8)
NEUTS SEG NFR BLD AUTO: 21 %
NEUTS SEG NFR BLD AUTO: 72 %
NEUTS SEG NFR BLD AUTO: 80 %
NEUTS SEG NFR BLD AUTO: 89 % (ref 43–75)
NEUTS SEG NFR BLD AUTO: 90 %
P AXIS: 81 DEGREES
PHOSPHATE SERPL-MCNC: 2.7 MG/DL (ref 2.3–4.1)
PLATELET # BLD AUTO: 218 THOUSANDS/UL (ref 149–390)
PLATELET BLD QL SMEAR: ADEQUATE
PMV BLD AUTO: 9 FL (ref 8.9–12.7)
POLYCHROMASIA BLD QL SMEAR: PRESENT
POTASSIUM SERPL-SCNC: 4 MMOL/L (ref 3.5–5.3)
PR INTERVAL: 146 MS
PROCALCITONIN SERPL-MCNC: 0.25 NG/ML
PROCALCITONIN SERPL-MCNC: 0.36 NG/ML
PROT SERPL-MCNC: 5.5 G/DL (ref 6.4–8.4)
PROTHROMBIN TIME: 14.7 SECONDS (ref 11.6–14.5)
QRS AXIS: 92 DEGREES
QRSD INTERVAL: 114 MS
QT INTERVAL: 348 MS
QTC INTERVAL: 470 MS
RBC # BLD AUTO: 3.38 MILLION/UL (ref 3.88–5.62)
RBC MORPH BLD: PRESENT
RIGHT ATRIUM AREA SYSTOLE A4C: 7.7 CM2
SL CV LV EF: 65
SL CV PED ECHO LEFT VENTRICLE DIASTOLIC VOLUME (MOD BIPLANE) 2D: 73 ML
SL CV PED ECHO LEFT VENTRICLE SYSTOLIC VOLUME (MOD BIPLANE) 2D: 23 ML
SODIUM SERPL-SCNC: 133 MMOL/L (ref 135–147)
T WAVE AXIS: 66 DEGREES
TOTAL CELLS COUNTED SPEC: 100
TOXIC GRANULES BLD QL SMEAR: PRESENT
TRICUSPID ANNULAR PLANE SYSTOLIC EXCURSION: 2.1 CM
VANCOMYCIN SERPL-MCNC: 8.4 UG/ML (ref 10–20)
VENTRICULAR RATE: 110 BPM
WBC # BLD AUTO: 7.58 THOUSAND/UL (ref 4.31–10.16)
WBC TOXIC VACUOLES BLD QL SMEAR: PRESENT

## 2023-03-22 PROCEDURE — 0B9F8ZX DRAINAGE OF RIGHT LOWER LUNG LOBE, VIA NATURAL OR ARTIFICIAL OPENING ENDOSCOPIC, DIAGNOSTIC: ICD-10-PCS | Performed by: INTERNAL MEDICINE

## 2023-03-22 PROCEDURE — 0B958ZX DRAINAGE OF RIGHT MIDDLE LOBE BRONCHUS, VIA NATURAL OR ARTIFICIAL OPENING ENDOSCOPIC, DIAGNOSTIC: ICD-10-PCS | Performed by: INTERNAL MEDICINE

## 2023-03-22 PROCEDURE — 0B9D8ZX DRAINAGE OF RIGHT MIDDLE LUNG LOBE, VIA NATURAL OR ARTIFICIAL OPENING ENDOSCOPIC, DIAGNOSTIC: ICD-10-PCS | Performed by: INTERNAL MEDICINE

## 2023-03-22 PROCEDURE — 0W993ZX DRAINAGE OF RIGHT PLEURAL CAVITY, PERCUTANEOUS APPROACH, DIAGNOSTIC: ICD-10-PCS | Performed by: INTERNAL MEDICINE

## 2023-03-22 RX ORDER — OXYCODONE HYDROCHLORIDE 5 MG/1
5 TABLET ORAL EVERY 4 HOURS PRN
Status: DISCONTINUED | OUTPATIENT
Start: 2023-03-22 | End: 2023-03-28 | Stop reason: HOSPADM

## 2023-03-22 RX ORDER — HYDROMORPHONE HCL/PF 1 MG/ML
0.5 SYRINGE (ML) INJECTION
Status: DISCONTINUED | OUTPATIENT
Start: 2023-03-22 | End: 2023-03-22 | Stop reason: HOSPADM

## 2023-03-22 RX ORDER — SODIUM CHLORIDE, SODIUM LACTATE, POTASSIUM CHLORIDE, CALCIUM CHLORIDE 600; 310; 30; 20 MG/100ML; MG/100ML; MG/100ML; MG/100ML
INJECTION, SOLUTION INTRAVENOUS CONTINUOUS PRN
Status: DISCONTINUED | OUTPATIENT
Start: 2023-03-22 | End: 2023-03-22

## 2023-03-22 RX ORDER — METOCLOPRAMIDE HYDROCHLORIDE 5 MG/ML
10 INJECTION INTRAMUSCULAR; INTRAVENOUS ONCE AS NEEDED
Status: DISCONTINUED | OUTPATIENT
Start: 2023-03-22 | End: 2023-03-22 | Stop reason: HOSPADM

## 2023-03-22 RX ORDER — FENTANYL CITRATE/PF 50 MCG/ML
50 SYRINGE (ML) INJECTION
Status: DISCONTINUED | OUTPATIENT
Start: 2023-03-22 | End: 2023-03-22 | Stop reason: HOSPADM

## 2023-03-22 RX ORDER — LIDOCAINE HYDROCHLORIDE 20 MG/ML
INJECTION, SOLUTION EPIDURAL; INFILTRATION; INTRACAUDAL; PERINEURAL AS NEEDED
Status: DISCONTINUED | OUTPATIENT
Start: 2023-03-22 | End: 2023-03-22

## 2023-03-22 RX ORDER — ONDANSETRON 2 MG/ML
INJECTION INTRAMUSCULAR; INTRAVENOUS AS NEEDED
Status: DISCONTINUED | OUTPATIENT
Start: 2023-03-22 | End: 2023-03-22

## 2023-03-22 RX ORDER — PROPOFOL 10 MG/ML
INJECTION, EMULSION INTRAVENOUS AS NEEDED
Status: DISCONTINUED | OUTPATIENT
Start: 2023-03-22 | End: 2023-03-22

## 2023-03-22 RX ORDER — GUAIFENESIN 600 MG/1
600 TABLET, EXTENDED RELEASE ORAL EVERY 12 HOURS SCHEDULED
Status: DISCONTINUED | OUTPATIENT
Start: 2023-03-22 | End: 2023-03-22

## 2023-03-22 RX ORDER — GUAIFENESIN 600 MG/1
1200 TABLET, EXTENDED RELEASE ORAL EVERY 12 HOURS SCHEDULED
Status: DISCONTINUED | OUTPATIENT
Start: 2023-03-22 | End: 2023-03-23

## 2023-03-22 RX ORDER — EPHEDRINE SULFATE 50 MG/ML
INJECTION INTRAVENOUS AS NEEDED
Status: DISCONTINUED | OUTPATIENT
Start: 2023-03-22 | End: 2023-03-22

## 2023-03-22 RX ORDER — PROPOFOL 10 MG/ML
INJECTION, EMULSION INTRAVENOUS CONTINUOUS PRN
Status: DISCONTINUED | OUTPATIENT
Start: 2023-03-22 | End: 2023-03-22

## 2023-03-22 RX ORDER — LEVALBUTEROL INHALATION SOLUTION 1.25 MG/3ML
1.25 SOLUTION RESPIRATORY (INHALATION)
Status: DISCONTINUED | OUTPATIENT
Start: 2023-03-22 | End: 2023-03-28 | Stop reason: HOSPADM

## 2023-03-22 RX ORDER — INSULIN LISPRO 100 [IU]/ML
1-5 INJECTION, SOLUTION INTRAVENOUS; SUBCUTANEOUS
Status: DISCONTINUED | OUTPATIENT
Start: 2023-03-22 | End: 2023-03-23

## 2023-03-22 RX ADMIN — CEFEPIME HYDROCHLORIDE 2000 MG: 2 INJECTION, SOLUTION INTRAVENOUS at 21:11

## 2023-03-22 RX ADMIN — PROPOFOL 20 MG: 10 INJECTION, EMULSION INTRAVENOUS at 13:31

## 2023-03-22 RX ADMIN — PREDNISONE 5 MG: 5 TABLET ORAL at 08:22

## 2023-03-22 RX ADMIN — LIDOCAINE HYDROCHLORIDE 60 MG: 20 INJECTION, SOLUTION EPIDURAL; INFILTRATION; INTRACAUDAL; PERINEURAL at 13:19

## 2023-03-22 RX ADMIN — FAMOTIDINE 20 MG: 40 POWDER, FOR SUSPENSION ORAL at 17:39

## 2023-03-22 RX ADMIN — FAMOTIDINE 20 MG: 40 POWDER, FOR SUSPENSION ORAL at 08:22

## 2023-03-22 RX ADMIN — ONDANSETRON 4 MG: 2 INJECTION INTRAMUSCULAR; INTRAVENOUS at 13:16

## 2023-03-22 RX ADMIN — CHLORHEXIDINE GLUCONATE 0.12% ORAL RINSE 15 ML: 1.2 LIQUID ORAL at 21:11

## 2023-03-22 RX ADMIN — CEFEPIME HYDROCHLORIDE 2000 MG: 2 INJECTION, SOLUTION INTRAVENOUS at 15:04

## 2023-03-22 RX ADMIN — OXYCODONE HYDROCHLORIDE 5 MG: 5 TABLET ORAL at 17:38

## 2023-03-22 RX ADMIN — FLUCONAZOLE 100 MG: 40 POWDER, FOR SUSPENSION ORAL at 08:23

## 2023-03-22 RX ADMIN — PROPOFOL 20 MG: 10 INJECTION, EMULSION INTRAVENOUS at 13:38

## 2023-03-22 RX ADMIN — GUAIFENESIN 1200 MG: 600 TABLET ORAL at 21:11

## 2023-03-22 RX ADMIN — EPHEDRINE SULFATE 10 MG: 50 INJECTION, SOLUTION INTRAVENOUS at 13:39

## 2023-03-22 RX ADMIN — GUAIFENESIN 1200 MG: 600 TABLET ORAL at 15:09

## 2023-03-22 RX ADMIN — PROPOFOL 120 MG: 10 INJECTION, EMULSION INTRAVENOUS at 13:19

## 2023-03-22 RX ADMIN — VANCOMYCIN HYDROCHLORIDE 1750 MG: 1 INJECTION, POWDER, LYOPHILIZED, FOR SOLUTION INTRAVENOUS at 12:12

## 2023-03-22 RX ADMIN — EPHEDRINE SULFATE 10 MG: 50 INJECTION, SOLUTION INTRAVENOUS at 13:32

## 2023-03-22 RX ADMIN — HEPARIN SODIUM 18 UNITS/KG/HR: 10000 INJECTION, SOLUTION INTRAVENOUS at 15:04

## 2023-03-22 RX ADMIN — ATORVASTATIN CALCIUM 20 MG: 20 TABLET, FILM COATED ORAL at 16:10

## 2023-03-22 RX ADMIN — SODIUM CHLORIDE, SODIUM LACTATE, POTASSIUM CHLORIDE, AND CALCIUM CHLORIDE: .6; .31; .03; .02 INJECTION, SOLUTION INTRAVENOUS at 13:07

## 2023-03-22 RX ADMIN — CEFEPIME HYDROCHLORIDE 2000 MG: 2 INJECTION, SOLUTION INTRAVENOUS at 06:03

## 2023-03-22 RX ADMIN — CHLORHEXIDINE GLUCONATE 0.12% ORAL RINSE 15 ML: 1.2 LIQUID ORAL at 08:23

## 2023-03-22 RX ADMIN — LEVALBUTEROL HYDROCHLORIDE 1.25 MG: 1.25 SOLUTION RESPIRATORY (INHALATION) at 20:13

## 2023-03-22 RX ADMIN — PROPOFOL 70 MCG/KG/MIN: 10 INJECTION, EMULSION INTRAVENOUS at 13:19

## 2023-03-22 NOTE — PROGRESS NOTES
When medically appropriate, recommend initiating Jevity 1 5cal @ 20ml/hr  Increase rate by 10ml q4-6hrs until goal rate of 55ml/hr x 24 hours reached  Add 1 packet Prosource once daily  Suggest PEG water flush of 100ml q4hrs  Tube feed to provide total volume of 1320ml, 1980kcals, 88g protein, and 1665ml free water  Monitor labs/electrolytes  If patient able to eat PO, may need to reduce TF rate

## 2023-03-22 NOTE — DISCHARGE INSTR - OTHER ORDERS
Skin and Wound Care Plan:   1  Ehob offloading cushion to chair when OOB  2  Elevate heels off the bed with pillows   3  Turn and reposition every two hours  4  Hydraguard to bilateral heels and lower buttocks BID and PRN  5  Skin nourishing cream daily  6  Cleanse buttock wounds with Remedy foaming cleanser, pat dry  Place Xeroform on wound beds, top with Allevyn life silicone bordered foam dressing, tomasa with T, date, change daily and PRN  7   Apply 3M No Sting barrier film to the left arm and hand scabs daily, ok to leave open to air    Follow up with wound center on discharge referral placed

## 2023-03-22 NOTE — CONSULTS
Pulmonary Medicine-Consultation  Isaias Holt 78 y o  male MRN: [id number]  Unit/Bed#: ICU 06-01 Encounter: [id number]      Assessment:  1  Acute hypoxic respiratory failure  · Likely multifactorial, PE/underlying COPD, pneumonia and retained secretions, possible atypical infection given the immunosuppression/recent chemotherapy      2  Acute pulmonary embolism  · Likely due to metastatic cancer/currently on palliative chemotherapy  · No signs of RV strain on TTE      3  Right hydropneumothorax  · Suspect malignant effusion  · Multiple thoracentesis most recently 2 months ago cytology was negative  · However, noted multiple FDG avidity on last PET/CT on the right pleural/fissural surfaces, see below  · Likely has an entrapped/trapped lung causing the hydropneumothorax appearance  · Chest tube placed at [facility] had a minimal effusion output  · CT this admission shows small space/effusion compared to before  · Does not appear to be amenable for drainage given the small size      4  Multiple pulmonary nodules  · Possible metastatic disease vs new lung primary given the high risk vs pneumonia related/ no signs of vegetations on TTE    5  Squamous cell cancer of the tongue  · With known right pleural mets, possible lung metastases as well  · Currently on palliative chemo/immunotherapy with pembrolizumab      6  Former tobacco abuse  7  COPD/emphysema      Recommendations/discussion:  • Given the CT chest findings/concern for atypical/opportunistic infection pneumonia we will perform bronchoscopy/BAL  • Continue port spectrum antibiotics cefepime/vancomycin, check MRSA swab  • Unfractionated heparin for PE, switch to oral agent tomorrow if remains stable    Likely needs lifelong therapy given the active/metastatic cancer  • Needs to discuss with his primary oncologist, needs either repeat CT chest for the lung nodules to ensure regression, if not could require biopsies to check for primary lung cancer if this were to change management  • Airway clearance protocol, Xopenex 3 times daily, Mucinex 1200 mg twice daily    We will continue to follow    Physician Requesting Consult: Alessandro Ames MD    Reason for Consult / Principal Problem: Abnormal CT chest/history of metastatic squamous cell carcinoma of the tongue/pulmonary embolism    HPI:   78 y o  male with a h/o acid reflux, HTN, former tobacco abuse 49 PYH quit in 7/2021,, DM 2, chronic dysphagia/PEG tube, squamous cell cancer of the tongue, A-fib, right pleural base metastasis, bladder cancer s/p surgery 2006    Hospitalized to [facility] in late 12/2022 for influenza A/hypoxic respiratory failure  CT chest showed moderate right hydropneumothorax, evaluated by CT surgery underwent chest tube placement which was removed given the minimal output  Fluid analysis consistent with exudative/negative cytology for malignancy  Treated with Tamiflu, with clinical improvement  Follows with oncology at [facility], last seen on 3/6  Completed definitive chemoradiation in 12/2021  Noted progressive right lung effusion/pulmonary nodules concerning for metastasis  Plan for weekly palliative chemotherapy and pembrolizumab  Presented to the ED 3/21 with worsening dyspnea, started with a sudden onset a day before  Also reported moist productive cough/white sputum  Noted hypoxemia on room air, CT PE showed subsegmental bilateral pulmonary embolism  Also noted areas of TIBO at the bases concerning for acute changes/pneumonia of aspiration  LLL 1 cm nodule with central cavitation  Also noted moderate pleural thickening at the right base, was a small hydropneumothorax/empyema    Admitted to [facility], started on broad-spectrum antibiotics, also unfractionated heparin for PE  On my assessment, reports feeling better compared to yesterday  Still with moist cough, sputum production however dyspnea is improving    No reported aspiration, still using the PEG tube placed few months ago  + Used to work as a  in [place], he is a [place], reported exposure to agent orange  Inpatient consult to Pulmonology  Consult performed by: Miguel A Baldwin MD  Consult ordered by: MOISES Burdick          Review of Systems  As per hpi, all other systems reviewed and were negative      Studies:    Imaging Studies: I have personally reviewed pertinent films in PACS    PET/CT 2022 at [facility]-Per report: Extensive right pleural/fissural nodules with FDG avidity  + FDG avid mediastinal lymphadenopathy, and at cardiophrenic lymph nodes      EKG, Pathology, and Other Studies: I have personally reviewed pertinent films in PACS    Pulmonary Results (PFTs, PSG): None in file    Historical Information   Past Medical History:   Diagnosis Date   • Cancer ([facility] )    • Diabetes ([facility] )    • Gastritis    • GERD (gastroesophageal reflux disease)    • History of bladder cancer 2010    treated with surgery   • Hypercholesterolemia    • Hypertension    • Hyponatremia 2021   • Lactic acidosis 2021     Past Surgical History:   Procedure Laterality Date   • BLADDER TUMOR EXCISION     • EGD     • IR CHOLECYSTOSTOMY TUBE CHECK/CHANGE/REPOSITION/REINSERTION/UPSIZE  2022   • IR CHOLECYSTOSTOMY TUBE CHECK/CHANGE/REPOSITION/REINSERTION/UPSIZE  3/4/2022   • IR CHOLECYSTOSTOMY TUBE CHECK/CHANGE/REPOSITION/REINSERTION/UPSIZE  3/24/2022   • IR CHOLECYSTOSTOMY TUBE PLACEMENT  2021     Social History   Social History     Substance and Sexual Activity   Alcohol Use Never     Social History     Substance and Sexual Activity   Drug Use Never     Social History     Tobacco Use   Smoking Status Former   • Types: Cigarettes   • Quit date: 2021   • Years since quittin 5   • Passive exposure: Past   Smokeless Tobacco Never     E-Cigarette/Vaping   • E-Cigarette Use Never User      E-Cigarette/Vaping Substances   • Nicotine No    • THC No    • CBD No    • Flavoring No    • Other No    • "Unknown No          Family History:   Family History   Problem Relation Age of Onset   • Cancer Mother    • Melanoma Neg Hx        Meds/Allergies   all current active meds have been reviewed    No Known Allergies    Objective   Vitals: Blood pressure 118/71, pulse 80, temperature (!) 97 °F (36 1 °C), temperature source Tympanic, resp  rate (!) 23, height 6' 2\" (1 88 m), weight 59 9 kg (132 lb), SpO2 92 %  ,Body mass index is 16 95 kg/m²  Intake/Output Summary (Last 24 hours) at 3/22/2023 0813  Last data filed at 3/22/2023 0600  Gross per 24 hour   Intake 2871 ml   Output 1875 ml   Net 996 ml     Invasive Devices     Peripheral Intravenous Line  Duration           Peripheral IV 03/21/23 Distal;Left;Upper;Ventral (anterior) Arm <1 day    Peripheral IV 03/21/23 Right Arm <1 day    Peripheral IV 03/21/23 Right;Ventral (anterior) Wrist <1 day          Drain  Duration           Gastrostomy/Enterostomy PEG-jejunostomy LUQ -- days                Physical Exam  Body mass index is 16 95 kg/m²  Gen: not in acute distress, thin, ill-appearing  Neck/Eyes: supple, PERRL  Ear: normal appearance, no significant hearing impairment  Nose:  normal nasal mucosa, no drainage  Chest: normal respiratory efforts, diffusely diminished air movement, mild rhonchi  CV: Distant heart sounds, no edema  Abdomen: soft, non tender, PEG tube in place  Extremities:  No observed deformity   Skin: unremarkable  Neuro: AAO X3, no focal motor deficit       Lab Results: I have personally reviewed pertinent lab results  None    Portions of the record may have been created with voice recognition software  Occasional wrong word or \"sound a like\" substitutions may have occurred due to the inherent limitations of voice recognition software  Read the chart carefully and recognize, using context, where substitutions have occurred      "

## 2023-03-22 NOTE — CONSULTS
When medically appropriate, recommend initiating Jevity 1 5cal @ 20ml/hr  Increase rate by 10ml q4-6hrs until goal rate of 55ml/hr x 24 hours reached  Add 1 packet Prosource once daily  Suggest PEG water flush of 100ml q4hrs  Tube feed to provide total volume of 1320ml, 1980kcals, 88g protein, and 1665ml free water  Monitor labs/electrolytes

## 2023-03-22 NOTE — ASSESSMENT & PLAN NOTE
Lab Results   Component Value Date    HGBA1C 5 8 (H) 12/30/2022       Recent Labs     03/21/23  2354 03/22/23  0551 03/22/23  0718 03/22/23  1209   POCGLU 106 89 109 108       Blood Sugar Average: Last 72 hrs:  · (P) 111Holding home metformin  · Continue sliding scale and Accu-Cheks

## 2023-03-22 NOTE — ANESTHESIA POSTPROCEDURE EVALUATION
Post-Op Assessment Note    CV Status:  Stable  Pain Score: 0    Pain management: adequate     Mental Status:  Alert and awake   Hydration Status:  Euvolemic and stable   PONV Controlled:  None   Airway Patency:  Patent      Post Op Vitals Reviewed: Yes      Staff: Anesthesiologist, with CRNAs         There were no known notable events for this encounter      BP      Temp      Pulse     Resp      SpO2

## 2023-03-22 NOTE — PLAN OF CARE
Problem: Potential for Falls  Goal: Patient will remain free of falls  Description: INTERVENTIONS:  - Educate patient/family on patient safety including physical limitations  - Instruct patient to call for assistance with activity   - Consult OT/PT to assist with strengthening/mobility   - Keep Call bell within reach  - Keep bed low and locked with side rails adjusted as appropriate  - Keep care items and personal belongings within reach  - Initiate and maintain comfort rounds  - Make Fall Risk Sign visible to staff  - Offer Toileting every 2  Hours, in advance of need  - Initiate/Maintain bed alarm  - Obtain necessary fall risk management equipment: walker  - Apply yellow socks and bracelet for high fall risk patients  - Consider moving patient to room near nurses station  Outcome: Progressing     Problem: PAIN - ADULT  Goal: Verbalizes/displays adequate comfort level or baseline comfort level  Description: Interventions:  - Encourage patient to monitor pain and request assistance  - Assess pain using appropriate pain scale  - Administer analgesics based on type and severity of pain and evaluate response  - Implement non-pharmacological measures as appropriate and evaluate response  - Consider cultural and social influences on pain and pain management  - Notify physician/advanced practitioner if interventions unsuccessful or patient reports new pain  Outcome: Progressing     Problem: INFECTION - ADULT  Goal: Absence or prevention of progression during hospitalization  Description: INTERVENTIONS:  - Assess and monitor for signs and symptoms of infection  - Monitor lab/diagnostic results  - Monitor all insertion sites, i e  indwelling lines, tubes, and drains  - Monitor endotracheal if appropriate and nasal secretions for changes in amount and color  - [place] appropriate cooling/warming therapies per order  - Administer medications as ordered  - Instruct and encourage patient and family to use good hand hygiene technique  - Identify and instruct in appropriate isolation precautions for identified infection/condition  Outcome: Progressing     Problem: INFECTION - ADULT  Goal: Absence of fever/infection during neutropenic period  Description: INTERVENTIONS:  - Monitor WBC    Outcome: Progressing     Problem: SAFETY ADULT  Goal: Patient will remain free of falls  Description: INTERVENTIONS:  - Educate patient/family on patient safety including physical limitations  - Instruct patient to call for assistance with activity   - Consult OT/PT to assist with strengthening/mobility   - Keep Call bell within reach  - Keep bed low and locked with side rails adjusted as appropriate  - Keep care items and personal belongings within reach  - Initiate and maintain comfort rounds  - Make Fall Risk Sign visible to staff  - Offer Toileting every 2  Hours, in advance of need  - Initiate/Maintain bed alarm  - Obtain necessary fall risk management equipment: walker  - Apply yellow socks and bracelet for high fall risk patients  - Consider moving patient to room near nurses station  Outcome: Progressing

## 2023-03-22 NOTE — PROGRESS NOTES
Zackary 47  Progress Note - Kilo Arm 1945, 78 y o  male MRN: [id number]  Unit/Bed#: ICU 06-01 Encounter: [id number]  Primary Care Provider: Bhanu Francis MD   Date and time admitted to hospital: 3/21/2023 12:44 PM    Sepsis [facility])  Assessment & Plan  · As evidenced by tachycardia hypoxia, leukocytosis and lactic acidosis  · Fluid resuscitated with 3 L of normal saline in the ED  · Suspect multifactorial pneumonia most likely aspiration  · Blood cultures x2 pending  · Pro-Alvarez 0 59, trend  · LA 2 1, cleared  · Urine strep and Legionella negative  · Sputum cx pending  · UA pending  · Continue IV antibiotics  · Trend WBC and fever curve    Hydropneumothorax  Assessment & Plan  · Hx of hydropneumothorax  · Recently admitted to [facility] on December 29, 2021 to and had a pigtail chest tube placed by CT surgery  Remained in for 2 days and was discontinued secondary to decreased output  · Exudative and suspicious for malignancy-unable to find recent cytology  · CT PE study with small loculated right basilar hydropneumothorax with pleural thickening suspicious for empyema and bronchopleural fistula  · Pulmonary consulted for possible chest tube and drainage    Pressure injury of skin of buttock  Assessment & Plan  · POA  · Inpatient wound care consult  · Allyvn prn  · Frequent repositioning and off loading pressure points    PNA (pneumonia)  Assessment & Plan  · Most likely secondary to aspiration versus community-acquired  · CT chest-multifocal aspiration pneumonia with multiple areas of mucous plugging    Also small loculated right basilar hydropneumothorax with pleural thickening suspicious for empyema and bronchopleural fistula  · Sputum culture pending  · Strep and Legionella negative  · Continue cefepime and Vanco    Hyperlipemia  Assessment & Plan  · Continue home statin    Other pulmonary embolism without acute cor pulmonale (HCC)  Assessment & Plan  · Complained of sudden onset of dyspnea yesterday-found to have a low SPO2 in the 80s  · Used home O2 with improvement to high 80s  · CT PE-Filling defects involving the anterior-basilar and lateral-basilar segments of the left lower lobe, as and inferior lingular segment of the left upper lobe, compatible with acute pulmonary emboli  · Echo from 12/22/2022 ejection fraction is 70%  Systolic function is normal  NRWM  · Repeat Echo  · Continue heparin gtt via PE protocol      Severe protein-calorie malnutrition (HCC)  Assessment & Plan  Malnutrition Findings:   Adult Malnutrition type: Chronic illness  Adult Degree of Malnutrition: Other severe protein calorie malnutrition  Malnutrition Characteristics: Fat loss, Muscle loss, Weight loss                  360 Statement: Chronic, severe protein-calorie malnutrition related to dysphagia as evidenced by severe muscle wasting of clavicle and temple regions, subcutaneous fat loss of orbital regions, and significant weight loss of 20% x 3 months  Will initiate EN via PEG tube when medically appropriate  BMI Findings:  Adult BMI Classifications: Underweight < 18 5        Body mass index is 16 95 kg/m²     · Has PEG tube secondary to dysphagia through which he gets Jevity tube feeds  · Nutrition consult    Neoplasm of lung  Assessment & Plan  · 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 mediastinal and cardiophrenic lymph nodes  · Follows with [facility] oncology  · Completed almost 2 complete rounds of chemotherapy-next round of chemotherapy to start at the beginning of April  · We will continue home prednisone    Type 2 diabetes mellitus without complication, without long-term current use of insulin [facility])  Assessment & Plan  Lab Results   Component Value Date    HGBA1C 5 8 (H) 12/30/2022       Recent Labs     03/21/23  2354 03/22/23  0551 03/22/23  0718 03/22/23  1209   POCGLU 106 89 109 108       Blood Sugar Average: Last 72 hrs:  · (P) 111Holding home metformin  · Continue sliding scale and Accu-Cheks     Dysphagia, unspecified  Assessment & Plan  · 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  · Has PEG tube in place    * Acute respiratory failure with hypoxia (HCC)  Assessment & Plan  · Multifactorial, pneumonia, PE, mucous plugging  · CT PE study with multifocal factorial pneumonia, PEs, mucous plugging, and a right basilar hydropneumothorax  · Wean oxygen for an SPO2 greater than 88%  · Incentive spirometry flutter valve  · Patient underwent bronchoscopy this afternoon, results pending        VTE Pharmacologic Prophylaxis: VTE Score: 9 High Risk (Score >/= 5) - Pharmacological DVT Prophylaxis Ordered: heparin drip  Sequential Compression Devices Ordered  Patient Centered Rounds: I performed bedside rounds with nursing staff today  Discussions with Specialists or Other Care Team Provider:     Education and Discussions with Family / Patient: Discussed with patient all questions answered    Total Time Spent on Date of Encounter in care of patient: 45 minutes This time was spent on one or more of the following: performing physical exam; counseling and coordination of care; obtaining or reviewing history; documenting in the medical record; reviewing/ordering tests, medications or procedures; communicating with other healthcare professionals and discussing with patient's family/caregivers  Current Length of Stay: 1 day(s)  Current Patient Status: Inpatient   Certification Statement: The patient will continue to require additional inpatient hospital stay due to Sepsis pneumonia requiring ministration of IV antibiotics, PEs requiring ministration of IV heparin  Discharge Plan: Pending clinical course    Code Status: Level 3 - DNAR and DNI    Subjective:   Patient seen and examined bedside  Patient resting in no apparent distress      Objective:     Vitals:   Temp (24hrs), Av 5 °F (36 4 °C), Min:97 °F (36 1 °C), Max:98 7 °F (37 1 °C)    Temp:  [97 °F (36 1 °C)-98 7 °F (37 1 °C)] 98 7 °F (37 1 °C)  HR:  [] 90  Resp:  [16-36] 22  BP: ()/(52-84) 142/73  SpO2:  [92 %-100 %] 92 %  Body mass index is 16 95 kg/m²  Input and Output Summary (last 24 hours): Intake/Output Summary (Last 24 hours) at 3/22/2023 1518  Last data filed at 3/22/2023 1430  Gross per 24 hour   Intake 1089 ml   Output 1425 ml   Net -336 ml       Physical Exam:   Physical Exam  HENT:      Head: Normocephalic  Cardiovascular:      Rate and Rhythm: Normal rate and regular rhythm  Pulmonary:      Comments: Decreased breath sounds bilaterally  Abdominal:      General: Abdomen is flat  Bowel sounds are normal    Musculoskeletal:         General: Normal range of motion  Skin:     General: Skin is warm  Neurological:      General: No focal deficit present  Mental Status: He is alert and oriented to person, place, and time  Mental status is at baseline     Psychiatric:         Mood and Affect: Mood normal           Additional Data:     Labs:  Results from last 7 days   Lab Units 23  0435 23  1303   WBC Thousand/uL 7 58 11 09*   HEMOGLOBIN g/dL 9 2* 11 0*   HEMATOCRIT % 30 8* 37 2   PLATELETS Thousands/uL 218 299   BANDS PCT % 4  --    NEUTROS PCT %  --  90*   LYMPHS PCT %  --  4*   LYMPHO PCT % 3*  --    MONOS PCT %  --  5   MONO PCT % 4  --    EOS PCT % 0 0     Results from last 7 days   Lab Units 23  0435   SODIUM mmol/L 133*   POTASSIUM mmol/L 4 0   CHLORIDE mmol/L 96   CO2 mmol/L 29   BUN mg/dL 14   CREATININE mg/dL 0 60   ANION GAP mmol/L 8   CALCIUM mg/dL 8 6   ALBUMIN g/dL 2 7*   TOTAL BILIRUBIN mg/dL 0 44   ALK PHOS U/L 84   ALT U/L 14   AST U/L 18   GLUCOSE RANDOM mg/dL 91     Results from last 7 days   Lab Units 23  0435   INR  1 15     Results from last 7 days   Lab Units 23  1209 23  0718 23  0551 23  2354 23  1717   POC GLUCOSE mg/dl 108 109 89 106 143*         Results from last 7 days   Lab Units 03/22/23  0435 03/21/23  1452 03/21/23  1303   LACTIC ACID mmol/L  --  2 0 2 1*   PROCALCITONIN ng/ml 0 36*  --  0 59*       Lines/Drains:  Invasive Devices     Peripheral Intravenous Line  Duration           Peripheral IV 03/21/23 Distal;Left;Upper;Ventral (anterior) Arm 1 day    Peripheral IV 03/21/23 Right Arm 1 day    Peripheral IV 03/21/23 Right;Ventral (anterior) Wrist <1 day          Drain  Duration           Gastrostomy/Enterostomy PEG-jejunostomy LUQ -- days          Airway  Duration           Supraglottic Airway LMA 4 <1 day                      Imaging: No pertinent imaging reviewed  Recent Cultures (last 7 days):   Results from last 7 days   Lab Units 03/21/23  1651 03/21/23  1628 03/21/23  1303   BLOOD CULTURE   --   --  Received in Microbiology Lab  Culture in Progress  Received in Microbiology Lab  Culture in Progress     SPUTUM CULTURE   --  Culture too young- will reincubate  --    GRAM STAIN RESULT   --  2+ Epithelial cells per low power field*  2+ Polys*  2+ Gram positive cocci in pairs*  --    LEGIONELLA URINARY ANTIGEN  Negative  --   --        Last 24 Hours Medication List:   Current Facility-Administered Medications   Medication Dose Route Frequency Provider Last Rate   • atorvastatin  20 mg Oral Daily With Dinner MOISES Mckeon     • cefepime  2,000 mg Intravenous Q8H ALANA MckeonNP 2,000 mg (03/22/23 1504)   • chlorhexidine  15 mL Mouth/Throat Q12H [facility] MOISES Mckeon     • famotidine  20 mg Oral BID MOISES Mckeon     • fluconazole  100 mg Oral Daily MOISES Mckeon     • guaiFENesin  1,200 mg Oral Q12H [facility] Hipolito Polk MD     • heparin (porcine)  3-30 Units/kg/hr (Order-Specific) Intravenous Titrated ALANA MckeonNP 18 Units/kg/hr (03/22/23 1504)   • heparin (porcine)  2,800 Units Intravenous Q6H PRN MOISES Mckeon     • heparin (porcine) 5,600 Units Intravenous Q6H PRN MOISES Mckeon     • insulin lispro  1-5 Units Subcutaneous TID AC Jose Gilliam DO     • levalbuterol  1 25 mg Nebulization TID Holmes Goldberg, MD     • oxyCODONE  5 mg Oral Q4H PRN Jose Gilliam DO     • predniSONE  5 mg Oral Daily MOISES Mckeon     • vancomycin  1,750 mg Intravenous Q24H MOISES Reddy Stopped (03/22/23 1430)        Today, Patient Was Seen By: Jose Gilliam DO    **Please Note: This note may have been constructed using a voice recognition system  **

## 2023-03-22 NOTE — ASSESSMENT & PLAN NOTE
· Complained of sudden onset of dyspnea yesterday-found to have a low SPO2 in the 80s  · Used home O2 with improvement to high 80s  · CT PE-Filling defects involving the anterior-basilar and lateral-basilar segments of the left lower lobe, as and inferior lingular segment of the left upper lobe, compatible with acute pulmonary emboli  · Echo from 12/22/2022 ejection fraction is 70%   Systolic function is normal  NRWM  · Repeat Echo  · Continue heparin gtt via PE protocol

## 2023-03-22 NOTE — ASSESSMENT & PLAN NOTE
Malnutrition Findings:   Adult Malnutrition type: Chronic illness  Adult Degree of Malnutrition: Other severe protein calorie malnutrition  Malnutrition Characteristics: Fat loss, Muscle loss, Weight loss                  360 Statement: Chronic, severe protein-calorie malnutrition related to dysphagia as evidenced by severe muscle wasting of clavicle and temple regions, subcutaneous fat loss of orbital regions, and significant weight loss of 20% x 3 months  Will initiate EN via PEG tube when medically appropriate  BMI Findings:  Adult BMI Classifications: Underweight < 18 5        Body mass index is 16 95 kg/m²     · Has PEG tube secondary to dysphagia through which he gets Jevity tube feeds  · Nutrition consult

## 2023-03-22 NOTE — PROGRESS NOTES
Poppy Wilson is a 78 y o  male who is currently ordered Vancomycin IV with management by the Pharmacy Consult service  Relevant clinical data and objective / subjective history reviewed  Vancomycin Assessment:  Indication and Goal AUC/Trough: Pneumonia (goal -600, trough >10), -600, trough >10  Clinical Status: stable  Micro:     Renal Function:  SCr: 0 6 mg/dL  CrCl: 90 1 mL/min  Renal replacement: Not on dialysis  Days of Therapy: 2  Current Dose: 1500 mg q24  Vancomycin Plan:  New Dosin mg q24  Estimated AUC: 453 mcg*hr/mL  Estimated Trough: 10 8 mcg/mL  Next Level: 3/24 6am  Renal Function Monitoring: Daily BMP and UOP  Pharmacy will continue to follow closely for s/sx of nephrotoxicity, infusion reactions and appropriateness of therapy  BMP and CBC will be ordered per protocol  We will continue to follow the patient’s culture results and clinical progress daily      Rosa Maria Sanchez, Pharmacist

## 2023-03-22 NOTE — ASSESSMENT & PLAN NOTE
· 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 mediastinal and cardiophrenic lymph nodes  · Follows with [facility] oncology  · Completed almost 2 complete rounds of chemotherapy-next round of chemotherapy to start at the beginning of April  · We will continue home prednisone

## 2023-03-22 NOTE — ASSESSMENT & PLAN NOTE
· As evidenced by tachycardia hypoxia, leukocytosis and lactic acidosis  · Fluid resuscitated with 3 L of normal saline in the ED  · Suspect multifactorial pneumonia most likely aspiration  · Blood cultures x2 pending  · Pro-Alvarez 0 59, trend  · LA 2 1, cleared  · Urine strep and Legionella negative  · Sputum cx pending  · UA pending  · Continue IV antibiotics  · Trend WBC and fever curve

## 2023-03-22 NOTE — MALNUTRITION/BMI
This medical record reflects one or more clinical indicators suggestive of malnutrition  Malnutrition Findings:   Adult Malnutrition type: Chronic illness  Adult Degree of Malnutrition: Other severe protein calorie malnutrition  Malnutrition Characteristics: Fat loss, Muscle loss, Weight loss              360 Statement: Chronic, severe protein-calorie malnutrition related to dysphagia as evidenced by severe muscle wasting of clavicle and temple regions, subcutaneous fat loss of orbital regions, and significant weight loss of 20% x 3 months  Will initiate EN via PEG tube when medically appropriate  BMI Findings:  Adult BMI Classifications: Underweight < 18 5        Body mass index is 16 95 kg/m²  See Nutrition note dated 3/22/2023 for additional details  Completed nutrition assessment is viewable in the nutrition documentation

## 2023-03-22 NOTE — NURSING NOTE
Patient transferred off unit to endoscopy for scheduled bronchoscopy  Vital signs stable at time of transfer  Critical care AP made aware of same

## 2023-03-22 NOTE — ASSESSMENT & PLAN NOTE
· Hx of hydropneumothorax  · Recently admitted to [facility] on December 29, 2021 to and had a pigtail chest tube placed by CT surgery    Remained in for 2 days and was discontinued secondary to decreased output  · Exudative and suspicious for malignancy-unable to find recent cytology  · CT PE study with small loculated right basilar hydropneumothorax with pleural thickening suspicious for empyema and bronchopleural fistula  · Pulmonary consulted for possible chest tube and drainage

## 2023-03-22 NOTE — WOUND OSTOMY CARE
Consult Note - Wound   Ayesha Steward 78 y o  male MRN: [id number]  Unit/Bed#: ICU 06-01 Encounter: [id number]    History and Present Illness:  78year old male patient admitted with acute respiratory failure with hypoxia  Wound care consulted for pressure injuries to the buttocks that are present on admission  Patient history significant for DDM2, lung CA, severe protein-calorie malnutrition, pneumonia, pressure injury of the buttocks, hydropneumothorax, anxiety, PTSD, HTN, actinic keratosis, bladder CA, neoplasm of base of tongue, tobacco dependence, COVID, agent orange exposure, and head and neck RT  Assessment Findings:   Patient in bed for assessment, he is awake, alert and oriented, wife present for assessment  He has a PEG tube for feedings, cleansed peg tube site of thick hard brown exudate, skin beneath the bumper intact  Patient is not incontinent, is on an ICU bed, is able to to ambulate with assist, actively repositions himself in bed and is NPO at the present time  Per wife, she was advised to use Desitin at home for wounds and has been applying  Patient seen with primary RN  1  POA-resolving pressure injury to the left hand, per patient he had a bandage on too tight and resulted in a pressure injury, dry scab, no drainage  2  POA-right buttock pressure injury, small unstageable wound, 90% yellow slough,  periwound mix of blanchable dry wound beds and dried beefy red wound beds, scant yellow drainage on dressing when pulled back  3  POA-left upper buttock unstageable wound bed, 90% yellow slough, pink edge slightly rolled at the proximal edge, scant yellow drainage on dressing when pulled back  4  POA-scattered area on the left upper arm of dry brown intact scabs, no drainage, no open areas, per patient he had a fall at home    Skin and Wound Care Plan:   1  Ehob offloading cushion to chair when OOB  2  Elevate heels off the bed with pillows   3  Turn and reposition every two hours  4  Hydraguard to bilateral heels and lower buttocks BID and PRN  5  Skin nourishing cream daily  6  Cleanse buttock wounds with Remedy foaming cleanser, pat dry  Place Xeroform on wound beds, top with Allevyn life silicone bordered foam dressing, tomasa with T, date, change daily and PRN  7  Apply 3M No Sting barrier film to the left arm and hand scabs daily, ok to leave open to air      Wounds:  Wound 03/21/23 Traumatic Abrasion(s) Elbow Anterior; Left (Active)   Wound Image   03/22/23 1537   Wound Description Dry;Brown; Intact 03/22/23 1537   Alicja-wound Assessment Dry; Intact 03/22/23 1537   Wound Length (cm) 10 4 cm 03/22/23 1537   Wound Width (cm) 4 5 cm 03/22/23 1537   Wound Depth (cm) 0 cm 03/22/23 1537   Wound Surface Area (cm^2) 46 8 cm^2 03/22/23 1537   Wound Volume (cm^3) 0 cm^3 03/22/23 1537   Calculated Wound Volume (cm^3) 0 cm^3 03/22/23 1537   Drainage Amount None 03/22/23 1537   Treatments Cleansed 03/22/23 1537   Dressing Open to air 03/22/23 0800   Patient Tolerance Tolerated well 03/22/23 1537       Wound 03/21/23 Pressure Injury Hand Left;Posterior (Active)   Wound Image   03/22/23 1540   Wound Description Brown;Dry 03/22/23 1540   Pressure Injury Stage U 03/21/23 1610   Alicja-wound Assessment Dry; Intact 03/22/23 1540   Wound Length (cm) 1 3 cm 03/22/23 1540   Wound Width (cm) 1 cm 03/22/23 1540   Wound Depth (cm) 0 cm 03/22/23 1540   Wound Surface Area (cm^2) 1 3 cm^2 03/22/23 1540   Wound Volume (cm^3) 0 cm^3 03/22/23 1540   Calculated Wound Volume (cm^3) 0 cm^3 03/22/23 1540   Drainage Amount None 03/22/23 1540   Treatments Cleansed 03/22/23 1540   Dressing Protective barrier 03/22/23 1540   Patient Tolerance Tolerated well 03/22/23 1540       Wound 03/21/23 Pressure Injury Sacrum Right (Active)   Wound Image   03/22/23 1546   Wound Description Yellow;Slough 03/22/23 1546   Pressure Injury Stage U 03/22/23 1546   Alicja-wound Assessment Excoriated;Fragile 03/22/23 1546   Wound Length (cm) 0 3 cm 03/22/23 1546   Wound Width (cm) 0 2 cm 03/22/23 1546   Wound Surface Area (cm^2) 0 06 cm^2 03/22/23 1546   Drainage Amount Scant 03/22/23 1546   Drainage Description Yellow 03/22/23 1546   Treatments Cleansed 03/22/23 1546   Dressing Xeroform; Foam, Silicon (eg  Allevyn, etc) 03/22/23 1546   Wound packed? No 03/22/23 1546   Dressing Changed Changed 03/22/23 1546   Patient Tolerance Tolerated well 03/22/23 1546       Wound 03/21/23 Pressure Injury Sacrum Left (Active)   Wound Image   03/22/23 1545   Wound Description Yellow;Slough 03/22/23 1545   Pressure Injury Stage U 03/22/23 1545   Alicja-wound Assessment Scar Tissue; Hyperpigmented 03/22/23 1545   Wound Length (cm) 0 5 cm 03/22/23 1545   Wound Width (cm) 1 5 cm 03/22/23 1545   Wound Surface Area (cm^2) 0 75 cm^2 03/22/23 1545   Drainage Amount Scant 03/22/23 1545   Drainage Description Yellow 03/22/23 1545   Treatments Cleansed 03/22/23 1545   Dressing Foam, Silicon (eg  Allevyn, etc); Xeroform 03/22/23 1545   Dressing Changed Changed 03/22/23 1545   Patient Tolerance Tolerated well 03/22/23 1545     Reviewed plan of care with primary RN Bethanie Lou  Recommendations written as orders  Wound care team to follow weekly while admitted  Questions or concerns Gideon AGUILARN, RN, [facility]

## 2023-03-22 NOTE — ASSESSMENT & PLAN NOTE
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

## 2023-03-22 NOTE — CASE MANAGEMENT
Case Management Assessment & Discharge Planning Note    Patient name Ayesha Steward  Location ICU 06/ICU 67-24 MRN [id number]  : 1945 Date 3/22/2023       Current Admission Date: 3/21/2023  Current Admission Diagnosis:Acute respiratory failure with hypoxia [facility])   Patient Active Problem List    Diagnosis Date Noted   • PNA (pneumonia) 2023   • Pressure injury of skin of buttock 2023   • Hydropneumothorax 2023   • Syncope and collapse 2022   • Hypomagnesemia 2022   • Hyperlipemia 2022   • Thyroid nodule 2022   • History of head and neck radiation 2022   • Agent orange exposure 2022   • Subclinical hyperthyroidism 2022   • Multiple fractures of rib involving four or more ribs-right 2022   • COVID 2022   • Acute respiratory failure with hypoxia ([address] ) 2022   • Anxiety 2021   • Neoplasm of uncertain behavior of skin 2021   • Post-traumatic stress disorder, unspecified 2021   • Essential hypertension 2021   • Actinic keratosis 2021   • Dysphagia, unspecified 2021   • Erectile dysfunction 2021   • History of malignant neoplasm of bladder 2021   • Carpal tunnel syndrome 2021   • Chronic post-traumatic stress disorder (PTSD) after  combat 2021   • Type 2 diabetes mellitus without complication, without long-term current use of insulin ([address] ) 2021   • Hematuria 2021   • Hyperlipidemia due to type 2 diabetes mellitus ([address] ) 2021   • Malignant neoplasm of urinary bladder ([address] ) 2021   • Neoplasm of lung 2021   • Osteoarthritis of knee 2021   • Panic disorder 2021   • Polyp of colon 2021   • Tobacco dependence 2021   • Sepsis ([address] ) 2021   • Other pulmonary embolism without acute cor pulmonale ([address] ) 2021   • Cholecystitis 2021   • Severe protein-calorie malnutrition ([facility] ) 2021   • Primary malignant neoplasm of base of tongue ([facility] ) 10/11/2021   • Neoplasm of uncertain behavior of oropharynx 09/23/2021   • Cardiac arrhythmia 05/15/2018   • Heart murmur 05/15/2018   • Left ventricular hypertrophy 05/15/2018      LOS (days): 1  Geometric Mean LOS (GMLOS) (days): 5 00  Days to GMLOS:4 2     OBJECTIVE:    Risk of Unplanned Readmission Score: 14 92     Current admission status: Inpatient    Preferred Pharmacy:   [facility] #[id number] El[place] Evans, [address] [facility] [address] [place] [address]  Phone: [phone] Fax: [address], [address]  [address] [address]  Phone: [phone] Fax: [phone]    Primary Care Provider: Fern Teran MD    Primary Insurance: MEDICARE  Secondary Insurance: Glo Colon:  Kevin 26 Proxies    There are no active Health Care Proxies on file  Advance Directives  Does patient have a [address]?: No  Was patient offered paperwork?: Yes (Declined)  Does patient currently have a Health Care decision maker?: Yes, please see Health Care Proxy section  Does patient have Advance Directives?: No  Was patient offered paperwork?: Yes (Declined)  Primary Contact: Honorio James wife  Readmission [employer]  30 Day Readmission: No    Patient Information  Admitted from[de-identified] Home  Mental Status: Alert  During Assessment patient was accompanied by: Not accompanied during assessment  Assessment information provided by[de-identified] Patient  Primary Caregiver: Self  Support Systems: Spouse/significant other  [place] of Residence: [address] [place] [address] Avenue do you live in?: [facility] entry access options   Select all that apply : No steps to enter home  Type of Current Residence: [place]  In the last 12 months, was there a time when you were not able to pay the mortgage or rent on time?: No  In the last 12 months, how many places have you lived?: 1  In the last 12 months, was there a time when you did not have a steady place to sleep or slept in a shelter (including now)?: No  Homeless/housing insecurity resource given?: N/A  Living Arrangements: Lives w/ Spouse/significant other  Is patient a ?: Yes  Is patient active with [facility])?: Yes  Is patient service connected?: Yes    Activities of Daily Living Prior to Admission  Functional Status: Independent  Completes ADLs independently?: Yes  Ambulates independently?: Yes  Does patient use assisted devices?: Yes  Assisted Devices (DME) used: [employer]  Does patient currently own DME?: Yes  What DME does the patient currently own?: Portable Oxygen tanks, Home Oxygen concentrator, Shower Chair, Straight Sylwia Wang, Wheelchair (Lorrene Harms tube feedings)  Does patient have a history of Outpatient Therapy (PT/OT)?: No  Does the patient have a history of Short-Term Rehab?: No  Does patient have a history of HHC?: Yes [facility])  Does patient currently have [facility]?: No    Patient Information Continued  Income Source: Pension/long term  Does patient have prescription coverage?: Yes  Within the past 12 months, you worried that your food would run out before you got the money to buy more : Never true  Within the past 12 months, the food you bought just didn't last and you didn't have money to get more : Never true  Food insecurity resource given?: N/A  Does patient receive dialysis treatments?: No  Does patient have a history of Mental Health Diagnosis?: No    PHQ 2/9 Screening   Reviewed PHQ 2/9 Depression Screening Score?: No    Means of Transportation  Means of Transport to Appts[de-identified] Family transport  In the past 12 months, has lack of transportation kept you from medical appointments or from getting medications?: No  In the past 12 months, has lack of transportation kept you from meetings, work, or from getting things needed for daily living?: No  Was application for public transport provided?: N/A    DISCHARGE DETAILS:    Discharge planning discussed with[de-identified] Pt  Contacts  Patient Contacts: Yvon Lindsay  Relationship to Patient[de-identified] Family  Contact Method: Phone (On speaker phone with pt cell to confirm O2 at home)  Phone Number: [phone]  Reason/Outcome: Discharge [address]         Is the patient interested in [address] at discharge?: No    DME Referral Provided  Referral made for DME?: No    Would you like to participate in our [address] service program?  : No - Declined    Treatment Team Recommendation: Home  Discharge Destination Plan[de-identified] Home   Evaluated the pt at the bedside  Pt states he has O2@ home, however only uses prn  Pt also has Jevity feedings ( 3x/day)  Pt reports he is active with weekly chem tx at [facility] weekly x3 and one week off  Will follow for care needs

## 2023-03-22 NOTE — ASSESSMENT & PLAN NOTE
· Most likely secondary to aspiration versus community-acquired  · CT chest-multifocal aspiration pneumonia with multiple areas of mucous plugging    Also small loculated right basilar hydropneumothorax with pleural thickening suspicious for empyema and bronchopleural fistula  · Sputum culture pending  · Strep and Legionella negative  · Continue cefepime and Vanco

## 2023-03-22 NOTE — ANESTHESIA PREPROCEDURE EVALUATION
Procedure:  BRONCHOSCOPY    Relevant Problems   CARDIO   (+) Cardiac arrhythmia   (+) Essential hypertension   (+) Heart murmur   (+) Hyperlipemia   (+) Hyperlipidemia due to type 2 diabetes mellitus (HCC)      ENDO   (+) Subclinical hyperthyroidism   (+) Type 2 diabetes mellitus without complication, without long-term current use of insulin (HCC)      GI/HEPATIC   (+) Dysphagia, unspecified      MUSCULOSKELETAL   (+) Osteoarthritis of knee      NEURO/PSYCH   (+) Anxiety   (+) Chronic post-traumatic stress disorder (PTSD) after  combat   (+) History of head and neck radiation   (+) History of malignant neoplasm of bladder   (+) Panic disorder   (+) Post-traumatic stress disorder, unspecified      PULMONARY   (+) Acute respiratory failure with hypoxia (HCC)   (+) Hydropneumothorax   (+) PNA (pneumonia)        Physical Exam    Airway    Mallampati score: II  TM Distance: >3 FB  Neck ROM: full     Dental       Cardiovascular      Pulmonary      Other Findings        Anesthesia Plan  ASA Score- 4     Anesthesia Type- general with ASA Monitors  Additional Monitors:   Airway Plan: LMA  Plan Factors-Exercise tolerance (METS): >4 METS  Chart reviewed  EKG reviewed  Existing labs reviewed  Patient summary reviewed  Patient is not a current smoker  There is medical exclusion for perioperative obstructive sleep apnea risk education  Induction- intravenous  Postoperative Plan- Plan for postoperative opioid use  Informed Consent- Anesthetic plan and risks discussed with patient  I personally reviewed this patient with the CRNA  Discussed and agreed on the Anesthesia Plan with the CRNA  Gricelda Livingston

## 2023-03-22 NOTE — ASSESSMENT & PLAN NOTE
· Multifactorial, pneumonia, PE, mucous plugging  · CT PE study with multifocal factorial pneumonia, PEs, mucous plugging, and a right basilar hydropneumothorax  · Wean oxygen for an SPO2 greater than 88%  · Incentive spirometry flutter valve  · Patient underwent bronchoscopy this afternoon, results pending

## 2023-03-23 ENCOUNTER — APPOINTMENT (INPATIENT)
Dept: INTERVENTIONAL RADIOLOGY/VASCULAR | Facility: HOSPITAL | Age: 78
End: 2023-03-23
Attending: STUDENT IN AN ORGANIZED HEALTH CARE EDUCATION/TRAINING PROGRAM

## 2023-03-23 LAB
ALBUMIN SERPL BCP-MCNC: 2.5 G/DL (ref 3.5–5)
ALP SERPL-CCNC: 91 U/L (ref 34–104)
ALT SERPL W P-5'-P-CCNC: 15 U/L (ref 7–52)
ANION GAP SERPL CALCULATED.3IONS-SCNC: 5 MMOL/L (ref 4–13)
APPEARANCE FLD: ABNORMAL
APTT PPP: 74 SECONDS (ref 23–37)
AST SERPL W P-5'-P-CCNC: 19 U/L (ref 13–39)
BILIRUB SERPL-MCNC: 0.36 MG/DL (ref 0.2–1)
BUN SERPL-MCNC: 16 MG/DL (ref 5–25)
CALCIUM ALBUM COR SERPL-MCNC: 9.4 MG/DL (ref 8.3–10.1)
CALCIUM SERPL-MCNC: 8.2 MG/DL (ref 8.4–10.2)
CHLORIDE SERPL-SCNC: 96 MMOL/L (ref 96–108)
CO2 SERPL-SCNC: 29 MMOL/L (ref 21–32)
COLOR FLD: YELLOW
CREAT SERPL-MCNC: 0.68 MG/DL (ref 0.6–1.3)
ERYTHROCYTE [DISTWIDTH] IN BLOOD BY AUTOMATED COUNT: 23.7 % (ref 11.6–15.1)
GFR SERPL CREATININE-BSD FRML MDRD: 93 ML/MIN/1.73SQ M
GLUCOSE FLD-MCNC: 268 MG/DL
GLUCOSE SERPL-MCNC: 148 MG/DL (ref 65–140)
GLUCOSE SERPL-MCNC: 206 MG/DL (ref 65–140)
GLUCOSE SERPL-MCNC: 271 MG/DL (ref 65–140)
GLUCOSE SERPL-MCNC: 324 MG/DL (ref 65–140)
GLUCOSE SERPL-MCNC: 343 MG/DL (ref 65–140)
HCT VFR BLD AUTO: 27.7 % (ref 36.5–49.3)
HGB BLD-MCNC: 8.3 G/DL (ref 12–17)
HISTIOCYTES NFR FLD: 2 %
INR PPP: 1.12 (ref 0.84–1.19)
LDH FLD L TO P-CCNC: 185 U/L
LYMPHOCYTES NFR BLD AUTO: 78 %
MAGNESIUM SERPL-MCNC: 1.7 MG/DL (ref 1.9–2.7)
MCH RBC QN AUTO: 27.2 PG (ref 26.8–34.3)
MCHC RBC AUTO-ENTMCNC: 30 G/DL (ref 31.4–37.4)
MCV RBC AUTO: 91 FL (ref 82–98)
MONOCYTES NFR BLD AUTO: 4 %
MONONUC CELLS NFR FLD MANUAL: 1 %
NEUTS SEG NFR BLD AUTO: 15 %
PH BODY FLUID: 7.4
PHOSPHATE SERPL-MCNC: 2.7 MG/DL (ref 2.3–4.1)
PLATELET # BLD AUTO: 191 THOUSANDS/UL (ref 149–390)
PMV BLD AUTO: 8.9 FL (ref 8.9–12.7)
POTASSIUM SERPL-SCNC: 4.2 MMOL/L (ref 3.5–5.3)
PROT FLD-MCNC: 2.6 G/DL
PROT SERPL-MCNC: 5 G/DL (ref 6.4–8.4)
PROTHROMBIN TIME: 14.4 SECONDS (ref 11.6–14.5)
RBC # BLD AUTO: 3.05 MILLION/UL (ref 3.88–5.62)
RHODAMINE-AURAMINE STN SPEC: NORMAL
SITE: ABNORMAL
SODIUM SERPL-SCNC: 130 MMOL/L (ref 135–147)
TOTAL CELLS COUNTED SPEC: 100
WBC # BLD AUTO: 6.2 THOUSAND/UL (ref 4.31–10.16)
WBC # FLD MANUAL: 168 /UL

## 2023-03-23 RX ORDER — LIDOCAINE HYDROCHLORIDE 10 MG/ML
INJECTION, SOLUTION EPIDURAL; INFILTRATION; INTRACAUDAL; PERINEURAL AS NEEDED
Status: COMPLETED | OUTPATIENT
Start: 2023-03-23 | End: 2023-03-23

## 2023-03-23 RX ORDER — INSULIN LISPRO 100 [IU]/ML
1-5 INJECTION, SOLUTION INTRAVENOUS; SUBCUTANEOUS
Status: DISCONTINUED | OUTPATIENT
Start: 2023-03-23 | End: 2023-03-28 | Stop reason: HOSPADM

## 2023-03-23 RX ORDER — GUAIFENESIN 100 MG/5ML
400 SOLUTION ORAL EVERY 6 HOURS
Status: DISCONTINUED | OUTPATIENT
Start: 2023-03-23 | End: 2023-03-28 | Stop reason: HOSPADM

## 2023-03-23 RX ORDER — MAGNESIUM SULFATE HEPTAHYDRATE 40 MG/ML
2 INJECTION, SOLUTION INTRAVENOUS ONCE
Status: COMPLETED | OUTPATIENT
Start: 2023-03-23 | End: 2023-03-23

## 2023-03-23 RX ADMIN — FAMOTIDINE 20 MG: 40 POWDER, FOR SUSPENSION ORAL at 09:08

## 2023-03-23 RX ADMIN — LIDOCAINE HYDROCHLORIDE 10 ML: 10 INJECTION, SOLUTION EPIDURAL; INFILTRATION; INTRACAUDAL at 16:05

## 2023-03-23 RX ADMIN — CEFEPIME HYDROCHLORIDE 2000 MG: 2 INJECTION, SOLUTION INTRAVENOUS at 06:25

## 2023-03-23 RX ADMIN — INSULIN LISPRO 3 UNITS: 100 INJECTION, SOLUTION INTRAVENOUS; SUBCUTANEOUS at 17:27

## 2023-03-23 RX ADMIN — LEVALBUTEROL HYDROCHLORIDE 1.25 MG: 1.25 SOLUTION RESPIRATORY (INHALATION) at 07:30

## 2023-03-23 RX ADMIN — FLUCONAZOLE 100 MG: 40 POWDER, FOR SUSPENSION ORAL at 09:08

## 2023-03-23 RX ADMIN — GUAIFENESIN 1200 MG: 600 TABLET ORAL at 09:08

## 2023-03-23 RX ADMIN — HEPARIN SODIUM 18 UNITS/KG/HR: 10000 INJECTION, SOLUTION INTRAVENOUS at 09:58

## 2023-03-23 RX ADMIN — LEVALBUTEROL HYDROCHLORIDE 1.25 MG: 1.25 SOLUTION RESPIRATORY (INHALATION) at 13:05

## 2023-03-23 RX ADMIN — ATORVASTATIN CALCIUM 20 MG: 20 TABLET, FILM COATED ORAL at 17:28

## 2023-03-23 RX ADMIN — FAMOTIDINE 20 MG: 40 POWDER, FOR SUSPENSION ORAL at 17:28

## 2023-03-23 RX ADMIN — VANCOMYCIN HYDROCHLORIDE 1500 MG: 1 INJECTION, POWDER, LYOPHILIZED, FOR SOLUTION INTRAVENOUS at 12:14

## 2023-03-23 RX ADMIN — PREDNISONE 5 MG: 5 TABLET ORAL at 09:08

## 2023-03-23 RX ADMIN — INSULIN LISPRO 3 UNITS: 100 INJECTION, SOLUTION INTRAVENOUS; SUBCUTANEOUS at 11:31

## 2023-03-23 RX ADMIN — LEVALBUTEROL HYDROCHLORIDE 1.25 MG: 1.25 SOLUTION RESPIRATORY (INHALATION) at 19:43

## 2023-03-23 RX ADMIN — OXYCODONE HYDROCHLORIDE 5 MG: 5 TABLET ORAL at 21:29

## 2023-03-23 RX ADMIN — OXYCODONE HYDROCHLORIDE 5 MG: 5 TABLET ORAL at 00:19

## 2023-03-23 RX ADMIN — GUAIFENESIN 400 MG: 100 SOLUTION ORAL at 21:29

## 2023-03-23 RX ADMIN — MAGNESIUM SULFATE HEPTAHYDRATE 2 G: 40 INJECTION, SOLUTION INTRAVENOUS at 14:56

## 2023-03-23 NOTE — PLAN OF CARE
Problem: Potential for Falls  Goal: Patient will remain free of falls  Description: INTERVENTIONS:  - Educate patient/family on patient safety including physical limitations  - Instruct patient to call for assistance with activity   - Consult OT/PT to assist with strengthening/mobility   - Keep Call bell within reach  - Keep bed low and locked with side rails adjusted as appropriate  - Keep care items and personal belongings within reach  - Initiate and maintain comfort rounds  - Make Fall Risk Sign visible to staff  - Apply yellow socks and bracelet for high fall risk patients  - Consider moving patient to room near nurses station  Outcome: Progressing     Problem: PAIN - ADULT  Goal: Verbalizes/displays adequate comfort level or baseline comfort level  Description: Interventions:  - Encourage patient to monitor pain and request assistance  - Assess pain using appropriate pain scale  - Administer analgesics based on type and severity of pain and evaluate response  - Implement non-pharmacological measures as appropriate and evaluate response  - Consider cultural and social influences on pain and pain management  - Notify physician/advanced practitioner if interventions unsuccessful or patient reports new pain  Outcome: Progressing     Problem: INFECTION - ADULT  Goal: Absence or prevention of progression during hospitalization  Description: INTERVENTIONS:  - Assess and monitor for signs and symptoms of infection  - Monitor lab/diagnostic results  - Monitor all insertion sites, i e  indwelling lines, tubes, and drains  - Monitor endotracheal if appropriate and nasal secretions for changes in amount and color  - [place] appropriate cooling/warming therapies per order  - Administer medications as ordered  - Instruct and encourage patient and family to use good hand hygiene technique  - Identify and instruct in appropriate isolation precautions for identified infection/condition  Outcome: Progressing     Problem: SAFETY ADULT  Goal: Patient will remain free of falls  Description: INTERVENTIONS:  - Educate patient/family on patient safety including physical limitations  - Instruct patient to call for assistance with activity   - Consult OT/PT to assist with strengthening/mobility   - Keep Call bell within reach  - Keep bed low and locked with side rails adjusted as appropriate  - Keep care items and personal belongings within reach  - Initiate and maintain comfort rounds  - Make Fall Risk Sign visible to staff  - Offer Toileting every 2 Hours, in advance of need  - Initiate/Maintain  bed alarm  - Apply yellow socks and bracelet for high fall risk patients  - Consider moving patient to room near nurses station  Outcome: Progressing     Problem: DISCHARGE PLANNING  Goal: Discharge to home or other facility with appropriate resources  Description: INTERVENTIONS:  - Identify barriers to discharge w/patient and caregiver  - Arrange for needed discharge resources and transportation as appropriate  - Identify discharge learning needs (meds, wound care, etc )  - Arrange for interpretive services to assist at discharge as needed  - Refer to Case Management Department for coordinating discharge planning if the patient needs post-hospital services based on physician/advanced practitioner order or complex needs related to functional status, cognitive ability, or social support system  Outcome: Progressing     Problem: Knowledge Deficit  Goal: Patient/family/caregiver demonstrates understanding of disease process, treatment plan, medications, and discharge instructions  Description: Complete learning assessment and assess knowledge base    Interventions:  - Provide teaching at level of understanding  - Provide teaching via preferred learning methods  Outcome: Progressing     Problem: Nutrition/Hydration-ADULT  Goal: Nutrient/Hydration intake appropriate for improving, restoring or maintaining nutritional needs  Description: Monitor and assess patient's nutrition/hydration status for malnutrition  Collaborate with interdisciplinary team and initiate plan and interventions as ordered  Monitor patient's weight and dietary intake as ordered or per policy  Utilize nutrition screening tool and intervene as necessary  Determine patient's food preferences and provide high-protein, high-caloric foods as appropriate       INTERVENTIONS:  - Monitor oral intake, urinary output, labs, and treatment plans  - Assess nutrition and hydration status and recommend course of action  - Evaluate amount of meals eaten  - Assist patient with eating if necessary   - Allow adequate time for meals  - Recommend/ encourage appropriate diets, oral nutritional supplements, and vitamin/mineral supplements  - Order, calculate, and assess calorie counts as needed  - Recommend, monitor, and adjust tube feedings  based on assessed needs  - Assess need for intravenous fluids  - Provide specific nutrition/hydration education as appropriate  - Include patient/family/caregiver in decisions related to nutrition  Outcome: Progressing

## 2023-03-23 NOTE — ASSESSMENT & PLAN NOTE
Malnutrition Findings:   Adult Malnutrition type: Chronic illness  Adult Degree of Malnutrition: Other severe protein calorie malnutrition  Malnutrition Characteristics: Fat loss, Muscle loss, Weight loss                  360 Statement: Chronic, severe protein-calorie malnutrition related to dysphagia as evidenced by severe muscle wasting of clavicle and temple regions, subcutaneous fat loss of orbital regions, and significant weight loss of 20% x 3 months  Will initiate EN via PEG tube when medically appropriate  BMI Findings:  Adult BMI Classifications: Underweight < 18 5        Body mass index is 17 24 kg/m²     · Has PEG tube secondary to dysphagia through which he gets Jevity tube feeds  · Nutrition consult

## 2023-03-23 NOTE — PROGRESS NOTES
[address] 47  Progress Note  Name: Kenny Carcamo  MRN: [id number]  Unit/Bed#: ICU 06-01 I Date of Admission: 3/21/2023   Date of Service: 3/23/2023 I Hospital Day: 2    Assessment/Plan   * Acute respiratory failure with hypoxia [facility])  Assessment & Plan  · Multifactorial, pneumonia, PE, mucous plugging  · CT PE study with multifocal factorial pneumonia, PEs, mucous plugging, and a right basilar hydropneumothorax  · Wean oxygen for an SPO2 greater than 88%  · Incentive spirometry flutter valve  · Patient underwent bronchoscopy 3/22, results pending  · ID consulted, recommending IR consultation to rule out empyema    Sepsis ([facility] [address] )  Assessment & Plan  · As evidenced by tachycardia hypoxia, leukocytosis and lactic acidosis  · Fluid resuscitated with 3 L of normal saline in the ED  · Suspect multifactorial pneumonia most likely aspiration  · Blood cultures x2 no growth at 24 hours  · LA 2 1, cleared  · Urine strep and Legionella negative  Sputum cx    3+ Growth of Staphylococcus aureus Abnormal          · Continue vancomycin    Hydropneumothorax  Assessment & Plan  · Hx of hydropneumothorax  · Recently admitted to [facility] on December 29, 2021 to and had a pigtail chest tube placed by CT surgery  Remained in for 2 days and was discontinued secondary to decreased output  · Exudative and suspicious for malignancy-unable to find recent cytology  · CT PE study with small loculated right basilar hydropneumothorax with pleural thickening suspicious for empyema and bronchopleural fistula      Pressure injury of skin of buttock  Assessment & Plan  · POA  · Inpatient wound care consult  · Allyvn prn  · Frequent repositioning and off loading pressure points    PNA (pneumonia)  Assessment & Plan  · Most likely secondary to aspiration versus community-acquired  · CT chest-multifocal aspiration pneumonia with multiple areas of mucous plugging    Also small loculated right basilar hydropneumothorax with pleural thickening suspicious for empyema and bronchopleural fistula  · Sputum culture pending  · Strep and Legionella negative  · Continue Vanco  · Consult IR for evaluation of loculated effusion    Hyperlipemia  Assessment & Plan  · Continue home statin    Other pulmonary embolism without acute cor pulmonale (HCC)  Assessment & Plan  · Complained of sudden onset of dyspnea yesterday-found to have a low SPO2 in the 80s  · Used home O2 with improvement to high 80s  · CT PE-Filling defects involving the anterior-basilar and lateral-basilar segments of the left lower lobe, as and inferior lingular segment of the left upper lobe, compatible with acute pulmonary emboli  · Echo from 12/22/2022 ejection fraction is 70%  Systolic function is normal  NRWM  · Repeat Echo  · Continue heparin gtt via PE protocol      Severe protein-calorie malnutrition (HCC)  Assessment & Plan  Malnutrition Findings:   Adult Malnutrition type: Chronic illness  Adult Degree of Malnutrition: Other severe protein calorie malnutrition  Malnutrition Characteristics: Fat loss, Muscle loss, Weight loss                  360 Statement: Chronic, severe protein-calorie malnutrition related to dysphagia as evidenced by severe muscle wasting of clavicle and temple regions, subcutaneous fat loss of orbital regions, and significant weight loss of 20% x 3 months  Will initiate EN via PEG tube when medically appropriate  BMI Findings:  Adult BMI Classifications: Underweight < 18 5        Body mass index is 17 24 kg/m²     · Has PEG tube secondary to dysphagia through which he gets Jevity tube feeds  · Nutrition consult    Neoplasm of lung  Assessment & Plan  · 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 mediastinal and cardiophrenic lymph nodes  · Follows with [facility] oncology  · Completed almost 2 complete rounds of chemotherapy-next round of chemotherapy to start at the beginning of April  · Will continue home prednisone    Type 2 diabetes mellitus without complication, without long-term current use of insulin [facility])  Assessment & Plan  Lab Results   Component Value Date    HGBA1C 5 8 (H) 12/30/2022       Recent Labs     03/22/23  1209 03/22/23  1609 03/23/23  0733 03/23/23  1129   POCGLU 108 98 148* 343*       Blood Sugar Average: Last 72 hrs:  · (P) 143Holding home metformin  · Continue sliding scale and Accu-Cheks     Dysphagia, unspecified  Assessment & Plan  · 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  · Has PEG tube in place               VTE Pharmacologic Prophylaxis: VTE Score: 9 High Risk (Score >/= 5) - Pharmacological DVT Prophylaxis Ordered: heparin drip  Sequential Compression Devices Ordered  Patient Centered Rounds: I performed bedside rounds with nursing staff today  Discussions with Specialists or Other Care Team Provider: ID, Pulm    Education and Discussions with Family / Patient: Updated  (wife) at bedside  Total Time Spent on Date of Encounter in care of patient: 45 minutes This time was spent on one or more of the following: performing physical exam; counseling and coordination of care; obtaining or reviewing history; documenting in the medical record; reviewing/ordering tests, medications or procedures; communicating with other healthcare professionals and discussing with patient's family/caregivers  Current Length of Stay: 2 day(s)  Current Patient Status: Inpatient   Certification Statement: The patient will continue to require additional inpatient hospital stay due to Acute hypoxic respiratory failure requiring supplemental oxygen  Discharge Plan: Pending clinical course    Code Status: Level 3 - DNAR and DNI    Subjective:   Patient seen and examined bedside  Patient resting bed no apparent stress    Patient continues endorse shortness of breath,    Objective:     Vitals:   Temp (24hrs), Av 7 °F (36 5 °C), Min:96 6 °F (35 9 °C), Max:99 8 °F (37 7 °C)    Temp:  [96 6 °F (35 9 °C)-99 8 °F (37 7 °C)] 96 6 °F (35 9 °C)  HR:  [] 76  Resp:  [12-42] 23  BP: ()/(51-82) 142/66  SpO2:  [93 %-100 %] 100 %  Body mass index is 17 24 kg/m²  Input and Output Summary (last 24 hours): Intake/Output Summary (Last 24 hours) at 3/23/2023 1509  Last data filed at 3/23/2023 1451  Gross per 24 hour   Intake 3295 66 ml   Output 850 ml   Net 2445 66 ml       Physical Exam:   Physical Exam  HENT:      Head: Normocephalic  Cardiovascular:      Rate and Rhythm: Normal rate and regular rhythm  Pulses: Normal pulses  Heart sounds: Normal heart sounds  Pulmonary:      Breath sounds: Rhonchi present  Comments: Decreased breath sounds bilaterally  Abdominal:      General: Abdomen is flat  Bowel sounds are normal       Palpations: Abdomen is soft  Musculoskeletal:         General: Normal range of motion  Cervical back: Normal range of motion  Neurological:      General: No focal deficit present  Mental Status: He is alert and oriented to person, place, and time  Mental status is at baseline     Psychiatric:         Mood and Affect: Mood normal           Additional Data:     Labs:  Results from last 7 days   Lab Units 23  0440 23  0435 23  1303   WBC Thousand/uL 6 20 7 58 11 09*   HEMOGLOBIN g/dL 8 3* 9 2* 11 0*   HEMATOCRIT % 27 7* 30 8* 37 2   PLATELETS Thousands/uL 191 218 299   BANDS PCT %  --  4  --    NEUTROS PCT %  --   --  90*   LYMPHS PCT %  --   --  4*   LYMPHO PCT %  --  3*  --    MONOS PCT %  --   --  5   MONO PCT %  --  4  --    EOS PCT %  --  0 0     Results from last 7 days   Lab Units 23  0440   SODIUM mmol/L 130*   POTASSIUM mmol/L 4 2   CHLORIDE mmol/L 96   CO2 mmol/L 29   BUN mg/dL 16   CREATININE mg/dL 0 68   ANION GAP mmol/L 5   CALCIUM mg/dL 8 2*   ALBUMIN g/dL 2 5*   TOTAL BILIRUBIN mg/dL 0 36   ALK PHOS U/L 91   ALT U/L 15 AST U/L 19   GLUCOSE RANDOM mg/dL 206*     Results from last 7 days   Lab Units 03/23/23  0440   INR  1 12     Results from last 7 days   Lab Units 03/23/23  1129 03/23/23  0733 03/22/23  1609 03/22/23  1209 03/22/23  0718 03/22/23  0551 03/21/23  2354 03/21/23  1717   POC GLUCOSE mg/dl 343* 148* 98 108 109 89 106 143*         Results from last 7 days   Lab Units 03/22/23  1603 03/22/23  0435 03/21/23  1452 03/21/23  1303   LACTIC ACID mmol/L  --   --  2 0 2 1*   PROCALCITONIN ng/ml 0 25 0 36*  --  0 59*       Lines/Drains:  Invasive Devices     Peripheral Intravenous Line  Duration           Peripheral IV 03/21/23 Distal;Left;Upper;Ventral (anterior) Arm 2 days    Peripheral IV 03/21/23 Right Arm 2 days    Peripheral IV 03/21/23 Right;Ventral (anterior) Wrist 1 day          Drain  Duration           Gastrostomy/Enterostomy PEG-jejunostomy LUQ -- days          Airway  Duration           Supraglottic Airway LMA 4 1 day                      Imaging: No pertinent imaging reviewed  Recent Cultures (last 7 days):   Results from last 7 days   Lab Units 03/22/23  1418 03/22/23  1417 03/22/23  1414 03/22/23  1410 03/22/23  1406 03/21/23  1651 03/21/23  1628 03/21/23  1303   BLOOD CULTURE   --   --   --   --   --   --   --  No Growth at 24 hrs  No Growth at 24 hrs     SPUTUM CULTURE   --   --   --   --   --   --  3+ Growth of Staphylococcus aureus*  Few Colonies of  --    GRAM STAIN RESULT  4+ Polys*  2+ Gram positive cocci in clusters* 1+ Polys  No bacteria seen 1+ Gram positive cocci in clusters*  1+ Polys* 2+ Polys*  1+ Gram positive cocci in clusters* 2+ Polys*  1+ Gram positive cocci in clusters*  --  2+ Epithelial cells per low power field*  2+ Polys*  2+ Gram positive cocci in pairs*  --    LEGIONELLA URINARY ANTIGEN   --   --   --   --   --  Negative  --   --        Last 24 Hours Medication List:   Current Facility-Administered Medications   Medication Dose Route Frequency Provider Last Rate   • atorvastatin  20 mg Oral Daily With [employer] MOISES Gross     • chlorhexidine  15 mL Mouth/Throat Q12H [address] MOISES Mckeon     • famotidine  20 mg Oral BID MOISES Mckeon     • guaiFENesin  1,200 mg Oral Q12H [address] Emmett Nunn MD     • heparin (porcine)  3-30 Units/kg/hr (Order-Specific) Intravenous Titrated MOISES Prieto 18 Units/kg/hr (03/23/23 7403)   • heparin (porcine)  2,800 Units Intravenous Q6H PRN MOISES Mckeon     • heparin (porcine)  5,600 Units Intravenous Q6H PRN MOISES Mckeon     • insulin lispro  1-5 Units Subcutaneous TID AC MOISES Del Valle     • levalbuterol  1 25 mg Nebulization TID Emmett Nunn MD     • magnesium sulfate  2 g Intravenous Once Nati Woods DO 2 g (03/23/23 2646)   • oxyCODONE  5 mg Oral Q4H PRN Nati Woods DO     • phenol  1 spray Mouth/Throat Q2H PRN MOISES Mckeon     • predniSONE  5 mg Oral Daily MOISES Mckeon     • vancomycin  1,500 mg Intravenous Q24H MOISES Prieto          Today, Patient Was Seen By: Nati Woods DO    **Please Note: This note may have been constructed using a voice recognition system  **

## 2023-03-23 NOTE — BRIEF OP NOTE (RAD/CATH)
IR THORACENTESIS Procedure Note    PATIENT NAME: Chester Toro  : 1945  MRN: [id number]    Pre-op Diagnosis:   1  Pressure injury of buttock, unstageable, unspecified laterality ([address] )    2  Multiple subsegmental pulmonary emboli without acute cor pulmonale (HCC)    3  Hypoxia    4  Acute respiratory failure with hypoxia (HCC)    5  Pneumonia    6  Sepsis ([address] )    7  Hydropneumothorax    8  PNA (pneumonia)    9  Neoplasm of lung      Post-op Diagnosis:   1  Pressure injury of buttock, unstageable, unspecified laterality ([address] )    2  Multiple subsegmental pulmonary emboli without acute cor pulmonale (HCC)    3  Hypoxia    4  Acute respiratory failure with hypoxia (HCC)    5  Pneumonia    6  Sepsis ([address] )    7  Hydropneumothorax    8  PNA (pneumonia)    9   Neoplasm of lung        Provider:   Smita Burks PA-C    Estimated Blood Loss: none    Findings: R diagnostic thora, 70mL louise fluid    Specimens: collected and sent    Complications:  None immediate    Anesthesia: local    Smita Burks PA-C     Date: 3/23/2023  Time: 4:38 PM

## 2023-03-23 NOTE — PROGRESS NOTES
Progress Note - Pulmonary   Francisco Ferris 78 y o  male MRN: [id number]  Unit/Bed#: ICU 06-01 Encounter: [id number]      Assessment:  1  Acute hypoxic respiratory failure   · Likely multifactorial, PE/underlying COPD, pneumonia and retained secretions, possible atypical infection given the immunosuppression/recent chemotherapy    2  Pneumonia/tracheobronchitis  · S/p bronchoscopy/BAL 3/23  · Noted copious moderate thickness green/white secretions with mucous plug at the bronchus intermedius    3  Right hydropneumothorax  • Suspect malignant effusion  • S/p thoracenteses most recently 2 months ago cytology was negative  • However, noted multiple FDG avidity on last PET/CT on the right pleural/fissural surfaces, see below  • Likely has an entrapped/trapped lung causing the hydropneumothorax appearance  • Chest tube placed at [facility] had a minimal effusion output  • CT this admission shows small space/effusion compared to before  • Does not appear to be amenable for drainage given the small size    4  Multiple pulmonary nodules  • Possible metastatic disease vs new lung primary given the high risk vs pneumonia related/ no signs of vegetations on TTE    5  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  • With known right pleural mets, possible lung metastases as well  • Currently on palliative chemo/immunotherapy with pembrolizumab     6  COPD/emphysema  7  Former tobacco abuse    Plan:  · Continue broad-spectrum antibiotics, appreciate ID inputs  · Send BAL/bronchial wash to typical/atypical cultures including cytology, cell count and differential is neutrophilic predominance  · Airway clearance protocol, Xopenex 3 times daily/Mucinex 1200 mg twice daily  · Unfractionated heparin for PE, consider switching to oral agent likely needs lifelong therapy given the active/metastatic cancer    Subjective:   No overnight acute events  Still with productive cough, improving  No fever or chills    Dyspnea slightly "better  Vitals: Blood pressure 137/65, pulse 87, temperature (!) 97 3 °F (36 3 °C), temperature source Tympanic, resp  rate (!) 28, height 6' 2\" (1 88 m), weight 60 9 kg (134 lb 4 2 oz), SpO2 94 %  , Body mass index is 17 24 kg/m²  Intake/Output Summary (Last 24 hours) at 3/23/2023 0909  Last data filed at 3/23/2023 0801  Gross per 24 hour   Intake 2044 86 ml   Output 700 ml   Net 1344 86 ml       Physical Exam  Gen: not in acute distress, thin, ill-appearing, Body mass index is 17 24 kg/m²  HEENT:supple, no accessory muscle use, no JVD appreciated  Cardiac: no murmurs appreciated  Lungs: normal respiratory effort, diminished air movement, mild rhonchi  Abdomen: soft, nontender, normoactive bowel sounds, PEG tube in place  Extremities: no edema  Neuro: AAO X3, no focal motor deficit    Labs: I have personally reviewed pertinent lab results          Andrew Gomez MD    "

## 2023-03-23 NOTE — SEDATION DOCUMENTATION
RIght sided thoracentesis complete  70ml of clear louise fluid removed  Patient offering no complaints  Bandage applied

## 2023-03-23 NOTE — ASSESSMENT & PLAN NOTE
· Hx of hydropneumothorax  · Recently admitted to [facility] on December 29, 2021 to and had a pigtail chest tube placed by CT surgery    Remained in for 2 days and was discontinued secondary to decreased output  · Exudative and suspicious for malignancy-unable to find recent cytology  · CT PE study with small loculated right basilar hydropneumothorax with pleural thickening suspicious for empyema and bronchopleural fistula

## 2023-03-23 NOTE — ASSESSMENT & PLAN NOTE
· Most likely secondary to aspiration versus community-acquired  · CT chest-multifocal aspiration pneumonia with multiple areas of mucous plugging    Also small loculated right basilar hydropneumothorax with pleural thickening suspicious for empyema and bronchopleural fistula  · Sputum culture pending  · Strep and Legionella negative  · Continue Vanco  · Consult IR for evaluation of loculated effusion

## 2023-03-23 NOTE — ASSESSMENT & PLAN NOTE
· As evidenced by tachycardia hypoxia, leukocytosis and lactic acidosis  · Fluid resuscitated with 3 L of normal saline in the ED  · Suspect multifactorial pneumonia most likely aspiration  · Blood cultures x2 no growth at 24 hours  · LA 2 1, cleared  · Urine strep and Legionella negative  Sputum cx    3+ Growth of Staphylococcus aureus Abnormal          · Continue vancomycin

## 2023-03-23 NOTE — CONSULTS
Consultation - [facility] Infectious Disease   Jordyn Sports 78 y o  male MRN: [id number]  Unit/Bed#: ICU 06-01 Encounter: [id number]      IMPRESSION & RECOMMENDATIONS:   1  Multifocal pneumonia  Possibly complicated by cavitary lesions  This is in the setting of chronic immunosuppression due to active malignancy  Patient presented with dyspnea and productive cough  CT on admission showed bibasilar multifocal aspiration bronchopneumonia with mucous plugging  Concern for developing septic emboli versus cavitary pneumonia versus empyema versus bronchopleural fistula  Sputum culture with growth of staph aureus so far  S/p bronchoscopy 3/22 which showed cloudy/purulent secretions with mucous plugging  Cultures preliminarily positive for GPC in clusters  In the setting of #6, consider possibility of atypical or opportunistic infection, though unlikely fungal in this setting  With chronic dysphagia, consider aspiration component  Urinary antigens are negative  MRSA nares negative  His blood cultures are so far negative  TTE (poor study) without obvious vegetation  Fortunately patient is hemodynamically stable and afebrile   -Continue IV vancomycin with pharmacy consult for trough management  -Discontinue IV cefepime  -Discontinue Fluconazole   -Follow-up sputum culture   -Follow-up BAL cultures including routine/anaerobic, PCP DFA, AFB, and  legionella/viral/fungal culture  -Recommend IR consult for dx thoracentesis +/- chest tube for #2  -Recommend repeat imaging of the chest in several days   -Anticipate prolonged course of antibiotics      2  Right sided hydropneumothorax  Consider possibility of empyema  CT chest shows small loculated right basilar hydropneumothorax with pleural thickening suspicious for empyema and/or bronchopleural fistula  Pulmonology is following, suspected malignant effusion  He is status post several thoracenteses, most recently 2 months ago that was consistent with exudative process   In the setting of a staph aureus pneumonia, suspect necrotizing process  -Management at above  -Close pulmonary follow-up    3  Leukocytosis  This was noted on admission, mild and now resolved  Patient remains afebrile and hemodynamically stable  Blood cultures are negative to date   -Follow up blood cultures  -Monitor CBC differential, temperature, hemodynamics     4  Acute pulmonary embolism  Likely in the setting of #5  No signs of right ventricular strain on transthoracic echo  He is anticoagulated on heparin drip   -Likely requires lifelong anticoagulation    5  Acute respiratory failure with hypoxia  Likely multifactorial secondary to above and underlying metastatic disease  He is stable on NC O2   -Monitor O2 requirements   -Aggressive pulmonary toilet   -Respiratory protocol     6  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  Now with abnormal PET scan of the right lung with extensive metastatic disease of the right pleura and a new right-sided malignant pleural effusion  Most likely a recurrent metastatic head/neck cancer although could alternatively be a primary lung CA  Last received palliative chemotherapy on 3/16 and is on prednisone taper for diarrhea  [facility]   -Close outpatient follow-up with oncology    7  Hx of dysphagia s/p PEG  NPO with feeding by G tube only  Tube was recently replaced in Jan 2023  Reports doing 3 gravity feeds for a total of 7 cans a day  -Monitor for sxs of bottom up aspiration     8  Diabetes mellitus, type II, without long term insulin use  Now with likely steroid induced hyperglycemia    -Glucose management per primary team    9  Hx of tobacco abuse  Hx of 54 pack year history, stopped in 2021  Antibiotics:  IV Cefepime D3  IV Vancomycin D3  PO Fluconazole D2    I have discussed the above management plan in detail with patient     I have discussed the above management plan in detail with patient's RN, and the primary service, SLIM attending  ID consult service will continue to follow  HISTORY OF PRESENT ILLNESS:  Reason for Consult: pneumonia     HPI: Satinder Ibarra is a 78y o  year old male with past medical history significant for diabetes, bladder cancer in 2006, history of SCC at the base of the tongue status post chemoradiation with chronic dysphagia, recent PET scan with right lung cancer mets status post multiple thoracentesis and recent chest tube in December at outside hospital who initially presented to [facility] on 3/21 complaining of dyspnea  Patient's symptoms were sudden in onset with associated O2 sats in the [de-identified]  He reports a productive cough with white mucus  He underwent CT in the ER which revealed a PE, multifactorial pneumonia and mucous plugging with associated right basilar hydropneumothorax  He was started on heparin drip  He was found to meet sepsis criteria and started on IV cefepime and vancomycin  Pulmonology consulted and he is status post bronchoscopy with evidence of cloudy/purulent secretions with mucous plugging  BAL sent for typical, atypical and opportunistic infection  Has remained afebrile, hemodynamically stable and mild leukocytosis resolved  Stable on 2 L nasal cannula  Cultures are negative  Sputum culture positive for Staph aureus and so far BAL cultures with growth of gram-positive cocci  Pt is followed by [address] [place] [address] and recent PET/CT revealed extensive FDG-avid metastatic disease primarily in the right pleura with a new sided malignant pleural effusion and bilateral FDG-avid metastatic mediastinal and cardiophrenic lymph nodes  Patient is on prolonged prednisone taper per outpatient oncology team at [facility]  He was also started on diflucan for possible thrush  Last chemo/immunotherapy was on 3/16/23  Per outpatient onc notes, pt is on weekly pac 80mg/m2 +carbo AUc2 , 3 weeks on 1 week off, with pembro on day 1 of each 4 week cycle   He was due for Keytruda this week per chart review  Patient was admitted to [facility] in December 2022 with influenza A, hydropneumothorax and acute respiratory failure  CT chest showed a moderate right hydropneumothorax for which CT surgery was consulted  Prior to this admission patient was at [facility] and underwent a thoracentesis on 12/21/2022  CT surgery recommended chest tube placement and this was eventually removed on 1/1/2023 given minimal output  Fluid analysis was consistent with exudative etiology and cytology was negative despite high suspicion for malignant process  Culture was negative for growth  Today pt reports improved SOB  He denies recent fevers or chills  No recent abx use  No recent travel  No LE wounds  Admits to having diarrhea  No dysuria  No issues recently with aspiration and states he hasnt tried solid or liquids in over a year  Denies acid reflux or bottom up aspiration  He denies recent travel  Lives at home with his wife and dtr, reports dtr works at car [employer] but he and his wife are mostly home bound  He has an indoor cat at home  Believes skin integrity is intact  No known intravascular or prosthetic devices  Gets chemo through peripheral IV  He is a retired [employer]  and [place] war vet where he was exposed to agent orange  [facility] [place]  REVIEW OF SYSTEMS:  A complete review of systems is negative other than that noted in the HPI      PAST MEDICAL HISTORY:  Past Medical History:   Diagnosis Date   • Cancer ([facility] [address] )    • Diabetes ([facility] )    • Gastritis    • GERD (gastroesophageal reflux disease)    • History of bladder cancer 2010    treated with surgery   • Hypercholesterolemia    • Hypertension    • Hyponatremia 12/22/2021   • Lactic acidosis 12/22/2021     Past Surgical History:   Procedure Laterality Date   • BLADDER TUMOR EXCISION     • EGD     • IR CHOLECYSTOSTOMY TUBE CHECK/CHANGE/REPOSITION/REINSERTION/UPSIZE  1/5/2022   • IR CHOLECYSTOSTOMY TUBE CHECK/CHANGE/REPOSITION/REINSERTION/UPSIZE  3/4/2022   • IR CHOLECYSTOSTOMY TUBE CHECK/CHANGE/REPOSITION/REINSERTION/UPSIZE  3/24/2022   • IR CHOLECYSTOSTOMY TUBE PLACEMENT  2021       FAMILY HISTORY:  Non-contributory    SOCIAL HISTORY:  Social History   Social History     Substance and Sexual Activity   Alcohol Use Never     Social History     Substance and Sexual Activity   Drug Use Never     Social History     Tobacco Use   Smoking Status Former   • Types: Cigarettes   • Quit date: 2021   • Years since quittin 5   • Passive exposure: Past   Smokeless Tobacco Never       ALLERGIES:  No Known Allergies    MEDICATIONS:  All current active medications have been reviewed  PHYSICAL EXAM:  Temp:  [96 6 °F (35 9 °C)-99 8 °F (37 7 °C)] 96 6 °F (35 9 °C)  HR:  [] 104  Resp:  [12-42] 27  BP: ()/(51-82) 149/82  SpO2:  [92 %-100 %] 97 %  Temp (24hrs), Av 7 °F (36 5 °C), Min:96 6 °F (35 9 °C), Max:99 8 °F (37 7 °C)  Current: Temperature: (!) 96 6 °F (35 9 °C)    Intake/Output Summary (Last 24 hours) at 3/23/2023 1221  Last data filed at 3/23/2023 1001  Gross per 24 hour   Intake 2794 86 ml   Output 900 ml   Net 1894 86 ml       General Appearance:  Appearing chronically ill and cachectic; nontoxic, and in no distress, sleeping in bed and easily rouses    HEENT: Normocephalic, without obvious abnormality, atraumatic  Conjunctiva pink and sclera anicteric  Oropharynx moist without lesions  Edentulous  Lungs:   Decreased R>L otherwise clear without rhonchi or wheeze, respirations unlabored on 2 LNC   Heart:  RRR; no murmur, rub or gallop   Abdomen:   Soft, non-tender, non-distended, positive bowel sounds  Abdomen is concave and flat  PEG tube c/d/i  Extremities: No cyanosis, clubbing or edema  Thin with evidence of muscle wasting  Musculoskeletal: Back symmetric without curvature, ROM normal     Skin: No rashes or lesions  No draining wounds noted   Old healed right chest wall scars from prior thoracenteses  No wounds on toes or feet  IV in place without warmth, erythema, or exudate  LABS, IMAGING, & OTHER STUDIES:  Extensive review of the medical records in epic including review of the notes, radiographs, and laboratory results were reviewed personally as below  Lab Results:  I have personally reviewed pertinent labs  Results from last 7 days   Lab Units 03/23/23  0440 03/22/23  0435 03/21/23  1303   WBC Thousand/uL 6 20 7 58 11 09*   HEMOGLOBIN g/dL 8 3* 9 2* 11 0*   PLATELETS Thousands/uL 191 218 299     Results from last 7 days   Lab Units 03/23/23  0440 03/22/23  0435 03/21/23  1303   SODIUM mmol/L 130* 133* 133*   POTASSIUM mmol/L 4 2 4 0 4 2   CHLORIDE mmol/L 96 96 91*   CO2 mmol/L 29 29 32   BUN mg/dL 16 14 19   CREATININE mg/dL 0 68 0 60 0 77   EGFR ml/min/1 73sq m 93 98 88   CALCIUM mg/dL 8 2* 8 6 9 8   AST U/L 19 18 17   ALT U/L 15 14 17   ALK PHOS U/L 91 84 108*     Results from last 7 days   Lab Units 03/22/23  1418 03/22/23  1417 03/22/23  1414 03/22/23  1410 03/22/23  1406 03/21/23  1652 03/21/23  1651 03/21/23  1628 03/21/23  1303   BLOOD CULTURE   --   --   --   --   --   --   --   --  No Growth at 24 hrs  No Growth at 24 hrs     SPUTUM CULTURE   --   --   --   --   --   --   --  3+ Growth of Staphylococcus aureus*  Few Colonies of  --    GRAM STAIN RESULT  4+ Polys*  2+ Gram positive cocci in clusters* 1+ Polys  No bacteria seen 1+ Gram positive cocci in clusters*  1+ Polys* 2+ Polys*  1+ Gram positive cocci in clusters* 2+ Polys*  1+ Gram positive cocci in clusters*  --   --  2+ Epithelial cells per low power field*  2+ Polys*  2+ Gram positive cocci in pairs*  --    MRSA CULTURE ONLY   --   --   --   --   --  No Methicillin Resistant Staphlyococcus aureus (MRSA) isolated  --   --   --    LEGIONELLA URINARY ANTIGEN   --   --   --   --   --   --  Negative  --   --      Results from last 7 days   Lab Units 03/22/23  1603 03/22/23  4205 03/21/23  3852 PROCALCITONIN ng/ml 0 25 0 36* 0 59*                   Imaging Studies:   I have personally reviewed pertinent imaging study reports and images in PACS, including CT chest     Other Studies:   I have personally reviewed pertinent report including blood cx, BAL cx, Bronchoscopy report, TTE report  I have spent a total time of 60 minutes on 03/23/23 in caring for this patient including Diagnostic results, Impressions, Documenting in the medical record, Reviewing / ordering tests, medicine, procedures  , Obtaining or reviewing history   and Communicating with other healthcare professionals

## 2023-03-23 NOTE — NUTRITION
03/23/23 1344   Biochemical Data,Medical Tests, and Procedures   Biochemical Data/Medical Tests/Procedures Lab values reviewed; Meds reviewed   Nutrition-Focused Physical Exam   Nutrition-Focused Physical Exam Findings RN skin assessment reviewed; No edema documented; Wound   Feeding Route   Tube Feeding PEG tube   Formula Jevity 1 5   Formula rate 500 mL bolus x3 daily   Pump Type Kangaroo ePump   Total Kcal intake 2250   Protein Intake (g) 83 g   Current PO Intake   Current Diet Order NPO; EN   Estimated calorie intake compared to estimated need EN as ordered is meeting estimated needs  Recommendations/Interventions   Malnutrition/BMI Present Yes  (as previously documented)   Summary Pt's wife reports pt's home TF regimen is as follows: 6 cartons Jevity 1 5 daily - providing 2100 kcal, 89 g protein  Pt's wife reports trying to increase regimen to 7 cartons daily as pt has a low body weight  Diarrhea ongoing in setting of recent chemotherapy and prednisone taper  Recommend adding Banatrol TID  Hyperglycemia noted  Recommend adjusting TF to Glucerna 1 2 600 mL bolus x3 daily to provide 2160 kcal, 108 g protein, 206 g CHO, 1449 mL free water  Recommend monitoring glucose POC closely  Hyperglycemia likely attributed to prednisone however will monitor labs to assess if change in formula lowers glucose POC  Interventions/Recommendations Adjust EN/ PN   Recommendations to Provider Diarrhea ongoing in setting of recent chemotherapy and prednisone taper  Recommend adding Banatrol TID  Hyperglycemia noted  Recommend adjusting TF to Glucerna 1 2 600 mL bolus x3 daily to provide 2160 kcal, 108 g protein, 206 g CHO, 1449 mL free water  Recommend monitoring glucose POC closely  Hyperglycemia likely attributed to prednisone however will monitor labs to assess if change in formula lowers glucose POC     Education Assessment   Education Education not indicated at this time   Patient Nutrition Goals   Goal Meet EN/PN needs

## 2023-03-23 NOTE — ASSESSMENT & PLAN NOTE
Lab Results   Component Value Date    HGBA1C 5 8 (H) 12/30/2022       Recent Labs     03/22/23  1209 03/22/23  1609 03/23/23  0733 03/23/23  1129   POCGLU 108 98 148* 343*       Blood Sugar Average: Last 72 hrs:  · (P) 143Holding home metformin  · Continue sliding scale and Accu-Cheks

## 2023-03-23 NOTE — ASSESSMENT & PLAN NOTE
· Multifactorial, pneumonia, PE, mucous plugging  · CT PE study with multifocal factorial pneumonia, PEs, mucous plugging, and a right basilar hydropneumothorax  · Wean oxygen for an SPO2 greater than 88%  · Incentive spirometry flutter valve  · Patient underwent bronchoscopy 3/22, results pending  · ID consulted, recommending IR consultation to rule out empyema

## 2023-03-23 NOTE — PROGRESS NOTES
Anna Marie Mascorro is a 78 y o  male who is currently ordered Vancomycin IV with management by the Pharmacy Consult service  Relevant clinical data and objective / subjective history reviewed  Vancomycin Assessment:  Indication and Goal AUC/Trough: Pneumonia (goal -600, trough >10), -600, trough >10  Clinical Status: stable  Micro:     Renal Function:  SCr: 0 68 mg/dL  CrCl: 80 9 mL/min  Renal replacement: Not on dialysis  Days of Therapy: 3  Current Dose: 1750mg IV Q24H  Vancomycin Plan:  New Dosinmg IV Q24H  Estimated AUC: 429 mcg*hr/mL  Estimated Trough: 10 8 mcg/mL  Next Level: 3/24 with AM labs  Renal Function Monitoring: Daily BMP and UOP  Pharmacy will continue to follow closely for s/sx of nephrotoxicity, infusion reactions and appropriateness of therapy  BMP and CBC will be ordered per protocol  We will continue to follow the patient’s culture results and clinical progress daily      Karla Langford, Pharmacist

## 2023-03-23 NOTE — ASSESSMENT & PLAN NOTE
· 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 mediastinal and cardiophrenic lymph nodes  · Follows with [facility] oncology  · Completed almost 2 complete rounds of chemotherapy-next round of chemotherapy to start at the beginning of April  · Will continue home prednisone

## 2023-03-24 LAB
ALBUMIN SERPL BCP-MCNC: 3.1 G/DL (ref 3.5–5)
ALP SERPL-CCNC: 111 U/L (ref 34–104)
ALT SERPL W P-5'-P-CCNC: 23 U/L (ref 7–52)
ANION GAP SERPL CALCULATED.3IONS-SCNC: 7 MMOL/L (ref 4–13)
APTT PPP: 103 SECONDS (ref 23–37)
APTT PPP: 38 SECONDS (ref 23–37)
APTT PPP: >210 SECONDS (ref 23–37)
AST SERPL W P-5'-P-CCNC: 20 U/L (ref 13–39)
BACTERIA BRONCH AEROBE CULT: ABNORMAL
BACTERIA SPT RESP CULT: ABNORMAL
BACTERIA SPT RESP CULT: ABNORMAL
BILIRUB SERPL-MCNC: 0.37 MG/DL (ref 0.2–1)
BUN SERPL-MCNC: 14 MG/DL (ref 5–25)
CALCIUM ALBUM COR SERPL-MCNC: 9.4 MG/DL (ref 8.3–10.1)
CALCIUM SERPL-MCNC: 8.7 MG/DL (ref 8.4–10.2)
CHLORIDE SERPL-SCNC: 96 MMOL/L (ref 96–108)
CO2 SERPL-SCNC: 29 MMOL/L (ref 21–32)
CREAT SERPL-MCNC: 0.67 MG/DL (ref 0.6–1.3)
ERYTHROCYTE [DISTWIDTH] IN BLOOD BY AUTOMATED COUNT: 23.5 % (ref 11.6–15.1)
GFR SERPL CREATININE-BSD FRML MDRD: 93 ML/MIN/1.73SQ M
GLUCOSE SERPL-MCNC: 166 MG/DL (ref 65–140)
GLUCOSE SERPL-MCNC: 174 MG/DL (ref 65–140)
GLUCOSE SERPL-MCNC: 268 MG/DL (ref 65–140)
GLUCOSE SERPL-MCNC: 314 MG/DL (ref 65–140)
GLUCOSE SERPL-MCNC: 351 MG/DL (ref 65–140)
GRAM STN SPEC: ABNORMAL
HCT VFR BLD AUTO: 32.6 % (ref 36.5–49.3)
HGB BLD-MCNC: 9.8 G/DL (ref 12–17)
MAGNESIUM SERPL-MCNC: 1.8 MG/DL (ref 1.9–2.7)
MCH RBC QN AUTO: 27.5 PG (ref 26.8–34.3)
MCHC RBC AUTO-ENTMCNC: 30.1 G/DL (ref 31.4–37.4)
MCV RBC AUTO: 91 FL (ref 82–98)
PHOSPHATE SERPL-MCNC: 2.5 MG/DL (ref 2.3–4.1)
PLATELET # BLD AUTO: 188 THOUSANDS/UL (ref 149–390)
PMV BLD AUTO: 9 FL (ref 8.9–12.7)
POTASSIUM SERPL-SCNC: 4 MMOL/L (ref 3.5–5.3)
PROT SERPL-MCNC: 6.1 G/DL (ref 6.4–8.4)
RBC # BLD AUTO: 3.57 MILLION/UL (ref 3.88–5.62)
SODIUM SERPL-SCNC: 132 MMOL/L (ref 135–147)
VANCOMYCIN SERPL-MCNC: 10.7 UG/ML (ref 10–20)
WBC # BLD AUTO: 5.18 THOUSAND/UL (ref 4.31–10.16)

## 2023-03-24 RX ORDER — VANCOMYCIN HYDROCHLORIDE 1 G/200ML
15 INJECTION, SOLUTION INTRAVENOUS EVERY 12 HOURS
Status: DISCONTINUED | OUTPATIENT
Start: 2023-03-24 | End: 2023-03-24

## 2023-03-24 RX ORDER — CEFAZOLIN SODIUM 2 G/50ML
2000 SOLUTION INTRAVENOUS EVERY 8 HOURS
Status: DISCONTINUED | OUTPATIENT
Start: 2023-03-24 | End: 2023-03-27

## 2023-03-24 RX ORDER — INSULIN GLARGINE 100 [IU]/ML
5 INJECTION, SOLUTION SUBCUTANEOUS EVERY MORNING
Status: DISCONTINUED | OUTPATIENT
Start: 2023-03-24 | End: 2023-03-28 | Stop reason: HOSPADM

## 2023-03-24 RX ADMIN — CEFAZOLIN SODIUM 2000 MG: 2 SOLUTION INTRAVENOUS at 19:35

## 2023-03-24 RX ADMIN — GUAIFENESIN 400 MG: 100 SOLUTION ORAL at 16:30

## 2023-03-24 RX ADMIN — OXYCODONE HYDROCHLORIDE 5 MG: 5 TABLET ORAL at 19:43

## 2023-03-24 RX ADMIN — HEPARIN SODIUM 5600 UNITS: 1000 INJECTION INTRAVENOUS; SUBCUTANEOUS at 22:01

## 2023-03-24 RX ADMIN — GUAIFENESIN 400 MG: 100 SOLUTION ORAL at 09:48

## 2023-03-24 RX ADMIN — FAMOTIDINE 20 MG: 40 POWDER, FOR SUSPENSION ORAL at 09:48

## 2023-03-24 RX ADMIN — VANCOMYCIN HYDROCHLORIDE 1000 MG: 1 INJECTION, SOLUTION INTRAVENOUS at 07:45

## 2023-03-24 RX ADMIN — LEVALBUTEROL HYDROCHLORIDE 1.25 MG: 1.25 SOLUTION RESPIRATORY (INHALATION) at 20:52

## 2023-03-24 RX ADMIN — INSULIN GLARGINE 5 UNITS: 100 INJECTION, SOLUTION SUBCUTANEOUS at 11:58

## 2023-03-24 RX ADMIN — GUAIFENESIN 400 MG: 100 SOLUTION ORAL at 05:17

## 2023-03-24 RX ADMIN — CHLORHEXIDINE GLUCONATE 0.12% ORAL RINSE 15 ML: 1.2 LIQUID ORAL at 20:28

## 2023-03-24 RX ADMIN — ATORVASTATIN CALCIUM 20 MG: 20 TABLET, FILM COATED ORAL at 16:31

## 2023-03-24 RX ADMIN — LEVALBUTEROL HYDROCHLORIDE 1.25 MG: 1.25 SOLUTION RESPIRATORY (INHALATION) at 13:07

## 2023-03-24 RX ADMIN — PREDNISONE 5 MG: 5 TABLET ORAL at 08:29

## 2023-03-24 RX ADMIN — CHLORHEXIDINE GLUCONATE 0.12% ORAL RINSE 15 ML: 1.2 LIQUID ORAL at 08:29

## 2023-03-24 RX ADMIN — HEPARIN SODIUM 16 UNITS/KG/HR: 10000 INJECTION, SOLUTION INTRAVENOUS at 07:22

## 2023-03-24 RX ADMIN — FAMOTIDINE 20 MG: 40 POWDER, FOR SUSPENSION ORAL at 17:27

## 2023-03-24 RX ADMIN — LEVALBUTEROL HYDROCHLORIDE 1.25 MG: 1.25 SOLUTION RESPIRATORY (INHALATION) at 07:02

## 2023-03-24 RX ADMIN — INSULIN LISPRO 3 UNITS: 100 INJECTION, SOLUTION INTRAVENOUS; SUBCUTANEOUS at 16:28

## 2023-03-24 RX ADMIN — INSULIN LISPRO 3 UNITS: 100 INJECTION, SOLUTION INTRAVENOUS; SUBCUTANEOUS at 11:58

## 2023-03-24 RX ADMIN — INSULIN LISPRO 1 UNITS: 100 INJECTION, SOLUTION INTRAVENOUS; SUBCUTANEOUS at 07:23

## 2023-03-24 RX ADMIN — GUAIFENESIN 400 MG: 100 SOLUTION ORAL at 22:00

## 2023-03-24 NOTE — ASSESSMENT & PLAN NOTE
· Hx of hydropneumothorax  · Recently admitted to [facility] on December 29, 2021 to and had a pigtail chest tube placed by CT surgery    Remained in for 2 days and was discontinued secondary to decreased output  · Exudative and suspicious for malignancy-unable to find recent cytology  · CT PE study with small loculated right basilar hydropneumothorax with pleural thickening suspicious for empyema and bronchopleural fistula  · Plan as above

## 2023-03-24 NOTE — PROGRESS NOTES
Progress Note - [facility] Infectious Disease   Sky Bullard 78 y o  male MRN: [id number]  Unit/Bed#: ICU 06-01 Encounter: [id number]      IMPRESSION & RECOMMENDATIONS:   1  Multifocal MSSA pneumonia  Complicated by few cavitary lesions  This is in the setting of chronic immunosuppression due to active malignancy  Patient presented with dyspnea and productive cough  CT on admission showed bibasilar multifocal aspiration bronchopneumonia with mucous plugging  Concern for developing septic emboli versus cavitary pneumonia versus empyema versus bronchopleural fistula  Sputum culture with growth of MSSA  S/p bronchoscopy 3/22 which showed cloudy/purulent secretions with mucous plugging  Cultures also so far isolated MSSA  He is now s/p thoracentesis with culture and GS pending  In the setting of #6, consider possibility of atypical or opportunistic infection, though unlikely fungal in this setting  With chronic dysphagia, consider aspiration component  Urinary antigens are negative  MRSA nares negative  His blood cultures are so far negative  TTE (poor study) without obvious vegetation  Fortunately patient remains hemodynamically stable and afebrile   -Discontinue IV vancomycin  -Start IV cefazolin 2g q8h  -Follow up pleural fluid GS, C&S  -Follow-up BAL cx including PCP DFA, AFB, and  legionella/viral/fungal culture  -Recommend repeat imaging of the chest sometime next week to monitor for response   -Anticipate prolonged course of antibiotics      2  Right sided hydropneumothorax  CT chest shows small loculated right basilar hydropneumothorax with pleural thickening suspicious for empyema and/or bronchopleural fistula  Pulmonology is following, suspected malignant effusion  He is status post several thoracenteses in which fluid studies were consistent with exudative process  He is now s/p IR dx thoracentesis yesterday, LDH low, glucose high  with lymphocytotic predominance, and pH 7 4  Culture is pending  In the setting of a staph aureus pneumonia, suspect necrotizing process    -Antibiotics as above  -Follow up pleural fluid GS and culture   -Follow up pleural fluid cytology   -Close pulmonary follow-up     3  Leukocytosis  This was noted on admission, mild and now resolved  Patient remains afebrile and hemodynamically stable  Blood cultures are negative to date   -Follow up blood cultures  -Monitor CBC differential, temperature, hemodynamics     4  Acute pulmonary embolism  Likely in the setting of #5  No signs of right ventricular strain on transthoracic echo  He is anticoagulated on heparin drip   -Likely requires lifelong anticoagulation     5  Acute respiratory failure with hypoxia  Likely multifactorial secondary to above and underlying metastatic disease  He is stable on NC O2   -Monitor O2 requirements   -Aggressive pulmonary toilet   -Respiratory protocol      6  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  Now with abnormal PET scan of the right lung with extensive metastatic disease of the right pleura and a new right-sided malignant pleural effusion  Most likely a recurrent metastatic head/neck cancer although could alternatively be a primary lung CA  Last received palliative chemotherapy on 3/16 and is on prednisone taper for diarrhea  [facility]   -Close outpatient follow-up with oncology     7  Hx of dysphagia s/p PEG  NPO with feeding by G tube only  Tube was recently replaced in Jan 2023  Reports doing 3 gravity feeds for a total of 7 cans a day  -Monitor for sxs of bottom up aspiration      8  Diabetes mellitus, type II, without long term insulin use  Now with likely steroid induced hyperglycemia    -Glucose management per primary team     9  Hx of tobacco abuse  Hx of 54 pack year history, stopped in 2021  Antibiotics:  Ancef D1  Abx D4    I have discussed the above management plan in detail with patient     I have discussed the above management plan in detail with patient's RN, and the primary service, SLIM attending  We will see patient again 3/27/23 if here  Please call ID on call physician in meantime if questions  24 Hour events:  Yesterday and overnight notes reviewed  S/p thoracentesis with pleural fluid analysis  Subjective:  Patient has no fever, chills, sweats; no nausea, vomiting, diarrhea; states his breathing is shallow  No cough today  Tolerating the abx  Objective:  Vitals:  Temp:  [96 6 °F (35 9 °C)-97 7 °F (36 5 °C)] 97 7 °F (36 5 °C)  HR:  [] 79  Resp:  [14-46] 21  BP: ()/(55-82) 145/77  SpO2:  [97 %-100 %] 99 %  Temp (24hrs), Av 1 °F (36 2 °C), Min:96 6 °F (35 9 °C), Max:97 7 °F (36 5 °C)  Current: Temperature: 97 7 °F (36 5 °C)    PHYSICAL EXAM:  General Appearance:  Appearing well, nontoxic, and in no distress; chronically ill appearing elderly male  Cachectic  HEENT: Normocephalic, without obvious abnormality, atraumatic  Conjunctiva pink and sclera anicteric  Oropharynx moist without lesions  Edentulous  Lungs:   Decreased R>L  No rhonchi or wheezes  respirations unlabored on 2 L NC   Heart:  RRR; no murmur, rub or gallop   Abdomen:   Soft, non-tender, non-distended, positive bowel sounds  Abdomen flat/concave  Peg intact  Extremities: No cyanosis, clubbing or edema  Thin with evidence of muscle wasting  Musculoskeletal: Back symmetric without curvature, ROM normal     Skin: No rashes or lesions  No draining wounds noted  Peripheral IV intact without evidence of erythema, warmth, or exudate  LABS, IMAGING, & OTHER STUDIES:  Extensive review of the medical records in epic including review of the notes, radiographs, and laboratory results were reviewed personally as below  Lab Results:  I have personally reviewed pertinent labs    Results from last 7 days   Lab Units 23  0539 23  0440 23  0435   WBC Thousand/uL 5 18 6 20 7 58   HEMOGLOBIN g/dL 9 8* 8 3* 9 2* PLATELETS Thousands/uL 188 191 218     Results from last 7 days   Lab Units 03/24/23  0539 03/23/23  0440 03/22/23  0435   SODIUM mmol/L 132* 130* 133*   POTASSIUM mmol/L 4 0 4 2 4 0   CHLORIDE mmol/L 96 96 96   CO2 mmol/L 29 29 29   BUN mg/dL 14 16 14   CREATININE mg/dL 0 67 0 68 0 60   EGFR ml/min/1 73sq m 93 93 98   CALCIUM mg/dL 8 7 8 2* 8 6   AST U/L 20 19 18   ALT U/L 23 15 14   ALK PHOS U/L 111* 91 84     Results from last 7 days   Lab Units 03/22/23  1418 03/22/23  1417 03/22/23  1414 03/22/23  1410 03/22/23  1406 03/21/23  1652 03/21/23  1651 03/21/23  1628 03/21/23  1303   BLOOD CULTURE   --   --   --   --   --   --   --   --  No Growth at 48 hrs  No Growth at 48 hrs     SPUTUM CULTURE   --   --   --   --   --   --   --  3+ Growth of Staphylococcus aureus*  1+ Growth of  --    GRAM STAIN RESULT  4+ Polys*  2+ Gram positive cocci in clusters* 1+ Polys  No bacteria seen 1+ Gram positive cocci in clusters*  1+ Polys* 2+ Polys*  1+ Gram positive cocci in clusters* 2+ Polys*  1+ Gram positive cocci in clusters*  --   --  2+ Epithelial cells per low power field*  2+ Polys*  2+ Gram positive cocci in pairs*  --    MRSA CULTURE ONLY   --   --   --   --   --  No Methicillin Resistant Staphlyococcus aureus (MRSA) isolated  --   --   --    LEGIONELLA URINARY ANTIGEN   --   --   --   --   --   --  Negative  --   --      Results from last 7 days   Lab Units 03/22/23  1603 03/22/23  0435 03/21/23  1303   PROCALCITONIN ng/ml 0 25 0 36* 0 59*                   Imaging Studies:   I have personally reviewed pertinent imaging study reports and images in PACS, including CTA chest     Other Studies:   I have personally reviewed pertinent report including IR procedure, sputum cx, blood cx, BAL cxs    I have spent a total time of 50 minutes on 03/24/23 in caring for this patient including Impressions, Documenting in the medical record, Reviewing / ordering tests, medicine, procedures  , Obtaining or reviewing history and Communicating with other healthcare professionals

## 2023-03-24 NOTE — PLAN OF CARE
Problem: Potential for Falls  Goal: Patient will remain free of falls  Description: INTERVENTIONS:  - Educate patient/family on patient safety including physical limitations  - Instruct patient to call for assistance with activity   - Consult OT/PT to assist with strengthening/mobility   - Keep Call bell within reach  - Keep bed low and locked with side rails adjusted as appropriate  - Keep care items and personal belongings within reach  - Initiate and maintain comfort rounds  - Make Fall Risk Sign visible to staff  - Offer Toileting every 2 Hours, in advance of need  - Initiate/Maintain bed alarm  - Apply yellow socks and bracelet for high fall risk patients  - Consider moving patient to room near nurses station  Outcome: Progressing     Problem: PAIN - ADULT  Goal: Verbalizes/displays adequate comfort level or baseline comfort level  Description: Interventions:  - Encourage patient to monitor pain and request assistance  - Assess pain using appropriate pain scale  - Administer analgesics based on type and severity of pain and evaluate response  - Implement non-pharmacological measures as appropriate and evaluate response  - Consider cultural and social influences on pain and pain management  - Notify physician/advanced practitioner if interventions unsuccessful or patient reports new pain  Outcome: Progressing     Problem: INFECTION - ADULT  Goal: Absence or prevention of progression during hospitalization  Description: INTERVENTIONS:  - Assess and monitor for signs and symptoms of infection  - Monitor lab/diagnostic results  - Monitor all insertion sites, i e  indwelling lines, tubes, and drains  - Monitor endotracheal if appropriate and nasal secretions for changes in amount and color  - [place] appropriate cooling/warming therapies per order  - Administer medications as ordered  - Instruct and encourage patient and family to use good hand hygiene technique  - Identify and instruct in appropriate isolation precautions for identified infection/condition  Outcome: Progressing  Goal: Absence of fever/infection during neutropenic period  Description: INTERVENTIONS:  - Monitor WBC    Outcome: Adequate for Discharge     Problem: SAFETY ADULT  Goal: Patient will remain free of falls  Description: INTERVENTIONS:  - Educate patient/family on patient safety including physical limitations  - Instruct patient to call for assistance with activity   - Consult OT/PT to assist with strengthening/mobility   - Keep Call bell within reach  - Keep bed low and locked with side rails adjusted as appropriate  - Keep care items and personal belongings within reach  - Initiate and maintain comfort rounds  - Make Fall Risk Sign visible to staff  - Offer Toileting every 2 Hours, in advance of need  - Initiate/Maintain bed alarm  - Apply yellow socks and bracelet for high fall risk patients  - Consider moving patient to room near nurses station  Outcome: Progressing  Goal: Maintain or return to baseline ADL function  Description: INTERVENTIONS:  - Educate patient/family on patient safety including physical limitations  - Instruct patient to call for assistance with activity   - Consult OT/PT to assist with strengthening/mobility   - Keep Call bell within reach  - Keep bed low and locked with side rails adjusted as appropriate  - Keep care items and personal belongings within reach  - Initiate and maintain comfort rounds  - Make Fall Risk Sign visible to staff  - Offer Toileting every 2 Hours, in advance of need  - Initiate/Maintain bed alarm  - Apply yellow socks and bracelet for high fall risk patients  - Consider moving patient to room near nurses station  Outcome: Progressing  Goal: Maintains/Returns to pre admission functional level  Description: INTERVENTIONS:  - Perform BMAT or MOVE assessment daily    - Set and communicate daily mobility goal to care team and patient/family/caregiver     - Collaborate with rehabilitation services on mobility goals if consulted  - Perform Range of Motion 3 times a day  - Reposition patient every 2 hours  - Dangle patient 3 times a day  - Stand patient 3 times a day  - Ambulate patient 3 times a day  Problem: Knowledge Deficit  Goal: Patient/family/caregiver demonstrates understanding of disease process, treatment plan, medications, and discharge instructions  Description: Complete learning assessment and assess knowledge base  Interventions:  - Provide teaching at level of understanding  - Provide teaching via preferred learning methods  Outcome: Progressing     Problem: Prexisting or High Potential for Compromised Skin Integrity  Goal: Skin integrity is maintained or improved  Description: INTERVENTIONS:  - Identify patients at risk for skin breakdown  - Assess and monitor skin integrity  - Assess and monitor nutrition and hydration status  - Monitor labs   - Assess for incontinence   - Turn and reposition patient  - Assist with mobility/ambulation  - Relieve pressure over bony prominences  - Avoid friction and shearing  - Provide appropriate hygiene as needed including keeping skin clean and dry  - Evaluate need for skin moisturizer/barrier cream  - Collaborate with interdisciplinary team   - Patient/family teaching  - Consider wound care consult   Outcome: Progressing     Problem: Nutrition/Hydration-ADULT  Goal: Nutrient/Hydration intake appropriate for improving, restoring or maintaining nutritional needs  Description: Monitor and assess patient's nutrition/hydration status for malnutrition  Collaborate with interdisciplinary team and initiate plan and interventions as ordered  Monitor patient's weight and dietary intake as ordered or per policy  Utilize nutrition screening tool and intervene as necessary  Determine patient's food preferences and provide high-protein, high-caloric foods as appropriate       INTERVENTIONS:  - Monitor oral intake, urinary output, labs, and treatment plans  - Assess nutrition and hydration status and recommend course of action  - Evaluate amount of meals eaten  - Assist patient with eating if necessary   - Allow adequate time for meals  - Recommend/ encourage appropriate diets, oral nutritional supplements, and vitamin/mineral supplements  - Order, calculate, and assess calorie counts as needed  - Recommend, monitor, and adjust tube feedings and TPN/PPN based on assessed needs  - Assess need for intravenous fluids  - Provide specific nutrition/hydration education as appropriate  - Include patient/family/caregiver in decisions related to nutrition  Outcome: Progressing     - Out of bed for toileting  - Record patient progress and toleration of activity level   Outcome: Progressing

## 2023-03-24 NOTE — ASSESSMENT & PLAN NOTE
· 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 mediastinal and cardiophrenic lymph nodes  · Follows with [facility] oncology  · Completed almost 2 complete rounds of chemotherapy-next round of chemotherapy to start at the beginning of April  · Will continue home prednisone

## 2023-03-24 NOTE — ASSESSMENT & PLAN NOTE
Lab Results   Component Value Date    HGBA1C 5 8 (H) 12/30/2022       Recent Labs     03/23/23  1725 03/23/23 2052 03/24/23  0657 03/24/23  1115   POCGLU 324* 271* 174* 351*       Blood Sugar Average: Last 72 hrs:  · (P) 156 [id number] home metformin  · Will initiate Lantus 5 units qam  · Continue sliding scale and Accu-Cheks

## 2023-03-24 NOTE — PROGRESS NOTES
Delroy Kilgore is a 78 y o  male who is currently ordered Vancomycin IV with management by the Pharmacy Consult service  Relevant clinical data and objective / subjective history reviewed  Vancomycin Assessment:  Indication and Goal AUC/Trough: Pneumonia (goal -600, trough >10), -600, trough >10  Clinical Status: stable  Micro:     Renal Function:  SCr: 0 67 mg/dL  CrCl: 84 5 mL/min  Renal replacement: Not on dialysis  Days of Therapy: 4  Current Dose: 1500mg IV Q24H  Vancomycin Plan:  New Dosinmg IV Q12H  Estimated AUC: 504 mcg*hr/mL  Estimated Trough: 14 9 mcg/mL  Next Level: 3/28 with Am labs  Renal Function Monitoring: Daily BMP and UOP  Pharmacy will continue to follow closely for s/sx of nephrotoxicity, infusion reactions and appropriateness of therapy  BMP and CBC will be ordered per protocol  We will continue to follow the patient’s culture results and clinical progress daily      Deena Hammer, Pharmacist

## 2023-03-24 NOTE — ASSESSMENT & PLAN NOTE
· Multifactorial, pneumonia, PE, mucous plugging  · CT PE study with multifocal factorial pneumonia, PEs, mucous plugging, and a right basilar hydropneumothorax  · Wean oxygen for an SPO2 greater than 88%  · Incentive spirometry flutter valve  · Patient underwent bronchoscopy 3/22, multiple cultures growing Staph aureus sensitive to cefazolin  · ID consulted, appreciate recommendations   · Patient underwent thoracentesis on 3/23, 70 cc of louise fluid removed  · ID recommending repeat chest CT next week

## 2023-03-24 NOTE — ASSESSMENT & PLAN NOTE
Malnutrition Findings:   Adult Malnutrition type: Chronic illness  Adult Degree of Malnutrition: Other severe protein calorie malnutrition  Malnutrition Characteristics: Fat loss, Muscle loss, Weight loss                  360 Statement: Chronic, severe protein-calorie malnutrition related to dysphagia as evidenced by severe muscle wasting of clavicle and temple regions, subcutaneous fat loss of orbital regions, and significant weight loss of 20% x 3 months  Will initiate EN via PEG tube when medically appropriate  BMI Findings:  Adult BMI Classifications: Underweight < 18 5        Body mass index is 17 75 kg/m²     · Has PEG tube secondary to dysphagia through which he gets Jevity tube feeds  · Nutrition consult

## 2023-03-24 NOTE — ASSESSMENT & PLAN NOTE
· Complained of sudden onset of dyspnea yesterday-found to have a low SPO2 in the 80s  · Used home O2 with improvement to high 80s  · CT PE-Filling defects involving the anterior-basilar and lateral-basilar segments of the left lower lobe, as and inferior lingular segment of the left upper lobe, compatible with acute pulmonary emboli  · Echo from 12/22/2022 ejection fraction is 70%   Systolic function is normal  NRWM  · Continue heparin gtt via PE protocol

## 2023-03-24 NOTE — ASSESSMENT & PLAN NOTE
· As evidenced by tachycardia hypoxia, leukocytosis and lactic acidosis  · Fluid resuscitated with 3 L of normal saline in the ED  · Suspect multifactorial pneumonia most likely aspiration  · Blood cultures x2 no growth at 48 hours  · LA 2 1, cleared  · Urine strep and Legionella negative  Sputum cx    3+ Growth of Staphylococcus aureus Abnormal          · Continue Ancef

## 2023-03-24 NOTE — PROGRESS NOTES
Progress Note - Pulmonary   Anna Marie Mascorro 78 y o  male MRN: [id number]  Unit/Bed#: ICU 06-01 Encounter: [id number]      Assessment:  1  Acute hypoxic respiratory failure   • Improving with current pneumonia treatment  • Suspect contribution from underlying COPD/unknown severity and pulmonary embolism     2  MSSA pneumonia/tracheobronchitis  • Still pending atypical/opportunistic infection cultures negative AFB/fungal to date, pending PCP  • S/p bronchoscopy/BAL 3/22  • Improving secretions/mucus production, noted copious moderate thickness green/white secretions with mucous plug at the bronchus intermedius     3  Right hydropneumothorax  • Suspect malignant effusion  • S/p thoracenteses most recently 2 months ago cytology was negative  • However, noted multiple FDG avidity on last PET/CT on the right pleural/fissural surfaces, see below  • Likely has an entrapped/trapped lung causing the hydropneumothorax appearance  • Chest tube placed at [facility] had a minimal effusion output  • CT this admission shows small space/effusion compared to before  • Does not appear to be amenable for drainage given the small size  • S/p repeat diagnostic Thora 3/23, exudate by protein ratio, pending cytology     4  Multiple pulmonary nodules  • Possible metastatic disease vs new lung primary given the high risk vs pneumonia related/ no signs of vegetations on TTE     5  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  • With known right pleural mets, possible lung metastases as well  • Currently on palliative chemo/immunotherapy with pembrolizumab     6  COPD/emphysema  7  Former tobacco abuse    Plan:  · Overall improving with current regimen  · Continue antibiotics/switch to Ancef today appreciate ID input  · Airway clearance protocol, Xopenex 3 times daily/Mucinex  · Anticoagulation could be switched to oral agent now  · Follow bronchoscopy/BAL atypical/AFB/PCP studies    Subjective:   No overnight acute events    Cough is a lot "better, improving/much less sputum production  Also breathing is easier  Underwent diagnostic right thoracentesis yesterday  Antibiotic de-escalated to cefazolin  Vitals: Blood pressure 159/74, pulse 79, temperature 97 7 °F (36 5 °C), temperature source Oral, resp  rate 20, height 6' 2\" (1 88 m), weight 62 7 kg (138 lb 3 7 oz), SpO2 99 %  , Body mass index is 17 75 kg/m²  Intake/Output Summary (Last 24 hours) at 3/24/2023 0942  Last data filed at 3/24/2023 0900  Gross per 24 hour   Intake 2550 58 ml   Output 1375 ml   Net 1175 58 ml       Physical Exam  Gen: not in acute distress, thin, ill-appearing Body mass index is 17 75 kg/m²  HEENT:supple, no accessory muscle use, no JVD appreciated  Cardiac:no murmurs appreciated  Lungs: normal respiratory effort, moderate air movement, diminished at the right base/mid lung fields  Abdomen: soft, nontender, normoactive bowel sounds, PEG tube in place  Extremities: no edema  Neuro: AAO X3, no focal motor deficit    Labs: I have personally reviewed pertinent lab results          Cameron Galvan MD    "

## 2023-03-24 NOTE — PROGRESS NOTES
Vancomycin IV Pharmacy-to-Dose Consultation     Vancomycin has been discontinued  Pharmacy will sign off  Please contact or re-consult with questions      Radha Armenta, Pharmacist

## 2023-03-24 NOTE — PROGRESS NOTES
[address]  Progress Note  Name: Rebeca Becerril  MRN: [id number]  Unit/Bed#: ICU 06-01 I Date of Admission: 3/21/2023   Date of Service: 3/24/2023 I Hospital Day: 3    Assessment/Plan   * Acute respiratory failure with hypoxia [facility])  Assessment & Plan  · Multifactorial, pneumonia, PE, mucous plugging  · CT PE study with multifocal factorial pneumonia, PEs, mucous plugging, and a right basilar hydropneumothorax  · Wean oxygen for an SPO2 greater than 88%  · Incentive spirometry flutter valve  · Patient underwent bronchoscopy 3/22, multiple cultures growing Staph aureus sensitive to cefazolin  · ID consulted, appreciate recommendations   · Patient underwent thoracentesis on 3/23, 70 cc of louise fluid removed  · ID recommending repeat chest CT next week    Sepsis ([facility] [address] )  Assessment & Plan  · As evidenced by tachycardia hypoxia, leukocytosis and lactic acidosis  · Fluid resuscitated with 3 L of normal saline in the ED  · Suspect multifactorial pneumonia most likely aspiration  · Blood cultures x2 no growth at 48 hours  · LA 2 1, cleared  · Urine strep and Legionella negative  Sputum cx    3+ Growth of Staphylococcus aureus Abnormal          · Continue Ancef    Hydropneumothorax  Assessment & Plan  · Hx of hydropneumothorax  · Recently admitted to [facility] on December 29, 2021 to and had a pigtail chest tube placed by CT surgery    Remained in for 2 days and was discontinued secondary to decreased output  · Exudative and suspicious for malignancy-unable to find recent cytology  · CT PE study with small loculated right basilar hydropneumothorax with pleural thickening suspicious for empyema and bronchopleural fistula  · Plan as above      Pressure injury of skin of buttock  Assessment & Plan  · POA  · Inpatient wound care consult  · Allyvn prn  · Frequent repositioning and off loading pressure points    PNA (pneumonia)  Assessment & Plan  · Most likely secondary to aspiration versus community-acquired  · CT chest-multifocal aspiration pneumonia with multiple areas of mucous plugging  Also small loculated right basilar hydropneumothorax with pleural thickening suspicious for empyema and bronchopleural fistula  · Sputum culture pending  · Strep and Legionella negative  · Plan as above    Hyperlipemia  Assessment & Plan  · Continue home statin    Other pulmonary embolism without acute cor pulmonale (HCC)  Assessment & Plan  · Complained of sudden onset of dyspnea yesterday-found to have a low SPO2 in the 80s  · Used home O2 with improvement to high 80s  · CT PE-Filling defects involving the anterior-basilar and lateral-basilar segments of the left lower lobe, as and inferior lingular segment of the left upper lobe, compatible with acute pulmonary emboli  · Echo from 12/22/2022 ejection fraction is 70%  Systolic function is normal  NRWM  · Continue heparin gtt via PE protocol      Severe protein-calorie malnutrition (HCC)  Assessment & Plan  Malnutrition Findings:   Adult Malnutrition type: Chronic illness  Adult Degree of Malnutrition: Other severe protein calorie malnutrition  Malnutrition Characteristics: Fat loss, Muscle loss, Weight loss                  360 Statement: Chronic, severe protein-calorie malnutrition related to dysphagia as evidenced by severe muscle wasting of clavicle and temple regions, subcutaneous fat loss of orbital regions, and significant weight loss of 20% x 3 months  Will initiate EN via PEG tube when medically appropriate  BMI Findings:  Adult BMI Classifications: Underweight < 18 5        Body mass index is 17 75 kg/m²     · Has PEG tube secondary to dysphagia through which he gets Jevity tube feeds  · Nutrition consult    Neoplasm of lung  Assessment & Plan  · 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 mediastinal and cardiophrenic lymph nodes  · Follows with [facility]  · Completed almost 2 complete rounds of chemotherapy-next round of chemotherapy to start at the beginning of April  · Will continue home prednisone    Type 2 diabetes mellitus without complication, without long-term current use of insulin [facility])  Assessment & Plan  Lab Results   Component Value Date    HGBA1C 5 8 (H) 12/30/2022       Recent Labs     03/23/23  1725 03/23/23  2052 03/24/23  0657 03/24/23  1115   POCGLU 324* 271* 174* 351*       Blood Sugar Average: Last 72 hrs:  · (P) 071 [id number] home metformin  · Will initiate Lantus 5 units qam  · Continue sliding scale and Accu-Cheks              VTE Pharmacologic Prophylaxis: VTE Score: 9 High Risk (Score >/= 5) - Pharmacological DVT Prophylaxis Ordered: heparin drip  Sequential Compression Devices Ordered  Patient Centered Rounds: I performed bedside rounds with nursing staff today  Discussions with Specialists or Other Care Team Provider: ID     Education and Discussions with Family / Patient: Updated  (wife) via phone  Total Time Spent on Date of Encounter in care of patient: 35 minutes This time was spent on one or more of the following: performing physical exam; counseling and coordination of care; obtaining or reviewing history; documenting in the medical record; reviewing/ordering tests, medications or procedures; communicating with other healthcare professionals and discussing with patient's family/caregivers  Current Length of Stay: 3 day(s)  Current Patient Status: Inpatient   Certification Statement: The patient will continue to require additional inpatient hospital stay due to Sepsis requiring administration of IV antibiotics and further imaging  Discharge Plan: Pending clinical course, anticipate repeat CT next week    Code Status: Level 3 - DNAR and DNI    Subjective:   Seen and examined bedside  Patient continues to endorse shortness of breath but otherwise denies any other symptoms at this time  Patient states he feels great    Objective:     Vitals:   Temp (24hrs), Av 2 °F (36 2 °C), Min:96 9 °F (36 1 °C), Max:97 7 °F (36 5 °C)    Temp:  [96 9 °F (36 1 °C)-97 7 °F (36 5 °C)] 97 7 °F (36 5 °C)  HR:  [65-87] 79  Resp:  [14-46] 20  BP: ()/(55-79) 159/74  SpO2:  [98 %-100 %] 99 %  Body mass index is 17 75 kg/m²  Input and Output Summary (last 24 hours): Intake/Output Summary (Last 24 hours) at 3/24/2023 1226  Last data filed at 3/24/2023 1101  Gross per 24 hour   Intake 2172 18 ml   Output 1450 ml   Net 722 18 ml       Physical Exam:   Physical Exam  Constitutional:       Comments: Frail-appearing   HENT:      Head: Normocephalic  Cardiovascular:      Rate and Rhythm: Normal rate and regular rhythm  Pulses: Normal pulses  Heart sounds: Normal heart sounds  Pulmonary:      Comments: Decreased breath sounds throughout both lung fields  Abdominal:      General: Abdomen is flat  Bowel sounds are normal       Palpations: Abdomen is soft  Musculoskeletal:         General: Normal range of motion  Skin:     General: Skin is warm  Neurological:      General: No focal deficit present  Mental Status: He is alert and oriented to person, place, and time  Mental status is at baseline  Psychiatric:         Mood and Affect: Mood normal           Additional Data:     Labs:  Results from last 7 days   Lab Units 23  0539 23  0440 23  0435 23  1303   WBC Thousand/uL 5 18   < > 7 58 11 09*   HEMOGLOBIN g/dL 9 8*   < > 9 2* 11 0*   HEMATOCRIT % 32 6*   < > 30 8* 37 2   PLATELETS Thousands/uL 188   < > 218 299   BANDS PCT %  --   --  4  --    NEUTROS PCT %  --   --   --  90*   LYMPHS PCT %  --   --   --  4*   LYMPHO PCT %  --   --  3*  --    MONOS PCT %  --   --   --  5   MONO PCT %  --   --  4  --    EOS PCT %  --   --  0 0    < > = values in this interval not displayed       Results from last 7 days   Lab Units 23  0539   SODIUM mmol/L 132*   POTASSIUM mmol/L 4 0   CHLORIDE mmol/L 96   CO2 mmol/L 29   BUN mg/dL 14   CREATININE mg/dL 0 67   ANION GAP mmol/L 7   CALCIUM mg/dL 8 7   ALBUMIN g/dL 3 1*   TOTAL BILIRUBIN mg/dL 0 37   ALK PHOS U/L 111*   ALT U/L 23   AST U/L 20   GLUCOSE RANDOM mg/dL 166*     Results from last 7 days   Lab Units 03/23/23  0440   INR  1 12     Results from last 7 days   Lab Units 03/24/23  1115 03/24/23  0657 03/23/23  2052 03/23/23  1725 03/23/23  1129 03/23/23  0733 03/22/23  1609 03/22/23  1209 03/22/23  0718 03/22/23  0551 03/21/23  2354 03/21/23  1717   POC GLUCOSE mg/dl 351* 174* 271* 324* 343* 148* 98 108 109 89 106 143*         Results from last 7 days   Lab Units 03/22/23  1603 03/22/23  0435 03/21/23  1452 03/21/23  1303   LACTIC ACID mmol/L  --   --  2 0 2 1*   PROCALCITONIN ng/ml 0 25 0 36*  --  0 59*       Lines/Drains:  Invasive Devices     Peripheral Intravenous Line  Duration           Peripheral IV 03/21/23 Right Arm 2 days    Peripheral IV 03/21/23 Right;Ventral (anterior) Wrist 2 days          Drain  Duration           Gastrostomy/Enterostomy PEG-jejunostomy LUQ -- days          Airway  Duration           Supraglottic Airway LMA 4 1 day                      Imaging: No pertinent imaging reviewed  Recent Cultures (last 7 days):   Results from last 7 days   Lab Units 03/22/23  1418 03/22/23  1417 03/22/23  1414 03/22/23  1410 03/22/23  1406 03/21/23  1651 03/21/23  1628 03/21/23  1303   BLOOD CULTURE   --   --   --   --   --   --   --  No Growth at 48 hrs  No Growth at 48 hrs     SPUTUM CULTURE   --   --   --   --   --   --  3+ Growth of Staphylococcus aureus*  1+ Growth of  --    GRAM STAIN RESULT  4+ Polys*  2+ Gram positive cocci in clusters* 1+ Polys  No bacteria seen 1+ Gram positive cocci in clusters*  1+ Polys* 2+ Polys*  1+ Gram positive cocci in clusters* 2+ Polys*  1+ Gram positive cocci in clusters*  --  2+ Epithelial cells per low power field*  2+ Polys*  2+ Gram positive cocci in pairs*  -- LEGIONELLA URINARY ANTIGEN   --   --   --   --   --  Negative  --   --        Last 24 Hours Medication List:   Current Facility-Administered Medications   Medication Dose Route Frequency Provider Last Rate   • atorvastatin  20 mg Oral Daily With Dinner MOISES Mckeon     • cefazolin  2,000 mg Intravenous Q8H Parker Saini PA-C     • chlorhexidine  15 mL Mouth/Throat Q12H [address] MOISES Mckeon     • famotidine  20 mg Oral BID MOISES Mckeon     • guaiFENesin  400 mg Per G Tube Q6H MOISES Del Valle     • heparin (porcine)  3-30 Units/kg/hr (Order-Specific) Intravenous Titrated MOISES Donaldson 16 Units/kg/hr (03/24/23 1002)   • heparin (porcine)  2,800 Units Intravenous Q6H PRN MOISES Mckeon     • heparin (porcine)  5,600 Units Intravenous Q6H PRN MOISES Mckeon     • insulin glargine  5 Units Subcutaneous QAM Jacek Hill DO     • insulin lispro  1-5 Units Subcutaneous TID AC MOISES Del Valle     • levalbuterol  1 25 mg Nebulization TID Danish Card MD     • oxyCODONE  5 mg Oral Q4H PRN Jacek Hill DO     • phenol  1 spray Mouth/Throat Q2H PRN MOISES Mckeon     • predniSONE  5 mg Oral Daily MOISES Mckeon          Today, Patient Was Seen By: Jacek Hill DO    **Please Note: This note may have been constructed using a voice recognition system  **

## 2023-03-24 NOTE — PLAN OF CARE
Problem: Potential for Falls  Goal: Patient will remain free of falls  Description: INTERVENTIONS:  - Educate patient/family on patient safety including physical limitations  - Instruct patient to call for assistance with activity   - Consult OT/PT to assist with strengthening/mobility   - Keep Call bell within reach  - Keep bed low and locked with side rails adjusted as appropriate  - Keep care items and personal belongings within reach  - Initiate and maintain comfort rounds  - Make Fall Risk Sign visible to staff  - Apply yellow socks and bracelet for high fall risk patients  - Consider moving patient to room near nurses station  Outcome: Progressing     Problem: PAIN - ADULT  Goal: Verbalizes/displays adequate comfort level or baseline comfort level  Description: Interventions:  - Encourage patient to monitor pain and request assistance  - Assess pain using appropriate pain scale  - Administer analgesics based on type and severity of pain and evaluate response  - Implement non-pharmacological measures as appropriate and evaluate response  - Consider cultural and social influences on pain and pain management  - Notify physician/advanced practitioner if interventions unsuccessful or patient reports new pain  Outcome: Progressing     Problem: INFECTION - ADULT  Goal: Absence or prevention of progression during hospitalization  Description: INTERVENTIONS:  - Assess and monitor for signs and symptoms of infection  - Monitor lab/diagnostic results  - Monitor all insertion sites, i e  indwelling lines, tubes, and drains  - Monitor endotracheal if appropriate and nasal secretions for changes in amount and color  - [place] appropriate cooling/warming therapies per order  - Administer medications as ordered  - Instruct and encourage patient and family to use good hand hygiene technique  - Identify and instruct in appropriate isolation precautions for identified infection/condition  Outcome: Progressing  Goal: Absence of fever/infection during neutropenic period  Description: INTERVENTIONS:  - Monitor WBC    Outcome: Progressing     Problem: SAFETY ADULT  Goal: Patient will remain free of falls  Description: INTERVENTIONS:  - Educate patient/family on patient safety including physical limitations  - Instruct patient to call for assistance with activity   - Consult OT/PT to assist with strengthening/mobility   - Keep Call bell within reach  - Keep bed low and locked with side rails adjusted as appropriate  - Keep care items and personal belongings within reach  - Initiate and maintain comfort rounds  - Make Fall Risk Sign visible to staff  - Apply yellow socks and bracelet for high fall risk patients  - Consider moving patient to room near nurses station  Outcome: Progressing  Goal: Maintain or return to baseline ADL function  Description: INTERVENTIONS:  -  Assess patient's ability to carry out ADLs; assess patient's baseline for ADL function and identify physical deficits which impact ability to perform ADLs (bathing, care of mouth/teeth, toileting, grooming, dressing, etc )  - Assess/evaluate cause of self-care deficits   - Assess range of motion  - Assess patient's mobility; develop plan if impaired  - Assess patient's need for assistive devices and provide as appropriate  - Encourage maximum independence but intervene and supervise when necessary  - Involve family in performance of ADLs  - Assess for home care needs following discharge   - Consider OT consult to assist with ADL evaluation and planning for discharge  - Provide patient education as appropriate  Outcome: Progressing  Goal: Maintains/Returns to pre admission functional level  Description: INTERVENTIONS:  - Perform BMAT or MOVE assessment daily    - Set and communicate daily mobility goal to care team and patient/family/caregiver     - Collaborate with rehabilitation services on mobility goals if consulted  - Out of bed for toileting  - Record patient progress and toleration of activity level   Outcome: Progressing     Problem: DISCHARGE PLANNING  Goal: Discharge to home or other facility with appropriate resources  Description: INTERVENTIONS:  - Identify barriers to discharge w/patient and caregiver  - Arrange for needed discharge resources and transportation as appropriate  - Identify discharge learning needs (meds, wound care, etc )  - Arrange for interpretive services to assist at discharge as needed  - Refer to Case Management Department for coordinating discharge planning if the patient needs post-hospital services based on physician/advanced practitioner order or complex needs related to functional status, cognitive ability, or social support system  Outcome: Progressing     Problem: Knowledge Deficit  Goal: Patient/family/caregiver demonstrates understanding of disease process, treatment plan, medications, and discharge instructions  Description: Complete learning assessment and assess knowledge base    Interventions:  - Provide teaching at level of understanding  - Provide teaching via preferred learning methods  Outcome: Progressing     Problem: MOBILITY - ADULT  Goal: Maintain or return to baseline ADL function  Description: INTERVENTIONS:  -  Assess patient's ability to carry out ADLs; assess patient's baseline for ADL function and identify physical deficits which impact ability to perform ADLs (bathing, care of mouth/teeth, toileting, grooming, dressing, etc )  - Assess/evaluate cause of self-care deficits   - Assess range of motion  - Assess patient's mobility; develop plan if impaired  - Assess patient's need for assistive devices and provide as appropriate  - Encourage maximum independence but intervene and supervise when necessary  - Involve family in performance of ADLs  - Assess for home care needs following discharge   - Consider OT consult to assist with ADL evaluation and planning for discharge  - Provide patient education as appropriate  Outcome: Progressing  Goal: Maintains/Returns to pre admission functional level  Description: INTERVENTIONS:  - Perform BMAT or MOVE assessment daily    - Set and communicate daily mobility goal to care team and patient/family/caregiver  - Collaborate with rehabilitation services on mobility goals if consulted  - Out of bed for toileting  - Record patient progress and toleration of activity level   Outcome: Progressing     Problem: Nutrition/Hydration-ADULT  Goal: Nutrient/Hydration intake appropriate for improving, restoring or maintaining nutritional needs  Description: Monitor and assess patient's nutrition/hydration status for malnutrition  Collaborate with interdisciplinary team and initiate plan and interventions as ordered  Monitor patient's weight and dietary intake as ordered or per policy  Utilize nutrition screening tool and intervene as necessary  Determine patient's food preferences and provide high-protein, high-caloric foods as appropriate       INTERVENTIONS:  - Monitor oral intake, urinary output, labs, and treatment plans  - Assess nutrition and hydration status and recommend course of action  - Evaluate amount of meals eaten  - Assist patient with eating if necessary   - Allow adequate time for meals  - Recommend/ encourage appropriate diets, oral nutritional supplements, and vitamin/mineral supplements  - Order, calculate, and assess calorie counts as needed  - Recommend, monitor, and adjust tube feedings and TPN/PPN based on assessed needs  - Assess need for intravenous fluids  - Provide specific nutrition/hydration education as appropriate  - Include patient/family/caregiver in decisions related to nutrition  Outcome: Progressing     Problem: Prexisting or High Potential for Compromised Skin Integrity  Goal: Skin integrity is maintained or improved  Description: INTERVENTIONS:  - Identify patients at risk for skin breakdown  - Assess and monitor skin integrity  - Assess and monitor nutrition and hydration status  - Monitor labs   - Assess for incontinence   - Turn and reposition patient  - Assist with mobility/ambulation  - Relieve pressure over bony prominences  - Avoid friction and shearing  - Provide appropriate hygiene as needed including keeping skin clean and dry  - Evaluate need for skin moisturizer/barrier cream  - Collaborate with interdisciplinary team   - Patient/family teaching  - Consider wound care consult   Outcome: Progressing

## 2023-03-24 NOTE — ASSESSMENT & PLAN NOTE
· Most likely secondary to aspiration versus community-acquired  · CT chest-multifocal aspiration pneumonia with multiple areas of mucous plugging    Also small loculated right basilar hydropneumothorax with pleural thickening suspicious for empyema and bronchopleural fistula  · Sputum culture pending  · Strep and Legionella negative  · Plan as above

## 2023-03-25 LAB
ANION GAP SERPL CALCULATED.3IONS-SCNC: 8 MMOL/L (ref 4–13)
APTT PPP: 100 SECONDS (ref 23–37)
APTT PPP: 103 SECONDS (ref 23–37)
APTT PPP: 40 SECONDS (ref 23–37)
BASE EX.OXY STD BLDV CALC-SCNC: 63.6 % (ref 60–80)
BASE EXCESS BLDV CALC-SCNC: 5.4 MMOL/L
BUN SERPL-MCNC: 13 MG/DL (ref 5–25)
CALCIUM SERPL-MCNC: 9.2 MG/DL (ref 8.4–10.2)
CHLORIDE SERPL-SCNC: 93 MMOL/L (ref 96–108)
CO2 SERPL-SCNC: 30 MMOL/L (ref 21–32)
CREAT SERPL-MCNC: 0.65 MG/DL (ref 0.6–1.3)
ERYTHROCYTE [DISTWIDTH] IN BLOOD BY AUTOMATED COUNT: 23.3 % (ref 11.6–15.1)
GFR SERPL CREATININE-BSD FRML MDRD: 95 ML/MIN/1.73SQ M
GLUCOSE SERPL-MCNC: 137 MG/DL (ref 65–140)
GLUCOSE SERPL-MCNC: 153 MG/DL (ref 65–140)
GLUCOSE SERPL-MCNC: 182 MG/DL (ref 65–140)
GLUCOSE SERPL-MCNC: 183 MG/DL (ref 65–140)
GLUCOSE SERPL-MCNC: 198 MG/DL (ref 65–140)
HCO3 BLDV-SCNC: 30.9 MMOL/L (ref 24–30)
HCT VFR BLD AUTO: 34.1 % (ref 36.5–49.3)
HGB BLD-MCNC: 9.8 G/DL (ref 12–17)
MAGNESIUM SERPL-MCNC: 1.9 MG/DL (ref 1.9–2.7)
MCH RBC QN AUTO: 26.3 PG (ref 26.8–34.3)
MCHC RBC AUTO-ENTMCNC: 28.7 G/DL (ref 31.4–37.4)
MCV RBC AUTO: 92 FL (ref 82–98)
O2 CT BLDV-SCNC: 9.5 ML/DL
PCO2 BLDV: 49.8 MM HG (ref 42–50)
PH BLDV: 7.41 [PH] (ref 7.3–7.4)
PLATELET # BLD AUTO: 190 THOUSANDS/UL (ref 149–390)
PMV BLD AUTO: 9.3 FL (ref 8.9–12.7)
PO2 BLDV: 34.5 MM HG (ref 35–45)
POTASSIUM SERPL-SCNC: 4.6 MMOL/L (ref 3.5–5.3)
RBC # BLD AUTO: 3.72 MILLION/UL (ref 3.88–5.62)
SODIUM SERPL-SCNC: 131 MMOL/L (ref 135–147)
WBC # BLD AUTO: 6.52 THOUSAND/UL (ref 4.31–10.16)

## 2023-03-25 RX ADMIN — LEVALBUTEROL HYDROCHLORIDE 1.25 MG: 1.25 SOLUTION RESPIRATORY (INHALATION) at 19:53

## 2023-03-25 RX ADMIN — FAMOTIDINE 20 MG: 40 POWDER, FOR SUSPENSION ORAL at 17:24

## 2023-03-25 RX ADMIN — LEVALBUTEROL HYDROCHLORIDE 1.25 MG: 1.25 SOLUTION RESPIRATORY (INHALATION) at 13:09

## 2023-03-25 RX ADMIN — PREDNISONE 5 MG: 5 TABLET ORAL at 09:47

## 2023-03-25 RX ADMIN — LEVALBUTEROL HYDROCHLORIDE 1.25 MG: 1.25 SOLUTION RESPIRATORY (INHALATION) at 07:35

## 2023-03-25 RX ADMIN — HEPARIN SODIUM 21 UNITS/KG/HR: 10000 INJECTION, SOLUTION INTRAVENOUS at 05:50

## 2023-03-25 RX ADMIN — INSULIN LISPRO 1 UNITS: 100 INJECTION, SOLUTION INTRAVENOUS; SUBCUTANEOUS at 15:53

## 2023-03-25 RX ADMIN — CEFAZOLIN SODIUM 2000 MG: 2 SOLUTION INTRAVENOUS at 12:00

## 2023-03-25 RX ADMIN — FAMOTIDINE 20 MG: 40 POWDER, FOR SUSPENSION ORAL at 09:58

## 2023-03-25 RX ADMIN — HEPARIN SODIUM 5600 UNITS: 1000 INJECTION INTRAVENOUS; SUBCUTANEOUS at 05:28

## 2023-03-25 RX ADMIN — GUAIFENESIN 400 MG: 100 SOLUTION ORAL at 22:07

## 2023-03-25 RX ADMIN — INSULIN LISPRO 1 UNITS: 100 INJECTION, SOLUTION INTRAVENOUS; SUBCUTANEOUS at 09:50

## 2023-03-25 RX ADMIN — GUAIFENESIN 400 MG: 100 SOLUTION ORAL at 09:47

## 2023-03-25 RX ADMIN — CEFAZOLIN SODIUM 2000 MG: 2 SOLUTION INTRAVENOUS at 03:32

## 2023-03-25 RX ADMIN — CEFAZOLIN SODIUM 2000 MG: 2 SOLUTION INTRAVENOUS at 20:00

## 2023-03-25 RX ADMIN — GUAIFENESIN 400 MG: 100 SOLUTION ORAL at 15:53

## 2023-03-25 RX ADMIN — GUAIFENESIN 400 MG: 100 SOLUTION ORAL at 03:32

## 2023-03-25 RX ADMIN — HEPARIN SODIUM 17 UNITS/KG/HR: 10000 INJECTION, SOLUTION INTRAVENOUS at 22:55

## 2023-03-25 RX ADMIN — CHLORHEXIDINE GLUCONATE 0.12% ORAL RINSE 15 ML: 1.2 LIQUID ORAL at 09:47

## 2023-03-25 RX ADMIN — OXYCODONE HYDROCHLORIDE 5 MG: 5 TABLET ORAL at 20:22

## 2023-03-25 RX ADMIN — ATORVASTATIN CALCIUM 20 MG: 20 TABLET, FILM COATED ORAL at 15:53

## 2023-03-25 RX ADMIN — INSULIN GLARGINE 5 UNITS: 100 INJECTION, SOLUTION SUBCUTANEOUS at 09:47

## 2023-03-25 NOTE — PLAN OF CARE
Problem: Nutrition/Hydration-ADULT  Goal: Nutrient/Hydration intake appropriate for improving, restoring or maintaining nutritional needs  Description: Monitor and assess patient's nutrition/hydration status for malnutrition  Collaborate with interdisciplinary team and initiate plan and interventions as ordered  Monitor patient's weight and dietary intake as ordered or per policy  Utilize nutrition screening tool and intervene as necessary  Determine patient's food preferences and provide high-protein, high-caloric foods as appropriate       INTERVENTIONS:  - Monitor oral intake, urinary output, labs, and treatment plans  - Assess nutrition and hydration status and recommend course of action  - Evaluate amount of meals eaten  - Assist patient with eating if necessary   - Allow adequate time for meals  - Recommend/ encourage appropriate diets, oral nutritional supplements, and vitamin/mineral supplements  - Order, calculate, and assess calorie counts as needed  - Recommend, monitor, and adjust tube feedings and TPN/PPN based on assessed needs  - Assess need for intravenous fluids  - Provide specific nutrition/hydration education as appropriate  - Include patient/family/caregiver in decisions related to nutrition  Outcome: Progressing   Bolus TF 8-12-15

## 2023-03-25 NOTE — PLAN OF CARE
Problem: Potential for Falls  Goal: Patient will remain free of falls  Description: INTERVENTIONS:  - Educate patient/family on patient safety including physical limitations  - Instruct patient to call for assistance with activity   - Consult OT/PT to assist with strengthening/mobility   - Keep Call bell within reach  - Keep bed low and locked with side rails adjusted as appropriate  - Keep care items and personal belongings within reach  - Initiate and maintain comfort rounds  - Make Fall Risk Sign visible to staff  - Offer Toileting every 2 Hours, in advance of need  - Initiate/Maintain bed alarm  - Apply yellow socks and bracelet for high fall risk patients  - Consider moving patient to room near nurses station  Outcome: Progressing     Problem: PAIN - ADULT  Goal: Verbalizes/displays adequate comfort level or baseline comfort level  Description: Interventions:  - Encourage patient to monitor pain and request assistance  - Assess pain using appropriate pain scale  - Administer analgesics based on type and severity of pain and evaluate response  - Implement non-pharmacological measures as appropriate and evaluate response  - Consider cultural and social influences on pain and pain management  - Notify physician/advanced practitioner if interventions unsuccessful or patient reports new pain  Outcome: Progressing     Problem: SAFETY ADULT  Goal: Patient will remain free of falls  Description: INTERVENTIONS:  - Educate patient/family on patient safety including physical limitations  - Instruct patient to call for assistance with activity   - Consult OT/PT to assist with strengthening/mobility   - Keep Call bell within reach  - Keep bed low and locked with side rails adjusted as appropriate  - Keep care items and personal belongings within reach  - Initiate and maintain comfort rounds  - Make Fall Risk Sign visible to staff  - Offer Toileting every 2 Hours, in advance of need  - Initiate/Maintain bed alarm  - Apply yellow socks and bracelet for high fall risk patients  - Consider moving patient to room near nurses station  Outcome: Progressing     Problem: Knowledge Deficit  Goal: Patient/family/caregiver demonstrates understanding of disease process, treatment plan, medications, and discharge instructions  Description: Complete learning assessment and assess knowledge base  Interventions:  - Provide teaching at level of understanding  - Provide teaching via preferred learning methods  Outcome: Progressing     Problem: Nutrition/Hydration-ADULT  Goal: Nutrient/Hydration intake appropriate for improving, restoring or maintaining nutritional needs  Description: Monitor and assess patient's nutrition/hydration status for malnutrition  Collaborate with interdisciplinary team and initiate plan and interventions as ordered  Monitor patient's weight and dietary intake as ordered or per policy  Utilize nutrition screening tool and intervene as necessary  Determine patient's food preferences and provide high-protein, high-caloric foods as appropriate       INTERVENTIONS:  - Monitor oral intake, urinary output, labs, and treatment plans  - Assess nutrition and hydration status and recommend course of action  - Evaluate amount of meals eaten  - Assist patient with eating if necessary   - Allow adequate time for meals  - Recommend/ encourage appropriate diets, oral nutritional supplements, and vitamin/mineral supplements  - Order, calculate, and assess calorie counts as needed  - Recommend, monitor, and adjust tube feedings and TPN/PPN based on assessed needs  - Assess need for intravenous fluids  - Provide specific nutrition/hydration education as appropriate  - Include patient/family/caregiver in decisions related to nutrition  Outcome: Progressing

## 2023-03-25 NOTE — ASSESSMENT & PLAN NOTE
· Multifactorial, pneumonia, PE, mucous plugging  · CT PE study with multifocal factorial pneumonia, PEs, mucous plugging, and a right basilar hydropneumothorax  · Wean oxygen for an SPO2 greater than 88%  · Incentive spirometry flutter valve  · Patient underwent bronchoscopy 3/22, multiple cultures growing Staph aureus sensitive to cefazolin  · ID consulted, appreciate recommendations   · Patient underwent thoracentesis on 3/23, 70 cc of louise fluid removed  · IV cefazolin possible transition to p o  antibiotics on Monday

## 2023-03-25 NOTE — PROCEDURES
US Guided Peripheral IV    Date/Time: 3/25/2023 2:53 PM  Performed by: MOISES Ramon  Authorized by: MOISES Ramon     Patient location:  ICU  Performed by:  NP/PA  Indications:     Indications: difficulty obtaining IV access    Procedure details:     Patient evaluated for contraindications to access (i e  fistula, thrombosis, etc): Yes      Standard clean technique used for ultrasound access: Yes      Location:  Left antecubital    Catheter size:  20 gauge    Number of attempts:  1    Successful placement: yes    Post-procedure details:     Post-procedure:  Dressing applied    Assessment: free fluid flow and no signs of infiltration      Post-procedure complications: none      Patient tolerance of procedure:   Tolerated well, no immediate complications

## 2023-03-25 NOTE — PROGRESS NOTES
Progress Note - Pulmonary   Velton Drop 78 y o  male MRN: [id number]  Unit/Bed#: ICU 06-01 Encounter: [id number]      Assessment:  1  Acute hypoxic respiratory failure   • Continues to improve, now on room air  • Suspect contribution from underlying COPD/unknown severity and pulmonary embolism     2  MSSA pneumonia/tracheobronchitis  • Negative AFB/fungal cultures to date, pending PCP, legionnaire culture  • S/p bronchoscopy/BAL 3/22  • Improving secretions/mucus production, noted copious moderate thickness green/white secretions with mucous plug at the bronchus intermedius     3  Right hydropneumothorax  • S/p repeat diagnostic Thora 3/23, exudate by protein ratio, pending cytology  • Recent [facility] tube [facility] at [facility] had minimal output  • Last PET/CT showed multiple FDG avidity at the right pleural/fissural surface     4  Multiple pulmonary nodules  • Possible metastatic disease vs new lung primary given the high risk vs pneumonia related/ no signs of vegetations on TTE     5  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  • With known right pleural mets, possible lung metastases as well  • Currently on palliative chemo/immunotherapy with pembrolizumab     6  COPD/emphysema  7  Former tobacco abuse    Plan/discussion:  · Overall improving clinically, likely from MSSA pneumonia with current regimen of antibiotics s/p bronchoscopy  Does not appear to be opportunistic infection/continue to follow on PCP/atypical fungal cultures  · Continue antibiotic per ID recommendations  · Switch to oral anticoagulation, needs lifelong therapy  · Continue Xopenex 3 times daily, Mucinex  · On discharge consider LAMA/LABA such as Anoro Ellipta/Stiolto Respimat based on his insurance coverage  Also provide nebulizer with DuoNeb every 6 as needed  · Will outpatient follow-up with pulmonary    Pulmonary will sign off, please do not hesitate to call with any questions      Subjective:   No overnight acute events    Significantly "improving cough, no dyspnea at rest or with minimal exertion within the room  No fever or chills    Vitals: Blood pressure 144/70, pulse 70, temperature 99 4 °F (37 4 °C), temperature source Oral, resp  rate 16, height 6' 2\" (1 88 m), weight 59 9 kg (132 lb 0 9 oz), SpO2 99 %  , Body mass index is 16 95 kg/m²  Intake/Output Summary (Last 24 hours) at 3/25/2023 1100  Last data filed at 3/25/2023 0645  Gross per 24 hour   Intake 1725 05 ml   Output 1725 ml   Net 0 05 ml       Physical Exam  Gen: not in acute distress, thin, Body mass index is 16 95 kg/m²  HEENT:supple, no accessory muscle use, no JVD appreciated  Cardiac: no murmurs appreciated  Lungs: normal respiratory effort, moderate air movement on the left, no rhonchi, mild crackles posteriorly, diminished/absent sounds on the right  Abdomen: soft, nontender, normoactive bowel sounds, PEG tube in place  Extremities: no cyanosis, no clubbing, no edema  Neuro: AAO X3, no focal motor deficit    Labs: I have personally reviewed pertinent lab results          Hunter Jaimes MD    "

## 2023-03-25 NOTE — ASSESSMENT & PLAN NOTE
· As evidenced by tachycardia hypoxia, leukocytosis and lactic acidosis  · Fluid resuscitated with 3 L of normal saline in the ED  · Suspect multifactorial pneumonia most likely aspiration  · Blood cultures x2 no growth at 72 hours  · LA 2 1, cleared  · Urine strep and Legionella negative  Sputum cx    3+ Growth of Staphylococcus aureus Abnormal          · Continue Ancef

## 2023-03-25 NOTE — PROGRESS NOTES
Zack[facility] 47  Progress Note  Name: Kavita Yanes  MRN: [id number]  Unit/Bed#: ICU 06-01 I Date of Admission: 3/21/2023   Date of Service: 3/25/2023 I Hospital Day: 4    Assessment/Plan   * Acute respiratory failure with hypoxia [facility])  Assessment & Plan  · Multifactorial, pneumonia, PE, mucous plugging  · CT PE study with multifocal factorial pneumonia, PEs, mucous plugging, and a right basilar hydropneumothorax  · Wean oxygen for an SPO2 greater than 88%  · Incentive spirometry flutter valve  · Patient underwent bronchoscopy 3/22, multiple cultures growing Staph aureus sensitive to cefazolin  · ID consulted, appreciate recommendations   · Patient underwent thoracentesis on 3/23, 70 cc of louise fluid removed  · IV cefazolin possible transition to p o  antibiotics on Monday    Sepsis ([facility] [address] )  Assessment & Plan  · As evidenced by tachycardia hypoxia, leukocytosis and lactic acidosis  · Fluid resuscitated with 3 L of normal saline in the ED  · Suspect multifactorial pneumonia most likely aspiration  · Blood cultures x2 no growth at 72 hours  · LA 2 1, cleared  · Urine strep and Legionella negative  Sputum cx    3+ Growth of Staphylococcus aureus Abnormal          · Continue [employer] [facility] [employer]    Hydropneumothorax  Assessment & Plan  · Hx of hydropneumothorax  · Recently admitted to [facility] on December 29, 2021 to and had a pigtail chest tube placed by CT surgery    Remained in for 2 days and was discontinued secondary to decreased output  · Exudative and suspicious for malignancy-unable to find recent cytology  · CT PE study with small loculated right basilar hydropneumothorax with pleural thickening suspicious for empyema and bronchopleural fistula  · Plan as above      Pressure injury of skin of buttock  Assessment & Plan  · POA  · Inpatient wound care consult  · Allyvn prn  · Frequent repositioning and off loading pressure points    PNA (pneumonia)  Assessment & Plan  · Most likely secondary to aspiration versus community-acquired  · CT chest-multifocal aspiration pneumonia with multiple areas of mucous plugging  Also small loculated right basilar hydropneumothorax with pleural thickening suspicious for empyema and bronchopleural fistula  · Sputum culture staph aureus   · Strep and Legionella negative  · Plan as above    Hyperlipemia  Assessment & Plan  · Continue home statin    Other pulmonary embolism without acute cor pulmonale (HCC)  Assessment & Plan  · Complained of sudden onset of dyspnea yesterday-found to have a low SPO2 in the 80s  · Used home O2 with improvement to high 80s  · CT PE-Filling defects involving the anterior-basilar and lateral-basilar segments of the left lower lobe, as and inferior lingular segment of the left upper lobe, compatible with acute pulmonary emboli  · Echo from 12/22/2022 ejection fraction is 70%  Systolic function is normal  NRWM  · Continue heparin gtt via PE protocol      Severe protein-calorie malnutrition (HCC)  Assessment & Plan  Malnutrition Findings:   Adult Malnutrition type: Chronic illness  Adult Degree of Malnutrition: Other severe protein calorie malnutrition  Malnutrition Characteristics: Fat loss, Muscle loss, Weight loss                  360 Statement: Chronic, severe protein-calorie malnutrition related to dysphagia as evidenced by severe muscle wasting of clavicle and temple regions, subcutaneous fat loss of orbital regions, and significant weight loss of 20% x 3 months  Will initiate EN via PEG tube when medically appropriate  BMI Findings:  Adult BMI Classifications: Underweight < 18 5        Body mass index is 16 95 kg/m²     · Has PEG tube secondary to dysphagia through which he gets Jevity tube feeds  · Nutrition consult    Neoplasm of lung  Assessment & Plan  · 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 mediastinal and cardiophrenic lymph nodes  · Follows with [facility] oncology  · Completed almost 2 complete rounds of chemotherapy-next round of chemotherapy to start at the beginning of April  · Will continue home prednisone    Type 2 diabetes mellitus without complication, without long-term current use of insulin [facility]  Assessment & Plan  Lab Results   Component Value Date    HGBA1C 5 8 (H) 12/30/2022       Recent Labs     03/24/23  1115 03/24/23  1625 03/24/23  2034 03/25/23  0950   POCGLU 351* 314* 268* 182*       Blood Sugar Average: Last 72 hrs:  · (P) 213 [id number] home metformin  · Will initiate Lantus 5 units qam  · Continue sliding scale and Accu-Cheks     Dysphagia, unspecified  Assessment & Plan  · 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  · Has PEG tube in place               VTE Pharmacologic Prophylaxis: VTE Score: 9 High Risk (Score >/= 5) - Pharmacological DVT Prophylaxis Ordered: heparin drip  Sequential Compression Devices Ordered  Patient Centered Rounds: I performed bedside rounds with nursing staff today  Discussions with Specialists or Other Care Team Provider: ID    Education and Discussions with Family / Patient: Discussed with patient, discussed with patient's wife via phone    Total Time Spent on Date of Encounter in care of patient: 45 minutes This time was spent on one or more of the following: performing physical exam; counseling and coordination of care; obtaining or reviewing history; documenting in the medical record; reviewing/ordering tests, medications or procedures; communicating with other healthcare professionals and discussing with patient's family/caregivers      Current Length of Stay: 4 day(s)  Current Patient Status: Inpatient   Certification Statement: The patient will continue to require additional inpatient hospital stay due to Requiring administration of IV antibiotics  Discharge Plan: pending clinical course    Code Status: Level 3 - DNAR and DNI    Subjective: Patient seen and examined bedside  Patient resting bed no apparent stress  No acute events overnight  Patient states he feels much better today, states his shortness of breath has vastly improved as compared to yesterday  Objective:     Vitals:   Temp (24hrs), Av 2 °F (36 8 °C), Min:96 9 °F (36 1 °C), Max:99 4 °F (37 4 °C)    Temp:  [96 9 °F (36 1 °C)-99 4 °F (37 4 °C)] 99 4 °F (37 4 °C)  HR:  [] 70  Resp:  [16-22] 16  BP: (109-151)/(55-70) 144/70  SpO2:  [96 %-100 %] 99 %  Body mass index is 16 95 kg/m²  Input and Output Summary (last 24 hours): Intake/Output Summary (Last 24 hours) at 3/25/2023 1013  Last data filed at 3/25/2023 0645  Gross per 24 hour   Intake 1725 05 ml   Output 1725 ml   Net 0 05 ml       Physical Exam:   Physical Exam  HENT:      Head: Normocephalic  Cardiovascular:      Rate and Rhythm: Normal rate and regular rhythm  Pulmonary:      Effort: Pulmonary effort is normal       Breath sounds: Normal breath sounds  Abdominal:      Comments: PEG tube in place   Musculoskeletal:         General: Normal range of motion  Cervical back: Normal range of motion  Neurological:      General: No focal deficit present  Mental Status: He is alert and oriented to person, place, and time  Mental status is at baseline  Psychiatric:         Mood and Affect: Mood normal          Thought Content:  Thought content normal           Additional Data:     Labs:  Results from last 7 days   Lab Units 23  0438 23  0440 23  0435 23  1303   WBC Thousand/uL 6 52   < > 7 58 11 09*   HEMOGLOBIN g/dL 9 8*   < > 9 2* 11 0*   HEMATOCRIT % 34 1*   < > 30 8* 37 2   PLATELETS Thousands/uL 190   < > 218 299   BANDS PCT %  --   --  4  --    NEUTROS PCT %  --   --   --  90*   LYMPHS PCT %  --   --   --  4*   LYMPHO PCT %  --   --  3*  --    MONOS PCT %  --   --   --  5   MONO PCT %  --   --  4  --    EOS PCT %  --   --  0 0    < > = values in this interval not displayed  Results from last 7 days   Lab Units 03/25/23  0438 03/24/23  0539   SODIUM mmol/L 131* 132*   POTASSIUM mmol/L 4 6 4 0   CHLORIDE mmol/L 93* 96   CO2 mmol/L 30 29   BUN mg/dL 13 14   CREATININE mg/dL 0 65 0 67   ANION GAP mmol/L 8 7   CALCIUM mg/dL 9 2 8 7   ALBUMIN g/dL  --  3 1*   TOTAL BILIRUBIN mg/dL  --  0 37   ALK PHOS U/L  --  111*   ALT U/L  --  23   AST U/L  --  20   GLUCOSE RANDOM mg/dL 153* 166*     Results from last 7 days   Lab Units 03/23/23  0440   INR  1 12     Results from last 7 days   Lab Units 03/25/23  0950 03/24/23  2034 03/24/23  1625 03/24/23  1115 03/24/23  0657 03/23/23  2052 03/23/23  1725 03/23/23  1129 03/23/23  0733 03/22/23  1609 03/22/23  1209 03/22/23  0718   POC GLUCOSE mg/dl 182* 268* 314* 351* 174* 271* 324* 343* 148* 98 108 109         Results from last 7 days   Lab Units 03/22/23  1603 03/22/23  0435 03/21/23  1452 03/21/23  1303   LACTIC ACID mmol/L  --   --  2 0 2 1*   PROCALCITONIN ng/ml 0 25 0 36*  --  0 59*       Lines/Drains:  Invasive Devices     Peripheral Intravenous Line  Duration           Peripheral IV 03/21/23 Right Arm 3 days    Peripheral IV 03/21/23 Right;Ventral (anterior) Wrist 3 days          Drain  Duration           Gastrostomy/Enterostomy PEG-jejunostomy LUQ -- days          Airway  Duration           Supraglottic Airway LMA 4 2 days                      Imaging: No pertinent imaging reviewed  Recent Cultures (last 7 days):   Results from last 7 days   Lab Units 03/23/23  1618 03/22/23  1418 03/22/23  1417 03/22/23  1414 03/22/23  1410 03/22/23  1406 03/21/23  1651 03/21/23  1628 03/21/23  1303   BLOOD CULTURE   --   --   --   --   --   --   --   --  No Growth at 72 hrs  No Growth at 72 hrs     SPUTUM CULTURE   --   --   --   --   --   --   --  3+ Growth of Staphylococcus aureus*  1+ Growth of  --    GRAM STAIN RESULT  2+ Polys  No bacteria seen 4+ Polys*  2+ Gram positive cocci in clusters* 1+ Polys  No bacteria seen 1+ Gram positive cocci in clusters*  1+ Polys* 2+ Polys*  1+ Gram positive cocci in clusters* 2+ Polys*  1+ Gram positive cocci in clusters*  --  2+ Epithelial cells per low power field*  2+ Polys*  2+ Gram positive cocci in pairs*  --    BODY FLUID CULTURE, STERILE  No growth  --   --   --   --   --   --   --   --    LEGIONELLA URINARY ANTIGEN   --   --   --   --   --   --  Negative  --   --        Last 24 Hours Medication List:   Current Facility-Administered Medications   Medication Dose Route Frequency Provider Last Rate   • atorvastatin  20 mg Oral Daily With Dinner MOISES Mckeon     • cefazolin  2,000 mg Intravenous Q8H Parker Saini PA-C Stopped (03/25/23 0645)   • chlorhexidine  15 mL Mouth/Throat Q12H [address] MOISES Mckeon     • famotidine  20 mg Oral BID MOISES Mckeon     • guaiFENesin  400 mg Per G Tube Q6H MOISES Del Valle     • heparin (porcine)  3-30 Units/kg/hr (Order-Specific) Intravenous Titrated MOISES Mckeon 21 Units/kg/hr (03/25/23 0550)   • heparin (porcine)  2,800 Units Intravenous Q6H PRN MOISES Mckeon     • heparin (porcine)  5,600 Units Intravenous Q6H PRN MOISES Mckeon     • insulin glargine  5 Units Subcutaneous QAM Juan Renner DO     • insulin lispro  1-5 Units Subcutaneous TID AC MOISES Del Valle     • levalbuterol  1 25 mg Nebulization TID Rashida Madrid MD     • oxyCODONE  5 mg Oral Q4H PRN Juan Renner DO     • phenol  1 spray Mouth/Throat Q2H PRN MOISES Mckeon     • predniSONE  5 mg Oral Daily MOISES Mckeon          Today, Patient Was Seen By: Juan Renner DO    **Please Note: This note may have been constructed using a voice recognition system  **

## 2023-03-25 NOTE — ASSESSMENT & PLAN NOTE
· Most likely secondary to aspiration versus community-acquired  · CT chest-multifocal aspiration pneumonia with multiple areas of mucous plugging    Also small loculated right basilar hydropneumothorax with pleural thickening suspicious for empyema and bronchopleural fistula  · Sputum culture staph aureus   · Strep and Legionella negative  · Plan as above

## 2023-03-25 NOTE — PROCEDURES
US Guided Peripheral IV    Date/Time: 3/25/2023 2:54 PM  Performed by: MOISES Pemberton  Authorized by: MOISES Pemberton     Patient location:  ICU  Performed by:  NP/PA  Indications:     Indications: difficulty obtaining IV access    Procedure details:     Patient evaluated for contraindications to access (i e  fistula, thrombosis, etc): Yes      Standard clean technique used for ultrasound access: Yes      Location:  Left arm    Catheter size:  20 gauge    Number of attempts:  1    Successful placement: yes    Post-procedure details:     Post-procedure:  Dressing applied    Assessment: free fluid flow and no signs of infiltration      Post-procedure complications: none      Patient tolerance of procedure:   Tolerated well, no immediate complications  Comments:      20 long placed via US

## 2023-03-25 NOTE — ASSESSMENT & PLAN NOTE
· 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 mediastinal and cardiophrenic lymph nodes  · Follows with [facility] oncology  · Completed almost 2 complete rounds of chemotherapy-next round of chemotherapy to start at the beginning of April  · Will continue home prednisone

## 2023-03-25 NOTE — ASSESSMENT & PLAN NOTE
· Hx of hydropneumothorax  · Recently admitted to [facility] on December 29, 2021 to and had a pigtail chest tube placed by CT surgery    Remained in for 2 days and was discontinued secondary to decreased output  · Exudative and suspicious for malignancy-unable to find recent cytology  · CT PE study with small loculated right basilar hydropneumothorax with pleural thickening suspicious for empyema and bronchopleural fistula  · Plan as above

## 2023-03-25 NOTE — PLAN OF CARE
Problem: Potential for Falls  Goal: Patient will remain free of falls  Description: INTERVENTIONS:  - Educate patient/family on patient safety including physical limitations  - Instruct patient to call for assistance with activity   - Consult OT/PT to assist with strengthening/mobility   - Keep Call bell within reach  - Keep bed low and locked with side rails adjusted as appropriate  - Keep care items and personal belongings within reach  - Initiate and maintain comfort rounds  - Make Fall Risk Sign visible to staff  - Offer Toileting every 2 Hours, in advance of need  - Initiate/Maintain bed alarm  - Apply yellow socks and bracelet for high fall risk patients  - Consider moving patient to room near nurses station  Outcome: Progressing     Problem: PAIN - ADULT  Goal: Verbalizes/displays adequate comfort level or baseline comfort level  Description: Interventions:  - Encourage patient to monitor pain and request assistance  - Assess pain using appropriate pain scale  - Administer analgesics based on type and severity of pain and evaluate response  - Implement non-pharmacological measures as appropriate and evaluate response  - Consider cultural and social influences on pain and pain management  - Notify physician/advanced practitioner if interventions unsuccessful or patient reports new pain  Outcome: Progressing     Problem: INFECTION - ADULT  Goal: Absence or prevention of progression during hospitalization  Description: INTERVENTIONS:  - Assess and monitor for signs and symptoms of infection  - Monitor lab/diagnostic results  - Monitor all insertion sites, i e  indwelling lines, tubes, and drains  - Monitor endotracheal if appropriate and nasal secretions for changes in amount and color  - [place] appropriate cooling/warming therapies per order  - Administer medications as ordered  - Instruct and encourage patient and family to use good hand hygiene technique  - Identify and instruct in appropriate isolation precautions for identified infection/condition  Outcome: Progressing  Goal: Absence of fever/infection during neutropenic period  Description: INTERVENTIONS:  - Monitor WBC    Outcome: Progressing     Problem: SAFETY ADULT  Goal: Patient will remain free of falls  Description: INTERVENTIONS:  - Educate patient/family on patient safety including physical limitations  - Instruct patient to call for assistance with activity   - Consult OT/PT to assist with strengthening/mobility   - Keep Call bell within reach  - Keep bed low and locked with side rails adjusted as appropriate  - Keep care items and personal belongings within reach  - Initiate and maintain comfort rounds  - Make Fall Risk Sign visible to staff  - Offer Toileting every 2 Hours, in advance of need  - Initiate/Maintain bed alarm  - Apply yellow socks and bracelet for high fall risk patients  - Consider moving patient to room near nurses station  Outcome: Progressing  Goal: Maintain or return to baseline ADL function  Description: INTERVENTIONS:  - Educate patient/family on patient safety including physical limitations  - Instruct patient to call for assistance with activity   - Consult OT/PT to assist with strengthening/mobility   - Keep Call bell within reach  - Keep bed low and locked with side rails adjusted as appropriate  - Keep care items and personal belongings within reach  - Initiate and maintain comfort rounds  - Make Fall Risk Sign visible to staff  - Offer Toileting every 2 Hours, in advance of need  - Initiate/Maintain bed alarm  - Apply yellow socks and bracelet for high fall risk patients  - Consider moving patient to room near nurses station  Outcome: Progressing  Goal: Maintains/Returns to pre admission functional level  Description: INTERVENTIONS:  - Perform BMAT or MOVE assessment daily    - Set and communicate daily mobility goal to care team and patient/family/caregiver     - Collaborate with rehabilitation services on mobility goals if consulted    Problem: Knowledge Deficit  Goal: Patient/family/caregiver demonstrates understanding of disease process, treatment plan, medications, and discharge instructions  Description: Complete learning assessment and assess knowledge base  Interventions:  - Provide teaching at level of understanding  - Provide teaching via preferred learning methods  Outcome: Progressing     Problem: Nutrition/Hydration-ADULT  Goal: Nutrient/Hydration intake appropriate for improving, restoring or maintaining nutritional needs  Description: Monitor and assess patient's nutrition/hydration status for malnutrition  Collaborate with interdisciplinary team and initiate plan and interventions as ordered  Monitor patient's weight and dietary intake as ordered or per policy  Utilize nutrition screening tool and intervene as necessary  Determine patient's food preferences and provide high-protein, high-caloric foods as appropriate       INTERVENTIONS:  - Monitor oral intake, urinary output, labs, and treatment plans  - Assess nutrition and hydration status and recommend course of action  - Evaluate amount of meals eaten  - Assist patient with eating if necessary   - Allow adequate time for meals  - Recommend/ encourage appropriate diets, oral nutritional supplements, and vitamin/mineral supplements  - Order, calculate, and assess calorie counts as needed  - Recommend, monitor, and adjust tube feedings and TPN/PPN based on assessed needs  - Assess need for intravenous fluids  - Provide specific nutrition/hydration education as appropriate  - Include patient/family/caregiver in decisions related to nutrition  Outcome: Progressing     Problem: Prexisting or High Potential for Compromised Skin Integrity  Goal: Skin integrity is maintained or improved  Description: INTERVENTIONS:  - Identify patients at risk for skin breakdown  - Assess and monitor skin integrity  - Assess and monitor nutrition and hydration status  - Monitor labs   - Assess for incontinence   - Turn and reposition patient  - Assist with mobility/ambulation  - Relieve pressure over bony prominences  - Avoid friction and shearing  - Provide appropriate hygiene as needed including keeping skin clean and dry  - Evaluate need for skin moisturizer/barrier cream  - Collaborate with interdisciplinary team   - Patient/family teaching  - Consider wound care consult   Outcome: Progressing     - Out of bed for toileting  - Record patient progress and toleration of activity level   Outcome: Progressing

## 2023-03-25 NOTE — ASSESSMENT & PLAN NOTE
Lab Results   Component Value Date    HGBA1C 5 8 (H) 12/30/2022       Recent Labs     03/24/23  1115 03/24/23  1625 03/24/23 2034 03/25/23  0950   POCGLU 351* 314* 268* 182*       Blood Sugar Average: Last 72 hrs:  · (P) 213 [id number] home metformin  · Will initiate Lantus 5 units qam  · Continue sliding scale and Accu-Cheks

## 2023-03-26 LAB
ALBUMIN SERPL BCP-MCNC: 3 G/DL (ref 3.5–5)
ALP SERPL-CCNC: 126 U/L (ref 34–104)
ALT SERPL W P-5'-P-CCNC: 29 U/L (ref 7–52)
ANION GAP SERPL CALCULATED.3IONS-SCNC: 7 MMOL/L (ref 4–13)
APTT PPP: 103 SECONDS (ref 23–37)
APTT PPP: 40 SECONDS (ref 23–37)
APTT PPP: 69 SECONDS (ref 23–37)
AST SERPL W P-5'-P-CCNC: 37 U/L (ref 13–39)
BACTERIA BLD CULT: NORMAL
BACTERIA BLD CULT: NORMAL
BACTERIA SPEC BFLD CULT: NO GROWTH
BILIRUB SERPL-MCNC: 0.29 MG/DL (ref 0.2–1)
BUN SERPL-MCNC: 14 MG/DL (ref 5–25)
CALCIUM ALBUM COR SERPL-MCNC: 9.9 MG/DL (ref 8.3–10.1)
CALCIUM SERPL-MCNC: 9.1 MG/DL (ref 8.4–10.2)
CHLORIDE SERPL-SCNC: 94 MMOL/L (ref 96–108)
CO2 SERPL-SCNC: 31 MMOL/L (ref 21–32)
CREAT SERPL-MCNC: 0.54 MG/DL (ref 0.6–1.3)
ERYTHROCYTE [DISTWIDTH] IN BLOOD BY AUTOMATED COUNT: 23.2 % (ref 11.6–15.1)
GFR SERPL CREATININE-BSD FRML MDRD: 102 ML/MIN/1.73SQ M
GLUCOSE SERPL-MCNC: 119 MG/DL (ref 65–140)
GLUCOSE SERPL-MCNC: 147 MG/DL (ref 65–140)
GLUCOSE SERPL-MCNC: 306 MG/DL (ref 65–140)
GLUCOSE SERPL-MCNC: 308 MG/DL (ref 65–140)
GLUCOSE SERPL-MCNC: 311 MG/DL (ref 65–140)
GRAM STN SPEC: NORMAL
GRAM STN SPEC: NORMAL
HCT VFR BLD AUTO: 31.8 % (ref 36.5–49.3)
HGB BLD-MCNC: 9.6 G/DL (ref 12–17)
MAGNESIUM SERPL-MCNC: 1.7 MG/DL (ref 1.9–2.7)
MCH RBC QN AUTO: 27.1 PG (ref 26.8–34.3)
MCHC RBC AUTO-ENTMCNC: 30.2 G/DL (ref 31.4–37.4)
MCV RBC AUTO: 90 FL (ref 82–98)
PLATELET # BLD AUTO: 210 THOUSANDS/UL (ref 149–390)
PMV BLD AUTO: 9.3 FL (ref 8.9–12.7)
POTASSIUM SERPL-SCNC: 4.6 MMOL/L (ref 3.5–5.3)
PROCALCITONIN SERPL-MCNC: 0.41 NG/ML
PROT SERPL-MCNC: 6.1 G/DL (ref 6.4–8.4)
RBC # BLD AUTO: 3.54 MILLION/UL (ref 3.88–5.62)
SODIUM SERPL-SCNC: 132 MMOL/L (ref 135–147)
WBC # BLD AUTO: 6.61 THOUSAND/UL (ref 4.31–10.16)

## 2023-03-26 RX ORDER — MAGNESIUM SULFATE HEPTAHYDRATE 40 MG/ML
2 INJECTION, SOLUTION INTRAVENOUS ONCE
Status: COMPLETED | OUTPATIENT
Start: 2023-03-26 | End: 2023-03-26

## 2023-03-26 RX ADMIN — GUAIFENESIN 400 MG: 100 SOLUTION ORAL at 03:04

## 2023-03-26 RX ADMIN — MAGNESIUM SULFATE HEPTAHYDRATE 2 G: 40 INJECTION, SOLUTION INTRAVENOUS at 12:45

## 2023-03-26 RX ADMIN — FAMOTIDINE 20 MG: 40 POWDER, FOR SUSPENSION ORAL at 18:01

## 2023-03-26 RX ADMIN — LEVALBUTEROL HYDROCHLORIDE 1.25 MG: 1.25 SOLUTION RESPIRATORY (INHALATION) at 19:30

## 2023-03-26 RX ADMIN — HEPARIN SODIUM 5600 UNITS: 1000 INJECTION INTRAVENOUS; SUBCUTANEOUS at 19:01

## 2023-03-26 RX ADMIN — PREDNISONE 5 MG: 5 TABLET ORAL at 08:00

## 2023-03-26 RX ADMIN — HEPARIN SODIUM 19 UNITS/KG/HR: 10000 INJECTION, SOLUTION INTRAVENOUS at 18:59

## 2023-03-26 RX ADMIN — GUAIFENESIN 400 MG: 100 SOLUTION ORAL at 21:49

## 2023-03-26 RX ADMIN — CEFAZOLIN SODIUM 2000 MG: 2 SOLUTION INTRAVENOUS at 20:02

## 2023-03-26 RX ADMIN — INSULIN LISPRO 3 UNITS: 100 INJECTION, SOLUTION INTRAVENOUS; SUBCUTANEOUS at 18:00

## 2023-03-26 RX ADMIN — INSULIN LISPRO 3 UNITS: 100 INJECTION, SOLUTION INTRAVENOUS; SUBCUTANEOUS at 11:09

## 2023-03-26 RX ADMIN — GUAIFENESIN 400 MG: 100 SOLUTION ORAL at 10:23

## 2023-03-26 RX ADMIN — ATORVASTATIN CALCIUM 20 MG: 20 TABLET, FILM COATED ORAL at 16:07

## 2023-03-26 RX ADMIN — LEVALBUTEROL HYDROCHLORIDE 1.25 MG: 1.25 SOLUTION RESPIRATORY (INHALATION) at 07:36

## 2023-03-26 RX ADMIN — LEVALBUTEROL HYDROCHLORIDE 1.25 MG: 1.25 SOLUTION RESPIRATORY (INHALATION) at 13:00

## 2023-03-26 RX ADMIN — GUAIFENESIN 400 MG: 100 SOLUTION ORAL at 16:07

## 2023-03-26 RX ADMIN — CEFAZOLIN SODIUM 2000 MG: 2 SOLUTION INTRAVENOUS at 03:00

## 2023-03-26 RX ADMIN — CEFAZOLIN SODIUM 2000 MG: 2 SOLUTION INTRAVENOUS at 10:23

## 2023-03-26 RX ADMIN — CHLORHEXIDINE GLUCONATE 0.12% ORAL RINSE 15 ML: 1.2 LIQUID ORAL at 08:03

## 2023-03-26 RX ADMIN — FAMOTIDINE 20 MG: 40 POWDER, FOR SUSPENSION ORAL at 10:23

## 2023-03-26 RX ADMIN — INSULIN GLARGINE 5 UNITS: 100 INJECTION, SOLUTION SUBCUTANEOUS at 08:00

## 2023-03-26 RX ADMIN — OXYCODONE HYDROCHLORIDE 5 MG: 5 TABLET ORAL at 21:49

## 2023-03-26 NOTE — ASSESSMENT & PLAN NOTE
Lab Results   Component Value Date    HGBA1C 5 8 (H) 12/30/2022       Recent Labs     03/25/23  1553 03/25/23  2112 03/26/23  0752 03/26/23  1105   POCGLU 183* 198* 147* 308*       Blood Sugar Average: Last 72 hrs:  · (P) 239 [id number] home metformin  · Continue Lantus 5 units qam  · Continue sliding scale and Accu-Cheks

## 2023-03-26 NOTE — ASSESSMENT & PLAN NOTE
Malnutrition Findings:   Adult Malnutrition type: Chronic illness  Adult Degree of Malnutrition: Other severe protein calorie malnutrition  Malnutrition Characteristics: Fat loss, Muscle loss, Weight loss                  360 Statement: Chronic, severe protein-calorie malnutrition related to dysphagia as evidenced by severe muscle wasting of clavicle and temple regions, subcutaneous fat loss of orbital regions, and significant weight loss of 20% x 3 months  Will initiate EN via PEG tube when medically appropriate  BMI Findings:  Adult BMI Classifications: Underweight < 18 5        Body mass index is 16 56 kg/m²     · Has PEG tube secondary to dysphagia through which he gets Jevity tube feeds  · Nutrition consult

## 2023-03-26 NOTE — PROGRESS NOTES
[address]  Progress Note  Name: Gill Cuadra  MRN: [id number]  Unit/Bed#: ICU 06-01 I Date of Admission: 3/21/2023   Date of Service: 3/26/2023 I Hospital Day: 5    Assessment/Plan   * Acute respiratory failure with hypoxia [facility])  Assessment & Plan  · Multifactorial, pneumonia, PE, mucous plugging  · CT PE study with multifocal factorial pneumonia, PEs, mucous plugging, and a right basilar hydropneumothorax  · Wean oxygen for an SPO2 greater than 88%  · Incentive spirometry flutter valve  · Patient underwent bronchoscopy 3/22, multiple cultures growing Staph aureus sensitive to cefazolin  · ID consulted, appreciate recommendations   · Patient underwent thoracentesis on 3/23, 70 cc of louise fluid removed  · IV cefazolin possible transition to p o  antibiotics on Monday    Sepsis ([facility] [address] )  Assessment & Plan  · As evidenced by tachycardia hypoxia, leukocytosis and lactic acidosis on admission  · Fluid resuscitated with 3 L of normal saline in the ED  · Suspect multifactorial pneumonia most likely aspiration  · Blood cultures x2 no growth to date  · LA 2 1, cleared  · Urine strep and Legionella negative  Sputum cx    3+ Growth of Staphylococcus aureus Abnormal          · Continue [facility]    Hydropneumothorax  Assessment & Plan  · Hx of hydropneumothorax  · Recently admitted to [facility] on December 29, 2021 to and had a pigtail chest tube placed by CT surgery    Remained in for 2 days and was discontinued secondary to decreased output  · Exudative and suspicious for malignancy-unable to find recent cytology  · CT PE study with small loculated right basilar hydropneumothorax with pleural thickening suspicious for empyema and bronchopleural fistula  · Plan as above      Pressure injury of skin of buttock  Assessment & Plan  · POA  · Inpatient wound care consult  · Allyvn prn  · Frequent repositioning and off loading pressure points    PNA (pneumonia)  Assessment & Plan  · Most likely secondary to aspiration versus community-acquired  · CT chest-multifocal aspiration pneumonia with multiple areas of mucous plugging  Also small loculated right basilar hydropneumothorax with pleural thickening suspicious for empyema and bronchopleural fistula  · Sputum culture staph aureus   · Strep and Legionella negative  · Plan as above    Hyperlipemia  Assessment & Plan  · Continue home statin    Other pulmonary embolism without acute cor pulmonale (HCC)  Assessment & Plan  · Complained of sudden onset of dyspnea yesterday-found to have a low SPO2 in the 80s  · Used home O2 with improvement to high 80s  · CT PE-Filling defects involving the anterior-basilar and lateral-basilar segments of the left lower lobe, as and inferior lingular segment of the left upper lobe, compatible with acute pulmonary emboli  · Echo from 12/22/2022 ejection fraction is 70%  Systolic function is normal  NRWM  · Continue heparin gtt via PE protocol      Severe protein-calorie malnutrition (HCC)  Assessment & Plan  Malnutrition Findings:   Adult Malnutrition type: Chronic illness  Adult Degree of Malnutrition: Other severe protein calorie malnutrition  Malnutrition Characteristics: Fat loss, Muscle loss, Weight loss                  360 Statement: Chronic, severe protein-calorie malnutrition related to dysphagia as evidenced by severe muscle wasting of clavicle and temple regions, subcutaneous fat loss of orbital regions, and significant weight loss of 20% x 3 months  Will initiate EN via PEG tube when medically appropriate  BMI Findings:  Adult BMI Classifications: Underweight < 18 5        Body mass index is 16 56 kg/m²     · Has PEG tube secondary to dysphagia through which he gets Jevity tube feeds  · Nutrition consult    Neoplasm of lung  Assessment & Plan  · 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 mediastinal and cardiophrenic lymph nodes  · Follows with [facility] oncology  · Completed almost 2 complete rounds of chemotherapy-next round of chemotherapy to start at the beginning of April  · Will continue home prednisone    Type 2 diabetes mellitus without complication, without long-term current use of insulin [facility])  Assessment & Plan  Lab Results   Component Value Date    HGBA1C 5 8 (H) 12/30/2022       Recent Labs     03/25/23  1553 03/25/23  2112 03/26/23  0752 03/26/23  1105   POCGLU 183* 198* 147* 308*       Blood Sugar Average: Last 72 hrs:  · (P) 239 [id number] home metformin  · Continue Lantus 5 units qam  · Continue sliding scale and Accu-Cheks     Dysphagia, unspecified  Assessment & Plan  · 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  · Has PEG tube in place  · Continue tube feeds               VTE Pharmacologic Prophylaxis: VTE Score: 9 High Risk (Score >/= 5) - Pharmacological DVT Prophylaxis Ordered: heparin drip  Sequential Compression Devices Ordered  Patient Centered Rounds: I performed bedside rounds with nursing staff today  Discussions with Specialists or Other Care Team Provider: none    Education and Discussions with Family / Patient: Discussed with patient    Total Time Spent on Date of Encounter in care of patient: 45 minutes This time was spent on one or more of the following: performing physical exam; counseling and coordination of care; obtaining or reviewing history; documenting in the medical record; reviewing/ordering tests, medications or procedures; communicating with other healthcare professionals and discussing with patient's family/caregivers      Current Length of Stay: 5 day(s)  Current Patient Status: Inpatient   Certification Statement: The patient will continue to require additional inpatient hospital stay due to Pneumonia requiring administration of IV antibiotics  Discharge Plan: Pending clinical course    Code Status: Level 3 - DNAR and DNI    Subjective:   Patient seen and examined at bedside  Patient resting bed no apparent distress  Patient states he feels fine today with no complaints at this time    Objective:     Vitals:   Temp (24hrs), Av 2 °F (36 2 °C), Min:96 2 °F (35 7 °C), Max:98 5 °F (36 9 °C)    Temp:  [96 2 °F (35 7 °C)-98 5 °F (36 9 °C)] 98 5 °F (36 9 °C)  HR:  [] 86  Resp:  [18-20] 18  BP: (102-139)/(56-67) 118/67  SpO2:  [94 %-97 %] 97 %  Body mass index is 16 56 kg/m²  Input and Output Summary (last 24 hours): Intake/Output Summary (Last 24 hours) at 3/26/2023 1422  Last data filed at 3/26/2023 1300  Gross per 24 hour   Intake 1137 07 ml   Output 1301 ml   Net -163 93 ml       Physical Exam:   Physical Exam  HENT:      Head: Normocephalic  Cardiovascular:      Rate and Rhythm: Normal rate and regular rhythm  Pulmonary:      Comments: Decreased breath sounds bilaterally  Abdominal:      General: Abdomen is flat  Bowel sounds are normal    Musculoskeletal:         General: Normal range of motion  Cervical back: Normal range of motion  Skin:     General: Skin is warm and dry  Neurological:      General: No focal deficit present  Mental Status: He is alert and oriented to person, place, and time  Mental status is at baseline  Psychiatric:         Mood and Affect: Mood normal          Thought Content: Thought content normal          Judgment: Judgment normal           Additional Data:     Labs:  Results from last 7 days   Lab Units 23  0327 23  0440 23  0435 23  1303   WBC Thousand/uL 6 61   < > 7 58 11 09*   HEMOGLOBIN g/dL 9 6*   < > 9 2* 11 0*   HEMATOCRIT % 31 8*   < > 30 8* 37 2   PLATELETS Thousands/uL 210   < > 218 299   BANDS PCT %  --   --  4  --    NEUTROS PCT %  --   --   --  90*   LYMPHS PCT %  --   --   --  4*   LYMPHO PCT %  --   --  3*  --    MONOS PCT %  --   --   --  5   MONO PCT %  --   --  4  --    EOS PCT %  --   --  0 0    < > = values in this interval not displayed       Results from last 7 days   Lab Units 03/26/23  0327   SODIUM mmol/L 132*   POTASSIUM mmol/L 4 6   CHLORIDE mmol/L 94*   CO2 mmol/L 31   BUN mg/dL 14   CREATININE mg/dL 0 54*   ANION GAP mmol/L 7   CALCIUM mg/dL 9 1   ALBUMIN g/dL 3 0*   TOTAL BILIRUBIN mg/dL 0 29   ALK PHOS U/L 126*   ALT U/L 29   AST U/L 37   GLUCOSE RANDOM mg/dL 119     Results from last 7 days   Lab Units 03/23/23  0440   INR  1 12     Results from last 7 days   Lab Units 03/26/23  1105 03/26/23  0752 03/25/23  2112 03/25/23  1553 03/25/23  1117 03/25/23  0950 03/24/23  2034 03/24/23  1625 03/24/23  1115 03/24/23  0657 03/23/23  2052 03/23/23  1725   POC GLUCOSE mg/dl 308* 147* 198* 183* 137 182* 268* 314* 351* 174* 271* 324*         Results from last 7 days   Lab Units 03/26/23  0327 03/22/23  1603 03/22/23  0435 03/21/23  1452 03/21/23  1303   LACTIC ACID mmol/L  --   --   --  2 0 2 1*   PROCALCITONIN ng/ml 0 41* 0 25 0 36*  --  0 59*       Lines/Drains:  Invasive Devices     Peripheral Intravenous Line  Duration           Peripheral IV Left Antecubital -- days    Peripheral IV 03/25/23 Left;Upper;Ventral (anterior) Arm <1 day          Drain  Duration           Gastrostomy/Enterostomy PEG-jejunostomy LUQ -- days                      Imaging: No pertinent imaging reviewed  Recent Cultures (last 7 days):   Results from last 7 days   Lab Units 03/23/23  1618 03/22/23  1418 03/22/23  1417 03/22/23  1414 03/22/23  1410 03/22/23  1406 03/21/23  1651 03/21/23  1628 03/21/23  1303   BLOOD CULTURE   --   --   --   --   --   --   --   --  No Growth After 4 Days  No Growth After 4 Days     SPUTUM CULTURE   --   --   --   --   --   --   --  3+ Growth of Staphylococcus aureus*  1+ Growth of  --    GRAM STAIN RESULT  2+ Polys  No bacteria seen 4+ Polys*  2+ Gram positive cocci in clusters* 1+ Polys  No bacteria seen 1+ Gram positive cocci in clusters*  1+ Polys* 2+ Polys*  1+ Gram positive cocci in clusters* 2+ Polys*  1+ Gram positive cocci in clusters* --  2+ Epithelial cells per low power field*  2+ Polys*  2+ Gram positive cocci in pairs*  --    BODY FLUID CULTURE, STERILE  No growth  --   --   --   --   --   --   --   --    LEGIONELLA URINARY ANTIGEN   --   --   --   --   --   --  Negative  --   --        Last 24 Hours Medication List:   Current Facility-Administered Medications   Medication Dose Route Frequency Provider Last Rate   • atorvastatin  20 mg Oral Daily With Dinner MOISES Mckeon     • cefazolin  2,000 mg Intravenous Q8H Parker Saini PA-C 2,000 mg (03/26/23 1023)   • chlorhexidine  15 mL Mouth/Throat Q12H [address] MOISES Mckeon     • famotidine  20 mg Oral BID MOISES Mckeon     • guaiFENesin  400 mg Per G Tube Q6H MOISES Del Valle     • heparin (porcine)  3-30 Units/kg/hr (Order-Specific) Intravenous Titrated MOISES Mckeon 15 Units/kg/hr (03/26/23 0600)   • heparin (porcine)  2,800 Units Intravenous Q6H PRN MOISES Mckeon     • heparin (porcine)  5,600 Units Intravenous Q6H PRN MOISES Mckeon     • insulin glargine  5 Units Subcutaneous QAM Geo Hodgson DO     • insulin lispro  1-5 Units Subcutaneous TID AC MOISES [place] Valle     • levalbuterol  1 25 mg Nebulization TID Cornell Garcia MD     • magnesium sulfate  2 g Intravenous Once Geo Hodgson DO 2 g (03/26/23 1245)   • oxyCODONE  5 mg Oral Q4H PRN Geo Hodgson DO     • phenol  1 spray Mouth/Throat Q2H PRN MOISES Mckeon     • predniSONE  5 mg Oral Daily MOISES Mckeon          Today, Patient Was Seen By: Geo Hodgson DO    **Please Note: This note may have been constructed using a voice recognition system  **

## 2023-03-26 NOTE — NURSING NOTE
Room air maintained; VS and sats stable  IV(Heparin infusing)  Sleeping most times when undisturbed; arouses to name  Withdrawn; usually only responds with one or two word answers

## 2023-03-26 NOTE — NURSING NOTE
Pt received to 201 from ICU Report from [address]  Pt alert oriented Family with pt  All watching TV  Oriented to room

## 2023-03-26 NOTE — ASSESSMENT & PLAN NOTE
· Hx of hydropneumothorax  · Recently admitted to [facility] on December 29, 2021 to and had a pigtail chest tube placed by CT surgery    Remained in for 2 days and was discontinued secondary to decreased output  · Exudative and suspicious for malignancy-unable to find recent cytology  · CT PE study with small loculated right basilar hydropneumothorax with pleural thickening suspicious for empyema and bronchopleural fistula  · Plan as above

## 2023-03-26 NOTE — NURSING NOTE
Patient transferred to room 201 at this time, report given to [facility]  Belongings taken with patient, heparin gtt infusing  Family at bedside and aware of transfer

## 2023-03-26 NOTE — ASSESSMENT & PLAN NOTE
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

## 2023-03-26 NOTE — PLAN OF CARE
Problem: Potential for Falls  Goal: Patient will remain free of falls  Description: INTERVENTIONS:  - Educate patient/family on patient safety including physical limitations  - Instruct patient to call for assistance with activity   - Consult OT/PT to assist with strengthening/mobility   - Keep Call bell within reach  - Keep bed low and locked with side rails adjusted as appropriate  - Keep care items and personal belongings within reach  - Initiate and maintain comfort rounds  - Make Fall Risk Sign visible to staff  - Offer Toileting every 2 Hours, in advance of need  - Initiate/Maintain bed alarm  - Apply yellow socks and bracelet for high fall risk patients  - Consider moving patient to room near nurses station  Outcome: Progressing     Problem: PAIN - ADULT  Goal: Verbalizes/displays adequate comfort level or baseline comfort level  Description: Interventions:  - Encourage patient to monitor pain and request assistance  - Assess pain using appropriate pain scale  - Administer analgesics based on type and severity of pain and evaluate response  - Implement non-pharmacological measures as appropriate and evaluate response  - Consider cultural and social influences on pain and pain management  - Notify physician/advanced practitioner if interventions unsuccessful or patient reports new pain  Outcome: Progressing     Problem: INFECTION - ADULT  Goal: Absence or prevention of progression during hospitalization  Description: INTERVENTIONS:  - Assess and monitor for signs and symptoms of infection  - Monitor lab/diagnostic results  - Monitor all insertion sites, i e  indwelling lines, tubes, and drains  - Monitor endotracheal if appropriate and nasal secretions for changes in amount and color  - [place] appropriate cooling/warming therapies per order  - Administer medications as ordered  - Instruct and encourage patient and family to use good hand hygiene technique  - Identify and instruct in appropriate isolation precautions for identified infection/condition  Outcome: Progressing  Goal: Absence of fever/infection during neutropenic period  Description: INTERVENTIONS:  - Monitor WBC    Outcome: Progressing     Problem: SAFETY ADULT  Goal: Patient will remain free of falls  Description: INTERVENTIONS:  - Educate patient/family on patient safety including physical limitations  - Instruct patient to call for assistance with activity   - Consult OT/PT to assist with strengthening/mobility   - Keep Call bell within reach  - Keep bed low and locked with side rails adjusted as appropriate  - Keep care items and personal belongings within reach  - Initiate and maintain comfort rounds  - Make Fall Risk Sign visible to staff  - Offer Toileting every 2 Hours, in advance of need  - Initiate/Maintain bed alarm  - Apply yellow socks and bracelet for high fall risk patients  - Consider moving patient to room near nurses station  Outcome: Progressing

## 2023-03-26 NOTE — ASSESSMENT & PLAN NOTE
· As evidenced by tachycardia hypoxia, leukocytosis and lactic acidosis on admission  · Fluid resuscitated with 3 L of normal saline in the ED  · Suspect multifactorial pneumonia most likely aspiration  · Blood cultures x2 no growth to date  · LA 2 1, cleared  · Urine strep and Legionella negative  Sputum cx    3+ Growth of Staphylococcus aureus Abnormal          · Continue [facility]

## 2023-03-26 NOTE — ASSESSMENT & PLAN NOTE
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

## 2023-03-27 LAB
ALBUMIN SERPL BCP-MCNC: 3.1 G/DL (ref 3.5–5)
ALP SERPL-CCNC: 137 U/L (ref 34–104)
ALT SERPL W P-5'-P-CCNC: 24 U/L (ref 7–52)
ANION GAP SERPL CALCULATED.3IONS-SCNC: 6 MMOL/L (ref 4–13)
APTT PPP: 138 SECONDS (ref 23–37)
APTT PPP: 51 SECONDS (ref 23–37)
AST SERPL W P-5'-P-CCNC: 29 U/L (ref 13–39)
BILIRUB SERPL-MCNC: 0.28 MG/DL (ref 0.2–1)
BUN SERPL-MCNC: 18 MG/DL (ref 5–25)
CALCIUM ALBUM COR SERPL-MCNC: 9.8 MG/DL (ref 8.3–10.1)
CALCIUM SERPL-MCNC: 9.1 MG/DL (ref 8.4–10.2)
CHLORIDE SERPL-SCNC: 93 MMOL/L (ref 96–108)
CO2 SERPL-SCNC: 33 MMOL/L (ref 21–32)
CREAT SERPL-MCNC: 0.64 MG/DL (ref 0.6–1.3)
ERYTHROCYTE [DISTWIDTH] IN BLOOD BY AUTOMATED COUNT: 23.2 % (ref 11.6–15.1)
FUNGUS SPEC CULT: NORMAL
GFR SERPL CREATININE-BSD FRML MDRD: 95 ML/MIN/1.73SQ M
GLUCOSE SERPL-MCNC: 156 MG/DL (ref 65–140)
GLUCOSE SERPL-MCNC: 185 MG/DL (ref 65–140)
GLUCOSE SERPL-MCNC: 201 MG/DL (ref 65–140)
GLUCOSE SERPL-MCNC: 260 MG/DL (ref 65–140)
GLUCOSE SERPL-MCNC: 267 MG/DL (ref 65–140)
HCT VFR BLD AUTO: 32.8 % (ref 36.5–49.3)
HGB BLD-MCNC: 9.9 G/DL (ref 12–17)
MAGNESIUM SERPL-MCNC: 1.9 MG/DL (ref 1.9–2.7)
MCH RBC QN AUTO: 27.1 PG (ref 26.8–34.3)
MCHC RBC AUTO-ENTMCNC: 30.2 G/DL (ref 31.4–37.4)
MCV RBC AUTO: 90 FL (ref 82–98)
PLATELET # BLD AUTO: 190 THOUSANDS/UL (ref 149–390)
PMV BLD AUTO: 8.7 FL (ref 8.9–12.7)
POTASSIUM SERPL-SCNC: 4.8 MMOL/L (ref 3.5–5.3)
PROT SERPL-MCNC: 6.2 G/DL (ref 6.4–8.4)
RBC # BLD AUTO: 3.65 MILLION/UL (ref 3.88–5.62)
SODIUM SERPL-SCNC: 132 MMOL/L (ref 135–147)
WBC # BLD AUTO: 7.56 THOUSAND/UL (ref 4.31–10.16)

## 2023-03-27 RX ORDER — CEPHALEXIN 250 MG/5ML
500 POWDER, FOR SUSPENSION ORAL EVERY 6 HOURS SCHEDULED
Status: DISCONTINUED | OUTPATIENT
Start: 2023-03-27 | End: 2023-03-28 | Stop reason: HOSPADM

## 2023-03-27 RX ORDER — CEPHALEXIN 250 MG/5ML
500 POWDER, FOR SUSPENSION ORAL EVERY 6 HOURS SCHEDULED
Status: DISCONTINUED | OUTPATIENT
Start: 2023-03-28 | End: 2023-03-27

## 2023-03-27 RX ORDER — CEPHALEXIN 250 MG/5ML
500 POWDER, FOR SUSPENSION ORAL EVERY 6 HOURS SCHEDULED
Status: DISCONTINUED | OUTPATIENT
Start: 2023-03-27 | End: 2023-03-27

## 2023-03-27 RX ADMIN — CHLORHEXIDINE GLUCONATE 0.12% ORAL RINSE 15 ML: 1.2 LIQUID ORAL at 08:52

## 2023-03-27 RX ADMIN — ATORVASTATIN CALCIUM 20 MG: 20 TABLET, FILM COATED ORAL at 18:23

## 2023-03-27 RX ADMIN — HEPARIN SODIUM 2800 UNITS: 1000 INJECTION INTRAVENOUS; SUBCUTANEOUS at 11:07

## 2023-03-27 RX ADMIN — PREDNISONE 5 MG: 5 TABLET ORAL at 08:47

## 2023-03-27 RX ADMIN — CEPHALEXIN 500 MG: 250 FOR SUSPENSION ORAL at 15:02

## 2023-03-27 RX ADMIN — FAMOTIDINE 20 MG: 40 POWDER, FOR SUSPENSION ORAL at 08:49

## 2023-03-27 RX ADMIN — INSULIN LISPRO 1 UNITS: 100 INJECTION, SOLUTION INTRAVENOUS; SUBCUTANEOUS at 08:47

## 2023-03-27 RX ADMIN — LEVALBUTEROL HYDROCHLORIDE 1.25 MG: 1.25 SOLUTION RESPIRATORY (INHALATION) at 13:04

## 2023-03-27 RX ADMIN — INSULIN GLARGINE 5 UNITS: 100 INJECTION, SOLUTION SUBCUTANEOUS at 08:47

## 2023-03-27 RX ADMIN — FAMOTIDINE 20 MG: 40 POWDER, FOR SUSPENSION ORAL at 18:23

## 2023-03-27 RX ADMIN — CEPHALEXIN 500 MG: 250 FOR SUSPENSION ORAL at 21:52

## 2023-03-27 RX ADMIN — GUAIFENESIN 400 MG: 100 SOLUTION ORAL at 08:47

## 2023-03-27 RX ADMIN — LEVALBUTEROL HYDROCHLORIDE 1.25 MG: 1.25 SOLUTION RESPIRATORY (INHALATION) at 20:18

## 2023-03-27 RX ADMIN — APIXABAN 10 MG: 5 TABLET, FILM COATED ORAL at 18:22

## 2023-03-27 RX ADMIN — CEFAZOLIN SODIUM 2000 MG: 2 SOLUTION INTRAVENOUS at 02:26

## 2023-03-27 RX ADMIN — GUAIFENESIN 400 MG: 100 SOLUTION ORAL at 18:23

## 2023-03-27 RX ADMIN — INSULIN LISPRO 2 UNITS: 100 INJECTION, SOLUTION INTRAVENOUS; SUBCUTANEOUS at 18:22

## 2023-03-27 RX ADMIN — OXYCODONE HYDROCHLORIDE 5 MG: 5 TABLET ORAL at 18:49

## 2023-03-27 RX ADMIN — INSULIN LISPRO 1 UNITS: 100 INJECTION, SOLUTION INTRAVENOUS; SUBCUTANEOUS at 12:42

## 2023-03-27 RX ADMIN — LEVALBUTEROL HYDROCHLORIDE 1.25 MG: 1.25 SOLUTION RESPIRATORY (INHALATION) at 07:08

## 2023-03-27 RX ADMIN — GUAIFENESIN 400 MG: 100 SOLUTION ORAL at 02:33

## 2023-03-27 RX ADMIN — GUAIFENESIN 400 MG: 100 SOLUTION ORAL at 21:52

## 2023-03-27 NOTE — ASSESSMENT & PLAN NOTE
Malnutrition Findings:   Adult Malnutrition type: Chronic illness  Adult Degree of Malnutrition: Other severe protein calorie malnutrition  Malnutrition Characteristics: Fat loss, Muscle loss, Weight loss                  360 Statement: Chronic, severe protein-calorie malnutrition related to dysphagia as evidenced by severe muscle wasting of clavicle and temple regions, subcutaneous fat loss of orbital regions, and significant weight loss of 20% x 3 months  Will initiate EN via PEG tube when medically appropriate  BMI Findings:  Adult BMI Classifications: Underweight < 18 5        Body mass index is 17 41 kg/m²     · Has PEG tube secondary to dysphagia through which he gets Jevity tube feeds  · Nutrition consult

## 2023-03-27 NOTE — ASSESSMENT & PLAN NOTE
· Hx of hydropneumothorax  · Recently admitted to [facility] on December 29, 2021 to and had a pigtail chest tube placed by CT surgery    Remained in for 2 days and was discontinued secondary to decreased output  · Exudative and suspicious for malignancy-unable to find recent cytology  · CT PE study with small loculated right basilar hydropneumothorax with pleural thickening suspicious for empyema and bronchopleural fistula  · Plan as above

## 2023-03-27 NOTE — ASSESSMENT & PLAN NOTE
· As evidenced by tachycardia hypoxia, leukocytosis and lactic acidosis on admission  · Fluid resuscitated with 3 L of normal saline in the ED  · Suspect multifactorial pneumonia most likely aspiration  · Blood cultures x2 no growth at 5 days  · LA 2 1, cleared  · Urine strep and Legionella negative  Sputum cx    3+ Growth of Staphylococcus aureus Abnormal          · Continue Keflex as above

## 2023-03-27 NOTE — PLAN OF CARE
Problem: SAFETY ADULT  Goal: Patient will remain free of falls  Description: INTERVENTIONS:  - Educate patient/family on patient safety including physical limitations  - Instruct patient to call for assistance with activity   - Consult OT/PT to assist with strengthening/mobility   - Keep Call bell within reach  - Keep bed low and locked with side rails adjusted as appropriate  - Keep care items and personal belongings within reach  - Initiate and maintain comfort rounds  - Make Fall Risk Sign visible to staff  - Offer Toileting every 2 Hours, in advance of need  - Initiate/Maintain bed alarm  - Apply yellow socks and bracelet for high fall risk patients  - Consider moving patient to room near nurses station  Outcome: Progressing     Problem: Nutrition/Hydration-ADULT  Goal: Nutrient/Hydration intake appropriate for improving, restoring or maintaining nutritional needs  Description: Monitor and assess patient's nutrition/hydration status for malnutrition  Collaborate with interdisciplinary team and initiate plan and interventions as ordered  Monitor patient's weight and dietary intake as ordered or per policy  Utilize nutrition screening tool and intervene as necessary  Determine patient's food preferences and provide high-protein, high-caloric foods as appropriate       INTERVENTIONS:  - Monitor oral intake, urinary output, labs, and treatment plans  - Assess nutrition and hydration status and recommend course of action  - Evaluate amount of meals eaten  - Assist patient with eating if necessary   - Allow adequate time for meals  - Recommend/ encourage appropriate diets, oral nutritional supplements, and vitamin/mineral supplements  - Order, calculate, and assess calorie counts as needed  - Recommend, monitor, and adjust tube feedings and TPN/PPN based on assessed needs  - Assess need for intravenous fluids  - Provide specific nutrition/hydration education as appropriate  - Include patient/family/caregiver in decisions related to nutrition  Outcome: Progressing

## 2023-03-27 NOTE — PLAN OF CARE
Problem: Potential for Falls  Goal: Patient will remain free of falls  Description: INTERVENTIONS:  - Educate patient/family on patient safety including physical limitations  - Instruct patient to call for assistance with activity   - Consult OT/PT to assist with strengthening/mobility   - Keep Call bell within reach  - Keep bed low and locked with side rails adjusted as appropriate  - Keep care items and personal belongings within reach  - Initiate and maintain comfort rounds  - Make Fall Risk Sign visible to staff  - Offer Toileting every 2 Hours, in advance of need  - Initiate/Maintain bed alarm  - Apply yellow socks and bracelet for high fall risk patients  - Consider moving patient to room near nurses station  Outcome: Progressing     Problem: PAIN - ADULT  Goal: Verbalizes/displays adequate comfort level or baseline comfort level  Description: Interventions:  - Encourage patient to monitor pain and request assistance  - Assess pain using appropriate pain scale  - Administer analgesics based on type and severity of pain and evaluate response  - Implement non-pharmacological measures as appropriate and evaluate response  - Consider cultural and social influences on pain and pain management  - Notify physician/advanced practitioner if interventions unsuccessful or patient reports new pain  Outcome: Progressing     Problem: INFECTION - ADULT  Goal: Absence or prevention of progression during hospitalization  Description: INTERVENTIONS:  - Assess and monitor for signs and symptoms of infection  - Monitor lab/diagnostic results  - Monitor all insertion sites, i e  indwelling lines, tubes, and drains  - Monitor endotracheal if appropriate and nasal secretions for changes in amount and color  - [place] appropriate cooling/warming therapies per order  - Administer medications as ordered  - Instruct and encourage patient and family to use good hand hygiene technique  - Identify and instruct in appropriate isolation precautions for identified infection/condition  Outcome: Progressing  Goal: Absence of fever/infection during neutropenic period  Description: INTERVENTIONS:  - Monitor WBC    Outcome: Progressing     Problem: SAFETY ADULT  Goal: Patient will remain free of falls  Description: INTERVENTIONS:  - Educate patient/family on patient safety including physical limitations  - Instruct patient to call for assistance with activity   - Consult OT/PT to assist with strengthening/mobility   - Keep Call bell within reach  - Keep bed low and locked with side rails adjusted as appropriate  - Keep care items and personal belongings within reach  - Initiate and maintain comfort rounds  - Make Fall Risk Sign visible to staff  - Offer Toileting every 2 Hours, in advance of need  - Initiate/Maintain bed alarm  - Apply yellow socks and bracelet for high fall risk patients  - Consider moving patient to room near nurses station  Outcome: Progressing  Goal: Maintain or return to baseline ADL function  Description: INTERVENTIONS:  - Educate patient/family on patient safety including physical limitations  - Instruct patient to call for assistance with activity   - Consult OT/PT to assist with strengthening/mobility   - Keep Call bell within reach  - Keep bed low and locked with side rails adjusted as appropriate  - Keep care items and personal belongings within reach  - Initiate and maintain comfort rounds  - Make Fall Risk Sign visible to staff  - Offer Toileting every 2 Hours, in advance of need  - Initiate/Maintain bed alarm  - Apply yellow socks and bracelet for high fall risk patients  - Consider moving patient to room near nurses station  Outcome: Progressing  Goal: Maintains/Returns to pre admission functional level  Description: INTERVENTIONS:  - Perform BMAT or MOVE assessment daily    - Set and communicate daily mobility goal to care team and patient/family/caregiver     - Collaborate with rehabilitation services on mobility goals if consulted  - Perform Range of Motion 3 times a day  - Reposition patient every 2 hours  - Dangle patient 3 times a day  - Stand patient 3 times a day  - Ambulate patient 3 times a day  - Out of bed to chair 3 times a day   - Out of bed for meals 3 times a day  - Out of bed for toileting  - Record patient progress and toleration of activity level   Outcome: Progressing     Problem: DISCHARGE PLANNING  Goal: Discharge to home or other facility with appropriate resources  Description: INTERVENTIONS:  - Identify barriers to discharge w/patient and caregiver  - Arrange for needed discharge resources and transportation as appropriate  - Identify discharge learning needs (meds, wound care, etc )  - Arrange for interpretive services to assist at discharge as needed  - Refer to Case Management Department for coordinating discharge planning if the patient needs post-hospital services based on physician/advanced practitioner order or complex needs related to functional status, cognitive ability, or social support system  Outcome: Progressing     Problem: Knowledge Deficit  Goal: Patient/family/caregiver demonstrates understanding of disease process, treatment plan, medications, and discharge instructions  Description: Complete learning assessment and assess knowledge base    Interventions:  - Provide teaching at level of understanding  - Provide teaching via preferred learning methods  Outcome: Progressing     Problem: MOBILITY - ADULT  Goal: Maintain or return to baseline ADL function  Description: INTERVENTIONS:  - Educate patient/family on patient safety including physical limitations  - Instruct patient to call for assistance with activity   - Consult OT/PT to assist with strengthening/mobility   - Keep Call bell within reach  - Keep bed low and locked with side rails adjusted as appropriate  - Keep care items and personal belongings within reach  - Initiate and maintain comfort rounds  - Make Fall Risk Sign visible to staff  - Offer Toileting every 2 Hours, in advance of need  - Initiate/Maintain bed alarm  - Apply yellow socks and bracelet for high fall risk patients  - Consider moving patient to room near nurses station  Outcome: Progressing  Goal: Maintains/Returns to pre admission functional level  Description: INTERVENTIONS:  - Perform BMAT or MOVE assessment daily    - Set and communicate daily mobility goal to care team and patient/family/caregiver  - Collaborate with rehabilitation services on mobility goals if consulted  - Perform Range of Motion 3 times a day  - Reposition patient every 2 hours  - Dangle patient 3 times a day  - Stand patient 3 times a day  - Ambulate patient 3 times a day  - Out of bed to chair 3 times a day   - Out of bed for meals 3 times a day  - Out of bed for toileting  - Record patient progress and toleration of activity level   Outcome: Progressing     Problem: Nutrition/Hydration-ADULT  Goal: Nutrient/Hydration intake appropriate for improving, restoring or maintaining nutritional needs  Description: Monitor and assess patient's nutrition/hydration status for malnutrition  Collaborate with interdisciplinary team and initiate plan and interventions as ordered  Monitor patient's weight and dietary intake as ordered or per policy  Utilize nutrition screening tool and intervene as necessary  Determine patient's food preferences and provide high-protein, high-caloric foods as appropriate       INTERVENTIONS:  - Monitor oral intake, urinary output, labs, and treatment plans  - Assess nutrition and hydration status and recommend course of action  - Evaluate amount of meals eaten  - Assist patient with eating if necessary   - Allow adequate time for meals  - Recommend/ encourage appropriate diets, oral nutritional supplements, and vitamin/mineral supplements  - Order, calculate, and assess calorie counts as needed  - Recommend, monitor, and adjust tube feedings and TPN/PPN based on assessed needs  - Assess need for intravenous fluids  - Provide specific nutrition/hydration education as appropriate  - Include patient/family/caregiver in decisions related to nutrition  Outcome: Progressing     Problem: Prexisting or High Potential for Compromised Skin Integrity  Goal: Skin integrity is maintained or improved  Description: INTERVENTIONS:  - Identify patients at risk for skin breakdown  - Assess and monitor skin integrity  - Assess and monitor nutrition and hydration status  - Monitor labs   - Assess for incontinence   - Turn and reposition patient  - Assist with mobility/ambulation  - Relieve pressure over bony prominences  - Avoid friction and shearing  - Provide appropriate hygiene as needed including keeping skin clean and dry  - Evaluate need for skin moisturizer/barrier cream  - Collaborate with interdisciplinary team   - Patient/family teaching  - Consider wound care consult   Outcome: Progressing

## 2023-03-27 NOTE — ASSESSMENT & PLAN NOTE
Lab Results   Component Value Date    HGBA1C 5 8 (H) 12/30/2022       Recent Labs     03/26/23  1600 03/26/23  2101 03/27/23  0637 03/27/23  1051   POCGLU 311* 306* 201* 185*       Blood Sugar Average: Last 72 hrs:  · (P) 233 [id number] home metformin  · Continue Lantus 5 units qam  · Continue sliding scale and Accu-Cheks

## 2023-03-27 NOTE — CASE MANAGEMENT
Case Management Discharge Planning Note    Patient name Monik Aguilar  Location [address] 201/-26 MRN [id number]  : 1945 Date 3/27/2023       Current Admission Date: 3/21/2023  Current Admission Diagnosis:Acute respiratory failure with hypoxia [facility])   Patient Active Problem List    Diagnosis Date Noted   • PNA (pneumonia) 2023   • Pressure injury of skin of buttock 2023   • Hydropneumothorax 2023   • Syncope and collapse 2022   • Hypomagnesemia 2022   • Hyperlipemia 2022   • Thyroid nodule 2022   • History of head and neck radiation 2022   • Agent orange exposure 2022   • Subclinical hyperthyroidism 2022   • Multiple fractures of rib involving four or more ribs-right 2022   • COVID 2022   • Acute respiratory failure with hypoxia ([address] ) 2022   • Anxiety 2021   • Neoplasm of uncertain behavior of skin 2021   • Post-traumatic stress disorder, unspecified 2021   • Essential hypertension 2021   • Actinic keratosis 2021   • Dysphagia, unspecified 2021   • Erectile dysfunction 2021   • History of malignant neoplasm of bladder 2021   • Carpal tunnel syndrome 2021   • Chronic post-traumatic stress disorder (PTSD) after  combat 2021   • Type 2 diabetes mellitus without complication, without long-term current use of insulin ([address] ) 2021   • Hematuria 2021   • Hyperlipidemia due to type 2 diabetes mellitus ([address] ) 2021   • Malignant neoplasm of urinary bladder ([address] ) 2021   • Neoplasm of lung 2021   • Osteoarthritis of knee 2021   • Panic disorder 2021   • Polyp of colon 2021   • Tobacco dependence 2021   • Sepsis ([address] ) 2021   • Other pulmonary embolism without acute cor pulmonale ([address] ) 2021   • Cholecystitis 2021   • Severe protein-calorie malnutrition ([facility] ) 2021   • Primary malignant neoplasm of base of tongue ([facility] ) 10/11/2021   • 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 [address]  [facility] [address] [place] [address]  Phone: [phone] Fax: [phone]    Primary Care Provider: Bhanu Francis MD    Primary Insurance: MEDICARE  Secondary Insurance: Gerardo Ivan DETAILS:  As per PT the pt will need STR  Spoke to the pt at the bedside about same  Pt is aware he will not get any chemo tx during STR  Pt states his oncologist is from [facility] and his wife will provide his phone number when she comes in later today  Will contact the oncologist when number is provided about the discharge plan  Made referrals via  [address]

## 2023-03-27 NOTE — PROGRESS NOTES
[address]  Progress Note  Name: Gregory Brunner  MRN: [id number]  Unit/Bed#: -01 I Date of Admission: 3/21/2023   Date of Service: 3/27/2023 I Hospital Day: 6    Assessment/Plan   * Acute respiratory failure with hypoxia [facility])  Assessment & Plan  · Multifactorial, pneumonia, PE, mucous plugging  · CT PE study with multifocal factorial pneumonia, PEs, mucous plugging, and a right basilar hydropneumothorax  · Wean oxygen for an SPO2 greater than 88%  · Incentive spirometry flutter valve  · Patient underwent bronchoscopy 3/22, multiple cultures growing Staph aureus   · ID consulted, appreciate recommendations   · Patient underwent thoracentesis on 3/23, 70 cc of louise fluid removed  · Keflex through 4/3 to complete 14-day course of antibiotics  · PT/OT evaluated patient, recommending [facility], patient agreeable to placement    Sepsis ([facility])  Assessment & Plan  · As evidenced by tachycardia hypoxia, leukocytosis and lactic acidosis on admission  · Fluid resuscitated with 3 L of normal saline in the ED  · Suspect multifactorial pneumonia most likely aspiration  · Blood cultures x2 no growth at 5 days  · LA 2 1, cleared  · Urine strep and Legionella negative  Sputum cx    3+ Growth of Staphylococcus aureus Abnormal          · Continue Keflex as above    Other pulmonary embolism without acute cor pulmonale (HCC)  Assessment & Plan  · Complained of sudden onset of dyspnea yesterday-found to have a low SPO2 in the 80s  · Used home O2 with improvement to high 80s  · CT PE-Filling defects involving the anterior-basilar and lateral-basilar segments of the left lower lobe, as and inferior lingular segment of the left upper lobe, compatible with acute pulmonary emboli  · Echo from 12/22/2022 ejection fraction is 70%   Systolic function is normal  NRWM  · Will start Eliquis      Hydropneumothorax  Assessment & Plan  · Hx of hydropneumothorax  · Recently admitted to [facility] on December 29, 2021 to and had a pigtail chest tube placed by CT surgery  Remained in for 2 days and was discontinued secondary to decreased output  · Exudative and suspicious for malignancy-unable to find recent cytology  · CT PE study with small loculated right basilar hydropneumothorax with pleural thickening suspicious for empyema and bronchopleural fistula  · Plan as above      Pressure injury of skin of buttock  Assessment & Plan  · POA  · Inpatient wound care consult  · Allyvn prn  · Frequent repositioning and off loading pressure points    PNA (pneumonia)  Assessment & Plan  · Most likely secondary to aspiration versus community-acquired  · CT chest-multifocal aspiration pneumonia with multiple areas of mucous plugging  Also small loculated right basilar hydropneumothorax with pleural thickening suspicious for empyema and bronchopleural fistula  · Sputum culture staph aureus   · Strep and Legionella negative  · Plan as above    Hyperlipemia  Assessment & Plan  · Continue home statin    Severe protein-calorie malnutrition (HCC)  Assessment & Plan  Malnutrition Findings:   Adult Malnutrition type: Chronic illness  Adult Degree of Malnutrition: Other severe protein calorie malnutrition  Malnutrition Characteristics: Fat loss, Muscle loss, Weight loss                  360 Statement: Chronic, severe protein-calorie malnutrition related to dysphagia as evidenced by severe muscle wasting of clavicle and temple regions, subcutaneous fat loss of orbital regions, and significant weight loss of 20% x 3 months  Will initiate EN via PEG tube when medically appropriate  BMI Findings:  Adult BMI Classifications: Underweight < 18 5        Body mass index is 17 41 kg/m²     · Has PEG tube secondary to dysphagia through which he gets Jevity tube feeds  · Nutrition consult    Neoplasm of lung  Assessment & Plan  · 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 mediastinal and cardiophrenic lymph nodes  · Follows with [facility] oncology  · Completed almost 2 complete rounds of chemotherapy-next round of chemotherapy to start at the beginning of April  · Will continue home prednisone    Type 2 diabetes mellitus without complication, without long-term current use of insulin 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  · Has PEG tube in place  · Continue tube feeds             VTE Pharmacologic Prophylaxis: VTE Score: 9 High Risk (Score >/= 5) - Pharmacological DVT Prophylaxis Ordered: apixaban (Eliquis)  Sequential Compression Devices Ordered  Patient Centered Rounds: I performed bedside rounds with nursing staff today  Discussions with Specialists or Other Care Team Provider: Infectious disease    Education and Discussions with Family / Patient: Discussed with patient  Discussed with patient's wife via phone all questions answered    Total Time Spent on Date of Encounter in care of patient: 45 minutes This time was spent on one or more of the following: performing physical exam; counseling and coordination of care; obtaining or reviewing history; documenting in the medical record; reviewing/ordering tests, medications or procedures; communicating with other healthcare professionals and discussing with patient's family/caregivers      Current Length of Stay: 6 day(s)  Current Patient Status: Inpatient   Certification Statement: The patient will continue to require additional inpatient hospital stay due to Pending placement into acute care rehab  Discharge Plan: Likely stable for discharge, pending placement into acute care rehab    Code Status: Level 3 - DNAR and DNI    Subjective:   Patient seen and examined at bedside  Patient resting in bed no apparent distress  States he is fatigued this morning, was evaluated by physical therapy, is agreeable to rehab placement    Objective:     Vitals:   Temp (24hrs), Av 4 °F (36 9 °C), Min:98 °F (36 7 °C), Max:98 9 °F (37 2 °C)    Temp:  [98 °F (36 7 °C)-98 9 °F (37 2 °C)] 98 °F (36 7 °C)  HR:  [81-98] 98  Resp:  [16-19] 18  BP: (117-138)/(62-75) 124/72  SpO2:  [95 %-98 %] 98 %  Body mass index is 17 41 kg/m²  Input and Output Summary (last 24 hours): Intake/Output Summary (Last 24 hours) at 3/27/2023 1459  Last data filed at 3/27/2023 1119  Gross per 24 hour   Intake 150 ml   Output 1675 ml   Net -1525 ml       Physical Exam:   Physical Exam  Constitutional:       Comments: Frail-appearing   HENT:      Head: Normocephalic  Cardiovascular:      Rate and Rhythm: Normal rate and regular rhythm  Pulses: Normal pulses  Heart sounds: Normal heart sounds  Pulmonary:      Effort: Pulmonary effort is normal       Breath sounds: Normal breath sounds  Abdominal:      General: Abdomen is flat  Bowel sounds are normal       Palpations: Abdomen is soft  Comments: PEG tube in place   Musculoskeletal:         General: Normal range of motion  Cervical back: Normal range of motion  Skin:     General: Skin is warm  Neurological:      General: No focal deficit present  Mental Status: He is alert and oriented to person, place, and time  Mental status is at baseline  Psychiatric:         Mood and Affect: Mood normal          Thought Content:  Thought content normal          Judgment: Judgment normal           Additional Data:     Labs:  Results from last 7 days   Lab Units 23  0434 23  0440 23  0435 23  1303   WBC Thousand/uL 7 56 < > 7 58 11 09*   HEMOGLOBIN g/dL 9 9*   < > 9 2* 11 0*   HEMATOCRIT % 32 8*   < > 30 8* 37 2   PLATELETS Thousands/uL 190   < > 218 299   BANDS PCT %  --   --  4  --    NEUTROS PCT %  --   --   --  90*   LYMPHS PCT %  --   --   --  4*   LYMPHO PCT %  --   --  3*  --    MONOS PCT %  --   --   --  5   MONO PCT %  --   --  4  --    EOS PCT %  --   --  0 0    < > = values in this interval not displayed  Results from last 7 days   Lab Units 03/27/23  0434   SODIUM mmol/L 132*   POTASSIUM mmol/L 4 8   CHLORIDE mmol/L 93*   CO2 mmol/L 33*   BUN mg/dL 18   CREATININE mg/dL 0 64   ANION GAP mmol/L 6   CALCIUM mg/dL 9 1   ALBUMIN g/dL 3 1*   TOTAL BILIRUBIN mg/dL 0 28   ALK PHOS U/L 137*   ALT U/L 24   AST U/L 29   GLUCOSE RANDOM mg/dL 156*     Results from last 7 days   Lab Units 03/23/23  0440   INR  1 12     Results from last 7 days   Lab Units 03/27/23  1051 03/27/23  0637 03/26/23  2101 03/26/23  1600 03/26/23  1105 03/26/23  0752 03/25/23  2112 03/25/23  1553 03/25/23  1117 03/25/23  0950 03/24/23  2034 03/24/23  1625   POC GLUCOSE mg/dl 185* 201* 306* 311* 308* 147* 198* 183* 137 182* 268* 314*         Results from last 7 days   Lab Units 03/26/23  0327 03/22/23  1603 03/22/23  0435 03/21/23  1452 03/21/23  1303   LACTIC ACID mmol/L  --   --   --  2 0 2 1*   PROCALCITONIN ng/ml 0 41* 0 25 0 36*  --  0 59*       Lines/Drains:  Invasive Devices     Peripheral Intravenous Line  Duration           Peripheral IV 03/25/23 Left Antecubital 2 days    Peripheral IV 03/25/23 Left;Upper;Ventral (anterior) Arm 2 days          Drain  Duration           Gastrostomy/Enterostomy PEG-jejunostomy LUQ -- days                      Imaging: No pertinent imaging reviewed      Recent Cultures (last 7 days):   Results from last 7 days   Lab Units 03/23/23  1618 03/22/23  1418 03/22/23  1417 03/22/23  1414 03/22/23  1410 03/22/23  1406 03/21/23  1651 03/21/23  1628 03/21/23  1303   BLOOD CULTURE   --   --   --   --   --   --   --   --  No Growth After 5 Days  No Growth After 5 Days  SPUTUM CULTURE   --   --   --   --   --   --   --  3+ Growth of Staphylococcus aureus*  1+ Growth of  --    GRAM STAIN RESULT  2+ Polys  No bacteria seen 4+ Polys*  2+ Gram positive cocci in clusters* 1+ Polys  No bacteria seen 1+ Gram positive cocci in clusters*  1+ Polys* 2+ Polys*  1+ Gram positive cocci in clusters* 2+ Polys*  1+ Gram positive cocci in clusters*  --  2+ Epithelial cells per low power field*  2+ Polys*  2+ Gram positive cocci in pairs*  --    BODY FLUID CULTURE, STERILE  No growth  --   --   --   --   --   --   --   --    LEGIONELLA URINARY ANTIGEN   --   --   --   --   --   --  Negative  --   --        Last 24 Hours Medication List:   Current Facility-Administered Medications   Medication Dose Route Frequency Provider Last Rate   • apixaban  10 mg Oral BID Kenyon Del Real DO      Followed by   • [START ON 4/3/2023] apixaban  5 mg Oral BID Kenyon Del Real DO     • atorvastatin  20 mg Oral Daily With [employer]IS[employer]     • cephalexin  500 mg Per PEG Tube Q6H [address] [place] Saini PA-C     • chlorhexidine  15 mL Mouth/Throat Q12H [address] MOISES Mckeon     • famotidine  20 mg Oral BID MOISES Mckeon     • guaiFENesin  400 mg Per G Tube Q6H MOISES Del Valle     • insulin glargine  5 Units Subcutaneous QAM Kenyon Del Real DO     • insulin lispro  1-5 Units Subcutaneous TID AC MOISES Del Valle     • levalbuterol  1 25 mg Nebulization TID Frandy Martinez MD     • oxyCODONE  5 mg Oral Q4H PRN Kenyon Del Real DO     • phenol  1 spray Mouth/Throat Q2H PRN MOISES Mckeon     • predniSONE  5 mg Oral Daily MOISES Mckeon          Today, Patient Was Seen By: Kenyon Del Real DO    **Please Note: This note may have been constructed using a voice recognition system  **

## 2023-03-27 NOTE — NUTRITION
03/27/23 1340   Biochemical Data,Medical Tests, and Procedures   Biochemical Data/Medical Tests/Procedures Lab values reviewed; Meds reviewed   Labs (Comment) 3/27/23 glucose , Na 132, Cl 93, H&H 9 9/32 8   Meds (Comment) atorvastatin, pepcid, insulin, prednisone, magnesium sulfate, heparin   Nutrition-Focused Physical Exam   Nutrition-Focused Physical Exam Findings RN skin assessment reviewed; Wound; No edema documented;Muscle Loss;Subcutaneous fat loss   Nutrition-Focused Physical Exam Findings traumatic abrasion at left elbow, pressure injury at left posterior hand, unstageable pressure injuries at BL sacrum   Medical-Related Concerns PMH reviewed   Feeding Route   Tube Feeding PEG tube   Formula Jevity 1 2   Formula rate 600 mL bolus x3 daily   Free Water From EN 1452 mL   Total EN Volume 1800 mL   EN Kcals TF Formula Only 2160 Kcals   Protein Intake (g) 100 g   Current PO Intake   Current Diet Order NPO; EN   Nutrition Supplements Banatrol Plus Banana Flakes  (TID)   Estimated calorie intake compared to estimated need EN as ordered is meeting estimated needs  PES Statement   Problem Continue previous diagnosis   Recommendations/Interventions   Malnutrition/BMI Present Yes  (as previously documented)   Summary TF regimen adjusted to Jevity 1 2 600 mL 3 times daily - providing 2160 kcal, 100 g protein, 1452 mL free water in total TF volume of 1800 mL  EN as ordered is meeting estimated needs  Banatrol TID ordered  Per flow sheets, 3/25 bowel movement was formed  Hyperglycemia persists likely in setting of prescribed steroid and metabolic stress  Recommend optimizing insulin coverage  Interventions/Recommendations Continue EN as ordered   Education Assessment   Education Education not indicated at this time   Patient Nutrition Goals   Goal Glucose WNL; Meet EN/PN needs

## 2023-03-27 NOTE — ASSESSMENT & PLAN NOTE
· Multifactorial, pneumonia, PE, mucous plugging  · CT PE study with multifocal factorial pneumonia, PEs, mucous plugging, and a right basilar hydropneumothorax  · Wean oxygen for an SPO2 greater than 88%  · Incentive spirometry flutter valve  · Patient underwent bronchoscopy 3/22, multiple cultures growing Staph aureus   · ID consulted, appreciate recommendations   · Patient underwent thoracentesis on 3/23, 70 cc of louise fluid removed  · Keflex through 4/3 to complete 14-day course of antibiotics  · PT/OT evaluated patient, recommending [facility], patient agreeable to placement

## 2023-03-27 NOTE — PLAN OF CARE
Problem: OCCUPATIONAL THERAPY ADULT  Goal: Performs self-care activities at highest level of function for planned discharge setting  See evaluation for individualized goals  Description: Treatment Interventions: ADL retraining, Functional transfer training, UE strengthening/ROM, Endurance training, Patient/family training, Compensatory technique education, Energy conservation, Activityengagement     See flowsheet documentation for full assessment, interventions and recommendations  Note: Limitation: Decreased ADL status, Decreased UE ROM, Decreased UE strength, Decreased endurance, Decreased Safe judgement during ADL  Prognosis: Good  Assessment: Pt is a 78 y o  male seen for OT evaluation s/p admit to [facility] on 3/21/2023 w/ Acute respiratory failure with hypoxia ([facility] [address] )  Comorbidities affecting pt's functional performance at time of assessment include: dysphagia, type 2 diabetes, sepsis, PNA, anxiety, HTN, OA of knee  Personal factors affecting pt at time of IE include:steps to enter environment, difficulty performing ADLS, difficulty performing IADLS , decreased initiation and engagement  and health management   Prior to admission, pt required some assistance with ADLs and IADLs from family members present  Upon evaluation: the following deficits impact occupational performance: weakness, decreased ROM, decreased strength, decreased balance, decreased tolerance, decreased safety awareness and increased pain  Pt to benefit from continued skilled OT tx while in the hospital to address deficits as defined above and maximize level of functional independence w ADL's and functional mobility  Occupational Performance areas to address include: grooming, bathing/shower, toilet hygiene, dressing, functional mobility, community mobility and clothing management  From OT standpoint, recommendation at time of d/c would be STR   High complexity eval d/t significant medical hx, evolving medical status, positive fall hx, positive fall risk assessment and assistance for functional mobility at this time       OT Discharge Recommendation: Post acute rehabilitation services (vs home pending progress)     Jillian Cramer MS, OTR/L

## 2023-03-27 NOTE — ASSESSMENT & PLAN NOTE
· 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 mediastinal and cardiophrenic lymph nodes  · Follows with [facility] oncology  · Completed almost 2 complete rounds of chemotherapy-next round of chemotherapy to start at the beginning of April  · Will continue home prednisone

## 2023-03-27 NOTE — PROGRESS NOTES
Progress Note - [facility] Infectious Disease   Magda Park 78 y o  male MRN: [id number]  Unit/Bed#: -01 Encounter: [id number]    IMPRESSION & RECOMMENDATIONS:   1  Multifocal MSSA pneumonia  Complicated by possible few early cavitary lesions  This is in the setting of chronic immunosuppression due to active malignancy  Patient presented with dyspnea and productive cough  CT on admission showed bibasilar multifocal aspiration bronchopneumonia with mucous plugging  Concern for developing septic emboli versus cavitary pneumonia versus empyema versus bronchopleural fistula  Sputum culture with growth of MSSA  S/p bronchoscopy 3/22 which showed cloudy and purulent secretions with mucous plugging  Cultures also isolated MSSA  He is now s/p thoracentesis with culture negative  In the setting of #6, consider possibility of atypical or opportunistic infection, though unlikely fungal in this setting  With chronic dysphagia, consider aspiration component  Urinary antigens are negative  MRSA nares negative  His blood cultures are also negative  TTE (poor study) without obvious vegetation  Fortunately patient remains hemodynamically stable and afebrile   -transition to Keflex 500mg QID via PEG to complete a 14 day course through 4/3/23   -outpatient follow-up with pulmonology   -follow up final BAL cx's including PCP DFA, AFB, and legionella/viral/fungal culture     2  Right sided hydropneumothorax  CT chest shows small loculated right basilar hydropneumothorax with pleural thickening suspicious for empyema and/or bronchopleural fistula  Pulmonology is following, suspected malignant effusion  He is status post several thoracenteses in which fluid studies were consistent with exudative process  He is now s/p IR dx thoracentesis 3/23, LDH low, glucose high  with lymphocytotic predominance, and pH 7 4   Culture is negative   -Antibiotics as above  -Follow up pleural fluid cytology   -Outpatient pulmonary follow-up     3  Leukocytosis  This was noted on admission, mild and now resolved  Patient remains afebrile and hemodynamically stable  Blood cultures are negative to date   -Follow up blood cultures  -Monitor CBC differential, temperature, hemodynamics     4  Acute pulmonary embolism   Likely in the setting of #5   No signs of right ventricular strain on transthoracic echo  [facility] is anticoagulated on heparin drip   -Likely requires lifelong anticoagulation     5  Acute respiratory failure with hypoxia  Likely multifactorial secondary to above and underlying metastatic disease  He is stable now on room air    -Monitor O2 requirements   -Aggressive pulmonary toilet   -Respiratory protocol      6  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   Now with abnormal PET scan of the right lung with extensive metastatic disease of the right pleura and a new right-sided malignant pleural effusion   Most likely a recurrent metastatic head/neck cancer although could alternatively be a primary lung CA   Last received palliative chemotherapy on 3/16 and is on prednisone taper for diarrhea  [facility]   -Close outpatient follow-up with oncology     7  Hx of dysphagia s/p PEG  NPO with feeding by G tube only  Tube was recently replaced in Jan 2023  Reports doing 3 gravity feeds for a total of 7 cans a day  -Monitor for sxs of bottom up aspiration      8   Diabetes mellitus, type II, without long term insulin use  Now with likely steroid induced hyperglycemia    -Glucose management per primary team     9  Hx of tobacco abuse  Hx of 54 pack year history, stopped in 2021      Antibiotics:  IV ancef D4  Abx D7    I have discussed the above management plan in detail with patient  I have discussed the above management plan in detail with patient's RN, and the primary service, SLIM attending  24 Hour events:  Yesterday and overnight notes reviewed  Transferred out of ICU       Subjective:  Patient has no fever, chills, sweats; no nausea, vomiting, diarrhea; has cough but difficulty expectorating sputum  Denies SOB  States he has not been out of bed since admission  Objective:  Vitals:  Temp:  [98 1 °F (36 7 °C)-98 9 °F (37 2 °C)] 98 1 °F (36 7 °C)  HR:  [81-98] 98  Resp:  [16-19] 18  BP: (117-138)/(62-75) 118/72  SpO2:  [95 %-97 %] 96 %  Temp (24hrs), Av 5 °F (36 9 °C), Min:98 1 °F (36 7 °C), Max:98 9 °F (37 2 °C)  Current: Temperature: 98 1 °F (36 7 °C)    PHYSICAL EXAM:  General Appearance:  Appearing well, nontoxic, and in no distress  Chronically ill appearing  Elderly male  Cachectic  HEENT: Normocephalic, without obvious abnormality, atraumatic  Conjunctiva pink and sclera anicteric  Oropharynx moist without lesions  Lungs:   Clear to auscultation bilaterally though somewhat decreased on right compared to left without wheezes or rhonchi, respirations unlabored on room air   Heart:  RRR; no murmur, rub or gallop   Abdomen:   Soft, non-tender, non-distended, positive bowel sounds  PEG intact  Extremities: Thin with evidence of muscle wasting  Musculoskeletal: Back symmetric without curvature, ROM normal     Skin: No rashes or lesions  No draining wounds noted  Peripheral IV intact without evidence of erythema, warmth, or exudate  LABS, IMAGING, & OTHER STUDIES:  Extensive review of the medical records in epic including review of the notes, radiographs, and laboratory results were reviewed personally as below  Lab Results:  I have personally reviewed pertinent labs    Results from last 7 days   Lab Units 23  04323  0438   WBC Thousand/uL 7 56 6 61 6 52   HEMOGLOBIN g/dL 9 9* 9 6* 9 8*   PLATELETS Thousands/uL 190 210 190     Results from last 7 days   Lab Units 23  0434 23  0327 23  0438 23  0539   SODIUM mmol/L 132* 132* 131* 132*   POTASSIUM mmol/L 4 8 4 6 4 6 4 0   CHLORIDE mmol/L 93* 94* 93* 96   CO2 mmol/L 33* 31 30 29   BUN mg/dL 18 14 13 14   CREATININE mg/dL 0 64 0 54* 0 65 0 67   EGFR ml/min/1 73sq m 95 102 95 93   CALCIUM mg/dL 9 1 9 1 9 2 8 7   AST U/L 29 37  --  20   ALT U/L 24 29  --  23   ALK PHOS U/L 137* 126*  --  111*     Results from last 7 days   Lab Units 03/23/23  1618 03/22/23  1418 03/22/23  1417 03/22/23  1414 03/22/23  1410 03/22/23  1406 03/21/23  1652 03/21/23  1651 03/21/23  1628 03/21/23  1303   BLOOD CULTURE   --   --   --   --   --   --   --   --   --  No Growth After 5 Days  No Growth After 5 Days  SPUTUM CULTURE   --   --   --   --   --   --   --   --  3+ Growth of Staphylococcus aureus*  1+ Growth of  --    GRAM STAIN RESULT  2+ Polys  No bacteria seen 4+ Polys*  2+ Gram positive cocci in clusters* 1+ Polys  No bacteria seen 1+ Gram positive cocci in clusters*  1+ Polys* 2+ Polys*  1+ Gram positive cocci in clusters* 2+ Polys*  1+ Gram positive cocci in clusters*  --   --  2+ Epithelial cells per low power field*  2+ Polys*  2+ Gram positive cocci in pairs*  --    BODY FLUID CULTURE, STERILE  No growth  --   --   --   --   --   --   --   --   --    MRSA CULTURE ONLY   --   --   --   --   --   --  No Methicillin Resistant Staphlyococcus aureus (MRSA) isolated  --   --   --    LEGIONELLA URINARY ANTIGEN   --   --   --   --   --   --   --  Negative  --   --      Results from last 7 days   Lab Units 03/26/23  0327 03/22/23  1603 03/22/23  0435 03/21/23  1303   PROCALCITONIN ng/ml 0 41* 0 25 0 36* 0 59*                   Imaging Studies:   I have personally reviewed pertinent imaging study reports and images in PACS  Other Studies:   I have personally reviewed pertinent report CTA chest     I have spent a total time of 50 minutes on 03/27/23 in caring for this patient including Patient and family education, Counseling / Coordination of care, Reviewing / ordering tests, medicine, procedures  , Obtaining or reviewing history   and Communicating with other healthcare professionals

## 2023-03-28 VITALS
HEIGHT: 74 IN | RESPIRATION RATE: 18 BRPM | SYSTOLIC BLOOD PRESSURE: 122 MMHG | WEIGHT: 135.8 LBS | HEART RATE: 92 BPM | DIASTOLIC BLOOD PRESSURE: 71 MMHG | OXYGEN SATURATION: 96 % | TEMPERATURE: 98.2 F | BODY MASS INDEX: 17.43 KG/M2

## 2023-03-28 LAB
ANION GAP SERPL CALCULATED.3IONS-SCNC: 5 MMOL/L (ref 4–13)
BUN SERPL-MCNC: 24 MG/DL (ref 5–25)
CALCIUM SERPL-MCNC: 9.2 MG/DL (ref 8.4–10.2)
CHLORIDE SERPL-SCNC: 93 MMOL/L (ref 96–108)
CO2 SERPL-SCNC: 33 MMOL/L (ref 21–32)
CREAT SERPL-MCNC: 0.66 MG/DL (ref 0.6–1.3)
ERYTHROCYTE [DISTWIDTH] IN BLOOD BY AUTOMATED COUNT: 23.1 % (ref 11.6–15.1)
FLUAV RNA RESP QL NAA+PROBE: NEGATIVE
FLUBV RNA RESP QL NAA+PROBE: NEGATIVE
GFR SERPL CREATININE-BSD FRML MDRD: 94 ML/MIN/1.73SQ M
GLUCOSE SERPL-MCNC: 195 MG/DL (ref 65–140)
GLUCOSE SERPL-MCNC: 207 MG/DL (ref 65–140)
GLUCOSE SERPL-MCNC: 341 MG/DL (ref 65–140)
HCT VFR BLD AUTO: 32.4 % (ref 36.5–49.3)
HGB BLD-MCNC: 9.4 G/DL (ref 12–17)
MAGNESIUM SERPL-MCNC: 1.8 MG/DL (ref 1.9–2.7)
MCH RBC QN AUTO: 26.7 PG (ref 26.8–34.3)
MCHC RBC AUTO-ENTMCNC: 29 G/DL (ref 31.4–37.4)
MCV RBC AUTO: 92 FL (ref 82–98)
MYCOBACTERIUM SPEC CULT: NORMAL
PLATELET # BLD AUTO: 214 THOUSANDS/UL (ref 149–390)
PMV BLD AUTO: 9.1 FL (ref 8.9–12.7)
POTASSIUM SERPL-SCNC: 4.8 MMOL/L (ref 3.5–5.3)
RBC # BLD AUTO: 3.52 MILLION/UL (ref 3.88–5.62)
RHODAMINE-AURAMINE STN SPEC: NORMAL
RSV RNA RESP QL NAA+PROBE: NEGATIVE
SARS-COV-2 RNA RESP QL NAA+PROBE: NEGATIVE
SODIUM SERPL-SCNC: 131 MMOL/L (ref 135–147)
WBC # BLD AUTO: 5.81 THOUSAND/UL (ref 4.31–10.16)

## 2023-03-28 RX ORDER — INSULIN LISPRO 100 [IU]/ML
1-5 INJECTION, SOLUTION INTRAVENOUS; SUBCUTANEOUS
Refills: 0
Start: 2023-03-28

## 2023-03-28 RX ORDER — CEPHALEXIN 250 MG/5ML
500 POWDER, FOR SUSPENSION ORAL EVERY 6 HOURS SCHEDULED
Qty: 270 ML | Refills: 0
Start: 2023-03-28 | End: 2023-04-04

## 2023-03-28 RX ADMIN — GUAIFENESIN 400 MG: 100 SOLUTION ORAL at 09:07

## 2023-03-28 RX ADMIN — OXYCODONE HYDROCHLORIDE 5 MG: 5 TABLET ORAL at 00:05

## 2023-03-28 RX ADMIN — GUAIFENESIN 400 MG: 100 SOLUTION ORAL at 03:54

## 2023-03-28 RX ADMIN — CEPHALEXIN 500 MG: 250 FOR SUSPENSION ORAL at 12:26

## 2023-03-28 RX ADMIN — INSULIN GLARGINE 5 UNITS: 100 INJECTION, SOLUTION SUBCUTANEOUS at 08:11

## 2023-03-28 RX ADMIN — PREDNISONE 5 MG: 5 TABLET ORAL at 08:13

## 2023-03-28 RX ADMIN — FAMOTIDINE 20 MG: 40 POWDER, FOR SUSPENSION ORAL at 08:13

## 2023-03-28 RX ADMIN — INSULIN LISPRO 3 UNITS: 100 INJECTION, SOLUTION INTRAVENOUS; SUBCUTANEOUS at 12:06

## 2023-03-28 RX ADMIN — APIXABAN 10 MG: 5 TABLET, FILM COATED ORAL at 08:13

## 2023-03-28 RX ADMIN — CEPHALEXIN 500 MG: 250 FOR SUSPENSION ORAL at 05:24

## 2023-03-28 RX ADMIN — LEVALBUTEROL HYDROCHLORIDE 1.25 MG: 1.25 SOLUTION RESPIRATORY (INHALATION) at 07:20

## 2023-03-28 RX ADMIN — OXYCODONE HYDROCHLORIDE 5 MG: 5 TABLET ORAL at 08:17

## 2023-03-28 RX ADMIN — INSULIN LISPRO 1 UNITS: 100 INJECTION, SOLUTION INTRAVENOUS; SUBCUTANEOUS at 08:10
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
## 2023-03-28 NOTE — NURSING NOTE
Pt D/C to [facility]  Report called to [facility]  All questions and concerns addressed  Callback number provided  IVs removed  D/S/D and pressure applied   All paperwork sent with transport team

## 2023-03-28 NOTE — ASSESSMENT & PLAN NOTE
· Complained of sudden onset of dyspnea-found to have a low SPO2 in the 80s  · Used home O2 with improvement to high 80s  · CT PE-Filling defects involving the anterior-basilar and lateral-basilar segments of the left lower lobe, as and inferior lingular segment of the left upper lobe, compatible with acute pulmonary emboli  · Echo from 12/22/2022 ejection fraction is 70%   Systolic function is normal  NRWM  · Eliquis has been started, continue per protocol

## 2023-03-28 NOTE — PROGRESS NOTES
-- Patient:  -- MRN: [id number]  -- Aidin Request ID: [id number]  -- Level of care reserved: Jhony Crawford  -- Partner Reserved: [address], [address] [facility] [phone]  -- Clinical needs requested:  -- [place] [facility]  -- Start of Service:  -- Request sent: 3:07pm EDT on 3/27/2023 by Oleg Sandoval  -- Partner reserved: 9:09am EDT on 3/28/2023 by Oleg Sandoval  -- Choice list shared: 8:40am EDT on 3/28/2023 by Oleg Sandoval

## 2023-03-28 NOTE — ASSESSMENT & PLAN NOTE
· As evidenced by tachycardia hypoxia, leukocytosis and lactic acidosis on admission  · Fluid resuscitated with 3 L of normal saline in the ED  · Suspect multifactorial pneumonia most likely aspiration  · Blood cultures x2 no growth at 5 days  · LA 2 1, cleared with IV fluids  · Urine strep and Legionella negative  Sputum cx    3+ Growth of Staphylococcus aureus Abnormal          · Continue Keflex as above

## 2023-03-28 NOTE — PLAN OF CARE
Problem: Potential for Falls  Goal: Patient will remain free of falls  Description: INTERVENTIONS:  - Educate patient/family on patient safety including physical limitations  - Instruct patient to call for assistance with activity   - Consult OT/PT to assist with strengthening/mobility   - Keep Call bell within reach  - Keep bed low and locked with side rails adjusted as appropriate  - Keep care items and personal belongings within reach  - Initiate and maintain comfort rounds  - Make Fall Risk Sign visible to staff  - Offer Toileting every 2 Hours, in advance of need  - Initiate/Maintain bed alarm  - Apply yellow socks and bracelet for high fall risk patients  - Consider moving patient to room near nurses station  Outcome: Progressing     Problem: PAIN - ADULT  Goal: Verbalizes/displays adequate comfort level or baseline comfort level  Description: Interventions:  - Encourage patient to monitor pain and request assistance  - Assess pain using appropriate pain scale  - Administer analgesics based on type and severity of pain and evaluate response  - Implement non-pharmacological measures as appropriate and evaluate response  - Consider cultural and social influences on pain and pain management  - Notify physician/advanced practitioner if interventions unsuccessful or patient reports new pain  Outcome: Progressing     Problem: INFECTION - ADULT  Goal: Absence or prevention of progression during hospitalization  Description: INTERVENTIONS:  - Assess and monitor for signs and symptoms of infection  - Monitor lab/diagnostic results  - Monitor all insertion sites, i e  indwelling lines, tubes, and drains  - Monitor endotracheal if appropriate and nasal secretions for changes in amount and color  - Donaldson appropriate cooling/warming therapies per order  - Administer medications as ordered  - Instruct and encourage patient and family to use good hand hygiene technique  - Identify and instruct in appropriate isolation precautions for identified infection/condition  Outcome: Progressing  Goal: Absence of fever/infection during neutropenic period  Description: INTERVENTIONS:  - Monitor WBC    Outcome: Progressing     Problem: SAFETY ADULT  Goal: Patient will remain free of falls  Description: INTERVENTIONS:  - Educate patient/family on patient safety including physical limitations  - Instruct patient to call for assistance with activity   - Consult OT/PT to assist with strengthening/mobility   - Keep Call bell within reach  - Keep bed low and locked with side rails adjusted as appropriate  - Keep care items and personal belongings within reach  - Initiate and maintain comfort rounds  - Make Fall Risk Sign visible to staff  - Offer Toileting every 2 Hours, in advance of need  - Initiate/Maintain bed alarm  - Apply yellow socks and bracelet for high fall risk patients  - Consider moving patient to room near nurses station  Outcome: Progressing  Goal: Maintain or return to baseline ADL function  Description: INTERVENTIONS:  - Educate patient/family on patient safety including physical limitations  - Instruct patient to call for assistance with activity   - Consult OT/PT to assist with strengthening/mobility   - Keep Call bell within reach  - Keep bed low and locked with side rails adjusted as appropriate  - Keep care items and personal belongings within reach  - Initiate and maintain comfort rounds  - Make Fall Risk Sign visible to staff  - Offer Toileting every 2 Hours, in advance of need  - Initiate/Maintain bed alarm  - Apply yellow socks and bracelet for high fall risk patients  - Consider moving patient to room near nurses station  Outcome: Progressing  Goal: Maintains/Returns to pre admission functional level  Description: INTERVENTIONS:  - Perform BMAT or MOVE assessment daily    - Set and communicate daily mobility goal to care team and patient/family/caregiver     - Collaborate with rehabilitation services on mobility goals if consulted  - Perform Range of Motion 3 times a day  - Reposition patient every 2 hours  - Dangle patient 3 times a day  - Stand patient 3 times a day  - Ambulate patient 3 times a day  - Out of bed to chair 3 times a day   - Out of bed for meals 3 times a day  - Out of bed for toileting  - Record patient progress and toleration of activity level   Outcome: Progressing     Problem: DISCHARGE PLANNING  Goal: Discharge to home or other facility with appropriate resources  Description: INTERVENTIONS:  - Identify barriers to discharge w/patient and caregiver  - Arrange for needed discharge resources and transportation as appropriate  - Identify discharge learning needs (meds, wound care, etc )  - Arrange for interpretive services to assist at discharge as needed  - Refer to Case Management Department for coordinating discharge planning if the patient needs post-hospital services based on physician/advanced practitioner order or complex needs related to functional status, cognitive ability, or social support system  Outcome: Progressing     Problem: Knowledge Deficit  Goal: Patient/family/caregiver demonstrates understanding of disease process, treatment plan, medications, and discharge instructions  Description: Complete learning assessment and assess knowledge base    Interventions:  - Provide teaching at level of understanding  - Provide teaching via preferred learning methods  Outcome: Progressing     Problem: MOBILITY - ADULT  Goal: Maintain or return to baseline ADL function  Description: INTERVENTIONS:  - Educate patient/family on patient safety including physical limitations  - Instruct patient to call for assistance with activity   - Consult OT/PT to assist with strengthening/mobility   - Keep Call bell within reach  - Keep bed low and locked with side rails adjusted as appropriate  - Keep care items and personal belongings within reach  - Initiate and maintain comfort rounds  - Make Fall Risk Sign visible to staff  - Offer Toileting every 2 Hours, in advance of need  - Initiate/Maintain bed alarm  - Apply yellow socks and bracelet for high fall risk patients  - Consider moving patient to room near nurses station  Outcome: Progressing  Goal: Maintains/Returns to pre admission functional level  Description: INTERVENTIONS:  - Perform BMAT or MOVE assessment daily    - Set and communicate daily mobility goal to care team and patient/family/caregiver  - Collaborate with rehabilitation services on mobility goals if consulted  - Perform Range of Motion 3 times a day  - Reposition patient every 2 hours  - Dangle patient 3 times a day  - Stand patient 3 times a day  - Ambulate patient 3 times a day  - Out of bed to chair 3 times a day   - Out of bed for meals 3 times a day  - Out of bed for toileting  - Record patient progress and toleration of activity level   Outcome: Progressing     Problem: Nutrition/Hydration-ADULT  Goal: Nutrient/Hydration intake appropriate for improving, restoring or maintaining nutritional needs  Description: Monitor and assess patient's nutrition/hydration status for malnutrition  Collaborate with interdisciplinary team and initiate plan and interventions as ordered  Monitor patient's weight and dietary intake as ordered or per policy  Utilize nutrition screening tool and intervene as necessary  Determine patient's food preferences and provide high-protein, high-caloric foods as appropriate       INTERVENTIONS:  - Monitor oral intake, urinary output, labs, and treatment plans  - Assess nutrition and hydration status and recommend course of action  - Evaluate amount of meals eaten  - Assist patient with eating if necessary   - Allow adequate time for meals  - Recommend/ encourage appropriate diets, oral nutritional supplements, and vitamin/mineral supplements  - Order, calculate, and assess calorie counts as needed  - Recommend, monitor, and adjust tube feedings and TPN/PPN based on assessed needs  - Assess need for intravenous fluids  - Provide specific nutrition/hydration education as appropriate  - Include patient/family/caregiver in decisions related to nutrition  Outcome: Progressing     Problem: Prexisting or High Potential for Compromised Skin Integrity  Goal: Skin integrity is maintained or improved  Description: INTERVENTIONS:  - Identify patients at risk for skin breakdown  - Assess and monitor skin integrity  - Assess and monitor nutrition and hydration status  - Monitor labs   - Assess for incontinence   - Turn and reposition patient  - Assist with mobility/ambulation  - Relieve pressure over bony prominences  - Avoid friction and shearing  - Provide appropriate hygiene as needed including keeping skin clean and dry  - Evaluate need for skin moisturizer/barrier cream  - Collaborate with interdisciplinary team   - Patient/family teaching  - Consider wound care consult   Outcome: Progressing

## 2023-03-28 NOTE — ASSESSMENT & PLAN NOTE
· Hx of hydropneumothorax  · Recently admitted to [facility] on December 29, 2021 to and had a pigtail chest tube placed by CT surgery    Remained in for 2 days and was discontinued secondary to decreased output  · Exudative and suspicious for malignancy-unable to find recent cytology  · CT PE study with small loculated right basilar hydropneumothorax with pleural thickening suspicious for empyema and bronchopleural fistula  · Plan as above

## 2023-03-28 NOTE — PROGRESS NOTES
Progress Note - [facility] Infectious Disease   Lindia Levels 78 y o  male MRN: [id number]  Unit/Bed#: -01 Encounter: [id number]      IMPRESSION & RECOMMENDATIONS:   1  Multifocal MSSA pneumonia  Complicated by possible few early cavitary lesions  This is in the setting of chronic immunosuppression due to active malignancy  Patient presented with dyspnea and productive cough  CT on admission showed bibasilar multifocal aspiration bronchopneumonia with mucous plugging  Concern for developing septic emboli versus cavitary pneumonia versus empyema versus bronchopleural fistula  Sputum culture with growth of MSSA  S/p bronch 3/22 which showed cloudy and purulent secretions with mucus plugging  Cultures also isolated MSSA  He is now s/p thoracentesis with culture negative  In the setting of #6, consider possibility of atypical or opportunistic infection, though unlikely fungal in this setting  With chronic dysphagia, consider aspiration component  Urinary antigens are negative  MRSA nares negative  His blood cultures are also negative  TTE (poor study) without obvious vegetation  Fortunately patient remains hemodynamically stable and afebrile   -continue Keflex 500mg QID via PEG to complete a 14 day course through 4/3/23   -outpatient follow-up with pulmonology   -follow up final BAL cx's including PCP DFA, AFB, and legionella/viral/fungal culture     2  Right sided hydropneumothorax  CT chest shows small loculated right basilar hydropneumothorax with pleural thickening suspicious for empyema and/or bronchopleural fistula  Pulmonology is following, suspected malignant effusion  He is status post several thoracenteses in which fluid studies were consistent with exudative process  He is now s/p IR dx thoracentesis 3/23, LDH low, glucose high  with lymphocytotic predominance, and pH 7 4   Culture is negative   -Antibiotics as above  -Follow up pleural fluid cytology   -Outpatient pulmonary follow-up     3  Leukocytosis  This was noted on admission, mild and now resolved  Patient remains afebrile and hemodynamically stable  Blood cultures finalized negative  -Monitor CBC differential, temperature, hemodynamics     4  Acute pulmonary embolism   Likely in the setting of #5   No signs of right ventricular strain on transthoracic echo  Cleve Henry is anticoagulated on heparin drip   -Likely requires lifelong anticoagulation     5  Acute respiratory failure with hypoxia  Likely multifactorial secondary to above and underlying metastatic disease  He is stable now on room air    -Monitor O2 requirements   -Aggressive pulmonary toilet   -Respiratory protocol      6  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   -Close outpatient follow-up with oncology     7  Hx of dysphagia s/p PEG  NPO with feeding by G tube only  Tube was recently replaced in Jan 2023  Reports doing 3 gravity feeds for a total of 7 cans a day  -Monitor for sxs of bottom up aspiration      8   Diabetes mellitus, type II, without long term insulin use  Now with likely steroid induced hyperglycemia    -Glucose management per primary team     9  Hx of tobacco abuse  Hx of 54 pack year history, stopped in 2021      Antibiotics:  IV ancef D5   Abx D8     I have discussed the above management plan in detail with patient  I have discussed the above management plan in detail with patient's RN, and the primary service, SLIM attending  24 Hour events:  Yesterday and overnight notes reviewed  Switched to abx by PEG yesterday  CM following for placement  Subjective:  Patient has no fever, chills, sweats; no nausea, vomiting, diarrhea; continues with cough and difficult to expectorate sputum  Denies SOB  Getting out of bed with therapy but still feels unsteady  Objective:  Vitals:  Temp:  [98 °F (36 7 °C)-98 2 °F (36 8 °C)] 98 2 °F (36 8 °C)  HR:  [77-98] 92  Resp:  [17-18] 18  BP: (122-128)/(71-72) 122/71  SpO2:  [96 %-100 %] 96 %  Temp (24hrs), Av 1 °F (36 7 °C), Min:98 °F (36 7 °C), Max:98 2 °F (36 8 °C)  Current: Temperature: 98 2 °F (36 8 °C)    PHYSICAL EXAM:  General Appearance:  Appearing chronically ill, cachectic, elderly; nontoxic, and in no distress   HEENT: Normocephalic, without obvious abnormality, atraumatic  Conjunctiva pink and sclera anicteric  Oropharynx moist without lesions  Edentulous  Lungs:   Clear to auscultation bilaterally without wheezes or rhonchi, respirations unlabored on RA   Heart:  RRR; no murmur, rub or gallop   Abdomen:   Soft, non-tender, non-distended, positive bowel sounds; flat  PEG in place  Extremities: Thin extremities with evidence of muscle wasting  Musculoskeletal: Back symmetric without curvature, ROM normal     Skin: No rashes or lesions  No draining wounds noted  Peripheral IV intact without evidence of erythema, warmth, or exudate  LABS, IMAGING, & OTHER STUDIES:  Extensive review of the medical records in epic including review of the notes, radiographs, and laboratory results were reviewed personally as below  Lab Results:  I have personally reviewed pertinent labs    Results from last 7 days   Lab Units 23  04323  0434 23  0327   WBC Thousand/uL 5 81 7 56 6 61   HEMOGLOBIN g/dL 9 4* 9 9* 9 6*   PLATELETS Thousands/uL 214 190 210     Results from last 7 days   Lab Units 23  0434 23  0434 23  0327 23  0438 23  0539   SODIUM mmol/L 131* 132* 132*   < > 132*   POTASSIUM mmol/L 4 8 4 8 4 6   < > 4 0   CHLORIDE mmol/L 93* 93* 94*   < > 96 CO2 mmol/L 33* 33* 31   < > 29   BUN mg/dL 24 18 14   < > 14   CREATININE mg/dL 0 66 0 64 0 54*   < > 0 67   EGFR ml/min/1 73sq m 94 95 102   < > 93   CALCIUM mg/dL 9 2 9 1 9 1   < > 8 7   AST U/L  --  29 37  --  20   ALT U/L  --  24 29  --  23   ALK PHOS U/L  --  137* 126*  --  111*    < > = values in this interval not displayed  Results from last 7 days   Lab Units 03/23/23  1618 03/22/23  1418 03/22/23  1417 03/22/23  1414 03/22/23  1410 03/22/23  1406 03/21/23  1652 03/21/23  1651 03/21/23  1628 03/21/23  1303   BLOOD CULTURE   --   --   --   --   --   --   --   --   --  No Growth After 5 Days  No Growth After 5 Days  SPUTUM CULTURE   --   --   --   --   --   --   --   --  3+ Growth of Staphylococcus aureus*  1+ Growth of  --    GRAM STAIN RESULT  2+ Polys  No bacteria seen 4+ Polys*  2+ Gram positive cocci in clusters* 1+ Polys  No bacteria seen 1+ Gram positive cocci in clusters*  1+ Polys* 2+ Polys*  1+ Gram positive cocci in clusters* 2+ Polys*  1+ Gram positive cocci in clusters*  --   --  2+ Epithelial cells per low power field*  2+ Polys*  2+ Gram positive cocci in pairs*  --    BODY FLUID CULTURE, STERILE  No growth  --   --   --   --   --   --   --   --   --    MRSA CULTURE ONLY   --   --   --   --   --   --  No Methicillin Resistant Staphlyococcus aureus (MRSA) isolated  --   --   --    LEGIONELLA URINARY ANTIGEN   --   --   --   --   --   --   --  Negative  --   --      Results from last 7 days   Lab Units 03/26/23  0327 03/22/23  1603 03/22/23  0435 03/21/23  1303   PROCALCITONIN ng/ml 0 41* 0 25 0 36* 0 59*                   Imaging Studies:   I have personally reviewed pertinent imaging study reports and images in PACS  Other Studies:   I have personally reviewed pertinent report including final blood cultures      I have spent a total time of 50 minutes on 03/28/23 in caring for this patient including Counseling / Coordination of care, Documenting in the medical record, Obtaining or reviewing history   and Communicating with other healthcare professionals

## 2023-03-28 NOTE — ASSESSMENT & PLAN NOTE
Malnutrition Findings:   Adult Malnutrition type: Chronic illness  Adult Degree of Malnutrition: Other severe protein calorie malnutrition  Malnutrition Characteristics: Fat loss, Muscle loss, Weight loss                  360 Statement: Chronic, severe protein-calorie malnutrition related to dysphagia as evidenced by severe muscle wasting of clavicle and temple regions, subcutaneous fat loss of orbital regions, and significant weight loss of 20% x 3 months  Will initiate EN via PEG tube when medically appropriate  BMI Findings:  Adult BMI Classifications: Underweight < 18 5        Body mass index is 17 44 kg/m²     · Has PEG tube secondary to dysphagia through which he gets Jevity tube feeds

## 2023-03-28 NOTE — PLAN OF CARE
Problem: PHYSICAL THERAPY ADULT  Goal: Performs mobility at highest level of function for planned discharge setting  See evaluation for individualized goals  Description: Treatment/Interventions: Functional transfer training, LE strengthening/ROM, Elevations, Therapeutic exercise, Endurance training, Patient/family training, Equipment eval/education, Gait training, Spoke to nursing, Bed mobility  Equipment Recommended:  (continue RW use)       See flowsheet documentation for full assessment, interventions and recommendations  Note: Prognosis: Fair  Problem List: Decreased strength, Decreased endurance, Impaired balance, Decreased mobility, Decreased safety awareness, Pain  Assessment: Pt is 78 y o  male seen for high-complexity PT evaluation on 3/28/2023 s/p admit to [facility] on 3/21/2023 w/ Acute respiratory failure with hypoxia ([address] )  PT was consulted to assess pt's functional mobility and d/c needs  Order placed for PT eval and tx, w/ up w/ A order  Prior to admission, pt required some assistance with ADLs and IADLs from family members present, lives in [address] [facility] [address]  At time of eval, pt requires primarily min Ax1 for all functional tasks, including amb x 16 ft with RW  Upon evaluation, pt presenting with impaired functional mobility d/t decreased strength, decreased endurance, impaired balance, decreased mobility, decreased safety awareness and pain  Pertinent PMHx and current co-morbidities affecting pt's physical performance at time of assessment include: dysphagia, type 2 diabetes, sepsis, PNA, anxiety, HTN, OA of knee  Personal factors affecting pt at time of eval include: positive fall history, unable to perform physical activity and limited insight into impairments  The following objective measures performed on IE also reveal limitations: AM-PAC 6-Clicks: 87/13   Pt's clinical presentation is currently unstable/unpredictable seen in pt's presentation of abnormal lab value(s), need for input for task focus and mobility technique and ongoing medical assessment  Overall, pt's rehab potential and prognosis to return to PLOF is fair as impacted by objective findings, warranting pt to receive further skilled PT interventions to address identified impairments, activity limitation(s), and participation restriction(s)  Goal for patient is to get stronger  Pt to benefit from continued PT tx to address deficits as defined above and maximize level of functional independent mobility and consistency in order for pt to improve activity tolerance  From PT/mobility standpoint, recommendation at time of d/c would be post acute rehabilitation services pending progress in order to facilitate return to PLOF  Barriers to Discharge: Inaccessible home environment     PT Discharge Recommendation: Post acute rehabilitation services    See flowsheet documentation for full assessment

## 2023-03-28 NOTE — ASSESSMENT & PLAN NOTE
· 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 mediastinal and cardiophrenic lymph nodes  · Follows with [facility] oncology  · Completed almost 2 complete rounds of chemotherapy-next round of chemotherapy to start at the beginning of April  · Will continue home prednisone

## 2023-03-28 NOTE — PLAN OF CARE
Problem: Potential for Falls  Goal: Patient will remain free of falls  Description: INTERVENTIONS:  - Educate patient/family on patient safety including physical limitations  - Instruct patient to call for assistance with activity   - Consult OT/PT to assist with strengthening/mobility   - Keep Call bell within reach  - Keep bed low and locked with side rails adjusted as appropriate  - Keep care items and personal belongings within reach  - Initiate and maintain comfort rounds  - Make Fall Risk Sign visible to staff  - Offer Toileting every 2 Hours, in advance of need  - Initiate/Maintain bed alarm  - Apply yellow socks and bracelet for high fall risk patients  - Consider moving patient to room near nurses station  Outcome: Progressing     Problem: PAIN - ADULT  Goal: Verbalizes/displays adequate comfort level or baseline comfort level  Description: Interventions:  - Encourage patient to monitor pain and request assistance  - Assess pain using appropriate pain scale  - Administer analgesics based on type and severity of pain and evaluate response  - Implement non-pharmacological measures as appropriate and evaluate response  - Consider cultural and social influences on pain and pain management  - Notify physician/advanced practitioner if interventions unsuccessful or patient reports new pain  Outcome: Progressing     Problem: INFECTION - ADULT  Goal: Absence or prevention of progression during hospitalization  Description: INTERVENTIONS:  - Assess and monitor for signs and symptoms of infection  - Monitor lab/diagnostic results  - Monitor all insertion sites, i e  indwelling lines, tubes, and drains  - Monitor endotracheal if appropriate and nasal secretions for changes in amount and color  - [place] appropriate cooling/warming therapies per order  - Administer medications as ordered  - Instruct and encourage patient and family to use good hand hygiene technique  - Identify and instruct in appropriate isolation precautions for identified infection/condition  Outcome: Progressing  Goal: Absence of fever/infection during neutropenic period  Description: INTERVENTIONS:  - Monitor WBC    Outcome: Progressing     Problem: SAFETY ADULT  Goal: Patient will remain free of falls  Description: INTERVENTIONS:  - Educate patient/family on patient safety including physical limitations  - Instruct patient to call for assistance with activity   - Consult OT/PT to assist with strengthening/mobility   - Keep Call bell within reach  - Keep bed low and locked with side rails adjusted as appropriate  - Keep care items and personal belongings within reach  - Initiate and maintain comfort rounds  - Make Fall Risk Sign visible to staff  - Offer Toileting every 2 Hours, in advance of need  - Initiate/Maintain bed alarm  - Apply yellow socks and bracelet for high fall risk patients  - Consider moving patient to room near nurses station  Outcome: Progressing  Goal: Maintain or return to baseline ADL function  Description: INTERVENTIONS:  - Educate patient/family on patient safety including physical limitations  - Instruct patient to call for assistance with activity   - Consult OT/PT to assist with strengthening/mobility   - Keep Call bell within reach  - Keep bed low and locked with side rails adjusted as appropriate  - Keep care items and personal belongings within reach  - Initiate and maintain comfort rounds  - Make Fall Risk Sign visible to staff  - Offer Toileting every 2 Hours, in advance of need  - Initiate/Maintain bed alarm  - Apply yellow socks and bracelet for high fall risk patients  - Consider moving patient to room near nurses station  Outcome: Progressing  Goal: Maintains/Returns to pre admission functional level  Description: INTERVENTIONS:  - Perform BMAT or MOVE assessment daily    - Set and communicate daily mobility goal to care team and patient/family/caregiver     - Collaborate with rehabilitation services on mobility goals if consulted  - Perform Range of Motion 3 times a day  - Reposition patient every 2 hours  - Dangle patient 3 times a day  - Stand patient 3 times a day  - Ambulate patient 3 times a day  - Out of bed to chair 3 times a day   - Out of bed for meals 3 times a day  - Out of bed for toileting  - Record patient progress and toleration of activity level   Outcome: Progressing     Problem: DISCHARGE PLANNING  Goal: Discharge to home or other facility with appropriate resources  Description: INTERVENTIONS:  - Identify barriers to discharge w/patient and caregiver  - Arrange for needed discharge resources and transportation as appropriate  - Identify discharge learning needs (meds, wound care, etc )  - Arrange for interpretive services to assist at discharge as needed  - Refer to Case Management Department for coordinating discharge planning if the patient needs post-hospital services based on physician/advanced practitioner order or complex needs related to functional status, cognitive ability, or social support system  Outcome: Progressing     Problem: Knowledge Deficit  Goal: Patient/family/caregiver demonstrates understanding of disease process, treatment plan, medications, and discharge instructions  Description: Complete learning assessment and assess knowledge base    Interventions:  - Provide teaching at level of understanding  - Provide teaching via preferred learning methods  Outcome: Progressing     Problem: MOBILITY - ADULT  Goal: Maintain or return to baseline ADL function  Description: INTERVENTIONS:  - Educate patient/family on patient safety including physical limitations  - Instruct patient to call for assistance with activity   - Consult OT/PT to assist with strengthening/mobility   - Keep Call bell within reach  - Keep bed low and locked with side rails adjusted as appropriate  - Keep care items and personal belongings within reach  - Initiate and maintain comfort rounds  - Make Fall Risk Sign visible to staff  - Offer Toileting every 2 Hours, in advance of need  - Initiate/Maintain bed alarm  - Apply yellow socks and bracelet for high fall risk patients  - Consider moving patient to room near nurses station  Outcome: Progressing  Goal: Maintains/Returns to pre admission functional level  Description: INTERVENTIONS:  - Perform BMAT or MOVE assessment daily    - Set and communicate daily mobility goal to care team and patient/family/caregiver  - Collaborate with rehabilitation services on mobility goals if consulted  - Perform Range of Motion 3 times a day  - Reposition patient every 2 hours  - Dangle patient 3 times a day  - Stand patient 3 times a day  - Ambulate patient 3 times a day  - Out of bed to chair 3 times a day   - Out of bed for meals 3 times a day  - Out of bed for toileting  - Record patient progress and toleration of activity level   Outcome: Progressing     Problem: Nutrition/Hydration-ADULT  Goal: Nutrient/Hydration intake appropriate for improving, restoring or maintaining nutritional needs  Description: Monitor and assess patient's nutrition/hydration status for malnutrition  Collaborate with interdisciplinary team and initiate plan and interventions as ordered  Monitor patient's weight and dietary intake as ordered or per policy  Utilize nutrition screening tool and intervene as necessary  Determine patient's food preferences and provide high-protein, high-caloric foods as appropriate       INTERVENTIONS:  - Monitor oral intake, urinary output, labs, and treatment plans  - Assess nutrition and hydration status and recommend course of action  - Evaluate amount of meals eaten  - Assist patient with eating if necessary   - Allow adequate time for meals  - Recommend/ encourage appropriate diets, oral nutritional supplements, and vitamin/mineral supplements  - Order, calculate, and assess calorie counts as needed  - Recommend, monitor, and adjust tube feedings and TPN/PPN based on assessed needs  - Assess need for intravenous fluids  - Provide specific nutrition/hydration education as appropriate  - Include patient/family/caregiver in decisions related to nutrition  Outcome: Progressing     Problem: Prexisting or High Potential for Compromised Skin Integrity  Goal: Skin integrity is maintained or improved  Description: INTERVENTIONS:  - Identify patients at risk for skin breakdown  - Assess and monitor skin integrity  - Assess and monitor nutrition and hydration status  - Monitor labs   - Assess for incontinence   - Turn and reposition patient  - Assist with mobility/ambulation  - Relieve pressure over bony prominences  - Avoid friction and shearing  - Provide appropriate hygiene as needed including keeping skin clean and dry  - Evaluate need for skin moisturizer/barrier cream  - Collaborate with interdisciplinary team   - Patient/family teaching  - Consider wound care consult   Outcome: Progressing

## 2023-03-28 NOTE — PLAN OF CARE
Problem: OCCUPATIONAL THERAPY ADULT  Goal: Performs self-care activities at highest level of function for planned discharge setting  See evaluation for individualized goals  Description: Treatment Interventions: ADL retraining, Functional transfer training, UE strengthening/ROM, Endurance training, Patient/family training, Compensatory technique education, Energy conservation, Activityengagement       See flowsheet documentation for full assessment, interventions and recommendations  Outcome: Progressing  Note: Limitation: Decreased ADL status, Decreased UE ROM, Decreased UE strength, Decreased endurance, Decreased Safe judgement during ADL  Prognosis: Good  Assessment: Patient participated in Skilled OT session this date with interventions consisting of safety awareness and fall prevention techniques and  therapeutic activities to: increase activity tolerance   Patient agreeable to OT treatment session, upon arrival patient was found supine in bed  In comparison to previous session, patient with improvements in bed mobility and functional mobility  Patient requiring verbal cues for safety and verbal cues for correct technique  Patient continues to be functioning below baseline level, occupational performance remains limited secondary to factors listed above and increased risk for falls and injury  From OT standpoint, recommendation at time of d/c would be Short Term Rehab  Patient to benefit from continued Occupational Therapy treatment while in the hospital to address deficits as defined above and maximize level of functional independence with ADLs and functional mobility       OT Discharge Recommendation: Post acute rehabilitation services     Karishma Walker MS, OTR/L

## 2023-03-28 NOTE — ASSESSMENT & PLAN NOTE
Lab Results   Component Value Date    HGBA1C 5 8 (H) 12/30/2022       Recent Labs     03/27/23  1051 03/27/23  1536 03/27/23 2033 03/28/23  0636   POCGLU 185* 267* 260* 195*       Blood Sugar Average: Last 72 hrs:  · (P) 125 [id number]   · Resume home metformin  · Continue Lantus 5 units qam  · Continue sliding scale and Accu-Cheks

## 2023-03-28 NOTE — PLAN OF CARE
Problem: Potential for Falls  Goal: Patient will remain free of falls  Description: INTERVENTIONS:  - Educate patient/family on patient safety including physical limitations  - Instruct patient to call for assistance with activity   - Consult OT/PT to assist with strengthening/mobility   - Keep Call bell within reach  - Keep bed low and locked with side rails adjusted as appropriate  - Keep care items and personal belongings within reach  - Initiate and maintain comfort rounds  - Make Fall Risk Sign visible to staff  - Offer Toileting every 2 Hours, in advance of need  - Initiate/Maintain bed alarm  - Apply yellow socks and bracelet for high fall risk patients  - Consider moving patient to room near nurses station  3/28/2023 1415 by Adonis Miller RN  Outcome: Adequate for Discharge  3/28/2023 1050 by Adonis Miller RN  Outcome: Progressing     Problem: PAIN - ADULT  Goal: Verbalizes/displays adequate comfort level or baseline comfort level  Description: Interventions:  - Encourage patient to monitor pain and request assistance  - Assess pain using appropriate pain scale  - Administer analgesics based on type and severity of pain and evaluate response  - Implement non-pharmacological measures as appropriate and evaluate response  - Consider cultural and social influences on pain and pain management  - Notify physician/advanced practitioner if interventions unsuccessful or patient reports new pain  3/28/2023 1415 by Adonis Miller RN  Outcome: Adequate for Discharge  3/28/2023 1050 by Adonis Miller RN  Outcome: Progressing     Problem: INFECTION - ADULT  Goal: Absence or prevention of progression during hospitalization  Description: INTERVENTIONS:  - Assess and monitor for signs and symptoms of infection  - Monitor lab/diagnostic results  - Monitor all insertion sites, i e  indwelling lines, tubes, and drains  - Monitor endotracheal if appropriate and nasal secretions for changes in amount and color  - [place] appropriate cooling/warming therapies per order  - Administer medications as ordered  - Instruct and encourage patient and family to use good hand hygiene technique  - Identify and instruct in appropriate isolation precautions for identified infection/condition  3/28/2023 1415 by Brittney Jordan RN  Outcome: Adequate for Discharge  3/28/2023 1050 by Brittney Jordan RN  Outcome: Progressing  Goal: Absence of fever/infection during neutropenic period  Description: INTERVENTIONS:  - Monitor WBC    3/28/2023 1415 by Brittney Jordan RN  Outcome: Adequate for Discharge  3/28/2023 1050 by Brittney Jordan RN  Outcome: Progressing     Problem: SAFETY ADULT  Goal: Patient will remain free of falls  Description: INTERVENTIONS:  - Educate patient/family on patient safety including physical limitations  - Instruct patient to call for assistance with activity   - Consult OT/PT to assist with strengthening/mobility   - Keep Call bell within reach  - Keep bed low and locked with side rails adjusted as appropriate  - Keep care items and personal belongings within reach  - Initiate and maintain comfort rounds  - Make Fall Risk Sign visible to staff  - Offer Toileting every 2 Hours, in advance of need  - Initiate/Maintain bed alarm  - Apply yellow socks and bracelet for high fall risk patients  - Consider moving patient to room near nurses station  3/28/2023 1415 by Brittney Jordan RN  Outcome: Adequate for Discharge  3/28/2023 1050 by Brittney Jordan RN  Outcome: Progressing  Goal: Maintain or return to baseline ADL function  Description: INTERVENTIONS:  - Educate patient/family on patient safety including physical limitations  - Instruct patient to call for assistance with activity   - Consult OT/PT to assist with strengthening/mobility   - Keep Call bell within reach  - Keep bed low and locked with side rails adjusted as appropriate  - Keep care items and personal belongings within reach  - Initiate and maintain comfort rounds  - Make Fall Risk Sign visible to staff  - Offer Toileting every 2 Hours, in advance of need  - Initiate/Maintain bed alarm  - Apply yellow socks and bracelet for high fall risk patients  - Consider moving patient to room near nurses station  3/28/2023 1415 by Altagracia Guevara RN  Outcome: Adequate for Discharge  3/28/2023 1050 by Altagracia Guevara RN  Outcome: Progressing  Goal: Maintains/Returns to pre admission functional level  Description: INTERVENTIONS:  - Perform BMAT or MOVE assessment daily    - Set and communicate daily mobility goal to care team and patient/family/caregiver  - Collaborate with rehabilitation services on mobility goals if consulted  - Perform Range of Motion 3 times a day  - Reposition patient every 2 hours    - Dangle patient 3 times a day  - Stand patient 3 times a day  - Ambulate patient 3 times a day  - Out of bed to chair 3 times a day   - Out of bed for meals 3 times a day  - Out of bed for toileting  - Record patient progress and toleration of activity level   3/28/2023 1415 by Altagracia Guevara RN  Outcome: Adequate for Discharge  3/28/2023 1050 by Altagracia Guevara RN  Outcome: Progressing     Problem: DISCHARGE PLANNING  Goal: Discharge to home or other facility with appropriate resources  Description: INTERVENTIONS:  - Identify barriers to discharge w/patient and caregiver  - Arrange for needed discharge resources and transportation as appropriate  - Identify discharge learning needs (meds, wound care, etc )  - Arrange for interpretive services to assist at discharge as needed  - Refer to Case Management Department for coordinating discharge planning if the patient needs post-hospital services based on physician/advanced practitioner order or complex needs related to functional status, cognitive ability, or social support system  3/28/2023 1415 by Altagracia Guevara RN  Outcome: Adequate for Discharge  3/28/2023 1050 by Altagracia Guevara RN  Outcome: Progressing     Problem: Knowledge Deficit  Goal: Patient/family/caregiver demonstrates understanding of disease process, treatment plan, medications, and discharge instructions  Description: Complete learning assessment and assess knowledge base  Interventions:  - Provide teaching at level of understanding  - Provide teaching via preferred learning methods  3/28/2023 1415 by Addis Figueroa RN  Outcome: Adequate for Discharge  3/28/2023 1050 by Addis Figueroa RN  Outcome: Progressing     Problem: MOBILITY - ADULT  Goal: Maintain or return to baseline ADL function  Description: INTERVENTIONS:  - Educate patient/family on patient safety including physical limitations  - Instruct patient to call for assistance with activity   - Consult OT/PT to assist with strengthening/mobility   - Keep Call bell within reach  - Keep bed low and locked with side rails adjusted as appropriate  - Keep care items and personal belongings within reach  - Initiate and maintain comfort rounds  - Make Fall Risk Sign visible to staff  - Offer Toileting every 2 Hours, in advance of need  - Initiate/Maintain bed alarm  - Apply yellow socks and bracelet for high fall risk patients  - Consider moving patient to room near nurses station  3/28/2023 1415 by Addis Figueroa RN  Outcome: Adequate for Discharge  3/28/2023 1050 by Addis Figueroa RN  Outcome: Progressing  Goal: Maintains/Returns to pre admission functional level  Description: INTERVENTIONS:  - Perform BMAT or MOVE assessment daily    - Set and communicate daily mobility goal to care team and patient/family/caregiver  - Collaborate with rehabilitation services on mobility goals if consulted  - Perform Range of Motion 3 times a day  - Reposition patient every 2 hours    - Dangle patient 3 times a day  - Stand patient 3 times a day  - Ambulate patient 3 times a day  - Out of bed to chair 3 times a day   - Out of bed for meals 3 times a day  - Out of bed for toileting  - Record patient progress and toleration of activity level   3/28/2023 1415 by Sheldon Marshall RN  Outcome: Adequate for Discharge  3/28/2023 1050 by Sheldon Marshall RN  Outcome: Progressing     Problem: Nutrition/Hydration-ADULT  Goal: Nutrient/Hydration intake appropriate for improving, restoring or maintaining nutritional needs  Description: Monitor and assess patient's nutrition/hydration status for malnutrition  Collaborate with interdisciplinary team and initiate plan and interventions as ordered  Monitor patient's weight and dietary intake as ordered or per policy  Utilize nutrition screening tool and intervene as necessary  Determine patient's food preferences and provide high-protein, high-caloric foods as appropriate       INTERVENTIONS:  - Monitor oral intake, urinary output, labs, and treatment plans  - Assess nutrition and hydration status and recommend course of action  - Evaluate amount of meals eaten  - Assist patient with eating if necessary   - Allow adequate time for meals  - Recommend/ encourage appropriate diets, oral nutritional supplements, and vitamin/mineral supplements  - Order, calculate, and assess calorie counts as needed  - Recommend, monitor, and adjust tube feedings and TPN/PPN based on assessed needs  - Assess need for intravenous fluids  - Provide specific nutrition/hydration education as appropriate  - Include patient/family/caregiver in decisions related to nutrition  3/28/2023 1415 by Sheldon Marshall RN  Outcome: Adequate for Discharge  3/28/2023 1050 by Sheldon Marshall RN  Outcome: Progressing     Problem: Prexisting or High Potential for Compromised Skin Integrity  Goal: Skin integrity is maintained or improved  Description: INTERVENTIONS:  - Identify patients at risk for skin breakdown  - Assess and monitor skin integrity  - Assess and monitor nutrition and hydration status  - Monitor labs   - Assess for incontinence   - Turn and reposition patient  - Assist with mobility/ambulation  - Relieve pressure over bony prominences  - Avoid friction and shearing  - Provide appropriate hygiene as needed including keeping skin clean and dry  - Evaluate need for skin moisturizer/barrier cream  - Collaborate with interdisciplinary team   - Patient/family teaching  - Consider wound care consult   3/28/2023 1415 by Kaylah Kennedy RN  Outcome: Adequate for Discharge  3/28/2023 1050 by Kaylah Kennedy RN  Outcome: Progressing

## 2023-03-28 NOTE — OCCUPATIONAL THERAPY NOTE
Occupational Therapy Treatment Note      Aguilar Imus    3/28/2023    Principal Problem:    Acute respiratory failure with hypoxia ([place] [address] )  Active Problems:    Dysphagia, unspecified    Type 2 diabetes mellitus without complication, without long-term current use of insulin ([place] [address] )    Neoplasm of lung    Severe protein-calorie malnutrition ([place] [address] )    Sepsis ([place] [address] )    Other pulmonary embolism without acute cor pulmonale (HCC)    Hyperlipemia    PNA (pneumonia)    Pressure injury of skin of buttock    Hydropneumothorax      Past Medical History:   Diagnosis Date    Cancer ([place] [address] )     Diabetes ([place] [address] )     Gastritis     GERD (gastroesophageal reflux disease)     History of bladder cancer 2010    treated with surgery    Hypercholesterolemia     Hypertension     Hyponatremia 12/22/2021    Lactic acidosis 12/22/2021       Past Surgical History:   Procedure Laterality Date    BLADDER TUMOR EXCISION      EGD      IR CHOLECYSTOSTOMY TUBE CHECK/CHANGE/REPOSITION/REINSERTION/UPSIZE  1/5/2022    IR CHOLECYSTOSTOMY TUBE CHECK/CHANGE/REPOSITION/REINSERTION/UPSIZE  3/4/2022    IR CHOLECYSTOSTOMY TUBE CHECK/CHANGE/REPOSITION/REINSERTION/UPSIZE  3/24/2022    IR CHOLECYSTOSTOMY TUBE PLACEMENT  12/27/2021    IR THORACENTESIS  3/23/2023        03/28/23 1024   OT Last Visit   OT Visit Date 03/28/23   Note Type   Note Type Treatment   Pain Assessment   Pain Assessment Tool 0-10   Pain Score 6   Pain Location/Orientation Orientation: Right;Location: Rib Cage   Pain Onset/Description Onset: Ongoing;Frequency: Constant/Continuous   Patient's Stated Pain Goal No pain   Hospital Pain Intervention(s) Repositioned; Emotional support; Rest   Restrictions/Precautions   Weight Bearing Precautions Per Order No   Other Precautions Fall Risk;Multiple lines;Pain;Bed Alarm   ADL   Where Assessed   (Pt completed full ADL with nursing staff prior to OT session)   Bed Mobility   Supine to Sit 5  Supervision   Additional items Assist x 1; Increased time "required;Verbal cues   Sit to Supine 4  Minimal assistance   Additional items Assist x 1; Increased time required;LE management   Transfers   Sit to Stand 4  Minimal assistance   Additional items Assist x 1; Increased time required   Stand to Sit 4  Minimal assistance   Additional items Assist x 1; Increased time required   Functional Mobility   Functional Mobility 4  Minimal assistance   Additional Comments Pt completed functional mobility from bed <> doorway w/ RW and no LOB observed  Additional items Rolling walker   Subjective   Subjective \"I get tired easily\"   Cognition   Overall Cognitive Status [facility]   Arousal/Participation Alert; Responsive; Cooperative   Attention Within functional limits   Orientation Level Oriented X4   Memory Within functional limits   Following Commands Follows all commands and directions without difficulty   Comments Pt agreeable to OT session, pleasant   Activity Tolerance   Activity Tolerance Patient limited by fatigue;Patient limited by pain   Medical Staff Made Aware Yes, RN aware of session outcomes   Assessment   Assessment Patient participated in Skilled OT session this date with interventions consisting of safety awareness and fall prevention techniques and  therapeutic activities to: increase activity tolerance   Patient agreeable to OT treatment session, upon arrival patient was found supine in bed  In comparison to previous session, patient with improvements in bed mobility and functional mobility  Patient requiring verbal cues for safety and verbal cues for correct technique  Patient continues to be functioning below baseline level, occupational performance remains limited secondary to factors listed above and increased risk for falls and injury  From OT standpoint, recommendation at time of d/c would be Short Term Rehab     Patient to benefit from continued Occupational Therapy treatment while in the hospital to address deficits as defined above and maximize level of " functional independence with ADLs and functional mobility  Plan   Treatment Interventions ADL retraining;Functional transfer training;UE strengthening/ROM; Endurance training;Patient/family training; Compensatory technique education; Energy conservation; Activityengagement   Goal Expiration Date 04/06/23   OT Treatment Day 1   OT Frequency 3-5x/wk   Recommendation   OT Discharge Recommendation Post acute rehabilitation services   Additional Comments  The patient's raw score on the AM-PAC Daily Activity inpatient short form is 18, standardized score is 38 66, less than 39 4  Patients at this level are likely to benefit from discharge to post-acute rehabilitation services  Please refer to the recommendation of the Occupational Therapist for safe discharge planning  Additional Comments 2 Pt seen as a co-session with PT due to the patient's co-morbidities, clinically unstable presentation, and present impairments which are a regression from the patient's baseline     AM-[facility] Daily Activity Inpatient   Lower Body Dressing 2   Bathing 2   Toileting 3   Upper Body Dressing 3   Grooming 4   Eating 4   Daily Activity Raw Score 18   Daily Activity Standardized Score (Calc for Raw Score >=11) 38 66   AM-[facility] Applied Cognition Inpatient   Following a Speech/Presentation 4   Understanding Ordinary Conversation 4   Taking Medications 3   Remembering Where Things Are Placed or Put Away 3   Remembering List of 4-5 Errands 3   Taking Care of Complicated Tasks 3   Applied Cognition Raw Score 20   Applied Cognition Standardized Score 41 76     Rafaela Wick MS, OTR/L

## 2023-03-28 NOTE — DISCHARGE SUMMARY
Zackary 47  Discharge- Velton Drop 1945, 78 y o  male MRN: [id number]  Unit/Bed#: -01 Encounter: [id number]  Primary Care Provider: Renold Spurling, MD   Date and time admitted to hospital: 3/21/2023 12:44 PM    * Acute respiratory failure with hypoxia [facility])  Assessment & Plan  · Multifactorial, pneumonia, PE, mucous plugging  · CT PE study with multifocal factorial pneumonia, PEs, mucous plugging, and a right basilar hydropneumothorax  · Wean oxygen for an SPO2 greater than 88%  · Incentive spirometry flutter valve  · Patient underwent bronchoscopy 3/22, multiple cultures growing Staph aureus   · ID consulted, appreciate recommendations   · Patient underwent thoracentesis on 3/23, 70 cc of louise fluid removed  · Keflex through 4/3 to complete 14-day course of antibiotics  · PT/OT evaluated patient, recommending [facility], patient agreeable to placement    Sepsis [facility])  Assessment & Plan  · As evidenced by tachycardia hypoxia, leukocytosis and lactic acidosis on admission  · Fluid resuscitated with 3 L of normal saline in the ED  · Suspect multifactorial pneumonia most likely aspiration  · Blood cultures x2 no growth at 5 days  · LA 2 1, cleared with IV fluids  · Urine strep and Legionella negative  Sputum cx    3+ Growth of Staphylococcus aureus Abnormal          · Continue Keflex as above    Hydropneumothorax  Assessment & Plan  · Hx of hydropneumothorax  · Recently admitted to [facility] on December 29, 2021 to and had a pigtail chest tube placed by CT surgery    Remained in for 2 days and was discontinued secondary to decreased output  · Exudative and suspicious for malignancy-unable to find recent cytology  · CT PE study with small loculated right basilar hydropneumothorax with pleural thickening suspicious for empyema and bronchopleural fistula  · Plan as above      Pressure injury of skin of buttock  Assessment & Plan  · POA  · Inpatient wound care consult  · Allyvn prn  · Frequent repositioning and off loading pressure points    PNA (pneumonia)  Assessment & Plan  · Most likely secondary to aspiration versus community-acquired  · CT chest-multifocal aspiration pneumonia with multiple areas of mucous plugging  Also small loculated right basilar hydropneumothorax with pleural thickening suspicious for empyema and bronchopleural fistula  · Sputum culture staph aureus   · Strep and Legionella negative  · Plan as above    Hyperlipemia  Assessment & Plan  · Continue home statin    Other pulmonary embolism without acute cor pulmonale (HCC)  Assessment & Plan  · Complained of sudden onset of dyspnea-found to have a low SPO2 in the 80s  · Used home O2 with improvement to high 80s  · CT PE-Filling defects involving the anterior-basilar and lateral-basilar segments of the left lower lobe, as and inferior lingular segment of the left upper lobe, compatible with acute pulmonary emboli  · Echo from 12/22/2022 ejection fraction is 70%  Systolic function is normal  NRWM  · Eliquis has been started, continue per protocol      Severe protein-calorie malnutrition ([facility] )  Assessment & Plan  Malnutrition Findings:   Adult Malnutrition type: Chronic illness  Adult Degree of Malnutrition: Other severe protein calorie malnutrition  Malnutrition Characteristics: Fat loss, Muscle loss, Weight loss                  360 Statement: Chronic, severe protein-calorie malnutrition related to dysphagia as evidenced by severe muscle wasting of clavicle and temple regions, subcutaneous fat loss of orbital regions, and significant weight loss of 20% x 3 months  Will initiate EN via PEG tube when medically appropriate  BMI Findings:  Adult BMI Classifications: Underweight < 18 5        Body mass index is 17 44 kg/m²     · Has PEG tube secondary to dysphagia through which he gets Jevity tube feeds    Neoplasm of lung  Assessment & Plan  · 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 mediastinal and cardiophrenic lymph nodes  · Follows with [facility] oncology  · Completed almost 2 complete rounds of chemotherapy-next round of chemotherapy to start at the beginning of April  · Will continue home prednisone    Type 2 diabetes mellitus without complication, without long-term current use of insulin 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  · Has PEG tube in place  · Continue tube feeds      Discharging Physician / Practitioner: Ruthie Escobedo MD  PCP: Pino Marcum MD  Admission Date:   Admission Orders (From admission, onward)     Ordered        03/21/23 1510  Inpatient Admission  Once                      Discharge Date: 03/28/23    Medical Problems     Resolved Problems  Date Reviewed: 3/27/2023   None         Consultations During Hospital Stay:  · Pulmonology, infectious disease    Procedures Performed:   · Bronchoscopy, ultrasound-guided IV placement, thoracentesis    Significant Findings / Test Results:   · Staph aureus pneumonia    Incidental Findings:   · None    Test Results Pending at Discharge (will require follow up): · Cytology     Outpatient Tests Requested:  · Routine labs with PCP as outpatient    Complications:    • None    Reason for Admission: Pneumonia    Hospital Course:     Mani Campbell is a 78 y o  male patient who originally presented to the hospital on 3/21/2023 due to this of breath    Patient started on antibiotics for suspected "pneumonia  Also with pulmonary embolism noted and started on heparin drip  Pulmonology was consulted  Based on CT chest results showing possible atypical/opportunistic infection pulmonology recommended bronchoscopy  Bronchoscopy showed Staph aureus susceptible to oxacillin  Infectious disease was consulted  Patient was maintained on cefazolin and eventually transition to Keflex  Patient eventually transitioned to Eliquis for treatment of pulmonary embolism  Patient also had thoracentesis performed while in the hospital   PT/OT recommending rehab   has arranged for patient to be discharged to SNF  Please see above list of diagnoses and related plan for additional information  Condition at Discharge: stable     Discharge Day Visit / Exam:     Subjective: Patient complaining of generalized weakness but overall feels better than when he came into the hospital  Vitals: Blood Pressure: 122/71 (03/28/23 0648)  Pulse: 92 (03/28/23 0648)  Temperature: 98 2 °F (36 8 °C) (03/28/23 0648)  Temp Source: Oral (03/27/23 2254)  Respirations: 18 (03/28/23 0648)  Height: 6' 2\" (188 cm) (03/26/23 1600)  Weight - Scale: 61 6 kg (135 lb 12 9 oz) (03/28/23 0600)  SpO2: 96 % (03/28/23 0720)  Exam:   Physical Exam  Constitutional:       General: He is not in acute distress  Comments: Frail elderly  male   HENT:      Head: Normocephalic and atraumatic  Nose: Nose normal       Mouth/Throat:      Mouth: Mucous membranes are moist    Eyes:      Extraocular Movements: Extraocular movements intact  Conjunctiva/sclera: Conjunctivae normal    Cardiovascular:      Rate and Rhythm: Normal rate and regular rhythm  Pulmonary:      Effort: Pulmonary effort is normal  No respiratory distress  Abdominal:      Palpations: Abdomen is soft  Tenderness: There is no abdominal tenderness  Comments: PEG tube noted   Musculoskeletal:         General: Normal range of motion        Cervical back: " Normal range of motion and neck supple  Comments: Generalized weakness   Skin:     General: Skin is warm and dry  Neurological:      General: No focal deficit present  Mental Status: He is alert  Mental status is at baseline  Cranial Nerves: No cranial nerve deficit  Psychiatric:         Mood and Affect: Mood normal          Behavior: Behavior normal          Discussion with Family: Spoke with patient's wife over the phone regarding discharge plan    Discharge instructions/Information to patient and family:   See after visit summary for information provided to patient and family  Provisions for Follow-Up Care:  See after visit summary for information related to follow-up care and any pertinent home health orders  Disposition:     [facility] Readmission:   • no     Discharge Statement:  I spent 35 minutes discharging the patient  This time was spent on the day of discharge  I had direct contact with the patient on the day of discharge  Greater than 50% of the total time was spent examining patient, answering all patient questions, arranging and discussing plan of care with patient as well as directly providing post-discharge instructions  Additional time then spent on discharge activities  Discharge Medications:  See after visit summary for reconciled discharge medications provided to patient and family        ** Please Note: This note has been constructed using a voice recognition system **

## 2023-03-28 NOTE — PHYSICAL THERAPY NOTE
Physical Therapy Evaluation   Time in: 3433  Time out: 1024  Total evaluation time: 10 minutes    Patient's Name: Shima Kapoor    Admitting Diagnosis  Hydropneumothorax [J94 8]  Pneumonia [J18 9]  SOB (shortness of breath) [R06 02]  Hypoxia [R09 02]  Acute respiratory failure with hypoxia ([address] ) [J96 01]  Sepsis ([address] ) [A41 9]  Multiple subsegmental pulmonary emboli without acute cor pulmonale ([address] ) [I26 94]    Problem List  Patient Active Problem List   Diagnosis    Anxiety    Neoplasm of uncertain behavior of skin    Post-traumatic stress disorder, unspecified    Essential hypertension    Actinic keratosis    Dysphagia, unspecified    Erectile dysfunction    History of malignant neoplasm of bladder    Cardiac arrhythmia    Carpal tunnel syndrome    Chronic post-traumatic stress disorder (PTSD) after  combat    Type 2 diabetes mellitus without complication, without long-term current use of insulin ([address] )    Heart murmur    Hematuria    Hyperlipidemia due to type 2 diabetes mellitus ([address] )    Left ventricular hypertrophy    Malignant neoplasm of urinary bladder ([address] )    Neoplasm of lung    Neoplasm of uncertain behavior of oropharynx    Osteoarthritis of knee    Panic disorder    Polyp of colon    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)    Severe protein-calorie malnutrition ([address] )    Tobacco dependence    Sepsis ([address] )    Other pulmonary embolism without acute cor pulmonale (HCC)    Cholecystitis    Multiple fractures of rib involving four or more ribs-right    COVID    Acute respiratory failure with hypoxia (HCC)    Thyroid nodule    History of head and neck radiation    Agent orange exposure    Subclinical hyperthyroidism    Syncope and collapse    Hypomagnesemia    Hyperlipemia    PNA (pneumonia)    Pressure injury of skin of buttock    Hydropneumothorax       Past Medical History  Past Medical History:   Diagnosis Date    Cancer ([address] )     Diabetes ([address] )     Gastritis     GERD (gastroesophageal reflux disease)     History of bladder cancer 2010    treated with surgery    Hypercholesterolemia     Hypertension     Hyponatremia 12/22/2021    Lactic acidosis 12/22/2021       Past Surgical History  Past Surgical History:   Procedure Laterality Date    BLADDER TUMOR EXCISION      EGD      IR CHOLECYSTOSTOMY TUBE CHECK/CHANGE/REPOSITION/REINSERTION/UPSIZE  1/5/2022    IR CHOLECYSTOSTOMY TUBE CHECK/CHANGE/REPOSITION/REINSERTION/UPSIZE  3/4/2022    IR CHOLECYSTOSTOMY TUBE CHECK/CHANGE/REPOSITION/REINSERTION/UPSIZE  3/24/2022    IR CHOLECYSTOSTOMY TUBE PLACEMENT  12/27/2021    IR THORACENTESIS  3/23/2023       PT performed at least 2 patient identifiers during session: Name and wristband  03/28/23 1020   PT Last Visit   PT Visit Date 03/28/23   Note Type   Note type Evaluation   Pain Assessment   Pain Assessment Tool 0-10   Pain Score 6   Pain Location/Orientation Orientation: Right;Location: Rib Cage   Pain Onset/Description Onset: Ongoing;Frequency: Constant/Continuous   Patient's Stated Pain Goal No pain   Hospital Pain Intervention(s) Repositioned; Emotional support; Rest   Restrictions/Precautions   Weight Bearing Precautions Per Order No   Other Precautions Fall Risk;Multiple lines;Pain;Bed Alarm   Home Living   Type of [address] [place] [address] One level;Performs ADLs on one level; Able to live on main level with bedroom/bathroom;Stairs to enter with rails  (3 JUDITH)   Bathroom Shower/Tub Tub/shower unit   Bathroom Toilet Standard   Bathroom Equipment Grab bars in shower; Shower chair;Grab bars around toilet   [facility]  (Pt reports using walker at baseline)   Prior Function   Level of [place] Needs assistance with functional mobility; Needs assistance with ADLs; Needs assistance with IADLS   Lives With Spouse;Daughter  (Pt reports wife and daugther at home to assist with ADLs and IADLs as needed)   Receives Help From Family   IADLs Family/Friend/Other "provides transportation; Family/Friend/Other provides meals; Family/Friend/Other provides medication management   Falls in the last 6 months 1 to 4  (Pt reports 1-2 falls within the last 6 months)   Vocational Retired   General   Family/Caregiver Present No   Cognition   Overall Cognitive Status WFL   Arousal/Participation Responsive   Attention Within functional limits   Orientation Level Oriented X4   Memory Within functional limits   Following Commands Follows all commands and directions without difficulty   Comments Pt agreeable to PT evaluation, pleasant   Subjective   Subjective \"I can try alittle OOB\"   RLE Assessment   RLE Assessment X   Strength RLE   R Hip Flexion 3-/5   R Knee Extension 3/5   R Ankle Dorsiflexion 3+/5   LLE Assessment   LLE Assessment X   Strength LLE   L Hip Flexion 3-/5   L Knee Extension 3/5   L Ankle Dorsiflexion 3+/5   Vision-Basic Assessment   Current Vision Wears glasses all the time   Coordination   Movements are Fluid and Coordinated 1   Bed Mobility   Supine to Sit 5  Supervision   Additional items Assist x 1; Increased time required;Verbal cues   Sit to Supine 4  Minimal assistance   Additional items Assist x 1; Increased time required;LE management   Transfers   Sit to Stand 4  Minimal assistance   Additional items Assist x 1; Increased time required   Stand to Sit 4  Minimal assistance   Additional items Assist x 1; Increased time required   Ambulation/Elevation   Gait pattern Improper Weight shift; Antalgic;Narrow LISANDRA; Forward Flexion;Decreased foot clearance;Shuffling   Gait Assistance 4  Minimal assist   Additional items Assist x 1;Verbal cues   Assistive Device Rolling walker   Distance 16 ft   Stair Management Assistance Not tested   Balance   Static Sitting Good   Dynamic Sitting Fair +   Static Standing Fair -   Dynamic Standing Poor +   Ambulatory Poor +   Endurance Deficit   Endurance Deficit Yes   Activity Tolerance   Activity Tolerance Patient limited by fatigue   Medical " Staff Made Aware coordination of care provided with NISHA Bolton; LEONOR Jade   Nurse Made Aware Yes, RN aware of session outcomes/recs   Assessment   Prognosis Fair   Problem List Decreased strength;Decreased endurance; Impaired balance;Decreased mobility; Decreased safety awareness;Pain   Assessment Pt is 78 y o  male seen for high-complexity PT evaluation on 3/28/2023 s/p admit to [facility] on 3/21/2023 w/ Acute respiratory failure with hypoxia ([address] )  PT was consulted to assess pt's functional mobility and d/c needs  Order placed for PT eval and tx, w/ up w/ A order  Prior to admission, pt required some assistance with ADLs and IADLs from family members present, lives in [address] [place] [address]  At time of eval, pt requires primarily min Ax1 for all functional tasks, including amb x 16 ft with RW  Upon evaluation, pt presenting with impaired functional mobility d/t decreased strength, decreased endurance, impaired balance, decreased mobility, decreased safety awareness and pain  Pertinent PMHx and current co-morbidities affecting pt's physical performance at time of assessment include: dysphagia, type 2 diabetes, sepsis, PNA, anxiety, HTN, OA of knee  Personal factors affecting pt at time of eval include: positive fall history, unable to perform physical activity and limited insight into impairments  The following objective measures performed on IE also reveal limitations: AM-PAC 6-Clicks: 88/94  Pt's clinical presentation is currently unstable/unpredictable seen in pt's presentation of abnormal lab value(s), need for input for task focus and mobility technique and ongoing medical assessment  Overall, pt's rehab potential and prognosis to return to PLOF is fair as impacted by objective findings, warranting pt to receive further skilled PT interventions to address identified impairments, activity limitation(s), and participation restriction(s)  Goal for patient is to get stronger   Pt to benefit from continued PT tx to "address deficits as defined above and maximize level of functional independent mobility and consistency in order for pt to improve activity tolerance  From PT/mobility standpoint, recommendation at time of d/c would be post acute rehabilitation services pending progress in order to facilitate return to PLOF  Barriers to Discharge Inaccessible home environment   Goals   Patient Goals \"to get stronger\"   STG Expiration Date 04/07/23   Short Term Goal #1 In 10 days pt will complete: 1) Bed mobility skills with mod I to increase safety and independence as well as decrease caregiver burden  2) Functional transfers with mod I to promote increased independence, safety, and QOL  3) Ambulate 150' using least restrictive AD with mod I without LOB and stable vitals so that pt can negotiate previous living environment safely and promote independence with functional mobility and return to PLOF  4) Stair training up/ down 3 step/s using rail/s with S so that pt can enter/negotiate previous living environment safely and decrease fall risk  5) Improve balance grades by 1/2 grade to increase safety with all mobility and decrease fall risk  6) Improve BLE strength by 1/2 grade to help increase overall functional mobility and decrease fall risk  PT Treatment Day 0   Plan   Treatment/Interventions Functional transfer training;LE strengthening/ROM; Elevations; Therapeutic exercise; Endurance training;Patient/family training;Equipment eval/education;Gait training;Spoke to nursing;Bed mobility   PT Frequency 3-5x/wk   Recommendation   PT Discharge Recommendation Post acute rehabilitation services   Equipment Recommended   (continue RW use)   AM-PAC Basic Mobility Inpatient   Turning in Flat Bed Without Bedrails 3   Lying on Back to Sitting on Edge of Flat Bed Without Bedrails 3   Moving Bed to Chair 3   Standing Up From Chair Using Arms 3   Walk in Room 3   Climb 3-5 Stairs With Railing 2   Basic Mobility Inpatient Raw Score 17   Basic " Mobility Standardized Score 39 67   Highest Level Of Mobility   -Health system Goal 5: Stand one or more mins   -HLM Achieved 6: Walk 10 steps or more   End of Consult   Patient Position at End of Consult Supine;Bed/Chair alarm activated; All needs within reach       Rosalba Mcardle, PT, DPT

## 2023-03-28 NOTE — CASE MANAGEMENT
Case Management Discharge Planning Note    Patient name Claudine Adame  Location [address] [phone] MRN [id number]  : 1945 Date 3/28/2023       Current Admission Date: 3/21/2023  Current Admission Diagnosis:Acute respiratory failure with hypoxia [facility])   Patient Active Problem List    Diagnosis Date Noted   • PNA (pneumonia) 2023   • Pressure injury of skin of buttock 2023   • Hydropneumothorax 2023   • Syncope and collapse 2022   • Hypomagnesemia 2022   • Hyperlipemia 2022   • Thyroid nodule 2022   • History of head and neck radiation 2022   • Agent orange exposure 2022   • Subclinical hyperthyroidism 2022   • Multiple fractures of rib involving four or more ribs-right 2022   • COVID 2022   • Acute respiratory failure with hypoxia ([address] ) 2022   • Anxiety 2021   • Neoplasm of uncertain behavior of skin 2021   • Post-traumatic stress disorder, unspecified 2021   • Essential hypertension 2021   • Actinic keratosis 2021   • Dysphagia, unspecified 2021   • Erectile dysfunction 2021   • History of malignant neoplasm of bladder 2021   • Carpal tunnel syndrome 2021   • Chronic post-traumatic stress disorder (PTSD) after  combat 2021   • Type 2 diabetes mellitus without complication, without long-term current use of insulin ([address] ) 2021   • Hematuria 2021   • Hyperlipidemia due to type 2 diabetes mellitus ([address] ) 2021   • Malignant neoplasm of urinary bladder ([address] ) 2021   • Neoplasm of lung 2021   • Osteoarthritis of knee 2021   • Panic disorder 2021   • Polyp of colon 2021   • Tobacco dependence 2021   • Sepsis ([address] ) 2021   • Other pulmonary embolism without acute cor pulmonale ([address] ) 2021   • Cholecystitis 2021   • Severe protein-calorie malnutrition ([facility] ) 2021   • Primary malignant neoplasm of base of tongue ([facility] ) 10/11/2021   • Neoplasm of uncertain behavior of oropharynx 09/23/2021   • Cardiac arrhythmia 05/15/2018   • Heart murmur 05/15/2018   • Left ventricular hypertrophy 05/15/2018      LOS (days): 7  Geometric Mean LOS (GMLOS) (days): 5 00  Days to GMLOS:-1 8     OBJECTIVE:  Risk of Unplanned Readmission Score: 14 75      Current admission status: Inpatient   Preferred Pharmacy:   [id number] [place], [address] [address]  Phone: [phone] Fax: [phone], [address]  [address]  [place] [address]  Phone: [phone] Fax: [phone]    Primary Care Provider: Zayda Soto MD    Primary Insurance: MEDICARE  Secondary Insurance: Armond Doss DETAILS:  Provided the list of accepting facilities to the pt  Pt wants the wife to chose  TC to pt wife who chose [facility]  Notified via [address]  TC to [facility] in admissions of same  Submitted for transport via [address]                                                                                                        Accepting Facility Name, [facility] [address] and [address] [facility], [address], [address]  Receiving Facility/Agency Phone Number: [phone]  Facility/Agency Fax Number: [phone]

## 2023-04-03 LAB
FUNGUS SPEC CULT: NORMAL

## 2023-04-04 LAB
MYCOBACTERIUM SPEC CULT: NORMAL
P JIROVECII AG SPEC QL IF: NORMAL
P JIROVECII AG SPEC QL IF: NORMAL
RHODAMINE-AURAMINE STN SPEC: NORMAL

## 2023-04-24 LAB
FUNGUS SPEC CULT: NORMAL
P JIROVECII AG SPEC QL IF: NORMAL
P JIROVECII AG SPEC QL IF: NORMAL

## 2023-04-26 ENCOUNTER — APPOINTMENT (EMERGENCY)
Dept: RADIOLOGY | Facility: HOSPITAL | Age: 78
End: 2023-04-26

## 2023-04-26 ENCOUNTER — HOSPITAL ENCOUNTER (INPATIENT)
Facility: HOSPITAL | Age: 78
LOS: 7 days | Discharge: NURSING, REHAB OR LONG-TERM CARE | End: 2023-05-03
Attending: EMERGENCY MEDICINE | Admitting: INTERNAL MEDICINE

## 2023-04-26 DIAGNOSIS — A41.9 SEPSIS (HCC): ICD-10-CM

## 2023-04-26 DIAGNOSIS — R19.7 DIARRHEA, UNSPECIFIED TYPE: ICD-10-CM

## 2023-04-26 DIAGNOSIS — R53.1 WEAKNESS: Primary | ICD-10-CM

## 2023-04-26 DIAGNOSIS — I63.9 CVA (CEREBRAL VASCULAR ACCIDENT) (HCC): ICD-10-CM

## 2023-04-26 DIAGNOSIS — B37.0 CANDIDA INFECTION, ORAL: ICD-10-CM

## 2023-04-26 DIAGNOSIS — I63.9 STROKE (HCC): ICD-10-CM

## 2023-04-26 DIAGNOSIS — I27.82 OTHER CHRONIC PULMONARY EMBOLISM WITHOUT ACUTE COR PULMONALE (HCC): ICD-10-CM

## 2023-04-26 DIAGNOSIS — R06.02 SHORTNESS OF BREATH: ICD-10-CM

## 2023-04-26 DIAGNOSIS — D00.07 SQUAMOUS CELL CARCINOMA IN SITU (SCCIS) OF TONGUE: ICD-10-CM

## 2023-04-26 DIAGNOSIS — E83.52 HYPERCALCEMIA: ICD-10-CM

## 2023-04-26 DIAGNOSIS — J18.9 PNA (PNEUMONIA): ICD-10-CM

## 2023-04-26 DIAGNOSIS — E43 SEVERE PROTEIN-CALORIE MALNUTRITION (HCC): ICD-10-CM

## 2023-04-26 DIAGNOSIS — E87.5 HYPERKALEMIA: ICD-10-CM

## 2023-04-26 PROBLEM — R65.20 SEVERE SEPSIS (HCC): Status: ACTIVE | Noted: 2021-12-22

## 2023-04-26 LAB
ALBUMIN SERPL BCP-MCNC: 3.2 G/DL (ref 3.5–5)
ALP SERPL-CCNC: 123 U/L (ref 34–104)
ALT SERPL W P-5'-P-CCNC: 57 U/L (ref 7–52)
ANION GAP SERPL CALCULATED.3IONS-SCNC: 12 MMOL/L (ref 4–13)
APTT PPP: 33 SECONDS (ref 23–37)
AST SERPL W P-5'-P-CCNC: 33 U/L (ref 13–39)
ATRIAL RATE: 107 BPM
BACTERIA UR QL AUTO: ABNORMAL /HPF
BASOPHILS # BLD MANUAL: 0 THOUSAND/UL (ref 0–0.1)
BASOPHILS NFR MAR MANUAL: 0 % (ref 0–1)
BILIRUB SERPL-MCNC: 0.51 MG/DL (ref 0.2–1)
BILIRUB UR QL STRIP: NEGATIVE
BUN SERPL-MCNC: 32 MG/DL (ref 5–25)
CALCIUM ALBUM COR SERPL-MCNC: 13.3 MG/DL (ref 8.3–10.1)
CALCIUM SERPL-MCNC: 12.7 MG/DL (ref 8.4–10.2)
CHLORIDE SERPL-SCNC: 93 MMOL/L (ref 96–108)
CLARITY UR: CLEAR
CO2 SERPL-SCNC: 29 MMOL/L (ref 21–32)
COLOR UR: YELLOW
CREAT SERPL-MCNC: 0.67 MG/DL (ref 0.6–1.3)
EOSINOPHIL # BLD MANUAL: 0 THOUSAND/UL (ref 0–0.4)
EOSINOPHIL NFR BLD MANUAL: 0 % (ref 0–6)
ERYTHROCYTE [DISTWIDTH] IN BLOOD BY AUTOMATED COUNT: 18.1 % (ref 11.6–15.1)
FINE GRAN CASTS URNS QL MICRO: ABNORMAL /LPF
GFR SERPL CREATININE-BSD FRML MDRD: 93 ML/MIN/1.73SQ M
GLUCOSE SERPL-MCNC: 115 MG/DL (ref 65–140)
GLUCOSE SERPL-MCNC: 125 MG/DL (ref 65–140)
GLUCOSE SERPL-MCNC: 179 MG/DL (ref 65–140)
GLUCOSE SERPL-MCNC: 86 MG/DL (ref 65–140)
GLUCOSE UR STRIP-MCNC: NEGATIVE MG/DL
HCT VFR BLD AUTO: 41.2 % (ref 36.5–49.3)
HGB BLD-MCNC: 12.4 G/DL (ref 12–17)
HGB UR QL STRIP.AUTO: ABNORMAL
HYALINE CASTS #/AREA URNS LPF: ABNORMAL /LPF
INR PPP: 1.91 (ref 0.84–1.19)
KETONES UR STRIP-MCNC: NEGATIVE MG/DL
LACTATE SERPL-SCNC: 4.9 MMOL/L (ref 0.5–2)
LEUKOCYTE ESTERASE UR QL STRIP: NEGATIVE
LYMPHOCYTES # BLD AUTO: 0.49 THOUSAND/UL (ref 0.6–4.47)
LYMPHOCYTES # BLD AUTO: 6 % (ref 14–44)
MCH RBC QN AUTO: 26.8 PG (ref 26.8–34.3)
MCHC RBC AUTO-ENTMCNC: 30.1 G/DL (ref 31.4–37.4)
MCV RBC AUTO: 89 FL (ref 82–98)
MONOCYTES # BLD AUTO: 0.49 THOUSAND/UL (ref 0–1.22)
MONOCYTES NFR BLD: 6 % (ref 4–12)
MUCOUS THREADS UR QL AUTO: ABNORMAL
NEUTROPHILS # BLD MANUAL: 7.22 THOUSAND/UL (ref 1.85–7.62)
NEUTS BAND NFR BLD MANUAL: 12 % (ref 0–8)
NEUTS SEG NFR BLD AUTO: 76 % (ref 43–75)
NITRITE UR QL STRIP: NEGATIVE
NON-SQ EPI CELLS URNS QL MICRO: ABNORMAL /HPF
P AXIS: 80 DEGREES
PH UR STRIP.AUTO: 6 [PH]
PLATELET # BLD AUTO: 438 THOUSANDS/UL (ref 149–390)
PLATELET BLD QL SMEAR: ABNORMAL
PMV BLD AUTO: 9.7 FL (ref 8.9–12.7)
POTASSIUM SERPL-SCNC: 5.5 MMOL/L (ref 3.5–5.3)
PR INTERVAL: 148 MS
PROCALCITONIN SERPL-MCNC: 0.56 NG/ML
PROT SERPL-MCNC: 7.7 G/DL (ref 6.4–8.4)
PROT UR STRIP-MCNC: ABNORMAL MG/DL
PROTHROMBIN TIME: 21.8 SECONDS (ref 11.6–14.5)
QRS AXIS: 98 DEGREES
QRSD INTERVAL: 114 MS
QT INTERVAL: 344 MS
QTC INTERVAL: 459 MS
RBC # BLD AUTO: 4.63 MILLION/UL (ref 3.88–5.62)
RBC #/AREA URNS AUTO: ABNORMAL /HPF
RBC MORPH BLD: NORMAL
SODIUM SERPL-SCNC: 134 MMOL/L (ref 135–147)
SP GR UR STRIP.AUTO: 1.02
T WAVE AXIS: 57 DEGREES
UROBILINOGEN UR QL STRIP.AUTO: 0.2 E.U./DL
VENTRICULAR RATE: 107 BPM
WBC # BLD AUTO: 8.2 THOUSAND/UL (ref 4.31–10.16)
WBC #/AREA URNS AUTO: ABNORMAL /HPF

## 2023-04-26 RX ORDER — CEFEPIME HYDROCHLORIDE 2 G/50ML
2000 INJECTION, SOLUTION INTRAVENOUS ONCE
Status: DISCONTINUED | OUTPATIENT
Start: 2023-04-26 | End: 2023-04-26

## 2023-04-26 RX ORDER — FAMOTIDINE 20 MG/1
20 TABLET, FILM COATED ORAL 2 TIMES DAILY
Status: DISCONTINUED | OUTPATIENT
Start: 2023-04-26 | End: 2023-04-26

## 2023-04-26 RX ORDER — FAMOTIDINE 20 MG/1
20 TABLET, FILM COATED ORAL 2 TIMES DAILY
Status: DISCONTINUED | OUTPATIENT
Start: 2023-04-26 | End: 2023-05-03 | Stop reason: HOSPADM

## 2023-04-26 RX ORDER — INSULIN LISPRO 100 [IU]/ML
1-5 INJECTION, SOLUTION INTRAVENOUS; SUBCUTANEOUS EVERY 6 HOURS
Status: DISCONTINUED | OUTPATIENT
Start: 2023-04-26 | End: 2023-05-03 | Stop reason: HOSPADM

## 2023-04-26 RX ORDER — SODIUM CHLORIDE 9 MG/ML
50 INJECTION, SOLUTION INTRAVENOUS CONTINUOUS
Status: DISCONTINUED | OUTPATIENT
Start: 2023-04-26 | End: 2023-05-01

## 2023-04-26 RX ORDER — DEXTROSE MONOHYDRATE 25 G/50ML
25 INJECTION, SOLUTION INTRAVENOUS ONCE
Status: COMPLETED | OUTPATIENT
Start: 2023-04-26 | End: 2023-04-26

## 2023-04-26 RX ORDER — ALBUTEROL SULFATE 2.5 MG/3ML
5 SOLUTION RESPIRATORY (INHALATION) ONCE
Status: COMPLETED | OUTPATIENT
Start: 2023-04-26 | End: 2023-04-26

## 2023-04-26 RX ORDER — VANCOMYCIN HYDROCHLORIDE 500 MG/100ML
500 INJECTION, SOLUTION INTRAVENOUS ONCE
Status: COMPLETED | OUTPATIENT
Start: 2023-04-26 | End: 2023-04-26

## 2023-04-26 RX ORDER — ATORVASTATIN CALCIUM 10 MG/1
20 TABLET, FILM COATED ORAL
Status: DISCONTINUED | OUTPATIENT
Start: 2023-04-26 | End: 2023-04-26

## 2023-04-26 RX ORDER — CEFEPIME HYDROCHLORIDE 2 G/50ML
2000 INJECTION, SOLUTION INTRAVENOUS EVERY 12 HOURS
Status: DISCONTINUED | OUTPATIENT
Start: 2023-04-26 | End: 2023-05-01

## 2023-04-26 RX ORDER — ACETAMINOPHEN 325 MG/1
650 TABLET ORAL EVERY 6 HOURS PRN
Status: DISCONTINUED | OUTPATIENT
Start: 2023-04-26 | End: 2023-05-03 | Stop reason: HOSPADM

## 2023-04-26 RX ORDER — CEFTRIAXONE 2 G/50ML
2000 INJECTION, SOLUTION INTRAVENOUS ONCE
Status: CANCELLED | OUTPATIENT
Start: 2023-04-26 | End: 2023-04-26

## 2023-04-26 RX ORDER — ATORVASTATIN CALCIUM 20 MG/1
20 TABLET, FILM COATED ORAL
Status: DISCONTINUED | OUTPATIENT
Start: 2023-04-26 | End: 2023-05-03

## 2023-04-26 RX ORDER — ONDANSETRON 2 MG/ML
4 INJECTION INTRAMUSCULAR; INTRAVENOUS EVERY 6 HOURS PRN
Status: DISCONTINUED | OUTPATIENT
Start: 2023-04-26 | End: 2023-05-03 | Stop reason: HOSPADM

## 2023-04-26 RX ADMIN — ATORVASTATIN CALCIUM 20 MG: 20 TABLET, FILM COATED ORAL at 17:38

## 2023-04-26 RX ADMIN — APIXABAN 5 MG: 5 TABLET, FILM COATED ORAL at 17:38

## 2023-04-26 RX ADMIN — FAMOTIDINE 20 MG: 20 TABLET, FILM COATED ORAL at 17:38

## 2023-04-26 RX ADMIN — DEXTROSE MONOHYDRATE 25 ML: 25 INJECTION, SOLUTION INTRAVENOUS at 12:28

## 2023-04-26 RX ADMIN — CEFEPIME HYDROCHLORIDE 2000 MG: 2 INJECTION, SOLUTION INTRAVENOUS at 14:03

## 2023-04-26 RX ADMIN — VANCOMYCIN HYDROCHLORIDE 500 MG: 500 INJECTION, SOLUTION INTRAVENOUS at 16:08

## 2023-04-26 RX ADMIN — SODIUM CHLORIDE, SODIUM LACTATE, POTASSIUM CHLORIDE, AND CALCIUM CHLORIDE 500 ML: .6; .31; .03; .02 INJECTION, SOLUTION INTRAVENOUS at 12:31

## 2023-04-26 RX ADMIN — ALBUTEROL SULFATE 5 MG: 2.5 SOLUTION RESPIRATORY (INHALATION) at 12:37

## 2023-04-26 RX ADMIN — VANCOMYCIN HYDROCHLORIDE 750 MG: 750 INJECTION, SOLUTION INTRAVENOUS at 12:12

## 2023-04-26 RX ADMIN — SODIUM CHLORIDE 75 ML/HR: 0.9 INJECTION, SOLUTION INTRAVENOUS at 14:19

## 2023-04-26 RX ADMIN — INSULIN HUMAN 5 UNITS: 100 INJECTION, SOLUTION PARENTERAL at 13:58

## 2023-04-26 RX ADMIN — SODIUM CHLORIDE, SODIUM LACTATE, POTASSIUM CHLORIDE, AND CALCIUM CHLORIDE 500 ML: .6; .31; .03; .02 INJECTION, SOLUTION INTRAVENOUS at 11:21

## 2023-04-26 RX ADMIN — SODIUM CHLORIDE 1000 ML: 0.9 INJECTION, SOLUTION INTRAVENOUS at 13:52

## 2023-04-26 NOTE — CASE MANAGEMENT
Case Management Assessment & Discharge Planning Note    Patient name Siobhan Retana Luite David [id number] MRN [id number]  : 1945 Date 2023       Current Admission Date: 2023  Current Admission Diagnosis:Severe sepsis [facility])   Patient Active Problem List    Diagnosis Date Noted    Hypercalcemia of malignancy 2023    Hyperkalemia 2023    PNA (pneumonia) 2023    Pressure injury of skin of buttock 2023    Hydropneumothorax 2023    Syncope and collapse 2022    Hypomagnesemia 2022    Hyperlipemia 2022    Thyroid nodule 2022    History of head and neck radiation 2022    Agent orange exposure 2022    Subclinical hyperthyroidism 2022    Multiple fractures of rib involving four or more ribs-right 2022    COVID 2022    Acute respiratory failure with hypoxia ([address] ) 2022    Anxiety 2021    Neoplasm of uncertain behavior of skin 2021    Post-traumatic stress disorder, unspecified 2021    Essential hypertension 2021    Actinic keratosis 2021    Dysphagia, unspecified 2021    Erectile dysfunction 2021    History of malignant neoplasm of bladder 2021    Carpal tunnel syndrome 2021    Chronic post-traumatic stress disorder (PTSD) after  combat 2021    Type 2 diabetes mellitus without complication, without long-term current use of insulin ([address] ) 2021    Hematuria 2021    Hyperlipidemia due to type 2 diabetes mellitus ([address] ) 2021    Malignant neoplasm of urinary bladder ([address] ) 2021    Neoplasm of lung 2021    Osteoarthritis of knee 2021    Panic disorder 2021    Polyp of colon 2021    Tobacco dependence 2021    Severe sepsis ([address] ) 2021    Other pulmonary embolism without acute cor pulmonale ([address] ) 2021    Cholecystitis 2021    Severe protein-calorie malnutrition ([facility] ) 11/24/2021    Primary malignant neoplasm of base of tongue ([facility] ) 10/11/2021    Neoplasm of uncertain behavior of oropharynx 09/23/2021    Cardiac arrhythmia 05/15/2018    Heart murmur 05/15/2018    Left ventricular hypertrophy 05/15/2018      LOS (days): 0  Geometric Mean LOS (GMLOS) (days): 5 00  Days to GMLOS:4 8     OBJECTIVE:  PATIENT READMITTED TO HOSPITAL            Current admission status: Inpatient  Referral Reason: Other (Asssit discharge planning)    Preferred Pharmacy:   [facility], [address] [address]  Phone: [phone] Fax: [address], [address]  [place] [address]  Phone: [phone] Fax: [phone]    Primary Care Provider: Leslie Downs MD    Primary Insurance: MEDICARE  Secondary Insurance: [employer] [place]:  Kevin 26 Proxies    There are no active Health Care Proxies on file  Readmission Root Cause  30 Day Readmission: Yes  Who directed you to return to the hospital?: Other (comment) (SNF)  Did you understand whom to contact if you had questions or problems?: Yes  Did you get your prescriptions before you left the hospital?: Yes  Were you able to get your prescriptions filled when you left the hospital?: Yes  Did you take your medications as prescribed?: Yes  Were you able to get to your follow-up appointments?: Yes, No  Reason[de-identified] Readmitted prior to appointment  During previous admission, was a post-acute recommendation made?: Yes  What post-acute resources were offered?: STR  Patient was readmitted due to:  Was at [facility]    Patient Information  Admitted from[de-identified] Home  Mental Status: Alert  During Assessment patient was accompanied by: Spouse  Assessment information provided by[de-identified] Spouse  Primary Caregiver: Self  Support Systems: Spouse/significant other, Self, Family members  [place] of Residence: [facility] [address] do you live in?: [facility]  entry access options   Select all that apply : No steps to enter home  Type of Current Residence: [employer]  In the last 12 months, was there a time when you were not able to pay the mortgage or rent on time?: No  In the last 12 months, how many places have you lived?: 1  In the last 12 months, was there a time when you did not have a steady place to sleep or slept in a shelter (including now)?: No  Homeless/housing insecurity resource given?: N/A  Living Arrangements: Lives w/ Spouse/significant other  Is patient a ?: Yes  Is patient active with [facility])?: Yes  Is patient service connected?: Yes    Activities of Daily Living Prior to Admission  Functional Status: Assistance  Completes ADLs independently?: No  Level of ADL dependence: Assistance  Ambulates independently?: Yes  Does patient use assisted devices?: Yes  Assisted Devices (DME) used: Sara Appiah  Does patient currently own DME?: Yes  What DME does the patient currently own?: Home Oxygen concentrator, Portable Oxygen tanks, Shower Chair, Straight Cane, Walker, Wheelchair, Other (Comment) (J tube feedings and supplies)  Does patient have a history of Outpatient Therapy (PT/OT)?: No  Does the patient have a history of Short-Term Rehab?: Yes ([facility])  Does patient have a history of HHC?: Yes [facility])  Does patient currently have [facility]?: No    Patient Information Continued  Income Source: Pension/FDC  Does patient have prescription coverage?: Yes  Within the past 12 months, you worried that your food would run out before you got the money to buy more : Never true  Within the past 12 months, the food you bought just didn't last and you didn't have money to get more : Never true  Food insecurity resource given?: N/A  Does patient receive dialysis treatments?: No  Does patient have a history of substance abuse?: No  Does patient have a history of Mental Health Diagnosis?: No    Means of Transportation  Means of Transport to Appts[de-identified] Family transport  In the past 12 months, has lack of transportation kept you from medical appointments or from getting medications?: No  In the past 12 months, has lack of transportation kept you from meetings, work, or from getting things needed for daily living?: No  Was application for public transport provided?: N/A    DISCHARGE DETAILS:    Discharge planning discussed with[de-identified] Patient and spouse  Freedom of Choice: Yes  Comments - Freedom of Choice: Was at [facility]  CM contacted family/caregiver?: Yes  Were Treatment Team discharge recommendations reviewed with patient/caregiver?: Yes  Did patient/caregiver verbalize understanding of patient care needs?: Yes  Were patient/caregiver advised of the risks associated with not following Treatment Team discharge recommendations?: Yes    Contacts  Patient Contacts: Osbaldo Frausto  Relationship to Patient[de-identified] Family  Contact Method: Phone  Phone Number: [phone]  Reason/Outcome: Emergency Contact      Additional Comments: Met with patient at spouse at bedside in ED  Patient was at [facility] for [address],  Wife unsure of needs at discharge re pallative care not ready for hospice care  PT/OT david ordered    CM department following thru discharge

## 2023-04-26 NOTE — ASSESSMENT & PLAN NOTE
· Patient was following with [facility] earlier in the year however has not had treatment recently as he was admitted to the hospital and then discharged to rehab patient has been in rehab for the last few weeks and malignancy treatment has been on hold  · Will need outpatient follow-up with hematology/oncology

## 2023-04-26 NOTE — PLAN OF CARE
Problem: Nutrition/Hydration-ADULT  Goal: Nutrient/Hydration intake appropriate for improving, restoring or maintaining nutritional needs  Description: Monitor and assess patient's nutrition/hydration status for malnutrition  Collaborate with interdisciplinary team and initiate plan and interventions as ordered  Monitor patient's weight and dietary intake as ordered or per policy  Utilize nutrition screening tool and intervene as necessary  Determine patient's food preferences and provide high-protein, high-caloric foods as appropriate       INTERVENTIONS:  - Monitor oral intake, urinary output, labs, and treatment plans  - Assess nutrition and hydration status and recommend course of action  - Evaluate amount of meals eaten  - Assist patient with eating if necessary   - Allow adequate time for meals  - Recommend/ encourage appropriate diets, oral nutritional supplements, and vitamin/mineral supplements  - Order, calculate, and assess calorie counts as needed  - Recommend, monitor, and adjust tube feedings and TPN/PPN based on assessed needs  - Assess need for intravenous fluids  - Provide specific nutrition/hydration education as appropriate  - Include patient/family/caregiver in decisions related to nutrition  Outcome: Progressing     Problem: MOBILITY - ADULT  Goal: Maintain or return to baseline ADL function  Description: INTERVENTIONS:  -  Assess patient's ability to carry out ADLs; assess patient's baseline for ADL function and identify physical deficits which impact ability to perform ADLs (bathing, care of mouth/teeth, toileting, grooming, dressing, etc )  - Assess/evaluate cause of self-care deficits   - Assess range of motion  - Assess patient's mobility; develop plan if impaired  - Assess patient's need for assistive devices and provide as appropriate  - Encourage maximum independence but intervene and supervise when necessary  - Involve family in performance of ADLs  - Assess for home care needs following discharge   - Consider OT consult to assist with ADL evaluation and planning for discharge  - Provide patient education as appropriate  Outcome: Progressing  Goal: Maintains/Returns to pre admission functional level  Description: INTERVENTIONS:  - Perform BMAT or MOVE assessment daily    - Set and communicate daily mobility goal to care team and patient/family/caregiver  - Collaborate with rehabilitation services on mobility goals if consulted  - Reposition patient every 2 hours    - Record patient progress and toleration of activity level   Outcome: Progressing     Problem: PAIN - ADULT  Goal: Verbalizes/displays adequate comfort level or baseline comfort level  Description: Interventions:  - Encourage patient to monitor pain and request assistance  - Assess pain using appropriate pain scale  - Administer analgesics based on type and severity of pain and evaluate response  - Implement non-pharmacological measures as appropriate and evaluate response  - Consider cultural and social influences on pain and pain management  - Notify physician/advanced practitioner if interventions unsuccessful or patient reports new pain  Outcome: Progressing     Problem: INFECTION - ADULT  Goal: Absence or prevention of progression during hospitalization  Description: INTERVENTIONS:  - Assess and monitor for signs and symptoms of infection  - Monitor lab/diagnostic results  - Monitor all insertion sites, i e  indwelling lines, tubes, and drains  - Monitor endotracheal if appropriate and nasal secretions for changes in amount and color  - [place] appropriate cooling/warming therapies per order  - Administer medications as ordered  - Instruct and encourage patient and family to use good hand hygiene technique  - Identify and instruct in appropriate isolation precautions for identified infection/condition  Outcome: Progressing     Problem: SAFETY ADULT  Goal: Maintain or return to baseline ADL function  Description: INTERVENTIONS:  -  Assess patient's ability to carry out ADLs; assess patient's baseline for ADL function and identify physical deficits which impact ability to perform ADLs (bathing, care of mouth/teeth, toileting, grooming, dressing, etc )  - Assess/evaluate cause of self-care deficits   - Assess range of motion  - Assess patient's mobility; develop plan if impaired  - Assess patient's need for assistive devices and provide as appropriate  - Encourage maximum independence but intervene and supervise when necessary  - Involve family in performance of ADLs  - Assess for home care needs following discharge   - Consider OT consult to assist with ADL evaluation and planning for discharge  - Provide patient education as appropriate  Outcome: Progressing  Goal: Maintains/Returns to pre admission functional level  Description: INTERVENTIONS:  - Perform BMAT or MOVE assessment daily    - Set and communicate daily mobility goal to care team and patient/family/caregiver  - Collaborate with rehabilitation services on mobility goals if consulted  - Reposition patient every 2 hours    - Record patient progress and toleration of activity level   Outcome: Progressing  Goal: Patient will remain free of falls  Description: INTERVENTIONS:  - Educate patient/family on patient safety including physical limitations  - Instruct patient to call for assistance with activity   - Consult OT/PT to assist with strengthening/mobility   - Keep Call bell within reach  - Keep bed low and locked with side rails adjusted as appropriate  - Keep care items and personal belongings within reach  - Initiate and maintain comfort rounds  - Make Fall Risk Sign visible to staff  - Offer Toileting every 2 Hours, in advance of need  - Initiate/Maintain bed alarm  - Apply yellow socks and bracelet for high fall risk patients  - Consider moving patient to room near nurses station  Outcome: Progressing     Problem: DISCHARGE PLANNING  Goal: Discharge to home or other facility with appropriate resources  Description: INTERVENTIONS:  - Identify barriers to discharge w/patient and caregiver  - Arrange for needed discharge resources and transportation as appropriate  - Identify discharge learning needs (meds, wound care, etc )  - Arrange for interpretive services to assist at discharge as needed  - Refer to Case Management Department for coordinating discharge planning if the patient needs post-hospital services based on physician/advanced practitioner order or complex needs related to functional status, cognitive ability, or social support system  Outcome: Progressing     Problem: Knowledge Deficit  Goal: Patient/family/caregiver demonstrates understanding of disease process, treatment plan, medications, and discharge instructions  Description: Complete learning assessment and assess knowledge base    Interventions:  - Provide teaching at level of understanding  - Provide teaching via preferred learning methods  Outcome: Progressing     Problem: RESPIRATORY - ADULT  Goal: Achieves optimal ventilation and oxygenation  Description: INTERVENTIONS:  - Assess for changes in respiratory status  - Assess for changes in mentation and behavior  - Position to facilitate oxygenation and minimize respiratory effort  - Oxygen administered by appropriate delivery if ordered  - Initiate smoking cessation education as indicated  - Encourage broncho-pulmonary hygiene including cough, deep breathe, Incentive Spirometry  - Assess the need for suctioning and aspirate as needed  - Assess and instruct to report SOB or any respiratory difficulty  - Respiratory Therapy support as indicated  Outcome: Progressing

## 2023-04-26 NOTE — ASSESSMENT & PLAN NOTE
· Present on admission secondary to suspected pneumonia with hypothermia, tachycardia, tachypnea  · Will follow-up official chest x-ray reading  · Procalcitonin elevated, will continue to trend  · Lactate elevated we will check repeat in 2 hours  · Continue IV fluids with normal saline bolus  · Follow-up cultures  · Continue antibiotics with cefepime/vancomycin  · MRSA screen  · Strep pneumo/urine Legionella  · Sputum culture

## 2023-04-26 NOTE — DISCHARGE INSTR - OTHER ORDERS
Skin care Plan:  1-Protect sacrum w/Allevyn foam, tomasa with t for treatment,, change daily and PRN  2-Turn/reposition q2h or when medically stable for pressure re-distribution on skin   Use foam wedges   3-Elevate heels to offload pressure  4-Moisturize skin daily with skin nourishing cream  5-Ehob cushion in chair when out of bed when appropriate   6-Allevyn foam to heels, tomasa w/P, peel foam check skin integrity q-shift  Change q3d   8-Cleanse left arm skin tear with normal saline, apply Dermagran to wound bed only  cover with dsd and wrap with siri change qod and prn soil or dislodgment

## 2023-04-26 NOTE — H&P
[address]  H&P  Name: Juan José Coon 78 y o  male I MRN: [id number]  Unit/Bed#: ED 01 I Date of Admission: 4/26/2023   Date of Service: 4/26/2023 I Hospital Day: 0      Hyperkalemia-continue IV fluids  Repeat BMP in the morning  Monitor on telemetry  Nephrology consult  Hypercalcemia-continue IV fluids  Monitor on telemetry  Possibly secondary to underlying malignancy  Nephrology consultation  Repeat BMP in the morning    Assessment/Plan   * Severe sepsis (HCC)  Assessment & Plan  · Present on admission secondary to suspected pneumonia with hypothermia, tachycardia, tachypnea  · Will follow-up official chest x-ray reading  · Procalcitonin elevated, will continue to trend  · Lactate elevated we will check repeat in 2 hours  · Continue IV fluids with normal saline bolus  · Follow-up cultures  · Continue antibiotics with cefepime/vancomycin  · MRSA screen  · Strep pneumo/urine Legionella  · Sputum culture    Pressure injury of skin of buttock  Assessment & Plan  Present on admission  Wound care consult    Acute respiratory failure with hypoxia [facility])  Assessment & Plan  Secondary to pneumonia  Will continue oxygen and titrate as tolerated    Other pulmonary embolism without acute cor pulmonale (HCC)  Assessment & Plan  · History of PE  We will continue Eliquis    Severe protein-calorie malnutrition ([address] )  Assessment & Plan  Body mass index is 15 41 kg/m²  Nutrition evaluation    Continue tube feeds    Primary malignant neoplasm of base of tongue ([facility] [address] )  Assessment & Plan  · Patient was following with [facility] earlier in the year however has not had treatment recently as he was admitted to the hospital and then discharged to rehab patient has been in rehab for the last few weeks and malignancy treatment has been on hold  · Will need outpatient follow-up with hematology/oncology    Type 2 diabetes mellitus without complication, without long-term current use of insulin [facility])  Assessment & Plan  Lab Results   Component Value Date    HGBA1C 5 8 (H) 12/30/2022       No results for input(s): POCGLU in the last 72 hours  Blood Sugar Average: Last 72 hrs:  · Insulin sliding scale  · Patient is on tube feeds    Dysphagia, unspecified  Assessment & Plan  PEG tube in place  Will get swallow evaluation         VTE Prophylaxis: Apixaban (Eliquis)  Code Status: DNR/DNI  Discussion with family: Spoke with patient's wife at bedside regarding plan of care    Anticipated Length of Stay:  Patient will be admitted on an Inpatient basis with an anticipated length of stay of  > 2 midnights  Justification for Hospital Stay: Sepsis    Chief Complaint:   Shortness of breath    History of Present Illness:    Fadumo Bravo is a 78 y o  male who presents with shortness of breath  Patient states that over the last few days he has had worsening shortness of breath  Also with cough productive of clear sputum  Patient is typically not on oxygen but has been requiring anywhere from 2 to 3 L of oxygen  Patient also has had significant amount of weakness and fatigue over the last 7 to 10 days  Significant weight loss per wife, unable to quantify how much  Of note patient does have history of tongue cancer and has been on chemotherapy in the past, this was put on hold due to recent hospitalization and discharged to rehab  Patient typically follows with [facility] however as mentioned above patient has not seen them for over 1 month now due to being in rehab  Patient is also on tube feeds and does not take any oral feeding  In the ER patient with possible pneumonia, noted to be hypothermic, elevated procalcitonin and lactate  Patient was given IV antibiotics and admitted for further evaluation/treatment    Review of Systems:    Review of Systems   Constitutional: Positive for activity change, chills and fatigue  Respiratory: Positive for cough and shortness of breath      Cardiovascular: Negative for chest pain  Musculoskeletal: Positive for arthralgias and gait problem  Neurological: Positive for weakness  All other systems reviewed and are negative  Past Medical and Surgical History:     Past Medical History:   Diagnosis Date    Cancer ([facility] )     [facility]     Gastritis     GERD (gastroesophageal reflux disease)     History of bladder cancer 2010    treated with surgery    Hypercholesterolemia     Hypertension     Hyponatremia 12/22/2021    Lactic acidosis 12/22/2021       Past Surgical History:   Procedure Laterality Date    BLADDER TUMOR EXCISION      EGD      IR CHOLECYSTOSTOMY TUBE CHECK/CHANGE/REPOSITION/REINSERTION/UPSIZE  1/5/2022    IR CHOLECYSTOSTOMY TUBE CHECK/CHANGE/REPOSITION/REINSERTION/UPSIZE  3/4/2022    IR CHOLECYSTOSTOMY TUBE CHECK/CHANGE/REPOSITION/REINSERTION/UPSIZE  3/24/2022    IR CHOLECYSTOSTOMY TUBE PLACEMENT  12/27/2021    IR THORACENTESIS  3/23/2023       Meds/Allergies:    Prior to Admission medications    Medication Sig Start Date End Date Taking? Authorizing Provider   apixaban (ELIQUIS) 5 mg Take 2 tablets (10 mg total) by mouth 2 (two) times a day for 12 doses 3/28/23 4/3/23  Erika Klein MD   apixaban (ELIQUIS) 5 mg Take 1 tablet (5 mg total) by mouth 2 (two) times a day Do not start before April 3, 2023   4/3/23   Erika Klein MD   atorvastatin (LIPITOR) 20 mg tablet Take 1 tablet (20 mg total) by mouth daily with dinner 11/26/22 12/26/22  [facility],    famotidine (PEPCID) 40 MG tablet Take 40 mg by mouth 2 (two) times a day    Historical Provider, MD   fluconazole (DIFLUCAN) 40 mg/mL suspension Take by mouth daily    Historical Provider, MD   fluticasone (FLONASE) 50 mcg/act nasal spray 1 spray into each nostril daily    Historical Provider, MD   insulin lispro (HumaLOG) 100 units/mL injection Inject 1-5 Units under the skin 3 (three) times a day before meals 3/28/23   Erika Klein MD "  Metformin HCl (RIOMET) 500 MG/5ML oral solution Take 500 mg by mouth 2 (two) times a day    Historical Provider, MD   ondansetron (ZOFRAN) 4 mg tablet Take 4 mg by mouth every 8 (eight) hours as needed for nausea or vomiting    Historical Provider, MD   predniSONE 5 mg tablet Take by mouth daily    Historical Provider, MD   sodium chloride, PF, 0 9 % 10 mL by Intracatheter route daily Intracatheter flushing daily 12/30/21 3/30/22  Pierre Bethea PA-C     all medications and allergies reviewed    Allergies: No Known Allergies    Social History:     Marital Status: /Civil Union   Occupation: None  Patient Pre-hospital Living Situation: Currently in [address]  Patient Pre-hospital Level of Mobility: Typically ambulatory however over the last 10 days has not been ambulating much due to significant weakness  Patient Pre-hospital Diet Restrictions: None  Substance Use History:   Social History     Substance and Sexual Activity   Alcohol Use Never     Social History     Tobacco Use   Smoking Status Former    Types: Cigarettes    Quit date: 2021    Years since quittin 6    Passive exposure: Past   Smokeless Tobacco Never     Social History     Substance and Sexual Activity   Drug Use Never       Family History:  I have reviewed the patient's family history    Physical Exam:     Vitals:   Blood Pressure: 107/68 (23 0946)  Pulse: 98 (23 1215)  Temperature: (!) 96 °F (35 6 °C) (23)  Temp Source: Tympanic (23)  Respirations: 20 (23 1215)  Height: 6' 2\" (188 cm) (23)  Weight - Scale: 54 4 kg (120 lb) (23)  SpO2: 98 % (23 1215)    Physical Exam  Constitutional:       Appearance: He is ill-appearing  Comments: Frail, cachectic elderly male   HENT:      Head: Normocephalic and atraumatic  Nose: Nose normal       Mouth/Throat:      Mouth: Mucous membranes are dry     Eyes:      Extraocular Movements: Extraocular movements " intact  Conjunctiva/sclera: Conjunctivae normal    Cardiovascular:      Rate and Rhythm: Normal rate and regular rhythm  Pulmonary:      Effort: Pulmonary effort is normal  No respiratory distress  Abdominal:      Palpations: Abdomen is soft  Tenderness: There is no abdominal tenderness  Comments: PEG tube noted   Musculoskeletal:         General: Normal range of motion  Cervical back: Normal range of motion and neck supple  Comments: Generalized weakness   Skin:     General: Skin is warm and dry  Neurological:      General: No focal deficit present  Mental Status: He is alert  Mental status is at baseline  Cranial Nerves: No cranial nerve deficit  Psychiatric:         Mood and Affect: Mood normal          Behavior: Behavior normal          Additional Data:     Lab Results: I have personally reviewed pertinent reports  Results from last 7 days   Lab Units 04/26/23  1051   WBC Thousand/uL 8 20   HEMOGLOBIN g/dL 12 4   HEMATOCRIT % 41 2   PLATELETS Thousands/uL 438*   BANDS PCT % 12*   LYMPHO PCT % 6*   MONO PCT % 6   EOS PCT % 0     Results from last 7 days   Lab Units 04/26/23  1051   SODIUM mmol/L 134*   POTASSIUM mmol/L 5 5*   CHLORIDE mmol/L 93*   CO2 mmol/L 29   BUN mg/dL 32*   CREATININE mg/dL 0 67   ANION GAP mmol/L 12   CALCIUM mg/dL 12 7*   ALBUMIN g/dL 3 2*   TOTAL BILIRUBIN mg/dL 0 51   ALK PHOS U/L 123*   ALT U/L 57*   AST U/L 33   GLUCOSE RANDOM mg/dL 125     Results from last 7 days   Lab Units 04/26/23  1051   INR  1 91*             Results from last 7 days   Lab Units 04/26/23  1132 04/26/23  1051   LACTIC ACID mmol/L 4 9*  --    PROCALCITONIN ng/ml  --  0 56*       Imaging: I have personally reviewed pertinent reports  XR chest portable    (Results Pending)     [facility] / Care Everywhere Records Reviewed: Yes    ** Please Note: This note has been constructed using a voice recognition system   **

## 2023-04-26 NOTE — ASSESSMENT & PLAN NOTE
· Possibly secondary to patient's underlying malignancy  · Continue IV fluids  · Repeat BMP in the morning  · Consult nephrology

## 2023-04-26 NOTE — ASSESSMENT & PLAN NOTE
· Unclear etiology, possibly due to dehydration and/or related to patient's tube feeds  · Continue IV fluids  · Patient received insulin/dextrose, albuterol  · Repeat BMP  · Nephrology consultation

## 2023-04-26 NOTE — ASSESSMENT & PLAN NOTE
Lab Results   Component Value Date    HGBA1C 5 8 (H) 12/30/2022       No results for input(s): POCGLU in the last 72 hours      Blood Sugar Average: Last 72 hrs:  · Insulin sliding scale  · Patient is on tube feeds

## 2023-04-26 NOTE — PROGRESS NOTES
Verlena Hatchet is a 78 y o  male who is currently ordered Vancomycin IV with management by the Pharmacy Consult service  Relevant clinical data and objective / subjective history reviewed  Vancomycin Assessment:  Indication and Goal AUC/Trough: Pneumonia (goal -600, trough >10), -600, trough >10  Clinical Status: stable  Micro:   pending  Renal Function:  SCr: 00 67 mg/dL  CrCl: 73 3 mL/min  Renal replacement: Not on dialysis  Days of Therapy: 1  Current Dose: 1250mg IV Q24H  Vancomycin Plan:  New Dosinmg IV Q24H  Estimated AUC: 406 mcg*hr/mL  Estimated Trough: 10 2 mcg/mL  Next Level:  with AM labs  Renal Function Monitoring: Daily BMP and UOP  Pharmacy will continue to follow closely for s/sx of nephrotoxicity, infusion reactions and appropriateness of therapy  BMP and CBC will be ordered per protocol  We will continue to follow the patients culture results and clinical progress daily      Marilee Gr, Pharmacist

## 2023-04-26 NOTE — WOUND OSTOMY CARE
Consult Note - Wound   Gurwinder Son 78 y o  male MRN: [id number]  Unit/Bed#: -01 Encounter: [id number]      History and Present Illness:78year old male , residing at [facility]  Pt has tongue and lung CA, presented to ED with increasing weakness  He has had recent hospitalizations for sepsis,pneumona and pressure injuries of buttocks  Per notes in EMR patient and spouse have been recently discussing hospice and palliative care  Assessment Findings:   Patent supine in bed, Dependent for care  In process of admission and Rn had just assessed, evaluation of wounds with discussion of wounds assessed on admission,redressed and photographed wounds of left arm skin tear, and sacrum  Per Rn report  1)Bilateral heels dry and intact, Allevyn heel foams applied as preventative measure  2)Resolving skin tear to left arm, dermagran and dsd qod  3)History of pressure injuries to buttocks and sacrum  POA previous unstageable wounds to bilateral buttocks and sacrum  Review of photo buttocks wounds appear as intact erythema  And gluteal cleft has 80% pink wound bed and 20% light tan slough  Allevyn foam applied  No induration, fluctuance, odor, warmth/temperature differences, redness, or purulence noted to the above noted wounds and skin areas assessed  New dressings applied per orders listed below  Patient tolerated well- no s/s of non-verbal pain or discomfort observed during the encounter  Bedside nurse aware of plan of care  See flow sheets for more detailed assessment findings  Wound care will continue to follow  Skin care Plan:  1-Protect sacrum w/Allevyn foam, tomasa with t for treatment,, change daily and PRN  2-Turn/reposition q2h or when medically stable for pressure re-distribution on skin   Use foam wedges   3-Elevate heels to offload pressure  4-Moisturize skin daily with skin nourishing cream  5-Ehob cushion in chair when out of bed when appropriate   6-Allevyn foam to heels, tomasa w/P, peel foam check skin integrity q-shift  Change q3d   8-Cleanse left arm skin tear with normal saline, apply Dermagran to wound bed only  cover with dsd and wrap with siri change qod and prn soil or dislodgment              Call or tigertext with any questions  Wound Care will continue to follow    Dorma Meigs BSN RN

## 2023-04-26 NOTE — ED NOTES
"Pt  States \" I think it's time to die\"  I am waiting for grandson to come from [place] on Friday evening  Denies suicidal thoughts  States is due to cancer       Jamarcus Romo RN  04/26/23 0361    "

## 2023-04-26 NOTE — ED PROVIDER NOTES
"History  Chief Complaint   Patient presents with    Weakness - Generalized     States has tongue and lung CA  Sent here from [address] [place] [address] with weakness  EMS states family considering Hospice  Patient is a 72-year-old male with past medical history diabetes, distant history of bladder cancer, currently with tongue and lung cancer with mets who presents today with weakness, SOB, and \"feeling lousy  \"  Pt is a resident at [address] [place] [address] and is a DNR, considering hospice  Recent hospitalization for sepsis, pneumonia, PE's started on Eliquis, PU of buttocks  Pt reports increased SOB and cough, no chest pain, decreased appetite  Denies any abdominal pain, nausea, vomiting  Wife at bedside states she had a hospice meeting two days ago at [facility] [address], but did not find it helpful and was overwhelmed with the information  Pt was ambulating up until early this week and she felt he had pneumonia again yesterday when she visited  She is interested in [facility] here and is wondering about evaluating if his cancer has progressed once he is stable  Prior to Admission Medications   Prescriptions Last Dose Informant Patient Reported? Taking? Metformin HCl (RIOMET) 500 MG/5ML oral solution   Yes No   Sig: Take 500 mg by mouth 2 (two) times a day   apixaban (ELIQUIS) 5 mg   No No   Sig: Take 2 tablets (10 mg total) by mouth 2 (two) times a day for 12 doses   apixaban (ELIQUIS) 5 mg   No No   Sig: Take 1 tablet (5 mg total) by mouth 2 (two) times a day Do not start before April 3, 2023     atorvastatin (LIPITOR) 20 mg tablet   No No   Sig: Take 1 tablet (20 mg total) by mouth daily with dinner   famotidine (PEPCID) 40 MG tablet   Yes No   Sig: Take 40 mg by mouth 2 (two) times a day   fluconazole (DIFLUCAN) 40 mg/mL suspension   Yes No   Sig: Take by mouth daily   fluticasone (FLONASE) 50 mcg/act nasal spray   Yes No   Si spray into each nostril daily   insulin lispro (HumaLOG) 100 units/mL " injection   No No   Sig: Inject 1-5 Units under the skin 3 (three) times a day before meals   ondansetron (ZOFRAN) 4 mg tablet   Yes No   Sig: Take 4 mg by mouth every 8 (eight) hours as needed for nausea or vomiting   predniSONE 5 mg tablet   Yes No   Sig: Take by mouth daily   sodium chloride, PF, 0 9 %   No No   Sig: 10 mL by Intracatheter route daily Intracatheter flushing daily      Facility-Administered Medications: None       Past Medical History:   Diagnosis Date    Cancer ([place] [address] )     Diabetes ([place] [address] )     Gastritis     GERD (gastroesophageal reflux disease)     History of bladder cancer     treated with surgery    Hypercholesterolemia     Hypertension     Hyponatremia 2021    Lactic acidosis 2021       Past Surgical History:   Procedure Laterality Date    BLADDER TUMOR EXCISION      EGD      IR CHOLECYSTOSTOMY TUBE CHECK/CHANGE/REPOSITION/REINSERTION/UPSIZE  2022    IR CHOLECYSTOSTOMY TUBE CHECK/CHANGE/REPOSITION/REINSERTION/UPSIZE  3/4/2022    IR CHOLECYSTOSTOMY TUBE CHECK/CHANGE/REPOSITION/REINSERTION/UPSIZE  3/24/2022    IR CHOLECYSTOSTOMY TUBE PLACEMENT  2021    IR THORACENTESIS  3/23/2023       Family History   Problem Relation Age of Onset    Cancer Mother     Melanoma Neg Hx      I have reviewed and agree with the history as documented  E-Cigarette/Vaping    E-Cigarette Use Never User      E-Cigarette/Vaping Substances    Nicotine No     THC No     CBD No     Flavoring No     Other No     Unknown No      Social History     Tobacco Use    Smoking status: Former     Types: Cigarettes     Quit date: 2021     Years since quittin 6     Passive exposure: Past    Smokeless tobacco: Never   Vaping Use    Vaping Use: Never used   Substance Use Topics    Alcohol use: Never    Drug use: Never       Review of Systems   Constitutional: Positive for activity change, appetite change and fatigue  Negative for chills, diaphoresis and fever     HENT: Negative for congestion, rhinorrhea, sore throat and trouble swallowing  Eyes: Negative for pain  Respiratory: Positive for shortness of breath  Negative for cough, chest tightness, wheezing and stridor  Cardiovascular: Negative for chest pain, palpitations and leg swelling  Gastrointestinal: Negative for abdominal pain, constipation, diarrhea, nausea and vomiting  Genitourinary: Negative for dysuria, flank pain, hematuria and urgency  Musculoskeletal: Negative for back pain, gait problem and myalgias  Skin: Positive for pallor and wound (multiple skin tears and bruising upper and lower extremities)  Negative for rash  Neurological: Positive for weakness  Negative for dizziness, syncope, facial asymmetry, speech difficulty, light-headedness and headaches  Hematological: Bruises/bleeds easily  Psychiatric/Behavioral: Negative for confusion  All other systems reviewed and are negative  Physical Exam  Physical Exam  Vitals and nursing note reviewed  Constitutional:       General: He is awake  He is not in acute distress  Appearance: Normal appearance  He is cachectic  He is ill-appearing  He is not toxic-appearing or diaphoretic  HENT:      Head: Normocephalic and atraumatic  Right Ear: Hearing and external ear normal       Left Ear: Hearing and external ear normal       Nose: Nose normal       Mouth/Throat:      Lips: Pink  Mouth: Mucous membranes are dry  Dentition: Abnormal dentition (edentulous)  Eyes:      General: Lids are normal  No scleral icterus  Right eye: No discharge  Left eye: No discharge  Extraocular Movements: Extraocular movements intact  Conjunctiva/sclera: Conjunctivae normal       Pupils: Pupils are equal, round, and reactive to light  Cardiovascular:      Rate and Rhythm: Regular rhythm  Tachycardia present  Pulses:           Radial pulses are 2+ on the right side and 2+ on the left side        Heart sounds: Normal heart sounds  Pulmonary:      Effort: Pulmonary effort is normal  No accessory muscle usage or respiratory distress  Breath sounds: Decreased air movement present  No stridor  Decreased breath sounds present  No wheezing, rhonchi or rales  Abdominal:      General: Abdomen is flat  Bowel sounds are normal       Palpations: Abdomen is soft  Tenderness: There is no abdominal tenderness  There is no right CVA tenderness, left CVA tenderness, guarding or rebound  Musculoskeletal:         General: No tenderness, deformity or signs of injury  Cervical back: Full passive range of motion without pain, normal range of motion and neck supple  Right lower leg: No edema  Left lower leg: No edema  Comments: Pt cachectic, muscle atrophy and weak throughout  Lymphadenopathy:      Cervical: No cervical adenopathy  Skin:     General: Skin is cool and dry  Capillary Refill: Capillary refill takes 2 to 3 seconds  Coloration: Skin is pale  Skin is not cyanotic, jaundiced or mottled  Findings: Bruising and wound present  No erythema or rash  Comments: Multiple skin tears yarely UEs  Neurological:      General: No focal deficit present  Mental Status: He is alert and oriented to person, place, and time  Mental status is at baseline  Sensory: Sensation is intact  Motor: Weakness, atrophy and abnormal muscle tone present  No tremor or pronator drift  Comments: Pt too weak to ambulate   Psychiatric:         Attention and Perception: Attention normal          Mood and Affect: Mood normal          Speech: Speech normal          Behavior: Behavior normal  Behavior is cooperative  Thought Content:  Thought content normal          Cognition and Memory: Cognition normal          Judgment: Judgment normal          Vital Signs  ED Triage Vitals   Temperature Pulse Respirations Blood Pressure SpO2   04/26/23 0946 04/26/23 0946 04/26/23 0946 04/26/23 0946 04/26/23 0946   (!) 96 °F (35 6 °C) (!) 109 18 107/68 95 %      Temp Source Heart Rate Source Patient Position - Orthostatic VS BP Location FiO2 (%)   04/26/23 0946 04/26/23 1330 04/26/23 1330 04/26/23 0946 --   Tympanic Monitor Lying Left arm       Pain Score       04/26/23 0946       No Pain           Vitals:    04/26/23 1215 04/26/23 1330 04/26/23 1415 04/26/23 1528   BP:  113/65 125/68 123/77   Pulse: 98 102 93 99   Patient Position - Orthostatic VS:  Lying Lying          Visual Acuity      ED Medications  Medications   lactated ringers bolus 1,000 mL ( Intravenous Canceled Entry 4/26/23 1409)   sodium chloride 0 9 % bolus 1,000 mL (1,000 mL Intravenous New Bag 4/26/23 1352)   sodium chloride 0 9 % infusion (75 mL/hr Intravenous New Bag 4/26/23 1419)   acetaminophen (TYLENOL) tablet 650 mg (has no administration in time range)   ondansetron (ZOFRAN) injection 4 mg (has no administration in time range)   cefepime (MAXIPIME) IVPB (premix in dextrose) 2,000 mg 50 mL (2,000 mg Intravenous New Bag 4/26/23 1403)   vancomycin (VANCOCIN) IVPB (premix in dextrose) 500 mg 100 mL (500 mg Intravenous New Bag 4/26/23 1608)   vancomycin (VANCOCIN) 1,250 mg in sodium chloride 0 9 % 250 mL IVPB (has no administration in time range)   insulin lispro (HumaLOG) 100 units/mL subcutaneous injection 1-5 Units (has no administration in time range)   atorvastatin (LIPITOR) tablet 20 mg (has no administration in time range)   apixaban (ELIQUIS) tablet 5 mg (has no administration in time range)   famotidine (PEPCID) tablet 20 mg (has no administration in time range)   lactated ringers bolus 500 mL (0 mL Intravenous Stopped 4/26/23 1230)   insulin regular (HumuLIN R,NovoLIN R) injection 5 Units (5 Units Subcutaneous Given 4/26/23 1358)   dextrose 50 % IV solution 25 mL (25 mL Intravenous Given 4/26/23 1228)   albuterol inhalation solution 5 mg (5 mg Nebulization Given 4/26/23 1237)   vancomycin (VANCOCIN) IVPB (premix in dextrose) 750 mg 150 mL (0 mg Intravenous Stopped 4/26/23 1356)   lactated ringers bolus 500 mL (0 mL Intravenous Stopped 4/26/23 1355)       Diagnostic Studies  Results Reviewed     Procedure Component Value Units Date/Time    Blood culture #1 [[id number]] Collected: 04/26/23 1051    Lab Status: Preliminary result Specimen: Blood from Arm, Right Updated: 04/26/23 1602     Blood Culture Received in Microbiology Lab  Culture in Progress  Blood culture #2 [[id number]] Collected: 04/26/23 1051    Lab Status: Preliminary result Specimen: Blood from Arm, Right Updated: 04/26/23 1602     Blood Culture Received in Microbiology Lab  Culture in Progress  MRSA culture [[id number]] Collected: 04/26/23 1527    Lab Status: In process Specimen: Nares from Nose Updated: 04/26/23 1540    Fingerstick Glucose (POCT) [[id number]]  (Abnormal) Collected: 04/26/23 1408    Lab Status: Final result Updated: 04/26/23 1411     POC Glucose 179 mg/dl     Sputum culture and Gram stain [[id number]]     Lab Status: No result Specimen: Sputum     Strep Pneumoniae, Urine [[id number]]     Lab Status: No result Specimen: Urine     Legionella antigen, urine [[id number]]     Lab Status: No result Specimen: Urine     Lactic acid [[id number]]  (Abnormal) Collected: 04/26/23 1132    Lab Status: Final result Specimen: Blood from Arm, Right Updated: 04/26/23 1157     LACTIC ACID 4 9 mmol/L     Narrative:      Result may be elevated if tourniquet was used during collection      Lactic acid 2 Hours [[id number]]     Lab Status: No result Specimen: Blood     Manual Differential(PHLEBS Do Not Order) [[id number]]  (Abnormal) Collected: 04/26/23 1051    Lab Status: Final result Specimen: Blood from Arm, Right Updated: 04/26/23 1128     Segmented % 76 %      Bands % 12 %      Lymphocytes % 6 %      Monocytes % 6 %      Eosinophils, % 0 %      Basophils % 0 %      Absolute Neutrophils 7 22 Thousand/uL      Lymphocytes Absolute 0 49 Thousand/uL      Monocytes Absolute 0 49 Thousand/uL Eosinophils Absolute 0 00 Thousand/uL      Basophils Absolute 0 00 Thousand/uL      Total Counted --     RBC Morphology Normal     Platelet Estimate Increased    CBC and differential [[id number]]  (Abnormal) Collected: 04/26/23 1051    Lab Status: Final result Specimen: Blood from Arm, Right Updated: 04/26/23 1128     WBC 8 20 Thousand/uL      RBC 4 63 Million/uL      Hemoglobin 12 4 g/dL      Hematocrit 41 2 %      MCV 89 fL      MCH 26 8 pg      MCHC 30 1 g/dL      RDW 18 1 %      MPV 9 7 fL      Platelets 776 Thousands/uL     Narrative: This is an appended report  These results have been appended to a previously verified report      Procalcitonin [[id number]]  (Abnormal) Collected: 04/26/23 1051    Lab Status: Final result Specimen: Blood from Arm, Right Updated: 04/26/23 1126     Procalcitonin 0 56 ng/ml     Comprehensive metabolic panel [[id number]]  (Abnormal) Collected: 04/26/23 1051    Lab Status: Final result Specimen: Blood from Arm, Right Updated: 04/26/23 1122     Sodium 134 mmol/L      Potassium 5 5 mmol/L      Chloride 93 mmol/L      CO2 29 mmol/L      ANION GAP 12 mmol/L      BUN 32 mg/dL      Creatinine 0 67 mg/dL      Glucose 125 mg/dL      Calcium 12 7 mg/dL      Corrected Calcium 13 3 mg/dL      AST 33 U/L      ALT 57 U/L      Alkaline Phosphatase 123 U/L      Total Protein 7 7 g/dL      Albumin 3 2 g/dL      Total Bilirubin 0 51 mg/dL      eGFR 93 ml/min/1 73sq m     Narrative:      [facility] guidelines for Chronic Kidney Disease (CKD):     Stage 1 with normal or high GFR (GFR > 90 mL/min/1 73 square meters)    Stage 2 Mild CKD (GFR = 60-89 mL/min/1 73 square meters)    Stage 3A Moderate CKD (GFR = 45-59 mL/min/1 73 square meters)    Stage 3B Moderate CKD (GFR = 30-44 mL/min/1 73 square meters)    Stage 4 Severe CKD (GFR = 15-29 mL/min/1 73 square meters)    Stage 5 End Stage CKD (GFR <15 mL/min/1 73 square meters)  Note: GFR calculation is accurate only with a steady state creatinine    APTT [[id number]]  (Normal) Collected: 04/26/23 1051    Lab Status: Final result Specimen: Blood from Arm, Right Updated: 04/26/23 1113     PTT 33 seconds     Protime-INR [[id number]]  (Abnormal) Collected: 04/26/23 1051    Lab Status: Final result Specimen: Blood from Arm, Right Updated: 04/26/23 1113     Protime 21 8 seconds      INR 1 91    UA w Reflex to Microscopic w Reflex to Culture [[id number]]     Lab Status: No result Specimen: Urine                  XR chest portable    (Results Pending)              Procedures  ECG 12 Lead Documentation Only    Date/Time: 4/26/2023 10:23 AM  Performed by: Nelson Rosas PA-C  Authorized by: Maria R Tijerina PA-C     Indications / Diagnosis:  Weakness, SOB  ECG reviewed by me, the ED Provider: yes    Patient location:  ED  Previous ECG:     Previous ECG:  Compared to current    Similarity:  No change  Interpretation:     Interpretation: normal    Rate:     ECG rate:  107    ECG rate assessment: normal    Rhythm:     Rhythm: sinus tachycardia    Ectopy:     Ectopy: none    QRS:     QRS axis:  Right    QRS intervals:  Normal  Conduction:     Conduction: abnormal      Abnormal conduction: complete RBBB    ST segments:     ST segments:  Normal  T waves:     T waves: normal    Comments:      Sinus tachycardia with RBBB, , unchanged from prior ECG 3/2023             ED Course  ED Course as of 04/26/23 1626   Wed Apr 26, 2023   1109 WBC: 8 20   1122 Calcium(!!): 12 7   1122 CORRECTED CALCIUM(!!): 13 3   1123 Potassium(!): 5 5  Insulin/D50 and albuterol ordered     1127 Procalcitonin(!): 0 56   1140 Bands Relative(!): 12   1157 Discussed case with SLIM, will admit to inpatient  Obtain MRSA screen and start on cefepime and vanco given recent admission      1158 LACTIC ACID(!!): 4 9   1200 Ordered LR, to reach 30 mg/kg total will give 2L total                                SBIRT 20yo+    Flowsheet Row Most Recent Value   Initial Alcohol Screen: US AUDIT-C     1  How often do you have a drink containing alcohol? 0 Filed at: 04/26/2023 0949   Audit-C Score 0 Filed at: 04/26/2023 8723   MARKO: How many times in the past year have you    Used an illegal drug or used a prescription medication for non-medical reasons? Never Filed at: 04/26/2023 [facility]      Patient with history as above presented with weakness and SOB  History obtained from patient and wife when she arrived later  Pt requested EMS transport due to weakness and SOB  Patient was ill appearing, tachycardic, stable BP, mentating well, O2sat mid 90s on 5L O2  Exam as above  Presentation concerning for sepsis/PNA/worsening lung cancer  CBC, CMP, lactate, procal, PT/INR/PTT, blood cultures, EKG d/t SOB, and CXR obtained  Pt not having CP, neck/jaw pain, arm pain, back pain, or other symptoms concerning for ACS  Pt recently started on Eliquis for PE's during prior admission  EKG reviewed  Sinus tachycardia with RBBB  Labs reviewed  Significant for hyperkalemia, hypercalcemia, elevated lactate, elevated procal     Independently reviewed imaging  Chest x-ray concerning for pulmonary edema, similar to prior study in March 2023 when he was admitted for PNA  Pt with known hx of lung CA actively receiving treatment at [address] [place] [address] Drive  Reviewed external records  Differential diagnosis considered  Overall presentation is consistent with sepsis, likely from pneumonia, weakness, and SOB with hyperkalemia and hypercalcemia  Low suspicion for meningitis, endocarditis, or other serious infection  Patient has normal oxygen levels but is on 5L NC  Uncertain oxygen requirements while at [address]  Nursing reports patient states he understands he is very ill and is ready to be comfortable and die  No active suicidal ideation with plan  He is in a lot of pain and feels very weak from treatment  Wife at bedside    She is uncertain of oxygen requirements at [address] [place] [address]  States they were introduced to palliative care there two days ago, however, she was not happy with how the consult went and is open for a palliative care/hospice consult here while the patient is inpatient  She wants a better understanding of his lung cancer progression and what the treatment/prognosis entails  Explained the lab work thus far, CXR, and the current treatment for sepsis and abnormal electrolytes  She verbalized understand and both she and the patient verbalized agreement with the plan to admit for treatment and consult for palliative care  Patient declined pain medication  Started on IV fluids at 30 cc/kg for sepsis and hypercalemia, IV antibiotics of cefepime and vanco per request of SLIM, and insulin/D50 and albuterol for treatment of hyperkalemia  Disposition:  Discussed case with NICHOLAS, who agreed to admit patient for sepsis, weakness, SOB, hyperkalemia, and hypercalcemia to the service of Dr Eber Castano under inpatient status  Hypercalcemia: acute illness or injury  Hyperkalemia: acute illness or injury  Sepsis [facility]): acute illness or injury  Shortness of breath: acute illness or injury  Weakness: acute illness or injury  Amount and/or Complexity of Data Reviewed  External Data Reviewed: labs, radiology, ECG and notes  Labs: ordered  Decision-making details documented in ED Course  Radiology: ordered  ECG/medicine tests: ordered and independent interpretation performed  Decision-making details documented in ED Course  Risk  OTC drugs  Prescription drug management  Decision regarding hospitalization  Risk Details: Total critical care time: Approximately 45 minutes  Due to a high probability of clinically significant, life threatening deterioration, the patient required my highest level of preparedness to intervene emergently and I personally spent this critical care time directly and personally managing the patient   This critical care time included obtaining a history; examining the patient; pulse oximetry; ordering and review of studies; arranging urgent treatment with development of a management plan; evaluation of patient's response to treatment; frequent reassessment; and, discussions with other providers  This critical care time was performed to assess and manage the high probability of imminent, life-threatening deterioration that could result in multi-organ failure  It was exclusive of separately billable procedures and treating other patients and teaching time  Please see MDM section and the rest of the note for further information on patient assessment and treatment  Disposition  Final diagnoses:   Weakness   Shortness of breath   Sepsis ([facility] )   Hyperkalemia   Hypercalcemia     Time reflects when diagnosis was documented in both MDM as applicable and the Disposition within this note     Time User Action Codes Description Comment    4/26/2023 11:12 AM Michael Loera, April Add [L03 115] Cellulitis of right leg     4/26/2023 11:12 AM Michael Loera, April Remove [E14 374] Cellulitis of right leg     4/26/2023 11:59 AM Tredede, April Add [R53 1] Weakness     4/26/2023 11:59 AM Michael Loera, April Add [R06 02] Shortness of breath     4/26/2023 11:59 AM Michael Loera, April Add [A41 9] Sepsis ([place] [address] )     4/26/2023  1:40 PM Adonay Rotunda Add [E43] Severe protein-calorie malnutrition ([place] [address] )     4/26/2023  1:41 PM Adonay Rotunda Add [J18 9] PNA (pneumonia)     4/26/2023  1:51 PM Adonay Rotunda Add [E87 5] Hyperkalemia     4/26/2023  4:25 PM Michael Loera, April Modify [E87 5] Hyperkalemia     4/26/2023  4:26 PM Michael Loera, April Add [E83 52] Hypercalcemia       ED Disposition     ED Disposition   Admit    Condition   Stable    Date/Time   Wed Apr 26, 2023 11:59 AM    Comment   Case was discussed with NICHOLAS and the patient's admission status was agreed to be Admission Status: inpatient status to the service of Dr Nida Gore             Follow-up Information None         Current Discharge Medication List      CONTINUE these medications which have NOT CHANGED    Details   apixaban (ELIQUIS) 5 mg Take 1 tablet (5 mg total) by mouth 2 (two) times a day Do not start before April 3, 2023  Refills: 0    Associated Diagnoses: Multiple subsegmental pulmonary emboli without acute cor pulmonale (HCC)      atorvastatin (LIPITOR) 20 mg tablet Take 1 tablet (20 mg total) by mouth daily with dinner  Qty: 30 tablet, Refills: 0    Associated Diagnoses: Hyperlipidemia, unspecified hyperlipidemia type      famotidine (PEPCID) 40 MG tablet Take 40 mg by mouth 2 (two) times a day      fluconazole (DIFLUCAN) 40 mg/mL suspension Take by mouth daily      fluticasone (FLONASE) 50 mcg/act nasal spray 1 spray into each nostril daily      insulin lispro (HumaLOG) 100 units/mL injection Inject 1-5 Units under the skin 3 (three) times a day before meals  Refills: 0    Associated Diagnoses: Type 2 diabetes mellitus without complication, without long-term current use of insulin ([facility])      Metformin HCl (RIOMET) 500 MG/5ML oral solution Take 500 mg by mouth 2 (two) times a day      ondansetron (ZOFRAN) 4 mg tablet Take 4 mg by mouth every 8 (eight) hours as needed for nausea or vomiting      predniSONE 5 mg tablet Take by mouth daily      sodium chloride, PF, 0 9 % 10 mL by Intracatheter route daily Intracatheter flushing daily  Qty: 300 mL, Refills: 0    Associated Diagnoses: Cholecystostomy care ([facility] )             No discharge procedures on file      PDMP Review       Value Time User    PDMP Reviewed  Yes 1/11/2022 12:15 PM Meño Valdez PA-C          ED Provider  Electronically Signed by           Maria R Nova PA-C  04/26/23 0812

## 2023-04-26 NOTE — ED NOTES
Dr Patricia Ruelas verbal order  cancel LR and draw 2nd lactic after and hour of NS running  Gina ORI   [employer]  04/26/23 6882

## 2023-04-27 ENCOUNTER — APPOINTMENT (INPATIENT)
Dept: CT IMAGING | Facility: HOSPITAL | Age: 78
End: 2023-04-27

## 2023-04-27 PROBLEM — H25.813 COMBINED FORMS OF AGE-RELATED CATARACT OF BOTH EYES: Status: ACTIVE | Noted: 2023-04-27

## 2023-04-27 PROBLEM — H52.223 REGULAR ASTIGMATISM OF BOTH EYES: Status: ACTIVE | Noted: 2023-04-27

## 2023-04-27 PROBLEM — D00.07 SQUAMOUS CELL CARCINOMA IN SITU (SCCIS) OF TONGUE: Status: ACTIVE | Noted: 2022-12-30

## 2023-04-27 PROBLEM — R26.9 UNSPECIFIED ABNORMALITIES OF GAIT AND MOBILITY: Status: ACTIVE | Noted: 2023-04-27

## 2023-04-27 PROBLEM — H52.4 PRESBYOPIA: Status: ACTIVE | Noted: 2023-04-27

## 2023-04-27 PROBLEM — H52.00 HYPEROPIA: Status: ACTIVE | Noted: 2023-04-27

## 2023-04-27 PROBLEM — R73.9 HYPERGLYCEMIA: Status: ACTIVE | Noted: 2023-01-30

## 2023-04-27 PROBLEM — D31.30 CHOROIDAL NEVUS: Status: ACTIVE | Noted: 2023-04-27

## 2023-04-27 PROBLEM — Z77.29 EXPOSURE TO POTENTIALLY HAZARDOUS SUBSTANCE: Status: ACTIVE | Noted: 2023-04-27

## 2023-04-27 PROBLEM — F17.200 TOBACCO DEPENDENCE: Status: RESOLVED | Noted: 2021-12-22 | Resolved: 2023-04-27

## 2023-04-27 PROBLEM — R94.2 ABNORMAL PET OF RIGHT LUNG: Status: ACTIVE | Noted: 2022-12-30

## 2023-04-27 LAB
GLUCOSE SERPL-MCNC: 118 MG/DL (ref 65–140)
GLUCOSE SERPL-MCNC: 122 MG/DL (ref 65–140)
GLUCOSE SERPL-MCNC: 126 MG/DL (ref 65–140)
GLUCOSE SERPL-MCNC: 150 MG/DL (ref 65–140)
GLUCOSE SERPL-MCNC: 163 MG/DL (ref 65–140)
GLUCOSE SERPL-MCNC: 285 MG/DL (ref 65–140)
L PNEUMO1 AG UR QL IA.RAPID: NEGATIVE
S PNEUM AG UR QL: NEGATIVE
VANCOMYCIN TROUGH SERPL-MCNC: <5 UG/ML (ref 10–20)

## 2023-04-27 RX ORDER — GUAIFENESIN 600 MG/1
600 TABLET, EXTENDED RELEASE ORAL EVERY 12 HOURS PRN
Status: DISCONTINUED | OUTPATIENT
Start: 2023-04-27 | End: 2023-04-27

## 2023-04-27 RX ORDER — CALCITONIN SALMON 200 [USP'U]/ML
4 INJECTION, SOLUTION INTRAMUSCULAR; SUBCUTANEOUS EVERY 12 HOURS SCHEDULED
Status: DISCONTINUED | OUTPATIENT
Start: 2023-04-27 | End: 2023-04-28

## 2023-04-27 RX ORDER — METOPROLOL TARTRATE 5 MG/5ML
5 INJECTION INTRAVENOUS ONCE
Status: COMPLETED | OUTPATIENT
Start: 2023-04-27 | End: 2023-04-27

## 2023-04-27 RX ORDER — GUAIFENESIN 100 MG/5ML
200 SOLUTION ORAL 4 TIMES DAILY PRN
Status: DISCONTINUED | OUTPATIENT
Start: 2023-04-27 | End: 2023-05-03 | Stop reason: HOSPADM

## 2023-04-27 RX ADMIN — CEFEPIME HYDROCHLORIDE 2000 MG: 2 INJECTION, SOLUTION INTRAVENOUS at 01:27

## 2023-04-27 RX ADMIN — IOHEXOL 90 ML: 350 INJECTION, SOLUTION INTRAVENOUS at 23:06

## 2023-04-27 RX ADMIN — INSULIN LISPRO 3 UNITS: 100 INJECTION, SOLUTION INTRAVENOUS; SUBCUTANEOUS at 06:36

## 2023-04-27 RX ADMIN — METOPROLOL TARTRATE 5 MG: 5 INJECTION INTRAVENOUS at 23:40

## 2023-04-27 RX ADMIN — NYSTATIN 500000 UNITS: 100000 SUSPENSION ORAL at 21:03

## 2023-04-27 RX ADMIN — APIXABAN 5 MG: 5 TABLET, FILM COATED ORAL at 18:41

## 2023-04-27 RX ADMIN — NYSTATIN 500000 UNITS: 100000 SUSPENSION ORAL at 18:42

## 2023-04-27 RX ADMIN — APIXABAN 5 MG: 5 TABLET, FILM COATED ORAL at 10:07

## 2023-04-27 RX ADMIN — VANCOMYCIN HYDROCHLORIDE 1500 MG: 1 INJECTION, POWDER, LYOPHILIZED, FOR SOLUTION INTRAVENOUS at 14:29

## 2023-04-27 RX ADMIN — CEFEPIME HYDROCHLORIDE 2000 MG: 2 INJECTION, SOLUTION INTRAVENOUS at 17:09

## 2023-04-27 RX ADMIN — FAMOTIDINE 20 MG: 20 TABLET, FILM COATED ORAL at 10:07

## 2023-04-27 RX ADMIN — SODIUM CHLORIDE 75 ML/HR: 0.9 INJECTION, SOLUTION INTRAVENOUS at 21:01

## 2023-04-27 RX ADMIN — SODIUM CHLORIDE 75 ML/HR: 0.9 INJECTION, SOLUTION INTRAVENOUS at 05:38

## 2023-04-27 RX ADMIN — ATORVASTATIN CALCIUM 20 MG: 20 TABLET, FILM COATED ORAL at 18:41

## 2023-04-27 RX ADMIN — CALCITONIN SALMON 218 UNITS: 200 INJECTION, SOLUTION INTRAMUSCULAR; SUBCUTANEOUS at 23:58

## 2023-04-27 RX ADMIN — NYSTATIN 500000 UNITS: 100000 SUSPENSION ORAL at 13:22

## 2023-04-27 RX ADMIN — FAMOTIDINE 20 MG: 20 TABLET, FILM COATED ORAL at 18:41

## 2023-04-27 NOTE — ACP (ADVANCE CARE PLANNING)
Advanced Care Planning Progress Note    Serious Illness Conversation    1  What is your understanding now of where you are with your illness? Prognostic Understanding: appropriate understanding of prognosis     2  How much information about what is likely to be ahead with your illness would you like to have? Information: patient wants to be informed of big picture, but not details     3  What did you (clinician) communicate to the patient? Prognostic Communication: Function - I hope that this is not the case, but Im worried that this may be as strong as you will feel, and things are likely to get more difficult  4  If your health situation worsens, what are your most important goals? Goals: be physically comfortable     5  What are the biggest fears and worries about the future and your health? Fears/Worries: pain     6  What abilities are so critical to your life that you cannot imagine living without them? Unacceptable Function: being in pain or very uncomfortable     7  What gives you strength as you think about the future with your illness? 8  If you become sicker, how much are you willing to go through for the possibility of gaining more time? Have a feeding tube: Yes   Be on a ventilator: No    9  How much does your proxy and family know about your priorities and wishes?   Discussion Discussion: extensive discussion with family about goals and wishes            Roda Cabot, CRNP

## 2023-04-27 NOTE — PHYSICAL THERAPY NOTE
Physical Therapy Evaluation     Patient's Name: Cami Strickland    Admitting Diagnosis  Shortness of breath [R06 02]  Hyperkalemia [E87 5]  Severe protein-calorie malnutrition ([facility] ) [E43]  Weakness [R53 1]  PNA (pneumonia) [J18 9]  Sepsis ([facility] ) [A41 9]    Problem List  Patient Active Problem List   Diagnosis    Anxiety    Neoplasm of uncertain behavior of skin    Post-traumatic stress disorder, unspecified    Essential hypertension    Actinic keratosis    Dysphagia, unspecified    Erectile dysfunction    History of malignant neoplasm of bladder    Cardiac arrhythmia    Carpal tunnel syndrome    Chronic post-traumatic stress disorder (PTSD) after  combat    Type 2 diabetes mellitus without complication, without long-term current use of insulin ([facility] )    Heart murmur    Hematuria    Hyperlipidemia due to type 2 diabetes mellitus ([facility] )    Left ventricular hypertrophy    Malignant neoplasm of urinary bladder ([facility] )    Neoplasm of lung    Neoplasm of uncertain behavior of oropharynx    Osteoarthritis of knee    Panic disorder    Polyp of colon    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)    Severe protein-calorie malnutrition ([facility] )    Tobacco dependence    Severe sepsis ([facility] )    Other pulmonary embolism without acute cor pulmonale (HCC)    Cholecystitis    Multiple fractures of rib involving four or more ribs-right    COVID    Acute respiratory failure with hypoxia (HCC)    Thyroid nodule    History of head and neck radiation    Agent orange exposure    Subclinical hyperthyroidism    Syncope and collapse    Hypomagnesemia    Hyperlipemia    PNA (pneumonia)    Pressure injury of skin of buttock    Hydropneumothorax    Hypercalcemia of malignancy    Hyperkalemia       Past Medical History  Past Medical History:   Diagnosis Date    Cancer ([place] [id number] )     Diabetes ([facility] )     Gastritis     GERD (gastroesophageal reflux disease)     History of bladder cancer 2010    treated with surgery    Hypercholesterolemia Hypertension     Hyponatremia 12/22/2021    Lactic acidosis 12/22/2021       Past Surgical History  Past Surgical History:   Procedure Laterality Date    BLADDER TUMOR EXCISION      EGD      IR CHOLECYSTOSTOMY TUBE CHECK/CHANGE/REPOSITION/REINSERTION/UPSIZE  1/5/2022    IR CHOLECYSTOSTOMY TUBE CHECK/CHANGE/REPOSITION/REINSERTION/UPSIZE  3/4/2022    IR CHOLECYSTOSTOMY TUBE CHECK/CHANGE/REPOSITION/REINSERTION/UPSIZE  3/24/2022    IR CHOLECYSTOSTOMY TUBE PLACEMENT  12/27/2021    IR THORACENTESIS  3/23/2023        04/27/23 0812   PT Last Visit   PT Visit Date 04/27/23   Note Type   Note type Evaluation   Pain Assessment   Pain Assessment Tool 0-10   Pain Score 4   Pain Location/Orientation Orientation: Right;Location: Shoulder   Pain Onset/Description Onset: Ongoing;Frequency: Constant/Continuous; Descriptor: Aching;Descriptor: Sore   Effect of Pain on Daily Activities yes   Patient's Stated Pain Goal No pain   Hospital Pain Intervention(s) Repositioned; Emotional support; Environmental changes   Multiple Pain Sites No   Restrictions/Precautions   Weight Bearing Precautions Per Order No   Other Precautions Chair Alarm; Bed Alarm; Fall Risk;Pain;O2;Multiple lines  (condom catheter, PEG)   Home Living   Type of Home SNF  (receiving rehab at [facility] prior to arrival)   Home Layout Performs ADLs on one level; Able to live on main level with bedroom/bathroom; Access; Ramped entrance   [employer] Grab bars in shower; Shower chair;Grab bars around toilet   [address] [facility]  (prior usage)   Prior Function   Level of [place] Needs assistance with ADLs; Needs assistance with functional mobility; Needs assistance with [address] staff   Receives Help From Personal care attendant   IADLs Family/Friend/Other provides transportation; Family/Friend/Other provides meals; Family/Friend/Other provides medication "management   Falls in the last 6 months 1 to 4   Vocational Retired   General   Family/Caregiver Present No   Cognition   Overall Cognitive Status Impaired   Arousal/Participation Alert   Orientation Level Oriented to person;Disoriented to situation;Oriented to time;Disoriented to place  (\"I'm at [employer]")   Memory Decreased short term memory;Decreased recall of recent events   Following Commands Follows one step commands with increased time or repetition   Comments Markus Frey was agreeable to PT assessment,pleasant  RLE Assessment   RLE Assessment X  (3/5 gross musculature)   LLE Assessment   LLE Assessment X  (3/5 gross musculature)   Vision-Basic Assessment   Current Vision Wears glasses all the time   Vestibular   Spontaneous Nystagmus (-) no evidence of nystagmus at rest in room light   Gaze Holding Nystagmus (-) no evidence of nystagmus   Coordination   Movements are Fluid and Coordinated 0   Coordination and Movement Description Incremental mobility requiring increased time and tactile facilitation  Sharp/Dull   RLE Sharp/Dull Grossly intact   LLE Sharp/Dull Grossly intact   Bed Mobility   Supine to Sit 3  Moderate assistance   Additional items Assist x 1;HOB elevated; Bedrails; Increased time required;Verbal cues;LE management   Sit to Supine   (DNT as Markus Frey was sitting out of bed on the recliner upon conclusion )   Additional Comments Verbal cues for bedrail utilization and proper body mechanics  Transfers   Sit to Stand 4  Minimal assistance   Additional items Assist x 2;Bedrails; Increased time required;Verbal cues  (RW utilization)   Stand to Sit 4  Minimal assistance   Additional items Assist x 2;Armrests; Impulsive; Bedrails;Verbal cues   Stand pivot 3  Moderate assistance   Additional items Assist x 2;Impulsive;Verbal cues   Additional Comments Verbal cues for bedrail utilization with transitional movements, safety while turning, and anterior weight shifting for stabilization     Ambulation/Elevation " Gait pattern Improper Weight shift;Narrow LISANDRA; Antalgic; Forward Flexion; Short stride; Step to;Excessively slow   Gait Assistance 3  Moderate assist   Additional items Assist x 2;Verbal cues; Tactile cues   Assistive Device Rolling walker   Distance 5 feet  (to the recliner, distance could not be advance due to fatigue severity)   Ambulation/Elevation Additional Comments Verbal cues for base of support widening for stabilization and proper AD utilization  Deondre Solid impulsively sat down on the recliner with external tactile support to stabilize descent  Balance   Static Sitting Fair   Dynamic Sitting Fair -   Static Standing Poor +   Dynamic Standing Poor +   Ambulatory Poor   Endurance Deficit   Endurance Deficit Yes   Activity Tolerance   Activity Tolerance Patient limited by fatigue;Patient limited by pain   Nurse Made Aware Yes, Kathi PCA assisted with mobility  I appreciated the care coordination  Assessment   Prognosis Fair   Problem List Decreased strength;Decreased endurance; Impaired balance;Decreased mobility; Decreased coordination;Decreased cognition; Impaired judgement;Decreased safety awareness;Pain   Assessment Pt is 78 y o  male seen for PT evaluation s/p admit to [facility] on 4/26/2023 w/ Severe sepsis ([facility] )  PT consulted to assess pt's functional mobility and d/c needs  Order placed for PT eval and tx, w/ up and OOB as tolerated order  Comorbidities affecting pt's physical performance at time of assessment include: weakness, severe sepsis, hyperkalemia, severe protein calorie malnutrition, type 2 DM,primary malignant neoplasm of tongue   PTA, pt was receiving PAR at a SNF   Personal factors affecting pt at time of IE include: inability to ambulate household distances, inability to navigate community distances, inability to navigate level surfaces w/o external assistance, unable to perform dynamic tasks in community, decreased cognition, positive fall history, unable to perform physical activity, limited insight into impairments and inability to perform ADLs  Please find objective findings from PT assessment regarding body systems outlined above with impairments and limitations including weakness, impaired balance, decreased endurance, impaired coordination, gait deviations, pain, decreased activity tolerance, decreased functional mobility tolerance, decreased safety awareness, impaired judgement, fall risk and decreased cognition  From PT/mobility standpoint, recommendation at time of d/c would be return to facility with rehabilitation services pending progress in order to facilitate return to PLOF  Goals   Patient Goals to feel better soon   LTG Expiration Date 05/07/23   Long Term Goal #1 1 )Patient will complete bed mobility supervision of 1 for decrease need for caregiver assistance, decrease burden of care  2 ) Patient will complete transfers supervision of 1 to decrease risk of falls, facilitate upright standing posture  3 ) BLE strength to greater than/equal to 4/5 gross musculature to increase ability to safely transfer, control descent to chair  4 ) Patient will exhibit increase dynamic standing to Fair 2-3 minutes  without LOB supervision of 1 to improve activity tolerance  5 ) Patient will exhibit increase dynamic ambulatory balance to Fair 25-50 feet w/AD  supervision of 1 to improve ability to mobilize to toilet, chair and decrease risk for additional medical complications  6 ) Patient will exhibit good self monitoring and ability to follow 2 step commands to increase complexity of tasks and resume ADL's without LOB  PT Treatment Day 0   Plan   Treatment/Interventions Functional transfer training;LE strengthening/ROM; Elevations; Therapeutic exercise; Endurance training;Cognitive reorientation;Patient/family training;Equipment eval/education; Bed mobility;Gait training;Spoke to nursing   PT Frequency 3-5x/wk   Recommendation   PT Discharge Recommendation Return to facility with rehabilitation services   Additional Comments Upon conclusion, Louise Philippe was sitting out of bed on the recliner  The chair alarm was engaged and all of his needs were within reach     AM-PAC Basic Mobility Inpatient   Turning in Flat Bed Without Bedrails 3   Lying on Back to Sitting on Edge of Flat Bed Without Bedrails 2   Moving Bed to Chair 2   Standing Up From Chair Using Arms 2   Walk in Room 2   Climb 3-5 Stairs With Railing 2   Basic Mobility Inpatient Raw Score 13   Basic Mobility Standardized Score 33 99   Highest Level Of Mobility   -HLM Goal 4: Move to chair/commode   -HLM Achieved 4: Move to chair/commode     History/Personal Factors/Comorbidities: weakness, severe sepsis, hyperkalemia, severe protein calorie malnutrition, type 2 DM,primary malignant neoplasm of tongue    # of body structures/limitations: muscle weakness, activity intolerance,decreased endurance, impaired balance, gait deviations,pain, impaired coordination, impaired cognition    Clinical presentation: unstable as seen in progressive symptoms prior to hospitalization with severe sepsis diagnosis, fall risk with positive fall history, pain constant in nature, CA diagnosis    Initial Assessment Time: [id number]    Kenya Francis, PT

## 2023-04-27 NOTE — ASSESSMENT & PLAN NOTE
· Patient was following with [facility] earlier in the year, however,  has not had treatment recently as he was hospitalized   He has been in rehab for the last few weeks and malignancy treatment has been on hold for this reason  · Will need outpatient follow-up with hematology/oncology

## 2023-04-27 NOTE — OCCUPATIONAL THERAPY NOTE
Occupational Therapy Evaluation      Karen Elias    4/27/2023    Principal Problem:    Severe sepsis ([facility] )  Active Problems:    Dysphagia, unspecified    Type 2 diabetes mellitus without complication, without long-term current use of insulin (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 )     Diabetes ([facility] )     Gastritis     GERD (gastroesophageal reflux disease)     History of bladder cancer 2010    treated with surgery    Hypercholesterolemia     Hypertension     Hyponatremia 12/22/2021    Lactic acidosis 12/22/2021       Past Surgical History:   Procedure Laterality Date    BLADDER TUMOR EXCISION      EGD      IR CHOLECYSTOSTOMY TUBE CHECK/CHANGE/REPOSITION/REINSERTION/UPSIZE  1/5/2022    IR CHOLECYSTOSTOMY TUBE CHECK/CHANGE/REPOSITION/REINSERTION/UPSIZE  3/4/2022    IR CHOLECYSTOSTOMY TUBE CHECK/CHANGE/REPOSITION/REINSERTION/UPSIZE  3/24/2022    IR CHOLECYSTOSTOMY TUBE PLACEMENT  12/27/2021    IR THORACENTESIS  3/23/2023        04/27/23 1045   OT Last Visit   OT Visit Date 04/27/23   Note Type   Note type Evaluation   Pain Assessment   Pain Assessment Tool 0-10   Pain Score No Pain   Restrictions/Precautions   Weight Bearing Precautions Per Order No   Other Precautions Chair Alarm; Bed Alarm; Fall Risk;Pain;O2;Multiple lines  (condom catheter, PEG, 4L of O2 Pt wears 3L at baseline)   Home Living   Type of Home SNF  (receiving rehab at [facility] prior to arrival)   Home Layout Performs ADLs on one level; Able to live on main level with bedroom/bathroom; Access; Ramped entrance   [employer] Grab bars in shower; Shower chair;Grab bars "around toilet   [facility]  (prior usage)   Prior Function   Level of [place] Needs assistance with ADLs; Needs assistance with functional mobility; Needs assistance with [address] staff   Receives Help From Personal care attendant   IADLs Family/Friend/Other provides transportation; Family/Friend/Other provides meals; Family/Friend/Other provides medication management   Falls in the last 6 months 1 to 4   Vocational Retired   ADL   UB Bathing Assistance 4  Minimal Assistance   LB [address] 3  Moderate Assistance   [address] 1  Total Assistance   Bed Mobility   Additional Comments Pt found supine in bed and declined getting OOB at this time   Balance   Static Sitting Fair   Dynamic Sitting Fair -   Activity Tolerance   Activity Tolerance Patient limited by fatigue;Patient limited by pain   Nurse Made Aware [facility] Vieyra   RUE Assessment   RUE Assessment X  (AROM WFL)   RUE Strength   RUE Overall Strength Deficits  (3+/5)   LUE Assessment   LUE Assessment X  (AROM WFL)   LUE Strength   LUE Overall Strength Deficits  (3+/5)   Vision-Basic Assessment   Current Vision Wears glasses all the time   Cognition   Overall Cognitive Status Impaired   Arousal/Participation Alert   Attention Attends with cues to redirect   Orientation Level Oriented to person;Disoriented to situation;Oriented to time;Disoriented to place  (\"I'm at rehab\")   Memory Decreased short term memory;Decreased recall of recent events   Following Commands Follows one step commands with increased time or repetition   Assessment   Limitation Decreased ADL status; Decreased UE strength;Decreased Safe judgement during ADL;Decreased endurance;Decreased self-care trans;Decreased high-level ADLs   Prognosis Good   Assessment Pt is a 78 y o  male seen for OT evaluation s/p [facility] w/ Severe sepsis ([facility] )   " Comorbidities affecting pt's functional performance at time of assessment include: CA, DM, Bladder CA, Hypercholesterolemia, HTN, Hyponatremia  Personal factors affecting pt at time of IE include:difficulty performing ADLS, limited insight into deficits and decreased initiation and engagement   Prior to admission, pt required A with ADLs  Upon evaluation: the following deficits impact occupational performance: decreased strength, decreased balance, decreased tolerance, impaired memory, impaired sequencing, impaired problem solving and decreased safety awareness  Pt to benefit from continued skilled OT tx while in the hospital to address deficits as defined above and maximize level of functional independence w ADL's and functional mobility  Occupational Performance areas to address include: bathing/shower, toilet hygiene, dressing, functional mobility and clothing management  From OT standpoint, recommendation at time of d/c would be return to facility with rehab services  Goals   Patient Goals to feel better soon   Plan   Treatment Interventions ADL retraining;Functional transfer training;UE strengthening/ROM; Endurance training;Cognitive reorientation;Patient/family training; Compensatory technique education; Energy conservation; Activityengagement   Goal Expiration Date 05/07/23   OT Treatment Day 0   OT Frequency 3-5x/wk   Recommendation   OT Discharge Recommendation Return to facility with rehabilitation services   Additional Comments  The patient's raw score on the AM-PAC Daily Activity Inpatient Short Form is 13  A raw score of less than 19 suggests the patient may benefit from discharge to post-acute rehabilitation services  Please refer to the recommendation of the Occupational Therapist for safe discharge planning     AM-PAC Daily Activity Inpatient   Lower Body Dressing 1   Bathing 2   Toileting 1   Upper Body Dressing 3   Grooming 3   Eating 3   Daily Activity Raw Score 13   Daily Activity Standardized Score (Calc for Raw Score >=11) 32 03   AM-PAC Applied Cognition Inpatient   Following a Speech/Presentation 3   Understanding Ordinary Conversation 3   Taking Medications 2   Remembering Where Things Are Placed or Put Away 2   Remembering List of 4-5 Errands 2   Taking Care of Complicated Tasks 2   Applied Cognition Raw Score 14   Applied Cognition Standardized Score 32 02     GOALS:    Pt will achieve the following within specified time frame: STG  Pt will achieve the following goals within 5 days    *ADL transfers with Mod (A) for inc'd independence with ADLs/purposeful tasks    *UB ADL with CGA for inc'd independence with self cares    *LB ADL with Max (A) using AE prn for inc'd independence with self cares    *Toileting with Max (A) for clothing management and hygiene for return to PLOF with personal care    *Increase static sitting balance to F+ and dyn sitting balance to F for inc'd safety with sitting purposeful tasks    *Increase sitting tolerance x3 m for inc'd tolerance with sitting purposeful tasks    *Participate in 10m UE therex to increase overall stamina/activity tolerance for purposeful tasks    *Bed mobility- Min (A) for inc'd independence to manage own comfort and initiate EOB & OOB purposeful tasks    Pt will achieve the following within specified time frame: LTG  Pt will achieve the following goals within 10 days    *ADL transfers with Min (A) for inc'd independence with ADLs/purposeful tasks    *UB ADL with (S) for inc'd independence with self cares    *LB ADL with Mod (A) using AE prn for inc'd independence with self cares    *Toileting with Mod (A) for clothing management and hygiene for return to PLOF with personal care    *Increase static stand balance to F- and dyn stand balance to P+ for inc'd safety with standing purposeful tasks    *Increase stand tolerance x1 m for inc'd tolerance with standing purposeful tasks    *Bed mobility- CGA for inc'd independence to manage own comfort and initiate EOB & OOB purposeful tasks      Rachel Cui, MS, OTR/L

## 2023-04-27 NOTE — PLAN OF CARE
Problem: PHYSICAL THERAPY ADULT  Goal: Performs mobility at highest level of function for planned discharge setting  See evaluation for individualized goals  Description: Treatment/Interventions: Functional transfer training, LE strengthening/ROM, Elevations, Therapeutic exercise, Endurance training, Cognitive reorientation, Patient/family training, Equipment eval/education, Bed mobility, Gait training, Spoke to nursing          See flowsheet documentation for full assessment, interventions and recommendations  Note: Prognosis: Fair  Problem List: Decreased strength, Decreased endurance, Impaired balance, Decreased mobility, Decreased coordination, Decreased cognition, Impaired judgement, Decreased safety awareness, Pain  Assessment: Pt is 78 y o  male seen for PT evaluation s/p admit to [facility] on 4/26/2023 w/ Severe sepsis ([address] )  PT consulted to assess pt's functional mobility and d/c needs  Order placed for PT eval and tx, w/ up and OOB as tolerated order  Comorbidities affecting pt's physical performance at time of assessment include: weakness, severe sepsis, hyperkalemia, severe protein calorie malnutrition, type 2 DM,primary malignant neoplasm of tongue   PTA, pt was receiving PAR at a SNF  Personal factors affecting pt at time of IE include: inability to ambulate household distances, inability to navigate community distances, inability to navigate level surfaces w/o external assistance, unable to perform dynamic tasks in community, decreased cognition, positive fall history, unable to perform physical activity, limited insight into impairments and inability to perform ADLs   Please find objective findings from PT assessment regarding body systems outlined above with impairments and limitations including weakness, impaired balance, decreased endurance, impaired coordination, gait deviations, pain, decreased activity tolerance, decreased functional mobility tolerance, decreased safety awareness, impaired judgement, fall risk and decreased cognition  From PT/mobility standpoint, recommendation at time of d/c would be return to facility with rehabilitation services pending progress in order to facilitate return to PLOF  PT Discharge Recommendation: Return to facility with rehabilitation services    See flowsheet documentation for full assessment

## 2023-04-27 NOTE — CONSULTS
Consultation - Nephrology   Noni Metcalf 78 y o  male MRN: [id number]  Unit/Bed#: -01 Encounter: [id number]      A/P:  1  Hypercalcemia   - May be related to volume depletion as he does look dry  Check a PTH and PTH related peptide     -Will obtain an ionized calcium and start salmon calcitonin subcutaneously   - continue normal saline   - serial studies will be obtained    2  Hyperkalemia   - change to Nepro feeding as it has a lower potassium content   - follow sugars closely   - I have reviewed his labs for inciting medications    3  Type 2 diabetes mellitus without complication without long-term current use of insulin   -Monitor sugars and cover as needed  His last A1c was 5 8    4  Severe sepsis    -likely due to pneumonia  - Receiving empiric cefepime and vancomycin    5  Primary malignant neoplasm of base of tongue   - Was receiving chemotherapy last month  I reviewed [address] note in epic  He was receiving Taxol, Keytruda and carboplatin  However he has not had chemo for over a month    Thank you for allowing us to participate in the care of your patient  Please feel free to contact us regarding the care of this patient, or any other questions/concerns that may be applicable      Patient Active Problem List   Diagnosis    Anxiety    Neoplasm of uncertain behavior of skin    Post-traumatic stress disorder, unspecified    Essential hypertension    Actinic keratosis    Dysphagia, unspecified    Erectile dysfunction    History of malignant neoplasm of bladder    Cardiac arrhythmia    Carpal tunnel syndrome    Chronic post-traumatic stress disorder (PTSD) after  combat    Type 2 diabetes mellitus without complication, without long-term current use of insulin (HCC)    Heart murmur    Hematuria    Hyperlipidemia due to type 2 diabetes mellitus ([address] )    Left ventricular hypertrophy    Malignant neoplasm of urinary bladder (HCC)    Neoplasm of lung  Neoplasm of uncertain behavior of oropharynx    Osteoarthritis of knee    Panic disorder    Polyp of colon    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)    Severe protein-calorie malnutrition (HCC)    Tobacco dependence    Severe sepsis (HCC)    Other pulmonary embolism without acute cor pulmonale (HCC)    Cholecystitis    Multiple fractures of rib involving four or more ribs-right    COVID    Acute respiratory failure with hypoxia (HCC)    Thyroid nodule    History of head and neck radiation    Agent orange exposure    Subclinical hyperthyroidism    Syncope and collapse    Hypomagnesemia    Hyperlipemia    PNA (pneumonia)    Pressure injury of skin of buttock    Hydropneumothorax    Hypercalcemia of malignancy    Hyperkalemia       History of Present Illness   Physician Requesting Consult: Juwan Whaley, Miguel  Reason for Consult / Principal Problem: hypercalcemia and hyperkalemia  Hx and PE limited by:   HPI: Yevgeniy Scott is a 78y o  year old male who presents with severe sepsis on admission with pneumonia with associated tachycardia and tachypnea  He had an elevated procalcitonin and antibiotics were started empirically with cefepime and vancomycin  Although his kidney function was normal, he was noted to have hypercalcemia and hyperkalemia  He also looks volume depleted completely n p o  and relies on tube feeds for his nutrition  He has a history of primary malignant neoplasm of the base of the tongue and has not had chemotherapy for a month  He follows with [address] [place] [address] in this regard  He was treated with carboplatin, Taxol and Keytruda  He has type 2 diabetes mellitus without complications without long-term current use of insulin  Previous hemoglobin A1c in December was 5 8    History obtained from chart review and the patient    Constitutional ROS-has  fatigue, Endocrine ROS- No history diabetes mellitus or thyroid disease    Cardiovascular ROS- Denies chest pain, palpitation, has dyspnea exertion, n   Pulmonary ROS-has cough and shortness of breath   GI ROS- Denies abdominal pain, diarrhea, nausea, had swallowing problems, vomiting, constipation, blood in stools, has ecal incontinence  Hematological ROS-has easy bruising, Genitourinary ROS- Denies dysuria Lymphatic ROS- Denies lymphadenopathy  Musculoskeletal ROS-has  of muscle weakness, joint pain  Dermatological ROS-   Neurological ROS- No stroke or TIA symptoms        Historical Information   Past Medical History:   Diagnosis Date    Cancer ([facility] )     Diabetes ([facility] )     Gastritis     GERD (gastroesophageal reflux disease)     History of bladder cancer     treated with surgery    Hypercholesterolemia     Hypertension     Hyponatremia 2021    Lactic acidosis 2021     Past Surgical History:   Procedure Laterality Date    BLADDER TUMOR EXCISION      EGD      IR CHOLECYSTOSTOMY TUBE CHECK/CHANGE/REPOSITION/REINSERTION/UPSIZE  2022    IR CHOLECYSTOSTOMY TUBE CHECK/CHANGE/REPOSITION/REINSERTION/UPSIZE  3/4/2022    IR CHOLECYSTOSTOMY TUBE CHECK/CHANGE/REPOSITION/REINSERTION/UPSIZE  3/24/2022    IR CHOLECYSTOSTOMY TUBE PLACEMENT  2021    IR THORACENTESIS  3/23/2023     Social History   Social History     Substance and Sexual Activity   Alcohol Use Never     Social History     Substance and Sexual Activity   Drug Use Never     Social History     Tobacco Use   Smoking Status Former    Types: Cigarettes    Quit date: 2021    Years since quittin 6    Passive exposure: Past   Smokeless Tobacco Never     Family History   Problem Relation Age of Onset    Cancer Mother     Melanoma Neg Hx        Meds/Allergies   all current active meds have been reviewed, current meds:   Current Facility-Administered Medications   Medication Dose Route Frequency    acetaminophen (TYLENOL) tablet 650 mg  650 mg Oral Q6H PRN    apixaban (ELIQUIS) tablet 5 mg  5 mg Per PEG Tube BID    atorvastatin (LIPITOR) tablet 20 mg  20 mg Per PEG Tube Daily With Dinner    cefepime (MAXIPIME) IVPB (premix in dextrose) 2,000 mg 50 mL  2,000 mg Intravenous Q12H    famotidine (PEPCID) tablet 20 mg  20 mg Per G Tube BID    insulin lispro (HumaLOG) 100 units/mL subcutaneous injection 1-5 Units  1-5 Units Subcutaneous Q6H    lactated ringers bolus 1,000 mL  1,000 mL Intravenous Once    nystatin (MYCOSTATIN) oral suspension 500,000 Units  500,000 Units Swish & Spit 4x Daily    ondansetron (ZOFRAN) injection 4 mg  4 mg Intravenous Q6H PRN    sodium chloride 0 9 % infusion  75 mL/hr Intravenous Continuous    vancomycin (VANCOCIN) 1500 mg in sodium chloride 0 9% 250 mL IVPB  1,500 mg Intravenous Q24H    and PTA meds:    Medications Prior to Admission   Medication    apixaban (ELIQUIS) 5 mg    apixaban (ELIQUIS) 5 mg    atorvastatin (LIPITOR) 20 mg tablet    famotidine (PEPCID) 40 MG tablet    fluconazole (DIFLUCAN) 40 mg/mL suspension    fluticasone (FLONASE) 50 mcg/act nasal spray    insulin lispro (HumaLOG) 100 units/mL injection    Metformin HCl (RIOMET) 500 MG/5ML oral solution    ondansetron (ZOFRAN) 4 mg tablet    predniSONE 5 mg tablet    sodium chloride, PF, 0 9 %         No Known Allergies    Objective     Intake/Output Summary (Last 24 hours) at 4/27/2023 1513  Last data filed at 4/27/2023 1301  Gross per 24 hour   Intake 3395 25 ml   Output 750 ml   Net 2645 25 ml       Invasive Devices:        Physical Exam      I/O last 3 completed shifts: In: 2676 3 [I V :1226 3; NG/GT:400; IV Piggyback:500; Feedings:550]  Out: -     Vitals:    04/27/23 1444   BP: 143/88   Pulse: (!) 114   Resp: 18   Temp: 98 °F (36 7 °C)   SpO2: (!) 89%       General Appearance:    No acute distress  cachectic  Frail Cooperative  Appears stated age  Head:    Normocephalic  Atraumatic  Normal jaw occlusion  Eyes:    Lids, conjunctiva normal  No scleral icterus  Ears:    Normal external ears     Nose: Nares normal  No drainage  Mouth:   Lips, tonguedryl  Mucosa normal  Phonation normal    Neck:   Supple  Symmetrical    Back:     Symmetric  No CVA tenderness  Lungs:     Exp rhonchi bilaterally  Chest wall:    No tenderness or deformity  Heart:    Regular rate and rhythm  Normal S1 and S2  No murmur  No JVD  No edema  Abdomen:     Peg in LUQ infusing  Genitourinary:   No Ba catheter present  Extremities:   Extremities normal  Atraumatic  No cyanosis  Skin:   Warm and dry  No pallor, jaundice, rash, ecchymoses  Neurologic:   Alert and oriented to person, place, time  No focal deficit  Current Weight: Weight - Scale: 54 4 kg (120 lb)  First Weight: Weight - Scale: 54 4 kg (120 lb)    Lab Results:  I have personally reviewed pertinent labs      CBC: No results found for: WBC, HGB, HCT, MCV, PLT, ADJUSTEDWBC, MCH, MCHC, RDW, MPV, NRBC  CMP: No results found for: NA, K, CL, CO2, ANIONGAP, BUN, CREATININE, GLUCOSE, CALCIUM, AST, ALT, ALKPHOS, PROT, BILITOT, EGFR  Phosphorus: No results found for: PHOS  Magnesium: No results found for: MG  Urinalysis:   Lab Results   Component Value Date    COLORU Yellow 04/26/2023    CLARITYU Clear 04/26/2023    SPECGRAV 1 025 04/26/2023    PHUR 6 0 04/26/2023    LEUKOCYTESUR Negative 04/26/2023    NITRITE Negative 04/26/2023    GLUCOSEU Negative 04/26/2023    KETONESU Negative 04/26/2023    BILIRUBINUR Negative 04/26/2023    BLOODU Trace-Intact (A) 04/26/2023     Ionized Calcium: No results found for: CAION  Coagulation: No results found for: PT, INR, APTT  Troponin: No results found for: TROPONINI  ABG: No results found for: PHART, DNK1DNN, PO2ART, SVH3JPB, G1LAVOXN, BEART, SOURCE    Results from last 7 days   Lab Units 04/26/23  1051   POTASSIUM mmol/L 5 5*   CHLORIDE mmol/L 93*   CO2 mmol/L 29   BUN mg/dL 32*   CREATININE mg/dL 0 67   CALCIUM mg/dL 12 7*   ALK PHOS U/L 123*   ALT U/L 57*   AST U/L 33       Radiology review:  Procedure: XR chest portable    Result Date: 4/27/2023  Narrative: CHEST INDICATION:   SOB  COMPARISON: 03/21/2023  EXAM PERFORMED/VIEWS:  XR CHEST PORTABLE 1 image FINDINGS: Cardiomediastinal silhouette appears unremarkable  Chronic changes are present  There is an infiltrate in the right lower lobe  Small right pleural effusion  Small nodule at the left lung base  No pneumothorax   Osseous structures appear within normal limits for patient age  Impression: Infiltrate at the right lung base along with a small pleural effusion  Workstation performed: [id number]         EKG, Pathology, and Other Studies: I reviewed notes from [address] [place] [address] and reviewed the oncology      Counseling / Coordination of Care  Total ADDITIONAL floor / unit time spent today 40 minutes  Greater than 50% of total time was spent with the patient and / or family counseling and / or coordination of care  A description of the counseling / coordination of care: We will discuss with primary care    Kp Ashraf MD      This consultation note was produced in part using a dictation device which may document imprecise wording from author's original intent

## 2023-04-27 NOTE — PROGRESS NOTES
Gissell Saldana is a 78 y o  male who is currently ordered Vancomycin IV with management by the Pharmacy Consult service  Relevant clinical data and objective / subjective history reviewed  Vancomycin Assessment:  Indication and Goal AUC/Trough: Pneumonia (goal -600, trough >10), -600, trough >10  Clinical Status: stable  Micro:   pending  Renal Function:  SCr: 0 67 mg/dL  CrCl: 73 3 mL/min  Renal replacement: Not on dialysis  Days of Therapy: 2  Current Dose: 1250mg IV Q24H  Vancomycin Plan:  New Dosinmg IV Q24H  Estimated AUC: 464 mcg*hr/mL  Estimated Trough: 11 4 mcg/mL  Next Level:  with AM labs  Renal Function Monitoring: Daily BMP and UOP  Pharmacy will continue to follow closely for s/sx of nephrotoxicity, infusion reactions and appropriateness of therapy  BMP and CBC will be ordered per protocol  We will continue to follow the patients culture results and clinical progress daily      Magalis Plascencia, Pharmacist

## 2023-04-27 NOTE — QUICK NOTE
Updated patient's wife and daughter at bedside  Informed them of speech evaluation; patient is likely having ongoing aspiration events and swallow function is not able to be rehabilitated  Patient's wife and daughter are understandably upset, support provided as able  Patient's wife says they are leaning towards palliative/comfort care

## 2023-04-27 NOTE — ASSESSMENT & PLAN NOTE
· POA secondary to suspected pneumonia with hypothermia, tachycardia, tachypnea  · Chest x-ray: Infiltrate at the right lung base along with a small pleural effusion  · Blood cultures: in progress  · Continue antibiotics with cefepime/vancomycin  · Continue IV fluids   · Trend procalcitonin  · MRSA screen  · Strep pneumo/urine Legionella negative  · Sputum culture pending collection

## 2023-04-27 NOTE — PROGRESS NOTES
[address]  Progress Note  Name: Ankur Cash  MRN: [id number]  Unit/Bed#: -01 I Date of Admission: 4/26/2023   Date of Service: 4/27/2023 I Hospital Day: 1    Assessment/Plan      * Severe sepsis ([facility] )  Assessment & Plan  · POA secondary to suspected pneumonia with hypothermia, tachycardia, tachypnea  · Chest x-ray: Infiltrate at the right lung base along with a small pleural effusion  · Blood cultures: in progress  · Continue antibiotics with cefepime/vancomycin  · Continue IV fluids   · Trend procalcitonin  · MRSA screen  · Strep pneumo/urine Legionella negative  · Sputum culture pending collection    Hyperkalemia  Assessment & Plan  · Unclear etiology, possibly due to dehydration and/or related to patient's tube feeds  · Continue IV fluids  · Patient received insulin/dextrose, albuterol  · Repeat BMP  · Nephrology consultation    Hypercalcemia of malignancy  Assessment & Plan  · Possibly secondary to patient's underlying malignancy  · Continue IV fluids  · Repeat BMP in the morning  · Consult nephrology    Acute respiratory failure with hypoxia (HCC)  Assessment & Plan  · Secondary to pneumonia    · Continue oxygen and wean as tolerated    Pressure injury of skin of buttock  Assessment & Plan  · Present on admission  · Wound care consult    Other pulmonary embolism without acute cor pulmonale (HCC)  Assessment & Plan  · Continue Eliquis    Severe protein-calorie malnutrition ([facility] )  Assessment & Plan  Body mass index is 15 41 kg/m²  · Continue tube feed   · Nutrition consultation    Primary malignant neoplasm of base of tongue [facility])  Assessment & Plan  · Patient was following with [facility] earlier in the year, however,  has not had treatment recently as he was hospitalized   He has been in rehab for the last few weeks and malignancy treatment has been on hold for this reason  · Will need outpatient follow-up with hematology/oncology    Type 2 diabetes mellitus without "complication, without long-term current use of insulin [facility])  Assessment & Plan  Lab Results   Component Value Date    HGBA1C 5 8 (H) 2022       Recent Labs     23  1755 23  2043 23  0050 23  0630   POCGLU 115 86 118 285*       Blood Sugar Average: Last 72 hrs:  (P) 156 6     · Continue tube feeds  · Sliding scale insulin    Dysphagia, unspecified  Assessment & Plan  · PEG tube in place  · Obtain swallow evaluation         VTE Pharmacologic Prophylaxis:   High Risk (Score >/= 5) - Pharmacological DVT Prophylaxis Ordered: apixaban (Eliquis)  Sequential Compression Devices Ordered  Patient Centered Rounds: I performed bedside rounds with nursing staff today  Discussions with Specialists or Other Care Team Provider: CM    Education and Discussions with Family / Patient: Will update wife after rounds  Total Time Spent on Date of Encounter in care of patient: 45 minutes This time was spent on one or more of the following: performing physical exam; counseling and coordination of care; obtaining or reviewing history; documenting in the medical record; reviewing/ordering tests, medications or procedures; communicating with other healthcare professionals and discussing with patient's family/caregivers  Current Length of Stay: 1 day(s)  Current Patient Status: Inpatient   Certification Statement: The patient will continue to require additional inpatient hospital stay due to severe sepsis  Discharge Plan: Anticipate discharge in 24-48 hrs to rehab facility  Code Status: Level 3 - DNAR and DNI    Subjective:   Patient seen and examined  He says he feels \" terrible  \"  He says he has a cough and feels very weak      Objective:     Vitals:   Temp (24hrs), Av 5 °F (28 1 °C), Min:35 6 °F (2 °C), Max:98 4 °F (36 9 °C)    Temp:  [35 6 °F (2 °C)-98 4 °F (36 9 °C)] 98 1 °F (36 7 °C)  HR:  [] 118  Resp:  [18-21] 18  BP: (113-141)/(65-87) 126/76  SpO2:  [95 %-98 %] 95 %  Body mass " index is 15 41 kg/m²  Input and Output Summary (last 24 hours): Intake/Output Summary (Last 24 hours) at 4/27/2023 1021  Last data filed at 4/27/2023 5357  Gross per 24 hour   Intake 2676 25 ml   Output 450 ml   Net 2226 25 ml       Physical Exam:   Physical Exam  Vitals and nursing note reviewed  Constitutional:       Appearance: He is cachectic  He is ill-appearing  HENT:      Head: Normocephalic and atraumatic  Mouth/Throat:      Mouth: Mucous membranes are dry  Eyes:      Extraocular Movements: Extraocular movements intact  Pupils: Pupils are equal, round, and reactive to light  Cardiovascular:      Rate and Rhythm: Normal rate and regular rhythm  Pulses: Normal pulses  Pulmonary:      Effort: Pulmonary effort is normal  No respiratory distress  Breath sounds: Rhonchi present  Abdominal:      General: Bowel sounds are normal       Palpations: Abdomen is soft  Tenderness: There is no abdominal tenderness  Comments: G-tube in place without surrounding redness drainage or tenderness   Musculoskeletal:      Cervical back: Neck supple  Right lower leg: No edema  Left lower leg: No edema  Skin:     General: Skin is warm and dry  Capillary Refill: Capillary refill takes less than 2 seconds  Neurological:      General: No focal deficit present  Mental Status: He is alert and oriented to person, place, and time         Additional Data:     Labs:  Results from last 7 days   Lab Units 04/26/23  1051   WBC Thousand/uL 8 20   HEMOGLOBIN g/dL 12 4   HEMATOCRIT % 41 2   PLATELETS Thousands/uL 438*   BANDS PCT % 12*   LYMPHO PCT % 6*   MONO PCT % 6   EOS PCT % 0     Results from last 7 days   Lab Units 04/26/23  1051   SODIUM mmol/L 134*   POTASSIUM mmol/L 5 5*   CHLORIDE mmol/L 93*   CO2 mmol/L 29   BUN mg/dL 32*   CREATININE mg/dL 0 67   ANION GAP mmol/L 12   CALCIUM mg/dL 12 7*   ALBUMIN g/dL 3 2*   TOTAL BILIRUBIN mg/dL 0 51   ALK PHOS U/L 123*   ALT U/L 57*   AST U/L 33   GLUCOSE RANDOM mg/dL 125     Results from last 7 days   Lab Units 04/26/23  1051   INR  1 91*     Results from last 7 days   Lab Units 04/27/23  0630 04/27/23  0050 04/26/23  2043 04/26/23  1755 04/26/23  1408   POC GLUCOSE mg/dl 285* 118 86 115 179*         Results from last 7 days   Lab Units 04/26/23  1132 04/26/23  1051   LACTIC ACID mmol/L 4 9*  --    PROCALCITONIN ng/ml  --  0 56*       Lines/Drains:  Invasive Devices     Peripheral Intravenous Line  Duration           Peripheral IV 04/26/23 Right;Ventral (anterior) Hand <1 day          Drain  Duration           Gastrostomy/Enterostomy PEG-jejunostomy LUQ -- days                      Imaging: Reviewed radiology reports from this admission including: chest xray    Recent Cultures (last 7 days):   Results from last 7 days   Lab Units 04/26/23  2155 04/26/23  1051   BLOOD CULTURE   --  Received in Microbiology Lab  Culture in Progress  Received in Microbiology Lab  Culture in Progress     LEGIONELLA URINARY ANTIGEN  Negative  --        Last 24 Hours Medication List:   Current Facility-Administered Medications   Medication Dose Route Frequency Provider Last Rate    acetaminophen  650 mg Oral Q6H PRN Erika Klein MD      apixaban  5 mg Per PEG Tube BID Erika Klein MD      atorvastatin  20 mg Per PEG Tube Daily With Ruel Valente MD      cefepime  2,000 mg Intravenous Q12H Erika Klein MD 2,000 mg (04/27/23 0127)    famotidine  20 mg Per G Tube BID Erika Klein MD      insulin lispro  1-5 Units Subcutaneous Q6H Erika Klein MD      lactated ringers  1,000 mL Intravenous Once Erika Klein MD      ondansetron  4 mg Intravenous Q6H PRN Erika Klein MD      sodium chloride  75 mL/hr Intravenous Continuous Erika Klein MD 75 mL/hr (04/27/23 0538)    vancomycin  1,250 mg Intravenous Q24H Erika Klein MD          Today, Patient Was Seen By: MOISES Castro    **Please Note: This note may have been constructed using a voice recognition system  **

## 2023-04-27 NOTE — SPEECH THERAPY NOTE
Speech Language/Pathology  Speech/Language Pathology  Assessment    Patient Name: Gurwinder Yanes  KBQAE'V Date: 4/27/2023     Problem List  Principal Problem:    Severe sepsis [facility])  Active Problems:    Dysphagia, unspecified    Type 2 diabetes mellitus without complication, without long-term current use of insulin ([facility] )    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)    Severe protein-calorie malnutrition ([facility] )    Other pulmonary embolism without acute cor pulmonale (HCC)    Acute respiratory failure with hypoxia (HCC)    Pressure injury of skin of buttock    Hypercalcemia of malignancy    Hyperkalemia    Past Medical History  Past Medical History:   Diagnosis Date    Cancer ([facility] )     Diabetes ([place] [address] )     Gastritis     GERD (gastroesophageal reflux disease)     History of bladder cancer 2010    treated with surgery    Hypercholesterolemia     Hypertension     Hyponatremia 12/22/2021    Lactic acidosis 12/22/2021     Past Surgical History  Past Surgical History:   Procedure Laterality Date    BLADDER TUMOR EXCISION      EGD      IR CHOLECYSTOSTOMY TUBE CHECK/CHANGE/REPOSITION/REINSERTION/UPSIZE  1/5/2022    IR CHOLECYSTOSTOMY TUBE CHECK/CHANGE/REPOSITION/REINSERTION/UPSIZE  3/4/2022    IR CHOLECYSTOSTOMY TUBE CHECK/CHANGE/REPOSITION/REINSERTION/UPSIZE  3/24/2022    IR CHOLECYSTOSTOMY TUBE PLACEMENT  12/27/2021    IR THORACENTESIS  3/23/2023        04/27/23 1700   Patient Information   Current Medical pneumonia   Past Medical History ongoing BOT ca dx, active treatment   Swallow Information   Current Risks for Dysphagia & Aspiration Weak cough;Weak voicing;Dysphonia;General debilitation;HX head/neck CA   Current Symptoms/Concerns Cough;Clear throat; With liquids; Moist cough; Wet voice;Current Pneumonia;Weight loss;Dehydration;HX Dysphagia   Current Diet NPO   Baseline Diet Tube feedings   Baseline Assessment   Behavior/Cognition Alert; Cooperative; Interactive   Speech/Language Status [facility]   Patient Positioning "Upright in bed   Consistencies Assessed and Performance   Materials Admnistered Thin liquid;Honey thick liquid   Oral Stage Mild impaired   Phargngeal Stage Severe impaired;Significant aspiration risk   Swallow Mechanics Moderate delayed;Swallow initation;Weak larygneal rise;Aspiration risk   Summary   Swallow Summary Orders received and chart review completed  Patient having had BOT cancer diagnosis with PEG placement in November 2021  Patient reporting at that time that he consumed very little PO intake, if anything at all  Most recently, patient denies having had anything by mouth for \"months maybe longer  \"  When asked why, patient gestures to neck stating \"I can't do it  \"  He presents with a congested overall vocal quality, wet cough, productive cough at times with sputum ranging from white to yellow per NSG  Patient has had both radiation and chemo, with radiation focused on the neck and BOT for \"awhile  \"  Attempted HTL via 1/2 tsp- pt with likely aspiration event and subsequent extended period of wet cough, non productive  Patient then attempted single ice chip, able to allow ice chip to melt, less overall overt s/s but also suspect micro aspiration occurs given history and overall presentation  Spoke with patient about pleasaure feeds and the assumed risk of ongoing aspiration events with subsequent pneumonia, he verbalized his understanding  Consider pleasure feeds as pt citing \"I probably won't even want them\" but it would allow freedom of choice by patient and family  Swallow function is not able to be rehabilitated, strategies provided, recommendations provided, education initiated  No further ST needs are warranted at this time     Recommendations   Risk for Aspiration Present   Results Reviewed with PAC/CRNP;PT/Family/Caregiver   Speech Therapy Prognosis   Prognosis Poor       "

## 2023-04-27 NOTE — ASSESSMENT & PLAN NOTE
Lab Results   Component Value Date    HGBA1C 5 8 (H) 12/30/2022       Recent Labs     04/26/23  1755 04/26/23  2043 04/27/23  0050 04/27/23  0630   POCGLU 115 86 118 285*       Blood Sugar Average: Last 72 hrs:  (P) 156 6     · Continue tube feeds  · Sliding scale insulin

## 2023-04-28 LAB
ANION GAP SERPL CALCULATED.3IONS-SCNC: 6 MMOL/L (ref 4–13)
ATRIAL RATE: 139 BPM
BUN SERPL-MCNC: 24 MG/DL (ref 5–25)
CA-I BLD-SCNC: 1.36 MMOL/L (ref 1.12–1.32)
CALCIUM PRE 500 MG CA PO UR-SCNC: >37.5 MG/DL
CALCIUM SERPL-MCNC: 10.1 MG/DL (ref 8.4–10.2)
CHLORIDE SERPL-SCNC: 101 MMOL/L (ref 96–108)
CO2 SERPL-SCNC: 30 MMOL/L (ref 21–32)
CREAT SERPL-MCNC: 0.54 MG/DL (ref 0.6–1.3)
ERYTHROCYTE [DISTWIDTH] IN BLOOD BY AUTOMATED COUNT: 17.4 % (ref 11.6–15.1)
ERYTHROCYTE [DISTWIDTH] IN BLOOD BY AUTOMATED COUNT: 17.5 % (ref 11.6–15.1)
GFR SERPL CREATININE-BSD FRML MDRD: 102 ML/MIN/1.73SQ M
GLUCOSE SERPL-MCNC: 128 MG/DL (ref 65–140)
GLUCOSE SERPL-MCNC: 142 MG/DL (ref 65–140)
GLUCOSE SERPL-MCNC: 150 MG/DL (ref 65–140)
GLUCOSE SERPL-MCNC: 167 MG/DL (ref 65–140)
GLUCOSE SERPL-MCNC: 240 MG/DL (ref 65–140)
GLUCOSE SERPL-MCNC: 303 MG/DL (ref 65–140)
GLUCOSE SERPL-MCNC: 96 MG/DL (ref 65–140)
HCT VFR BLD AUTO: 31 % (ref 36.5–49.3)
HCT VFR BLD AUTO: 33.9 % (ref 36.5–49.3)
HGB BLD-MCNC: 9.4 G/DL (ref 12–17)
HGB BLD-MCNC: 9.9 G/DL (ref 12–17)
MCH RBC QN AUTO: 25.8 PG (ref 26.8–34.3)
MCH RBC QN AUTO: 26.8 PG (ref 26.8–34.3)
MCHC RBC AUTO-ENTMCNC: 29.2 G/DL (ref 31.4–37.4)
MCHC RBC AUTO-ENTMCNC: 30.3 G/DL (ref 31.4–37.4)
MCV RBC AUTO: 88 FL (ref 82–98)
MCV RBC AUTO: 88 FL (ref 82–98)
MRSA NOSE QL CULT: NORMAL
P AXIS: 84 DEGREES
PLATELET # BLD AUTO: 314 THOUSANDS/UL (ref 149–390)
PLATELET # BLD AUTO: 346 THOUSANDS/UL (ref 149–390)
PMV BLD AUTO: 9 FL (ref 8.9–12.7)
PMV BLD AUTO: 9.2 FL (ref 8.9–12.7)
POTASSIUM SERPL-SCNC: 3.5 MMOL/L (ref 3.5–5.3)
PR INTERVAL: 140 MS
PROCALCITONIN SERPL-MCNC: 0.67 NG/ML
QRS AXIS: 142 DEGREES
QRSD INTERVAL: 124 MS
QT INTERVAL: 280 MS
QTC INTERVAL: 426 MS
RBC # BLD AUTO: 3.51 MILLION/UL (ref 3.88–5.62)
RBC # BLD AUTO: 3.84 MILLION/UL (ref 3.88–5.62)
SODIUM SERPL-SCNC: 137 MMOL/L (ref 135–147)
T WAVE AXIS: 48 DEGREES
VENTRICULAR RATE: 139 BPM
WBC # BLD AUTO: 8.31 THOUSAND/UL (ref 4.31–10.16)
WBC # BLD AUTO: 8.93 THOUSAND/UL (ref 4.31–10.16)

## 2023-04-28 RX ORDER — LORAZEPAM 2 MG/ML
0.5 CONCENTRATE ORAL
Status: DISCONTINUED | OUTPATIENT
Start: 2023-04-28 | End: 2023-05-01

## 2023-04-28 RX ADMIN — GUAIFENESIN 200 MG: 200 SYRUP ORAL at 19:51

## 2023-04-28 RX ADMIN — CEFEPIME HYDROCHLORIDE 2000 MG: 2 INJECTION, SOLUTION INTRAVENOUS at 15:19

## 2023-04-28 RX ADMIN — CEFEPIME HYDROCHLORIDE 2000 MG: 2 INJECTION, SOLUTION INTRAVENOUS at 02:10

## 2023-04-28 RX ADMIN — FAMOTIDINE 20 MG: 20 TABLET, FILM COATED ORAL at 18:15

## 2023-04-28 RX ADMIN — VANCOMYCIN HYDROCHLORIDE 1750 MG: 1 INJECTION, POWDER, LYOPHILIZED, FOR SOLUTION INTRAVENOUS at 15:30

## 2023-04-28 RX ADMIN — NYSTATIN 500000 UNITS: 100000 SUSPENSION ORAL at 11:43

## 2023-04-28 RX ADMIN — NYSTATIN 500000 UNITS: 100000 SUSPENSION ORAL at 18:15

## 2023-04-28 RX ADMIN — SODIUM CHLORIDE 75 ML/HR: 0.9 INJECTION, SOLUTION INTRAVENOUS at 18:15

## 2023-04-28 RX ADMIN — INSULIN LISPRO 2 UNITS: 100 INJECTION, SOLUTION INTRAVENOUS; SUBCUTANEOUS at 21:44

## 2023-04-28 RX ADMIN — ATORVASTATIN CALCIUM 20 MG: 20 TABLET, FILM COATED ORAL at 15:30

## 2023-04-28 RX ADMIN — FAMOTIDINE 20 MG: 20 TABLET, FILM COATED ORAL at 11:42

## 2023-04-28 RX ADMIN — APIXABAN 5 MG: 5 TABLET, FILM COATED ORAL at 18:15

## 2023-04-28 RX ADMIN — APIXABAN 5 MG: 5 TABLET, FILM COATED ORAL at 11:42

## 2023-04-28 NOTE — ASSESSMENT & PLAN NOTE
· Secondary to pneumonia    · Continue oxygen and wean as tolerated  · Overnight he had an episode of severe respiratory distress  CTA chest PE study negative for PE  At that time patient verbalized that he would want CPR and to be on a ventilator if his respiratory status declined  I asked him again this morning and he again said that that is what he would want    Will discuss again with family present

## 2023-04-28 NOTE — MALNUTRITION/BMI
This medical record reflects one or more clinical indicators suggestive of malnutrition and/or morbid obesity  Malnutrition Findings:   Adult Malnutrition type: Chronic illness  Adult Degree of Malnutrition: Other severe protein calorie malnutrition  Malnutrition Characteristics: Fat loss, Muscle loss       360 Statement: Related to chronic illness as evidenced by multiple areas of muscle and adipose loss; most notable - severe muscle loss at clavicle region, severe adipose loss at orbital region  To treat with most optimal EN regimen to meet estimated nutrient needs  BMI Findings:  Adult BMI Classifications: Underweight < 18 5        Body mass index is 15 41 kg/m²  See Nutrition note dated 4/28/2023 in flow sheets for additional details  Completed nutrition assessment is viewable in the nutrition documentation

## 2023-04-28 NOTE — QUICK NOTE
"Was contacted by nursing the patient suddenly became hypoxic with O2 saturation in the 70s despite being on 4 L nasal cannula  Patient was subsequently placed on a nonrebreather at time of exam out an O2 saturation of 90%  Additionally patient was noted to be tachycardic with a heart rate as high as 137  Patient has a history of oral cancer with previous pulmonary embolus pulmonary effusion  Patient was initially listed as a level 3 CODE STATUS apparently there was conversation today about possible comfort measures  I contacted patient's wife Walker Nicholas via phone verify his CODE STATUS and she told me \"what ever he wants\"  I then went in to speak with patient again references CODE STATUS and what his wishes were include if his heart were to stop which point he indicated that he would be willing to have CPR if his breathing were to worsen to the point where he required intubation he was agreeable to it  Patient is pending stat CTA to rule out pulmonary embolus and again spoke with patient's wife by phone to update her of his wishes  Patient was upgraded to level 1 CODE STATUS per his wishes Case was discussed with critical care MANNY as well plan is to upgrade him to level 2 stepdown and awaiting CT results  We will attempt to readdress CODE STATUS once CT results are available    "

## 2023-04-28 NOTE — ASSESSMENT & PLAN NOTE
· Patient was following with [facility] earlier in the year, however,  has not had treatment recently as he was hospitalized   He has been in rehab for the last few weeks and malignancy treatment has been on hold for this reason  · Will need outpatient follow-up with hematology/oncology

## 2023-04-28 NOTE — PHYSICAL THERAPY NOTE
PT Treatment Note    NAME:  Miguel Garcia  DATE: 04/28/23    AGE:   78 y o  Mrn:   [id number]  ADMIT DX:  Shortness of breath [R06 02]  Hyperkalemia [E87 5]  Severe protein-calorie malnutrition (HCC) [E43]  Weakness [R53 1]  PNA (pneumonia) [J18 9]  Sepsis ([address] ) [A41 9]  Performed at least 2 patient identifiers during session: Name and Zach Day       04/28/23 0851   PT Last Visit   PT Visit Date 04/28/23   Note Type   Note Type Treatment   Pain Assessment   Pain Assessment Tool 0-10   Pain Score No Pain   Restrictions/Precautions   Weight Bearing Precautions Per Order No   Other Precautions Bed Alarm;Cognitive;Multiple lines;Telemetry;O2;Fall Risk  (Ba)   General   Chart Reviewed Yes   Family/Caregiver Present No   Cognition   Overall Cognitive Status Impaired   Arousal/Participation Responsive   Attention Attends with cues to redirect   Orientation Level Oriented to person;Disoriented to place; Disoriented to time;Disoriented to situation   Memory Decreased recall of recent events   Following Commands Follows one step commands with increased time or repetition   Bed Mobility   Supine to Sit 3  Moderate assistance   Additional items Assist x 1;HOB elevated;LE management;Verbal cues; Increased time required   Sit to Supine 4  Minimal assistance   Additional items Assist x 1;Verbal cues; Increased time required   Additional Comments pt sat EOB ~ 5 mins; Min A to maintain sitting posture; pt very fatigued; pt refused to transfer OOB to chair despite encouragement   Transfers   Sit to Stand 4  Minimal assistance   Additional items Assist x 2;Verbal cues; Increased time required   Stand to Sit 4  Minimal assistance   Additional items Assist x 2;Verbal cues; Increased time required   Additional Comments flexed posture   Balance   Static Sitting Fair -   Dynamic Sitting Fair -   Static Standing Poor +   Endurance Deficit   Endurance Deficit Yes   Endurance Deficit Description unable to tolerate further OOB mobility Activity Tolerance   Activity Tolerance Patient limited by fatigue   Nurse Made Aware RHYS Nj verbalized pt appropriate for session and made aware of session outcomes   Exercises   Ankle Pumps Supine;15 reps;AROM; Bilateral   Assessment   Prognosis Guarded   Assessment Pt seen for PT treatment session this date with interventions consisting of therapeutic activity to improve transfers and increase activity tolerance with functional mobility to decrease fall risk  Pt agreeable to PT treatment session upon arrival, pt found supine in bed, responsive  Since previous session, pt has made no progress as evidenced by inability to tolerate getting OOB  Barriers during this session include confusion and fatigue  Pt continues to be functioning below baseline level, and remains limited 2* factors listed above and including decreased strength, impaired activity tolerance, decreased balance, poor safety awareness and impaired cognition  Pt prognosis for achieving goals is guarded, pending pt progress with hospitalization/medical status improvements, and indicated by ability to follow cues and continued required assistance  PT will continue to see pt during current hospitalization in order to address the deficits listed above and provide interventions consistent w/ POC in effort to achieve goals  Current goals and POC remain appropriate, pt continues to have rehab potential  Continue to recommend return to facility with rehabilitation services at time of d/c in order to maximize pt's functional independence and safety w/ mobility  Upon conclusion pt supine in bed  The patient's AM-PAC Basic Mobility Inpatient Short Form Raw Score is 9  A Raw score of less than or equal to 16 suggests the patient may benefit from discharge to post-acute rehabilitation services  Please also refer to the recommendation of the Physical Therapist for safe discharge planning   This session, pt required and most appropriately benefited from skilled OT/PT co-treat due to extensive physical assistance of SKILLED therapists, significant regression from functional baseline and decreased activity tolerance  OT and PT goals were addressed separately as seen in documentation     Goals   Patient Goals to take a nap in bed   PT Treatment Day 1   Plan   Progress Slow progress, medical status limitations   Recommendation   PT Discharge Recommendation Return to facility with rehabilitation services   AM-PAC Basic Mobility Inpatient   Turning in Flat Bed Without Bedrails 3   Lying on Back to Sitting on Edge of Flat Bed Without Bedrails 2   Moving Bed to Chair 1   Standing Up From Chair Using Arms 1   Walk in Room 1   Climb 3-5 Stairs With Railing 1   Basic Mobility Inpatient Raw Score 9   Turning Head Towards Sound 4   Follow Simple Instructions 3   Low Function Basic Mobility Raw Score  16   Low Function Basic Mobility Standardized Score  25 72   Highest Level Of Mobility   -HLM Goal 3: Sit at edge of bed   -HL Achieved 3: Sit at edge of bed         Time In: 0825  Time Out: 0851  Total Treatment Minutes: 1210 Decker, PT
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
## 2023-04-28 NOTE — PLAN OF CARE
Problem: OCCUPATIONAL THERAPY ADULT  Goal: Performs self-care activities at highest level of function for planned discharge setting  See evaluation for individualized goals  Description: Treatment Interventions: ADL retraining, Functional transfer training, UE strengthening/ROM, Endurance training, Cognitive reorientation, Patient/family training, Compensatory technique education, Energy conservation, Activityengagement     See flowsheet documentation for full assessment, interventions and recommendations  Outcome: Progressing  Note: Limitation: Decreased ADL status, Decreased UE strength, Decreased Safe judgement during ADL, Decreased endurance, Decreased self-care trans, Decreased high-level ADLs  Prognosis: Good  Assessment: Pt seen this date for skilled OT session focused on ADLs, functional transfers and mobility, safety education  The patient was received supine in bed, NAD, PIV access, on 3L O2 NC, tele, PEG with tube feeds, garcia catheter  He participated in functional bed mobility, sit<>stand trial, and BUE exercises this date  Pt declined to perform grooming ADLs, and required significant encouragement to participate in exercises and mobility  He declined to mobilize away from the bed this date  At end of session the patient was located supine in bed with call bell in reach and all needs met  Bed alarm activated  Overall the patient remains below his functional baseline, and is primarily limited at this time due to impaired cognition, mood limitation, significant generalized deconditioning, impaired balance, and decreased activity tolerance  OT will continue to follow while acute to address POC   At this time, recommend inpatient rehab upon d/c      OT Discharge Recommendation: Return to facility with rehabilitation services        CARIN Adame/DREA

## 2023-04-28 NOTE — OCCUPATIONAL THERAPY NOTE
Occupational Therapy Progress Note     Patient Name: Luna Harris  VNWGP'P Date: 4/28/2023      Problem List  Principal Problem:    Severe sepsis [facility])  Active Problems:    Dysphagia, unspecified    Type 2 diabetes mellitus without complication, without long-term current use of insulin (HCC)    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)    Severe protein-calorie malnutrition ([address] )    Other pulmonary embolism without acute cor pulmonale (HCC)    Acute respiratory failure with hypoxia (HCC)    Pressure injury of skin of buttock    Hypercalcemia of malignancy    Hyperkalemia          04/28/23 0825   OT Last Visit   OT Visit Date 04/28/23   Note Type   Note Type Treatment   Pain Assessment   Pain Assessment Tool 0-10   Pain Score No Pain   Restrictions/Precautions   Weight Bearing Precautions Per Order No   Other Precautions Cognitive; Chair Alarm; Bed Alarm;Multiple lines;Telemetry;O2;Fall Risk   ADL   Where Assessed Other (Comment)  (Assist levels for some self care tasks are based on functional assessment of performance skills and deficits observed during session )   Eating Assistance   (Pt is currently NPO with tube feeds)   Grooming Assistance 5  Supervision/Setup   Grooming Deficit Setup;Supervision/safety; Increased time to complete;Wash/dry face   UB Bathing Assistance 3  Moderate Assistance   LB Bathing Assistance 2  Maximal Assistance   UB Dressing Assistance 3  Moderate Assistance   LB Dressing Assistance 2  Maximal Assistance   Toileting Assistance  1  Total Assistance   Functional Standing Tolerance   Time ~1 minute   Activity static standing   Comments assist x2 with RW   Bed Mobility   Supine to Sit 3  Moderate assistance   Additional items Assist x 1;HOB elevated; Bedrails; Increased time required;LE management   Sit to Supine 4  Minimal assistance   Additional items Assist x 2; Increased time required;Verbal cues;LE management   Additional Comments pt sat EOB ~5 minutes; initially Min A, progressed to SBA with forward flexed posture   Transfers   Sit to Stand 4  Minimal assistance   Additional items Assist x 2;Verbal cues; Increased time required  (with RW)   Stand to Sit 4  Minimal assistance   Additional items Assist x 2; Increased time required;Verbal cues   Additional Comments pt declined further mobility or OOB to bed side chair, despite significant encouragement and education   Therapeutic Excerise-Strength   UE Strength Yes   Right Upper Extremity- Strength   R Shoulder Flexion   R Elbow Elbow flexion;Elbow extension   R Position Supine   R Weight/Reps/Sets 10 reps x1 set   Left Upper Extremity-Strength   L Shoulder Flexion   L Elbow Elbow flexion;Elbow extension   L Position Supine   L Weights/Reps/Sets 10 reps x1 set   Subjective   Subjective Pt reporting fatigue, agreeable to limited therapy session   Cognition   Overall Cognitive Status Impaired   Arousal/Participation Responsive   Attention Attends with cues to redirect   Orientation Level Oriented to person;Disoriented to place; Disoriented to time;Disoriented to situation   Memory Decreased recall of recent events   Following Commands Follows one step commands with increased time or repetition   Activity Tolerance   Activity Tolerance Patient limited by fatigue   Medical Staff Made Aware Jcarlos Talbert PT, [place] JOINER  Pt seen for co-treatment with Physical Therapist due to pt's medical complexity, functional limitations and limited activity tolerance  Assessment   Assessment Pt seen this date for skilled OT session focused on ADLs, functional transfers and mobility, safety education  The patient was received supine in bed, NAD, PIV access, on 3L O2 NC, tele, PEG with tube feeds, garcia catheter  He participated in functional bed mobility, sit<>stand trial, and BUE exercises this date  Pt declined to perform grooming ADLs, and required significant encouragement to participate in exercises and mobility   He declined to mobilize away from the bed this date  At end of session the patient was located supine in bed with call bell in reach and all needs met  Bed alarm activated  Overall the patient remains below his functional baseline, and is primarily limited at this time due to impaired cognition, mood limitation, significant generalized deconditioning, impaired balance, and decreased activity tolerance  OT will continue to follow while acute to address POC  At this time, recommend inpatient rehab upon d/c    Plan   Treatment Interventions ADL retraining;Functional transfer training; Endurance training;UE strengthening/ROM   Goal Expiration Date 05/07/23   OT Treatment Day 1   OT Frequency 3-5x/wk   Recommendation   OT Discharge Recommendation Return to facility with rehabilitation services   Additional Comments  The patient's raw score on the AM-PAC Daily Activity Inpatient Short Form is 13  A raw score of less than 19 suggests the patient may benefit from discharge to post-acute rehabilitation services  Please refer to the recommendation of the Occupational Therapist for safe discharge planning     AM-PAC Daily Activity Inpatient   Lower Body Dressing 2   Bathing 2   Toileting 1   Upper Body Dressing 2   Grooming 3   Eating 3   Daily Activity Raw Score 13   Daily Activity Standardized Score (Calc for Raw Score >=11) 32 03   AM-PAC Applied Cognition Inpatient   Following a Speech/Presentation 3   Understanding Ordinary Conversation 3   Taking Medications 2   Remembering Where Things Are Placed or Put Away 2   Remembering List of 4-5 Errands 2   Taking Care of Complicated Tasks 2   Applied Cognition Raw Score 14   Applied Cognition Standardized Score 32 02       Jennifer Santos, OTR/L

## 2023-04-28 NOTE — ASSESSMENT & PLAN NOTE
· Unclear etiology, possibly due to dehydration and/or related to patient's tube feeds   Resolved  · Continue IV fluids  · Patient received insulin/dextrose, albuterol  · Nephrology recommendations appreciated  · Daily BMP and trend potassium

## 2023-04-28 NOTE — ASSESSMENT & PLAN NOTE
· POA secondary to suspected pneumonia with hypothermia, tachycardia, tachypnea  · Chest x-ray: Infiltrate at the right lung base along with a small pleural effusion  · Blood cultures: NGTD  · Continue antibiotics with cefepime/vancomycin  · Continue IV fluids   · Trend procalcitonin  · MRSA screen  · Strep pneumo/urine Legionella negative  · Sputum culture pending collection

## 2023-04-28 NOTE — ASSESSMENT & PLAN NOTE
· Possibly secondary to patient's underlying malignancy  · Continue IV fluids  · Nephrology recommendations appreciated  · Monitor calcium periodically

## 2023-04-28 NOTE — ASSESSMENT & PLAN NOTE
Lab Results   Component Value Date    HGBA1C 5 8 (H) 12/30/2022       Recent Labs     04/27/23  2127 04/27/23  2336 04/28/23  0209 04/28/23  0558   POCGLU 150* 163* 96 150*       Blood Sugar Average: Last 72 hrs:  (P) 039 [id number]     · Continue tube feeds  · Sliding scale insulin

## 2023-04-28 NOTE — PROGRESS NOTES
Deirdre Aguirre is a 78 y o  male who is currently ordered Vancomycin IV with management by the Pharmacy Consult service  Relevant clinical data and objective / subjective history reviewed  Vancomycin Assessment:  Indication and Goal AUC/Trough: Pneumonia (goal -600, trough >10), -600, trough >10  Clinical Status: stable  Micro:   Preliminary   Renal Function:  SCr: 0 54 mg/dL  CrCl: 90 3 mL/min  Renal replacement: Not on dialysis  Days of Therapy: 3  Current Dose: 1500mg IV Q24hrs  Vancomycin Plan:  New Dosinmg IV Q24hrs  Estimated AUC: 463 mcg*hr/mL  Estimated Trough: 10 3 mcg/mL  Next Level:  with am labs   Renal Function Monitoring: Daily BMP and UOP  Pharmacy will continue to follow closely for s/sx of nephrotoxicity, infusion reactions and appropriateness of therapy  BMP and CBC will be ordered per protocol  We will continue to follow the patients culture results and clinical progress daily      Vane Odell, Pharmacist

## 2023-04-28 NOTE — PLAN OF CARE
Problem: PHYSICAL THERAPY ADULT  Goal: Performs mobility at highest level of function for planned discharge setting  See evaluation for individualized goals  Description: Treatment/Interventions: Functional transfer training, LE strengthening/ROM, Elevations, Therapeutic exercise, Endurance training, Cognitive reorientation, Patient/family training, Equipment eval/education, Bed mobility, Gait training, Spoke to nursing          See flowsheet documentation for full assessment, interventions and recommendations  Outcome: Progressing  Note: Prognosis: Guarded  Problem List: Decreased strength, Decreased endurance, Impaired balance, Decreased mobility, Decreased coordination, Decreased cognition, Impaired judgement, Decreased safety awareness, Pain  Assessment: Pt seen for PT treatment session this date with interventions consisting of therapeutic activity to improve transfers and increase activity tolerance with functional mobility to decrease fall risk  Pt agreeable to PT treatment session upon arrival, pt found supine in bed, responsive  Since previous session, pt has made no progress as evidenced by inability to tolerate getting OOB  Barriers during this session include confusion and fatigue  Pt continues to be functioning below baseline level, and remains limited 2* factors listed above and including decreased strength, impaired activity tolerance, decreased balance, poor safety awareness and impaired cognition  Pt prognosis for achieving goals is guarded, pending pt progress with hospitalization/medical status improvements, and indicated by ability to follow cues and continued required assistance  PT will continue to see pt during current hospitalization in order to address the deficits listed above and provide interventions consistent w/ POC in effort to achieve goals   Current goals and POC remain appropriate, pt continues to have rehab potential  Continue to recommend return to facility with rehabilitation services at time of d/c in order to maximize pt's functional independence and safety w/ mobility  Upon conclusion pt supine in bed  The patient's AM-PAC Basic Mobility Inpatient Short Form Raw Score is 9  A Raw score of less than or equal to 16 suggests the patient may benefit from discharge to post-acute rehabilitation services  Please also refer to the recommendation of the Physical Therapist for safe discharge planning  This session, pt required and most appropriately benefited from skilled OT/PT co-treat due to extensive physical assistance of SKILLED therapists, significant regression from functional baseline and decreased activity tolerance  OT and PT goals were addressed separately as seen in documentation  PT Discharge Recommendation: Return to facility with rehabilitation services    See flowsheet documentation for full assessment

## 2023-04-28 NOTE — NUTRITION
04/28/23 0840   Biochemical Data,Medical Tests, and Procedures   Biochemical Data/Medical Tests/Procedures Lab values reviewed; Meds reviewed   Labs (Comment) 4/28/23 creat 0 54, glucose , H&H 9 4/31 0   Meds (Comment) eliquis, atorvastatin, pepcid, insulin, LR bolus, NaCl infusion   Nutrition-Focused Physical Exam   Nutrition-Focused Physical Exam Findings RN skin assessment reviewed;Muscle Loss;Subcutaneous fat loss; No edema documented; Wound   Nutrition-Focused Physical Exam Findings stage 3 pressure injury at left sacrum   Medical-Related Concerns tongue and lung cancer, dysphagia, type 2 diabetes, history of malnutrition, acute respiratory failure   Adequacy of Intake   Nutrition Modality NPO;EN   Feeding Route   Formula Nepro   Formula rate 50   Continuous Continuous via Pump   Free Water From  mL   Total EN Volume 1200 mL   EN Kcals TF Formula Only 2160 Kcals   Total Kcal intake 2160   Protein Intake (g) 97 g   Current PO Intake   Estimated calorie intake compared to estimated need Nutrient needs are met with current EN order   PES Statement   Weight (3) Underweight NC-3 1   Related to Other (Comment)  (advanced clinical conditions)   As evidenced by: Malnutrition; Loss of muscle mass; Other (Comment)  (catabolic illness)   Recommendations/Interventions   Malnutrition/BMI Present Yes   Adult Malnutrition type Chronic illness   Adult Degree of Malnutrition Other severe protein calorie malnutrition   Malnutrition Characteristics Fat loss;Muscle loss   Adult BMI Classifications Underweight < 18 5   360 Statement Related to chronic illness as evidenced by multiple areas of muscle and adipose loss; most notable - severe muscle loss at clavicle region, severe adipose loss at orbital region  To treat with most optimal EN regimen to meet estimated nutrient needs  Summary Consult - tube feeding; ST consulted; Low BMI; Nutrition Risk - weight loss, home TF   [facility]resents from [facility] with generalized weakness  Past medical history significant for tongue and lung cancer, dysphagia, type 2 diabetes, history of malnutrition, acute respiratory failure  Weight history reviewed  Unable to accurately assess with most recent weight being stated  Significant weight loss possible  Recommend obtaining updated weight via standing or bed scale as able  Stage 3 pressure injury at sacrum noted per flow sheets  Upon visual assessment noted multiple areas of muscle and adipose loss  Most notable - severe muscle loss at clavicle region and severe adipose loss at orbital region  Malnutrition criteria met  Presents from [facility] with generalized weakness  Information obtained from pt's spouse via phone  When at home, home TF regimen is 6 cartons Jevity 1 5 daily - bolus 3 times daily (2 cans at each feeding)  Home TF regimen provides 2133 kcal, 90 g protein, 1080 mL free water in total volume of 1422 mL  At nursing home, pt was receiving continuous feeds  Currently ordered for Nepro at 50 mL/hr continuous via PEG  Nepro recommended by Nephrology as potassium was elevated yesterday  Labs reviewed  Potassium now WNL today  Recommend transitioning to home TF regimen as clinically appropriate: Jevity 1 5 480 mL bolus TID at 0800, 1300, 1700  Recommend adding Prosource NoCarb once daily  Seen by ST 4/27 - recommending consideration of pleasure feeds as swallow function is not able to be rehabilitated  Pt is at risk for aspiration  Pt is full code  Interventions/Recommendations Adjust EN/ PN;Monitor I & O's;Supplement initiate   Recommendations to Provider Recommend transitioning to home TF regimen as clinically appropriate: Jevity 1 5 480 mL bolus TID at 0800, 1300, 1700  Recommend adding Prosource NoCarb once daily     Education Assessment   Education Education not indicated at this time   Patient Nutrition Goals   Goal Meet EN/PN needs

## 2023-04-28 NOTE — PROGRESS NOTES
Progress Note - Nephrology   Karen Griffin 78 y o  male MRN: [id number]  Unit/Bed#: ICU 02-01 Encounter: [id number]    A/P:  1  Hypercalcemia   Patient serum calcium levels are appropriate this morning status post volume expansion, and to subcutaneous calcitonin  Will discontinue calcitonin, may continue with IV fluids at this time though the patient is stable on tube feeds  Continue to monitor calcium levels, PTH and PTH related peptide were not drawn  At this time may be discontinued since calcium levels have normalized  2   Hyperkalemia   Potassium levels have significantly improved, continue to monitor for now, may be able to transition back to typical tube feeds given improvement in overall clinical status  3   Severe sepsis   Patient appears to be stable, continue with antibiotics according to hospitalist   4   Malignant neoplasm of the tongue   Has not been on chemotherapeutics for about 1 month, previously on Taxol, Keytruda, and carboplatin  Unclear if the patient is a candidate to proceed with further therapy      Follow up reason for today's visit: Electrolyte abnormalities    Severe sepsis [facility])    Patient Active Problem List   Diagnosis    Anxiety    Neoplasm of uncertain behavior of skin    Post-traumatic stress disorder, unspecified    Essential hypertension    Actinic keratosis    Dysphagia, unspecified    Erectile dysfunction    History of malignant neoplasm of bladder    Cardiac arrhythmia    Carpal tunnel syndrome    Chronic post-traumatic stress disorder (PTSD) after  combat    Type 2 diabetes mellitus without complication, without long-term current use of insulin (HCC)    Heart murmur    Hematuria    Hyperlipidemia due to type 2 diabetes mellitus ([address] )    Left ventricular hypertrophy    Malignant neoplasm of urinary bladder (HCC)    Neoplasm of lung    Neoplasm of uncertain behavior of oropharynx    Osteoarthritis of knee    Panic disorder    Polyp of colon "  • 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)   • Severe protein-calorie malnutrition (HCC)   • Severe sepsis ([address] )   • Other pulmonary embolism without acute cor pulmonale (HCC)   • Cholecystitis   • Multiple fractures of rib involving four or more ribs-right   • COVID   • Acute respiratory failure with hypoxia (HCC)   • Thyroid nodule   • History of head and neck radiation   • Agent orange exposure   • Subclinical hyperthyroidism   • Syncope and collapse   • Hypomagnesemia   • Hyperlipemia   • PNA (pneumonia)   • Pressure injury of skin of buttock   • Hydropneumothorax   • Hypercalcemia of malignancy   • Hyperkalemia   • Abnormal PET of right lung   • Choroidal nevus   • Combined forms of age-related cataract of both eyes   • Exposure to potentially hazardous substance   • Hyperglycemia   • Hyperopia   • Presbyopia   • Regular astigmatism of both eyes   • 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   • Unspecified abnormalities of gait and mobility         Subjective:   No acute complaints, denies nausea or vomiting or pain  Objective:     Vitals: Blood pressure 121/82, pulse 102, temperature (!) 97 2 °F (36 2 °C), temperature source Tympanic, resp  rate (!) 23, height 6' 2\" (1 88 m), weight 54 4 kg (120 lb), SpO2 97 %  ,Body mass index is 15 41 kg/m²  Weight (last 2 days)     Date/Time Weight    04/26/23 0946 54 4 (120)            Intake/Output Summary (Last 24 hours) at 4/28/2023 0912  Last data filed at 4/28/2023 0800  Gross per 24 hour   Intake 2438 ml   Output 1225 ml   Net 1213 ml     I/O last 3 completed shifts: In: 4614 3 [I V :1226 3; NG/GT:1200;  Feedings:2188]  Out: 1675 [Urine:1675]         Physical Exam: /82 (BP Location: Right arm)   Pulse 102   Temp (!) 97 2 °F (36 2 °C) (Tympanic)   Resp (!) 23   Ht 6' 2\" (1 88 m)   Wt 54 4 kg (120 lb)   SpO2 97%   BMI 15 41 kg/m²     General Appearance:    Alert, cooperative, no distress, appears stated age   Head:    " Normocephalic, without obvious abnormality, atraumatic   Eyes:    Conjunctiva/corneas clear   Ears:    Normal external ears   Nose:   Nares normal, septum midline, mucosa normal, no drainage    or sinus tenderness   Throat:   Lips, mucosa, and tongue normal; teeth and gums normal   Neck:   Supple   Back:     Symmetric, no curvature, ROM normal, no CVA tenderness   Lungs:     Clear to auscultation bilaterally, respirations unlabored   Chest wall:    No tenderness or deformity   Heart:    Regular rate and rhythm, S1 and S2 normal, no murmur, rub   or gallop   Abdomen:     Soft, non-tender, bowel sounds active   Extremities:   Extremities normal, atraumatic, no cyanosis or edema   Skin:   Skin color, texture, turgor normal, no rashes or lesions   Lymph nodes:   Cervical normal   Neurologic:   CNII-XII intact            Lab, Imaging and other studies: I have personally reviewed pertinent labs  CBC:   Lab Results   Component Value Date    WBC 8 93 04/28/2023    HGB 9 4 (L) 04/28/2023    HCT 31 0 (L) 04/28/2023    MCV 88 04/28/2023     04/28/2023    MCH 26 8 04/28/2023    MCHC 30 3 (L) 04/28/2023    RDW 17 5 (H) 04/28/2023    MPV 9 2 04/28/2023     CMP:   Lab Results   Component Value Date    K 3 5 04/28/2023     04/28/2023    CO2 30 04/28/2023    BUN 24 04/28/2023    CREATININE 0 54 (L) 04/28/2023    CALCIUM 10 1 04/28/2023    EGFR 102 04/28/2023           Results from last 7 days   Lab Units 04/28/23  0552 04/26/23  1051   POTASSIUM mmol/L 3 5 5 5*   CHLORIDE mmol/L 101 93*   CO2 mmol/L 30 29   BUN mg/dL 24 32*   CREATININE mg/dL 0 54* 0 67   CALCIUM mg/dL 10 1 12 7*   ALK PHOS U/L  --  123*   ALT U/L  --  57*   AST U/L  --  33         Phosphorus: No results found for: PHOS  Magnesium: No results found for: MG  Urinalysis: No results found for: COLORU, CLARITYU, SPECGRAV, PHUR, LEUKOCYTESUR, NITRITE, PROTEINUA, GLUCOSEU, KETONESU, BILIRUBINUR, BLOODU  Ionized Calcium: No results found for: CAION  Coagulation: No results found for: PT, INR, APTT  Troponin: No results found for: TROPONINI  ABG: No results found for: PHART, VXX7SWF, PO2ART, WWC9LQO, L3WZQSOF, BEART, SOURCE  Radiology review:     IMAGING  Procedure: XR chest portable    Result Date: 4/27/2023  Narrative: CHEST INDICATION:   SOB  COMPARISON: 03/21/2023  EXAM PERFORMED/VIEWS:  XR CHEST PORTABLE 1 image FINDINGS: Cardiomediastinal silhouette appears unremarkable  Chronic changes are present  There is an infiltrate in the right lower lobe  Small right pleural effusion  Small nodule at the left lung base  No pneumothorax   Osseous structures appear within normal limits for patient age  Impression: Infiltrate at the right lung base along with a small pleural effusion  Workstation performed: [id number]     Procedure: CTA chest pe study    Result Date: 4/28/2023  Narrative: CTA - CHEST WITH IV CONTRAST - PULMONARY ANGIOGRAM INDICATION:   Hypoxia and tachycardia  COMPARISON: 3/21/2023 TECHNIQUE: CTA examination of the chest was performed using angiographic technique according to a protocol specifically tailored to evaluate for pulmonary embolism  Multiplanar 2D reformatted images were created from the source data  In addition, coronal 3D MIP postprocessing was performed on the acquisition scanner  Radiation dose length product (DLP) for this visit:  391 8 mGy-cm   This examination, like all CT scans performed in the [facility], was performed utilizing techniques to minimize radiation dose exposure, including the use of iterative reconstruction and automated exposure control  IV Contrast:  90 mL of iohexol (OMNIPAQUE) FINDINGS: PULMONARY ARTERIAL TREE:  No filling defect in the visualized pulmonary arteries to suggest an acute embolus LUNGS: Right lower lobe airspace consolidation and atelectasis is stable   Endobronchial opacities are present throughout the lungs, most prominent in the lower lungs, similar to the previous exam  Mild bronchial inflammation  PLEURA: Stable small right hydropneumothorax  HEART/GREAT VESSELS: Normal heart size  No thoracic aortic aneurysm MEDIASTINUM AND EFRAIN: No lymphadenopathy  CHEST WALL AND LOWER NECK:   Unremarkable  VISUALIZED STRUCTURES IN THE UPPER ABDOMEN: Cholelithiasis OSSEOUS STRUCTURES:  No acute fracture or destructive osseous lesion  Impression: 1  Diffuse aspiration or endobronchial pneumonia throughout the lungs, similar to the prior exam  2  Stable airspace consolidation and atelectasis in the right lower lobe  3  Stable small right hydropneumothorax  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  4  No evidence of acute pulmonary embolus  Previously visualized emboli in left lower lobe and lingula segmental and subsegmental bronchi have resolved   Workstation performed: [id number]       Current Facility-Administered Medications   Medication Dose Route Frequency    acetaminophen (TYLENOL) tablet 650 mg  650 mg Oral Q6H PRN    apixaban (ELIQUIS) tablet 5 mg  5 mg Per PEG Tube BID    atorvastatin (LIPITOR) tablet 20 mg  20 mg Per PEG Tube Daily With Dinner    cefepime (MAXIPIME) IVPB (premix in dextrose) 2,000 mg 50 mL  2,000 mg Intravenous Q12H    famotidine (PEPCID) tablet 20 mg  20 mg Per G Tube BID    insulin lispro (HumaLOG) 100 units/mL subcutaneous injection 1-5 Units  1-5 Units Subcutaneous Q6H    lactated ringers bolus 1,000 mL  1,000 mL Intravenous Once    NON FORMULARY   Gastrostomy Tube 4x Daily PRN    nystatin (MYCOSTATIN) oral suspension 500,000 Units  500,000 Units Swish & Spit 4x Daily    ondansetron (ZOFRAN) injection 4 mg  4 mg Intravenous Q6H PRN    sodium chloride 0 9 % infusion  75 mL/hr Intravenous Continuous    vancomycin (VANCOCIN) 1500 mg in sodium chloride 0 9% 250 mL IVPB  1,500 mg Intravenous Q24H     Medications Discontinued During This Encounter   Medication Reason    cefepime (MAXIPIME) IVPB (premix in dextrose) 2,000 mg 50 mL     vancomycin (VANCOCIN) IVPB (premix in dextrose) 750 mg 150 mL     apixaban (ELIQUIS) tablet 5 mg     atorvastatin (LIPITOR) tablet 20 mg     famotidine (PEPCID) tablet 20 mg     vancomycin (VANCOCIN) 1,250 mg in sodium chloride 0 9 % 250 mL IVPB     vancomycin (VANCOCIN) 1,250 mg in sodium chloride 0 9 % 250 mL IVPB     guaiFENesin (MUCINEX) 12 hr tablet 600 mg     calcitonin (salmon) (MIACALCIN) injection 218 Units        DIRECTV, DO      This progress note was produced in part using a dictation device which may document imprecise wording from author's original intent

## 2023-04-29 PROBLEM — R19.7 DIARRHEA, UNSPECIFIED: Status: ACTIVE | Noted: 2023-04-29

## 2023-04-29 LAB
ANION GAP SERPL CALCULATED.3IONS-SCNC: 8 MMOL/L (ref 4–13)
BUN SERPL-MCNC: 18 MG/DL (ref 5–25)
C DIFF TOX GENS STL QL NAA+PROBE: NEGATIVE
CALCIUM SERPL-MCNC: 8.2 MG/DL (ref 8.4–10.2)
CHLORIDE SERPL-SCNC: 103 MMOL/L (ref 96–108)
CO2 SERPL-SCNC: 26 MMOL/L (ref 21–32)
CREAT SERPL-MCNC: 0.45 MG/DL (ref 0.6–1.3)
ERYTHROCYTE [DISTWIDTH] IN BLOOD BY AUTOMATED COUNT: 17.2 % (ref 11.6–15.1)
GFR SERPL CREATININE-BSD FRML MDRD: 110 ML/MIN/1.73SQ M
GLUCOSE SERPL-MCNC: 149 MG/DL (ref 65–140)
GLUCOSE SERPL-MCNC: 170 MG/DL (ref 65–140)
GLUCOSE SERPL-MCNC: 239 MG/DL (ref 65–140)
GLUCOSE SERPL-MCNC: 240 MG/DL (ref 65–140)
GLUCOSE SERPL-MCNC: 285 MG/DL (ref 65–140)
HCT VFR BLD AUTO: 30.3 % (ref 36.5–49.3)
HGB BLD-MCNC: 8.9 G/DL (ref 12–17)
MAGNESIUM SERPL-MCNC: 1.5 MG/DL (ref 1.9–2.7)
MCH RBC QN AUTO: 26.7 PG (ref 26.8–34.3)
MCHC RBC AUTO-ENTMCNC: 29.4 G/DL (ref 31.4–37.4)
MCV RBC AUTO: 91 FL (ref 82–98)
PLATELET # BLD AUTO: 256 THOUSANDS/UL (ref 149–390)
PMV BLD AUTO: 9 FL (ref 8.9–12.7)
POTASSIUM SERPL-SCNC: 3.1 MMOL/L (ref 3.5–5.3)
RBC # BLD AUTO: 3.33 MILLION/UL (ref 3.88–5.62)
SODIUM SERPL-SCNC: 137 MMOL/L (ref 135–147)
WBC # BLD AUTO: 7.69 THOUSAND/UL (ref 4.31–10.16)

## 2023-04-29 RX ORDER — HYDROMORPHONE HCL/PF 1 MG/ML
0.5 SYRINGE (ML) INJECTION ONCE
Status: COMPLETED | OUTPATIENT
Start: 2023-04-29 | End: 2023-04-29

## 2023-04-29 RX ORDER — POTASSIUM CHLORIDE 20MEQ/15ML
40 LIQUID (ML) ORAL ONCE
Status: COMPLETED | OUTPATIENT
Start: 2023-04-29 | End: 2023-04-29

## 2023-04-29 RX ORDER — MAGNESIUM SULFATE HEPTAHYDRATE 40 MG/ML
2 INJECTION, SOLUTION INTRAVENOUS ONCE
Status: COMPLETED | OUTPATIENT
Start: 2023-04-29 | End: 2023-04-29

## 2023-04-29 RX ORDER — VANCOMYCIN HYDROCHLORIDE 1 G/200ML
17.5 INJECTION, SOLUTION INTRAVENOUS EVERY 12 HOURS
Status: DISCONTINUED | OUTPATIENT
Start: 2023-04-30 | End: 2023-04-30

## 2023-04-29 RX ADMIN — FAMOTIDINE 20 MG: 20 TABLET, FILM COATED ORAL at 17:00

## 2023-04-29 RX ADMIN — HYDROMORPHONE HYDROCHLORIDE 0.5 MG: 1 INJECTION, SOLUTION INTRAMUSCULAR; INTRAVENOUS; SUBCUTANEOUS at 00:39

## 2023-04-29 RX ADMIN — MAGNESIUM SULFATE HEPTAHYDRATE 2 G: 40 INJECTION, SOLUTION INTRAVENOUS at 13:45

## 2023-04-29 RX ADMIN — NYSTATIN 500000 UNITS: 100000 SUSPENSION ORAL at 21:13

## 2023-04-29 RX ADMIN — INSULIN LISPRO 1 UNITS: 100 INJECTION, SOLUTION INTRAVENOUS; SUBCUTANEOUS at 21:13

## 2023-04-29 RX ADMIN — ATORVASTATIN CALCIUM 20 MG: 20 TABLET, FILM COATED ORAL at 17:00

## 2023-04-29 RX ADMIN — POTASSIUM CHLORIDE 40 MEQ: 1.5 SOLUTION ORAL at 09:50

## 2023-04-29 RX ADMIN — APIXABAN 5 MG: 5 TABLET, FILM COATED ORAL at 17:00

## 2023-04-29 RX ADMIN — FAMOTIDINE 20 MG: 20 TABLET, FILM COATED ORAL at 09:00

## 2023-04-29 RX ADMIN — LORAZEPAM 0.5 MG: 2 LIQUID ORAL at 21:13

## 2023-04-29 RX ADMIN — CEFEPIME HYDROCHLORIDE 2000 MG: 2 INJECTION, SOLUTION INTRAVENOUS at 02:20

## 2023-04-29 RX ADMIN — SODIUM CHLORIDE 75 ML/HR: 0.9 INJECTION, SOLUTION INTRAVENOUS at 09:46

## 2023-04-29 RX ADMIN — NYSTATIN 500000 UNITS: 100000 SUSPENSION ORAL at 09:00

## 2023-04-29 RX ADMIN — CEFEPIME HYDROCHLORIDE 2000 MG: 2 INJECTION, SOLUTION INTRAVENOUS at 13:46

## 2023-04-29 RX ADMIN — INSULIN LISPRO 3 UNITS: 100 INJECTION, SOLUTION INTRAVENOUS; SUBCUTANEOUS at 02:23

## 2023-04-29 RX ADMIN — APIXABAN 5 MG: 5 TABLET, FILM COATED ORAL at 09:00

## 2023-04-29 RX ADMIN — INSULIN LISPRO 2 UNITS: 100 INJECTION, SOLUTION INTRAVENOUS; SUBCUTANEOUS at 09:00

## 2023-04-29 RX ADMIN — VANCOMYCIN HYDROCHLORIDE 1750 MG: 1 INJECTION, POWDER, LYOPHILIZED, FOR SOLUTION INTRAVENOUS at 17:00

## 2023-04-29 NOTE — ASSESSMENT & PLAN NOTE
· Diarrhea, possibly related to tube feeding  · C  difficile and stool enteric panel pending  · Will decrease tube feed rate from 50-25 mL/hr  Assess response    Increase baseline rate as tolerated  · Monitor stool output

## 2023-04-29 NOTE — PROGRESS NOTES
"NEPHROLOGY PROGRESS NOTE   Tao Nelson 78 y o  male MRN: [id number]  Unit/Bed#: ICU 02-01 Encounter: [id number]    Assessment/Plan:    77 yo man with malignant neoplasm of tongue who presented 4/26 with shortness of breath being treated for severe sepsis POA and acute hypoxic respiratory failure secondary to pneumonia  Nephrology following for hypercalcemia and hyperkalemia  1   Hypercalcemia  · Potentially due to volume depletion versus malignancy  PTH and PTH RP were not collected upon arrival   Calcium uncorrected now low normal   No further intervention is needed for this and will check ionized calcium tomorrow  2  Hyperkalemia now hypokalemic  · Patient received 40 mill equivalents of liquid potassium via G-tube  Patient can transition back to outpatient tube feeding  3  Diarrhea potentially due to tube feeds  · If this is the case, recommend reducing rate and slowly increasing back to goal   Stool studies were also sent  4  Severe protein calorie malnutrition  · He was placed on Nepro due to hyperkalemia which resolved and patient is now actually hypokalemic and hypomagnesemic  · Reviewed registered dietitian's note  She is recommending transitioning to home tube feed regimen  Agree that patient can be transitioned back to his typical tube feed regimen  · \"Potassium now WNL today  Recommend transitioning to home TF regimen as clinically appropriate: Jevity 1 5 480 mL bolus TID at 0800, 1300, 1700  Recommend adding Prosource NoCarb once daily\"  5  Hypomagnesemia  · Give 2 g IV mag now      ROS:  \"When can I go home? \"  A complete 10 point review of systems have been performed and are otherwise negative         Historical Information   Past Medical History:   Diagnosis Date    Cancer [facility] [address] [facility]     Diabetes ([facility] )     Gastritis     GERD (gastroesophageal reflux disease)     History of bladder cancer 2010    treated with surgery    Hypercholesterolemia     Hypertension     Hyponatremia 12/22/2021 "  Lactic acidosis 2021     Past Surgical History:   Procedure Laterality Date    BLADDER TUMOR EXCISION      EGD      IR CHOLECYSTOSTOMY TUBE CHECK/CHANGE/REPOSITION/REINSERTION/UPSIZE  2022    IR CHOLECYSTOSTOMY TUBE CHECK/CHANGE/REPOSITION/REINSERTION/UPSIZE  3/4/2022    IR CHOLECYSTOSTOMY TUBE CHECK/CHANGE/REPOSITION/REINSERTION/UPSIZE  3/24/2022    IR CHOLECYSTOSTOMY TUBE PLACEMENT  2021    IR THORACENTESIS  3/23/2023     Social History   Social History     Substance and Sexual Activity   Alcohol Use Never     Social History     Substance and Sexual Activity   Drug Use Never     Social History     Tobacco Use   Smoking Status Former    Types: Cigarettes    Quit date: 2021    Years since quittin 6    Passive exposure: Past   Smokeless Tobacco Never       Family History:   Family History   Problem Relation Age of Onset    Cancer Mother     Melanoma Neg Hx        Medications:  Pertinent medications were reviewed  Current Facility-Administered Medications   Medication Dose Route Frequency Provider Last Rate    acetaminophen  650 mg Oral Q6H PRN Stephen Momin MD      apixaban  5 mg Per PEG Tube BID Stephen Momin MD      atorvastatin  20 mg Per PEG Tube Daily With Dinner Stephen Momin MD      cefepime  2,000 mg Intravenous Q12H Stephen Momin MD Stopped (23 0250)    famotidine  20 mg Per G Tube BID Stephen Momin MD      guaiFENesin  200 mg Per G Tube 4x Daily PRN MOISES Juan      insulin lispro  1-5 Units Subcutaneous Q6H Stephen Momin MD      lactated ringers  1,000 mL Intravenous Once Stephen Momin MD      LORazepam  0 5 mg Oral HS PRN MOISES Juan      nystatin  500,000 Units Swish & Spit 4x Daily MOISES Juan      ondansetron  4 mg Intravenous Q6H PRN Stephen Momin MD      sodium chloride  75 mL/hr Intravenous Continuous Stephen Momin MD 75 "mL/hr (04/29/23 0946)    vancomycin  1,750 mg Intravenous Q24H Kushal Leung MD Stopped (04/28/23 2000)         No Known Allergies      Vitals:   /72   Pulse (!) 107   Temp (!) 97 3 °F (36 3 °C) (Tympanic)   Resp 19   Ht 6' 2\" (1 88 m)   Wt 54 4 kg (120 lb)   SpO2 100%   BMI 15 41 kg/m²   Body mass index is 15 41 kg/m²  SpO2: 100 %,   SpO2 Activity: At Rest,   O2 Device: Nasal cannula      Intake/Output Summary (Last 24 hours) at 4/29/2023 1052  Last data filed at 4/29/2023 0600  Gross per 24 hour   Intake 4562 5 ml   Output 1060 ml   Net 3502 5 ml     Invasive Devices     Peripheral Intravenous Line  Duration           Peripheral IV 04/28/23 Left;Upper;Ventral (anterior) Arm 1 day    Peripheral IV 04/28/23 Right;Upper;Ventral (anterior) Arm 1 day          Drain  Duration           Gastrostomy/Enterostomy PEG-jejunostomy LUQ -- days    External Urinary Catheter 1 day                Physical Exam  General: conscious, cooperative, in no acute distress  Eyes: conjunctivae pink, anicteric sclerae  ENT: lips and mucous membranes moist  Neck: supple, no JVD, no masses  Chest: clear breath sounds bilaterally, no crackles, ronchus or wheezings  CVS: S1 & S2, normal rate, regular rhythm  Abdomen: soft, non-tender, non-distended, normoactive bowel sounds cachectic  Extremities: no edema of both legs  Skin: no rash  Neuro: awake, alert, oriented      Diagnostic Data:  Lab: I have personally reviewed pertinent lab results  ,   CBC:  Results from last 7 days   Lab Units 04/29/23  0525   WBC Thousand/uL 7 69   HEMOGLOBIN g/dL 8 9*   HEMATOCRIT % 30 3*   PLATELETS Thousands/uL 256      CMP:   Lab Results   Component Value Date    SODIUM 137 04/29/2023    K 3 1 (L) 04/29/2023     04/29/2023    CO2 26 04/29/2023    BUN 18 04/29/2023    CREATININE 0 45 (L) 04/29/2023    CALCIUM 8 2 (L) 04/29/2023    EGFR 110 04/29/2023   ,   PT/INR: No results found for: PT, INR,   Magnesium: No components found for: " "MAG,  Phosphorous: No results found for: PHOS    Microbiology:  @[facility],(urinecx:7)@        [facility]    Portions of the record may have been created with voice recognition software  Occasional wrong word or \"sound a like\" substitutions may have occurred due to the inherent limitations of voice recognition software  Read the chart carefully and recognize, using context, where substitutions have occurred    "

## 2023-04-29 NOTE — ASSESSMENT & PLAN NOTE
· Secondary to pneumonia    · Continue oxygen and wean as tolerated  · Overnight 4/28 he had an episode of severe respiratory distress  CTA chest PE study negative for PE  At that time patient verbalized that he would want CPR and to be on a ventilator if his respiratory status declined  I asked him again this morning now that he is feeling better, and he confirmed that he would not want compressions or to be on a ventilator  He also admits feeling confused the night he was having respiratory distress    Status returned to DNR

## 2023-04-29 NOTE — ASSESSMENT & PLAN NOTE
· POA secondary to suspected pneumonia with hypothermia, tachycardia, tachypnea  · Chest x-ray: Infiltrate at the right lung base along with a small pleural effusion  · Blood cultures: NGTD  · Continue antibiotics with cefepime D5    Vanco has been discontinued due to negative MRSA screen  · Continue IV fluids   · Trend procalcitonin  · Strep pneumo/urine Legionella negative  · Sputum culture pending collection

## 2023-04-29 NOTE — PROGRESS NOTES
[address]  Progress Note  Name: Rakan Valentin  MRN: [id number]  Unit/Bed#: ICU 02-01 I Date of Admission: 4/26/2023   Date of Service: 4/29/2023 I Hospital Day: 3    Assessment/Plan      Acute respiratory failure with hypoxia [facility])  Assessment & Plan  · Secondary to pneumonia    · Continue oxygen and wean as tolerated  · Overnight 4/28 he had an episode of severe respiratory distress  CTA chest PE study negative for PE  At that time patient verbalized that he would want CPR and to be on a ventilator if his respiratory status declined  I asked him again this morning now that he is feeling better, and he confirmed that he would not want compressions or to be on a ventilator  He also admits feeling confused the night he was having respiratory distress  Status returned to DNR    Hyperkalemia  Assessment & Plan  · Unclear etiology, possibly due to dehydration and/or related to patient's tube feeds  Resolved  · Continue IV fluids  · Patient received insulin/dextrose, albuterol  · Nephrology recommendations appreciated  · Daily BMP and trend potassium    * Severe sepsis ([address] )  Assessment & Plan  · POA secondary to suspected pneumonia with hypothermia, tachycardia, tachypnea  · Chest x-ray: Infiltrate at the right lung base along with a small pleural effusion  · Blood cultures: NGTD  · Continue antibiotics with cefepime/vancomycin  · Continue IV fluids   · Trend procalcitonin  · MRSA screen  · Strep pneumo/urine Legionella negative  · Sputum culture pending collection    Hypercalcemia of malignancy  Assessment & Plan  · Possibly secondary to patient's underlying malignancy  · Continue IV fluids  · Nephrology recommendations appreciated  · Monitor calcium periodically    Diarrhea, unspecified  Assessment & Plan  · Diarrhea, possibly related to tube feeding  · C  difficile and stool enteric panel pending  · Will decrease tube feed rate from 50-25 mL/hr  Assess response    Increase baseline rate as tolerated  · Monitor stool output    Pressure injury of skin of buttock  Assessment & Plan  · Present on admission  · Wound care consult    Other pulmonary embolism without acute cor pulmonale (HCC)  Assessment & Plan  · Continue Eliquis    Severe protein-calorie malnutrition ([address] )  Assessment & Plan  Body mass index is 15 41 kg/m²  · Continue tube feed   · Nutrition consultation    Primary malignant neoplasm of base of tongue [facility])  Assessment & Plan  · Patient was following with [facility] earlier in the year, however,  has not had treatment recently as he was hospitalized  He has been in rehab for the last few weeks and malignancy treatment has been on hold for this reason  · Will need outpatient follow-up with hematology/oncology    Type 2 diabetes mellitus without complication, without long-term current use of insulin [facility])  Assessment & Plan  Lab Results   Component Value Date    HGBA1C 5 8 (H) 12/30/2022       Recent Labs     04/28/23  1817 04/28/23  2140 04/29/23  0221 04/29/23  0816   POCGLU 303* 240* 285* 240*       Blood Sugar Average: Last 72 hrs:  (P) 367 [id number]     · Continue tube feeds  · Sliding scale insulin    Dysphagia, unspecified  Assessment & Plan  · PEG tube in place  · Swallow results appreciated and reviewed with family         VTE Pharmacologic Prophylaxis:   High Risk (Score >/= 5) - Pharmacological DVT Prophylaxis Ordered: apixaban (Eliquis)  Sequential Compression Devices Ordered  Patient Centered Rounds: I performed bedside rounds with nursing staff today  Discussions with Specialists or Other Care Team Provider: Nephrology    Education and Discussions with Family / Patient: will update wife after rounds       Total Time Spent on Date of Encounter in care of patient: 55 minutes This time was spent on one or more of the following: performing physical exam; counseling and coordination of care; obtaining or reviewing history; documenting in the medical record; reviewing/ordering tests, medications or procedures; communicating with other healthcare professionals and discussing with patient's family/caregivers  Current Length of Stay: 3 day(s)  Current Patient Status: Inpatient   Certification Statement: The patient will continue to require additional inpatient hospital stay due to severe sepsis and pneumonia  Discharge Plan: Anticipate discharge in 24-48 hrs to prior assisted or independent living facility  Code Status: Level 3 - DNAR and DNI    Subjective:   Patient seen and examined  He says he feels better  He does continue with diarrhea however  He is sitting upright in a Maia chair  Reconfirmed CODE STATUS with him today  Objective:     Vitals:   Temp (24hrs), Av 4 °F (36 3 °C), Min:96 7 °F (35 9 °C), Max:97 9 °F (36 6 °C)    Temp:  [96 7 °F (35 9 °C)-97 9 °F (36 6 °C)] 97 2 °F (36 2 °C)  HR:  [] 109  Resp:  [16-41] 32  BP: (104-136)/(53-77) 128/72  SpO2:  [97 %-100 %] 100 %  Body mass index is 15 41 kg/m²  Input and Output Summary (last 24 hours): Intake/Output Summary (Last 24 hours) at 2023 1409  Last data filed at 2023 0600  Gross per 24 hour   Intake 4562 5 ml   Output 1060 ml   Net 3502 5 ml       Physical Exam:   Physical Exam  Vitals and nursing note reviewed  Constitutional:       Appearance: He is cachectic  He is ill-appearing  HENT:      Head: Normocephalic and atraumatic  Mouth/Throat:      Mouth: Mucous membranes are dry  Pharynx: Oropharynx is clear  Eyes:      Pupils: Pupils are equal, round, and reactive to light  Cardiovascular:      Rate and Rhythm: Normal rate and regular rhythm  Pulses: Normal pulses  Pulmonary:      Effort: Pulmonary effort is normal  No respiratory distress  Breath sounds: Rhonchi present  Comments: Occasional rhonchi but improved  Abdominal:      General: Bowel sounds are normal       Palpations: Abdomen is soft  Tenderness:  There is no abdominal tenderness  Musculoskeletal:      Cervical back: Neck supple  Right lower leg: No edema  Left lower leg: No edema  Skin:     General: Skin is warm and dry  Capillary Refill: Capillary refill takes less than 2 seconds  Neurological:      General: No focal deficit present  Mental Status: He is alert  Mental status is at baseline  Additional Data:     Labs:  Results from last 7 days   Lab Units 04/29/23  0525 04/28/23  0441 04/26/23  1051   WBC Thousand/uL 7 69   < > 8 20   HEMOGLOBIN g/dL 8 9*   < > 12 4   HEMATOCRIT % 30 3*   < > 41 2   PLATELETS Thousands/uL 256   < > 438*   BANDS PCT %  --   --  12*   LYMPHO PCT %  --   --  6*   MONO PCT %  --   --  6   EOS PCT %  --   --  0    < > = values in this interval not displayed  Results from last 7 days   Lab Units 04/29/23  0525 04/28/23  0552 04/26/23  1051   SODIUM mmol/L 137   < > 134*   POTASSIUM mmol/L 3 1*   < > 5 5*   CHLORIDE mmol/L 103   < > 93*   CO2 mmol/L 26   < > 29   BUN mg/dL 18   < > 32*   CREATININE mg/dL 0 45*   < > 0 67   ANION GAP mmol/L 8   < > 12   CALCIUM mg/dL 8 2*   < > 12 7*   ALBUMIN g/dL  --   --  3 2*   TOTAL BILIRUBIN mg/dL  --   --  0 51   ALK PHOS U/L  --   --  123*   ALT U/L  --   --  57*   AST U/L  --   --  33   GLUCOSE RANDOM mg/dL 239*   < > 125    < > = values in this interval not displayed       Results from last 7 days   Lab Units 04/26/23  1051   INR  1 91*     Results from last 7 days   Lab Units 04/29/23  1356 04/29/23  0816 04/29/23  0221 04/28/23  2140 04/28/23  1817 04/28/23  1529 04/28/23  1150 04/28/23  0558 04/28/23  0209 04/27/23  2336 04/27/23  2127 04/27/23  1823   POC GLUCOSE mg/dl 149* 240* 285* 240* 303* 128 167* 150* 96 163* 150* 126         Results from last 7 days   Lab Units 04/28/23  0441 04/26/23  1132 04/26/23  1051   LACTIC ACID mmol/L  --  4 9*  --    PROCALCITONIN ng/ml 0 67*  --  0 56*       Lines/Drains:  Invasive Devices     Peripheral Intravenous Line  Duration Peripheral IV 04/28/23 Left;Upper;Ventral (anterior) Arm 1 day    Peripheral IV 04/28/23 Right;Upper;Ventral (anterior) Arm 1 day          Drain  Duration           Gastrostomy/Enterostomy PEG-jejunostomy LUQ -- days    External Urinary Catheter 1 day                Recent Cultures (last 7 days):   Results from last 7 days   Lab Units 04/28/23  1852 04/27/23  1142 04/26/23  2155 04/26/23  1051   BLOOD CULTURE   --   --   --  No Growth at 48 hrs  No Growth at 48 hrs     SPUTUM CULTURE   --  2+ Growth of Staphylococcus aureus*  2+ Growth of  --   --    GRAM STAIN RESULT   --  1+ Epithelial Cells*  3+ Polys*  3+ Gram positive rods*  2+ Gram positive cocci in clusters*  1+ Gram negative rods*  --   --    LEGIONELLA URINARY ANTIGEN   --   --  Negative  --    C DIFF TOXIN B BY PCR  Negative  --   --   --        Last 24 Hours Medication List:   Current Facility-Administered Medications   Medication Dose Route Frequency Provider Last Rate    acetaminophen  650 mg Oral Q6H PRN Mansi Garrison MD      apixaban  5 mg Per PEG Tube BID Mansi Garrison MD      atorvastatin  20 mg Per PEG Tube Daily With Dinner Mansi Garrison MD      cefepime  2,000 mg Intravenous Q12H Mansi Garrison MD 2,000 mg (04/29/23 1346)    famotidine  20 mg Per G Tube BID Mansi Garrison MD      guaiFENesin  200 mg Per G Tube 4x Daily PRN MOISES Ferraro      insulin lispro  1-5 Units Subcutaneous Q6H Mansi Garrison MD      lactated ringers  1,000 mL Intravenous Once Mansi Garrison MD      LORazepam  0 5 mg Oral HS PRN MOISES Ferraro      magnesium sulfate  2 g Intravenous Once VinceMOISES Gallegos 2 g (04/29/23 1345)    nystatin  500,000 Units Swish & Spit 4x Daily MOISES Ferraro      ondansetron  4 mg Intravenous Q6H PRN Mansi Garrison MD      sodium chloride  75 mL/hr Intravenous Continuous Mansi Garrison MD 75 mL/hr (04/29/23 3684)   [facility] vancomycin  1,750 mg Intravenous Q24H Nickie Alva MD Stopped (04/28/23 2000)        Today, Patient Was Seen By: MOISES Blanco    **Please Note: This note may have been constructed using a voice recognition system  **

## 2023-04-29 NOTE — PLAN OF CARE
Problem: Nutrition/Hydration-ADULT  Goal: Nutrient/Hydration intake appropriate for improving, restoring or maintaining nutritional needs  Description: Monitor and assess patient's nutrition/hydration status for malnutrition  Collaborate with interdisciplinary team and initiate plan and interventions as ordered  Monitor patient's weight and dietary intake as ordered or per policy  Utilize nutrition screening tool and intervene as necessary  Determine patient's food preferences and provide high-protein, high-caloric foods as appropriate       INTERVENTIONS:  - Monitor oral intake, urinary output, labs, and treatment plans  - Assess nutrition and hydration status and recommend course of action  - Evaluate amount of meals eaten  - Assist patient with eating if necessary   - Allow adequate time for meals  - Recommend/ encourage appropriate diets, oral nutritional supplements, and vitamin/mineral supplements  - Order, calculate, and assess calorie counts as needed  - Recommend, monitor, and adjust tube feedings and TPN/PPN based on assessed needs  - Assess need for intravenous fluids  - Provide specific nutrition/hydration education as appropriate  - Include patient/family/caregiver in decisions related to nutrition  Outcome: Progressing   TF changed to Home regimen with bolus TF

## 2023-04-29 NOTE — PROGRESS NOTES
Miguel Garcia is a 78 y o  male who is currently ordered Vancomycin IV with management by the Pharmacy Consult service  Relevant clinical data and objective / subjective history reviewed  Vancomycin Assessment:  Indication and Goal AUC/Trough: Pneumonia (goal -600, trough >10), -600, trough >10  Clinical Status: stable  Micro:     Renal Function:  SCr: 0 45 mg/dL  CrCl: 109 1 mL/min  Renal replacement: Not on dialysis  Days of Therapy: 4  Current Dose: 1750 mg IV q24  Vancomycin Plan:  New Dosin mg IV q12  Estimated AUC: 457 mcg*hr/mL  Estimated Trough: 12 5 mcg/mL  Next Level: 5/1 AM  Renal Function Monitoring: Daily BMP and UOP  Pharmacy will continue to follow closely for s/sx of nephrotoxicity, infusion reactions and appropriateness of therapy  BMP and CBC will be ordered per protocol  We will continue to follow the patients culture results and clinical progress daily      Justo Mixon, Pharmacist

## 2023-04-29 NOTE — ASSESSMENT & PLAN NOTE
· Patient was following with [facility] earlier in the year, however,  has not had treatment recently as he was hospitalized   He has been in rehab for the last few weeks and malignancy treatment has been on hold for this reason  · Will need outpatient follow-up with hematology/oncology

## 2023-04-29 NOTE — ASSESSMENT & PLAN NOTE
Lab Results   Component Value Date    HGBA1C 5 8 (H) 12/30/2022       Recent Labs     04/28/23  1817 04/28/23  2140 04/29/23  0221 04/29/23  0816   POCGLU 303* 240* 285* 240*       Blood Sugar Average: Last 72 hrs:  (P) 383 [id number]     · Continue tube feeds  · Sliding scale insulin

## 2023-04-29 NOTE — NURSING NOTE
NC maintained  VS and sats stable  IV infusing  Condom cath patent  TF started(per home regimen)  Awake, but continues with confusion regarding place, time and situation  G-tube patent

## 2023-04-29 NOTE — NURSING NOTE
"Incontinent of stool again, c/o pain 10/10 back and \"R lung\"; Dilaudid per Hospitalist order    "

## 2023-04-30 LAB
ALBUMIN SERPL BCP-MCNC: 1.9 G/DL (ref 3.5–5)
ALP SERPL-CCNC: 78 U/L (ref 34–104)
ALT SERPL W P-5'-P-CCNC: 42 U/L (ref 7–52)
ANION GAP SERPL CALCULATED.3IONS-SCNC: 4 MMOL/L (ref 4–13)
AST SERPL W P-5'-P-CCNC: 44 U/L (ref 13–39)
BACTERIA SPT RESP CULT: ABNORMAL
BACTERIA SPT RESP CULT: ABNORMAL
BASOPHILS # BLD MANUAL: 0 THOUSAND/UL (ref 0–0.1)
BASOPHILS NFR MAR MANUAL: 0 % (ref 0–1)
BILIRUB SERPL-MCNC: 0.28 MG/DL (ref 0.2–1)
BUN SERPL-MCNC: 15 MG/DL (ref 5–25)
CA-I BLD-SCNC: 1.08 MMOL/L (ref 1.12–1.32)
CALCIUM ALBUM COR SERPL-MCNC: 9.1 MG/DL (ref 8.3–10.1)
CALCIUM SERPL-MCNC: 7.4 MG/DL (ref 8.4–10.2)
CAMPYLOBACTER DNA SPEC NAA+PROBE: NORMAL
CHLORIDE SERPL-SCNC: 103 MMOL/L (ref 96–108)
CO2 SERPL-SCNC: 28 MMOL/L (ref 21–32)
CREAT SERPL-MCNC: 0.39 MG/DL (ref 0.6–1.3)
EOSINOPHIL # BLD MANUAL: 0 THOUSAND/UL (ref 0–0.4)
EOSINOPHIL NFR BLD MANUAL: 0 % (ref 0–6)
ERYTHROCYTE [DISTWIDTH] IN BLOOD BY AUTOMATED COUNT: 17.6 % (ref 11.6–15.1)
GFR SERPL CREATININE-BSD FRML MDRD: 117 ML/MIN/1.73SQ M
GLUCOSE SERPL-MCNC: 148 MG/DL (ref 65–140)
GLUCOSE SERPL-MCNC: 190 MG/DL (ref 65–140)
GLUCOSE SERPL-MCNC: 202 MG/DL (ref 65–140)
GLUCOSE SERPL-MCNC: 211 MG/DL (ref 65–140)
GLUCOSE SERPL-MCNC: 231 MG/DL (ref 65–140)
GRAM STN SPEC: ABNORMAL
HCT VFR BLD AUTO: 27.8 % (ref 36.5–49.3)
HGB BLD-MCNC: 8.5 G/DL (ref 12–17)
LYMPHOCYTES # BLD AUTO: 0.47 THOUSAND/UL (ref 0.6–4.47)
LYMPHOCYTES # BLD AUTO: 6 % (ref 14–44)
MAGNESIUM SERPL-MCNC: 1.4 MG/DL (ref 1.9–2.7)
MCH RBC QN AUTO: 27.3 PG (ref 26.8–34.3)
MCHC RBC AUTO-ENTMCNC: 30.6 G/DL (ref 31.4–37.4)
MCV RBC AUTO: 89 FL (ref 82–98)
MONOCYTES # BLD AUTO: 0.08 THOUSAND/UL (ref 0–1.22)
MONOCYTES NFR BLD: 1 % (ref 4–12)
NEUTROPHILS # BLD MANUAL: 7.33 THOUSAND/UL (ref 1.85–7.62)
NEUTS BAND NFR BLD MANUAL: 2 % (ref 0–8)
NEUTS SEG NFR BLD AUTO: 91 % (ref 43–75)
PHOSPHATE SERPL-MCNC: 1 MG/DL (ref 2.3–4.1)
PLATELET # BLD AUTO: 202 THOUSANDS/UL (ref 149–390)
PLATELET BLD QL SMEAR: ADEQUATE
PMV BLD AUTO: 9.2 FL (ref 8.9–12.7)
POTASSIUM SERPL-SCNC: 3.5 MMOL/L (ref 3.5–5.3)
PROT SERPL-MCNC: 4.4 G/DL (ref 6.4–8.4)
RBC # BLD AUTO: 3.11 MILLION/UL (ref 3.88–5.62)
RBC MORPH BLD: NORMAL
SALMONELLA DNA SPEC QL NAA+PROBE: NORMAL
SHIGA TOXIN STX GENE SPEC NAA+PROBE: NORMAL
SHIGELLA DNA SPEC QL NAA+PROBE: NORMAL
SODIUM SERPL-SCNC: 135 MMOL/L (ref 135–147)
WBC # BLD AUTO: 7.88 THOUSAND/UL (ref 4.31–10.16)

## 2023-04-30 RX ORDER — MAGNESIUM SULFATE HEPTAHYDRATE 40 MG/ML
2 INJECTION, SOLUTION INTRAVENOUS EVERY 8 HOURS
Status: COMPLETED | OUTPATIENT
Start: 2023-04-30 | End: 2023-04-30

## 2023-04-30 RX ORDER — MAGNESIUM SULFATE HEPTAHYDRATE 40 MG/ML
4 INJECTION, SOLUTION INTRAVENOUS ONCE
Status: DISCONTINUED | OUTPATIENT
Start: 2023-04-30 | End: 2023-04-30

## 2023-04-30 RX ADMIN — MAGNESIUM SULFATE HEPTAHYDRATE 2 G: 40 INJECTION, SOLUTION INTRAVENOUS at 09:19

## 2023-04-30 RX ADMIN — FAMOTIDINE 20 MG: 20 TABLET, FILM COATED ORAL at 17:08

## 2023-04-30 RX ADMIN — ATORVASTATIN CALCIUM 20 MG: 20 TABLET, FILM COATED ORAL at 17:08

## 2023-04-30 RX ADMIN — CEFEPIME HYDROCHLORIDE 2000 MG: 2 INJECTION, SOLUTION INTRAVENOUS at 01:50

## 2023-04-30 RX ADMIN — THIAMINE HYDROCHLORIDE 100 MG: 100 INJECTION, SOLUTION INTRAMUSCULAR; INTRAVENOUS at 13:32

## 2023-04-30 RX ADMIN — POTASSIUM PHOSPHATE, MONOBASIC AND POTASSIUM PHOSPHATE, DIBASIC 21 MMOL: 224; 236 INJECTION, SOLUTION, CONCENTRATE INTRAVENOUS at 09:12

## 2023-04-30 RX ADMIN — APIXABAN 5 MG: 5 TABLET, FILM COATED ORAL at 09:22

## 2023-04-30 RX ADMIN — CEFEPIME HYDROCHLORIDE 2000 MG: 2 INJECTION, SOLUTION INTRAVENOUS at 14:58

## 2023-04-30 RX ADMIN — INSULIN LISPRO 2 UNITS: 100 INJECTION, SOLUTION INTRAVENOUS; SUBCUTANEOUS at 13:37

## 2023-04-30 RX ADMIN — INSULIN LISPRO 1 UNITS: 100 INJECTION, SOLUTION INTRAVENOUS; SUBCUTANEOUS at 09:00

## 2023-04-30 RX ADMIN — VANCOMYCIN HYDROCHLORIDE 1000 MG: 1 INJECTION, SOLUTION INTRAVENOUS at 02:31

## 2023-04-30 RX ADMIN — SODIUM CHLORIDE 75 ML/HR: 0.9 INJECTION, SOLUTION INTRAVENOUS at 21:09

## 2023-04-30 RX ADMIN — APIXABAN 5 MG: 5 TABLET, FILM COATED ORAL at 17:08

## 2023-04-30 RX ADMIN — MAGNESIUM SULFATE HEPTAHYDRATE 2 G: 40 INJECTION, SOLUTION INTRAVENOUS at 16:39

## 2023-04-30 RX ADMIN — NYSTATIN 500000 UNITS: 100000 SUSPENSION ORAL at 17:16

## 2023-04-30 RX ADMIN — FAMOTIDINE 20 MG: 20 TABLET, FILM COATED ORAL at 09:22

## 2023-04-30 NOTE — ASSESSMENT & PLAN NOTE
· Possibly secondary to patient's underlying malignancy  · Improved with IV fluids  · Nephrology recommendations appreciated  · Monitor calcium periodically

## 2023-04-30 NOTE — NURSING NOTE
"Patient aspirated while using the nystatin swish and spit  He was able to cough up a lot of mucus and the nystatin  He allowed some yankauer suction, but refused any deeper suction  Stayed with patient for 15 minutes until he stated he \"got it all up\"  He stated he would no longer accept the swish and spit nystatin    "

## 2023-04-30 NOTE — PROGRESS NOTES
[address]  Progress Note  Name: Erika Parham  MRN: [id number]  Unit/Bed#: ICU 02-01 I Date of Admission: 4/26/2023   Date of Service: 4/30/2023 I Hospital Day: 4    Assessment/Plan      Acute respiratory failure with hypoxia [facility])  Assessment & Plan  · Secondary to pneumonia    · Continue oxygen and wean as tolerated  · Overnight 4/28 he had an episode of severe respiratory distress  CTA chest PE study negative for PE  At that time patient verbalized that he would want CPR and to be on a ventilator if his respiratory status declined  I asked him again this morning now that he is feeling better, and he confirmed that he would not want compressions or to be on a ventilator  He also admits feeling confused the night he was having respiratory distress  Status returned to DNR    Hyperkalemia  Assessment & Plan  · Unclear etiology, possibly due to dehydration and/or related to patient's tube feeds  Resolved  · Continue IV fluids  · Patient received insulin/dextrose, albuterol  · Nephrology recommendations appreciated  · Daily BMP and trend potassium    * Severe sepsis ([address] )  Assessment & Plan  · POA secondary to suspected pneumonia with hypothermia, tachycardia, tachypnea  · Chest x-ray: Infiltrate at the right lung base along with a small pleural effusion  · Blood cultures: NGTD  · Continue antibiotics with cefepime D5    Vanco has been discontinued due to negative MRSA screen  · Continue IV fluids   · Trend procalcitonin  · Strep pneumo/urine Legionella negative  · Sputum culture pending collection    Hypercalcemia of malignancy  Assessment & Plan  · Possibly secondary to patient's underlying malignancy  · Improved with IV fluids  · Nephrology recommendations appreciated  · Monitor calcium periodically    Diarrhea, unspecified  Assessment & Plan  · Diarrhea, possibly related to tube feeding  · C  difficile and stool enteric panel negative  · Will decrease tube feed rate from 50-25 mL/hr  Diarrhea has improved with decreased rate  Continue to assess response  Increase baseline rate as tolerated  · Monitor stool output    Pressure injury of skin of buttock  Assessment & Plan  · Present on admission  · Wound care consult    Other pulmonary embolism without acute cor pulmonale (HCC)  Assessment & Plan  · Continue Eliquis    Severe protein-calorie malnutrition ([address] )  Assessment & Plan  Body mass index is 15 41 kg/m²  · Continue tube feed   · Nutrition consultation    Primary malignant neoplasm of base of tongue [facility])  Assessment & Plan  · Patient was following with [facility] earlier in the year, however,  has not had treatment recently as he was hospitalized  He has been in rehab for the last few weeks and malignancy treatment has been on hold for this reason  · Will need outpatient follow-up with hematology/oncology    Type 2 diabetes mellitus without complication, without long-term current use of insulin [facility])  Assessment & Plan  Lab Results   Component Value Date    HGBA1C 5 8 (H) 12/30/2022       Recent Labs     04/29/23  0816 04/29/23  1356 04/29/23  1950 04/30/23  0617   POCGLU 240* 149* 170* 211*       Blood Sugar Average: Last 72 hrs:  (P) 182 [id number]     · Continue tube feeds  · Sliding scale insulin    Dysphagia, unspecified  Assessment & Plan  · PEG tube in place  · Swallow results appreciated and reviewed with family       VTE Pharmacologic Prophylaxis:   High Risk (Score >/= 5) - Pharmacological DVT Prophylaxis Ordered: apixaban (Eliquis)  Sequential Compression Devices Ordered  Patient Centered Rounds: I performed bedside rounds with nursing staff today  Discussions with Specialists or Other Care Team Provider: Nephrology    Education and Discussions with Family / Patient: will update wife after rounds        Total Time Spent on Date of Encounter in care of patient: 55 minutes This time was spent on one or more of the following: performing physical exam; counseling and coordination of care; obtaining or reviewing history; documenting in the medical record; reviewing/ordering tests, medications or procedures; communicating with other healthcare professionals and discussing with patient's family/caregivers  Current Length of Stay: 4 day(s)  Current Patient Status: Inpatient   Certification Statement: The patient will continue to require additional inpatient hospital stay due to Sepsis, IV antibiotics  Discharge Plan: Anticipate discharge tomorrow to prior assisted or independent living facility  Code Status: Level 3 - DNAR and DNI    Subjective:   No complaints offered  No overnight events  Objective:     Vitals:   Temp (24hrs), Av 2 °F (36 2 °C), Min:97 °F (36 1 °C), Max:97 8 °F (36 6 °C)    Temp:  [97 °F (36 1 °C)-97 8 °F (36 6 °C)] 97 1 °F (36 2 °C)  HR:  [] 95  Resp:  [18-33] 19  BP: ()/(49-74) 103/59  SpO2:  [96 %-100 %] 97 %  Body mass index is 15 41 kg/m²  Input and Output Summary (last 24 hours): Intake/Output Summary (Last 24 hours) at 2023 1224  Last data filed at 2023 0932  Gross per 24 hour   Intake 1700 ml   Output 1375 ml   Net 325 ml       Physical Exam:   Physical Exam  Vitals and nursing note reviewed  Constitutional:       General: He is not in acute distress  Appearance: He is ill-appearing  HENT:      Head: Normocephalic and atraumatic  Mouth/Throat:      Mouth: Mucous membranes are moist       Pharynx: Oropharynx is clear  Eyes:      Pupils: Pupils are equal, round, and reactive to light  Cardiovascular:      Rate and Rhythm: Normal rate  Pulmonary:      Effort: Pulmonary effort is normal  No respiratory distress  Breath sounds: Normal breath sounds  Comments: Occasional rhonchi, however further improved  Abdominal:      General: Bowel sounds are normal       Palpations: Abdomen is soft  Tenderness: There is no abdominal tenderness     Musculoskeletal:      Cervical back: Neck supple  Skin:     General: Skin is warm and dry  Capillary Refill: Capillary refill takes less than 2 seconds  Neurological:      General: No focal deficit present  Mental Status: He is alert  Additional Data:     Labs:  Results from last 7 days   Lab Units 04/30/23  0515   WBC Thousand/uL 7 88   HEMOGLOBIN g/dL 8 5*   HEMATOCRIT % 27 8*   PLATELETS Thousands/uL 202   BANDS PCT % 2   LYMPHO PCT % 6*   MONO PCT % 1*   EOS PCT % 0     Results from last 7 days   Lab Units 04/30/23  0515   SODIUM mmol/L 135   POTASSIUM mmol/L 3 5   CHLORIDE mmol/L 103   CO2 mmol/L 28   BUN mg/dL 15   CREATININE mg/dL 0 39*   ANION GAP mmol/L 4   CALCIUM mg/dL 7 4*   ALBUMIN g/dL 1 9*   TOTAL BILIRUBIN mg/dL 0 28   ALK PHOS U/L 78   ALT U/L 42   AST U/L 44*   GLUCOSE RANDOM mg/dL 190*     Results from last 7 days   Lab Units 04/26/23  1051   INR  1 91*     Results from last 7 days   Lab Units 04/30/23  0617 04/29/23  1950 04/29/23  1356 04/29/23  0816 04/29/23  0221 04/28/23  2140 04/28/23  1817 04/28/23  1529 04/28/23  1150 04/28/23  0558 04/28/23  0209 04/27/23  2336   POC GLUCOSE mg/dl 211* 170* 149* 240* 285* 240* 303* 128 167* 150* 96 163*         Results from last 7 days   Lab Units 04/28/23  0441 04/26/23  1132 04/26/23  1051   LACTIC ACID mmol/L  --  4 9*  --    PROCALCITONIN ng/ml 0 67*  --  0 56*       Lines/Drains:  Invasive Devices     Peripheral Intravenous Line  Duration           Peripheral IV 04/28/23 Left;Upper;Ventral (anterior) Arm 2 days    Peripheral IV 04/28/23 Right;Upper;Ventral (anterior) Arm 2 days          Drain  Duration           Gastrostomy/Enterostomy PEG-jejunostomy LUQ -- days    External Urinary Catheter 2 days                        Recent Cultures (last 7 days):   Results from last 7 days   Lab Units 04/28/23  1852 04/27/23  1142 04/26/23  2155 04/26/23  1051   BLOOD CULTURE   --   --   --  No Growth at 72 hrs  No Growth at 72 hrs     SPUTUM CULTURE   --  2+ Growth of Staphylococcus aureus*  2+ Growth of  --   --    GRAM STAIN RESULT   --  1+ Epithelial Cells*  3+ Polys*  3+ Gram positive rods*  2+ Gram positive cocci in clusters*  1+ Gram negative rods*  --   --    LEGIONELLA URINARY ANTIGEN   --   --  Negative  --    C DIFF TOXIN B BY PCR  Negative  --   --   --        Last 24 Hours Medication List:   Current Facility-Administered Medications   Medication Dose Route Frequency Provider Last Rate    acetaminophen  650 mg Oral Q6H PRN Jose Armando Suarez MD      apixaban  5 mg Per PEG Tube BID Jose Armando Saurez MD      atorvastatin  20 mg Per PEG Tube Daily With Dinner Jose Armando Suarez MD      cefepime  2,000 mg Intravenous Q12H Jose Armando Suraez MD 2,000 mg (04/30/23 0150)    famotidine  20 mg Per G Tube BID Jose Armando Suarez MD      guaiFENesin  200 mg Per G Tube 4x Daily PRN MOISES Blount      insulin lispro  1-5 Units Subcutaneous Q6H Jose Armando Suarez MD      lactated ringers  1,000 mL Intravenous Once Jose Armando Suarez MD      LORazepam  0 5 mg Oral HS PRN MOISES Blount      magnesium sulfate  2 g Intravenous Q8H MOISES Steven 2 g (04/30/23 0919)    nystatin  500,000 Units Swish & Spit 4x Daily MOISES Blount      ondansetron  4 mg Intravenous Q6H PRN Jose Armando Suarez MD      potassium phosphate  21 mmol Intravenous Once MOISES Steven 21 mmol (04/30/23 0912)    sodium chloride  75 mL/hr Intravenous Continuous Jose Armando Suarez MD 75 mL/hr (04/29/23 0946)    thiamine  100 mg Intravenous Once MOISES Steven          Today, Patient Was Seen By: MOISES Blount    **Please Note: This note may have been constructed using a voice recognition system  **

## 2023-04-30 NOTE — PLAN OF CARE
Problem: Nutrition/Hydration-ADULT  Goal: Nutrient/Hydration intake appropriate for improving, restoring or maintaining nutritional needs  Description: Monitor and assess patient's nutrition/hydration status for malnutrition  Collaborate with interdisciplinary team and initiate plan and interventions as ordered  Monitor patient's weight and dietary intake as ordered or per policy  Utilize nutrition screening tool and intervene as necessary  Determine patient's food preferences and provide high-protein, high-caloric foods as appropriate       INTERVENTIONS:  - Monitor oral intake, urinary output, labs, and treatment plans  - Assess nutrition and hydration status and recommend course of action  - Evaluate amount of meals eaten  - Assist patient with eating if necessary   - Allow adequate time for meals  - Recommend/ encourage appropriate diets, oral nutritional supplements, and vitamin/mineral supplements  - Order, calculate, and assess calorie counts as needed  - Recommend, monitor, and adjust tube feedings and TPN/PPN based on assessed needs  - Assess need for intravenous fluids  - Provide specific nutrition/hydration education as appropriate  - Include patient/family/caregiver in decisions related to nutrition  Outcome: Progressing     Problem: MOBILITY - ADULT  Goal: Maintain or return to baseline ADL function  Description: INTERVENTIONS:  -  Assess patient's ability to carry out ADLs; assess patient's baseline for ADL function and identify physical deficits which impact ability to perform ADLs (bathing, care of mouth/teeth, toileting, grooming, dressing, etc )  - Assess/evaluate cause of self-care deficits   - Assess range of motion  - Assess patient's mobility; develop plan if impaired  - Assess patient's need for assistive devices and provide as appropriate  - Encourage maximum independence but intervene and supervise when necessary  - Involve family in performance of ADLs  - Assess for home care needs following discharge   - Consider OT consult to assist with ADL evaluation and planning for discharge  - Provide patient education as appropriate  Outcome: Progressing  Goal: Maintains/Returns to pre admission functional level  Description: INTERVENTIONS:  - Perform BMAT or MOVE assessment daily    - Set and communicate daily mobility goal to care team and patient/family/caregiver  - Collaborate with rehabilitation services on mobility goals if consulted  - Reposition patient every 2 hours    - Record patient progress and toleration of activity level   Outcome: Progressing     Problem: PAIN - ADULT  Goal: Verbalizes/displays adequate comfort level or baseline comfort level  Description: Interventions:  - Encourage patient to monitor pain and request assistance  - Assess pain using appropriate pain scale  - Administer analgesics based on type and severity of pain and evaluate response  - Implement non-pharmacological measures as appropriate and evaluate response  - Consider cultural and social influences on pain and pain management  - Notify physician/advanced practitioner if interventions unsuccessful or patient reports new pain  Outcome: Progressing     Problem: INFECTION - ADULT  Goal: Absence or prevention of progression during hospitalization  Description: INTERVENTIONS:  - Assess and monitor for signs and symptoms of infection  - Monitor lab/diagnostic results  - Monitor all insertion sites, i e  indwelling lines, tubes, and drains  - Monitor endotracheal if appropriate and nasal secretions for changes in amount and color  - [place] appropriate cooling/warming therapies per order  - Administer medications as ordered  - Instruct and encourage patient and family to use good hand hygiene technique  - Identify and instruct in appropriate isolation precautions for identified infection/condition  Outcome: Progressing     Problem: SAFETY ADULT  Goal: Maintain or return to baseline ADL function  Description: INTERVENTIONS:  -  Assess patient's ability to carry out ADLs; assess patient's baseline for ADL function and identify physical deficits which impact ability to perform ADLs (bathing, care of mouth/teeth, toileting, grooming, dressing, etc )  - Assess/evaluate cause of self-care deficits   - Assess range of motion  - Assess patient's mobility; develop plan if impaired  - Assess patient's need for assistive devices and provide as appropriate  - Encourage maximum independence but intervene and supervise when necessary  - Involve family in performance of ADLs  - Assess for home care needs following discharge   - Consider OT consult to assist with ADL evaluation and planning for discharge  - Provide patient education as appropriate  Outcome: Progressing  Goal: Maintains/Returns to pre admission functional level  Description: INTERVENTIONS:  - Perform BMAT or MOVE assessment daily    - Set and communicate daily mobility goal to care team and patient/family/caregiver  - Collaborate with rehabilitation services on mobility goals if consulted  - Reposition patient every 2 hours    - Record patient progress and toleration of activity level   Outcome: Progressing  Goal: Patient will remain free of falls  Description: INTERVENTIONS:  - Educate patient/family on patient safety including physical limitations  - Instruct patient to call for assistance with activity   - Consult OT/PT to assist with strengthening/mobility   - Keep Call bell within reach  - Keep bed low and locked with side rails adjusted as appropriate  - Keep care items and personal belongings within reach  - Initiate and maintain comfort rounds  - Make Fall Risk Sign visible to staff  - Offer Toileting every 2 Hours, in advance of need  - Initiate/Maintain bed alarm  - Apply yellow socks and bracelet for high fall risk patients  - Consider moving patient to room near nurses station  Outcome: Progressing     Problem: DISCHARGE PLANNING  Goal: Discharge to home or other facility with appropriate resources  Description: INTERVENTIONS:  - Identify barriers to discharge w/patient and caregiver  - Arrange for needed discharge resources and transportation as appropriate  - Identify discharge learning needs (meds, wound care, etc )  - Arrange for interpretive services to assist at discharge as needed  - Refer to Case Management Department for coordinating discharge planning if the patient needs post-hospital services based on physician/advanced practitioner order or complex needs related to functional status, cognitive ability, or social support system  Outcome: Progressing     Problem: Knowledge Deficit  Goal: Patient/family/caregiver demonstrates understanding of disease process, treatment plan, medications, and discharge instructions  Description: Complete learning assessment and assess knowledge base    Interventions:  - Provide teaching at level of understanding  - Provide teaching via preferred learning methods  Outcome: Progressing     Problem: RESPIRATORY - ADULT  Goal: Achieves optimal ventilation and oxygenation  Description: INTERVENTIONS:  - Assess for changes in respiratory status  - Assess for changes in mentation and behavior  - Position to facilitate oxygenation and minimize respiratory effort  - Oxygen administered by appropriate delivery if ordered  - Initiate smoking cessation education as indicated  - Encourage broncho-pulmonary hygiene including cough, deep breathe, Incentive Spirometry  - Assess the need for suctioning and aspirate as needed  - Assess and instruct to report SOB or any respiratory difficulty  - Respiratory Therapy support as indicated  Outcome: Progressing     Problem: Prexisting or High Potential for Compromised Skin Integrity  Goal: Skin integrity is maintained or improved  Description: INTERVENTIONS:  - Identify patients at risk for skin breakdown  - Assess and monitor skin integrity  - Assess and monitor nutrition and hydration status  - Monitor labs   - Assess for incontinence   - Turn and reposition patient  - Assist with mobility/ambulation  - Relieve pressure over bony prominences  - Avoid friction and shearing  - Provide appropriate hygiene as needed including keeping skin clean and dry  - Evaluate need for skin moisturizer/barrier cream  - Collaborate with interdisciplinary team   - Patient/family teaching  - Consider wound care consult   Outcome: Progressing

## 2023-04-30 NOTE — ASSESSMENT & PLAN NOTE
· Patient was following with [facility] earlier in the year, however,  has not had treatment recently as he was hospitalized   He has been in rehab for the last few weeks and malignancy treatment has been on hold for this reason  · Will need outpatient follow-up with hematology/oncology

## 2023-04-30 NOTE — PROGRESS NOTES
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 Initially hypercalcemic on presentation requiring treatment now ionized calcium mildly low  We will continue to trend as nutrition is increased  2   Hyperkalemia now hypokalemic  ? Potassium stable at this time  3  Diarrhea potentially due to tube feeds  ? Rate halved yesterday by primary team and diarrhea improved  4  Severe protein calorie malnutrition  ? My discussion with nursing, he is receiving Nepro as of Friday but was previously receiving Jevity  Can switch him back to [facility] however would recommend half dose due to diarrhea and then slowly increasing up to full dose  Below is recs per dietitian   - \"Potassium now WNL today  Recommend transitioning to home TF regimen as clinically appropriate: Jevity 1 5 480 mL bolus TID at 0800, 1300, 1700  Recommend adding Prosource NoCarb once daily\"  5  Hypomagnesemia  ? 4 g IV magnesium today  6  Hypophosphatemia  · 21 mmol K-Phos      ROS  No physical complaints  Discussed with nursing-states that diarrhea has improved since reducing tube feed rate  A complete 10 point review of systems have been performed and are otherwise negative         Historical Information   Past Medical History:   Diagnosis Date    Cancer [facility]     Diabetes [facility]     Gastritis     GERD (gastroesophageal reflux disease)     History of bladder cancer 2010    treated with surgery    Hypercholesterolemia     Hypertension     Hyponatremia 12/22/2021    Lactic acidosis 12/22/2021     Past Surgical History:   Procedure Laterality Date    BLADDER TUMOR EXCISION      EGD      IR " CHOLECYSTOSTOMY TUBE CHECK/CHANGE/REPOSITION/REINSERTION/UPSIZE  2022    IR CHOLECYSTOSTOMY TUBE CHECK/CHANGE/REPOSITION/REINSERTION/UPSIZE  3/4/2022    IR CHOLECYSTOSTOMY TUBE CHECK/CHANGE/REPOSITION/REINSERTION/UPSIZE  3/24/2022    IR CHOLECYSTOSTOMY TUBE PLACEMENT  2021    IR THORACENTESIS  3/23/2023     Social History   Social History     Substance and Sexual Activity   Alcohol Use Never     Social History     Substance and Sexual Activity   Drug Use Never     Social History     Tobacco Use   Smoking Status Former    Types: Cigarettes    Quit date: 2021    Years since quittin 6    Passive exposure: Past   Smokeless Tobacco Never       Family History:   Family History   Problem Relation Age of Onset    Cancer Mother     Melanoma Neg Hx        Medications:  Pertinent medications were reviewed  Current Facility-Administered Medications   Medication Dose Route Frequency Provider Last Rate    acetaminophen  650 mg Oral Q6H PRN Sophie Gomes MD      apixaban  5 mg Per PEG Tube BID Sophie Gomes MD      atorvastatin  20 mg Per PEG Tube Daily With Dinner Sophie Gomes MD      cefepime  2,000 mg Intravenous Q12H Sophie Gomes MD 2,000 mg (23 0150)    famotidine  20 mg Per G Tube BID Sophie Gomes MD      guaiFENesin  200 mg Per G Tube 4x Daily PRN MOISES Ruiz      insulin lispro  1-5 Units Subcutaneous Q6H Sophie Gomes MD      lactated ringers  1,000 mL Intravenous Once Sophie Gomes MD      LORazepam  0 5 mg Oral HS PRN MOISES Ruiz      magnesium sulfate  2 g Intravenous Q8H MOISES Finney 2 g (23 0919)    magnesium sulfate  4 g Intravenous Once MOISES Ruiz      nystatin  500,000 Units Swish & Spit 4x Daily MOISES Ruiz      ondansetron  4 mg Intravenous Q6H PRN Sophie Gomes MD      potassium phosphate  21 mmol Intravenous Once Kenneth Barton "CRNP 21 mmol (04/30/23 0912)    sodium chloride  75 mL/hr Intravenous Continuous Vladimir MD Genna 75 mL/hr (04/29/23 0946)    thiamine  100 mg Intravenous Once MOISES Isidro           No Known Allergies      Vitals:   /59 (BP Location: Right arm)   Pulse 95   Temp (!) 97 1 °F (36 2 °C) (Tympanic)   Resp 19   Ht 6' 2\" (1 88 m)   Wt 54 4 kg (120 lb)   SpO2 97%   BMI 15 41 kg/m²   Body mass index is 15 41 kg/m²  SpO2: 97 %,   SpO2 Activity: At Rest,   O2 Device: None (Room air)      Intake/Output Summary (Last 24 hours) at 4/30/2023 1210  Last data filed at 4/30/2023 0932  Gross per 24 hour   Intake 1700 ml   Output 1375 ml   Net 325 ml     Invasive Devices     Peripheral Intravenous Line  Duration           Peripheral IV 04/28/23 Left;Upper;Ventral (anterior) Arm 2 days    Peripheral IV 04/28/23 Right;Upper;Ventral (anterior) Arm 2 days          Drain  Duration           Gastrostomy/Enterostomy PEG-jejunostomy LUQ -- days    External Urinary Catheter 2 days                Physical Exam  General: conscious, cooperative, in no acute distress  Eyes: conjunctivae pink, anicteric sclerae  ENT: lips and mucous membranes moist  Neck: supple, no JVD, no masses  Chest: diminished breath sounds bilaterally, no crackles, ronchus or wheezings  CVS: S1 & S2, normal rate, regular rhythm  Abdomen: soft, non-tender, non-distended, normoactive bowel sounds cachectic   Extremities: no edema of both legs  Skin: no rash  Neuro: awake, alert, oriented      Diagnostic Data:  Lab: I have personally reviewed pertinent lab results  ,   CBC:  Results from last 7 days   Lab Units 04/30/23  0515   WBC Thousand/uL 7 88   HEMOGLOBIN g/dL 8 5*   HEMATOCRIT % 27 8*   PLATELETS Thousands/uL 202      CMP:   Lab Results   Component Value Date    SODIUM 135 04/30/2023    K 3 5 04/30/2023     04/30/2023    CO2 28 04/30/2023    BUN 15 04/30/2023    CREATININE 0 39 (L) 04/30/2023    CALCIUM 7 4 (L) 04/30/2023    AST 44 " "(H) 04/30/2023    ALT 42 04/30/2023    ALKPHOS 78 04/30/2023    EGFR 117 04/30/2023   ,   PT/INR: No results found for: PT, INR,   Magnesium: No components found for: MAG,  Phosphorous:   Lab Results   Component Value Date    PHOS 1 0 (L) 04/30/2023       Microbiology:  @[facility],(urinecx:7)@        Youlanda Osgood, CRNP    Portions of the record may have been created with voice recognition software  Occasional wrong word or \"sound a like\" substitutions may have occurred due to the inherent limitations of voice recognition software  Read the chart carefully and recognize, using context, where substitutions have occurred    "

## 2023-04-30 NOTE — ASSESSMENT & PLAN NOTE
· Diarrhea, possibly related to tube feeding  · C  difficile and stool enteric panel negative  · Will decrease tube feed rate from 50-25 mL/hr  Diarrhea has improved with decreased rate  Continue to assess response    Increase baseline rate as tolerated  · Monitor stool output

## 2023-05-01 LAB
ALBUMIN SERPL BCP-MCNC: 2.1 G/DL (ref 3.5–5)
ALP SERPL-CCNC: 114 U/L (ref 34–104)
ALT SERPL W P-5'-P-CCNC: 64 U/L (ref 7–52)
ANION GAP SERPL CALCULATED.3IONS-SCNC: 5 MMOL/L (ref 4–13)
AST SERPL W P-5'-P-CCNC: 70 U/L (ref 13–39)
BACTERIA BLD CULT: NORMAL
BACTERIA BLD CULT: NORMAL
BILIRUB SERPL-MCNC: 0.4 MG/DL (ref 0.2–1)
BUN SERPL-MCNC: 13 MG/DL (ref 5–25)
CA-I BLD-SCNC: 1.15 MMOL/L (ref 1.12–1.32)
CALCIUM ALBUM COR SERPL-MCNC: 9.7 MG/DL (ref 8.3–10.1)
CALCIUM SERPL-MCNC: 8.2 MG/DL (ref 8.4–10.2)
CHLORIDE SERPL-SCNC: 98 MMOL/L (ref 96–108)
CO2 SERPL-SCNC: 30 MMOL/L (ref 21–32)
CREAT SERPL-MCNC: 0.48 MG/DL (ref 0.6–1.3)
ERYTHROCYTE [DISTWIDTH] IN BLOOD BY AUTOMATED COUNT: 18 % (ref 11.6–15.1)
GFR SERPL CREATININE-BSD FRML MDRD: 107 ML/MIN/1.73SQ M
GLUCOSE SERPL-MCNC: 119 MG/DL (ref 65–140)
GLUCOSE SERPL-MCNC: 144 MG/DL (ref 65–140)
GLUCOSE SERPL-MCNC: 205 MG/DL (ref 65–140)
GLUCOSE SERPL-MCNC: 245 MG/DL (ref 65–140)
GLUCOSE SERPL-MCNC: 256 MG/DL (ref 65–140)
GLUCOSE SERPL-MCNC: 379 MG/DL (ref 65–140)
HCT VFR BLD AUTO: 31.2 % (ref 36.5–49.3)
HGB BLD-MCNC: 9.3 G/DL (ref 12–17)
MAGNESIUM SERPL-MCNC: 1.6 MG/DL (ref 1.9–2.7)
MCH RBC QN AUTO: 26.8 PG (ref 26.8–34.3)
MCHC RBC AUTO-ENTMCNC: 29.8 G/DL (ref 31.4–37.4)
MCV RBC AUTO: 90 FL (ref 82–98)
PHOSPHATE SERPL-MCNC: 1.7 MG/DL (ref 2.3–4.1)
PLATELET # BLD AUTO: 220 THOUSANDS/UL (ref 149–390)
PMV BLD AUTO: 9.1 FL (ref 8.9–12.7)
POTASSIUM SERPL-SCNC: 3.6 MMOL/L (ref 3.5–5.3)
PROCALCITONIN SERPL-MCNC: 0.48 NG/ML
PROT SERPL-MCNC: 4.7 G/DL (ref 6.4–8.4)
RBC # BLD AUTO: 3.47 MILLION/UL (ref 3.88–5.62)
SODIUM SERPL-SCNC: 133 MMOL/L (ref 135–147)
WBC # BLD AUTO: 6.87 THOUSAND/UL (ref 4.31–10.16)

## 2023-05-01 RX ORDER — CEFEPIME HYDROCHLORIDE 2 G/50ML
2000 INJECTION, SOLUTION INTRAVENOUS EVERY 12 HOURS
Status: COMPLETED | OUTPATIENT
Start: 2023-05-01 | End: 2023-05-01

## 2023-05-01 RX ORDER — MAGNESIUM SULFATE HEPTAHYDRATE 40 MG/ML
4 INJECTION, SOLUTION INTRAVENOUS ONCE
Status: COMPLETED | OUTPATIENT
Start: 2023-05-01 | End: 2023-05-01

## 2023-05-01 RX ORDER — LORAZEPAM 2 MG/ML
0.25 CONCENTRATE ORAL
Status: DISCONTINUED | OUTPATIENT
Start: 2023-05-01 | End: 2023-05-01

## 2023-05-01 RX ADMIN — APIXABAN 5 MG: 5 TABLET, FILM COATED ORAL at 17:10

## 2023-05-01 RX ADMIN — FAMOTIDINE 20 MG: 20 TABLET, FILM COATED ORAL at 10:26

## 2023-05-01 RX ADMIN — CEFEPIME HYDROCHLORIDE 2000 MG: 2 INJECTION, SOLUTION INTRAVENOUS at 13:16

## 2023-05-01 RX ADMIN — INSULIN LISPRO 4 UNITS: 100 INJECTION, SOLUTION INTRAVENOUS; SUBCUTANEOUS at 14:36

## 2023-05-01 RX ADMIN — FAMOTIDINE 20 MG: 20 TABLET, FILM COATED ORAL at 17:10

## 2023-05-01 RX ADMIN — ATORVASTATIN CALCIUM 20 MG: 20 TABLET, FILM COATED ORAL at 17:10

## 2023-05-01 RX ADMIN — MAGNESIUM SULFATE HEPTAHYDRATE 4 G: 40 INJECTION, SOLUTION INTRAVENOUS at 13:49

## 2023-05-01 RX ADMIN — INSULIN LISPRO 2 UNITS: 100 INJECTION, SOLUTION INTRAVENOUS; SUBCUTANEOUS at 20:48

## 2023-05-01 RX ADMIN — POTASSIUM PHOSPHATE, MONOBASIC AND POTASSIUM PHOSPHATE, DIBASIC 21 MMOL: 224; 236 INJECTION, SOLUTION, CONCENTRATE INTRAVENOUS at 13:53

## 2023-05-01 RX ADMIN — SODIUM CHLORIDE 75 ML/HR: 0.9 INJECTION, SOLUTION INTRAVENOUS at 10:49

## 2023-05-01 RX ADMIN — APIXABAN 5 MG: 5 TABLET, FILM COATED ORAL at 10:26

## 2023-05-01 RX ADMIN — CEFEPIME HYDROCHLORIDE 2000 MG: 2 INJECTION, SOLUTION INTRAVENOUS at 02:09

## 2023-05-01 RX ADMIN — NYSTATIN 500000 UNITS: 100000 SUSPENSION ORAL at 22:10

## 2023-05-01 NOTE — OCCUPATIONAL THERAPY NOTE
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)    Severe protein-calorie malnutrition ([place] [address] )    Other pulmonary embolism without acute cor pulmonale (HCC)    Acute respiratory failure with hypoxia (HCC)    Pressure injury of skin of buttock    Hypercalcemia of malignancy    Hyperkalemia    Diarrhea, unspecified      Past Medical History:   Diagnosis Date    Cancer ([place] [address] )     Diabetes ([place] [address] )     Gastritis     GERD (gastroesophageal reflux disease)     History of bladder cancer 2010    treated with surgery    Hypercholesterolemia     Hypertension     Hyponatremia 12/22/2021    Lactic acidosis 12/22/2021       Past Surgical History:   Procedure Laterality Date    BLADDER TUMOR EXCISION      EGD      IR CHOLECYSTOSTOMY TUBE CHECK/CHANGE/REPOSITION/REINSERTION/UPSIZE  1/5/2022    IR CHOLECYSTOSTOMY TUBE CHECK/CHANGE/REPOSITION/REINSERTION/UPSIZE  3/4/2022    IR CHOLECYSTOSTOMY TUBE CHECK/CHANGE/REPOSITION/REINSERTION/UPSIZE  3/24/2022    IR CHOLECYSTOSTOMY TUBE PLACEMENT  12/27/2021    IR THORACENTESIS  3/23/2023       05/01/23 1323   OT Last Visit   OT Visit Date 05/01/23   Note Type   Note Type Treatment   Pain Assessment   Pain Assessment Tool 0-10   Pain Score No Pain   Restrictions/Precautions   Weight Bearing Precautions Per Order No   Other Precautions Bed Alarm; Chair Alarm; Fall Risk;O2;Pain;Multiple lines  (condom catheter, PEG)   ADL   Where Assessed Edge of bed   Grooming Assistance 5  Supervision/Setup   Grooming Deficit Setup;Steadying;Supervision/safety; Increased time to complete   UB Bathing Assistance 3  Moderate Assistance   UB Bathing Deficit Setup;Steadying;Verbal cueing;Supervision/safety; Increased time to complete   LB Bathing Assistance 2 "Maximal Assistance   LB Bathing Deficit Setup;Steadying;Supervision/safety; Increased time to complete;Verbal cueing   UB Dressing Assistance 3  Moderate Assistance   UB Dressing Deficit Increased time to complete;Supervision/safety;Verbal cueing;Steadying;Setup   LB Dressing Assistance 2  Maximal Assistance   LB Dressing Deficit Supervision/safety;Verbal cueing; Increased time to complete;Steadying;Setup   Toileting Assistance  1  Total Assistance   Toileting Deficit Verbal cueing; Increased time to complete;Supervison/safety;Steadying;Setup   Bed Mobility   Rolling R 3  Moderate assistance   Additional items Assist x 1;Bedrails; Increased time required;Verbal cues;LE management   Rolling L 3  Moderate assistance   Additional items Assist x 1;Bedrails; Increased time required;Verbal cues;LE management   Supine to Sit 3  Moderate assistance   Additional items Assist x 1;HOB elevated; Bedrails; Increased time required;Verbal cues;LE management   Sit to Supine 3  Moderate assistance   Additional items Assist x 1;HOB elevated; Bedrails; Increased time required;Verbal cues;LE management   Additional Comments Pt sat on EOB for about 11 minutes before he stated \"I'm done now\" and started attempting to get back to supine  Transfers   Additional Comments Pt did not stand but was able to complete scooting to [facility] while seated on his buttocks  Pt required use of BUEs and max A of OT to complete this  Therapeutic Excerise-Strength   UE Strength Yes   Right Upper Extremity- Strength   R Shoulder Flexion; Extension   R Position Supine   R Weight/Reps/Sets 1 set of 10 reps   Left Upper Extremity-Strength   L Shoulder Flexion   L Position Supine   L Weights/Reps/Sets 1 set of 10 reps   Cognition   Overall Cognitive Status Impaired   Arousal/Participation Responsive   Attention Attends with cues to redirect   Orientation Level Oriented to person;Oriented to time;Disoriented to place; Disoriented to situation   Memory Decreased recall of " "recent events   Following Commands Follows one step commands with increased time or repetition   Comments Pt was agreeable to OT session  Activity Tolerance   Activity Tolerance Patient limited by fatigue;Patient tolerated treatment well   Assessment   Assessment Pt participated in skilled OT session today addressing the following interventions ADL retraining with proper body mechanics, Patient / Family Education, Transfer Training, Safety Awareness, Fall Prevention, Activity tolerance training and Sitting/ standing balance task to increase EOB/ OOB bina performance tolerance  Upon arrival, OT completed 2 pt identifiers  Pt agreeable to OT treatment session, upon arrival patient was found alert, responsive and in no apparent distress  Pt completed bed mobility with mod A  Pt completed scooting to [facility] while seated on buttocks with use of [place] UEs and max A of OT  Pt completed ADLS seated on bedside  Pt demonstrated ability to/was able to complete UB ADLS with mod A and LB ADLS with max A  Pt was able to maintain dynamic sitting balance for about 11 minutes with fair+ activity tolerance for functional task performance  Pt requiring VCs and A throughout and edu in safety, pacing and increasing independence with ADL performance  Pt completed 1 set of 10 reps of UE ROM exercises  Pt started with shoulder flexion and extension and after completing his set up 10 he stated \"I'm done now! \" and declined further UE exercises  Pt continues to be functioning below baseline level, occupational performance remains limited secondary to factors listed above and increased risk for falls and injury  From OT standpoint, recommendation at time of d/c would be post acute rehab  Pt to benefit from continued Occupational Therapy treatment while in the hospital to address deficits as defined above and maximize level of functional independence with ADLs and functional mobility     Plan   Treatment Interventions ADL " retraining;Functional transfer training; Endurance training;Patient/family training; Compensatory technique education;Continued evaluation;Cardiac education; Energy conservation; Activityengagement   Goal Expiration Date 05/07/23   OT Treatment Day 2   OT Frequency 3-5x/wk   Recommendation   OT Discharge Recommendation Return to facility with rehabilitation services   AM-PAC Daily Activity Inpatient   Lower Body Dressing 2   Bathing 2   Toileting 1   Upper Body Dressing 2   Grooming 3   Eating 3   Daily Activity Raw Score 13   Daily Activity Standardized Score (Calc for Raw Score >=11) 32 03   AM-PAC Applied Cognition Inpatient   Following a Speech/Presentation 3   Understanding Ordinary Conversation 3   Taking Medications 2   Remembering Where Things Are Placed or Put Away 2   Remembering List of 4-5 Errands 2   Taking Care of Complicated Tasks 2   Applied Cognition Raw Score 14   Applied Cognition Standardized Score 32 02   [place] Index   Grooming Score 5   Dressing Score 5   Toilet Use Score 5   Transfers (Bed/Chair) Score 10   Mobility (Level Surface) Score 0     José Cervantes MS OTR/L

## 2023-05-01 NOTE — PLAN OF CARE
"  Problem: OCCUPATIONAL THERAPY ADULT  Goal: Performs self-care activities at highest level of function for planned discharge setting  See evaluation for individualized goals  Description: Treatment Interventions: ADL retraining, Functional transfer training, UE strengthening/ROM, Endurance training, Cognitive reorientation, Patient/family training, Compensatory technique education, Energy conservation, Activityengagement     See flowsheet documentation for full assessment, interventions and recommendations  Note: Limitation: Decreased ADL status, Decreased UE strength, Decreased Safe judgement during ADL, Decreased endurance, Decreased self-care trans, Decreased high-level ADLs  Prognosis: Good  Assessment: Pt participated in skilled OT session today addressing the following interventions ADL retraining with proper body mechanics, Patient / Family Education, Transfer Training, Safety Awareness, Fall Prevention, Activity tolerance training and Sitting/ standing balance task to increase EOB/ OOB bina performance tolerance  Upon arrival, OT completed 2 pt identifiers  Pt agreeable to OT treatment session, upon arrival patient was found alert, responsive and in no apparent distress  Pt completed bed mobility with mod A  Pt completed scooting to [facility] while seated on buttocks with use of [place] UEs and max A of OT  Pt completed ADLS seated on bedside  Pt demonstrated ability to/was able to complete UB ADLS with mod A and LB ADLS with max A  Pt was able to maintain dynamic sitting balance for about 11 minutes with fair+ activity tolerance for functional task performance  Pt requiring VCs and A throughout and edu in safety, pacing and increasing independence with ADL performance  Pt completed 1 set of 10 reps of UE ROM exercises  Pt started with shoulder flexion and extension and after completing his set up 10 he stated \"I'm done now! \" and declined further UE exercises   Pt continues to be functioning below baseline " level, occupational performance remains limited secondary to factors listed above and increased risk for falls and injury  From OT standpoint, recommendation at time of d/c would be post acute rehab  Pt to benefit from continued Occupational Therapy treatment while in the hospital to address deficits as defined above and maximize level of functional independence with ADLs and functional mobility       OT Discharge Recommendation: Return to facility with rehabilitation services

## 2023-05-01 NOTE — ASSESSMENT & PLAN NOTE
· Possibly secondary to patient's underlying malignancy  · This was treated  Ca level now is mildly low     · Improved with IV fluids  · Nephrology recommendations appreciated  · Continue with nutritional supplement   · Monitor calcium

## 2023-05-01 NOTE — PROGRESS NOTES
[address]  Progress Note  Name: Carl Howard  MRN: [id number]  Unit/Bed#: -01 I Date of Admission: 4/26/2023   Date of Service: 5/1/2023 I Hospital Day: 5    Assessment/Plan   * Severe sepsis ([facility] [address] )  Assessment & Plan  · POA secondary to suspected pneumonia with hypothermia, tachycardia, tachypnea-- sepsis parameters much improved   · Chest x-ray: Infiltrate at the right lung base along with a small pleural effusion  · Blood cultures: NGTD  · Strep pneumo/urine Legionella negative  · Sputum culture: S aureus   · Continue cefepime day#5  Vanco has been discontinued due to negative MRSA screen  · Trend procalcitonin      Diarrhea, unspecified  Assessment & Plan  · Diarrhea, possibly related to tube feeding  Much improved  · C  difficile and stool enteric panel-negative  · Tube feeds is now back to his home regime- bolus 480 ml TID       Hyperkalemia  Assessment & Plan  · Unclear etiology, possibly due to volume depletion and/or related to patient's tube feeds  Resolved  · Continue IV fluids  · Patient received insulin/dextrose, albuterol  · Nephrology recommendations appreciated  · Daily BMP     Hypercalcemia of malignancy  Assessment & Plan  · Possibly secondary to patient's underlying malignancy  · This was treated  Ca level now is mildly low  · Improved with IV fluids  · Nephrology recommendations appreciated  · Continue with nutritional supplement   · Monitor calcium     Pressure injury of skin of buttock  Assessment & Plan  · Present on admission  · Continue with local wound care       Acute respiratory failure with hypoxia ([facility] [address] )  Assessment & Plan  · Secondary to pneumonia    · Continue oxygen and wean as tolerated  · Overnight 4/28 he had an episode of severe respiratory distress  CTA chest PE study negative for PE  At that time patient verbalized that he would want CPR and to be on a ventilator if his respiratory status declined   Code status was addressed again "after his mentation improved and he confirmed that he would not want compressions or to be on a ventilator  He also admits feeling confused the night he was having respiratory distress  Now is a DNR/DNI  Other pulmonary embolism without acute cor pulmonale (HCC)  Assessment & Plan  · Continue Eliquis     Severe protein-calorie malnutrition (HCC)  Assessment & Plan  Body mass index is 15 41 kg/m²  · Continue tube feed   · Nutrition input appreciated  Recommendation: \"Recommend transitioning to home TF regimen as clinically appropriate: Jevity 1 5 480 mL bolus TID at 0800, 1300, 1700  Recommend adding Prosource NoCarb once daily  \"     Primary malignant neoplasm of base of tongue ([address] )  Assessment & Plan  · Patient was following with [facility] earlier in the year, however, has not had treatment recently as he was hospitalized  He has been in rehab for the last few weeks and malignancy treatment has been on hold for this reason   · Will need outpatient follow-up with hematology/oncology    Type 2 diabetes mellitus without complication, without long-term current use of insulin [facility])  Assessment & Plan  Lab Results   Component Value Date    HGBA1C 5 8 (H) 12/30/2022       Recent Labs     04/30/23  1545 04/30/23  2021 05/01/23  0208 05/01/23  0717   POCGLU 202* 148* 144* 119       Blood Sugar Average: Last 72 hrs:  (P) 186 4375     · Continue tube feeds  · Sliding scale insulin     Dysphagia, unspecified  Assessment & Plan  · PEG tube in place  · Speech input appreciated  Severe impairment during pharyngeal stage with significant aspiration risk  Possible micro aspiration  · Okay with pleasure feeds              VTE Prophylaxis:  Apixaban (Eliquis)    Patient Centered Rounds: I have performed bedside rounds with nursing staff today  Discussions with Specialists or Other Care Team Provider: renal   Education and Discussions with Family / Patient: patient and spouse- Umari Caicedo via phone       Current Length of " Stay: 5 day(s)    Current Patient Status: Inpatient   Certification Statement: The patient will continue to require additional inpatient hospital stay due to severe sepsis     Discharge Plan: pending clinical course     Code Status: Level 3 - DNAR and DNI    Subjective:   Feeling okay  Denies any pain  Per staff, the patient has been tolerating bolus tube feeds  Objective:     Vitals:   Temp (24hrs), Av 3 °F (36 8 °C), Min:97 1 °F (36 2 °C), Max:99 7 °F (37 6 °C)    Temp:  [97 1 °F (36 2 °C)-99 7 °F (37 6 °C)] 97 4 °F (36 3 °C)  HR:  [] 106  Resp:  [18-21] 21  BP: ()/(59-68) 111/68  SpO2:  [94 %-100 %] 94 %  Body mass index is 15 41 kg/m²  Input and Output Summary (last 24 hours): Intake/Output Summary (Last 24 hours) at 2023 1049  Last data filed at 2023 2119  Gross per 24 hour   Intake 440 ml   Output 600 ml   Net -160 ml       Physical Exam:   Physical Exam  Vitals and nursing note reviewed  Constitutional:       General: He is not in acute distress  Appearance: Normal appearance  He is cachectic  Comments: Frail appearing    HENT:      Head: Normocephalic and atraumatic  Right Ear: External ear normal       Left Ear: External ear normal       Nose: Nose normal       Mouth/Throat:      Mouth: Mucous membranes are moist       Pharynx: Oropharynx is clear  Eyes:      General:         Right eye: No discharge  Left eye: No discharge  Extraocular Movements: Extraocular movements intact  Pupils: Pupils are equal, round, and reactive to light  Cardiovascular:      Rate and Rhythm: Normal rate and regular rhythm  Pulses: Normal pulses  Heart sounds: Normal heart sounds  No murmur heard  Pulmonary:      Effort: Pulmonary effort is normal  No respiratory distress  Breath sounds: No wheezing or rales  Comments: Very decreased in bases    Abdominal:      General: Bowel sounds are normal  There is no distension        Palpations: Abdomen is soft  There is no mass  Tenderness: There is no abdominal tenderness  Musculoskeletal:         General: No swelling, tenderness or deformity  Normal range of motion  Cervical back: Normal range of motion and neck supple  No rigidity  Skin:     General: Skin is warm and dry  Capillary Refill: Capillary refill takes less than 2 seconds  Coloration: Skin is not pale  Findings: Bruising (on arms ) present  No erythema  Neurological:      General: No focal deficit present  Mental Status: He is alert and oriented to person, place, and time  Mental status is at baseline  Comments: With periods of forgetfulness and confusion    Psychiatric:         Mood and Affect: Mood normal          Behavior: Behavior normal          Thought Content: Thought content normal          Additional Data:     Labs:    Results from last 7 days   Lab Units 04/30/23  0515   WBC Thousand/uL 7 88   HEMOGLOBIN g/dL 8 5*   HEMATOCRIT % 27 8*   PLATELETS Thousands/uL 202   LYMPHO PCT % 6*   MONO PCT % 1*   EOS PCT % 0     Results from last 7 days   Lab Units 04/30/23  0515   SODIUM mmol/L 135   POTASSIUM mmol/L 3 5   CHLORIDE mmol/L 103   CO2 mmol/L 28   BUN mg/dL 15   CREATININE mg/dL 0 39*   CALCIUM mg/dL 7 4*   ALK PHOS U/L 78   ALT U/L 42   AST U/L 44*     Results from last 7 days   Lab Units 04/26/23  1051   INR  1 91*     Results from last 7 days   Lab Units 05/01/23  0717 05/01/23  0208 04/30/23  2021 04/30/23  1545 04/30/23  1209 04/30/23  0617 04/29/23  1950 04/29/23  1356 04/29/23  0816 04/29/23  0221 04/28/23  2140 04/28/23  1817   POC GLUCOSE mg/dl 119 144* 148* 202* 231* 211* 170* 149* 240* 285* 240* 303*           * I Have Reviewed All Lab Data Listed Above  * Additional Pertinent Lab Tests Reviewed:  [facility] Admission  Reviewed    Imaging:  Imaging Reports Reviewed Today Include: n/a     Recent Cultures (last 7 days):     Results from last 7 days   Lab Units 04/28/23  1852 04/27/23  1142 04/26/23  2155 04/26/23  1051   BLOOD CULTURE   --   --   --  No Growth After 4 Days  No Growth After 4 Days  SPUTUM CULTURE   --  2+ Growth of Staphylococcus aureus*  2+ Growth of  --   --    GRAM STAIN RESULT   --  1+ Epithelial Cells*  3+ Polys*  3+ Gram positive rods*  2+ Gram positive cocci in clusters*  1+ Gram negative rods*  --   --    LEGIONELLA URINARY ANTIGEN   --   --  Negative  --    C DIFF TOXIN B BY PCR  Negative  --   --   --        Last 24 Hours Medication List:   Current Facility-Administered Medications   Medication Dose Route Frequency Provider Last Rate    acetaminophen  650 mg Oral Q6H PRN Jose Armando Suarez MD      apixaban  5 mg Per PEG Tube BID Jose Armando Suarez MD      atorvastatin  20 mg Per PEG Tube Daily With Dinner Jose Armando Suarez MD      cefepime  2,000 mg Intravenous Q12H Jose Armando Suarez MD 2,000 mg (05/01/23 0209)    famotidine  20 mg Per G Tube BID Jose Armando Suarez MD      guaiFENesin  200 mg Per G Tube 4x Daily PRN MOISES Blount      insulin lispro  1-5 Units Subcutaneous Q6H Jose Armando Suarez MD      lactated ringers  1,000 mL Intravenous Once Jose Armando Suarez MD      LORazepam  0 5 mg Oral HS PRN MOISES Blount      nystatin  500,000 Units Swish & Spit 4x Daily MOISES Blount      ondansetron  4 mg Intravenous Q6H PRN Jose Armando Suarez MD      sodium chloride  75 mL/hr Intravenous Continuous Jose Armando Suarez MD 75 mL/hr (04/30/23 2109)        Today, Patient Was Seen By: MOISES Diehl    ** Please Note: Dictation voice to text software may have been used in the creation of this document   **

## 2023-05-01 NOTE — ASSESSMENT & PLAN NOTE
· Diarrhea, possibly related to tube feeding  Much improved     · C  difficile and stool enteric panel-negative  · Tube feeds is now back to his home regime- bolus 480 ml TID

## 2023-05-01 NOTE — PLAN OF CARE
Problem: Nutrition/Hydration-ADULT  Goal: Nutrient/Hydration intake appropriate for improving, restoring or maintaining nutritional needs  Description: Monitor and assess patient's nutrition/hydration status for malnutrition  Collaborate with interdisciplinary team and initiate plan and interventions as ordered  Monitor patient's weight and dietary intake as ordered or per policy  Utilize nutrition screening tool and intervene as necessary  Determine patient's food preferences and provide high-protein, high-caloric foods as appropriate       INTERVENTIONS:  - Monitor oral intake, urinary output, labs, and treatment plans  - Assess nutrition and hydration status and recommend course of action  - Evaluate amount of meals eaten  - Assist patient with eating if necessary   - Allow adequate time for meals  - Recommend/ encourage appropriate diets, oral nutritional supplements, and vitamin/mineral supplements  - Order, calculate, and assess calorie counts as needed  - Recommend, monitor, and adjust tube feedings and TPN/PPN based on assessed needs  - Assess need for intravenous fluids  - Provide specific nutrition/hydration education as appropriate  - Include patient/family/caregiver in decisions related to nutrition  Outcome: Progressing     Problem: MOBILITY - ADULT  Goal: Maintain or return to baseline ADL function  Description: INTERVENTIONS:  -  Assess patient's ability to carry out ADLs; assess patient's baseline for ADL function and identify physical deficits which impact ability to perform ADLs (bathing, care of mouth/teeth, toileting, grooming, dressing, etc )  - Assess/evaluate cause of self-care deficits   - Assess range of motion  - Assess patient's mobility; develop plan if impaired  - Assess patient's need for assistive devices and provide as appropriate  - Encourage maximum independence but intervene and supervise when necessary  - Involve family in performance of ADLs  - Assess for home care needs following discharge   - Consider OT consult to assist with ADL evaluation and planning for discharge  - Provide patient education as appropriate  Outcome: Progressing  Goal: Maintains/Returns to pre admission functional level  Description: INTERVENTIONS:  - Perform BMAT or MOVE assessment daily    - Set and communicate daily mobility goal to care team and patient/family/caregiver  - Collaborate with rehabilitation services on mobility goals if consulted  - Reposition patient every 2 hours    - Record patient progress and toleration of activity level   Outcome: Progressing     Problem: PAIN - ADULT  Goal: Verbalizes/displays adequate comfort level or baseline comfort level  Description: Interventions:  - Encourage patient to monitor pain and request assistance  - Assess pain using appropriate pain scale  - Administer analgesics based on type and severity of pain and evaluate response  - Implement non-pharmacological measures as appropriate and evaluate response  - Consider cultural and social influences on pain and pain management  - Notify physician/advanced practitioner if interventions unsuccessful or patient reports new pain  Outcome: Progressing     Problem: INFECTION - ADULT  Goal: Absence or prevention of progression during hospitalization  Description: INTERVENTIONS:  - Assess and monitor for signs and symptoms of infection  - Monitor lab/diagnostic results  - Monitor all insertion sites, i e  indwelling lines, tubes, and drains  - Monitor endotracheal if appropriate and nasal secretions for changes in amount and color  - Vader appropriate cooling/warming therapies per order  - Administer medications as ordered  - Instruct and encourage patient and family to use good hand hygiene technique  - Identify and instruct in appropriate isolation precautions for identified infection/condition  Outcome: Progressing     Problem: SAFETY ADULT  Goal: Maintain or return to baseline ADL function  Description: INTERVENTIONS:  -  Assess patient's ability to carry out ADLs; assess patient's baseline for ADL function and identify physical deficits which impact ability to perform ADLs (bathing, care of mouth/teeth, toileting, grooming, dressing, etc )  - Assess/evaluate cause of self-care deficits   - Assess range of motion  - Assess patient's mobility; develop plan if impaired  - Assess patient's need for assistive devices and provide as appropriate  - Encourage maximum independence but intervene and supervise when necessary  - Involve family in performance of ADLs  - Assess for home care needs following discharge   - Consider OT consult to assist with ADL evaluation and planning for discharge  - Provide patient education as appropriate  Outcome: Progressing  Goal: Maintains/Returns to pre admission functional level  Description: INTERVENTIONS:  - Perform BMAT or MOVE assessment daily    - Set and communicate daily mobility goal to care team and patient/family/caregiver  - Collaborate with rehabilitation services on mobility goals if consulted  - Reposition patient every 2 hours    - Record patient progress and toleration of activity level   Outcome: Progressing  Goal: Patient will remain free of falls  Description: INTERVENTIONS:  - Educate patient/family on patient safety including physical limitations  - Instruct patient to call for assistance with activity   - Consult OT/PT to assist with strengthening/mobility   - Keep Call bell within reach  - Keep bed low and locked with side rails adjusted as appropriate  - Keep care items and personal belongings within reach  - Initiate and maintain comfort rounds  - Make Fall Risk Sign visible to staff  - Offer Toileting every 2 Hours, in advance of need  - Initiate/Maintain bed alarm  - Apply yellow socks and bracelet for high fall risk patients  - Consider moving patient to room near nurses station  Outcome: Progressing     Problem: DISCHARGE PLANNING  Goal: Discharge to home or other facility with appropriate resources  Description: INTERVENTIONS:  - Identify barriers to discharge w/patient and caregiver  - Arrange for needed discharge resources and transportation as appropriate  - Identify discharge learning needs (meds, wound care, etc )  - Arrange for interpretive services to assist at discharge as needed  - Refer to Case Management Department for coordinating discharge planning if the patient needs post-hospital services based on physician/advanced practitioner order or complex needs related to functional status, cognitive ability, or social support system  Outcome: Progressing     Problem: Knowledge Deficit  Goal: Patient/family/caregiver demonstrates understanding of disease process, treatment plan, medications, and discharge instructions  Description: Complete learning assessment and assess knowledge base    Interventions:  - Provide teaching at level of understanding  - Provide teaching via preferred learning methods  Outcome: Progressing     Problem: RESPIRATORY - ADULT  Goal: Achieves optimal ventilation and oxygenation  Description: INTERVENTIONS:  - Assess for changes in respiratory status  - Assess for changes in mentation and behavior  - Position to facilitate oxygenation and minimize respiratory effort  - Oxygen administered by appropriate delivery if ordered  - Initiate smoking cessation education as indicated  - Encourage broncho-pulmonary hygiene including cough, deep breathe, Incentive Spirometry  - Assess the need for suctioning and aspirate as needed  - Assess and instruct to report SOB or any respiratory difficulty  - Respiratory Therapy support as indicated  Outcome: Progressing     Problem: Prexisting or High Potential for Compromised Skin Integrity  Goal: Skin integrity is maintained or improved  Description: INTERVENTIONS:  - Identify patients at risk for skin breakdown  - Assess and monitor skin integrity  - Assess and monitor nutrition and hydration status  - Monitor labs   - Assess for incontinence   - Turn and reposition patient  - Assist with mobility/ambulation  - Relieve pressure over bony prominences  - Avoid friction and shearing  - Provide appropriate hygiene as needed including keeping skin clean and dry  - Evaluate need for skin moisturizer/barrier cream  - Collaborate with interdisciplinary team   - Patient/family teaching  - Consider wound care consult   Outcome: Progressing

## 2023-05-01 NOTE — ASSESSMENT & PLAN NOTE
· Unclear etiology, possibly due to volume depletion and/or related to patient's tube feeds  Resolved     · Continue IV fluids  · Patient received insulin/dextrose, albuterol  · Nephrology recommendations appreciated  · Daily BMP

## 2023-05-01 NOTE — ASSESSMENT & PLAN NOTE
· Secondary to pneumonia    · Continue oxygen and wean as tolerated  · Overnight 4/28 he had an episode of severe respiratory distress  CTA chest PE study negative for PE  At that time patient verbalized that he would want CPR and to be on a ventilator if his respiratory status declined  Code status was addressed again after his mentation improved and he confirmed that he would not want compressions or to be on a ventilator  He also admits feeling confused the night he was having respiratory distress  Now is a DNR/DNI

## 2023-05-01 NOTE — ASSESSMENT & PLAN NOTE
"Body mass index is 15 41 kg/m²  · Continue tube feed   · Nutrition input appreciated  Recommendation: \"Recommend transitioning to home TF regimen as clinically appropriate: Jevity 1 5 480 mL bolus TID at 0800, 1300, 1700  Recommend adding Prosource NoCarb once daily  \"   "

## 2023-05-01 NOTE — PROGRESS NOTES
Progress Note - Nephrology   Tavo Subramanian 78 y o  male MRN: [id number]  Unit/Bed#: -01 Encounter: [id number]    A/P:  1  Hypercalcemia              Serum calcium levels significant proved  We will discontinue IV fluids at this time as well, please refer below  Patient was also one-point on subcutaneous calcitonin  2   Hyperkalemia              Resolved continue to monitor time  3   Hyponatremia   Potentially due to excess fluid intake including IV fluids  We will discontinue IV fluids, continue to monitor tube feeds, may need to reduce free water depending on the progress will clinical standpoint  4   Severe sepsis              Improved clinically, continue with cefepime according to hospitalist recommendations  5   Malignant neoplasm of the tongue    Follow up reason for today's visit: Multiple electrolyte abnormalities      Severe sepsis [facility])    Patient Active Problem List   Diagnosis    Anxiety    Neoplasm of uncertain behavior of skin    Post-traumatic stress disorder, unspecified    Essential hypertension    Actinic keratosis    Dysphagia, unspecified    Erectile dysfunction    History of malignant neoplasm of bladder    Cardiac arrhythmia    Carpal tunnel syndrome    Chronic post-traumatic stress disorder (PTSD) after  combat    Type 2 diabetes mellitus without complication, without long-term current use of insulin (HCC)    Heart murmur    Hematuria    Hyperlipidemia due to type 2 diabetes mellitus ([address] )    Left ventricular hypertrophy    Malignant neoplasm of urinary bladder (HCC)    Neoplasm of lung    Neoplasm of uncertain behavior of oropharynx    Osteoarthritis of knee    Panic disorder    Polyp of colon    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)    Severe protein-calorie malnutrition (HCC)    Severe sepsis (HCC)    Other pulmonary embolism without acute cor pulmonale (HCC)    Cholecystitis    Multiple fractures of rib involving four or more "ribs-right    COVID    Acute respiratory failure with hypoxia (HCC)    Thyroid nodule    History of head and neck radiation    Agent orange exposure    Subclinical hyperthyroidism    Syncope and collapse    Hypomagnesemia    Hyperlipemia    PNA (pneumonia)    Pressure injury of skin of buttock    Hydropneumothorax    Hypercalcemia of malignancy    Hyperkalemia    Abnormal PET of right lung    Choroidal nevus    Combined forms of age-related cataract of both eyes    Exposure to potentially hazardous substance    Hyperglycemia    Hyperopia    Presbyopia    Regular astigmatism of both eyes    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    Unspecified abnormalities of gait and mobility    Diarrhea, unspecified         Subjective:   No acute issues at this time  Diarrhea has improved  Objective:     Vitals: Blood pressure 111/68, pulse (!) 106, temperature (!) 97 4 °F (36 3 °C), resp  rate 21, height 6' 2\" (1 88 m), weight 54 4 kg (120 lb), SpO2 94 %  ,Body mass index is 15 41 kg/m²  Weight (last 2 days)     None            Intake/Output Summary (Last 24 hours) at 5/1/2023 1247  Last data filed at 5/1/2023 1100  Gross per 24 hour   Intake 440 ml   Output 1000 ml   Net -560 ml     I/O last 3 completed shifts:   In: 1240 [I V :300; NG/GT:600; Feedings:340]  Out: 1575 [Urine:1575]         Physical Exam: /68   Pulse (!) 106   Temp (!) 97 4 °F (36 3 °C)   Resp 21   Ht 6' 2\" (1 88 m)   Wt 54 4 kg (120 lb)   SpO2 94%   BMI 15 41 kg/m²     General Appearance:    Alert, cooperative, no distress, appears stated age   Head:    Normocephalic, without obvious abnormality, atraumatic   Eyes:    Conjunctiva/corneas clear   Ears:    Normal external ears   Nose:   Nares normal, septum midline, mucosa normal, no drainage    or sinus tenderness   Throat:   Lips, mucosa, and tongue normal; teeth and gums normal   Neck:   Supple   Back:     Symmetric, no curvature, ROM normal, no CVA " tenderness   Lungs:     Clear to auscultation bilaterally, respirations unlabored   Chest wall:    No tenderness or deformity   Heart:    Regular rate and rhythm, S1 and S2 normal, no murmur, rub   or gallop   Abdomen:     Soft, non-tender, bowel sounds active   Extremities:   Extremities normal, atraumatic, no cyanosis or edema   Skin:   Skin color, texture, turgor normal, no rashes or lesions   Lymph nodes:   Cervical normal   Neurologic:   CNII-XII intact            Lab, Imaging and other studies: I have personally reviewed pertinent labs  CBC:   Lab Results   Component Value Date    WBC 6 87 05/01/2023    HGB 9 3 (L) 05/01/2023    HCT 31 2 (L) 05/01/2023    MCV 90 05/01/2023     05/01/2023    MCH 26 8 05/01/2023    MCHC 29 8 (L) 05/01/2023    RDW 18 0 (H) 05/01/2023    MPV 9 1 05/01/2023     CMP:   Lab Results   Component Value Date    K 3 6 05/01/2023    CL 98 05/01/2023    CO2 30 05/01/2023    BUN 13 05/01/2023    CREATININE 0 48 (L) 05/01/2023    CALCIUM 8 2 (L) 05/01/2023    AST 70 (H) 05/01/2023    ALT 64 (H) 05/01/2023    ALKPHOS 114 (H) 05/01/2023    EGFR 107 05/01/2023         Results from last 7 days   Lab Units 05/01/23  1041 04/30/23  0515 04/29/23  0525 04/28/23  0552 04/26/23  1051   POTASSIUM mmol/L 3 6 3 5 3 1*   < > 5 5*   CHLORIDE mmol/L 98 103 103   < > 93*   CO2 mmol/L 30 28 26   < > 29   BUN mg/dL 13 15 18   < > 32*   CREATININE mg/dL 0 48* 0 39* 0 45*   < > 0 67   CALCIUM mg/dL 8 2* 7 4* 8 2*   < > 12 7*   ALK PHOS U/L 114* 78  --   --  123*   ALT U/L 64* 42  --   --  57*   AST U/L 70* 44*  --   --  33    < > = values in this interval not displayed           Phosphorus:   Lab Results   Component Value Date    PHOS 1 7 (L) 05/01/2023     Magnesium:   Lab Results   Component Value Date    MG 1 6 (L) 05/01/2023     Urinalysis: No results found for: Angelica Jim, SPECGRAV, PHUR, LEUKOCYTESUR, NITRITE, PROTEINUA, GLUCOSEU, KETONESU, BILIRUBINUR, BLOODU  Ionized Calcium: No results found for: CAION  Coagulation: No results found for: PT, INR, APTT  Troponin: No results found for: TROPONINI  ABG: No results found for: PHART, MMJ2VNR, PO2ART, MUS4DMX, P4QQIAMF, BEART, SOURCE  Radiology review:     IMAGING  No results found      Current Facility-Administered Medications   Medication Dose Route Frequency    acetaminophen (TYLENOL) tablet 650 mg  650 mg Oral Q6H PRN    apixaban (ELIQUIS) tablet 5 mg  5 mg Per PEG Tube BID    atorvastatin (LIPITOR) tablet 20 mg  20 mg Per PEG Tube Daily With Dinner    cefepime (MAXIPIME) IVPB (premix in dextrose) 2,000 mg 50 mL  2,000 mg Intravenous Q12H    famotidine (PEPCID) tablet 20 mg  20 mg Per G Tube BID    guaiFENesin (ROBITUSSIN) oral liquid 200 mg  200 mg Per G Tube 4x Daily PRN    insulin lispro (HumaLOG) 100 units/mL subcutaneous injection 1-5 Units  1-5 Units Subcutaneous Q6H    LORazepam (ATIVAN) oral concentrated solution 0 26 mg  0 26 mg Oral HS PRN    nystatin (MYCOSTATIN) oral suspension 500,000 Units  500,000 Units Swish & Spit 4x Daily    ondansetron (ZOFRAN) injection 4 mg  4 mg Intravenous Q6H PRN     Medications Discontinued During This Encounter   Medication Reason    cefepime (MAXIPIME) IVPB (premix in dextrose) 2,000 mg 50 mL     vancomycin (VANCOCIN) IVPB (premix in dextrose) 750 mg 150 mL     apixaban (ELIQUIS) tablet 5 mg     atorvastatin (LIPITOR) tablet 20 mg     famotidine (PEPCID) tablet 20 mg     vancomycin (VANCOCIN) 1,250 mg in sodium chloride 0 9 % 250 mL IVPB     vancomycin (VANCOCIN) 1,250 mg in sodium chloride 0 9 % 250 mL IVPB     guaiFENesin (MUCINEX) 12 hr tablet 600 mg     calcitonin (salmon) (MIACALCIN) injection 218 Units     vancomycin (VANCOCIN) 1500 mg in sodium chloride 0 9% 250 mL IVPB     vancomycin (VANCOCIN) 1,750 mg in sodium chloride 0 9 % 500 mL IVPB     vancomycin (VANCOCIN) IVPB (premix in dextrose) 1,000 mg 200 mL     magnesium sulfate 4 g/100 mL IVPB (premix) 4 g     LORazepam (ATIVAN) oral concentrated solution 0 5 mg     lactated ringers bolus 1,000 mL     cefepime (MAXIPIME) IVPB (premix in dextrose) 2,000 mg 50 mL     sodium chloride 0 9 % infusion        Unruly Bernstein DO      This progress note was produced in part using a dictation device which may document imprecise wording from author's original intent

## 2023-05-01 NOTE — ASSESSMENT & PLAN NOTE
· PEG tube in place  · Speech input appreciated  Severe impairment during pharyngeal stage with significant aspiration risk  Possible micro aspiration     · Okay with pleasure feeds

## 2023-05-01 NOTE — ASSESSMENT & PLAN NOTE
· POA secondary to suspected pneumonia with hypothermia, tachycardia, tachypnea-- sepsis parameters much improved   · Chest x-ray: Infiltrate at the right lung base along with a small pleural effusion  · Blood cultures: NGTD  · Strep pneumo/urine Legionella negative  · Sputum culture: S aureus   · Continue cefepime day#5  Vanco has been discontinued due to negative MRSA screen     · Trend procalcitonin

## 2023-05-01 NOTE — ASSESSMENT & PLAN NOTE
Lab Results   Component Value Date    HGBA1C 5 8 (H) 12/30/2022       Recent Labs     04/30/23  1545 04/30/23 2021 05/01/23  0208 05/01/23  0717   POCGLU 202* 148* 144* 119       Blood Sugar Average: Last 72 hrs:  (P) 186 4375     · Continue tube feeds  · Sliding scale insulin

## 2023-05-01 NOTE — NURSING NOTE
Pain to rt flank is severe per patient  resp easy  No resp distress  Hr 109/mon  02 sat 95%  No edema  Turned and repositioned  No distress

## 2023-05-01 NOTE — CASE MANAGEMENT
Case Management Discharge Planning Note    Patient name Fadumo Bravo  Location [address] 202/-56 MRN [id number]  : 1945 Date 2023       Current Admission Date: 2023  Current Admission Diagnosis:Severe sepsis [facility])   Patient Active Problem List    Diagnosis Date Noted    Diarrhea, unspecified 2023    Choroidal nevus 2023    Combined forms of age-related cataract of both eyes 2023    Exposure to potentially hazardous substance 2023    Hyperopia 2023    Presbyopia 2023    Regular astigmatism of both eyes 2023    Unspecified abnormalities of gait and mobility 2023    Hypercalcemia of malignancy 2023    Hyperkalemia 2023    PNA (pneumonia) 2023    Pressure injury of skin of buttock 2023    Hydropneumothorax 2023    Hyperglycemia 2023    Abnormal PET of right lung 2022    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    Syncope and collapse 2022    Hypomagnesemia 2022    Hyperlipemia 2022    Thyroid nodule 2022    History of head and neck radiation 2022    Agent orange exposure 2022    Subclinical hyperthyroidism 2022    Multiple fractures of rib involving four or more ribs-right 2022    COVID 2022    Acute respiratory failure with hypoxia ([address] ) 2022    Anxiety 2021    Neoplasm of uncertain behavior of skin 2021    Post-traumatic stress disorder, unspecified 2021    Essential hypertension 2021    Actinic keratosis 2021    Dysphagia, unspecified 2021    Erectile dysfunction 2021    History of malignant neoplasm of bladder 2021    Carpal tunnel syndrome 2021    Chronic post-traumatic stress disorder (PTSD) after  combat 2021    Type 2 diabetes mellitus without complication, without long-term current use of insulin ([address] ) 2021    Hematuria 12/22/2021    Hyperlipidemia due to type 2 diabetes mellitus ([facility] ) 12/22/2021    Malignant neoplasm of urinary bladder ([facility] ) 12/22/2021    Neoplasm of lung 12/22/2021    Osteoarthritis of knee 12/22/2021    Panic disorder 12/22/2021    Polyp of colon 12/22/2021    Severe sepsis ([facility] ) 12/22/2021    Other pulmonary embolism without acute cor pulmonale ([facility] ) 12/22/2021    Cholecystitis 12/22/2021    Severe protein-calorie malnutrition ([facility] ) 11/24/2021    Primary malignant neoplasm of base of tongue ([facility] ) 10/11/2021    Neoplasm of uncertain behavior of oropharynx 09/23/2021    Cardiac arrhythmia 05/15/2018    Heart murmur 05/15/2018    Left ventricular hypertrophy 05/15/2018      LOS (days): 5  Geometric Mean LOS (GMLOS) (days): 5 00  Days to GMLOS:-0 2     OBJECTIVE:  Risk of Unplanned Readmission Score: 23 44         Current admission status: Inpatient   Preferred Pharmacy:   [address] [place], [place] [address], [address] [address]  Phone: [phone] Fax: [address], [address] [facility]  [facility] [place] [address]  Phone: [phone] Fax: [phone]    Primary Care Provider: Rick Avila MD    Primary Insurance: MEDICARE  Secondary Insurance: Eveline Schuster    DISCHARGE DETAILS:    Discharge planning discussed with[de-identified] patietn and spouse was called at 8:56 am     Comments - Freedom of Choice: pt is a bedhold at [facility]- wife is in agreement with his return  CM contacted family/caregiver?: Yes             Contacts  Patient Contacts:  Debbie Staples  Relationship to Patient[de-identified] Family (wife)  Contact Method: Phone  Phone Number: [phone]  Reason/Outcome: Discharge [address]         Is the patient interested in [facility] at discharge?: No    DME Referral Provided  Referral made for DME?: No    Other Referral/Resources/Interventions Provided:  Interventions: Short Term Rehab  Referral Comments: return to [facility]- pt is back on his tube feeding rate and  he back on Jevity 1 5    Would you like to participate in our [address] service program?  : No - Declined    Treatment Team Recommendation: Short Term Rehab (Ariel COURTNEY 14:30pm Isabel)                                   IMM Given (Date):: 05/01/23  IMM Given to[de-identified] Family (IMM was reviewed over [facility] phone - she stated she understood and IMM was mailed)

## 2023-05-02 ENCOUNTER — APPOINTMENT (INPATIENT)
Dept: CT IMAGING | Facility: HOSPITAL | Age: 78
End: 2023-05-02

## 2023-05-02 ENCOUNTER — APPOINTMENT (INPATIENT)
Dept: MRI IMAGING | Facility: HOSPITAL | Age: 78
End: 2023-05-02

## 2023-05-02 PROBLEM — G93.40 ENCEPHALOPATHY: Status: ACTIVE | Noted: 2023-05-02

## 2023-05-02 LAB
ANION GAP SERPL CALCULATED.3IONS-SCNC: 7 MMOL/L (ref 4–13)
BUN SERPL-MCNC: 13 MG/DL (ref 5–25)
CALCIUM SERPL-MCNC: 8.6 MG/DL (ref 8.4–10.2)
CHLORIDE SERPL-SCNC: 99 MMOL/L (ref 96–108)
CO2 SERPL-SCNC: 31 MMOL/L (ref 21–32)
CREAT SERPL-MCNC: 0.51 MG/DL (ref 0.6–1.3)
ERYTHROCYTE [DISTWIDTH] IN BLOOD BY AUTOMATED COUNT: 18 % (ref 11.6–15.1)
FLUAV RNA RESP QL NAA+PROBE: NEGATIVE
FLUBV RNA RESP QL NAA+PROBE: NEGATIVE
FOLATE SERPL-MCNC: 11.1 NG/ML (ref 3.1–17.5)
GFR SERPL CREATININE-BSD FRML MDRD: 105 ML/MIN/1.73SQ M
GLUCOSE SERPL-MCNC: 120 MG/DL (ref 65–140)
GLUCOSE SERPL-MCNC: 130 MG/DL (ref 65–140)
GLUCOSE SERPL-MCNC: 136 MG/DL (ref 65–140)
GLUCOSE SERPL-MCNC: 413 MG/DL (ref 65–140)
GLUCOSE SERPL-MCNC: 450 MG/DL (ref 65–140)
HCT VFR BLD AUTO: 28.3 % (ref 36.5–49.3)
HGB BLD-MCNC: 8.4 G/DL (ref 12–17)
MAGNESIUM SERPL-MCNC: 1.8 MG/DL (ref 1.9–2.7)
MCH RBC QN AUTO: 26.4 PG (ref 26.8–34.3)
MCHC RBC AUTO-ENTMCNC: 29.7 G/DL (ref 31.4–37.4)
MCV RBC AUTO: 89 FL (ref 82–98)
MYCOBACTERIUM SPEC CULT: NORMAL
PHOSPHATE SERPL-MCNC: 2.2 MG/DL (ref 2.3–4.1)
PLATELET # BLD AUTO: 207 THOUSANDS/UL (ref 149–390)
PMV BLD AUTO: 9.2 FL (ref 8.9–12.7)
POTASSIUM SERPL-SCNC: 3.6 MMOL/L (ref 3.5–5.3)
RBC # BLD AUTO: 3.18 MILLION/UL (ref 3.88–5.62)
RHODAMINE-AURAMINE STN SPEC: NORMAL
RSV RNA RESP QL NAA+PROBE: NEGATIVE
SARS-COV-2 RNA RESP QL NAA+PROBE: NEGATIVE
SODIUM SERPL-SCNC: 137 MMOL/L (ref 135–147)
TSH SERPL DL<=0.05 MIU/L-ACNC: 1.36 UIU/ML (ref 0.45–4.5)
VIT B12 SERPL-MCNC: 1235 PG/ML (ref 100–900)
WBC # BLD AUTO: 9.23 THOUSAND/UL (ref 4.31–10.16)

## 2023-05-02 RX ORDER — MAGNESIUM SULFATE HEPTAHYDRATE 40 MG/ML
2 INJECTION, SOLUTION INTRAVENOUS ONCE
Status: COMPLETED | OUTPATIENT
Start: 2023-05-02 | End: 2023-05-02

## 2023-05-02 RX ORDER — LOPERAMIDE HYDROCHLORIDE 2 MG/1
2 CAPSULE ORAL EVERY 4 HOURS PRN
Status: DISCONTINUED | OUTPATIENT
Start: 2023-05-02 | End: 2023-05-03 | Stop reason: HOSPADM

## 2023-05-02 RX ADMIN — FAMOTIDINE 20 MG: 20 TABLET, FILM COATED ORAL at 18:01

## 2023-05-02 RX ADMIN — LOPERAMIDE HYDROCHLORIDE 2 MG: 2 CAPSULE ORAL at 11:02

## 2023-05-02 RX ADMIN — INSULIN LISPRO 5 UNITS: 100 INJECTION, SOLUTION INTRAVENOUS; SUBCUTANEOUS at 13:36

## 2023-05-02 RX ADMIN — MAGNESIUM SULFATE HEPTAHYDRATE 2 G: 40 INJECTION, SOLUTION INTRAVENOUS at 10:58

## 2023-05-02 RX ADMIN — FAMOTIDINE 20 MG: 20 TABLET, FILM COATED ORAL at 10:00

## 2023-05-02 RX ADMIN — Medication 2.5 MG: at 13:28

## 2023-05-02 RX ADMIN — GADOBUTROL 5 ML: 604.72 INJECTION INTRAVENOUS at 18:56

## 2023-05-02 RX ADMIN — ACETAMINOPHEN 650 MG: 325 TABLET ORAL at 11:02

## 2023-05-02 RX ADMIN — APIXABAN 5 MG: 5 TABLET, FILM COATED ORAL at 18:00

## 2023-05-02 RX ADMIN — APIXABAN 5 MG: 5 TABLET, FILM COATED ORAL at 10:00

## 2023-05-02 RX ADMIN — ATORVASTATIN CALCIUM 20 MG: 20 TABLET, FILM COATED ORAL at 18:01

## 2023-05-02 RX ADMIN — INSULIN LISPRO 5 UNITS: 100 INJECTION, SOLUTION INTRAVENOUS; SUBCUTANEOUS at 20:29

## 2023-05-02 NOTE — PLAN OF CARE
Problem: Nutrition/Hydration-ADULT  Goal: Nutrient/Hydration intake appropriate for improving, restoring or maintaining nutritional needs  Description: Monitor and assess patient's nutrition/hydration status for malnutrition  Collaborate with interdisciplinary team and initiate plan and interventions as ordered  Monitor patient's weight and dietary intake as ordered or per policy  Utilize nutrition screening tool and intervene as necessary  Determine patient's food preferences and provide high-protein, high-caloric foods as appropriate       INTERVENTIONS:  - Monitor oral intake, urinary output, labs, and treatment plans  - Assess nutrition and hydration status and recommend course of action  - Evaluate amount of meals eaten  - Assist patient with eating if necessary   - Allow adequate time for meals  - Recommend/ encourage appropriate diets, oral nutritional supplements, and vitamin/mineral supplements  - Order, calculate, and assess calorie counts as needed  - Recommend, monitor, and adjust tube feedings and TPN/PPN based on assessed needs  - Assess need for intravenous fluids  - Provide specific nutrition/hydration education as appropriate  - Include patient/family/caregiver in decisions related to nutrition  Outcome: Progressing     Problem: MOBILITY - ADULT  Goal: Maintain or return to baseline ADL function  Description: INTERVENTIONS:  -  Assess patient's ability to carry out ADLs; assess patient's baseline for ADL function and identify physical deficits which impact ability to perform ADLs (bathing, care of mouth/teeth, toileting, grooming, dressing, etc )  - Assess/evaluate cause of self-care deficits   - Assess range of motion  - Assess patient's mobility; develop plan if impaired  - Assess patient's need for assistive devices and provide as appropriate  - Encourage maximum independence but intervene and supervise when necessary  - Involve family in performance of ADLs  - Assess for home care needs following discharge   - Consider OT consult to assist with ADL evaluation and planning for discharge  - Provide patient education as appropriate  Outcome: Progressing     Problem: PAIN - ADULT  Goal: Verbalizes/displays adequate comfort level or baseline comfort level  Description: Interventions:  - Encourage patient to monitor pain and request assistance  - Assess pain using appropriate pain scale  - Administer analgesics based on type and severity of pain and evaluate response  - Implement non-pharmacological measures as appropriate and evaluate response  - Consider cultural and social influences on pain and pain management  - Notify physician/advanced practitioner if interventions unsuccessful or patient reports new pain  Outcome: Progressing

## 2023-05-02 NOTE — ASSESSMENT & PLAN NOTE
"Body mass index is 15 41 kg/m²  · Continue tube feed   · Nutrition input appreciated  Recommendation: \"Recommend transitioning to home TF regimen as clinically appropriate: Jevity 1 5 480 mL bolus TID at 0800, 1300, 1700  Recommend adding Prosource NoCarb once daily  \"    · Tube feeds now at goal   "

## 2023-05-02 NOTE — NURSING NOTE
ASLEEP  AWAKENS WHEN CALLED TOO  RESP EASY  NO DISTRESS  PEG TUBE MAINTAINED TO LUQ  NO DISTRESS  DENIES PAIN   WIFE IS HERE

## 2023-05-02 NOTE — ASSESSMENT & PLAN NOTE
· Unclear etiology, possibly due to volume depletion and/or related to patient's tube feeds  Resolved     · Patient received insulin/dextrose, albuterol  · Nephrology recommendations appreciated  · Daily BMP

## 2023-05-02 NOTE — PROGRESS NOTES
[address]  Progress Note  Name: Trang Caceres  MRN: [id number]  Unit/Bed#: -01 I Date of Admission: 4/26/2023   Date of Service: 5/2/2023 I Hospital Day: 6    Assessment/Plan   * Severe sepsis ([address] )  Assessment & Plan  · POA secondary to suspected pneumonia with hypothermia, tachycardia, tachypnea-- sepsis parameters much improved   · Chest x-ray: Infiltrate at the right lung base along with a small pleural effusion  · Procalcitonin 0 6 -> 0 48  · Blood cultures: NGTD  · Strep pneumo/urine Legionella negative  · Sputum culture: S aureus   · Sepsis parameters have much improved  · Completed 5 days of cefepime (5/1)  Vanco has been discontinued due to negative MRSA screen  Encephalopathy  Assessment & Plan  · No focal neurological deficits   · Wife reports started approx 48 hours ago  · Likely toxic metabolic encephalopathy due to multifactorial including underlying malignancy, malnutrition, receiving benzodiazepine  · Continue with frequent neuro checks   · Check TSH, vitamin D, B12, folate  · Obtain CT head    Diarrhea, unspecified  Assessment & Plan  · Diarrhea, possibly related to tube feeding  · C  difficile and stool enteric panel-negative  · Unfortunately, the patient continues to have diarrhea especially after receives boluses  As infectious cause is ruled out, will start on fiber and imodium  · Tube feeds is now back to his home regime- bolus 480 ml TID       Hyperkalemia  Assessment & Plan  · Unclear etiology, possibly due to volume depletion and/or related to patient's tube feeds  Resolved  · Patient received insulin/dextrose, albuterol  · Nephrology recommendations appreciated  · Daily BMP      Hypercalcemia of malignancy  Assessment & Plan  · Possibly secondary to patient's underlying malignancy  · This was treated  Ca level now is mildly low     · Improved with IV fluids  · Nephrology recommendations appreciated  · Continue with nutritional supplement "  · Monitor calcium      Pressure injury of skin of buttock  Assessment & Plan  · Present on admission  · Continue with local wound care        Acute respiratory failure with hypoxia (HCC)  Assessment & Plan  · Secondary to pneumonia     · This has resolved  Now on RA  · Overnight 4/28 he had an episode of severe respiratory distress  CTA chest PE study negative for PE  At that time patient verbalized that he would want CPR and to be on a ventilator if his respiratory status declined  Code status was addressed again after his mentation improved and he confirmed that he would not want compressions or to be on a ventilator  He also admits feeling confused the night he was having respiratory distress  Now is a DNR/DNI  Other pulmonary embolism without acute cor pulmonale (HCC)  Assessment & Plan  · Continue Eliquis      Severe protein-calorie malnutrition (HCC)  Assessment & Plan  Body mass index is 15 41 kg/m²  · Continue tube feed   · Nutrition input appreciated  Recommendation: \"Recommend transitioning to home TF regimen as clinically appropriate: Jevity 1 5 480 mL bolus TID at 0800, 1300, 1700  Recommend adding Prosource NoCarb once daily  \"    · Tube feeds now at goal     Primary malignant neoplasm of base of tongue [facility])  Assessment & Plan  · Patient was following with [facility] earlier in the year, however, has not had treatment recently as he was hospitalized   He has been in rehab for the last few weeks and malignancy treatment has been on hold for this reason   · Will need outpatient follow-up with hematology/oncology     Type 2 diabetes mellitus without complication, without long-term current use of insulin [facility])  Assessment & Plan  Lab Results   Component Value Date    HGBA1C 5 8 (H) 12/30/2022       Recent Labs     05/01/23  1611 05/01/23 2022 05/02/23  0154 05/02/23  0815   POCGLU 205* 256* 136 130       Blood Sugar Average: Last 72 hrs:  (P) 311 [id number]     · Continue tube " feeds  · Sliding scale insulin      Dysphagia, unspecified  Assessment & Plan  · PEG tube in place  · Speech input appreciated  Severe impairment during pharyngeal stage with significant aspiration risk  Possible micro aspiration  · Okay with pleasure feeds               VTE Prophylaxis:  Apixaban (Eliquis)    Patient Centered Rounds: I have performed bedside rounds with nursing staff today  Discussions with Specialists or Other Care Team Provider: LEONOR   Education and Discussions with Family / Patient: patient and wife via phone     Current Length of Stay: 6 day(s)    Current Patient Status: Inpatient   Certification Statement: The patient will continue to require additional inpatient hospital stay due to severe sepsis     Discharge Plan: pending clinical course  Hopeful discharge in 24 hours once diarrhea and mentation improve  Code Status: Level 3 - DNAR and DNI    Subjective:   Feeling okay  Patient is somewhat confused during our conversation but easily re-directed  Objective:     Vitals:   Temp (24hrs), Av 2 °F (37 3 °C), Min:98 6 °F (37 °C), Max:99 5 °F (37 5 °C)    Temp:  [98 6 °F (37 °C)-99 5 °F (37 5 °C)] 98 6 °F (37 °C)  HR:  [101-108] 107  Resp:  [18-20] 18  BP: ()/(57-61) 95/59  SpO2:  [89 %-97 %] 94 %  Body mass index is 15 41 kg/m²  Input and Output Summary (last 24 hours): Intake/Output Summary (Last 24 hours) at 2023 1118  Last data filed at 2023 0401  Gross per 24 hour   Intake 200 ml   Output 300 ml   Net -100 ml       Physical Exam:   Physical Exam  Vitals and nursing note reviewed  Constitutional:       General: He is not in acute distress  Appearance: Normal appearance  He is cachectic  HENT:      Head: Normocephalic and atraumatic  Right Ear: External ear normal       Left Ear: External ear normal       Nose: Nose normal       Mouth/Throat:      Mouth: Mucous membranes are moist       Pharynx: Oropharynx is clear     Eyes:      General: Right eye: No discharge  Left eye: No discharge  Extraocular Movements: Extraocular movements intact  Pupils: Pupils are equal, round, and reactive to light  Cardiovascular:      Rate and Rhythm: Normal rate and regular rhythm  Pulses: Normal pulses  Heart sounds: Normal heart sounds  No murmur heard  Pulmonary:      Effort: Pulmonary effort is normal  No respiratory distress  Breath sounds: Normal breath sounds  No wheezing or rales  Abdominal:      General: Bowel sounds are normal  There is no distension  Palpations: Abdomen is soft  There is no mass  Tenderness: There is no abdominal tenderness  Comments: PEG tube in place    Musculoskeletal:         General: No swelling, tenderness or deformity  Normal range of motion  Cervical back: Normal range of motion and neck supple  No rigidity  Skin:     General: Skin is warm and dry  Capillary Refill: Capillary refill takes less than 2 seconds  Coloration: Skin is not pale  Findings: No erythema  Neurological:      General: No focal deficit present  Mental Status: He is alert  Mental status is at baseline  He is disoriented  Comments: He knows that it is 2023 but unable to answer the month or date  Know current [place] president  Psychiatric:         Mood and Affect: Mood normal          Behavior: Behavior normal          Additional Data:     Labs:    Results from last 7 days   Lab Units 05/02/23  0513 05/01/23  1041 04/30/23  0515   WBC Thousand/uL 9 23   < > 7 88   HEMOGLOBIN g/dL 8 4*   < > 8 5*   HEMATOCRIT % 28 3*   < > 27 8*   PLATELETS Thousands/uL 207   < > 202   LYMPHO PCT %  --   --  6*   MONO PCT %  --   --  1*   EOS PCT %  --   --  0    < > = values in this interval not displayed       Results from last 7 days   Lab Units 05/02/23  0513 05/01/23  1041   SODIUM mmol/L 137 133*   POTASSIUM mmol/L 3 6 3 6   CHLORIDE mmol/L 99 98   CO2 mmol/L 31 30   BUN mg/dL 13 13   CREATININE mg/dL 0 51* 0 48*   CALCIUM mg/dL 8 6 8 2*   ALK PHOS U/L  --  114*   ALT U/L  --  64*   AST U/L  --  70*     Results from last 7 days   Lab Units 04/26/23  1051   INR  1 91*     Results from last 7 days   Lab Units 05/02/23  0815 05/02/23  0154 05/01/23  2022 05/01/23  1611 05/01/23  1403 05/01/23  0717 05/01/23  0208 04/30/23  2021 04/30/23  1545 04/30/23  1209 04/30/23  0617 04/29/23  1950   POC GLUCOSE mg/dl 130 136 256* 205* 379* 119 144* 148* 202* 231* 211* 170*           * I Have Reviewed All Lab Data Listed Above  * Additional Pertinent Lab Tests Reviewed: [address] [facility] Admission  Reviewed    Imaging:  Imaging Reports Reviewed Today Include: n/a     Recent Cultures (last 7 days):     Results from last 7 days   Lab Units 04/28/23  1852 04/27/23  1142 04/26/23  2155 04/26/23  1051   BLOOD CULTURE   --   --   --  No Growth After 5 Days  No Growth After 5 Days     SPUTUM CULTURE   --  2+ Growth of Staphylococcus aureus*  2+ Growth of  --   --    GRAM STAIN RESULT   --  1+ Epithelial Cells*  3+ Polys*  3+ Gram positive rods*  2+ Gram positive cocci in clusters*  1+ Gram negative rods*  --   --    LEGIONELLA URINARY ANTIGEN   --   --  Negative  --    C DIFF TOXIN B BY PCR  Negative  --   --   --        Last 24 Hours Medication List:   Current Facility-Administered Medications   Medication Dose Route Frequency Provider Last Rate    acetaminophen  650 mg Oral Q6H PRN Zuleika Aguirre MD      apixaban  5 mg Per PEG Tube BID Zuleika Aguirre MD      atorvastatin  20 mg Per PEG Tube Daily With Dinner Zuleika Aguirre MD      famotidine  20 mg Per G Tube BID Zuleika Aguirre MD      guaiFENesin  200 mg Per G Tube 4x Daily PRN Delight Linda, CRNP      insulin lispro  1-5 Units Subcutaneous Q6H Zuleika Aguirre MD      loperamide  2 mg Oral Q4H PRN Estella Pedlar, CRNP      magnesium sulfate  2 g Intravenous Once Estella Pedlar, CRNP 2 g (05/02/23 1058)    nystatin  500,000 Units Swish & Spit 4x Daily MOISES Alcantar      ondansetron  4 mg Intravenous Q6H PRN Ty Clark MD      psyllium  1 packet Oral Daily MOISES Mcintyre          Today, Patient Was Seen By: MOISES Mcintyre    ** Please Note: Dictation voice to text software may have been used in the creation of this document   **
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
## 2023-05-02 NOTE — ASSESSMENT & PLAN NOTE
· Patient was following with [facility] earlier in the year, however, has not had treatment recently as he was hospitalized   He has been in rehab for the last few weeks and malignancy treatment has been on hold for this reason   · Will need outpatient follow-up with hematology/oncology

## 2023-05-02 NOTE — ASSESSMENT & PLAN NOTE
· Diarrhea, possibly related to tube feeding  · C  difficile and stool enteric panel-negative  · Unfortunately, the patient continues to have diarrhea especially after receives boluses  As infectious cause is ruled out, will start on fiber and imodium     · Tube feeds is now back to his home regime- bolus 480 ml TID

## 2023-05-02 NOTE — PROGRESS NOTES
Progress Note - Nephrology   Tavo Subramanian 78 y o  male MRN: [id number]  Unit/Bed#: -01 Encounter: [id number]    A/P:  1   Hypercalcemia              GHHMNEP serum calcium levels are appropriate, continue to monitor for now  2   Hyperkalemia              Resolved, continue to check potassium levels from time to time  3   Hyponatremia              Sodium level improved up to 137 mmol/L, continue to monitor for now  4   Hypophosphatemia   Phosphorus levels have been improving, he received a 21 mmol infused unit of potassium phosphate yesterday, May 1  Continue to monitor for now given tube feeds  5   Severe sepsis             Continues to improve, remains on cefepime    6   Malignant neoplasm of the tongue    Follow up reason for today's visit: Multiple electrolyte abnormalities    Severe sepsis [facility])    Patient Active Problem List   Diagnosis    Anxiety    Neoplasm of uncertain behavior of skin    Post-traumatic stress disorder, unspecified    Essential hypertension    Actinic keratosis    Dysphagia, unspecified    Erectile dysfunction    History of malignant neoplasm of bladder    Cardiac arrhythmia    Carpal tunnel syndrome    Chronic post-traumatic stress disorder (PTSD) after  combat    Type 2 diabetes mellitus without complication, without long-term current use of insulin (HCC)    Heart murmur    Hematuria    Hyperlipidemia due to type 2 diabetes mellitus ([facility] [address] )    Left ventricular hypertrophy    Malignant neoplasm of urinary bladder (HCC)    Neoplasm of lung    Neoplasm of uncertain behavior of oropharynx    Osteoarthritis of knee    Panic disorder    Polyp of colon    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)    Severe protein-calorie malnutrition (HCC)    Severe sepsis (HCC)    Other pulmonary embolism without acute cor pulmonale (HCC)    Cholecystitis    Multiple fractures of rib involving four or more ribs-right    COVID    Acute respiratory failure "with hypoxia ([facility] [address] )    Thyroid nodule    History of head and neck radiation    Agent orange exposure    Subclinical hyperthyroidism    Syncope and collapse    Hypomagnesemia    Hyperlipemia    PNA (pneumonia)    Pressure injury of skin of buttock    Hydropneumothorax    Hypercalcemia of malignancy    Hyperkalemia    Abnormal PET of right lung    Choroidal nevus    Combined forms of age-related cataract of both eyes    Exposure to potentially hazardous substance    Hyperglycemia    Hyperopia    Presbyopia    Regular astigmatism of both eyes    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    Unspecified abnormalities of gait and mobility    Diarrhea, unspecified    Encephalopathy         Subjective:   No acute issues at this time, he continues to have loose bowel movements  Objective:     Vitals: Blood pressure 95/59, pulse (!) 107, temperature 98 6 °F (37 °C), resp  rate 18, height 6' 2\" (1 88 m), weight 54 4 kg (120 lb), SpO2 94 %  ,Body mass index is 15 41 kg/m²  Weight (last 2 days)     None            Intake/Output Summary (Last 24 hours) at 5/2/2023 1342  Last data filed at 5/2/2023 1245  Gross per 24 hour   Intake 880 ml   Output 300 ml   Net 580 ml     I/O last 3 completed shifts:   In: 640 [NG/GT:400; Feedings:240]  Out: 1300 [Urine:1300]         Physical Exam: BP 95/59   Pulse (!) 107   Temp 98 6 °F (37 °C)   Resp 18   Ht 6' 2\" (1 88 m)   Wt 54 4 kg (120 lb)   SpO2 94%   BMI 15 41 kg/m²     General Appearance:    Alert, cooperative, no distress, appears stated age   Head:    Normocephalic, without obvious abnormality, atraumatic   Eyes:    Conjunctiva/corneas clear   Ears:    Normal external ears   Nose:   Nares normal, septum midline, mucosa normal, no drainage    or sinus tenderness   Throat:   Lips, mucosa, and tongue normal; teeth and gums normal   Neck:   Supple   Back:     Symmetric, no curvature, ROM normal, no CVA tenderness   Lungs:     Clear to auscultation " bilaterally, respirations unlabored   Chest wall:    No tenderness or deformity   Heart:    Regular rate and rhythm, S1 and S2 normal, no murmur, rub   or gallop   Abdomen:     Soft, non-tender, bowel sounds active   Extremities:   Extremities normal, atraumatic, no cyanosis or edema   Skin:   Skin color, texture, turgor normal, no rashes or lesions   Lymph nodes:   Cervical normal   Neurologic:   CNII-XII intact            Lab, Imaging and other studies: I have personally reviewed pertinent labs  CBC:   Lab Results   Component Value Date    WBC 9 23 05/02/2023    HGB 8 4 (L) 05/02/2023    HCT 28 3 (L) 05/02/2023    MCV 89 05/02/2023     05/02/2023    MCH 26 4 (L) 05/02/2023    MCHC 29 7 (L) 05/02/2023    RDW 18 0 (H) 05/02/2023    MPV 9 2 05/02/2023     CMP:   Lab Results   Component Value Date    K 3 6 05/02/2023    CL 99 05/02/2023    CO2 31 05/02/2023    BUN 13 05/02/2023    CREATININE 0 51 (L) 05/02/2023    CALCIUM 8 6 05/02/2023    EGFR 105 05/02/2023         Results from last 7 days   Lab Units 05/02/23  0513 05/01/23  1041 04/30/23  0515 04/28/23  0552 04/26/23  1051   POTASSIUM mmol/L 3 6 3 6 3 5   < > 5 5*   CHLORIDE mmol/L 99 98 103   < > 93*   CO2 mmol/L 31 30 28   < > 29   BUN mg/dL 13 13 15   < > 32*   CREATININE mg/dL 0 51* 0 48* 0 39*   < > 0 67   CALCIUM mg/dL 8 6 8 2* 7 4*   < > 12 7*   ALK PHOS U/L  --  114* 78  --  123*   ALT U/L  --  64* 42  --  57*   AST U/L  --  70* 44*  --  33    < > = values in this interval not displayed           Phosphorus:   Lab Results   Component Value Date    PHOS 2 2 (L) 05/02/2023     Magnesium:   Lab Results   Component Value Date    MG 1 8 (L) 05/02/2023     Urinalysis: No results found for: Ashley Evens, SPECGRAV, PHUR, LEUKOCYTESUR, NITRITE, PROTEINUA, GLUCOSEU, KETONESU, BILIRUBINUR, BLOODU  Ionized Calcium: No results found for: CAION  Coagulation: No results found for: PT, INR, APTT  Troponin: No results found for: TROPONINI  ABG: No results found for: PHART, OPO8RNQ, PO2ART, ZRU4FIZ, Y6AKDSNR, BEART, SOURCE  Radiology review:     IMAGING  Procedure: CT head wo contrast    Result Date: 5/2/2023  Narrative: CT BRAIN - WITHOUT CONTRAST INDICATION:   Delirium delirium  COMPARISON:  None  TECHNIQUE:  CT examination of the brain was performed  Multiplanar 2D reformatted images were created from the source data  Radiation dose length product (DLP) for this visit:  852 99 mGy-cm   This examination, like all CT scans performed in the [facility], was performed utilizing techniques to minimize radiation dose exposure, including the use of iterative  reconstruction and automated exposure control  IMAGE QUALITY:  Diagnostic  FINDINGS: PARENCHYMA: Cerebral atrophy  No intracranial mass, mass effect or midline shift  No CT signs of acute infarction  No acute parenchymal hemorrhage  There is 0 8 cm ovoid hypodensity in the left cerebellum (series 2 image 13), new from prior exam  VENTRICLES AND EXTRA-AXIAL SPACES:  Normal for the patient's age  VISUALIZED ORBITS: Normal visualized orbits  PARANASAL SINUSES: Normal visualized paranasal sinuses  CALVARIUM AND EXTRACRANIAL SOFT TISSUES:  Normal      Impression: 1  No acute intracranial CT abnormality  2   Ovoid hypodensity in the left cerebellum, new from head CT 1/10/2020, may indicate focal encephalomalacia from chronic infarct; however, given discrete ovoid appearance recommend follow-up brain MRI without and with contrast to exclude lesional pathology  This study was marked in [facility] for notification and follow-up   Workstation performed: [id number]       Current Facility-Administered Medications   Medication Dose Route Frequency    acetaminophen (TYLENOL) tablet 650 mg  650 mg Oral Q6H PRN    apixaban (ELIQUIS) tablet 5 mg  5 mg Per PEG Tube BID    atorvastatin (LIPITOR) tablet 20 mg  20 mg Per PEG Tube Daily With Dinner    famotidine (PEPCID) tablet 20 mg  20 mg Per G Tube BID    guaiFENesin (ROBITUSSIN) oral liquid 200 mg  200 mg Per G Tube 4x Daily PRN    insulin lispro (HumaLOG) 100 units/mL subcutaneous injection 1-5 Units  1-5 Units Subcutaneous Q6H    loperamide (IMODIUM) capsule 2 mg  2 mg Oral Q4H PRN    nystatin (MYCOSTATIN) oral suspension 500,000 Units  500,000 Units Swish & Spit 4x Daily    ondansetron (ZOFRAN) injection 4 mg  4 mg Intravenous Q6H PRN    oxyCODONE (ROXICODONE) split tablet 2 5 mg  2 5 mg Oral Q8H PRN    psyllium (METAMUCIL) 1 packet  1 packet Oral Daily     Medications Discontinued During This Encounter   Medication Reason    cefepime (MAXIPIME) IVPB (premix in dextrose) 2,000 mg 50 mL     vancomycin (VANCOCIN) IVPB (premix in dextrose) 750 mg 150 mL     apixaban (ELIQUIS) tablet 5 mg     atorvastatin (LIPITOR) tablet 20 mg     famotidine (PEPCID) tablet 20 mg     vancomycin (VANCOCIN) 1,250 mg in sodium chloride 0 9 % 250 mL IVPB     vancomycin (VANCOCIN) 1,250 mg in sodium chloride 0 9 % 250 mL IVPB     guaiFENesin (MUCINEX) 12 hr tablet 600 mg     calcitonin (salmon) (MIACALCIN) injection 218 Units     vancomycin (VANCOCIN) 1500 mg in sodium chloride 0 9% 250 mL IVPB     vancomycin (VANCOCIN) 1,750 mg in sodium chloride 0 9 % 500 mL IVPB     vancomycin (VANCOCIN) IVPB (premix in dextrose) 1,000 mg 200 mL     magnesium sulfate 4 g/100 mL IVPB (premix) 4 g     LORazepam (ATIVAN) oral concentrated solution 0 5 mg     lactated ringers bolus 1,000 mL     cefepime (MAXIPIME) IVPB (premix in dextrose) 2,000 mg 50 mL     sodium chloride 0 9 % infusion     LORazepam (ATIVAN) oral concentrated solution 0 26 mg        Nichelle Scotty, DO      This progress note was produced in part using a dictation device which may document imprecise wording from author's original intent

## 2023-05-02 NOTE — ASSESSMENT & PLAN NOTE
Lab Results   Component Value Date    HGBA1C 5 8 (H) 12/30/2022       Recent Labs     05/01/23  1611 05/01/23 2022 05/02/23  0154 05/02/23  0815   POCGLU 205* 256* 136 130       Blood Sugar Average: Last 72 hrs:  (P) 691 [id number]     · Continue tube feeds  · Sliding scale insulin

## 2023-05-02 NOTE — ASSESSMENT & PLAN NOTE
· POA secondary to suspected pneumonia with hypothermia, tachycardia, tachypnea-- sepsis parameters much improved   · Chest x-ray: Infiltrate at the right lung base along with a small pleural effusion  · Procalcitonin 0 6 -> 0 48  · Blood cultures: NGTD  · Strep pneumo/urine Legionella negative  · Sputum culture: S aureus   · Sepsis parameters have much improved  · Completed 5 days of cefepime (5/1)  Vanco has been discontinued due to negative MRSA screen

## 2023-05-02 NOTE — ASSESSMENT & PLAN NOTE
· No focal neurological deficits   · Wife reports started approx 48 hours ago  · Likely toxic metabolic encephalopathy due to multifactorial including underlying malignancy, malnutrition, receiving benzodiazepine  · Continue with frequent neuro checks   · Check TSH, vitamin D, B12, folate  · Obtain CT head

## 2023-05-02 NOTE — CONSULTS
Consultation - Palliative and Supportive Care   Karen Griffin 78 y o  male [id number]  Assessment  · Primary malignant neoplasm of tongue  · Acute respiratory failure with hypoxia  · Severe sepsis  · Severe protein-calorie malnutrition  · Palliative care consult  · Goals of care counseling    Plan  1  Symptom management   Defer to primary treatment team    2  Goals  Level 3 code status  Disease focused care  DNR/DNI limits placed   Patient and wife both want to continue current treatments and attempt rehab once more to see if patient can improve and restart cancer treatments   Wife is familiar with hospice/comfort approach and is open to further discussions pending patients clinical course progression   MRI of brain pending to investigate encephalopathy, metabolic v  Malignancy   [place]ed [place] 169  Donta Jj (wife) and adult children from previous marriage (2 sons) are to have equal say regarding medical decision making  3   Social support   Patient is supported by his wife Donta Jj who he has been  to for over 25 years   He was  previously and has 2 sons from previous marriage and one daughter form current marriage   Wife reports family communicates well and they have been on the same page as for his cares and that they defer to Donta Jj for medical decision making  · Supportive listening provided  · Normalized experience of patient/family  · Provided anxiety containment  · Provided anticipatory guidance    4  Follow up  [address] will continue to follow for goals of care discussions that are pending patients disease course  Will be available in hospital this Thursday to follow up if patient not yet discharged  [place] Ph[place] Offered wife contact information for questions and option for outpatient follow up if she would like   Please reach out to on-call provider via [employer] if questions or concerns arise                I have reviewed the patient's controlled substance dispensing history in the Prescription Drug Monitoring Program in compliance with the [facility] regulations before prescribing any controlled substances  Decisional apparatus:  Patient is not competent on exam today  Patient's substitute decision maker would default to current spouse and adult children from previous marriage by [place] 169  Advance Directive/Living Will: none  POLST: none  POA Forms: none    We appreciate the invitation to be involved in this patient's care  We will continue to follow throughout this hospitalization  Please do not hesitate to reach our on call provider through our clinic answering service at  should you have acute symptom control concerns  Jean Rawls PA-C  Palliative and Supportive Care  Clinic/Answering Service: [phone]  You can find me on [employer]! 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  He was brought to [facility] on 04/26/2023 secondary to shortness of breath and weakness from [place] rehab  Patient previously on chemotherapy that is currently on hold due to recent hospitalization  Patient sees [facility] oncology for cancer treatments  He receives tube feeds and is dependent on this per wife since he lost ability to swallow s/p cancer treatments  He was found to have aspiration pneumonia and treated with iv abx  Palliative consulted to introduce our services and to discuss goals of care  Patient was more alert this morning during our visit and was able to verbalize that he is determined to get better and continue rehab and his cancer treatments  Second visit to the patient's room to meet with the family he was lethargic and had just received oxycodone for his back pain   There is concern patient could have disease spread to the brain leading to his encephalopathy and there is an MRI ordered of the brain  Patient has wavering mental status and wife Pedro has been guiding his medical decisions  She states he has worsened over the past few days  Initially on admission to the hospital the family was considering hospice  The wife is familiar with hospice and believes this may be a conversation she would like to have if the patient fails to improve or is unable to rehab  She states his cancer treatments were palliative to begin with as their oncology team said there was no hope for cure  The wife reports having one daughter and that he has two other sons from a previous marriage  In the absence of AD/living will, discussed hierarchy of decision making per [place] 169  She says the family is on the same page as far as the patients current cares and that the family defers to her for decision making       Review of Systems   Unable to perform ROS: Mental status change     Past Medical History:   Diagnosis Date    Cancer ([facility] [address] )     Diabetes ([facility] [address] )     Gastritis     GERD (gastroesophageal reflux disease)     History of bladder cancer 2010    treated with surgery    Hypercholesterolemia     Hypertension     Hyponatremia 12/22/2021    Lactic acidosis 12/22/2021     Past Surgical History:   Procedure Laterality Date    BLADDER TUMOR EXCISION      EGD      IR CHOLECYSTOSTOMY TUBE CHECK/CHANGE/REPOSITION/REINSERTION/UPSIZE  1/5/2022    IR CHOLECYSTOSTOMY TUBE CHECK/CHANGE/REPOSITION/REINSERTION/UPSIZE  3/4/2022    IR CHOLECYSTOSTOMY TUBE CHECK/CHANGE/REPOSITION/REINSERTION/UPSIZE  3/24/2022    IR CHOLECYSTOSTOMY TUBE PLACEMENT  12/27/2021    IR THORACENTESIS  3/23/2023     Social History     Socioeconomic History    Marital status: /Civil Union     Spouse name: Not on file    Number of children: Not on file    Years of education: Not on file    Highest education level: Not on file   Occupational History    Not on file Tobacco Use    Smoking status: Former     Types: Cigarettes     Quit date: 2021     Years since quittin 6     Passive exposure: Past    Smokeless tobacco: Never   Vaping Use    Vaping Use: Never used   Substance and Sexual Activity    Alcohol use: Never    Drug use: Never    Sexual activity: Not Currently   Other Topics Concern    Not on file   Social History Narrative    Not on file     Social Determinants of Health     Financial Resource Strain: Not on file   Food Insecurity: No Food Insecurity    Worried About Running Out of Food in the Last Year: Never true    [address] in the Last Year: Never true   Transportation Needs: No Transportation Needs    Lack of Transportation (Medical): No    Lack of Transportation (Non-Medical):  No   Physical Activity: Not on file   Stress: Not on file   Social Connections: Not on file   Intimate Partner Violence: Not on file   Housing Stability: Low Risk     Unable to Pay for Housing in the Last Year: No    Number of Places Lived in the Last Year: 1    Unstable Housing in the Last Year: No     Family History   Problem Relation Age of Onset    Cancer Mother     Melanoma Neg Hx        Medications:  all current active meds have been reviewed and current meds:   Current Facility-Administered Medications   Medication Dose Route Frequency    acetaminophen (TYLENOL) tablet 650 mg  650 mg Oral Q6H PRN    apixaban (ELIQUIS) tablet 5 mg  5 mg Per PEG Tube BID    atorvastatin (LIPITOR) tablet 20 mg  20 mg Per PEG Tube Daily With Dinner    famotidine (PEPCID) tablet 20 mg  20 mg Per G Tube BID    guaiFENesin (ROBITUSSIN) oral liquid 200 mg  200 mg Per G Tube 4x Daily PRN    insulin lispro (HumaLOG) 100 units/mL subcutaneous injection 1-5 Units  1-5 Units Subcutaneous Q6H    loperamide (IMODIUM) capsule 2 mg  2 mg Oral Q4H PRN    nystatin (MYCOSTATIN) oral suspension 500,000 Units  500,000 Units Swish & Spit 4x Daily    ondansetron (ZOFRAN) injection 4 "mg  4 mg Intravenous Q6H PRN    oxyCODONE (ROXICODONE) split tablet 2 5 mg  2 5 mg Oral Q8H PRN    psyllium (METAMUCIL) 1 packet  1 packet Oral Daily       No Known Allergies    Objective:  /58   Pulse 103   Temp 98 4 °F (36 9 °C)   Resp 18   Ht 6' 2\" (1 88 m)   Wt 54 4 kg (120 lb)   SpO2 99%   BMI 15 41 kg/m²     Physical Exam    Lab Results:   I have personally reviewed pertinent labs  , CBC:   Lab Results   Component Value Date    WBC 9 23 05/02/2023    HGB 8 4 (L) 05/02/2023    HCT 28 3 (L) 05/02/2023    MCV 89 05/02/2023     05/02/2023    MCH 26 4 (L) 05/02/2023    MCHC 29 7 (L) 05/02/2023    RDW 18 0 (H) 05/02/2023    MPV 9 2 05/02/2023   , CMP:   Lab Results   Component Value Date    SODIUM 137 05/02/2023    K 3 6 05/02/2023    CL 99 05/02/2023    CO2 31 05/02/2023    BUN 13 05/02/2023    CREATININE 0 51 (L) 05/02/2023    CALCIUM 8 6 05/02/2023    EGFR 105 05/02/2023       Imaging Studies: I have personally reviewed pertinent reports  XR chest portable  Result Date: 4/27/2023  Impression: Infiltrate at the right lung base along with a small pleural effusion  CT head wo contrast  Result Date: 5/2/2023  Impression: 1  No acute intracranial CT abnormality  2   Ovoid hypodensity in the left cerebellum, new from head CT 1/10/2020, may indicate focal encephalomalacia from chronic infarct; however, given discrete ovoid appearance recommend follow-up brain MRI without and with contrast to exclude lesional pathology  CTA chest pe study  Result Date: 4/28/2023  Impression: 1  Diffuse aspiration or endobronchial pneumonia throughout the lungs, similar to the prior exam  2  Stable airspace consolidation and atelectasis in the right lower lobe  3  Stable small right hydropneumothorax  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  4  No evidence of acute pulmonary embolus  Previously visualized emboli in left lower lobe and lingula segmental and subsegmental bronchi have resolved       EKG, Pathology, and " "Other Studies: I have personally reviewed pertinent reports  Counseling / Coordination of Care  Total floor / unit time spent today 30 minutes  Greater than 50% of total time was spent with the patient and / or family counseling and / or coordination of care  A description of the counseling / coordination of care: Reviewed chart,  determined goals of care, discussed palliative care, discussed comfort care and hospice care, discussed code status, provided anticipatory guidance, determined competency and POA/HCA, determined social/family support, provided psychosocial support  Reviewed with SLIM  Portions of this document may have been created using dictation software and as such some \"sound alike\" terms may have been generated by the system  Do not hesitate to contact me with any questions or clarifications      "

## 2023-05-02 NOTE — PLAN OF CARE
Problem: Nutrition/Hydration-ADULT  Goal: Nutrient/Hydration intake appropriate for improving, restoring or maintaining nutritional needs  Description: Monitor and assess patient's nutrition/hydration status for malnutrition  Collaborate with interdisciplinary team and initiate plan and interventions as ordered  Monitor patient's weight and dietary intake as ordered or per policy  Utilize nutrition screening tool and intervene as necessary  Determine patient's food preferences and provide high-protein, high-caloric foods as appropriate       INTERVENTIONS:  - Monitor oral intake, urinary output, labs, and treatment plans  - Assess nutrition and hydration status and recommend course of action  - Evaluate amount of meals eaten  - Assist patient with eating if necessary   - Allow adequate time for meals  - Recommend/ encourage appropriate diets, oral nutritional supplements, and vitamin/mineral supplements  - Order, calculate, and assess calorie counts as needed  - Recommend, monitor, and adjust tube feedings and TPN/PPN based on assessed needs  - Assess need for intravenous fluids  - Provide specific nutrition/hydration education as appropriate  - Include patient/family/caregiver in decisions related to nutrition  Outcome: Progressing     Problem: MOBILITY - ADULT  Goal: Maintain or return to baseline ADL function  Description: INTERVENTIONS:  -  Assess patient's ability to carry out ADLs; assess patient's baseline for ADL function and identify physical deficits which impact ability to perform ADLs (bathing, care of mouth/teeth, toileting, grooming, dressing, etc )  - Assess/evaluate cause of self-care deficits   - Assess range of motion  - Assess patient's mobility; develop plan if impaired  - Assess patient's need for assistive devices and provide as appropriate  - Encourage maximum independence but intervene and supervise when necessary  - Involve family in performance of ADLs  - Assess for home care needs following discharge   - Consider OT consult to assist with ADL evaluation and planning for discharge  - Provide patient education as appropriate  Outcome: Progressing  Goal: Maintains/Returns to pre admission functional level  Description: INTERVENTIONS:  - Perform BMAT or MOVE assessment daily    - Set and communicate daily mobility goal to care team and patient/family/caregiver  - Collaborate with rehabilitation services on mobility goals if consulted  - Reposition patient every 2 hours    - Record patient progress and toleration of activity level   Outcome: Progressing     Problem: PAIN - ADULT  Goal: Verbalizes/displays adequate comfort level or baseline comfort level  Description: Interventions:  - Encourage patient to monitor pain and request assistance  - Assess pain using appropriate pain scale  - Administer analgesics based on type and severity of pain and evaluate response  - Implement non-pharmacological measures as appropriate and evaluate response  - Consider cultural and social influences on pain and pain management  - Notify physician/advanced practitioner if interventions unsuccessful or patient reports new pain  Outcome: Progressing     Problem: INFECTION - ADULT  Goal: Absence or prevention of progression during hospitalization  Description: INTERVENTIONS:  - Assess and monitor for signs and symptoms of infection  - Monitor lab/diagnostic results  - Monitor all insertion sites, i e  indwelling lines, tubes, and drains  - Monitor endotracheal if appropriate and nasal secretions for changes in amount and color  - [place] appropriate cooling/warming therapies per order  - Administer medications as ordered  - Instruct and encourage patient and family to use good hand hygiene technique  - Identify and instruct in appropriate isolation precautions for identified infection/condition  Outcome: Progressing     Problem: SAFETY ADULT  Goal: Maintain or return to baseline ADL function  Description: INTERVENTIONS:  -  Assess patient's ability to carry out ADLs; assess patient's baseline for ADL function and identify physical deficits which impact ability to perform ADLs (bathing, care of mouth/teeth, toileting, grooming, dressing, etc )  - Assess/evaluate cause of self-care deficits   - Assess range of motion  - Assess patient's mobility; develop plan if impaired  - Assess patient's need for assistive devices and provide as appropriate  - Encourage maximum independence but intervene and supervise when necessary  - Involve family in performance of ADLs  - Assess for home care needs following discharge   - Consider OT consult to assist with ADL evaluation and planning for discharge  - Provide patient education as appropriate  Outcome: Progressing  Goal: Maintains/Returns to pre admission functional level  Description: INTERVENTIONS:  - Perform BMAT or MOVE assessment daily    - Set and communicate daily mobility goal to care team and patient/family/caregiver  - Collaborate with rehabilitation services on mobility goals if consulted  - Reposition patient every 2 hours    - Record patient progress and toleration of activity level   Outcome: Progressing  Goal: Patient will remain free of falls  Description: INTERVENTIONS:  - Educate patient/family on patient safety including physical limitations  - Instruct patient to call for assistance with activity   - Consult OT/PT to assist with strengthening/mobility   - Keep Call bell within reach  - Keep bed low and locked with side rails adjusted as appropriate  - Keep care items and personal belongings within reach  - Initiate and maintain comfort rounds  - Make Fall Risk Sign visible to staff  - Offer Toileting every 2 Hours, in advance of need  - Initiate/Maintain bed alarm  - Apply yellow socks and bracelet for high fall risk patients  - Consider moving patient to room near nurses station  Outcome: Progressing     Problem: DISCHARGE PLANNING  Goal: Discharge to home or other facility with appropriate resources  Description: INTERVENTIONS:  - Identify barriers to discharge w/patient and caregiver  - Arrange for needed discharge resources and transportation as appropriate  - Identify discharge learning needs (meds, wound care, etc )  - Arrange for interpretive services to assist at discharge as needed  - Refer to Case Management Department for coordinating discharge planning if the patient needs post-hospital services based on physician/advanced practitioner order or complex needs related to functional status, cognitive ability, or social support system  Outcome: Progressing     Problem: Knowledge Deficit  Goal: Patient/family/caregiver demonstrates understanding of disease process, treatment plan, medications, and discharge instructions  Description: Complete learning assessment and assess knowledge base    Interventions:  - Provide teaching at level of understanding  - Provide teaching via preferred learning methods  Outcome: Progressing     Problem: RESPIRATORY - ADULT  Goal: Achieves optimal ventilation and oxygenation  Description: INTERVENTIONS:  - Assess for changes in respiratory status  - Assess for changes in mentation and behavior  - Position to facilitate oxygenation and minimize respiratory effort  - Oxygen administered by appropriate delivery if ordered  - Initiate smoking cessation education as indicated  - Encourage broncho-pulmonary hygiene including cough, deep breathe, Incentive Spirometry  - Assess the need for suctioning and aspirate as needed  - Assess and instruct to report SOB or any respiratory difficulty  - Respiratory Therapy support as indicated  Outcome: Progressing     Problem: Prexisting or High Potential for Compromised Skin Integrity  Goal: Skin integrity is maintained or improved  Description: INTERVENTIONS:  - Identify patients at risk for skin breakdown  - Assess and monitor skin integrity  - Assess and monitor nutrition and hydration status  - Monitor labs   - Assess for incontinence   - Turn and reposition patient  - Assist with mobility/ambulation  - Relieve pressure over bony prominences  - Avoid friction and shearing  - Provide appropriate hygiene as needed including keeping skin clean and dry  - Evaluate need for skin moisturizer/barrier cream  - Collaborate with interdisciplinary team   - Patient/family teaching  - Consider wound care consult   Outcome: Progressing

## 2023-05-02 NOTE — ASSESSMENT & PLAN NOTE
· Secondary to pneumonia     · This has resolved  Now on RA  · Overnight 4/28 he had an episode of severe respiratory distress  CTA chest PE study negative for PE  At that time patient verbalized that he would want CPR and to be on a ventilator if his respiratory status declined  Code status was addressed again after his mentation improved and he confirmed that he would not want compressions or to be on a ventilator  He also admits feeling confused the night he was having respiratory distress  Now is a DNR/DNI

## 2023-05-03 ENCOUNTER — APPOINTMENT (INPATIENT)
Dept: CT IMAGING | Facility: HOSPITAL | Age: 78
End: 2023-05-03

## 2023-05-03 VITALS
TEMPERATURE: 98.3 F | WEIGHT: 120 LBS | BODY MASS INDEX: 15.4 KG/M2 | HEART RATE: 110 BPM | HEIGHT: 74 IN | SYSTOLIC BLOOD PRESSURE: 125 MMHG | DIASTOLIC BLOOD PRESSURE: 71 MMHG | OXYGEN SATURATION: 98 % | RESPIRATION RATE: 17 BRPM

## 2023-05-03 PROBLEM — I63.9 CVA (CEREBRAL VASCULAR ACCIDENT) (HCC): Status: ACTIVE | Noted: 2023-05-03

## 2023-05-03 LAB
ANION GAP SERPL CALCULATED.3IONS-SCNC: 2 MMOL/L (ref 4–13)
BUN SERPL-MCNC: 18 MG/DL (ref 5–25)
CALCIUM SERPL-MCNC: 8.6 MG/DL (ref 8.4–10.2)
CHLORIDE SERPL-SCNC: 101 MMOL/L (ref 96–108)
CHOLEST SERPL-MCNC: 50 MG/DL
CO2 SERPL-SCNC: 34 MMOL/L (ref 21–32)
CREAT SERPL-MCNC: 0.58 MG/DL (ref 0.6–1.3)
GFR SERPL CREATININE-BSD FRML MDRD: 99 ML/MIN/1.73SQ M
GLUCOSE SERPL-MCNC: 187 MG/DL (ref 65–140)
GLUCOSE SERPL-MCNC: 204 MG/DL (ref 65–140)
GLUCOSE SERPL-MCNC: 260 MG/DL (ref 65–140)
GLUCOSE SERPL-MCNC: 308 MG/DL (ref 65–140)
GLUCOSE SERPL-MCNC: 351 MG/DL (ref 65–140)
HDLC SERPL-MCNC: 21 MG/DL
LDLC SERPL CALC-MCNC: 16 MG/DL (ref 0–100)
POTASSIUM SERPL-SCNC: 3.7 MMOL/L (ref 3.5–5.3)
SODIUM SERPL-SCNC: 137 MMOL/L (ref 135–147)
TRIGL SERPL-MCNC: 66 MG/DL

## 2023-05-03 RX ORDER — ASPIRIN 81 MG/1
81 TABLET, CHEWABLE ORAL DAILY
Status: DISCONTINUED | OUTPATIENT
Start: 2023-05-03 | End: 2023-05-03

## 2023-05-03 RX ORDER — LOPERAMIDE HYDROCHLORIDE 2 MG/1
2 CAPSULE ORAL DAILY
Qty: 30 CAPSULE | Refills: 0
Start: 2023-05-03

## 2023-05-03 RX ORDER — ATORVASTATIN CALCIUM 40 MG/1
40 TABLET, FILM COATED ORAL EVERY EVENING
Status: DISCONTINUED | OUTPATIENT
Start: 2023-05-03 | End: 2023-05-03

## 2023-05-03 RX ORDER — INSULIN LISPRO 100 [IU]/ML
3 INJECTION, SOLUTION INTRAVENOUS; SUBCUTANEOUS
Status: DISCONTINUED | OUTPATIENT
Start: 2023-05-03 | End: 2023-05-03 | Stop reason: HOSPADM

## 2023-05-03 RX ADMIN — ASPIRIN 81 MG 81 MG: 81 TABLET ORAL at 08:43

## 2023-05-03 RX ADMIN — INSULIN LISPRO 3 UNITS: 100 INJECTION, SOLUTION INTRAVENOUS; SUBCUTANEOUS at 02:14

## 2023-05-03 RX ADMIN — INSULIN LISPRO 3 UNITS: 100 INJECTION, SOLUTION INTRAVENOUS; SUBCUTANEOUS at 12:44

## 2023-05-03 RX ADMIN — INSULIN LISPRO 1 UNITS: 100 INJECTION, SOLUTION INTRAVENOUS; SUBCUTANEOUS at 09:16

## 2023-05-03 RX ADMIN — INSULIN LISPRO 3 UNITS: 100 INJECTION, SOLUTION INTRAVENOUS; SUBCUTANEOUS at 09:16

## 2023-05-03 RX ADMIN — INSULIN LISPRO 2 UNITS: 100 INJECTION, SOLUTION INTRAVENOUS; SUBCUTANEOUS at 14:35

## 2023-05-03 RX ADMIN — Medication 1 PACKET: at 08:43

## 2023-05-03 RX ADMIN — LOPERAMIDE HYDROCHLORIDE 2 MG: 2 CAPSULE ORAL at 10:39

## 2023-05-03 RX ADMIN — APIXABAN 5 MG: 5 TABLET, FILM COATED ORAL at 08:43

## 2023-05-03 RX ADMIN — IOHEXOL 85 ML: 350 INJECTION, SOLUTION INTRAVENOUS at 13:19

## 2023-05-03 RX ADMIN — FAMOTIDINE 20 MG: 20 TABLET, FILM COATED ORAL at 08:43

## 2023-05-03 RX ADMIN — ATORVASTATIN CALCIUM 40 MG: 40 TABLET, FILM COATED ORAL at 00:30

## 2023-05-03 NOTE — NURSING NOTE
Pt DC to [facility] via [address] [facility] in stable condition  All belongings packed and sent with pt  AVS reviewed and sent

## 2023-05-03 NOTE — ASSESSMENT & PLAN NOTE
His stroke is extremely tiny and would not be expected to cause generalized encephalopathy  This does remain possible considering his frailty otherwise, but higher suspicion is for metabolic versus toxic encephalopathy versus hospital-acquired delirium    Recommend ongoing delirium precautions, frequent reorientation when necessary  As much as possible spend time out of bed during the day with lights on and window shades up to help maintain orientation    Avoid overnight interruptions to sleep as much as possible

## 2023-05-03 NOTE — QUICK NOTE
CVA (cerebral vascular accident) [facility])  Assessment & Plan  Notified by radiology department regarding MRI results  Stroke pathway ordered  Asa 81mg peg  Atorvastatin increased to 40mg  MRI: Punctate right posterior cerebellar focus of acute ischemia  Additional punctate focus of left postcentral gyrus parietal lobe high vertex acute ischemia  Old left cerebellar infarct as seen on CT

## 2023-05-03 NOTE — TELEMEDICINE
TeleConsultation - Neurology   Tavo Subramanian 78 y o  male MRN: [id number]  Unit/Bed#: -01 Encounter: [id number]        REQUIRED DOCUMENTATION:     1  This service was provided via Telemedicine  2  Provider located at [facility]  3  TeleMed provider: Sophia Null MD   4  Identify all parties in room with patient during tele consult: Frank Arely (wife), Onur Hernandez (RN)  5  Patient was then informed that this was a Telemedicine visit and that the exam was being conducted confidentially over secure lines  My office door was closed  No one else was in the room  Patient acknowledged consent and understanding of privacy and security of the Telemedicine visit, and gave us permission to have the assistant stay in the room in order to assist with the history and to conduct the exam   I informed the patient that I have reviewed their record in Epic and presented the opportunity for them to ask any questions regarding the visit today  The patient agreed to participate  Assessment/Plan     CVA (cerebral vascular accident) [facility])  Assessment & Plan  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 occult atrial fibrillation cannot be    -Agree with PT/OT/SP  -Maintain adequate nutrition and hydration utilizing PEG tube and swallow whenever possible would recommend transitioning Eliquis at this time  Consideration could be given to full dose Lovenox  Additional considerations could include Xarelto, 20 mg once daily with food, or Pradaxa 150 mg twice daily    There are no adequate trials to suggest that 1 particular agent would be preferred over the other in this setting other than the data suggesting that full dose Lovenox may be preferred in some patients with hypercoagulable state of malignant although injections twice per day can be uncomfortable and may not be ideal   -Given his very low cholesterol and degree of protein calorie malnutrition do not suggest placing him on a statin medication for stroke prevention  Furthermore, if aspirin was started this admission for stroke prevention can be discontinued at the time of his medication transition at the discretion of the primary medical team   If he does need for cardiovascular protection otherwise it would not be unreasonable to maintain it    Encephalopathy  Assessment & Plan  His stroke is extremely tiny and would not be expected to cause generalized encephalopathy  This does remain possible considering his frailty otherwise, but higher suspicion is for metabolic versus toxic encephalopathy versus hospital-acquired delirium    Recommend ongoing delirium precautions, frequent reorientation when necessary  As much as possible spend time out of bed during the day with lights on and window shades up to help maintain orientation  Avoid overnight interruptions to sleep as much as possible    Type 2 diabetes mellitus without complication, without long-term current use of insulin [facility])  Assessment & Plan  Lab Results   Component Value Date    HGBA1C 5 8 (H) 12/30/2022       Recent Labs     05/02/23 2012 05/03/23  0213 05/03/23  0757 05/03/23  1241   POCGLU 450* 308* 187* 351*       Blood Sugar Average: Last 72 hrs:  (P) 241 875     In the setting of recent/acute ischemic stroke would recommend maintaining blood sugar less than 180 if possible  Depending on his clinical state consider targeting hemoglobin A1c of less than 7%        Karen Griffin will need follow up in in 6 weeks with neurovascular attending or advance practitioner or resident clinic  He will not require outpatient neurological testing  History of Present Illness     Reason for Consult / Principal Problem: Stroke  Hx and PE limited by: none  HPI: Karen Griffin is a 78 y o   male who presents with stroke    He has some "vascular risk factors as described below including diabetes and a history of malignancy  He has received chemotherapy and reports a remote history of radiation treatments a few years ago  He is currently a resident at [facility]" and was sent over to the hospital after feeling a lot of generalized weakness and overall unwell  He was admitted and treated for sepsis along with significant protein calorie malnutrition  He completed a course of antibiotics  He was found as of Saturday 4/29 to have a degree of confusion/encephalopathy  His wife reports that he did not have confusion/encephalopathy with his prior admissions in spite of being quite ill  A noncontrast head CT was performed which showed a new hypodensity in the left cerebellum which was not noted to be present previously  As result an MRI was requested which showed the area in the right cerebellum to be chronic, however there is a tiny acute ischemic stroke in the left frontoparietal cortex  Otherwise he has very minimal microvascular disease on his MRI  The patient reports feeling reasonably well  He has some dysarthric speech which is attributed in part to dry mouth  He has a PEG tube in place that has been in place for quite some time  He has been on Eliquis and reports no recent interruptions in the medication  He did not endorse symptoms of aphasia, hemiparesis, hemisensory changes, vision loss, and did not endorse recent fevers/rashes  He does report some bruising, particularly when he falls  Subsequent to my initial evaluation a CT angiogram of the head and neck vasculopathy in either the intracranial or extracranial vessels on the left and side that could explain his stroke  That has now been performed  I personally reviewed the study which does not show any evidence of significant stenosis/atherosclerosis or vasculopathy in the intracranial or extracranial circulation    Bearing this in mind his strokes are potentially related " to either hypercoagulable state of malignancy or occult atrial fibrillation or both  Would characterize this as an Eliquis failure  Inpatient consult to Neurology  Consult performed by: Jeni Engle MD  Consult ordered by:  Tarun Lambert PA-C           Review of Systems    Historical Information   Past Medical History:   Diagnosis Date    [facility]     [facility] )     Gastritis     GERD (gastroesophageal reflux disease)     History of bladder cancer     treated with surgery    Hypercholesterolemia     Hypertension     Hyponatremia 2021    Lactic acidosis 2021     Past Surgical History:   Procedure Laterality Date    BLADDER TUMOR EXCISION      EGD      IR CHOLECYSTOSTOMY TUBE CHECK/CHANGE/REPOSITION/REINSERTION/UPSIZE  2022    IR CHOLECYSTOSTOMY TUBE CHECK/CHANGE/REPOSITION/REINSERTION/UPSIZE  3/4/2022    IR CHOLECYSTOSTOMY TUBE CHECK/CHANGE/REPOSITION/REINSERTION/UPSIZE  3/24/2022    IR CHOLECYSTOSTOMY TUBE PLACEMENT  2021    IR THORACENTESIS  3/23/2023     Social History   Social History     Substance and Sexual Activity   Alcohol Use Never     Social History     Substance and Sexual Activity   Drug Use Never     E-Cigarette/Vaping    E-Cigarette Use Never User      E-Cigarette/Vaping Substances    Nicotine No     THC No     CBD No     Flavoring No     Other No     Unknown No      Social History     Tobacco Use   Smoking Status Former    Types: Cigarettes    Quit date: 2021    Years since quittin 6    Passive exposure: Past   Smokeless Tobacco Never     Family History:   Family History   Problem Relation Age of Onset    Cancer Mother     Melanoma Neg Hx          Meds/Allergies   current meds:   Current Facility-Administered Medications   Medication Dose Route Frequency    acetaminophen (TYLENOL) tablet 650 mg  650 mg Oral Q6H PRN    apixaban (ELIQUIS) tablet 5 mg  5 mg Per PEG Tube BID    aspirin chewable tablet 81 mg  81 mg "Per PEG Tube Daily    atorvastatin (LIPITOR) tablet 40 mg  40 mg Per PEG Tube QPM    famotidine (PEPCID) tablet 20 mg  20 mg Per G Tube BID    guaiFENesin (ROBITUSSIN) oral liquid 200 mg  200 mg Per G Tube 4x Daily PRN    insulin lispro (HumaLOG) 100 units/mL subcutaneous injection 1-5 Units  1-5 Units Subcutaneous Q6H    insulin lispro (HumaLOG) 100 units/mL subcutaneous injection 3 Units  3 Units Subcutaneous TID With Meals    loperamide (IMODIUM) capsule 2 mg  2 mg Oral Q4H PRN    nystatin (MYCOSTATIN) oral suspension 500,000 Units  500,000 Units Swish & Spit 4x Daily    ondansetron (ZOFRAN) injection 4 mg  4 mg Intravenous Q6H PRN    oxyCODONE (ROXICODONE) split tablet 2 5 mg  2 5 mg Oral Q8H PRN    psyllium (METAMUCIL) 1 packet  1 packet Oral Daily       No Known Allergies    Objective   Vitals:Blood pressure 125/72, pulse 101, temperature 97 8 °F (36 6 °C), temperature source Axillary, resp  rate 18, height 6' 2\" (1 88 m), weight 54 4 kg (120 lb), SpO2 99 %  ,Body mass index is 15 41 kg/m²  Intake/Output Summary (Last 24 hours) at 5/3/2023 1404  Last data filed at 5/3/2023 1216  Gross per 24 hour   Intake 680 ml   Output 300 ml   Net 380 ml       Invasive Devices: Invasive Devices     Peripheral Intravenous Line  Duration           Peripheral IV 04/29/23 Left;Upper;Ventral (anterior) Arm 4 days    Peripheral IV 04/29/23 Right;Upper;Ventral (anterior) Arm 4 days          Drain  Duration           Gastrostomy/Enterostomy PEG-jejunostomy LUQ -- days    External Urinary Catheter 5 days                Physical Exam  Neurologic Exam at the time of my evaluation he was awake, alert, and sitting in bed  Extraocular movements were intact with no nystagmus  Face was symmetric  Tongue was midline  There is no drift or significant tremor in either upper extremity  Finger-nose reveals intact proprioceptive function  His voice was mildly hypophonic and there was minimal dysarthria    He was " edentulous  Lab Results:   CBC:   Results from last 7 days   Lab Units 05/02/23  0513 05/01/23  1041 04/30/23  0515   WBC Thousand/uL 9 23 6 87 7 88   RBC Million/uL 3 18* 3 47* 3 11*   HEMOGLOBIN g/dL 8 4* 9 3* 8 5*   HEMATOCRIT % 28 3* 31 2* 27 8*   MCV fL 89 90 89   PLATELETS Thousands/uL 207 220 202   , BMP/CMP:   Results from last 7 days   Lab Units 05/03/23  0503 05/02/23  0513 05/01/23  1041 04/30/23  0515   SODIUM mmol/L 137 137 133* 135   POTASSIUM mmol/L 3 7 3 6 3 6 3 5   CHLORIDE mmol/L 101 99 98 103   CO2 mmol/L 34* 31 30 28   BUN mg/dL 18 13 13 15   CREATININE mg/dL 0 58* 0 51* 0 48* 0 39*   CALCIUM mg/dL 8 6 8 6 8 2* 7 4*   AST U/L  --   --  70* 44*   ALT U/L  --   --  64* 42   ALK PHOS U/L  --   --  114* 78   EGFR ml/min/1 73sq m 99 105 107 117   , HgBA1C:   , Coagulation:   , Lipid Profile:   Results from last 7 days   Lab Units 05/03/23  0503   HDL mg/dL 21*   LDL CALC mg/dL 16   TRIGLYCERIDES mg/dL 66     Imaging Studies: I have personally reviewed pertinent films in PACS  VTE Prophylaxis: Eliquis    Code Status: Level 3 - DNAR and DNI  Advance Directive and Living Will:      Power of :    POLST:      Counseling / Coordination of Care  I have spent a total time of 54 minutes on 05/03/23 in caring for this patient including Risks and benefits of tx options, Instructions for management, Patient and family education, Importance of tx compliance, Risk factor reductions, Impressions, Counseling / Coordination of care, Documenting in the medical record, Reviewing / ordering tests, medicine, procedures  , Obtaining or reviewing history   and Communicating with other healthcare professionals
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
## 2023-05-03 NOTE — PLAN OF CARE
Problem: OCCUPATIONAL THERAPY ADULT  Goal: Performs self-care activities at highest level of function for planned discharge setting  See evaluation for individualized goals  Description: Treatment Interventions: ADL retraining, Functional transfer training, UE strengthening/ROM, Endurance training, Cognitive reorientation, Patient/family training, Compensatory technique education, Energy conservation, Activityengagement     See flowsheet documentation for full assessment, interventions and recommendations  5/3/2023 1024 by Savanah Cee OT  Note: Limitation: Decreased ADL status, Decreased UE strength, Decreased Safe judgement during ADL, Decreased cognition, Decreased endurance, Decreased fine motor control, Decreased self-care trans, Decreased high-level ADLs  Prognosis: Fair  Assessment: Pt is a 78 y o  male seen for OT evaluation s/p admit to [facility] on 4/26/2023 w/ Severe sepsis ([address] )  Comorbidities affecting pt's functional performance at time of assessment include: CA, DM, Bladder CA, Hypercholesterolemia, HTN, Hyponatremia  Personal factors affecting pt at time of IE include:difficulty performing ADLS, limited insight into deficits and decreased initiation and engagement   Prior to admission, pt required A with ADLs  Upon evaluation: the following deficits impact occupational performance: decreased strength, decreased balance, decreased tolerance, impaired memory, impaired sequencing, impaired problem solving and decreased safety awareness  Pt to benefit from continued skilled OT tx while in the hospital to address deficits as defined above and maximize level of functional independence w ADL's and functional mobility  Occupational Performance areas to address include: bathing/shower, toilet hygiene, dressing, functional mobility and clothing management  From OT standpoint, recommendation at time of d/c would be STR       OT Discharge Recommendation: Post acute rehabilitation services     Rachel SHEPARD Rj Saunders, MS, OTR/L

## 2023-05-03 NOTE — WOUND OSTOMY CARE
Consult Note - Wound   Gurwinder Son 78 y o  male MRN: [id number]  Unit/Bed#: -01 Encounter: [id number]    History and Present Illness:  78year old male patient admitted with severe sepsis  Wound care consulted for left arm skin tear and wound to the sacrum  Patient history significant for bladder CA, DDM2, tongue CA, severe protein calorie malnutrition, PEG tube, CVA, PTSD, HTN, lung CA, COVID, agent orange exposure, and encephalopathy  Patient seen prior to discharge    Assessment Findings:   Patient OOB in the recliner  He is pale and frail  He is able to be turned onto his side for assessment while in the recliner  He is NPO due to aspiration risk  No tube feeds at this time  He has a condom catheter in place at this time for urinary management, he is incontinent of stool  Patient states he is to be discharged today  Patient has purple areas to bilateral arms and hands, hands with extensive purple areas  Skin is fragile  1  Bilateral heels intact and blanchable, Allevyn heel foams in place  2  POA-unstageable wound on the sacrum, yellow slough to 100% of wound bed, periwound blanchable erythema  3  POA-skin tear to the right hand, wound bed is dry, beefy red and yellow  Right posterior arm at the elbow skin tear, beefy red, scant bloody drainage, two areas distal dry brown scabbed  4  Left posterior upper arm skin tears, lateral wound yellow and brown, moist, medial wound dry, mix of brown and red wound bed    Wounds cleansed and treatment provided  See flowsheet for additional assessment        Wounds:  Wound 03/21/23 Pressure Injury Sacrum Left (Active)   Wound Image   05/03/23 1157   Wound Description Yellow;Slough 05/03/23 1157   Pressure Injury Stage U 05/03/23 1157   Alicja-wound Assessment Erythema 05/03/23 1157   Wound Length (cm) 0 3 cm 05/03/23 1157   Wound Width (cm) 0 3 cm 05/03/23 1157   Wound Surface Area (cm^2) 0 09 cm^2 05/03/23 1157   Drainage Amount Scant 05/03/23 1157   Drainage Description Yellow 05/03/23 1157   Treatments Cleansed 05/03/23 1157   Dressing Foam, Silicon (eg  Allevyn, etc) 05/03/23 1157   Wound packed? No 05/01/23 1100   Dressing Changed Changed 05/03/23 1157   Patient Tolerance Tolerated well 05/03/23 1157   Dressing Status Clean;Dry; Intact 05/02/23 1945       Wound 04/28/23 Arm Posterior;Proximal;Right; Lower (Active)   Wound Image   05/03/23 1204   Wound Description Dry;Beefy red;Brown 05/03/23 1204   Alicja-wound Assessment Fragile; Purple 05/03/23 1204   Wound Length (cm) 0 8 cm 05/03/23 1204   Wound Width (cm) 0 5 cm 05/03/23 1204   Wound Depth (cm) 0 1 cm 05/03/23 1204   Wound Surface Area (cm^2) 0 4 cm^2 05/03/23 1204   Wound Volume (cm^3) 0 04 cm^3 05/03/23 1204   Calculated Wound Volume (cm^3) 0 04 cm^3 05/03/23 1204   Drainage Amount None 05/03/23 1204   Non-staged Wound Description Partial thickness 05/03/23 1204   Treatments Cleansed 05/03/23 1204   Dressing Dermagran gauze;Dry dressing;Gauze 05/03/23 1204   Wound packed? No 05/01/23 1100   Dressing Changed Changed 05/03/23 1204   Patient Tolerance Tolerated well 05/03/23 1204   Dressing Status Clean;Dry; Intact 05/02/23 1945       Wound 04/28/23 Hand Posterior;Right (Active)   Wound Image   05/03/23 1205   Wound Description Dry;Beefy red 05/03/23 1205   Alicja-wound Assessment Fragile 05/03/23 1205   Wound Length (cm) 0 6 cm 05/03/23 1205   Wound Width (cm) 1 cm 05/03/23 1205   Wound Surface Area (cm^2) 0 6 cm^2 05/03/23 1205   Treatments Cleansed 05/03/23 1205   Dressing Karrin Pippins gauze;Dry dressing 05/03/23 1205   Dressing Changed Changed 05/03/23 1205   Patient Tolerance Tolerated well 05/03/23 1205   Dressing Status Clean;Dry; Intact 05/02/23 1945       Wound 04/28/23 Arm Left;Posterior;Proximal (Active)   Wound Image   05/03/23 1201   Wound Description Beefy red;Yellow 05/03/23 1201   Alicja-wound Assessment Fragile; Purple 05/03/23 1201   Wound Length (cm) 2 6 cm 05/03/23 1201   Wound Width (cm) 0 6 cm 05/03/23 1201   Wound Surface Area (cm^2) 1 56 cm^2 05/03/23 1201   Non-staged Wound Description Partial thickness 05/03/23 1201   Treatments Cleansed 05/03/23 1201   Dressing Miles Betters gauze;Dry dressing;Gauze 05/03/23 1201   Dressing Changed Changed 05/03/23 1201   Patient Tolerance Tolerated well 05/03/23 1201   Dressing Status Clean;Dry; Intact 05/02/23 8036     Reviewed plan of care with primary RN Libby Kelley  Questions or concerns [address] [facility] Nurse    Meka AGUILARN, RN, [facility]

## 2023-05-03 NOTE — ASSESSMENT & PLAN NOTE
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 occult atrial fibrillation cannot be    -Agree with PT/OT/SP  -Maintain adequate nutrition and hydration utilizing PEG tube and swallow whenever possible would recommend transitioning Eliquis at this time  Consideration could be given to full dose Lovenox  Additional considerations could include Xarelto, 20 mg once daily with food, or Pradaxa 150 mg twice daily  There are no adequate trials to suggest that 1 particular agent would be preferred over the other in this setting other than the data suggesting that full dose Lovenox may be preferred in some patients with hypercoagulable state of malignant although injections twice per day can be uncomfortable and may not be ideal   -Given his very low cholesterol and degree of protein calorie malnutrition do not suggest placing him on a statin medication for stroke prevention    Furthermore, if aspirin was started this admission for stroke prevention can be discontinued at the time of his medication transition at the discretion of the primary medical team   If he does need for cardiovascular protection otherwise it would not be unreasonable to maintain it

## 2023-05-03 NOTE — ASSESSMENT & PLAN NOTE
· No focal neurological deficits   · Likely toxic metabolic encephalopathy due to multifactorial including underlying malignancy, malnutrition, receiving benzodiazepine  · Much improved   · CTH and MRI brain results appreciated

## 2023-05-03 NOTE — PLAN OF CARE
Problem: Nutrition/Hydration-ADULT  Goal: Nutrient/Hydration intake appropriate for improving, restoring or maintaining nutritional needs  Description: Monitor and assess patient's nutrition/hydration status for malnutrition  Collaborate with interdisciplinary team and initiate plan and interventions as ordered  Monitor patient's weight and dietary intake as ordered or per policy  Utilize nutrition screening tool and intervene as necessary  Determine patient's food preferences and provide high-protein, high-caloric foods as appropriate       INTERVENTIONS:  - Monitor oral intake, urinary output, labs, and treatment plans  - Assess nutrition and hydration status and recommend course of action  - Evaluate amount of meals eaten  - Assist patient with eating if necessary   - Allow adequate time for meals  - Recommend/ encourage appropriate diets, oral nutritional supplements, and vitamin/mineral supplements  - Order, calculate, and assess calorie counts as needed  - Recommend, monitor, and adjust tube feedings and TPN/PPN based on assessed needs  - Assess need for intravenous fluids  - Provide specific nutrition/hydration education as appropriate  - Include patient/family/caregiver in decisions related to nutrition  5/3/2023 0150 by Houston Hatchet, RN  Outcome: Progressing  5/2/2023 2242 by Houston Hatchet, RN  Outcome: Progressing  5/2/2023 2240 by Houston Hatchet, RN  Outcome: Progressing     Problem: MOBILITY - ADULT  Goal: Maintain or return to baseline ADL function  Description: INTERVENTIONS:  -  Assess patient's ability to carry out ADLs; assess patient's baseline for ADL function and identify physical deficits which impact ability to perform ADLs (bathing, care of mouth/teeth, toileting, grooming, dressing, etc )  - Assess/evaluate cause of self-care deficits   - Assess range of motion  - Assess patient's mobility; develop plan if impaired  - Assess patient's need for assistive devices and provide as appropriate  - Encourage maximum independence but intervene and supervise when necessary  - Involve family in performance of ADLs  - Assess for home care needs following discharge   - Consider OT consult to assist with ADL evaluation and planning for discharge  - Provide patient education as appropriate  5/3/2023 0150 by Santos Howard RN  Outcome: Progressing  5/2/2023 2242 by Santos Howard RN  Outcome: Progressing  5/2/2023 2240 by Santos Howard RN  Outcome: Progressing  Goal: Maintains/Returns to pre admission functional level  Description: INTERVENTIONS:  - Perform BMAT or MOVE assessment daily    - Set and communicate daily mobility goal to care team and patient/family/caregiver  - Collaborate with rehabilitation services on mobility goals if consulted  - Reposition patient every 2 hours    - Record patient progress and toleration of activity level   5/3/2023 0150 by Santos Howard RN  Outcome: Progressing  5/2/2023 2242 by Santos Howard RN  Outcome: Progressing  5/2/2023 2240 by Santos Howard RN  Outcome: Progressing     Problem: PAIN - ADULT  Goal: Verbalizes/displays adequate comfort level or baseline comfort level  Description: Interventions:  - Encourage patient to monitor pain and request assistance  - Assess pain using appropriate pain scale  - Administer analgesics based on type and severity of pain and evaluate response  - Implement non-pharmacological measures as appropriate and evaluate response  - Consider cultural and social influences on pain and pain management  - Notify physician/advanced practitioner if interventions unsuccessful or patient reports new pain  5/3/2023 0150 by Santos Howard RN  Outcome: Progressing  5/2/2023 2242 by Santos Howard RN  Outcome: Progressing  5/2/2023 2240 by Santos Howard RN  Outcome: Progressing     Problem: INFECTION - ADULT  Goal: Absence or prevention of progression during hospitalization  Description: INTERVENTIONS:  - Assess and monitor for signs and symptoms of infection  - Monitor lab/diagnostic results  - Monitor all insertion sites, i e  indwelling lines, tubes, and drains  - Monitor endotracheal if appropriate and nasal secretions for changes in amount and color  - [place] appropriate cooling/warming therapies per order  - Administer medications as ordered  - Instruct and encourage patient and family to use good hand hygiene technique  - Identify and instruct in appropriate isolation precautions for identified infection/condition  5/3/2023 0150 by Ari Bates RN  Outcome: Progressing  5/2/2023 2242 by Ari Bates RN  Outcome: Progressing  5/2/2023 2240 by Ari Bates RN  Outcome: Progressing  Goal: Absence of fever/infection during neutropenic period  Description: INTERVENTIONS:  - Monitor WBC    Outcome: Progressing     Problem: SAFETY ADULT  Goal: Maintain or return to baseline ADL function  Description: INTERVENTIONS:  -  Assess patient's ability to carry out ADLs; assess patient's baseline for ADL function and identify physical deficits which impact ability to perform ADLs (bathing, care of mouth/teeth, toileting, grooming, dressing, etc )  - Assess/evaluate cause of self-care deficits   - Assess range of motion  - Assess patient's mobility; develop plan if impaired  - Assess patient's need for assistive devices and provide as appropriate  - Encourage maximum independence but intervene and supervise when necessary  - Involve family in performance of ADLs  - Assess for home care needs following discharge   - Consider OT consult to assist with ADL evaluation and planning for discharge  - Provide patient education as appropriate  5/3/2023 0150 by Ari Bates RN  Outcome: Progressing  5/2/2023 2242 by Ari Bates RN  Outcome: Progressing  5/2/2023 2240 by Ari Bates RN  Outcome: Progressing  Goal: Maintains/Returns to pre admission functional level  Description: INTERVENTIONS:  - Perform BMAT or MOVE assessment daily    - Set and communicate daily mobility goal to care team and patient/family/caregiver  - Collaborate with rehabilitation services on mobility goals if consulted  - Reposition patient every 2 hours    - Record patient progress and toleration of activity level   5/3/2023 0150 by Dayanara Chase RN  Outcome: Progressing  5/2/2023 2242 by Dayanara Chase RN  Outcome: Progressing  5/2/2023 2240 by Dayanara Chase RN  Outcome: Progressing  Goal: Patient will remain free of falls  Description: INTERVENTIONS:  - Educate patient/family on patient safety including physical limitations  - Instruct patient to call for assistance with activity   - Consult OT/PT to assist with strengthening/mobility   - Keep Call bell within reach  - Keep bed low and locked with side rails adjusted as appropriate  - Keep care items and personal belongings within reach  - Initiate and maintain comfort rounds  - Make Fall Risk Sign visible to staff  - Offer Toileting every 2 Hours, in advance of need  - Initiate/Maintain bed alarm  - Apply yellow socks and bracelet for high fall risk patients  - Consider moving patient to room near nurses station  5/3/2023 0150 by Dayanara Chase RN  Outcome: Progressing  5/2/2023 2242 by Dayanara Chase RN  Outcome: Progressing  5/2/2023 2240 by Dayanara Chase RN  Outcome: Progressing     Problem: DISCHARGE PLANNING  Goal: Discharge to home or other facility with appropriate resources  Description: INTERVENTIONS:  - Identify barriers to discharge w/patient and caregiver  - Arrange for needed discharge resources and transportation as appropriate  - Identify discharge learning needs (meds, wound care, etc )  - Arrange for interpretive services to assist at discharge as needed  - Refer to Case Management Department for coordinating discharge planning if the patient needs post-hospital services based on physician/advanced practitioner order or complex needs related to functional status, cognitive ability, or social support system  5/3/2023 0150 by Kaylah Benz RN  Outcome: Progressing  5/2/2023 2242 by Kaylah Benz RN  Outcome: Progressing  5/2/2023 2240 by Kaylah Benz RN  Outcome: Progressing     Problem: Knowledge Deficit  Goal: Patient/family/caregiver demonstrates understanding of disease process, treatment plan, medications, and discharge instructions  Description: Complete learning assessment and assess knowledge base    Interventions:  - Provide teaching at level of understanding  - Provide teaching via preferred learning methods  5/3/2023 0150 by Kaylah Benz RN  Outcome: Progressing  5/2/2023 2242 by Kaylah Benz RN  Outcome: Progressing  5/2/2023 2240 by Kaylah Benz RN  Outcome: Progressing     Problem: RESPIRATORY - ADULT  Goal: Achieves optimal ventilation and oxygenation  Description: INTERVENTIONS:  - Assess for changes in respiratory status  - Assess for changes in mentation and behavior  - Position to facilitate oxygenation and minimize respiratory effort  - Oxygen administered by appropriate delivery if ordered  - Initiate smoking cessation education as indicated  - Encourage broncho-pulmonary hygiene including cough, deep breathe, Incentive Spirometry  - Assess the need for suctioning and aspirate as needed  - Assess and instruct to report SOB or any respiratory difficulty  - Respiratory Therapy support as indicated  5/3/2023 0150 by Kaylah Benz RN  Outcome: Progressing  5/2/2023 2242 by Kaylah Benz RN  Outcome: Progressing  5/2/2023 2240 by Kaylah Benz RN  Outcome: Progressing     Problem: Prexisting or High Potential for Compromised Skin Integrity  Goal: Skin integrity is maintained or improved  Description: INTERVENTIONS:  - Identify patients at risk for skin breakdown  - Assess and monitor skin integrity  - Assess and monitor nutrition and hydration status  - Monitor labs   - Assess for incontinence   - Turn and reposition patient  - Assist with mobility/ambulation  - Relieve pressure over bony prominences  - Avoid friction and shearing  - Provide appropriate hygiene as needed including keeping skin clean and dry  - Evaluate need for skin moisturizer/barrier cream  - Collaborate with interdisciplinary team   - Patient/family teaching  - Consider wound care consult   5/3/2023 0150 by Radha Orozco RN  Outcome: Progressing  5/2/2023 2242 by Radha Orozco RN  Outcome: Progressing  5/2/2023 2240 by Radha Orozco RN  Outcome: Progressing     Problem: Neurological Deficit  Goal: Neurological status is stable or improving  Description: Interventions:  - Monitor and assess patient's level of consciousness, motor function, sensory function, and level of assistance needed for ADLs  - Monitor and report changes from baseline  Collaborate with interdisciplinary team to initiate plan and implement interventions as ordered  - Provide and maintain a safe environment  - Consider seizure precautions  - Consider fall precautions  - Consider aspiration precautions  - Consider bleeding precautions  Outcome: Progressing     Problem: Activity Intolerance/Impaired Mobility  Goal: Mobility/activity is maintained at optimum level for patient  Description: Interventions:  - Assess and monitor patient  barriers to mobility and need for assistive/adaptive devices  - Assess patient's emotional response to limitations  - Collaborate with interdisciplinary team and initiate plans and interventions as ordered  - Encourage independent activity per ability   - Maintain proper body alignment  - Perform active/passive rom as tolerated/ordered    - Plan activities to conserve energy   - Turn patient as appropriate  Outcome: Progressing     Problem: Communication Impairment  Goal: Ability to express needs and understand communication  Description: Assess patient's communication skills and ability to understand information  Patient will demonstrate use of effective communication techniques, alternative methods of communication and understanding even if not able to speak  - Encourage communication and provide alternate methods of communication as needed  - Collaborate with case management/ for discharge needs  - Include patient/family/caregiver in decisions related to communication  Outcome: Progressing     Problem: Potential for Aspiration  Goal: Non-ventilated patient's risk of aspiration is minimized  Description: Assess and monitor vital signs, respiratory status, and labs (WBC)  Monitor for signs of aspiration (tachypnea, cough, rales, wheezing, cyanosis, fever)  - Assess and monitor patient's ability to swallow  - Place patient up in chair to eat if possible  - HOB up at 90 degrees to eat if unable to get patient up into chair   - Supervise patient during oral intake  - Instruct patient/ family to take small bites  - Instruct patient/ family to take small single sips when taking liquids  - Follow patient-specific strategies generated by speech pathologist   Outcome: Progressing  Goal: Ventilated patient's risk of aspiration is minimized  Description: Assess and monitor vital signs, respiratory status, airway cuff pressure, and labs (WBC)  Monitor for signs of aspiration (tachypnea, cough, rales, wheezing, cyanosis, fever)  - Elevate head of bed 30 degrees if patient has tube feeding   - Monitor tube feeding  Outcome: Progressing     Problem: Nutrition  Goal: Nutrition/Hydration status is improving  Description: Monitor and assess patient's nutrition/hydration status for malnutrition (ex- brittle hair, bruises, dry skin, pale skin and conjunctiva, muscle wasting, smooth red tongue, and disorientation)  Collaborate with interdisciplinary team and initiate plan and interventions as ordered  Monitor patient's weight and dietary intake as ordered or per policy  Utilize nutrition screening tool and intervene per policy  Determine patient's food preferences and provide high-protein, high-caloric foods as appropriate  - Assist patient with eating   - Allow adequate time for meals   - Encourage patient to take dietary supplement as ordered  - Collaborate with clinical nutritionist   - Include patient/family/caregiver in decisions related to nutrition    Outcome: Progressing

## 2023-05-03 NOTE — PHYSICAL THERAPY NOTE
Physical Therapy Re-Evaluation   Time in: 2150  Time out: 0800  Total evaluation time: 16 minutes    Patient's Name: Gurwinder Son    Admitting Diagnosis  Shortness of breath [R06 02]  Hyperkalemia [E87 5]  Severe protein-calorie malnutrition ([address] ) [E43]  Weakness [R53 1]  PNA (pneumonia) [J18 9]  Sepsis ([address] ) [A41 9]    Problem List  Patient Active Problem List   Diagnosis    Anxiety    Neoplasm of uncertain behavior of skin    Post-traumatic stress disorder, unspecified    Essential hypertension    Actinic keratosis    Dysphagia, unspecified    Erectile dysfunction    History of malignant neoplasm of bladder    Cardiac arrhythmia    Carpal tunnel syndrome    Chronic post-traumatic stress disorder (PTSD) after  combat    Type 2 diabetes mellitus without complication, without long-term current use of insulin ([address] )    Heart murmur    Hematuria    Hyperlipidemia due to type 2 diabetes mellitus ([address] )    Left ventricular hypertrophy    Malignant neoplasm of urinary bladder ([address] )    Neoplasm of lung    Neoplasm of uncertain behavior of oropharynx    Osteoarthritis of knee    Panic disorder    Polyp of colon    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)    Severe protein-calorie malnutrition (HCC)    Severe sepsis ([address] )    Other pulmonary embolism without acute cor pulmonale (HCC)    Cholecystitis    Multiple fractures of rib involving four or more ribs-right    COVID    Acute respiratory failure with hypoxia (HCC)    Thyroid nodule    History of head and neck radiation    Agent orange exposure    Subclinical hyperthyroidism    Syncope and collapse    Hypomagnesemia    Hyperlipemia    PNA (pneumonia)    Pressure injury of skin of buttock    Hydropneumothorax    Hypercalcemia of malignancy    Hyperkalemia    Abnormal PET of right lung    Choroidal nevus    Combined forms of age-related cataract of both eyes    Exposure to potentially hazardous substance    Hyperglycemia    Hyperopia    Presbyopia Regular astigmatism of both eyes    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    Unspecified abnormalities of gait and mobility    Diarrhea, unspecified    Encephalopathy    CVA (cerebral vascular accident) [facility])       Past Medical History  Past Medical History:   Diagnosis Date    Cancer ([facility] )     Diabetes ([facility] )     Gastritis     GERD (gastroesophageal reflux disease)     History of bladder cancer 2010    treated with surgery    Hypercholesterolemia     Hypertension     Hyponatremia 12/22/2021    Lactic acidosis 12/22/2021       Past Surgical History  Past Surgical History:   Procedure Laterality Date    BLADDER TUMOR EXCISION      EGD      IR CHOLECYSTOSTOMY TUBE CHECK/CHANGE/REPOSITION/REINSERTION/UPSIZE  1/5/2022    IR CHOLECYSTOSTOMY TUBE CHECK/CHANGE/REPOSITION/REINSERTION/UPSIZE  3/4/2022    IR CHOLECYSTOSTOMY TUBE CHECK/CHANGE/REPOSITION/REINSERTION/UPSIZE  3/24/2022    IR CHOLECYSTOSTOMY TUBE PLACEMENT  12/27/2021    IR THORACENTESIS  3/23/2023       PT performed at least 2 patient identifiers during session: Name and wristband  05/03/23 0752   PT Last Visit   PT Visit Date 05/03/23   Note Type   Note type Re-Evaluation  (d/t medical status change)   Pain Assessment   Pain Assessment Tool 0-10   Pain Score No Pain   Restrictions/Precautions   Weight Bearing Precautions Per Order No   Other Precautions Chair Alarm; Bed Alarm; Fall Risk   Home Living   Type of Home SNF  (receiving rehab at [facility] prior to arrival)   [address] One level;Performs ADLs on one level   Bathroom Shower/Tub Walk-in shower   Bathroom Toilet Standard   Bathroom Equipment Grab bars in shower; Shower chair;Grab bars around toilet   [facility]   Prior Function   Level of [place] Needs assistance with functional mobility; Needs assistance with ADLs   Lives With Facility staff   Receives Help From Personal care attendant   IADLs Family/Friend/Other provides "transportation; Family/Friend/Other provides meals; Family/Friend/Other provides medication management   Falls in the last 6 months 1 to 4   Vocational Retired   Comments PLOF and social history obtained from pt's chart via previous PT eval on 4/27/23   General   Family/Caregiver Present No   Cognition   Overall Cognitive Status Impaired   Arousal/Participation Arousable   Attention Attends with cues to redirect   Orientation Level Oriented to person;Oriented to place;Oriented to time;Disoriented to situation   Memory Decreased recall of recent events   Following Commands Follows one step commands with increased time or repetition   Comments pt agreeable to PT session   Subjective   Subjective \"I can try\" - when asked if he wants to attempt OOB mobility including transfer into recliner   RLE Assessment   RLE Assessment X   Strength RLE   R Hip Flexion 2+/5   R Knee Extension 3-/5   R Ankle Dorsiflexion 3-/5   LLE Assessment   LLE Assessment X   Strength LLE   L Hip Flexion 2+/5   L Knee Extension 3-/5   L Ankle Dorsiflexion 3-/5   Vision-Basic Assessment   Current Vision Wears glasses all the time   Coordination   Coordination and Movement Description Incremental mobility requiring increased time and tactile facilitation  Sensation X  (pt admits to B toes numbness)   Bed Mobility   Supine to Sit 2  Maximal assistance   Additional items Assist x 1;HOB elevated; Increased time required;Verbal cues;LE management   Additional Comments BP seated at EOB prior to OOB activity = 92/56   Transfers   Sit to Stand 4  Minimal assistance   Additional items Assist x 2; Increased time required;Verbal cues   Stand to Sit 4  Minimal assistance   Additional items Assist x 2; Increased time required;Verbal cues   Stand pivot 3  Moderate assistance   Additional items Assist x 2; Increased time required;Verbal cues  (HHA)   Additional Comments BP seated in recliner x 3-5 mins = 112/56   Ambulation/Elevation   Gait pattern Improper Weight " shift;Narrow LISANDRA; Antalgic; Forward Flexion; Short stride; Step to;Excessively slow   Gait Assistance 3  Moderate assist   Additional items Assist x 2   Assistive Device   (HHA)   Distance 2 ft from bed>recliner; poor advancement of B LEs   Balance   Static Sitting Fair -   Dynamic Sitting Poor +   Static Standing Poor   Dynamic Standing Poor -   Ambulatory Poor -   Endurance Deficit   Endurance Deficit Yes   Activity Tolerance   Activity Tolerance Patient limited by fatigue   Medical Staff Made Aware coordination of care provided with  [address] Yes, RN Onur Hyde made aware of outcomes/recs   Assessment   Prognosis Poor   Problem List Decreased strength;Decreased endurance; Impaired balance;Decreased mobility; Decreased coordination;Decreased cognition; Impaired judgement;Decreased safety awareness   Assessment Pt is 78 y o  male seen for PT re-evaluation d/t change in medical status, on 5/3/2023 s/p admit to [facility] on 4/26/2023 w/ Severe sepsis ([address] )  Re-eval performed d/t MRI results from last night revealing punctate R posterior cerebellar focus of acute ischemia & additional punctate focus of left postcentral gyrus parietal lobe high vertex acute ischemia  PT was consulted to assess pt's functional mobility and d/c needs  At time of re-eval, max Ax1 supine>sit required, min Ax2 STS and mod Ax2 SPT with B knee buckling evident during LB transition into stepping  Upon re-evaluation, pt presenting with impaired functional mobility d/t decreased strength, decreased endurance, impaired balance, decreased mobility, decreased coordination, decreased cognition, impaired judgement and decreased safety awareness  The following objective measures performed on re-eval also reveal limitations: AM-PAC 6-Clicks: 95/17   Overall, pt's rehab potential and prognosis to return to PLOF is poor/guarded as impacted by objective findings, warranting pt to receive further skilled PT interventions to address "identified impairments, activity limitation(s), and participation restriction(s)  Goal for patient is to feel better  Pt to benefit from continued PT tx to address deficits as defined above and maximize level of functional independent mobility and consistency in order for pt to maintain and improve activity tolerance  From PT/mobility standpoint, recommendation at time of d/c would be post acute rehabilitation services pending progress in order to facilitate return to PLOF  Barriers to Discharge Inaccessible home environment;Decreased caregiver support   Goals   Patient Goals \"to feel better\"   [facility] Expiration Date 05/13/23   Short Term Goal #1 1 )Patient will complete bed mobility supervision of 1 for decrease need for caregiver assistance, decrease burden of care  2 ) Patient will complete transfers supervision of 1 to decrease risk of falls, facilitate upright standing posture  3 ) BLE strength to greater than/equal to 4/5 gross musculature to increase ability to safely transfer, control descent to chair  4 ) Patient will exhibit increase dynamic standing to Fair 2-3 minutes  without LOB supervision of 1 to improve activity tolerance  5 ) Patient will exhibit increase dynamic ambulatory balance to Fair 25-50 feet w/AD  supervision of 1 to improve ability to mobilize to toilet, chair and decrease risk for additional medical complications  6 ) Patient will exhibit good self monitoring and ability to follow 2 step commands to increase complexity of tasks and resume ADL's without LOB  PT Treatment Day 2  (see treatment note below)   Plan   Treatment/Interventions Functional transfer training;LE strengthening/ROM; Elevations; Therapeutic exercise; Endurance training;Cognitive reorientation;Patient/family training;Equipment eval/education; Bed mobility;Gait training;Spoke to nursing   PT Frequency 3-5x/wk   Recommendation   PT Discharge Recommendation Post acute rehabilitation services   Equipment Recommended   (pt " would benefit from RW trial)   Additional Comments Pt's raw score on the AM-[facility] Basic Mobility inpatient short form is 11, standardized score is 30 25  Patients at this level are likely to benefit from DC to [facility], however, please refer to therapist recommendation for safe DC planning  [facility] Basic Mobility Inpatient   Turning in Flat Bed Without Bedrails 2   Lying on Back to Sitting on Edge of Flat Bed Without Bedrails 2   Moving Bed to Chair 2   Standing Up From Chair Using Arms 2   Walk in Room 2   Climb 3-5 Stairs With Railing 1   Basic Mobility Inpatient Raw Score 11   Basic Mobility Standardized Score 30 25   Highest Level Of Mobility   JH-HLM Goal 4: Move to chair/commode   JH-HLM Achieved 4: Move to chair/commode   Additional Treatment Session   Start Time 0800   End Time 0810  (total treatment time = 10 minutes)   Treatment Assessment Pt seen for PT treatment session this date with interventions consisting of therapeutic activity consisting of training: static sitting tolerance at EOB for 3-5x minutes w/ B UE support and vc and tactile cues for static sitting posture faciliation  Post session: pt returned back to recliner, chair alarm engaged, all needs in reach and RN notified of session findings/recommendations  Continue to recommend post acute rehabilitation services at time of d/c in order to maximize pt's functional independence and safety w/ mobility  Pt continues to be functioning below baseline level, and remains limited 2* factors listed above  PT will continue to see pt during current hospitalization in order to address the deficits listed above and provide interventions consistent w/ POC in effort to achieve STGs  Equipment Use n/a   Additional Treatment Day 2   Exercises   Balance training  EOB dynamic sitting balance control unsupported at EOB x 3-5 mins with encouragement of improved trunk posture  Pt with occasional min Ax1 externally required to improve posture and prevent LOB     End of Consult   Patient Position at End of Consult Bedside chair;Bed/Chair alarm activated; All needs within reach       Kush Mcbride, PT, DPT

## 2023-05-03 NOTE — ASSESSMENT & PLAN NOTE
Lab Results   Component Value Date    HGBA1C 5 8 (H) 12/30/2022       Recent Labs     05/03/23  0213 05/03/23  0757 05/03/23  1241 05/03/23  1352   POCGLU 308* 187* 351* 260*       Blood Sugar Average: Last 72 hrs:  (P) 242 [id number]     · Continue tube feeds  · Sliding scale insulin with coverage with tube feed boluses

## 2023-05-03 NOTE — PROGRESS NOTES
Progress Note - Nephrology   [facility] 78 y o  male MRN: [id number]  Unit/Bed#: -01 Encounter: [id number]    A/P:  1   Hypercalcemia              Results status post volume expansion  2   Hyperkalemia              Resolved, continue to monitor for now  3   Hyponatremia              Sodium level remained stable at 137 mmol/L   4   Hypophosphatemia              Continue with tube feeds, patient status post IV supplementation earlier this admission  5   Severe sepsis             Significant proved, antibiotics to be continued according to hospitalist recommendations    6   Malignant neoplasm of the tongue    Follow up reason for today's visit: Multiple electrolyte abnormalities    Severe sepsis [facility])    Patient Active Problem List   Diagnosis    Anxiety    Neoplasm of uncertain behavior of skin    Post-traumatic stress disorder, unspecified    Essential hypertension    Actinic keratosis    Dysphagia, unspecified    Erectile dysfunction    History of malignant neoplasm of bladder    Cardiac arrhythmia    Carpal tunnel syndrome    Chronic post-traumatic stress disorder (PTSD) after  combat    Type 2 diabetes mellitus without complication, without long-term current use of insulin (HCC)    Heart murmur    Hematuria    Hyperlipidemia due to type 2 diabetes mellitus ([address] )    Left ventricular hypertrophy    Malignant neoplasm of urinary bladder (HCC)    Neoplasm of lung    Neoplasm of uncertain behavior of oropharynx    Osteoarthritis of knee    Panic disorder    Polyp of colon    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)    Severe protein-calorie malnutrition (HCC)    Severe sepsis (HCC)    Other pulmonary embolism without acute cor pulmonale (HCC)    Cholecystitis    Multiple fractures of rib involving four or more ribs-right    COVID    Acute respiratory failure with hypoxia (HCC)    Thyroid nodule    History of head and neck radiation    Agent orange exposure "   Subclinical hyperthyroidism    Syncope and collapse    Hypomagnesemia    Hyperlipemia    PNA (pneumonia)    Pressure injury of skin of buttock    Hydropneumothorax    Hypercalcemia of malignancy    Hyperkalemia    Abnormal PET of right lung    Choroidal nevus    Combined forms of age-related cataract of both eyes    Exposure to potentially hazardous substance    Hyperglycemia    Hyperopia    Presbyopia    Regular astigmatism of both eyes    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    Unspecified abnormalities of gait and mobility    Diarrhea, unspecified    Encephalopathy    CVA (cerebral vascular accident) ([facility] )         Subjective:   No acute complaints at this time  Objective:     Vitals: Blood pressure 125/72, pulse 101, temperature 97 8 °F (36 6 °C), temperature source Axillary, resp  rate 18, height 6' 2\" (1 88 m), weight 54 4 kg (120 lb), SpO2 99 %  ,Body mass index is 15 41 kg/m²  Weight (last 2 days)     None            Intake/Output Summary (Last 24 hours) at 5/3/2023 1248  Last data filed at 5/3/2023 1216  Gross per 24 hour   Intake 680 ml   Output 300 ml   Net 380 ml     I/O last 3 completed shifts:   In: 1560 [NG/GT:600; Feedings:960]  Out: 600 [Urine:600]         Physical Exam: /72 (BP Location: Left arm)   Pulse 101   Temp 97 8 °F (36 6 °C) (Axillary)   Resp 18   Ht 6' 2\" (1 88 m)   Wt 54 4 kg (120 lb)   SpO2 99%   BMI 15 41 kg/m²     General Appearance:    Alert, cooperative, no distress, temporal wasting, emaciated   Head:    Normocephalic, without obvious abnormality, atraumatic   Eyes:    Conjunctiva/corneas clear   Ears:    Normal external ears   Nose:   Nares normal, septum midline, mucosa normal, no drainage    or sinus tenderness   Throat:   Lips, mucosa, and tongue normal; teeth and gums normal   Neck:   Supple   Back:     Symmetric, no curvature, ROM normal, no CVA tenderness   Lungs:     Clear to auscultation bilaterally, respirations " unlabored   Chest wall:    No tenderness or deformity   Heart:    Regular rate and rhythm, S1 and S2 normal, no murmur, rub   or gallop   Abdomen:     Soft, non-tender, bowel sounds active   Extremities:   Extremities normal, atraumatic, no cyanosis or edema   Skin:   Skin color, texture, turgor normal, no rashes or lesions   Lymph nodes:   Cervical normal   Neurologic:   CNII-XII intact            Lab, Imaging and other studies: I have personally reviewed pertinent labs  CBC: No results found for: WBC, HGB, HCT, MCV, PLT, ADJUSTEDWBC, MCH, MCHC, RDW, MPV, NRBC  CMP:   Lab Results   Component Value Date    K 3 7 05/03/2023     05/03/2023    CO2 34 (H) 05/03/2023    BUN 18 05/03/2023    CREATININE 0 58 (L) 05/03/2023    CALCIUM 8 6 05/03/2023    EGFR 99 05/03/2023         Results from last 7 days   Lab Units 05/03/23  0503 05/02/23  0513 05/01/23  1041 04/30/23  0515   POTASSIUM mmol/L 3 7 3 6 3 6 3 5   CHLORIDE mmol/L 101 99 98 103   CO2 mmol/L 34* 31 30 28   BUN mg/dL 18 13 13 15   CREATININE mg/dL 0 58* 0 51* 0 48* 0 39*   CALCIUM mg/dL 8 6 8 6 8 2* 7 4*   ALK PHOS U/L  --   --  114* 78   ALT U/L  --   --  64* 42   AST U/L  --   --  70* 44*         Phosphorus: No results found for: PHOS  Magnesium: No results found for: MG  Urinalysis: No results found for: COLORU, CLARITYU, SPECGRAV, PHUR, LEUKOCYTESUR, NITRITE, PROTEINUA, GLUCOSEU, KETONESU, BILIRUBINUR, BLOODU  Ionized Calcium: No results found for: CAION  Coagulation: No results found for: PT, INR, APTT  Troponin: No results found for: TROPONINI  ABG: No results found for: PHART, NDC8YAA, PO2ART, HPT8XXH, Q6TMBMWX, BEART, SOURCE  Radiology review:     IMAGING  Procedure: CT head wo contrast    Result Date: 5/2/2023  Narrative: CT BRAIN - WITHOUT CONTRAST INDICATION:   Delirium delirium  COMPARISON:  None  TECHNIQUE:  CT examination of the brain was performed  Multiplanar 2D reformatted images were created from the source data   Radiation dose length product (DLP) for this visit:  852 99 mGy-cm   This examination, like all CT scans performed in the [facility], was performed utilizing techniques to minimize radiation dose exposure, including the use of iterative  reconstruction and automated exposure control  IMAGE QUALITY:  Diagnostic  FINDINGS: PARENCHYMA: Cerebral atrophy  No intracranial mass, mass effect or midline shift  No CT signs of acute infarction  No acute parenchymal hemorrhage  There is 0 8 cm ovoid hypodensity in the left cerebellum (series 2 image 13), new from prior exam  VENTRICLES AND EXTRA-AXIAL SPACES:  Normal for the patient's age  VISUALIZED ORBITS: Normal visualized orbits  PARANASAL SINUSES: Normal visualized paranasal sinuses  CALVARIUM AND EXTRACRANIAL SOFT TISSUES:  Normal      Impression: 1  No acute intracranial CT abnormality  2   Ovoid hypodensity in the left cerebellum, new from head CT 1/10/2020, may indicate focal encephalomalacia from chronic infarct; however, given discrete ovoid appearance recommend follow-up brain MRI without and with contrast to exclude lesional pathology  This study was marked in [facility] for notification and follow-up  Workstation performed: [id number]     Procedure: MRI brain w wo contrast    Result Date: 5/2/2023  Narrative: MRI BRAIN WITH AND WITHOUT CONTRAST INDICATION: Lesion seen on CT COMPARISON: Same-day CT TECHNIQUE: Multiplanar, multisequence imaging of the brain was performed before and after gadolinium administration  IV Contrast:  5 mL of Gadobutrol injection (SINGLE-DOSE) IMAGE QUALITY:   Diagnostic  FINDINGS: BRAIN PARENCHYMA:  There is no discrete mass, mass effect or midline shift  There is no intracranial hemorrhage  Normal posterior fossa  Focus of increased diffusion in the right posterior cerebellum could relate to acute or subacute ischemia as it contains long TR signal abnormality   Additional punctate focus of left postcentral gyrus parietal lobe high vertex acute "ischemia  Old infarct in the left cerebellum as seen on CT with tiny focus of hemosiderin staining There are no white matter changes in the cerebral hemispheres  Postcontrast imaging of the brain demonstrates no abnormal enhancement  VENTRICLES:  Normal for the patient's age  SELLA AND PITUITARY GLAND:  Normal  ORBITS:  Normal  PARANASAL SINUSES:  Normal  VASCULATURE:  Evaluation of the major intracranial vasculature demonstrates appropriate flow voids  CALVARIUM AND SKULL BASE:  Normal  EXTRACRANIAL SOFT TISSUES:  Normal      Impression: Punctate right posterior cerebellar focus of acute ischemia  Additional punctate focus of left postcentral gyrus parietal lobe high vertex acute ischemia  Old left cerebellar infarct as seen on CT  Findings were marked as \"immediate\"in Epic and will now be related to the ordering physician or covering clinical team by the radiology liaison   Workstation performed: [id number]       Current Facility-Administered Medications   Medication Dose Route Frequency    acetaminophen (TYLENOL) tablet 650 mg  650 mg Oral Q6H PRN    apixaban (ELIQUIS) tablet 5 mg  5 mg Per PEG Tube BID    aspirin chewable tablet 81 mg  81 mg Per PEG Tube Daily    atorvastatin (LIPITOR) tablet 40 mg  40 mg Per PEG Tube QPM    famotidine (PEPCID) tablet 20 mg  20 mg Per G Tube BID    guaiFENesin (ROBITUSSIN) oral liquid 200 mg  200 mg Per G Tube 4x Daily PRN    insulin lispro (HumaLOG) 100 units/mL subcutaneous injection 1-5 Units  1-5 Units Subcutaneous Q6H    insulin lispro (HumaLOG) 100 units/mL subcutaneous injection 3 Units  3 Units Subcutaneous TID With Meals    loperamide (IMODIUM) capsule 2 mg  2 mg Oral Q4H PRN    nystatin (MYCOSTATIN) oral suspension 500,000 Units  500,000 Units Swish & Spit 4x Daily    ondansetron (ZOFRAN) injection 4 mg  4 mg Intravenous Q6H PRN    oxyCODONE (ROXICODONE) split tablet 2 5 mg  2 5 mg Oral Q8H PRN    psyllium (METAMUCIL) 1 packet  1 packet Oral Daily " Medications Discontinued During This Encounter   Medication Reason    cefepime (MAXIPIME) IVPB (premix in dextrose) 2,000 mg 50 mL     vancomycin (VANCOCIN) IVPB (premix in dextrose) 750 mg 150 mL     apixaban (ELIQUIS) tablet 5 mg     atorvastatin (LIPITOR) tablet 20 mg     famotidine (PEPCID) tablet 20 mg     vancomycin (VANCOCIN) 1,250 mg in sodium chloride 0 9 % 250 mL IVPB     vancomycin (VANCOCIN) 1,250 mg in sodium chloride 0 9 % 250 mL IVPB     guaiFENesin (MUCINEX) 12 hr tablet 600 mg     calcitonin (salmon) (MIACALCIN) injection 218 Units     vancomycin (VANCOCIN) 1500 mg in sodium chloride 0 9% 250 mL IVPB     vancomycin (VANCOCIN) 1,750 mg in sodium chloride 0 9 % 500 mL IVPB     vancomycin (VANCOCIN) IVPB (premix in dextrose) 1,000 mg 200 mL     magnesium sulfate 4 g/100 mL IVPB (premix) 4 g     LORazepam (ATIVAN) oral concentrated solution 0 5 mg     lactated ringers bolus 1,000 mL     cefepime (MAXIPIME) IVPB (premix in dextrose) 2,000 mg 50 mL     sodium chloride 0 9 % infusion     LORazepam (ATIVAN) oral concentrated solution 0 26 mg     atorvastatin (LIPITOR) tablet 20 mg        Gela Silveira, DO      This progress note was produced in part using a dictation device which may document imprecise wording from author's original intent

## 2023-05-03 NOTE — ASSESSMENT & PLAN NOTE
Lab Results   Component Value Date    HGBA1C 5 8 (H) 12/30/2022       Recent Labs     05/02/23 2012 05/03/23  0213 05/03/23  0757 05/03/23  1241   POCGLU 450* 308* 187* 351*       Blood Sugar Average: Last 72 hrs:  (P) 241 875     In the setting of recent/acute ischemic stroke would recommend maintaining blood sugar less than 180 if possible  Depending on his clinical state consider targeting hemoglobin A1c of less than 7%

## 2023-05-03 NOTE — ASSESSMENT & PLAN NOTE
· MRI: some scarring in the left cerebellum  New small ischemic stroke  · Neurology input appreciated   · Neurology does not feel that new ischemic stroke is contributing to his encephalopathy   · No need for ASA  Due to his nutritional status and normal lipid panel, statin can be held  · Recommend to transition to either: Xarelto 20 mg daily or Pradaxa 150mg twice daily or dose Lovenox twice daily     · Transition from eliquis to xarelto (5/3)   · Will need outpatient follow up with neurology in 6 weeks

## 2023-05-03 NOTE — PLAN OF CARE
Problem: PHYSICAL THERAPY ADULT  Goal: Performs mobility at highest level of function for planned discharge setting  See evaluation for individualized goals  Description: Treatment/Interventions: Functional transfer training, LE strengthening/ROM, Elevations, Therapeutic exercise, Endurance training, Cognitive reorientation, Patient/family training, Equipment eval/education, Bed mobility, Gait training, Spoke to nursing          See flowsheet documentation for full assessment, interventions and recommendations  5/3/2023 0903 by Darinel Collazo PT  Outcome: Progressing  Note: Prognosis: Poor  Problem List: Decreased strength, Decreased endurance, Impaired balance, Decreased mobility, Decreased coordination, Decreased cognition, Impaired judgement, Decreased safety awareness  Assessment: Pt is 78 y o  male seen for PT re-evaluation d/t change in medical status, on 5/3/2023 s/p admit to [facility] on 4/26/2023 w/ Severe sepsis ([facility] [address] )  Re-eval performed d/t MRI results from last night revealing punctate R posterior cerebellar focus of acute ischemia & additional punctate focus of left postcentral gyrus parietal lobe high vertex acute ischemia  PT was consulted to assess pt's functional mobility and d/c needs  At time of re-eval, max Ax1 supine>sit required, min Ax2 STS and mod Ax2 SPT with B knee buckling evident during LB transition into stepping  Upon re-evaluation, pt presenting with impaired functional mobility d/t decreased strength, decreased endurance, impaired balance, decreased mobility, decreased coordination, decreased cognition, impaired judgement and decreased safety awareness  The following objective measures performed on re-eval also reveal limitations: AM-PAC 6-Clicks: 07/16   Overall, pt's rehab potential and prognosis to return to [facility] is poor/guarded as impacted by objective findings, warranting pt to receive further skilled PT interventions to address identified impairments, activity limitation(s), and participation restriction(s)  Goal for patient is to feel better  Pt to benefit from continued PT tx to address deficits as defined above and maximize level of functional independent mobility and consistency in order for pt to maintain and improve activity tolerance  From PT/mobility standpoint, recommendation at time of d/c would be post acute rehabilitation services pending progress in order to facilitate return to PLOF  Barriers to Discharge: Inaccessible home environment, Decreased caregiver support     PT Discharge Recommendation: Post acute rehabilitation services    See flowsheet documentation for full assessment

## 2023-05-03 NOTE — PROGRESS NOTES
Progress Note - Palliative & Supportive Care  Lansing Olszewski  78 y o   male  /-01   MRN: [id number]  Encounter: [id number]     Assessment  Primary malignant neoplasm of tongue  Acute respiratory failure with hypoxia  Severe sepsis  Severe protein-calorie malnutrition  Palliative care by specialist  Goals of care counseling    Plan  1  Symptom management   Defer to primary treatment team    2  Goals  Level 3 code status  Disease focused care  DNR/DNI limits placed   Patient to be discharged back to [address] [place] [address] for rehab  [facility] with Reynaldo Bravo on the phone and verified continued goals of rehab   Called both sons Charles Em ([phone]) and Donte Moseley ([phone]) to review [place] and their roles in medical decision making  No answers, unable to leave voicemail for Charles Em as inbox is full, and left message for Donte Moseley to call back with questions, concerns about being a substitute decision maker   Per Reynaldo Bravo they are all on the same page as far as decision making at that they defer decisions to her   Will add Charles Em and Donte Moseley to patient's contact list if they are needed to be reached in the future  3   Social support  Loyd Mercado is doing well, hopeful for rehab over the next few weeks  She has good support at home  · Supportive listening provided  · Provided anticipatory guidance    4  Follow up    [address] will continue to follow peripherally until patient discharged if needed for further [facility] discussions   Please reach out to on-call provider via [employer] if questions or concerns arise  Interval History  Chart reviewed before visit    Patient asleep during visit  Spoke with wife who is doing well and looking forward to transfer to rehab this afternoon      Review of Systems   Unable to perform ROS: Mental status change     Medications    Current Facility-Administered Medications:     acetaminophen (TYLENOL) tablet 650 mg, 650 mg, Oral, Q6H PRN, Renee Prater MD, 650 mg at "05/02/23 1102    apixaban (ELIQUIS) tablet 5 mg, 5 mg, Per PEG Tube, BID, Angelique Reyes MD, 5 mg at 05/03/23 0843    aspirin chewable tablet 81 mg, 81 mg, Per PEG Tube, Daily, [employer], PA-C, 81 mg at 05/03/23 0843    atorvastatin (LIPITOR) tablet 40 mg, 40 mg, Per PEG Tube, QPM, Holli Thrasher PA-C, 40 mg at 05/03/23 0030    famotidine (PEPCID) tablet 20 mg, 20 mg, Per G Tube, BID, Angelique Reyes MD, 20 mg at 05/03/23 0843    guaiFENesin (ROBITUSSIN) oral liquid 200 mg, 200 mg, Per G Tube, 4x Daily PRN, Buzz Standard, CRNP, 200 mg at 04/28/23 1951    insulin lispro (HumaLOG) 100 units/mL subcutaneous injection 1-5 Units, 1-5 Units, Subcutaneous, Q6H, Angelique Reyes MD, 1 Units at 05/03/23 0916    insulin lispro (HumaLOG) 100 units/mL subcutaneous injection 3 Units, 3 Units, Subcutaneous, TID With Meals, MOISES Arnold, 3 Units at 05/03/23 1244    loperamide (IMODIUM) capsule 2 mg, 2 mg, Oral, Q4H PRN, MOISES Arnold, 2 mg at 05/03/23 1039    nystatin (MYCOSTATIN) oral suspension 500,000 Units, 500,000 Units, Swish & Spit, 4x Daily, Buzz Standard, CRNP, 500,000 Units at 05/01/23 2210    ondansetron (ZOFRAN) injection 4 mg, 4 mg, Intravenous, Q6H PRN, Angelique Reyes MD    oxyCODONE (ROXICODONE) split tablet 2 5 mg, 2 5 mg, Oral, Q8H PRN, MOISES Arnold, 2 5 mg at 05/02/23 1328    psyllium (METAMUCIL) 1 packet, 1 packet, Oral, Daily, MOISES Arnold, 1 packet at 05/03/23 0843    Objective  /72 (BP Location: Left arm)   Pulse 101   Temp 97 8 °F (36 6 °C) (Axillary)   Resp 18   Ht 6' 2\" (1 88 m)   Wt 54 4 kg (120 lb)   SpO2 99%   BMI 15 41 kg/m²   Physical Exam  Vitals reviewed  Constitutional:       General: He is sleeping  Appearance: He is cachectic     HENT:      Right Ear: External ear normal       Left Ear: External ear normal       Nose: Nose normal       Mouth/Throat:      Pharynx: " Oropharynx is clear  Eyes:      Comments: closed   Cardiovascular:      Rate and Rhythm: Normal rate  Pulmonary:      Effort: Pulmonary effort is normal    Skin:     Findings: No rash  Lab Results:   CBC: No results found for: WBC, HGB, HCT, MCV, PLT, ADJUSTEDWBC, MCH, MCHC, RDW, MPV, NRBC, CMP:   Lab Results   Component Value Date    SODIUM 137 05/03/2023    K 3 7 05/03/2023     05/03/2023    CO2 34 (H) 05/03/2023    BUN 18 05/03/2023    CREATININE 0 58 (L) 05/03/2023    CALCIUM 8 6 05/03/2023    EGFR 99 05/03/2023     Imaging Studies: I have personally reviewed pertinent reports  CTA head and neck w wo contrast  Result Date: 5/3/2023  Impression: 1  Secretions and/or debris within infraglottic larynx extending to the right main bronchus concerning for aspiration  2   Recent punctuate left parietal and right cerebellar infarcts are not conspicuous due to CT modality limitation (seen in recent brain MRI)  3   Patent major vasculature of the [place] without high-grade stenosis  No aneurysm  4   No hemodynamically significant stenosis or dissection of cervical carotid arteries  5   Severe atherosclerotic stenosis of the right vertebral artery origin  6   Partially imaged right hydropneumothorax  See recent CTA chest report  MRI Brain  Result date: 5/2/2023  IMPRESSION:  Punctate right posterior cerebellar focus of acute ischemia  Additional punctate focus of left postcentral gyrus parietal lobe high vertex acute ischemia  Old left cerebellar infarct as seen on CT  EKG, Pathology, and Other Studies: I have personally reviewed pertinent reports  Counseling / Coordination of Care  Total floor / unit time spent today 20 minutes  Greater than 50% of total time was spent with the patient and / or family counseling and / or coordinating of care   A description of the counseling / coordination of care: Chart reviewed, provided medical updates, determined goals of care, provided "anticipatory guidance, determined competency and POA/HCA, determined social/family support, provided psychosocial support  Cait Rocha PA-C  Palliative and Supportive Care  Clinic/Answering Service: [phone]  You can find me on [employer]! Portions of this document may have been created using dictation software and as such some \"sound alike\" terms may have been generated by the system  Do not hesitate to contact me with any questions or clarifications      "

## 2023-05-03 NOTE — DISCHARGE SUMMARY
[address]  Discharge- Cy Flow 1945, 78 y o  male MRN: [id number]  Unit/Bed#: -01 Encounter: [id number]  Primary Care Provider: Madelyn Dickson MD   Date and time admitted to hospital: 4/26/2023  9:36 AM    * Severe sepsis ([facility] [address] )  Assessment & Plan  · POA secondary to suspected pneumonia with hypothermia, tachycardia, tachypnea-- sepsis parameters much improved   · Chest x-ray: Infiltrate at the right lung base along with a small pleural effusion  · Procalcitonin 0 6 -> 0 48  · Blood cultures: NGTD  · Strep pneumo/urine Legionella negative  · Sputum culture: S aureus    · Sepsis parameters have much improved  · Completed 5 days of cefepime (5/1)  Vanco has been discontinued due to negative MRSA screen  CVA (cerebral vascular accident) [facility])  Assessment & Plan  · MRI: some scarring in the left cerebellum  New small ischemic stroke  · Neurology input appreciated   · Neurology does not feel that new ischemic stroke is contributing to his encephalopathy   · No need for ASA  Due to his nutritional status and normal lipid panel, statin can be held  · Recommend to transition to either: Xarelto 20 mg daily or Pradaxa 150mg twice daily or dose Lovenox twice daily  · Transition from eliquis to xarelto (5/3)   · Will need outpatient follow up with neurology in 6 weeks        Encephalopathy  Assessment & Plan  · No focal neurological deficits   · Likely toxic metabolic encephalopathy due to multifactorial including underlying malignancy, malnutrition, receiving benzodiazepine  · Much improved   · CTH and MRI brain results appreciated     Diarrhea, unspecified  Assessment & Plan  · Diarrhea, possibly related to tube feeding  · C  difficile and stool enteric panel-negative  · Unfortunately, the patient continues to have diarrhea especially after receives boluses  As infectious cause is ruled out, started on fiber and imodium     · Tube feeds is now back to his home "regime- bolus 480 ml TID        Hyperkalemia  Assessment & Plan  · Unclear etiology, possibly due to volume depletion and/or related to patient's tube feeds  Resolved  · Patient received insulin/dextrose, albuterol  · Nephrology recommendations appreciated      Hypercalcemia of malignancy  Assessment & Plan  · Possibly secondary to patient's underlying malignancy  · This was treated  Ca level now is mildly low  · Improved with IV fluids  · Nephrology recommendations appreciated  · Continue with nutritional supplement   · Monitor calcium       Pressure injury of skin of buttock  Assessment & Plan  · Present on admission   · Continue with local wound care        Acute respiratory failure with hypoxia ([facility] [address] )  Assessment & Plan  · Secondary to pneumonia     · This has resolved  Now on RA  · Overnight 4/28 he had an episode of severe respiratory distress  CTA chest PE study negative for PE  At that time patient verbalized that he would want CPR and to be on a ventilator if his respiratory status declined  Code status was addressed again after his mentation improved and he confirmed that he would not want compressions or to be on a ventilator  He also admits feeling confused the night he was having respiratory distress  Now is a DNR/DNI  Other pulmonary embolism without acute cor pulmonale (HCC)  Assessment & Plan  · Eliquis transitioned to Xarelto  Severe protein-calorie malnutrition ([facility] [address] )  Assessment & Plan  Body mass index is 15 41 kg/m²  · Continue tube feed   · Nutrition input appreciated  Recommendation: \"Recommend transitioning to home TF regimen as clinically appropriate: Jevity 1 5 480 mL bolus TID at 0800, 1300, 1700  Recommend adding Prosource NoCarb once daily  \"    · Tube feeds now at goal      Primary malignant neoplasm of base of tongue [facility])  Assessment & Plan  · Patient was following with [facility] earlier in the year, however, has not had treatment recently as he was hospitalized   " He has been in rehab for the last few weeks and malignancy treatment has been on hold for this reason    · Will need outpatient follow-up with hematology/oncology     Type 2 diabetes mellitus without complication, without long-term current use of insulin [facility])  Assessment & Plan  Lab Results   Component Value Date    HGBA1C 5 8 (H) 12/30/2022       Recent Labs     05/03/23  0213 05/03/23  0757 05/03/23  1241 05/03/23  1352   POCGLU 308* 187* 351* 260*       Blood Sugar Average: Last 72 hrs:  (P) 242 [id number]     · Continue tube feeds  · Sliding scale insulin with coverage with tube feed boluses    Dysphagia, unspecified  Assessment & Plan  · PEG tube in place  · Speech input appreciated  Severe impairment during pharyngeal stage with significant aspiration risk  Possible micro aspiration  · Okay with pleasure feeds           Discharging Physician / Practitioner: MOISES Johnson  PCP: Brinda Lorenz MD  Admission Date:   Admission Orders (From admission, onward)     Ordered        04/26/23 1203  [facility] [facility] [place]  Once                      Discharge Date: 05/03/23    Medical Problems     Resolved Problems  Date Reviewed: 5/3/2023   None         Consultations During Hospital Stay:  · IP CONSULT TO PHARMACY  · IP CONSULT TO NEPHROLOGY  · IP CONSULT TO NUTRITION SERVICES  · IP CONSULT TO PALLIATIVE CARE  · IP CONSULT TO NEUROLOGY  · IP CONSULT TO CASE MANAGEMENT  · IP CONSULT TO NUTRITION SERVICES      Procedures Performed:   XR chest portable  Result Date: 4/27/2023  Infiltrate at the right lung base along with a small pleural effusion  CTA chest pe study  Result Date: 4/28/2023  1  Diffuse aspiration or endobronchial pneumonia throughout the lungs, similar to the prior exam  2  Stable airspace consolidation and atelectasis in the right lower lobe  3  Stable small right hydropneumothorax  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  4  No evidence of acute pulmonary embolus   Previously visualized emboli in left lower lobe and lingula segmental and subsegmental bronchi have resolved  CTA head and neck   1   Secretions and/or debris within infraglottic larynx extending to the right main bronchus concerning for aspiration  2   Recent punctuate left parietal and right cerebellar infarcts are not conspicuous due to CT modality limitation (seen in recent brain MRI)  3   Patent major vasculature of the [place] without high-grade stenosis  No aneurysm  4   No hemodynamically significant stenosis or dissection of cervical carotid arteries  5   Severe atherosclerotic stenosis of the right vertebral artery origin  6   Partially imaged right hydropneumothorax  See recent CTA chest report  MRI brain  Punctate right posterior cerebellar focus of acute ischemia  Additional punctate focus of left postcentral gyrus parietal lobe high vertex acute ischemia  Old left cerebellar infarct as seen on CT  Significant Findings / Test Results:   · Refer to above     Incidental Findings:   · None      Test Results Pending at Discharge (will require follow up): · None      Outpatient Tests Requested:  · Follow up with PCP, hematology and neurology     Complications:  None     Reason for Admission: Weakness - Generalized (States has tongue and lung CA  Sent here from [address] [place] [address] with weakness  EMS states family considering Hospice )        Hospital Course:     Gin Galvan is a 78 y o  male patient who originally presented to the hospital on 4/26/2023 due to shortness of breath  Patient found to have pneumonia and met severe sepsis criteria  He completed 5 days of IV cefepime  He was noted to have multiple electrolytes abnormalities including hyperkalemia, hypercalcemia, and hyponatremia  He was evaluated by nephrology  Patient received volume expansion  Electrolyte abnormalities now resolved    The patient was able to be titrated off of oxygen supplement after was noted to be hypoxic due to "pneumonia  He was evaluated by speech therapist and deemed to be a very high risk for aspiration  Continue with nutritional support via PEG tube  The patient was started on Banatrol, Metamucil and Imodium to help with diarrhea  So far stool cultures and C  Difficile negative  Patient received 1 dose of Ativan to help with sleep however developed acute encephalopathy persisted for 48 to 72 hours  Subsequently CAT scan of the head was obtained  Due to the finding of ovoid hypodensity in the left cerebellum is new from prior CAT scan, MRI of the brain was done to follow-up  Patient was found to have a new small ischemic stroke  Neurology evaluation done  At this time, as no further neurological testing this is indicated  Neurology recommend to transition to Eliquis to other anticoagulation  I discussed this in detail with the patient's wife and are agreeable to switch him to Xarelto 20 mg daily  There is no need for the patient to start on aspirin or statin  The patient was evaluated by PT/OT and deemed to be a candidate for further rehabilitation and he is being sent back to [facility] today  Please see above list of diagnoses and related plan for additional information  Condition at Discharge: fair     Discharge Day Visit / Exam:     Subjective:  Feeling better today  No pain  Vitals: Blood Pressure: 125/71 (05/03/23 1501)  Pulse: (!) 110 (05/03/23 1501)  Temperature: 98 3 °F (36 8 °C) (05/03/23 1501)  Temp Source: Oral (05/03/23 1501)  Respirations: 17 (05/03/23 1501)  Height: 6' 2\" (188 cm) (04/26/23 0946)  Weight - Scale: 54 4 kg (120 lb) (04/26/23 0946)  SpO2: 98 % (05/03/23 1501)  Exam:   Physical Exam  Vitals and nursing note reviewed  Constitutional:       General: He is not in acute distress  Appearance: Normal appearance  Comments: Frail appearing     HENT:      Head: Normocephalic and atraumatic        Right Ear: External ear normal       Left Ear: External ear normal       " Nose: Nose normal       Mouth/Throat:      Mouth: Mucous membranes are moist       Pharynx: Oropharynx is clear  Eyes:      General:         Right eye: No discharge  Left eye: No discharge  Extraocular Movements: Extraocular movements intact  Pupils: Pupils are equal, round, and reactive to light  Cardiovascular:      Rate and Rhythm: Regular rhythm  Tachycardia present  Pulses: Normal pulses  Heart sounds: Normal heart sounds  No murmur heard  Pulmonary:      Effort: Pulmonary effort is normal  No respiratory distress  Breath sounds: Normal breath sounds  No wheezing or rales  Abdominal:      General: Bowel sounds are normal  There is no distension  Palpations: Abdomen is soft  There is no mass  Tenderness: There is no abdominal tenderness  Musculoskeletal:         General: No swelling, tenderness or deformity  Normal range of motion  Cervical back: Normal range of motion and neck supple  No rigidity  Skin:     General: Skin is warm and dry  Capillary Refill: Capillary refill takes less than 2 seconds  Coloration: Skin is not pale  Findings: No erythema  Neurological:      General: No focal deficit present  Mental Status: He is alert and oriented to person, place, and time  Mental status is at baseline  Comments: With forgetfulness and confusion      Psychiatric:         Mood and Affect: Mood normal          Behavior: Behavior normal          Thought Content: Thought content normal        Discussion with Family: wife- Ashleigh Syed via phone     Discharge instructions/Information to patient and family:   See after visit summary for information provided to patient and family  Provisions for Follow-Up Care:  See after visit summary for information related to follow-up care and any pertinent home health orders        Disposition:     [facility] Readmission: no      Discharge Statement:  I spent 39 minutes discharging the patient  This time was spent on the day of discharge  I had direct contact with the patient on the day of discharge  Greater than 50% of the total time was spent examining patient, answering all patient questions, arranging and discussing plan of care with patient as well as directly providing post-discharge instructions  Additional time then spent on discharge activities  Discharge Medications:  See after visit summary for reconciled discharge medications provided to patient and family        ** Please Note: This note has been constructed using a voice recognition system **

## 2023-05-03 NOTE — PLAN OF CARE
Problem: Nutrition/Hydration-ADULT  Goal: Nutrient/Hydration intake appropriate for improving, restoring or maintaining nutritional needs  Description: Monitor and assess patient's nutrition/hydration status for malnutrition  Collaborate with interdisciplinary team and initiate plan and interventions as ordered  Monitor patient's weight and dietary intake as ordered or per policy  Utilize nutrition screening tool and intervene as necessary  Determine patient's food preferences and provide high-protein, high-caloric foods as appropriate       INTERVENTIONS:  - Monitor oral intake, urinary output, labs, and treatment plans  - Assess nutrition and hydration status and recommend course of action  - Evaluate amount of meals eaten  - Assist patient with eating if necessary   - Allow adequate time for meals  - Recommend/ encourage appropriate diets, oral nutritional supplements, and vitamin/mineral supplements  - Order, calculate, and assess calorie counts as needed  - Recommend, monitor, and adjust tube feedings and TPN/PPN based on assessed needs  - Assess need for intravenous fluids  - Provide specific nutrition/hydration education as appropriate  - Include patient/family/caregiver in decisions related to nutrition  5/2/2023 2242 by Ari Bates RN  Outcome: Progressing  5/2/2023 2240 by Ari Bates RN  Outcome: Progressing     Problem: MOBILITY - ADULT  Goal: Maintain or return to baseline ADL function  Description: INTERVENTIONS:  -  Assess patient's ability to carry out ADLs; assess patient's baseline for ADL function and identify physical deficits which impact ability to perform ADLs (bathing, care of mouth/teeth, toileting, grooming, dressing, etc )  - Assess/evaluate cause of self-care deficits   - Assess range of motion  - Assess patient's mobility; develop plan if impaired  - Assess patient's need for assistive devices and provide as appropriate  - Encourage maximum independence but intervene and supervise when necessary  - Involve family in performance of ADLs  - Assess for home care needs following discharge   - Consider OT consult to assist with ADL evaluation and planning for discharge  - Provide patient education as appropriate  5/2/2023 2242 by Beck Carrillo RN  Outcome: Progressing  5/2/2023 2240 by Beck Carrillo RN  Outcome: Progressing  Goal: Maintains/Returns to pre admission functional level  Description: INTERVENTIONS:  - Perform BMAT or MOVE assessment daily    - Set and communicate daily mobility goal to care team and patient/family/caregiver  - Collaborate with rehabilitation services on mobility goals if consulted  - Reposition patient every 2 hours    - Record patient progress and toleration of activity level   5/2/2023 2242 by Beck Carrillo RN  Outcome: Progressing  5/2/2023 2240 by Beck Carrillo RN  Outcome: Progressing     Problem: PAIN - ADULT  Goal: Verbalizes/displays adequate comfort level or baseline comfort level  Description: Interventions:  - Encourage patient to monitor pain and request assistance  - Assess pain using appropriate pain scale  - Administer analgesics based on type and severity of pain and evaluate response  - Implement non-pharmacological measures as appropriate and evaluate response  - Consider cultural and social influences on pain and pain management  - Notify physician/advanced practitioner if interventions unsuccessful or patient reports new pain  5/2/2023 2242 by Beck Carrillo RN  Outcome: Progressing  5/2/2023 2240 by Beck Carrillo RN  Outcome: Progressing     Problem: INFECTION - ADULT  Goal: Absence or prevention of progression during hospitalization  Description: INTERVENTIONS:  - Assess and monitor for signs and symptoms of infection  - Monitor lab/diagnostic results  - Monitor all insertion sites, i e  indwelling lines, tubes, and drains  - Monitor endotracheal if appropriate and nasal secretions for changes in amount and color  - [place] appropriate cooling/warming therapies per order  - Administer medications as ordered  - Instruct and encourage patient and family to use good hand hygiene technique  - Identify and instruct in appropriate isolation precautions for identified infection/condition  5/2/2023 2242 by Grayling Sacks, RN  Outcome: Progressing  5/2/2023 2240 by Grayling Sacks, RN  Outcome: Progressing     Problem: SAFETY ADULT  Goal: Maintain or return to baseline ADL function  Description: INTERVENTIONS:  -  Assess patient's ability to carry out ADLs; assess patient's baseline for ADL function and identify physical deficits which impact ability to perform ADLs (bathing, care of mouth/teeth, toileting, grooming, dressing, etc )  - Assess/evaluate cause of self-care deficits   - Assess range of motion  - Assess patient's mobility; develop plan if impaired  - Assess patient's need for assistive devices and provide as appropriate  - Encourage maximum independence but intervene and supervise when necessary  - Involve family in performance of ADLs  - Assess for home care needs following discharge   - Consider OT consult to assist with ADL evaluation and planning for discharge  - Provide patient education as appropriate  5/2/2023 2242 by Grayling Sacks, RN  Outcome: Progressing  5/2/2023 2240 by Grayling Sacks, RN  Outcome: Progressing  Goal: Maintains/Returns to pre admission functional level  Description: INTERVENTIONS:  - Perform BMAT or MOVE assessment daily    - Set and communicate daily mobility goal to care team and patient/family/caregiver  - Collaborate with rehabilitation services on mobility goals if consulted  - Reposition patient every 2 hours    - Record patient progress and toleration of activity level   5/2/2023 2242 by Grayling Sacks, RN  Outcome: Progressing  5/2/2023 2240 by Grayling Sacks, RN  Outcome: Progressing  Goal: Patient will remain free of falls  Description: INTERVENTIONS:  - Educate patient/family on patient safety including physical limitations  - Instruct patient to call for assistance with activity   - Consult OT/PT to assist with strengthening/mobility   - Keep Call bell within reach  - Keep bed low and locked with side rails adjusted as appropriate  - Keep care items and personal belongings within reach  - Initiate and maintain comfort rounds  - Make Fall Risk Sign visible to staff  - Offer Toileting every 2 Hours, in advance of need  - Initiate/Maintain bed alarm  - Apply yellow socks and bracelet for high fall risk patients  - Consider moving patient to room near nurses station  5/2/2023 2242 by Beck Carrillo RN  Outcome: Progressing  5/2/2023 2240 by Beck Carrillo RN  Outcome: Progressing     Problem: DISCHARGE PLANNING  Goal: Discharge to home or other facility with appropriate resources  Description: INTERVENTIONS:  - Identify barriers to discharge w/patient and caregiver  - Arrange for needed discharge resources and transportation as appropriate  - Identify discharge learning needs (meds, wound care, etc )  - Arrange for interpretive services to assist at discharge as needed  - Refer to Case Management Department for coordinating discharge planning if the patient needs post-hospital services based on physician/advanced practitioner order or complex needs related to functional status, cognitive ability, or social support system  5/2/2023 2242 by Beck Carrillo, RN  Outcome: Progressing  5/2/2023 2240 by Beck Carrillo, RN  Outcome: Progressing     Problem: Knowledge Deficit  Goal: Patient/family/caregiver demonstrates understanding of disease process, treatment plan, medications, and discharge instructions  Description: Complete learning assessment and assess knowledge base    Interventions:  - Provide teaching at level of understanding  - Provide teaching via preferred learning methods  5/2/2023 2242 by Beck Carrillo, RN  Outcome: Progressing  5/2/2023 2240 by Chano Bright RN  Outcome: Progressing     Problem: RESPIRATORY - ADULT  Goal: Achieves optimal ventilation and oxygenation  Description: INTERVENTIONS:  - Assess for changes in respiratory status  - Assess for changes in mentation and behavior  - Position to facilitate oxygenation and minimize respiratory effort  - Oxygen administered by appropriate delivery if ordered  - Initiate smoking cessation education as indicated  - Encourage broncho-pulmonary hygiene including cough, deep breathe, Incentive Spirometry  - Assess the need for suctioning and aspirate as needed  - Assess and instruct to report SOB or any respiratory difficulty  - Respiratory Therapy support as indicated  5/2/2023 2242 by Chano Bright RN  Outcome: Progressing  5/2/2023 2240 by Chano Bright RN  Outcome: Progressing     Problem: Prexisting or High Potential for Compromised Skin Integrity  Goal: Skin integrity is maintained or improved  Description: INTERVENTIONS:  - Identify patients at risk for skin breakdown  - Assess and monitor skin integrity  - Assess and monitor nutrition and hydration status  - Monitor labs   - Assess for incontinence   - Turn and reposition patient  - Assist with mobility/ambulation  - Relieve pressure over bony prominences  - Avoid friction and shearing  - Provide appropriate hygiene as needed including keeping skin clean and dry  - Evaluate need for skin moisturizer/barrier cream  - Collaborate with interdisciplinary team   - Patient/family teaching  - Consider wound care consult   5/2/2023 2242 by Chano Bright RN  Outcome: Progressing  5/2/2023 2240 by Chano Bright RN  Outcome: Progressing

## 2023-05-03 NOTE — ASSESSMENT & PLAN NOTE
· Diarrhea, possibly related to tube feeding  · C  difficile and stool enteric panel-negative  · Unfortunately, the patient continues to have diarrhea especially after receives boluses  As infectious cause is ruled out, started on fiber and imodium     · Tube feeds is now back to his home regime- bolus 480 ml TID

## 2023-05-03 NOTE — OCCUPATIONAL THERAPY NOTE
Occupational Therapy Re-Evaluation     Gin Galvan    5/3/2023    Principal Problem:    Severe sepsis [facility])  Active Problems:    Dysphagia, unspecified    Type 2 diabetes mellitus without complication, without long-term current use of insulin (HCC)    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)    Severe protein-calorie malnutrition ([facility] [address] )    Other pulmonary embolism without acute cor pulmonale (HCC)    Acute respiratory failure with hypoxia (HCC)    Pressure injury of skin of buttock    Hypercalcemia of malignancy    Hyperkalemia    Diarrhea, unspecified    Encephalopathy    CVA (cerebral vascular accident) ([facility] [address] )      [unfilled]    Past Surgical History:   Procedure Laterality Date    BLADDER TUMOR EXCISION      EGD      IR CHOLECYSTOSTOMY TUBE CHECK/CHANGE/REPOSITION/REINSERTION/UPSIZE  1/5/2022    IR CHOLECYSTOSTOMY TUBE CHECK/CHANGE/REPOSITION/REINSERTION/UPSIZE  3/4/2022    IR CHOLECYSTOSTOMY TUBE CHECK/CHANGE/REPOSITION/REINSERTION/UPSIZE  3/24/2022    IR CHOLECYSTOSTOMY TUBE PLACEMENT  12/27/2021    IR THORACENTESIS  3/23/2023        05/03/23 0810   OT Last Visit   OT Visit Date 05/03/23   Note Type   Note type Re-Evaluation  (d/t medical status change)   Pain Assessment   Pain Assessment Tool 0-10   Pain Score No Pain   Restrictions/Precautions   Weight Bearing Precautions Per Order No   Other Precautions Chair Alarm; Bed Alarm; Fall Risk   Home Living   Type of Home SNF   Home Layout One level;Performs ADLs on one level   Bathroom Shower/Tub Walk-in shower   Bathroom Toilet Standard   Bathroom Equipment Grab bars in shower; Shower chair;Grab bars around toilet   [facility]   Prior Function   Level of [place] Needs assistance with functional mobility; Needs assistance with ADLs   Lives With Facility staff   Receives Help From Personal care attendant   IADLs Family/Friend/Other provides transportation; Family/Friend/Other provides meals; Family/Friend/Other provides medication management   Falls in the last 6 months 1 to 4   Vocational Retired   Comments PLOF and social history obtained from pt's chart via previous OT eval on 4/27/23   ADL   UB Bathing Assistance 4  Minimal Assistance   LB Bathing Assistance 2  Maximal [address] 3  Moderate Assistance    [address] 1  Total Assistance   Bed Mobility   Supine to Sit 2  Maximal assistance   Additional items Assist x 1;HOB elevated; Increased time required;Verbal cues;LE management   Additional Comments BP seated at EOB prior to OOB activity = 92/56   Transfers   Sit to Stand 4  Minimal assistance   Additional items Assist x 2; Increased time required;Verbal cues   Stand to Sit 4  Minimal assistance   Additional items Assist x 2; Increased time required;Verbal cues   Stand pivot 3  Moderate assistance   Additional items Assist x 2; Increased time required;Verbal cues  (HHA)   Additional Comments BP seated in recliner x 3-5 mins = 112/56   Balance   Static Sitting Fair -   Dynamic Sitting Poor +   Static Standing Poor   Dynamic Standing Poor -   Ambulatory Poor -   Activity Tolerance   Activity Tolerance Patient limited by fatigue   Nurse Made Aware RHYS B[place]   RUE Assessment   RUE Assessment X  (AROM WFL)   RUE Strength   RUE Overall Strength Deficits  (3/5)   LUE Assessment   LUE Assessment X  (AROM WFL)   LUE Strength   LUE Overall Strength Deficits  (3/5)   Hand Function   Gross Motor Coordination Impaired   Fine Motor Coordination Impaired   Sensation   Light Touch No apparent deficits   Vision-Basic Assessment   Current Vision Wears glasses all the time   Cognition   Overall Cognitive Status Impaired   Arousal/Participation Responsive   Attention Attends with cues to redirect   Orientation Level Oriented to person;Oriented to place;Oriented to time;Disoriented to situation   Memory Decreased recall of recent events   Following Commands Follows one step "commands with increased time or repetition   Assessment   Limitation Decreased ADL status; Decreased UE strength;Decreased Safe judgement during ADL;Decreased cognition;Decreased endurance;Decreased fine motor control;Decreased self-care trans;Decreased high-level ADLs   Prognosis Fair   Assessment Pt is a 78 y o  male seen for OT evaluation s/p admit to [facility] on 4/26/2023 w/ Severe sepsis ([address] )  Comorbidities affecting pt's functional performance at time of assessment include: CA, DM, Bladder CA, Hypercholesterolemia, HTN, Hyponatremia  Personal factors affecting pt at time of IE include:difficulty performing ADLS, limited insight into deficits and decreased initiation and engagement   Prior to admission, pt required A with ADLs  Upon evaluation: the following deficits impact occupational performance: decreased strength, decreased balance, decreased tolerance, impaired memory, impaired sequencing, impaired problem solving and decreased safety awareness  Pt to benefit from continued skilled OT tx while in the hospital to address deficits as defined above and maximize level of functional independence w ADL's and functional mobility  Occupational Performance areas to address include: bathing/shower, toilet hygiene, dressing, functional mobility and clothing management  From OT standpoint, recommendation at time of d/c would be STR  Goals   Patient Goals \"to feel better\"   Plan   Treatment Interventions ADL retraining;Functional transfer training;UE strengthening/ROM; Endurance training;Patient/family training;Neuromuscular reeducation; Fine motor coordination activities; Compensatory technique education; Energy conservation; Activityengagement   Goal Expiration Date 05/13/23   OT Treatment Day 2   OT Frequency 3-5x/wk   Recommendation   OT Discharge Recommendation Post acute rehabilitation services   Additional Comments  Pt seen as a co-REeval with PT due to the patient's co-morbidities, clinically unstable " presentation, and present impairments which are a regression from the patient's baseline  Additional Comments 2 The patient's raw score on the AM-PAC Daily Activity Inpatient Short Form is 12  A raw score of less than 19 suggests the patient may benefit from discharge to post-acute rehabilitation services  Please refer to the recommendation of the Occupational Therapist for safe discharge planning     AM-PAC Daily Activity Inpatient   Lower Body Dressing 1   Bathing 2   Toileting 1   Upper Body Dressing 2   Grooming 3   Eating 3   Daily Activity Raw Score 12   Daily Activity Standardized Score (Calc for Raw Score >=11) 30 6   AM-PAC Applied Cognition Inpatient   Following a Speech/Presentation 3   Understanding Ordinary Conversation 3   Taking Medications 2   Remembering Where Things Are Placed or Put Away 2   Remembering List of 4-5 Errands 2   Taking Care of Complicated Tasks 2   Applied Cognition Raw Score 14   Applied Cognition Standardized Score 32 02     GOALS:    Pt will achieve the following within specified time frame: STG  Pt will achieve the following goals within 5 days    *ADL transfers with Mod (A) for inc'd independence with ADLs/purposeful tasks    *UB ADL with Min (A) for inc'd independence with self cares    *LB ADL with Max (A) using AE prn for inc'd independence with self cares    *Toileting with Max (A) for clothing management and hygiene for return to PLOF with personal care    *Increase static stand balance to P+ and dyn stand balance to P for inc'd safety with standing purposeful tasks    *Increase stand tolerance x3 m for inc'd tolerance with standing purposeful tasks    *Participate in 10m UE therex to increase overall stamina/activity tolerance for purposeful tasks    *Bed mobility- Mod (A) for inc'd independence to manage own comfort and initiate EOB & OOB purposeful tasks    Pt will achieve the following within specified time frame: LTG  Pt will achieve the following goals within 10 days    *ADL transfers with Min (A) for inc'd independence with ADLs/purposeful tasks    *UB ADL with CGA for inc'd independence with self cares    *LB ADL with Mod (A) using AE prn for inc'd independence with self cares    *Toileting with Mod (A) for clothing management and hygiene for return to PLOF with personal care    *Increase static stand balance to F- and dyn stand balance to P+ for inc'd safety with standing purposeful tasks    *Increase stand tolerance x5 m for inc'd tolerance with standing purposeful tasks    *Bed mobility- Min (A) for inc'd independence to manage own comfort and initiate EOB & OOB purposeful tasks      Rachel Humphrey MS, OTR/L

## 2023-05-03 NOTE — ASSESSMENT & PLAN NOTE
· Patient was following with [facility] earlier in the year, however, has not had treatment recently as he was hospitalized   He has been in rehab for the last few weeks and malignancy treatment has been on hold for this reason    · Will need outpatient follow-up with hematology/oncology

## 2023-05-03 NOTE — ASSESSMENT & PLAN NOTE
· Unclear etiology, possibly due to volume depletion and/or related to patient's tube feeds  Resolved     · Patient received insulin/dextrose, albuterol  · Nephrology recommendations appreciated

## 2023-05-05 LAB
1,25(OH)2D3 SERPL-MCNC: 22 PG/ML (ref 24.8–81.5)
EST. AVERAGE GLUCOSE BLD GHB EST-MCNC: 126 MG/DL
HBA1C MFR BLD: 6 %

## 2023-05-09 LAB
MYCOBACTERIUM SPEC CULT: NORMAL
RHODAMINE-AURAMINE STN SPEC: NORMAL

## 2023-05-20 PROBLEM — J18.9 PNA (PNEUMONIA): Status: RESOLVED | Noted: 2023-03-21 | Resolved: 2023-05-20

## 2023-06-01 ENCOUNTER — TELEPHONE (OUTPATIENT)
Dept: NEUROLOGY | Facility: CLINIC | Age: 78
End: 2023-06-01

## 2023-06-01 NOTE — TELEPHONE ENCOUNTER
Admitted @ [facility] - -5/3  Patient discharged to [facility] [place] [address] home  Called and spoke with nursing  Asked if patient was still at their facility  Nurse stated that patient has been  since 23  Closing chart   Removing from discharge list

## 2023-10-18 NOTE — PROGRESS NOTES
[address]  Progress Note - Priscilla Graff 1945, 78 y o  male MRN: [id number]  Unit/Bed#: -01 Encounter: [id number]  Primary Care Provider: Lavinia Abebe MD   Date and time admitted to hospital: 12/21/2021 11:56 PM    * Sepsis [facility])  Assessment & Plan  · Patient is still with active pneumonia and cholecystitis, however all other sepsis parameters resolved  · Sepsis criteria met with tachycardia, fever, lactic acidosis, with pneumonia possible aspiration and cholecystitis  · Patient received sepsis protocol normal saline fluid resuscitation followed by 125 cc/hour  · Lactic acid and procalcitonin trended to normal  · Blood cultures NGTD    · Zosyn 3 375 IV q 6 hours day 5    Cholecystitis  Assessment & Plan  · Patient initially presented with generalized weakness  Evidence of possible cholecystitis seen on CT abdomen/pelvis, confirmed with ultrasound gallbladder  · General surgery consult appreciated  · Holding off on more aggressive measures as of now, as patient is not a good surgical candidate  LFTs continue to raise and RUQ pain is intermittent; surgery team recommends percutaneous cholecystostomy by IR tomorrow  · Tube feeds will be held after midnight; will hold Lovenox tonight and AM doses  · Zosyn day 5   · Trending CMP     Other pulmonary embolism without acute cor pulmonale (HCC)  Assessment & Plan  · As seen on CT chest/abdomen/pelvis- 1st episodes of PE  · Echo unremarkable  · No evidence of DVT on venous Doppler  · Continue Lovenox 1 milligram/kilogram subQ q 12 hours  · Will hold off on starting the patient on full anticoagulation at this time as patient will be evaluated by IR for percutaneous cholecystostomy tube placement Monday  · O2 protocol    · Continue to monitor hemodynamic status        Community acquired pneumonia of left lower lobe of lung  Assessment & Plan  · As seen on CT and chest x-ray  · COVID negative   · Procalcitonin 0 85- will trend · Strep pneumo and Legionella urine antigens negative  · Zosyn day 5  · MRSA-pending        Primary malignant neoplasm of base of tongue ([facility] )  Assessment & Plan  · Patient just completed radiation therapy at [address]  · Supportive care   · Aspiration precautions        Hyperlipidemia due to type 2 diabetes mellitus ([facility] )  Assessment & Plan  · Hold statin for now due to elevated LFTs    Type 2 diabetes mellitus without complication, without long-term current use of insulin [facility])  Assessment & Plan  Lab Results   Component Value Date    HGBA1C 6 3 (H) 12/22/2021       Recent Labs     12/25/21  1820 12/25/21  2045 12/25/21  2356 12/26/21  0601   POCGLU 268* 233* 251* 262*       Blood Sugar Average: Last 72 hrs:  (P) 211 [id number]  · Hold home hypoglycemic agents  · Sliding scale insulin   · Added long acting insulin as tolerates tube feeds- will give half dose of long acting tonight    · Accu-Cheks       Dysphagia, unspecified  Assessment & Plan  · Patient has an indwelling PEG tube  Formula used is a [employer] product, which we do not carry here at the hospital   · Nutrition consult appreciated  · Patient and his wife agreeable to using our product here (Dwayne Malagon) while inpatient       Essential (primary) hypertension  Assessment & Plan  · Blood pressure is slightly elevated  · Continue with lisinopril and metoprolol tartrate; added amlodipine  · Continue to monitor           VTE Prophylaxis:  Enoxaparin (Lovenox)    Patient Centered Rounds: I have performed bedside rounds with nursing staff today      Discussions with Specialists or Other Care Team Provider: surgery   Education and Discussions with Family / Patient: patient and wife via phone     Current Length of Stay: 4 day(s)    Current Patient Status: Inpatient   Certification Statement: The patient will continue to require additional inpatient hospital stay due to Sepsis    Discharge Plan:  Pending clinical course    Code Status: Level 1 - Full Code    Subjective:   Feeling okay  Complain of right upper quadrant pain  Denies any nausea or vomiting  Objective:     Vitals:   Temp (24hrs), Av 4 °F (36 9 °C), Min:98 °F (36 7 °C), Max:98 7 °F (37 1 °C)    Temp:  [98 °F (36 7 °C)-98 7 °F (37 1 °C)] 98 7 °F (37 1 °C)  HR:  [81-96] 94  Resp:  [20] 20  BP: (145-166)/(83-89) 163/88  SpO2:  [86 %-92 %] 89 %  Body mass index is 22 47 kg/m²  Input and Output Summary (last 24 hours): Intake/Output Summary (Last 24 hours) at 2021 1129  Last data filed at 2021 0758  Gross per 24 hour   Intake  5 ml   Output 300 ml   Net 1732 5 ml       Physical Exam:   Physical Exam  Vitals and nursing note reviewed  Constitutional:       General: He is not in acute distress  Appearance: Normal appearance  He is ill-appearing  HENT:      Head: Normocephalic and atraumatic  Right Ear: External ear normal       Left Ear: External ear normal       Nose: Nose normal       Mouth/Throat:      Mouth: Mucous membranes are moist       Pharynx: Oropharynx is clear  Eyes:      General:         Right eye: No discharge  Left eye: No discharge  Extraocular Movements: Extraocular movements intact  Pupils: Pupils are equal, round, and reactive to light  Cardiovascular:      Rate and Rhythm: Normal rate and regular rhythm  Pulses: Normal pulses  Heart sounds: Normal heart sounds  No murmur heard  Pulmonary:      Effort: Pulmonary effort is normal  No respiratory distress  Breath sounds: No wheezing or rales  Comments: Decreased in bases  Abdominal:      General: Bowel sounds are normal  There is no distension  Palpations: Abdomen is soft  There is no mass  Tenderness: There is abdominal tenderness (RUQ)  Musculoskeletal:         General: No swelling, tenderness or deformity  Normal range of motion  Cervical back: Normal range of motion and neck supple  No rigidity     Skin:     General: Skin is warm and dry  Capillary Refill: Capillary refill takes less than 2 seconds  Coloration: Skin is pale  Findings: No erythema  Neurological:      General: No focal deficit present  Mental Status: He is alert and oriented to person, place, and time  Mental status is at baseline  Psychiatric:         Mood and Affect: Mood normal          Behavior: Behavior normal          Thought Content: Thought content normal          Judgment: Judgment normal          Additional Data:     Labs:    Results from last 7 days   Lab Units 12/26/21  0603 12/25/21  0550 12/25/21  0550   WBC Thousand/uL 8 06   < > 10 46*   HEMOGLOBIN g/dL 8 9*   < > 9 3*   HEMATOCRIT % 28 2*   < > 28 9*   PLATELETS Thousands/uL 235   < > 247   NEUTROS PCT %  --   --  88*   LYMPHS PCT %  --   --  3*   MONOS PCT %  --   --  7   EOS PCT %  --   --  0    < > = values in this interval not displayed  Results from last 7 days   Lab Units 12/26/21  0603   SODIUM mmol/L 145   POTASSIUM mmol/L 3 2*   CHLORIDE mmol/L 109*   CO2 mmol/L 30   BUN mg/dL 17   CREATININE mg/dL 0 61   CALCIUM mg/dL 7 6*   ALK PHOS U/L 402*   ALT U/L 414*   AST U/L 464*     Results from last 7 days   Lab Units 12/22/21  0103   INR  1 01     Results from last 7 days   Lab Units 12/26/21  0601 12/25/21  2356 12/25/21  2045 12/25/21  1820 12/25/21  1134 12/25/21  0549 12/24/21  2250 12/24/21  1619 12/24/21  1046 12/24/21  0558 12/23/21  2342 12/23/21  1805   POC GLUCOSE mg/dl 262* 251* 233* 268* 255* 259* 202* 230* 175* 186* 275* 137     Results from last 7 days   Lab Units 12/22/21  0103   HEMOGLOBIN A1C % 6 3*       * I Have Reviewed All Lab Data Listed Above  * Additional Pertinent Lab Tests Reviewed:  [facility] Admission  Reviewed    Imaging:  Imaging Reports Reviewed Today Include: n/a     Recent Cultures (last 7 days):     Results from last 7 days   Lab Units 12/23/21  0758 12/22/21  0119 12/22/21  0103   BLOOD CULTURE   --  No Growth After 4 Days  No Growth After 4 Days  LEGIONELLA URINARY ANTIGEN  Negative  --   --        Last 24 Hours Medication List:   Current Facility-Administered Medications   Medication Dose Route Frequency Provider Last Rate    acetaminophen  650 mg Rectal Q4H PRN Elizabet SessionsTANISHA      amLODIPine  5 mg Per PEG Tube Daily MOISES Vásquez      aspirin  81 mg Per PEG Tube Daily MOISES Vásquez      [START ON 12/27/2021] enoxaparin  1 mg/kg Subcutaneous Q12H [facility] MOISES Vásquez      fluticasone  1 spray Nasal Daily Elinor DaysTANISHA      glycopyrrolate  0 1 mg Intravenous Q8H PRN Elizabet SessionsTANISHA      guaiFENesin  200 mg Oral 4x Daily Elinor DaysTANISHA      [START ON 12/27/2021] insulin glargine  10 Units Subcutaneous HS MOISES Vásquez      insulin glargine  5 Units Subcutaneous HS MOISES Vásquez      insulin lispro  1-6 [facility] Subcutaneous Q6H [address] [facility]      lisinopril  40 mg Per PEG Tube Daily MOISES Vásquez      metoprolol tartrate  25 mg Oral Q12H [facility] MOISES Vásquez      morphine injection  2 mg Intravenous Q4H PRN Elinor DaysTANISHA      ondansetron  4 mg Intravenous Q6H PRN Elinor DaysTANISHA      pantoprazole  40 mg Intravenous Q24H [facility] MOISES Vásquez      piperacillin-tazobactam  3 375 g Intravenous Q6H MOISES Vásquez      potassium chloride  20 mEq Intravenous Once MOISES Vásquez      potassium chloride  40 mEq Per G Tube BID MOISES Vásquez      potassium phosphate  21 mmol Intravenous Once MOISES Vásquez      potassium-sodium phosphates  2 packet Per PEG Tube BID With Meals MOISES Vásquez          Today, Patient Was Seen By: MOISES Vásquez    ** Please Note: Dictation voice to text software may have been used in the creation of this document   ** English